# Patient Record
Sex: MALE | Race: WHITE | NOT HISPANIC OR LATINO | Employment: OTHER | URBAN - METROPOLITAN AREA
[De-identification: names, ages, dates, MRNs, and addresses within clinical notes are randomized per-mention and may not be internally consistent; named-entity substitution may affect disease eponyms.]

---

## 2020-02-18 LAB — CRC RECOMMENDATION EXT: NORMAL

## 2020-02-26 ENCOUNTER — HISTORICAL (OUTPATIENT)
Dept: ADMINISTRATIVE | Facility: HOSPITAL | Age: 70
End: 2020-02-26

## 2020-02-26 LAB
ABS NEUT (OLG): 2.35 X10(3)/MCL (ref 2.1–9.2)
ALBUMIN SERPL-MCNC: 4.3 GM/DL (ref 3.4–5)
ALBUMIN/GLOB SERPL: 1.4 RATIO (ref 1.1–2)
ALP SERPL-CCNC: 108 UNIT/L (ref 50–136)
ALT SERPL-CCNC: 41 UNIT/L (ref 12–78)
AST SERPL-CCNC: 18 UNIT/L (ref 15–37)
BILIRUB SERPL-MCNC: 1.1 MG/DL (ref 0.2–1)
BILIRUBIN DIRECT+TOT PNL SERPL-MCNC: 0.3 MG/DL (ref 0–0.5)
BILIRUBIN DIRECT+TOT PNL SERPL-MCNC: 0.8 MG/DL (ref 0–0.8)
BUN SERPL-MCNC: 21 MG/DL (ref 7–18)
CALCIUM SERPL-MCNC: 9.6 MG/DL (ref 8.5–10.1)
CHLORIDE SERPL-SCNC: 103 MMOL/L (ref 98–107)
CHOLEST SERPL-MCNC: 126 MG/DL (ref 0–200)
CHOLEST/HDLC SERPL: 3.9 {RATIO} (ref 0–5)
CO2 SERPL-SCNC: 33 MMOL/L (ref 21–32)
CREAT SERPL-MCNC: 0.9 MG/DL (ref 0.7–1.3)
EOSINOPHIL NFR BLD MANUAL: 1 % (ref 0–8)
ERYTHROCYTE [DISTWIDTH] IN BLOOD BY AUTOMATED COUNT: 14.1 % (ref 11.5–17)
GLOBULIN SER-MCNC: 3.1 GM/DL (ref 2.4–3.5)
GLUCOSE SERPL-MCNC: 108 MG/DL (ref 74–106)
HCT VFR BLD AUTO: 44.2 % (ref 42–52)
HDLC SERPL-MCNC: 32 MG/DL (ref 35–60)
HGB BLD-MCNC: 14.2 GM/DL (ref 14–18)
LDLC SERPL CALC-MCNC: 72 MG/DL (ref 0–129)
LYMPHOCYTES NFR BLD MANUAL: 24 % (ref 13–40)
MCH RBC QN AUTO: 31.3 PG (ref 27–31)
MCHC RBC AUTO-ENTMCNC: 32.1 GM/DL (ref 33–36)
MCV RBC AUTO: 97.6 FL (ref 80–94)
METAMYELOCYTES NFR BLD MANUAL: 1 %
MONOCYTES NFR BLD MANUAL: 11 % (ref 2–11)
NEUTROPHILS NFR BLD MANUAL: 63 % (ref 47–80)
PLATELET # BLD AUTO: 189 X10(3)/MCL (ref 130–400)
PLATELET # BLD EST: NORMAL 10*3/UL
PMV BLD AUTO: 9.6 FL (ref 7.4–10.4)
POTASSIUM SERPL-SCNC: 4.5 MMOL/L (ref 3.5–5.1)
PROT SERPL-MCNC: 7.4 GM/DL (ref 6.4–8.2)
PSA SERPL-MCNC: 0.86 NG/ML (ref 0–4)
RBC # BLD AUTO: 4.53 X10(6)/MCL (ref 4.7–6.1)
RBC MORPH BLD: NORMAL
SODIUM SERPL-SCNC: 141 MMOL/L (ref 136–145)
TRIGL SERPL-MCNC: 112 MG/DL (ref 30–150)
TSH SERPL-ACNC: 1.09 MIU/L (ref 0.36–3.74)
VLDLC SERPL CALC-MCNC: 22 MG/DL
WBC # SPEC AUTO: 4.2 X10(3)/MCL (ref 4.5–11.5)

## 2021-02-25 ENCOUNTER — HISTORICAL (OUTPATIENT)
Dept: ADMINISTRATIVE | Facility: HOSPITAL | Age: 71
End: 2021-02-25

## 2021-02-25 LAB
ABS NEUT (OLG): 2.65 X10(3)/MCL (ref 2.1–9.2)
ALBUMIN SERPL-MCNC: 4.1 GM/DL (ref 3.4–4.8)
ALBUMIN/GLOB SERPL: 1.6 RATIO (ref 1.1–2)
ALP SERPL-CCNC: 99 UNIT/L (ref 40–150)
ALT SERPL-CCNC: 23 UNIT/L (ref 0–55)
ANISOCYTOSIS BLD QL SMEAR: 1
AST SERPL-CCNC: 22 UNIT/L (ref 5–34)
BASOPHILS NFR BLD MANUAL: 1 % (ref 0–2)
BILIRUB SERPL-MCNC: 1.2 MG/DL
BILIRUBIN DIRECT+TOT PNL SERPL-MCNC: 0.4 MG/DL (ref 0–0.5)
BILIRUBIN DIRECT+TOT PNL SERPL-MCNC: 0.8 MG/DL (ref 0–0.8)
BUN SERPL-MCNC: 29.8 MG/DL (ref 8.4–25.7)
CALCIUM SERPL-MCNC: 8.8 MG/DL (ref 8.8–10)
CHLORIDE SERPL-SCNC: 106 MMOL/L (ref 98–107)
CHOLEST SERPL-MCNC: 120 MG/DL
CHOLEST/HDLC SERPL: 4 {RATIO} (ref 0–5)
CO2 SERPL-SCNC: 29 MMOL/L (ref 23–31)
CREAT SERPL-MCNC: 0.87 MG/DL (ref 0.73–1.18)
ERYTHROCYTE [DISTWIDTH] IN BLOOD BY AUTOMATED COUNT: 15.3 % (ref 11.5–17)
GLOBULIN SER-MCNC: 2.5 GM/DL (ref 2.4–3.5)
GLUCOSE SERPL-MCNC: 96 MG/DL (ref 82–115)
HCT VFR BLD AUTO: 38.3 % (ref 42–52)
HDLC SERPL-MCNC: 31 MG/DL (ref 35–60)
HGB BLD-MCNC: 12 GM/DL (ref 14–18)
LDLC SERPL CALC-MCNC: 78 MG/DL (ref 50–140)
LYMPHOCYTES NFR BLD MANUAL: 22 % (ref 13–40)
MCH RBC QN AUTO: 30.3 PG (ref 27–31)
MCHC RBC AUTO-ENTMCNC: 31.3 GM/DL (ref 33–36)
MCV RBC AUTO: 96.7 FL (ref 80–94)
MICROCYTES BLD QL SMEAR: 1
MONOCYTES NFR BLD MANUAL: 11 % (ref 2–11)
MYELOCYTES NFR BLD MANUAL: 1 %
NEUTROPHILS NFR BLD MANUAL: 65 % (ref 47–80)
PLATELET # BLD AUTO: 181 X10(3)/MCL (ref 130–400)
PLATELET # BLD EST: NORMAL 10*3/UL
PMV BLD AUTO: 9.5 FL (ref 7.4–10.4)
POTASSIUM SERPL-SCNC: 4.3 MMOL/L (ref 3.5–5.1)
PROT SERPL-MCNC: 6.6 GM/DL (ref 5.8–7.6)
PSA SERPL-MCNC: 0.95 NG/ML
RBC # BLD AUTO: 3.96 X10(6)/MCL (ref 4.7–6.1)
RBC MORPH BLD: NORMAL
SODIUM SERPL-SCNC: 142 MMOL/L (ref 136–145)
TRIGL SERPL-MCNC: 56 MG/DL (ref 34–140)
TSH SERPL-ACNC: 0.63 UIU/ML (ref 0.35–4.94)
VLDLC SERPL CALC-MCNC: 11 MG/DL
WBC # SPEC AUTO: 4.6 X10(3)/MCL (ref 4.5–11.5)

## 2022-03-03 ENCOUNTER — EXTERNAL HOSPITAL ADMISSION (OUTPATIENT)
Dept: ADMINISTRATIVE | Facility: CLINIC | Age: 72
End: 2022-03-03

## 2022-03-03 ENCOUNTER — PATIENT OUTREACH (OUTPATIENT)
Dept: ADMINISTRATIVE | Facility: CLINIC | Age: 72
End: 2022-03-03

## 2022-03-03 NOTE — PROGRESS NOTES
C3 nurse attempted to contact Hussein T Carlos Salmeron for a TCC post hospital discharge follow up call. No answer. Left voicemail with callback information. The patient does not have a scheduled HOSFU appointment, and the pt does not have an [facility] PCP.    C3 nurse attempted to contact Hussein ALLEN Carlos Salmeron for a TCC post hospital discharge follow up call. No answer. The patient does not have a scheduled HOSFU appointment, and the pt does not have an [facility] PCP.    C3 nurse spoke with Hussein Gonzalez JrKell's wife for a TCC post hospital discharge follow up call. The patient has a scheduled HOSFU appointment with Dr. Coppola on 3/16/2022.

## 2022-03-04 RX ORDER — LISINOPRIL AND HYDROCHLOROTHIAZIDE 20; 25 MG/1; MG/1
1 TABLET ORAL DAILY
COMMUNITY
End: 2022-06-27 | Stop reason: SDUPTHER

## 2022-03-04 RX ORDER — ATORVASTATIN CALCIUM 80 MG/1
80 TABLET, FILM COATED ORAL DAILY
COMMUNITY
End: 2023-08-16

## 2022-03-04 RX ORDER — ASPIRIN 325 MG
325 TABLET ORAL DAILY
Status: ON HOLD | COMMUNITY
End: 2023-01-21

## 2022-03-04 RX ORDER — CHOLECALCIFEROL (VITAMIN D3) 25 MCG
1000 TABLET ORAL DAILY
COMMUNITY

## 2022-03-04 RX ORDER — VITAMIN E (DL,TOCOPHERYL ACET) 22.5 MG/ML
DROPS ORAL
COMMUNITY
End: 2023-08-24

## 2022-03-04 RX ORDER — AMOXICILLIN 500 MG
1 CAPSULE ORAL EVERY OTHER DAY
Status: ON HOLD | COMMUNITY
End: 2023-06-27 | Stop reason: CLARIF

## 2022-03-04 RX ORDER — TAMSULOSIN HYDROCHLORIDE 0.4 MG/1
CAPSULE ORAL DAILY
COMMUNITY
End: 2022-09-06

## 2022-03-04 RX ORDER — CITALOPRAM 40 MG/1
40 TABLET, FILM COATED ORAL DAILY
COMMUNITY
End: 2022-10-03

## 2022-03-04 RX ORDER — CEFDINIR 300 MG/1
300 CAPSULE ORAL 2 TIMES DAILY
COMMUNITY
End: 2022-10-04

## 2022-03-04 RX ORDER — PYRIDOXINE HCL (VITAMIN B6) 100 MG
100 TABLET ORAL EVERY OTHER DAY
COMMUNITY

## 2022-04-10 ENCOUNTER — HISTORICAL (OUTPATIENT)
Dept: ADMINISTRATIVE | Facility: HOSPITAL | Age: 72
End: 2022-04-10

## 2022-04-28 VITALS
BODY MASS INDEX: 27.23 KG/M2 | HEIGHT: 73 IN | SYSTOLIC BLOOD PRESSURE: 120 MMHG | DIASTOLIC BLOOD PRESSURE: 78 MMHG | WEIGHT: 205.5 LBS | OXYGEN SATURATION: 97 %

## 2022-06-03 ENCOUNTER — DOCUMENTATION ONLY (OUTPATIENT)
Dept: ADMINISTRATIVE | Facility: HOSPITAL | Age: 72
End: 2022-06-03

## 2022-06-27 ENCOUNTER — TELEPHONE (OUTPATIENT)
Dept: INTERNAL MEDICINE | Facility: CLINIC | Age: 72
End: 2022-06-27

## 2022-06-27 DIAGNOSIS — I10 HYPERTENSION, UNSPECIFIED TYPE: Primary | ICD-10-CM

## 2022-06-27 RX ORDER — LISINOPRIL AND HYDROCHLOROTHIAZIDE 20; 25 MG/1; MG/1
1 TABLET ORAL DAILY
Qty: 90 TABLET | Refills: 2 | Status: SHIPPED | OUTPATIENT
Start: 2022-06-27 | End: 2022-10-04

## 2022-06-27 NOTE — TELEPHONE ENCOUNTER
----- Message from Tiana Monae sent at 6/27/2022  3:43 PM CDT -----  Regarding: REFILL  MEDICATION WAS CALLED IN 2 AT DIFFERENT STRENGHTS    LISINOPRIL  HCTZ 20/12.5 MG AND 20/25MG      NEED TO KNOW WHICH ONE IS HE SUPPOSED TO BE TAKING    [facility]  [birth year]  [phone]----INDIO (CARETAKER/GIRLFRIEND)

## 2022-09-26 RX ORDER — LISINOPRIL AND HYDROCHLOROTHIAZIDE 12.5; 2 MG/1; MG/1
TABLET ORAL
Qty: 90 TABLET | Refills: 0 | Status: SHIPPED | OUTPATIENT
Start: 2022-09-26 | End: 2023-01-09

## 2022-10-04 ENCOUNTER — OFFICE VISIT (OUTPATIENT)
Dept: INTERNAL MEDICINE | Facility: CLINIC | Age: 72
End: 2022-10-04
Payer: MEDICARE

## 2022-10-04 VITALS
SYSTOLIC BLOOD PRESSURE: 104 MMHG | HEIGHT: 73 IN | HEART RATE: 75 BPM | WEIGHT: 203.63 LBS | DIASTOLIC BLOOD PRESSURE: 62 MMHG | TEMPERATURE: 98 F | OXYGEN SATURATION: 98 % | BODY MASS INDEX: 26.99 KG/M2

## 2022-10-04 DIAGNOSIS — F41.9 ANXIETY AND DEPRESSION: ICD-10-CM

## 2022-10-04 DIAGNOSIS — C43.9 MALIGNANT MELANOMA OF SKIN, UNSPECIFIED: ICD-10-CM

## 2022-10-04 DIAGNOSIS — F32.A ANXIETY AND DEPRESSION: ICD-10-CM

## 2022-10-04 DIAGNOSIS — N40.0 BENIGN PROSTATIC HYPERPLASIA, UNSPECIFIED WHETHER LOWER URINARY TRACT SYMPTOMS PRESENT: ICD-10-CM

## 2022-10-04 DIAGNOSIS — I10 PRIMARY HYPERTENSION: ICD-10-CM

## 2022-10-04 PROCEDURE — 99214 OFFICE O/P EST MOD 30 MIN: CPT | Mod: ,,, | Performed by: INTERNAL MEDICINE

## 2022-10-04 PROCEDURE — 99214 PR OFFICE/OUTPT VISIT, EST, LEVL IV, 30-39 MIN: ICD-10-PCS | Mod: ,,, | Performed by: INTERNAL MEDICINE

## 2022-10-04 RX ORDER — LANOLIN ALCOHOL/MO/W.PET/CERES
1 CREAM (GRAM) TOPICAL
Status: ON HOLD | COMMUNITY
End: 2023-06-27 | Stop reason: CLARIF

## 2022-10-04 NOTE — ASSESSMENT & PLAN NOTE
-has needed resection twice, encouraged to keep her regular follow-up with dermatology for mole check

## 2022-10-04 NOTE — ASSESSMENT & PLAN NOTE
Continue lisinopril hydrochlorothiazide 20-12.5 mg p.o. daily  Low Sodium Diet (DASH Diet - Less than 2 grams of sodium per day).  Monitor blood pressure daily and log. Report consistent numbers greater than 140/90.  Maintain healthy weight with goal BMI <30. Exercise 30 minutes per day, 5 days per week.  Smoking cessation encouraged to aid in BP reduction.

## 2022-10-04 NOTE — PROGRESS NOTES
Subjective:      Patient ID: Hussein Gonzalez Jr. is a 72 y.o. male.    Chief Complaint: No chief complaint on file.    Mr. Savage is a 71-year-old male here today for wellness visit. 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    Reports feeling generally well since last visit without any acute issues.     Wellness: 4/5/2020  CRS: February 2020; every 10yrs  Pneumococcal: Declined    Cardiology: Dr. Kelley  Urology: Dr. Salas  Infectious disease: Dr. Aguilar    The patient's Health Maintenance was reviewed and the following appears to be due at this time:   Health Maintenance Due   Topic Date Due    Hepatitis C Screening  Never done    COVID-19 Vaccine (1) Never done    TETANUS VACCINE  Never done    Shingles Vaccine (1 of 2) Never done    Pneumococcal Vaccines (Age 65+) (1 - PCV) Never done    Influenza Vaccine (1) Never done        Past Medical History:  Past Medical History:   Diagnosis Date    Anxiety and depression     BPH (benign prostatic hyperplasia)     CVA (cerebrovascular accident)     HTN (hypertension)     Malignant melanoma of skin, unspecified     NSTEMI (non-ST elevated myocardial infarction)     Sepsis, Gram negative      Past Surgical History:   Procedure Laterality Date    COLONOSCOPY      EXCISION OF MELANOMA      SINUS SURGERY      TONSILLECTOMY       Review of patient's allergies indicates:   Allergen Reactions    Acetaminophen     Chlorpheniramine-dextromethorp     Doxylamine-dm-acetaminophen      Other reaction(s): Feeling jittery     Social History     Socioeconomic History    Marital status:      Family History   Problem Relation Age of Onset    Cancer Mother     Cancer Father        Review of Systems    A comprehensive review of systems was performed and is negative except for that stated above  Objective:   There were no vitals taken for this visit.    Physical Exam  Constitutional:        Appearance: Normal appearance.   HENT:      Head: Normocephalic and atraumatic.      Nose: Nose normal.      Mouth/Throat:      Mouth: Mucous membranes are moist.      Pharynx: Oropharynx is clear.   Eyes:      Extraocular Movements: Extraocular movements intact.      Pupils: Pupils are equal, round, and reactive to light.   Cardiovascular:      Rate and Rhythm: Normal rate and regular rhythm.      Pulses: Normal pulses.   Pulmonary:      Effort: Pulmonary effort is normal.      Breath sounds: Normal breath sounds.   Abdominal:      General: Bowel sounds are normal.      Palpations: Abdomen is soft.   Musculoskeletal:         General: Normal range of motion.      Cervical back: Normal range of motion and neck supple.   Skin:     General: Skin is warm.   Neurological:      General: No focal deficit present.      Mental Status: He is alert and oriented to person, place, and time. Mental status is at baseline.   Psychiatric:         Mood and Affect: Mood normal.     Assessment/ Plan:   {There are no diagnoses linked to this encounter. (Refresh or delete this SmartLink)}

## 2022-10-04 NOTE — PROGRESS NOTES
Subjective:      Patient ID: Hussein Gonzalez Jr. is a 72 y.o. male.    Chief Complaint: Follow-up (6 month)  Mr. Ramirez is a 72-year-old gentleman who is here today for his 6 month follow-up.   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   No acute needs or complaints as such today.  Doing well overall     Wellness: 4/5/2021  CRS: February 2020; every 10yrs  Pneumococcal: Declined    Cardiology: Dr. Hussein  Urology: Dr. Salas  Dermatology: Dr. Mason    The patient's Health Maintenance was reviewed and the following appears to be due at this time:   There are no preventive care reminders to display for this patient.     Past Medical History:  Past Medical History:   Diagnosis Date    Anxiety and depression     BPH (benign prostatic hyperplasia)     CVA (cerebrovascular accident)     HTN (hypertension)     Malignant melanoma of skin, unspecified     NSTEMI (non-ST elevated myocardial infarction)     Sepsis, Gram negative      Past Surgical History:   Procedure Laterality Date    COLONOSCOPY      EXCISION OF MELANOMA      SINUS SURGERY      TONSILLECTOMY       Review of patient's allergies indicates:   Allergen Reactions    Acetaminophen     Chlorpheniramine-dextromethorp     Doxylamine-dm-acetaminophen      Other reaction(s): Feeling jittery     Social History     Socioeconomic History    Marital status: Significant Other   Tobacco Use    Smoking status: Never    Smokeless tobacco: Never   Substance and Sexual Activity    Alcohol use: Yes     Alcohol/week: 7.0 standard drinks     Types: 7 Cans of beer per week    Drug use: Never    Sexual activity: Yes     Family History   Problem Relation Age of Onset    Cancer Mother     Cancer Father        Review of Systems    A comprehensive review of systems was performed and is negative except for that stated above  Objective:   BP  "104/62 (BP Location: Left arm, Patient Position: Sitting, BP Method: Large (Manual))   Pulse 75   Temp 98.1 °F (36.7 °C) (Temporal)   Ht 6' 1" (1.854 m)   Wt 92.4 kg (203 lb 9.6 oz)   SpO2 98%   BMI 26.86 kg/m²     Physical Exam  Constitutional:       Appearance: Normal appearance.   HENT:      Head: Normocephalic and atraumatic.      Nose: Nose normal.      Mouth/Throat:      Mouth: Mucous membranes are moist.      Pharynx: Oropharynx is clear.   Eyes:      Extraocular Movements: Extraocular movements intact.      Pupils: Pupils are equal, round, and reactive to light.   Cardiovascular:      Rate and Rhythm: Normal rate and regular rhythm.      Pulses: Normal pulses.   Pulmonary:      Effort: Pulmonary effort is normal.      Breath sounds: Normal breath sounds.   Abdominal:      General: Bowel sounds are normal.      Palpations: Abdomen is soft.   Musculoskeletal:         General: Normal range of motion.      Cervical back: Normal range of motion and neck supple.   Skin:     General: Skin is warm.   Neurological:      General: No focal deficit present.      Mental Status: He is alert and oriented to person, place, and time. Mental status is at baseline.   Psychiatric:         Mood and Affect: Mood normal.     Assessment/ Plan:   1. Primary hypertension  Assessment & Plan:    Continue lisinopril hydrochlorothiazide 20-12.5 mg p.o. daily  Low Sodium Diet (DASH Diet - Less than 2 grams of sodium per day).  Monitor blood pressure daily and log. Report consistent numbers greater than 140/90.  Maintain healthy weight with goal BMI <30. Exercise 30 minutes per day, 5 days per week.  Smoking cessation encouraged to aid in BP reduction.            2. Benign prostatic hyperplasia, unspecified whether lower urinary tract symptoms present  Assessment & Plan:  -symptoms well controlled on tamsulosin, continue   -followed by Urology      3. Anxiety and depression  Assessment & Plan:  On Celexa,  Continue   Practice deep " breathing or abdominal breathing exercises when anxiety occurs.  Exercise daily. Get sunlight daily.  Avoid caffeine, alcohol and stimulants.  Practice positive phrases and repeat throughout the day, along with yoga and relaxation techniques.  Set healthy boundaries, avoid people and conversations that increase stress.  Educated patient on the risks of serotonin based medications such as serotonin modulators and SSRIs/SNRIs including common side effects of nausea, GI upset, headache dizziness as well as the rare risk for worsening symptoms of depression including development of suicidal thoughts or ideations, and serotonin syndrome.         4. Malignant melanoma of skin, unspecified  Assessment & Plan:  -has needed resection twice, encouraged to keep her regular follow-up with dermatology for mole check

## 2022-10-04 NOTE — ASSESSMENT & PLAN NOTE
On Celexa,  Continue   Practice deep breathing or abdominal breathing exercises when anxiety occurs.  Exercise daily. Get sunlight daily.  Avoid caffeine, alcohol and stimulants.  Practice positive phrases and repeat throughout the day, along with yoga and relaxation techniques.  Set healthy boundaries, avoid people and conversations that increase stress.  Educated patient on the risks of serotonin based medications such as serotonin modulators and SSRIs/SNRIs including common side effects of nausea, GI upset, headache dizziness as well as the rare risk for worsening symptoms of depression including development of suicidal thoughts or ideations, and serotonin syndrome.

## 2022-12-14 ENCOUNTER — TELEPHONE (OUTPATIENT)
Dept: INTERNAL MEDICINE | Facility: CLINIC | Age: 72
End: 2022-12-14

## 2022-12-14 ENCOUNTER — OFFICE VISIT (OUTPATIENT)
Dept: INTERNAL MEDICINE | Facility: CLINIC | Age: 72
End: 2022-12-14
Payer: MEDICARE

## 2022-12-14 ENCOUNTER — HOSPITAL ENCOUNTER (OUTPATIENT)
Dept: RADIOLOGY | Facility: HOSPITAL | Age: 72
Discharge: HOME | End: 2022-12-14
Payer: MEDICARE

## 2022-12-14 VITALS
DIASTOLIC BLOOD PRESSURE: 82 MMHG | HEART RATE: 65 BPM | BODY MASS INDEX: 26.64 KG/M2 | OXYGEN SATURATION: 97 % | SYSTOLIC BLOOD PRESSURE: 137 MMHG | HEIGHT: 73 IN | WEIGHT: 201 LBS | TEMPERATURE: 99 F

## 2022-12-14 DIAGNOSIS — R06.02 SHORTNESS OF BREATH: ICD-10-CM

## 2022-12-14 DIAGNOSIS — T14.8XXA MUSCLE STRAIN: Primary | ICD-10-CM

## 2022-12-14 DIAGNOSIS — T14.8XXA SKIN ABRASION: ICD-10-CM

## 2022-12-14 PROCEDURE — 99214 PR OFFICE/OUTPT VISIT, EST, LEVL IV, 30-39 MIN: ICD-10-PCS | Mod: ,,,

## 2022-12-14 PROCEDURE — 71046 X-RAY EXAM CHEST 2 VIEWS: CPT | Mod: TC

## 2022-12-14 PROCEDURE — 99214 OFFICE O/P EST MOD 30 MIN: CPT | Mod: ,,,

## 2022-12-14 RX ORDER — CYCLOBENZAPRINE HCL 5 MG
5 TABLET ORAL NIGHTLY
Qty: 4 TABLET | Refills: 0 | Status: SHIPPED | OUTPATIENT
Start: 2022-12-14 | End: 2022-12-18

## 2022-12-14 RX ORDER — MUPIROCIN 20 MG/G
OINTMENT TOPICAL 3 TIMES DAILY
Qty: 15 G | Refills: 0 | Status: SHIPPED | OUTPATIENT
Start: 2022-12-14 | End: 2022-12-21

## 2022-12-14 RX ORDER — MELOXICAM 15 MG/1
15 TABLET ORAL DAILY PRN
Qty: 14 TABLET | Refills: 0 | Status: SHIPPED | OUTPATIENT
Start: 2022-12-14 | End: 2022-12-28

## 2022-12-14 NOTE — PROGRESS NOTES
Patient ID: Hussein Gonzalez Jr. is a 72 y.o. male.    Chief Complaint: Follow-up    72-year-old gentleman who is here today for  a requested visit.   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  Since last visit patient reports left-sided flank discomfort times 1-2 weeks.  Denies recent over exertions for associated traumas.  Symptoms triggered with range of motion of back.  No bony tenderness noted on exam.  Will initiate on trial on Mobic as well as Flexeril.  Also with some complaints of left near ulcer s/p picking for which we will initiate him on mupirocin topical.  Does have some complaints of occasional shortness of breath /fatigue after previous URI approximately 3-4 weeks ago.    Denies cough, fever, chest pain, orthopnea.    Vital signs stable in no acute distress during office visit. Of note,   Self-reported negative stress test with Cardiology this past August. Will obtain chest x-ray.      Wellness: 4/5/2021  CRS: February 2020; every 10yrs    Cardiology: Dr. Hussein  Urology: Dr. Salas  Dermatology: Dr. Mason      MEDICAL HISTORY:    Past Medical History:   Diagnosis Date    Anxiety and depression     BPH (benign prostatic hyperplasia)     CVA (cerebrovascular accident)     HTN (hypertension)     Malignant melanoma of skin, unspecified     NSTEMI (non-ST elevated myocardial infarction)     Sepsis, Gram negative       Past Surgical History:   Procedure Laterality Date    COLONOSCOPY      EXCISION OF MELANOMA      SINUS SURGERY      TONSILLECTOMY        Social History     Tobacco Use    Smoking status: Never    Smokeless tobacco: Never   Substance Use Topics    Alcohol use: Yes     Alcohol/week: 7.0 standard drinks     Types: 7 Cans of beer per week    Drug use: Never          Health Maintenance Due   Topic Date Due    Hepatitis C Screening  Never done     "      Patient Care Team:  Nayely Andres MD as PCP - General (Internal Medicine)  Nayely Andres MD      Review of Systems   Constitutional:  Negative for fatigue and fever.   HENT:  Negative for congestion, rhinorrhea, sore throat and trouble swallowing.    Eyes:  Negative for redness and visual disturbance.   Respiratory:  Positive for shortness of breath (Occasional). Negative for cough and chest tightness.    Cardiovascular:  Negative for chest pain and palpitations.   Gastrointestinal:  Negative for abdominal pain, constipation, diarrhea, nausea and vomiting.   Genitourinary:  Negative for dysuria, flank pain, frequency and urgency.   Musculoskeletal:  Positive for back pain and myalgias. Negative for arthralgias and gait problem.   Skin:  Negative for rash and wound.   Neurological:  Negative for facial asymmetry, speech difficulty, weakness and headaches.   All other systems reviewed and are negative.    Objective:   /82 (BP Location: Right arm, Patient Position: Sitting, BP Method: Large (Manual))   Pulse 65   Temp 98.6 °F (37 °C)   Ht 6' 1" (1.854 m)   Wt 91.2 kg (201 lb)   SpO2 97%   BMI 26.52 kg/m²      Physical Exam  Constitutional:       General: He is not in acute distress.     Appearance: Normal appearance.   HENT:      Right Ear: Tympanic membrane, ear canal and external ear normal.      Left Ear: Tympanic membrane, ear canal and external ear normal.      Nose: Nose normal.      Mouth/Throat:      Mouth: Mucous membranes are moist.      Pharynx: Oropharynx is clear.   Eyes:      Extraocular Movements: Extraocular movements intact.      Conjunctiva/sclera: Conjunctivae normal.      Pupils: Pupils are equal, round, and reactive to light.   Cardiovascular:      Rate and Rhythm: Normal rate and regular rhythm.      Pulses: Normal pulses.      Heart sounds: Normal heart sounds. No murmur heard.    No gallop.   Pulmonary:      Effort: Pulmonary effort is normal.      Breath sounds: " Normal breath sounds. No wheezing.   Abdominal:      General: Bowel sounds are normal. There is no distension.      Palpations: Abdomen is soft. There is no mass.      Tenderness: There is no abdominal tenderness. There is no right CVA tenderness (none), left CVA tenderness (none) or guarding.   Musculoskeletal:      Cervical back: Normal. No signs of trauma.      Thoracic back: Normal. No signs of trauma.      Lumbar back: No signs of trauma. Decreased range of motion (due to discomfort).        Back:    Skin:     General: Skin is warm and dry.   Neurological:      Mental Status: He is alert. Mental status is at baseline.      Sensory: No sensory deficit.      Motor: No weakness.         Assessment:       ICD-10-CM ICD-9-CM   1. Muscle strain  T14.8XXA 848.9   2. Shortness of breath  R06.02 786.05   3. Skin abrasion  T14.8XXA 919.0        Plan:     Problem List Items Addressed This Visit          Pulmonary    Shortness of breath    Relevant Orders    X-Ray Chest PA And Lateral (Completed)       Orthopedic    Muscle strain - Primary    Relevant Medications    meloxicam (MOBIC) 15 MG tablet    cyclobenzaprine (FLEXERIL) 5 MG tablet    Skin abrasion    Relevant Medications    mupirocin (BACTROBAN) 2 % ointment          Follow up for Previously scheduled and PRN if need.   -plan specifics discussed above    Orders Placed This Encounter    X-Ray Chest PA And Lateral    meloxicam (MOBIC) 15 MG tablet    cyclobenzaprine (FLEXERIL) 5 MG tablet    mupirocin (BACTROBAN) 2 % ointment        Medication List with Changes/Refills   New Medications    CYCLOBENZAPRINE (FLEXERIL) 5 MG TABLET    Take 1 tablet (5 mg total) by mouth nightly. for 4 days    MELOXICAM (MOBIC) 15 MG TABLET    Take 1 tablet (15 mg total) by mouth daily as needed for Pain.    MUPIROCIN (BACTROBAN) 2 % OINTMENT    Apply topically 3 (three) times daily. for 7 days   Current Medications    ASPIRIN 325 MG TABLET    Take 325 mg by mouth once daily.     ATORVASTATIN (LIPITOR) 80 MG TABLET    Take 80 mg by mouth once daily.    CITALOPRAM (CELEXA) 40 MG TABLET    Take 1 tablet by mouth once daily    FERROUS SULFATE (FEOSOL) TAB TABLET    Take 1 tablet by mouth daily with breakfast.    LACTOBACILLUS RHAMNOSUS GG (CULTURELLE) 10 BILLION CELL CAPSULE    Take 1 capsule by mouth once daily.    LISINOPRIL-HYDROCHLOROTHIAZIDE (PRINZIDE,ZESTORETIC) 20-12.5 MG PER TABLET    Take 1 tablet by mouth once daily    OMEGA-3 FATTY ACIDS/FISH OIL (FISH OIL-OMEGA-3 FATTY ACIDS) 300-1,000 MG CAPSULE    Take 1 capsule by mouth every other day.    PYRIDOXINE, VITAMIN B6, (B-6) 100 MG TAB    Take 100 mg by mouth every other day.    TAMSULOSIN (FLOMAX) 0.4 MG CAP    TAKE 1 CAPSULE BY MOUTH ONCE DAILY IN THE EVENING    VITAMIN D (VITAMIN D3) 1000 UNITS TAB    Take 1,000 Units by mouth once daily.    VITAMIN E, DL, ACETATE, 22.5 MG (50 UNIT)/ML DROP    Take by mouth.

## 2022-12-14 NOTE — TELEPHONE ENCOUNTER
----- Message from JULITO Snyder sent at 12/14/2022  1:01 PM CST -----  Please notify patient his chest x-ray was normal.  No pneumonia or lung issue noted

## 2023-01-02 DIAGNOSIS — F41.9 ANXIETY AND DEPRESSION: Primary | ICD-10-CM

## 2023-01-02 DIAGNOSIS — F32.A ANXIETY AND DEPRESSION: Primary | ICD-10-CM

## 2023-01-03 RX ORDER — CITALOPRAM 40 MG/1
TABLET, FILM COATED ORAL
Qty: 90 TABLET | Refills: 0 | Status: SHIPPED | OUTPATIENT
Start: 2023-01-03 | End: 2023-03-27 | Stop reason: SDUPTHER

## 2023-01-20 ENCOUNTER — TELEPHONE (OUTPATIENT)
Dept: INTERNAL MEDICINE | Facility: CLINIC | Age: 73
End: 2023-01-20
Payer: MEDICARE

## 2023-01-20 ENCOUNTER — HOSPITAL ENCOUNTER (INPATIENT)
Facility: HOSPITAL | Age: 73
LOS: 5 days | Discharge: HOME | DRG: 808 | End: 2023-01-25
Attending: EMERGENCY MEDICINE | Admitting: INTERNAL MEDICINE
Payer: MEDICARE

## 2023-01-20 DIAGNOSIS — R55 SYNCOPE: ICD-10-CM

## 2023-01-20 DIAGNOSIS — R55 SYNCOPE, UNSPECIFIED SYNCOPE TYPE: ICD-10-CM

## 2023-01-20 DIAGNOSIS — K92.2 ACUTE GI BLEEDING: ICD-10-CM

## 2023-01-20 DIAGNOSIS — D61.818 PANCYTOPENIA: Primary | ICD-10-CM

## 2023-01-20 DIAGNOSIS — R53.1 GENERALIZED WEAKNESS: ICD-10-CM

## 2023-01-20 DIAGNOSIS — K92.2 GASTROINTESTINAL HEMORRHAGE, UNSPECIFIED GASTROINTESTINAL HEMORRHAGE TYPE: ICD-10-CM

## 2023-01-20 LAB
ABORH RETYPE: NORMAL
ABS NEUT (OLG): 1.14 X10(3)/MCL (ref 2.1–9.2)
ALBUMIN SERPL-MCNC: 3.6 G/DL (ref 3.4–4.8)
ALBUMIN/GLOB SERPL: 1.1 RATIO (ref 1.1–2)
ALP SERPL-CCNC: 103 UNIT/L (ref 40–150)
ALT SERPL-CCNC: 14 UNIT/L (ref 0–55)
ANISOCYTOSIS BLD QL SMEAR: ABNORMAL
AST SERPL-CCNC: 16 UNIT/L (ref 5–34)
BILIRUBIN DIRECT+TOT PNL SERPL-MCNC: 0.6 MG/DL
BUN SERPL-MCNC: 24.2 MG/DL (ref 8.4–25.7)
CALCIUM SERPL-MCNC: 9.2 MG/DL (ref 8.8–10)
CHLORIDE SERPL-SCNC: 104 MMOL/L (ref 98–107)
CO2 SERPL-SCNC: 27 MMOL/L (ref 23–31)
CREAT SERPL-MCNC: 1.09 MG/DL (ref 0.73–1.18)
DIRECT COOMBS (DAT): NORMAL
ELLIPTOCYTOSIS (OHS): ABNORMAL
ERYTHROCYTE [DISTWIDTH] IN BLOOD BY AUTOMATED COUNT: 19.9 % (ref 11.5–17)
FOLATE SERPL-MCNC: 8.7 NG/ML (ref 7–31.4)
GFR SERPLBLD CREATININE-BSD FMLA CKD-EPI: >60 MLS/MIN/1.73/M2
GLOBULIN SER-MCNC: 3.4 GM/DL (ref 2.4–3.5)
GLUCOSE SERPL-MCNC: 110 MG/DL (ref 82–115)
GROUP & RH: NORMAL
HCT VFR BLD AUTO: 27.1 % (ref 42–52)
HGB BLD-MCNC: 7.7 GM/DL (ref 14–18)
HYPOCHROMIA BLD QL SMEAR: ABNORMAL
IMM GRANULOCYTES # BLD AUTO: 0.02 X10(3)/MCL (ref 0–0.04)
IMM GRANULOCYTES NFR BLD AUTO: 0.8 %
INDIRECT COOMBS GEL: NORMAL
INSTRUMENT WBC (OLG): 2.6 X10(3)/MCL
IRON SATN MFR SERPL: 23 % (ref 20–50)
IRON SERPL-MCNC: 61 UG/DL (ref 65–175)
LDH SERPL-CCNC: 185 U/L (ref 125–220)
LYMPHOCYTES NFR BLD MANUAL: 1.22 X10(3)/MCL
LYMPHOCYTES NFR BLD MANUAL: 47 %
MCH RBC QN AUTO: 22.3 PG
MCHC RBC AUTO-ENTMCNC: 28.4 MG/DL (ref 33–36)
MCV RBC AUTO: 78.6 FL (ref 80–94)
MICROCYTES BLD QL SMEAR: ABNORMAL
MONOCYTES NFR BLD MANUAL: 0.23 X10(3)/MCL (ref 0.1–1.3)
MONOCYTES NFR BLD MANUAL: 9 %
NEUTROPHILS NFR BLD MANUAL: 44 %
NRBC BLD AUTO-RTO: 0 %
PLATELET # BLD AUTO: 47 X10(3)/MCL (ref 130–400)
PLATELET # BLD EST: ABNORMAL 10*3/UL
PMV BLD AUTO: ABNORMAL FL
POIKILOCYTOSIS BLD QL SMEAR: ABNORMAL
POTASSIUM SERPL-SCNC: 3.9 MMOL/L (ref 3.5–5.1)
PROT SERPL-MCNC: 7 GM/DL (ref 5.8–7.6)
RBC # BLD AUTO: 3.45 X10(6)/MCL (ref 4.7–6.1)
RET# (OHS): 0.16 (ref 0.03–0.1)
RETICULOCYTE COUNT AUTOMATED (OLG): 4.66 % (ref 1.1–2.1)
SARS-COV-2 RDRP RESP QL NAA+PROBE: NEGATIVE
SODIUM SERPL-SCNC: 136 MMOL/L (ref 136–145)
STOMATOCYTES (OLG): ABNORMAL
TEAR DROP CELL (OLG): ABNORMAL
TIBC SERPL-MCNC: 203 UG/DL (ref 69–240)
TIBC SERPL-MCNC: 264 UG/DL (ref 250–450)
TRANSFERRIN SERPL-MCNC: 235 MG/DL (ref 163–344)
TROPONIN I SERPL-MCNC: <0.01 NG/ML (ref 0–0.04)
VIT B12 SERPL-MCNC: >2000 PG/ML (ref 213–816)
WBC # SPEC AUTO: 2.6 X10(3)/MCL (ref 4.5–11.5)

## 2023-01-20 PROCEDURE — 85025 COMPLETE CBC W/AUTO DIFF WBC: CPT | Performed by: NURSE PRACTITIONER

## 2023-01-20 PROCEDURE — 85027 COMPLETE CBC AUTOMATED: CPT | Performed by: NURSE PRACTITIONER

## 2023-01-20 PROCEDURE — 11000001 HC ACUTE MED/SURG PRIVATE ROOM

## 2023-01-20 PROCEDURE — 36430 TRANSFUSION BLD/BLD COMPNT: CPT

## 2023-01-20 PROCEDURE — 82746 ASSAY OF FOLIC ACID SERUM: CPT | Performed by: EMERGENCY MEDICINE

## 2023-01-20 PROCEDURE — 84484 ASSAY OF TROPONIN QUANT: CPT | Performed by: NURSE PRACTITIONER

## 2023-01-20 PROCEDURE — 80053 COMPREHEN METABOLIC PANEL: CPT | Performed by: NURSE PRACTITIONER

## 2023-01-20 PROCEDURE — 85045 AUTOMATED RETICULOCYTE COUNT: CPT | Performed by: EMERGENCY MEDICINE

## 2023-01-20 PROCEDURE — 86900 BLOOD TYPING SEROLOGIC ABO: CPT | Performed by: EMERGENCY MEDICINE

## 2023-01-20 PROCEDURE — 21400001 HC TELEMETRY ROOM

## 2023-01-20 PROCEDURE — 99291 CRITICAL CARE FIRST HOUR: CPT

## 2023-01-20 PROCEDURE — 87635 SARS-COV-2 COVID-19 AMP PRB: CPT | Performed by: INTERNAL MEDICINE

## 2023-01-20 PROCEDURE — 63600175 PHARM REV CODE 636 W HCPCS: Performed by: EMERGENCY MEDICINE

## 2023-01-20 PROCEDURE — 86923 COMPATIBILITY TEST ELECTRIC: CPT | Mod: 91 | Performed by: EMERGENCY MEDICINE

## 2023-01-20 PROCEDURE — C9113 INJ PANTOPRAZOLE SODIUM, VIA: HCPCS | Performed by: EMERGENCY MEDICINE

## 2023-01-20 PROCEDURE — 93005 ELECTROCARDIOGRAM TRACING: CPT

## 2023-01-20 PROCEDURE — 83615 LACTATE (LD) (LDH) ENZYME: CPT | Performed by: EMERGENCY MEDICINE

## 2023-01-20 PROCEDURE — P9016 RBC LEUKOCYTES REDUCED: HCPCS | Performed by: EMERGENCY MEDICINE

## 2023-01-20 PROCEDURE — 82607 VITAMIN B-12: CPT | Performed by: EMERGENCY MEDICINE

## 2023-01-20 PROCEDURE — 83550 IRON BINDING TEST: CPT | Performed by: EMERGENCY MEDICINE

## 2023-01-20 PROCEDURE — 86880 COOMBS TEST DIRECT: CPT | Performed by: EMERGENCY MEDICINE

## 2023-01-20 PROCEDURE — 25000003 PHARM REV CODE 250: Performed by: EMERGENCY MEDICINE

## 2023-01-20 RX ORDER — TALC
6 POWDER (GRAM) TOPICAL NIGHTLY PRN
Status: DISCONTINUED | OUTPATIENT
Start: 2023-01-20 | End: 2023-01-25 | Stop reason: HOSPADM

## 2023-01-20 RX ORDER — ONDANSETRON 2 MG/ML
4 INJECTION INTRAMUSCULAR; INTRAVENOUS EVERY 8 HOURS PRN
Status: DISCONTINUED | OUTPATIENT
Start: 2023-01-20 | End: 2023-01-25 | Stop reason: HOSPADM

## 2023-01-20 RX ORDER — SODIUM CHLORIDE 0.9 % (FLUSH) 0.9 %
10 SYRINGE (ML) INJECTION
Status: DISCONTINUED | OUTPATIENT
Start: 2023-01-20 | End: 2023-01-25 | Stop reason: HOSPADM

## 2023-01-20 RX ORDER — PANTOPRAZOLE SODIUM 40 MG/10ML
80 INJECTION, POWDER, LYOPHILIZED, FOR SOLUTION INTRAVENOUS
Status: COMPLETED | OUTPATIENT
Start: 2023-01-20 | End: 2023-01-20

## 2023-01-20 RX ORDER — HYDROCODONE BITARTRATE AND ACETAMINOPHEN 500; 5 MG/1; MG/1
TABLET ORAL
Status: DISCONTINUED | OUTPATIENT
Start: 2023-01-20 | End: 2023-01-25 | Stop reason: HOSPADM

## 2023-01-20 RX ORDER — POLYETHYLENE GLYCOL 3350 17 G/17G
17 POWDER, FOR SOLUTION ORAL DAILY
Status: DISCONTINUED | OUTPATIENT
Start: 2023-01-21 | End: 2023-01-25 | Stop reason: HOSPADM

## 2023-01-20 RX ADMIN — PANTOPRAZOLE SODIUM 80 MG: 40 INJECTION, POWDER, FOR SOLUTION INTRAVENOUS at 06:01

## 2023-01-20 RX ADMIN — PANTOPRAZOLE SODIUM 8 MG/HR: 40 INJECTION, POWDER, FOR SOLUTION INTRAVENOUS at 06:01

## 2023-01-20 NOTE — FIRST PROVIDER EVALUATION
Medical screening examination initiated.  I have conducted a focused provider triage encounter, findings are as follows:    Brief history of present illness:  Patient states that he became dizzy PTA and then had an episode of syncope. Denies any injury.     There were no vitals filed for this visit.    Pertinent physical exam:  Awake, alert, ambulatory    Brief workup plan:  labs, EKG    Preliminary workup initiated; this workup will be continued and followed by the physician or advanced practice provider that is assigned to the patient when roomed.

## 2023-01-20 NOTE — ED PROVIDER NOTES
"Encounter Date: 1/20/2023    SCRIBE #1 NOTE: I, Melva Arnold, am scribing for, and in the presence of,  Ana Luisa Flores MD. I have scribed the following portions of the note - Other sections scribed: HPI,ROS,PE.     History     Chief Complaint   Patient presents with    Loss of Consciousness     C/o syncopal episode PTA. Patient states he got dizzy after turned away from the door and caused him to "black out" and fall. Pt denies cardiac history and other symptoms. Cardiologist Dr. Hussein.      This is a 72-year-old male with past medical history of CVA, HTN, BPH, and Melanoma presenting to ED following syncopal episode PTA. Pt states he was siting in a chair when he thought he heard someone at the door, states no one was there and when he was walking back to his chair felt sudden onset of dizziness prior to passing out and falling to the floor. Pt denies prior episodes, states he's been having dizziness, fatigue, weakness, SOB, back pain , and leg pain onset over the past 3x months. States he as been seen and had stress test and X-rays done without definitive diagnosis. Pt reports dizziness worsens when standing or walking, but reports relief when sitting down. Currently, pt notes new onset of right sided abdominal pain while sitting in room. Pt follows with Nayely Andres MD .    Cardiology: Dr. Hussein  Urology: Dr. Salas  Dermatology: Dr. Mason    The history is provided by the patient. No  was used.   Loss of Consciousness  This is a new problem. The current episode started 1 to 2 hours ago. Associated symptoms include abdominal pain and shortness of breath. Pertinent negatives include no chest pain and no headaches. Exacerbated by: Movement. Relieved by: Staying still.   Review of patient's allergies indicates:   Allergen Reactions    Acetaminophen     Chlorpheniramine-dextromethorp     Doxylamine-dm-acetaminophen      Other reaction(s): Feeling jittery     Past Medical " History:   Diagnosis Date    Anxiety and depression     BPH (benign prostatic hyperplasia)     CVA (cerebrovascular accident)     HTN (hypertension)     Malignant melanoma of skin, unspecified     NSTEMI (non-ST elevated myocardial infarction)     Sepsis, Gram negative      Past Surgical History:   Procedure Laterality Date    COLONOSCOPY      EXCISION OF MELANOMA      SINUS SURGERY      TONSILLECTOMY       Family History   Problem Relation Age of Onset    Cancer Mother     Cancer Father      Social History     Tobacco Use    Smoking status: Never    Smokeless tobacco: Never   Substance Use Topics    Alcohol use: Yes     Alcohol/week: 7.0 standard drinks     Types: 7 Cans of beer per week    Drug use: Never     Review of Systems   Constitutional:  Positive for fatigue. Negative for chills, diaphoresis and fever.   HENT:  Negative for congestion, ear pain, sinus pain and sore throat.    Eyes:  Negative for pain, discharge and visual disturbance.   Respiratory:  Positive for shortness of breath. Negative for cough, wheezing and stridor.    Cardiovascular:  Positive for syncope. Negative for chest pain and palpitations.   Gastrointestinal:  Positive for abdominal pain. Negative for constipation, diarrhea, nausea, rectal pain and vomiting.   Genitourinary:  Negative for dysuria and hematuria.   Musculoskeletal:  Positive for back pain. Negative for myalgias.   Skin:  Negative for rash.   Neurological:  Positive for dizziness, syncope and weakness. Negative for numbness and headaches.   Hematological: Negative.    Psychiatric/Behavioral: Negative.     All other systems reviewed and are negative.    Physical Exam     Initial Vitals [01/20/23 1310]   BP Pulse Resp Temp SpO2   121/73 75 15 98.4 °F (36.9 °C) 98 %      MAP       --         Physical Exam    Nursing note and vitals reviewed.  Constitutional: He appears well-developed and well-nourished. He is not diaphoretic. No distress.   Patient generally weak.    HENT:    Head: Normocephalic and atraumatic.   Right Ear: External ear normal.   Left Ear: External ear normal.   Nose: Nose normal.   Mouth/Throat: Oropharynx is clear and moist.   Tms clear on otoscopic exam   Eyes: EOM are normal. Pupils are equal, round, and reactive to light.   conjunctival pallor   Neck: Trachea normal. Neck supple.   Normal range of motion.  Cardiovascular:  Normal rate, regular rhythm, normal heart sounds and intact distal pulses.     Exam reveals no gallop and no friction rub.       No murmur heard.  Pulmonary/Chest: Breath sounds normal. No respiratory distress. He has no wheezes. He has no rhonchi. He has no rales. He exhibits no tenderness.   Abdominal: Abdomen is soft. Bowel sounds are normal. He exhibits no distension and no mass. There is no abdominal tenderness. There is no rebound.   Genitourinary: Rectum:      Guaiac result positive.   Guaiac positive stool.    Genitourinary Comments: Rectal Exam: Hard stool in rectal vault. Stool brown, hemoccult +.      Musculoskeletal:         General: No tenderness or edema. Normal range of motion.      Cervical back: Normal range of motion and neck supple.      Lumbar back: Normal. No tenderness. Normal range of motion.     Neurological: He is alert and oriented to person, place, and time. He has normal strength. No cranial nerve deficit or sensory deficit.   Skin: Skin is warm and dry. Capillary refill takes less than 2 seconds. No rash and no abscess noted. No erythema. There is pallor.   Psychiatric: He has a normal mood and affect. His behavior is normal. Judgment and thought content normal.       ED Course   Critical Care    Date/Time: 1/20/2023 7:22 PM  Performed by: Ana Luisa Flores MD  Authorized by: Ana Luisa Flores MD   Direct patient critical care time: 15 minutes  Additional history critical care time: 5 minutes  Ordering / reviewing critical care time: 12 minutes  Documentation critical care time: 10 minutes  Consulting other  physicians critical care time: 5 minutes  Total critical care time (exclusive of procedural time) : 47 minutes  Critical care was necessary to treat or prevent imminent or life-threatening deterioration of the following conditions: anemia.  Critical care was time spent personally by me on the following activities: development of treatment plan with patient or surrogate, discussions with consultants, evaluation of patient's response to treatment, obtaining history from patient or surrogate, examination of patient, ordering and performing treatments and interventions, ordering and review of laboratory studies, pulse oximetry, re-evaluation of patient's condition and review of old charts.      Labs Reviewed   CBC WITH DIFFERENTIAL - Abnormal; Notable for the following components:       Result Value    WBC 2.6 (*)     RBC 3.45 (*)     Hgb 7.7 (*)     Hct 27.1 (*)     MCV 78.6 (*)     MCHC 28.4 (*)     RDW 19.9 (*)     Platelet 47 (*)     All other components within normal limits   MANUAL DIFFERENTIAL - Abnormal; Notable for the following components:    Abs Neut 1.144 (*)     Anisocyte 1+ (*)     Poik 1+ (*)     Microcyte 1+ (*)     Hypochrom 1+ (*)     Tear drop cell 1+ (*)     Elliptocytosis 1+ (*)     Stomatocytes 1+ (*)     Platelet Est Decreased (*)     All other components within normal limits   RETICULOCYTES - Abnormal; Notable for the following components:    Retic Cnt Auto 4.66 (*)     RET# 0.1617 (*)     All other components within normal limits   VITAMIN B12 - Abnormal; Notable for the following components:    Vitamin B12 Level >2,000 (*)     All other components within normal limits   IRON AND TIBC - Abnormal; Notable for the following components:    Iron Level 61 (*)     All other components within normal limits   TROPONIN I - Normal   LACTATE DEHYDROGENASE - Normal   COMPREHENSIVE METABOLIC PANEL   CBC W/ AUTO DIFFERENTIAL    Narrative:     The following orders were created for panel order CBC Auto  Differential.  Procedure                               Abnormality         Status                     ---------                               -----------         ------                     CBC with Differential[[id number]]        Abnormal            Final result               Manual Differential[[id number]]          Abnormal            Final result                 Please view results for these tests on the individual orders.   URINALYSIS, REFLEX TO URINE CULTURE   FOLATE   TYPE & SCREEN   DIRECT ANTIGLOBULIN TEST   ABORH RETYPE   PREPARE RBC SOFT          Imaging Results    None     Medical Decision Making  Problems Addressed:  Gastrointestinal hemorrhage, unspecified gastrointestinal hemorrhage type: acute illness or injury that poses a threat to life or bodily functions  Generalized weakness: acute illness or injury that poses a threat to life or bodily functions  Pancytopenia: acute illness or injury that poses a threat to life or bodily functions  Syncope: acute illness or injury that poses a threat to life or bodily functions  Syncope, unspecified syncope type: acute illness or injury that poses a threat to life or bodily functions    Amount and/or Complexity of Data Reviewed  Independent Historian: lupe  External Data Reviewed: labs and notes.  Labs: ordered. Decision-making details documented in ED Course.  ECG/medicine tests: ordered and independent interpretation performed. Decision-making details documented in ED Course.    Risk  Decision regarding hospitalization.           Medications   pantoprazole (PROTONIX) 40 mg in sodium chloride 0.9 % 100 mL IVPB (MB+) (8 mg/hr Intravenous New Bag 1/20/23 1838)   0.9%  NaCl infusion (for blood administration) (has no administration in time range)   pantoprazole injection 80 mg (80 mg Intravenous Given 1/20/23 1800)     Medical Decision Making:   History:   Old Medical Records: I decided to obtain old medical records.  Old Records Summarized: records from clinic  visits.       <> Summary of Records: Clinic visits for recent complaints  Initial Assessment:   Syncopal episode, fatigue  Differential Diagnosis:   Anemia, dysrhythmia, acs, pancytopenia, hyper or hyponatremia, hyper or hypokalemia, elevated lfts, gi bleed, iron deficiency  Independently Interpreted Test(s):   I have ordered and independently interpreted EKG Reading(s) - see prior notes  Clinical Tests:   Lab Tests: Ordered and Reviewed  The following lab test(s) were unremarkable: CBC, CMP and Urinalysis       <> Summary of Lab: Notable for acute leukopenia, anemia and thrombocytopenia  Medical Tests: Ordered and Reviewed  ED Management:  Ekg without significant abnormality, s/s most consistent with acute anemia, noted to have + occult stool on exam. Symptoms ongoing for months, no gross melena or hematochezia. No hemodynamic instability but did have syncope today. Typed and crossed for 2 uprbc to transfuse, protonix bolus and infusion, gi consult and admitted to PCP.  There is the possibility of malignancy as a source of symptoms given pancytopenia; however, he does have evidence of a gi bleed and acutely that will be treated.  Additional anemia labs have been ordered.   Other:   I have discussed this case with another health care provider.       <> Summary of the Discussion: Gi consult, transfuse 2 u        Scribe Attestation:   Scribe #1: I performed the above scribed service and the documentation accurately describes the services I performed. I attest to the accuracy of the note.  Comments: Attending:   Physician Attestation Statement for Scribe #1: I, Ana Luisa Flores MD, personally performed the services described in this documentation. All medical record entries made by the scribe were at my direction and in my presence.  I have reviewed the chart and agree that the record reflects my personal performance and is accurate and complete.        Attending Attestation:           Physician Attestation for  Scribe:  Physician Attestation Statement for Scribe #1: I, Ana Luisa Flores MD, reviewed documentation, as scribed by Melva Arnold in my presence, and it is both accurate and complete.           ED Course as of 01/20/23 1929 Fri Jan 20, 2023   1751 Paged Nayely Andres MD  [DP]   0455 Call and Consult Nayely Andres MD  [DP]   8428 Discussed with Dr. Ventura, on call for Dr. Andres, transfuse 2 u, gi consult and admit [BS]   1823 Ekg performeda t 1312 rate 72 nsr, incomplete rbbb, nonspecific septal abnormality [BS]      ED Course User Index  [BS] Ana Luisa Flores MD  [DP] Sasha Arnold                   Clinical Impression:   Final diagnoses:  [R55] Syncope  [D61.818] Pancytopenia (Primary)  [K92.2] Gastrointestinal hemorrhage, unspecified gastrointestinal hemorrhage type  [R53.1] Generalized weakness  [R55] Syncope, unspecified syncope type        ED Disposition Condition    Admit Stable                Ana Luisa Flores MD  01/20/23 1929

## 2023-01-20 NOTE — TELEPHONE ENCOUNTER
Pt Wife Gissel left a message with there answering service after hours. Pt fell, and is still dizzy. I called and spoke with Gissel Pt getting to the hospital while on the phone.

## 2023-01-20 NOTE — Clinical Note
Diagnosis: Pancytopenia [635100]   Admitting Provider:: NEETA VAZQUEZ [[id number]]   Future Attending Provider: NEETA VAZQUEZ [[id number]]   Reason for IP Medical Treatment  (Clinical interventions that can only be accomplished in the IP setting? ) :: pancytopenia, gi bleed   Estimated Length of Stay:: 2 midnights   I certify that Inpatient services for greater than or equal to 2 midnights are medically necessary:: Yes   Plans for Post-Acute care--if anticipated (pick the single best option):: A. No post acute care anticipated at this time

## 2023-01-21 LAB
ABO + RH BLD: NORMAL
ABO + RH BLD: NORMAL
ALBUMIN SERPL-MCNC: 3.7 G/DL (ref 3.4–4.8)
ALBUMIN/GLOB SERPL: 1.3 RATIO (ref 1.1–2)
ALP SERPL-CCNC: 109 UNIT/L (ref 40–150)
ALT SERPL-CCNC: 15 UNIT/L (ref 0–55)
APPEARANCE UR: CLEAR
AST SERPL-CCNC: 18 UNIT/L (ref 5–34)
BACTERIA #/AREA URNS AUTO: NORMAL /HPF
BASOPHILS # BLD AUTO: 0.01 X10(3)/MCL (ref 0–0.2)
BASOPHILS NFR BLD AUTO: 0.4 %
BILIRUB UR QL STRIP.AUTO: NEGATIVE MG/DL
BILIRUBIN DIRECT+TOT PNL SERPL-MCNC: 1.1 MG/DL
BLD PROD TYP BPU: NORMAL
BLD PROD TYP BPU: NORMAL
BLOOD UNIT EXPIRATION DATE: NORMAL
BLOOD UNIT EXPIRATION DATE: NORMAL
BLOOD UNIT TYPE CODE: 5100
BLOOD UNIT TYPE CODE: 5100
BUN SERPL-MCNC: 23.1 MG/DL (ref 8.4–25.7)
CALCIUM SERPL-MCNC: 8.6 MG/DL (ref 8.8–10)
CHLORIDE SERPL-SCNC: 105 MMOL/L (ref 98–107)
CO2 SERPL-SCNC: 27 MMOL/L (ref 23–31)
COLOR UR AUTO: YELLOW
CREAT SERPL-MCNC: 1.03 MG/DL (ref 0.73–1.18)
CROSSMATCH INTERPRETATION: NORMAL
CROSSMATCH INTERPRETATION: NORMAL
DISPENSE STATUS: NORMAL
DISPENSE STATUS: NORMAL
EOSINOPHIL # BLD AUTO: 0 X10(3)/MCL (ref 0–0.9)
EOSINOPHIL NFR BLD AUTO: 0 %
ERYTHROCYTE [DISTWIDTH] IN BLOOD BY AUTOMATED COUNT: 18.9 % (ref 11.5–17)
GFR SERPLBLD CREATININE-BSD FMLA CKD-EPI: >60 MLS/MIN/1.73/M2
GLOBULIN SER-MCNC: 2.9 GM/DL (ref 2.4–3.5)
GLUCOSE SERPL-MCNC: 79 MG/DL (ref 82–115)
GLUCOSE UR QL STRIP.AUTO: NEGATIVE MG/DL
HCT VFR BLD AUTO: 31.5 % (ref 42–52)
HGB BLD-MCNC: 9.6 GM/DL (ref 14–18)
IMM GRANULOCYTES # BLD AUTO: 0.02 X10(3)/MCL (ref 0–0.04)
IMM GRANULOCYTES NFR BLD AUTO: 0.7 %
KETONES UR QL STRIP.AUTO: NEGATIVE MG/DL
LEUKOCYTE ESTERASE UR QL STRIP.AUTO: NEGATIVE UNIT/L
LYMPHOCYTES # BLD AUTO: 1.29 X10(3)/MCL (ref 0.6–4.6)
LYMPHOCYTES NFR BLD AUTO: 46.7 %
MCH RBC QN AUTO: 24.4 PG
MCHC RBC AUTO-ENTMCNC: 30.5 MG/DL (ref 33–36)
MCV RBC AUTO: 79.9 FL (ref 80–94)
MONOCYTES # BLD AUTO: 0.46 X10(3)/MCL (ref 0.1–1.3)
MONOCYTES NFR BLD AUTO: 16.7 %
NEUTROPHILS # BLD AUTO: 0.98 X10(3)/MCL (ref 2.1–9.2)
NEUTROPHILS NFR BLD AUTO: 35.5 %
NITRITE UR QL STRIP.AUTO: NEGATIVE
NRBC BLD AUTO-RTO: 0 %
PH UR STRIP.AUTO: 6.5 [PH]
PLATELET # BLD AUTO: 43 X10(3)/MCL (ref 130–400)
PMV BLD AUTO: ABNORMAL FL
POTASSIUM SERPL-SCNC: 4 MMOL/L (ref 3.5–5.1)
PROT SERPL-MCNC: 6.6 GM/DL (ref 5.8–7.6)
PROT UR QL STRIP.AUTO: NEGATIVE MG/DL
RBC # BLD AUTO: 3.94 X10(6)/MCL (ref 4.7–6.1)
RBC #/AREA URNS AUTO: <5 /HPF
RBC UR QL AUTO: NEGATIVE UNIT/L
SODIUM SERPL-SCNC: 142 MMOL/L (ref 136–145)
SP GR UR STRIP.AUTO: 1.02 (ref 1–1.03)
SQUAMOUS #/AREA URNS AUTO: <5 /HPF
UNIT NUMBER: NORMAL
UNIT NUMBER: NORMAL
UROBILINOGEN UR STRIP-ACNC: 1 MG/DL
WBC # SPEC AUTO: 2.8 X10(3)/MCL (ref 4.5–11.5)
WBC #/AREA URNS AUTO: <5 /HPF

## 2023-01-21 PROCEDURE — 36415 COLL VENOUS BLD VENIPUNCTURE: CPT | Performed by: INTERNAL MEDICINE

## 2023-01-21 PROCEDURE — 99223 1ST HOSP IP/OBS HIGH 75: CPT | Mod: ,,, | Performed by: INTERNAL MEDICINE

## 2023-01-21 PROCEDURE — 99223 PR INITIAL HOSPITAL CARE,LEVL III: ICD-10-PCS | Mod: ,,, | Performed by: INTERNAL MEDICINE

## 2023-01-21 PROCEDURE — 25000003 PHARM REV CODE 250: Performed by: EMERGENCY MEDICINE

## 2023-01-21 PROCEDURE — C9113 INJ PANTOPRAZOLE SODIUM, VIA: HCPCS | Performed by: EMERGENCY MEDICINE

## 2023-01-21 PROCEDURE — 63600175 PHARM REV CODE 636 W HCPCS: Performed by: EMERGENCY MEDICINE

## 2023-01-21 PROCEDURE — 82525 ASSAY OF COPPER: CPT | Performed by: INTERNAL MEDICINE

## 2023-01-21 PROCEDURE — P9016 RBC LEUKOCYTES REDUCED: HCPCS | Performed by: EMERGENCY MEDICINE

## 2023-01-21 PROCEDURE — 25000003 PHARM REV CODE 250: Performed by: INTERNAL MEDICINE

## 2023-01-21 PROCEDURE — 85025 COMPLETE CBC W/AUTO DIFF WBC: CPT | Performed by: EMERGENCY MEDICINE

## 2023-01-21 PROCEDURE — 94761 N-INVAS EAR/PLS OXIMETRY MLT: CPT

## 2023-01-21 PROCEDURE — 80053 COMPREHEN METABOLIC PANEL: CPT | Performed by: EMERGENCY MEDICINE

## 2023-01-21 PROCEDURE — 36415 COLL VENOUS BLD VENIPUNCTURE: CPT | Performed by: EMERGENCY MEDICINE

## 2023-01-21 PROCEDURE — 81001 URINALYSIS AUTO W/SCOPE: CPT | Performed by: NURSE PRACTITIONER

## 2023-01-21 PROCEDURE — 21400001 HC TELEMETRY ROOM

## 2023-01-21 RX ORDER — HYDROCHLOROTHIAZIDE 12.5 MG/1
12.5 TABLET ORAL DAILY
Status: DISCONTINUED | OUTPATIENT
Start: 2023-01-21 | End: 2023-01-25 | Stop reason: HOSPADM

## 2023-01-21 RX ORDER — ATORVASTATIN CALCIUM 40 MG/1
80 TABLET, FILM COATED ORAL DAILY
Status: DISCONTINUED | OUTPATIENT
Start: 2023-01-21 | End: 2023-01-25 | Stop reason: HOSPADM

## 2023-01-21 RX ORDER — LISINOPRIL 20 MG/1
20 TABLET ORAL DAILY
Status: DISCONTINUED | OUTPATIENT
Start: 2023-01-21 | End: 2023-01-25 | Stop reason: HOSPADM

## 2023-01-21 RX ORDER — LISINOPRIL AND HYDROCHLOROTHIAZIDE 12.5; 2 MG/1; MG/1
1 TABLET ORAL DAILY
Status: DISCONTINUED | OUTPATIENT
Start: 2023-01-21 | End: 2023-01-21

## 2023-01-21 RX ORDER — TAMSULOSIN HYDROCHLORIDE 0.4 MG/1
1 CAPSULE ORAL NIGHTLY
Status: DISCONTINUED | OUTPATIENT
Start: 2023-01-21 | End: 2023-01-25 | Stop reason: HOSPADM

## 2023-01-21 RX ORDER — CITALOPRAM 20 MG/1
40 TABLET, FILM COATED ORAL DAILY
Status: DISCONTINUED | OUTPATIENT
Start: 2023-01-21 | End: 2023-01-25 | Stop reason: HOSPADM

## 2023-01-21 RX ADMIN — POLYETHYLENE GLYCOL 3350 17 G: 17 POWDER, FOR SOLUTION ORAL at 11:01

## 2023-01-21 RX ADMIN — LISINOPRIL 20 MG: 20 TABLET ORAL at 11:01

## 2023-01-21 RX ADMIN — ATORVASTATIN CALCIUM 80 MG: 40 TABLET, FILM COATED ORAL at 11:01

## 2023-01-21 RX ADMIN — PANTOPRAZOLE SODIUM 8 MG/HR: 40 INJECTION, POWDER, FOR SOLUTION INTRAVENOUS at 09:01

## 2023-01-21 RX ADMIN — PANTOPRAZOLE SODIUM 8 MG/HR: 40 INJECTION, POWDER, FOR SOLUTION INTRAVENOUS at 06:01

## 2023-01-21 RX ADMIN — TAMSULOSIN HYDROCHLORIDE 0.4 MG: 0.4 CAPSULE ORAL at 09:01

## 2023-01-21 RX ADMIN — CITALOPRAM HYDROBROMIDE 40 MG: 20 TABLET ORAL at 11:01

## 2023-01-21 RX ADMIN — HYDROCHLOROTHIAZIDE 12.5 MG: 12.5 TABLET ORAL at 11:01

## 2023-01-21 NOTE — HPI
72-year-old male patient of Dr. Andres with past medical history of CVA, HTN, BPH, and Melanoma presented to ED following syncopal episode at home. Pt states he was siting in a chair when he thought he heard someone at the door, states no one was there and when he was walking back to his chair felt sudden onset of dizziness prior to passing out and falling to the floor. Pt denies prior episodes, states he's been having dizziness, fatigue, weakness, SOB, back pain , and leg pain onset over the past 3 months. Pt reports dizziness worsens when standing or walking, but reports relief when sitting down. He was found to be anemic and was transfused 2 units of blood 1/20. Stool positive for blood in ER, but patient denies melena or hematochezia. Reports that he had 2 colonoscopies in the past, but doesn't remember the last one or who did procedure.

## 2023-01-21 NOTE — H&P
"[facility] Medicine  History & Physical    Patient Name: Hussein Gonzalez Jr.  MRN: [id number]  Patient Class: IP- Inpatient  Admission Date: 1/20/2023  Attending Physician: Nayely Andres MD   Primary Care Provider: Nayely Andres MD         Patient information was obtained from patient and ER records.     Subjective:     Principal Problem:Acute GI bleeding    Chief Complaint:   Chief Complaint   Patient presents with    Loss of Consciousness     C/o syncopal episode PTA. Patient states he got dizzy after turned away from the door and caused him to "black out" and fall. Pt denies cardiac history and other symptoms. Cardiologist Dr. Hussein.         HPI: 72-year-old male patient of Dr. Andres with past medical history of CVA, HTN, BPH, and Melanoma presented to ED following syncopal episode at home. Pt states he was siting in a chair when he thought he heard someone at the door, states no one was there and when he was walking back to his chair felt sudden onset of dizziness prior to passing out and falling to the floor. Pt denies prior episodes, states he's been having dizziness, fatigue, weakness, SOB, back pain , and leg pain onset over the past 3 months. Pt reports dizziness worsens when standing or walking, but reports relief when sitting down. He was found to be anemic and was transfused 2 units of blood 1/20. Stool positive for blood in ER, but patient denies melena or hematochezia. Reports that he had 2 colonoscopies in the past, but doesn't remember the last one or who did procedure.      Past Medical History:   Diagnosis Date    Anxiety and depression     BPH (benign prostatic hyperplasia)     CVA (cerebrovascular accident)     HTN (hypertension)     Malignant melanoma of skin, unspecified     NSTEMI (non-ST elevated myocardial infarction)     Sepsis, Gram negative        Past Surgical History:   Procedure Laterality Date    COLONOSCOPY   "    EXCISION OF MELANOMA      SINUS SURGERY      TONSILLECTOMY         Review of patient's allergies indicates:   Allergen Reactions    Acetaminophen     Chlorpheniramine-dextromethorp     Doxylamine-dm-acetaminophen      Other reaction(s): Feeling jittery       No current facility-administered medications on file prior to encounter.     Current Outpatient Medications on File Prior to Encounter   Medication Sig    aspirin 325 MG tablet Take 325 mg by mouth once daily.    atorvastatin (LIPITOR) 80 MG tablet Take 80 mg by mouth once daily.    citalopram (CELEXA) 40 MG tablet Take 1 tablet by mouth once daily    ferrous sulfate (FEOSOL) Tab tablet Take 1 tablet by mouth daily with breakfast.    Lactobacillus rhamnosus GG (CULTURELLE) 10 billion cell capsule Take 3 capsules by mouth once daily. Pt states he takes 3 in the morning and 2 and night    lisinopriL-hydrochlorothiazide (PRINZIDE,ZESTORETIC) 20-12.5 mg per tablet Take 1 tablet by mouth once daily    omega-3 fatty acids/fish oil (FISH OIL-OMEGA-3 FATTY ACIDS) 300-1,000 mg capsule Take 1 capsule by mouth every other day.    pyridoxine, vitamin B6, (B-6) 100 MG Tab Take 100 mg by mouth every other day.    tamsulosin (FLOMAX) 0.4 mg Cap TAKE 1 CAPSULE BY MOUTH ONCE DAILY IN THE EVENING    vitamin D (VITAMIN D3) 1000 units Tab Take 1,000 Units by mouth once daily.    vitamin E, dl, acetate, 22.5 mg (50 unit)/mL Drop Take by mouth.     Family History       Problem Relation (Age of Onset)    Cancer Mother, Father          Tobacco Use    Smoking status: Never    Smokeless tobacco: Never   Substance and Sexual Activity    Alcohol use: Yes     Alcohol/week: 7.0 standard drinks     Types: 7 Cans of beer per week    Drug use: Never    Sexual activity: Yes     Review of Systems   Constitutional:  Negative for chills.   HENT:  Negative for nosebleeds.    Eyes:  Negative for redness.   Respiratory:  Negative for shortness of breath.    Cardiovascular:   Negative for chest pain.   Gastrointestinal:  Negative for abdominal pain.   Genitourinary:  Negative for dysuria.   Musculoskeletal:  Negative for back pain.   Neurological:  Positive for syncope. Negative for headaches.   Psychiatric/Behavioral:  Negative for behavioral problems.    Objective:     Vital Signs (Most Recent):  Temp: 98 °F (36.7 °C) (01/21/23 0720)  Pulse: 62 (01/21/23 0720)  Resp: 20 (01/21/23 0720)  BP: (!) 147/79 (01/21/23 0720)  SpO2: 95 % (01/21/23 0720)   Vital Signs (24h Range):  Temp:  [97.8 °F (36.6 °C)-98.7 °F (37.1 °C)] 98 °F (36.7 °C)  Pulse:  [60-85] 62  Resp:  [15-20] 20  SpO2:  [95 %-100 %] 95 %  BP: ()/(54-86) 147/79     Weight: 89.8 kg (198 lb)  Body mass index is 26.12 kg/m².    Physical Exam  Vitals reviewed.   Eyes:      Extraocular Movements: Extraocular movements intact.   Cardiovascular:      Rate and Rhythm: Normal rate and regular rhythm.   Pulmonary:      Effort: Pulmonary effort is normal.      Breath sounds: Normal breath sounds.   Abdominal:      General: Bowel sounds are normal.      Palpations: Abdomen is soft.   Neurological:      General: No focal deficit present.      Mental Status: He is alert and oriented to person, place, and time.   Psychiatric:         Mood and Affect: Mood normal.           Significant Labs: All pertinent labs within the past 24 hours have been reviewed.    Significant Imaging: I have reviewed all pertinent imaging results/findings within the past 24 hours.    Assessment/Plan:     * Acute GI bleeding  - FOBT positive in ER, no melena or hematochezia  - GI consulted    Pancytopenia  - Platelets 43,000  - Retic elevated  - Transfused 2 units 1/20  - Consult Hematology    Malignant melanoma of skin, unspecified  - Treated twice at [facility]  - Radiation to L side of neck in the past    HTN (hypertension)  - Resume home dose of medication    VTE Risk Mitigation (From admission, onward)         Ordered     IP VTE HIGH RISK PATIENT  Once          01/20/23 1940     Place sequential compression device  Until discontinued         01/20/23 1940                   Rocky Jose II, MD  Department of [facility] Medicine   [facility]

## 2023-01-21 NOTE — CONSULTS
Reason for Consult: anemia     HPI: 71 y/o gentleman who is being worked up for pancytopenia. Consult placed to GI for evaluation of anemia. Patient unsure if he has had an EGD but reports a colonoscopy within the past 3 years. He denies any GI symptoms.       Review of Systems   Constitutional: Negative.    HENT: Negative.     Eyes: Negative.    Respiratory: Negative.     Cardiovascular: Negative.    Gastrointestinal: Negative.       Past Medical History:   Diagnosis Date    Anxiety and depression     BPH (benign prostatic hyperplasia)     CVA (cerebrovascular accident)     HTN (hypertension)     Malignant melanoma of skin, unspecified     NSTEMI (non-ST elevated myocardial infarction)     Sepsis, Gram negative        Past Surgical History:   Procedure Laterality Date    COLONOSCOPY      EXCISION OF MELANOMA      SINUS SURGERY      TONSILLECTOMY         Family History   Problem Relation Age of Onset    Cancer Mother     Cancer Father        Review of patient's allergies indicates:   Allergen Reactions    Acetaminophen     Chlorpheniramine-dextromethorp     Doxylamine-dm-acetaminophen      Other reaction(s): Feeling jittery       Social History     Socioeconomic History    Marital status: Significant Other   Tobacco Use    Smoking status: Never    Smokeless tobacco: Never   Substance and Sexual Activity    Alcohol use: Yes     Alcohol/week: 7.0 standard drinks     Types: 7 Cans of beer per week    Drug use: Never    Sexual activity: Yes          atorvastatin  80 mg Oral Daily    citalopram  40 mg Oral Daily    lisinopriL  20 mg Oral Daily    And    hydroCHLOROthiazide  12.5 mg Oral Daily    polyethylene glycol  17 g Oral Daily    tamsulosin  1 capsule Oral QHS       Physical Exam  Constitutional:       Appearance: Normal appearance.   HENT:      Head: Normocephalic and atraumatic.      Nose: Nose normal.   Eyes:      Extraocular Movements: Extraocular movements intact.   Cardiovascular:      Rate and Rhythm: Normal  rate and regular rhythm.      Pulses: Normal pulses.      Heart sounds: Normal heart sounds.   Pulmonary:      Effort: Pulmonary effort is normal.      Breath sounds: Normal breath sounds.   Abdominal:      General: Abdomen is flat. Bowel sounds are normal.      Palpations: Abdomen is soft.   Neurological:      Mental Status: He is alert.        Recent Labs   Lab 01/21/23  0800   WBC 2.8*   RBC 3.94*   HGB 9.6*   HCT 31.5*   PLT 43*   MCV 79.9*   MCH 24.4   MCHC 30.5*     Recent Labs   Lab 01/21/23  0800   GLUCOSE 79*   CALCIUM 8.6*      K 4.0   CO2 27   BUN 23.1   CREATININE 1.03   ALKPHOS 109   ALT 15   AST 18   BILITOT 1.1     No results for input(s): PT, INR, APTT in the last 24 hours.    Assessment:   1. Anemia    Recs:   1. EGD and colonoscopy Monday. Regular diet today and clears tomorrow. NPO after MN Sunday. Hold any anticoagulants.

## 2023-01-21 NOTE — PLAN OF CARE
Problem: Adult Inpatient Plan of Care  Goal: Plan of Care Review  1/21/2023 0516 by Nandini Watson RN  Outcome: Ongoing, Progressing  Flowsheets (Taken 1/20/2023 2000)  Plan of Care Reviewed With: patient  1/21/2023 0211 by Nandini Watson RN  Outcome: Ongoing, Progressing  Flowsheets (Taken 1/20/2023 2306)  Plan of Care Reviewed With: patient  Goal: Patient-Specific Goal (Individualized)  1/21/2023 0516 by Nandini Watson RN  Outcome: Ongoing, Progressing  1/21/2023 0211 by Nandini Watson RN  Outcome: Ongoing, Progressing  Flowsheets (Taken 1/20/2023 2306)  Anxieties, Fears or Concerns: not sure of what is causing me to get weaken  Goal: Absence of Hospital-Acquired Illness or Injury  Outcome: Ongoing, Progressing  Intervention: Identify and Manage Fall Risk  1/21/2023 0516 by MARK Maloneyheets (Taken 1/20/2023 2000)  Safety Promotion/Fall Prevention:   room near unit station   nonskid shoes/socks when out of bed  1/21/2023 0211 by MARK Maloneyheets (Taken 1/20/2023 2306)  Safety Promotion/Fall Prevention:   assistive device/personal item within reach   lighting adjusted   medications reviewed   nonskid shoes/socks when out of bed  Intervention: Prevent Skin Injury  1/21/2023 0516 by MARK Maloney (Taken 1/20/2023 2000)  Body Position: position changed independently  Skin Protection: adhesive use limited  1/21/2023 0211 by MARK Maloney (Taken 1/20/2023 2306)  Skin Protection:   adhesive use limited   tubing/devices free from skin contact  Intervention: Prevent and Manage VTE (Venous Thromboembolism) Risk  1/21/2023 0516 by MARK Maloney (Taken 1/20/2023 2000)  Activity Management: Ambulated -L4  VTE Prevention/Management: remove, assess skin, and reapply sequential compression device  Range of Motion: active ROM (range of motion) encouraged  1/21/2023 0211 by MARK Maloneyheets (Taken 1/20/2023  2306)  VTE Prevention/Management: remove, assess skin, and reapply sequential compression device  Range of Motion: active ROM (range of motion) encouraged  Intervention: Prevent Infection  1/21/2023 0516 by Nandini Watson RN  Flowsheets (Taken 1/20/2023 2000)  Infection Prevention: rest/sleep promoted  1/21/2023 0211 by Nandini Watson RN  Flowsheets (Taken 1/20/2023 2306)  Infection Prevention: rest/sleep promoted  Goal: Optimal Comfort and Wellbeing  1/21/2023 0516 by Nandini Watson RN  Outcome: Ongoing, Progressing  1/21/2023 0211 by Nandini Watson RN  Outcome: Ongoing, Progressing  Intervention: Monitor Pain and Promote Comfort  1/21/2023 0516 by Nandini Watson RN  Flowsheets (Taken 1/20/2023 2000)  Pain Management Interventions:   care clustered   warm blanket provided  1/21/2023 0211 by Nandini Watson RN  Flowsheets (Taken 1/20/2023 2306)  Pain Management Interventions:   care clustered   pillow support provided  Intervention: Provide Person-Centered Care  1/21/2023 0516 by Nandini Watson RN  Flowsheets (Taken 1/20/2023 2000)  Trust Relationship/Rapport:   care explained   questions encouraged   questions answered  1/21/2023 0211 by Nandini Watson RN  Flowsheets (Taken 1/20/2023 2306)  Trust Relationship/Rapport:   care explained   questions answered   emotional support provided  Goal: Readiness for Transition of Care  1/21/2023 0516 by Nandini Watson RN  Outcome: Ongoing, Progressing  1/21/2023 0211 by Nandini Watson RN  Outcome: Ongoing, Progressing  Intervention: Mutually Develop Transition Plan  Flowsheets (Taken 1/20/2023 2306)  Equipment Currently Used at Home: none

## 2023-01-21 NOTE — CONSULTS
"Subjective:       Patient ID: Hussein Gonzalez Jr. is a 72 y.o. male.    Chief Complaint: Loss of Consciousness (C/o syncopal episode PTA. Patient states he got dizzy after turned away from the door and caused him to "black out" and fall. Pt denies cardiac history and other symptoms. Cardiologist Dr. Hussein. )      Diagnosis:  Pancytopenia    Current Treatment:   Pending workup    Treatment History:  N/A    HPI:  72-year-old male with a history of CVA, hypertension, BPH, and melanoma who presented to the emergency department following a syncopal episode.  In February of 2022, the patient did have a slightly low white blood cell count, hemoglobin, and platelet count.  Per the patient, 3 months prior to admission, he was having dizziness, fatigue, weakness, shortness of breath, back pain, and leg pain.  The dizziness was worse with standing and walking.  On 01/20/2023, the patient had a syncopal episode.  He was brought to the emergency department and was found to be pancytopenic.  He also had stool that was positive for occult blood in the emergency department.  He received blood transfusion.  His CBC on 01/20/2023 revealed a white blood cell count of 2.6, hemoglobin of 7.7, and platelet count of 47,000. Reticulocytes were appropriately elevated, B12 and folate were within normal limits, iron level was slightly decreased.  Repeat blood work on 01/21/2023 revealed a white blood cell count of 2.8, post transfusion hemoglobin of 9.6, and a [place] 80362.  Hematology was consulted for pancytopenia.    Interval History:   I initially saw the patient on 01/21/2023.  He stated that overall, he felt well.  Outside of his symptoms and have been going on for the last 3 months, he had no issues.  He also stated that prior to the symptoms starting, he was able to do everything he wanted to without any limitation.    Past Medical History:   Diagnosis Date    Anxiety and depression     BPH (benign prostatic " hyperplasia)     CVA (cerebrovascular accident)     HTN (hypertension)     Malignant melanoma of skin, unspecified     NSTEMI (non-ST elevated myocardial infarction)     Sepsis, Gram negative       Past Surgical History:   Procedure Laterality Date    COLONOSCOPY      EXCISION OF MELANOMA      SINUS SURGERY      TONSILLECTOMY       Social History     Socioeconomic History    Marital status: Significant Other   Tobacco Use    Smoking status: Never    Smokeless tobacco: Never   Substance and Sexual Activity    Alcohol use: Yes     Alcohol/week: 7.0 standard drinks     Types: 7 Cans of beer per week    Drug use: Never    Sexual activity: Yes      Family History   Problem Relation Age of Onset    Cancer Mother     Cancer Father       Review of patient's allergies indicates:   Allergen Reactions    Acetaminophen     Chlorpheniramine-dextromethorp     Doxylamine-dm-acetaminophen      Other reaction(s): Feeling jittery      Review of Systems   Constitutional:  Positive for fatigue. Negative for chills, diaphoresis, fever and unexpected weight change.   HENT:  Negative for nasal congestion, mouth sores, sinus pressure/congestion and sore throat.    Eyes:  Negative for pain and visual disturbance.   Respiratory:  Positive for shortness of breath. Negative for cough and chest tightness.    Cardiovascular:  Negative for chest pain, palpitations and leg swelling.   Gastrointestinal:  Negative for abdominal distention, abdominal pain, blood in stool, constipation and diarrhea.   Genitourinary:  Negative for dysuria, frequency and hematuria.   Musculoskeletal:  Negative for arthralgias and back pain.   Integumentary:  Negative for rash.   Neurological:  Positive for dizziness and syncope. Negative for weakness, numbness and headaches.   Hematological:  Negative for adenopathy.   Psychiatric/Behavioral:  Negative for confusion.        Objective:      Physical Exam  Vitals reviewed.   Constitutional:       General: He is awake.       Appearance: Normal appearance.   HENT:      Head: Normocephalic and atraumatic.      Right Ear: Hearing normal.      Left Ear: Hearing normal.      Nose: Nose normal.   Eyes:      General: Lids are normal. Vision grossly intact.      Extraocular Movements: Extraocular movements intact.      Conjunctiva/sclera: Conjunctivae normal.   Cardiovascular:      Rate and Rhythm: Normal rate and regular rhythm.      Pulses: Normal pulses.      Heart sounds: Normal heart sounds.   Pulmonary:      Effort: Pulmonary effort is normal.      Breath sounds: Normal breath sounds. No wheezing, rhonchi or rales.   Abdominal:      General: Bowel sounds are normal.      Palpations: Abdomen is soft.      Tenderness: There is no abdominal tenderness.   Musculoskeletal:      Cervical back: Full passive range of motion without pain.      Right lower leg: No edema.      Left lower leg: No edema.   Lymphadenopathy:      Cervical: No cervical adenopathy.      Upper Body:      Right upper body: No supraclavicular or axillary adenopathy.      Left upper body: No supraclavicular or axillary adenopathy.   Skin:     General: Skin is warm.   Neurological:      General: No focal deficit present.      Mental Status: He is alert and oriented to person, place, and time.   Psychiatric:         Attention and Perception: Attention normal.         Mood and Affect: Mood and affect normal.         Behavior: Behavior is cooperative.       LABS AND IMAGING REVIEWED IN EPIC          Assessment:   Pancytopenia        Plan:       So far, patient's blood work unremarkable for cause of pancytopenia.    For a blood smear reviewed by me on 01/21/2023 showed anemia, thrombocytopenia, leukopenia with bilobed (pseudo Pelger-Huet) neutrophils present.    I will check a copper level as the patient is on zinc and this can cause copper deficiency.    Due to the fact that he has had cytopenias in the past and this is progressive, if his copper level is within normal limits, I  will go ahead and likely perform a bone marrow biopsy sometime in the upcoming week.  This would be to rule out MDS.    I agree with GI workup for possible GI bleed.    All questions were answered to the patient to the best of my ability and he understands the plan moving forward.      Edurad Luna II, MD

## 2023-01-21 NOTE — PLAN OF CARE
Problem: Adult Inpatient Plan of Care  Goal: Plan of Care Review  Outcome: Ongoing, Progressing  Flowsheets (Taken 1/20/2023 2306)  Plan of Care Reviewed With: patient  Goal: Patient-Specific Goal (Individualized)  Outcome: Ongoing, Progressing  Flowsheets (Taken 1/20/2023 2306)  Anxieties, Fears or Concerns: not sure of what is causing me to get weaken  Goal: Absence of Hospital-Acquired Illness or Injury  Outcome: Ongoing, Progressing  Intervention: Identify and Manage Fall Risk  Flowsheets (Taken 1/20/2023 2306)  Safety Promotion/Fall Prevention:   assistive device/personal item within reach   lighting adjusted   medications reviewed   nonskid shoes/socks when out of bed  Intervention: Prevent Skin Injury  Flowsheets (Taken 1/20/2023 2306)  Skin Protection:   adhesive use limited   tubing/devices free from skin contact  Intervention: Prevent and Manage VTE (Venous Thromboembolism) Risk  Flowsheets (Taken 1/20/2023 2306)  VTE Prevention/Management: remove, assess skin, and reapply sequential compression device  Range of Motion: active ROM (range of motion) encouraged  Intervention: Prevent Infection  Flowsheets (Taken 1/20/2023 2306)  Infection Prevention: rest/sleep promoted  Goal: Optimal Comfort and Wellbeing  Outcome: Ongoing, Progressing  Intervention: Monitor Pain and Promote Comfort  Flowsheets (Taken 1/20/2023 2306)  Pain Management Interventions:   care clustered   pillow support provided  Intervention: Provide Person-Centered Care  Flowsheets (Taken 1/20/2023 2306)  Trust Relationship/Rapport:   care explained   questions answered   emotional support provided  Goal: Readiness for Transition of Care  Outcome: Ongoing, Progressing  Intervention: Mutually Develop Transition Plan  Flowsheets (Taken 1/20/2023 2306)  Equipment Currently Used at Home: none

## 2023-01-21 NOTE — NURSING
Nurses Note -- 4 Eyes      1/21/2023   12:03 AM      Skin assessed during: Admit      [x] No Pressure Injuries Present    []Prevention Measures Documented      [] Yes- Altered Skin Integrity Present or Discovered   [] LDA Added if Not in Epic (Describe Wound)   [] New Altered Skin Integrity was Present on Admit and Documented in LDA   [] Wound Image Taken    Wound Care Consulted? No    Attending Nurse:  Nandini Watson RN     Second RN/Staff Member:  Barbra Alvarez

## 2023-01-21 NOTE — SUBJECTIVE & OBJECTIVE
Past Medical History:   Diagnosis Date    Anxiety and depression     BPH (benign prostatic hyperplasia)     CVA (cerebrovascular accident)     HTN (hypertension)     Malignant melanoma of skin, unspecified     NSTEMI (non-ST elevated myocardial infarction)     Sepsis, Gram negative        Past Surgical History:   Procedure Laterality Date    COLONOSCOPY      EXCISION OF MELANOMA      SINUS SURGERY      TONSILLECTOMY         Review of patient's allergies indicates:   Allergen Reactions    Acetaminophen     Chlorpheniramine-dextromethorp     Doxylamine-dm-acetaminophen      Other reaction(s): Feeling jittery       No current facility-administered medications on file prior to encounter.     Current Outpatient Medications on File Prior to Encounter   Medication Sig    aspirin 325 MG tablet Take 325 mg by mouth once daily.    atorvastatin (LIPITOR) 80 MG tablet Take 80 mg by mouth once daily.    citalopram (CELEXA) 40 MG tablet Take 1 tablet by mouth once daily    ferrous sulfate (FEOSOL) Tab tablet Take 1 tablet by mouth daily with breakfast.    Lactobacillus rhamnosus GG (CULTURELLE) 10 billion cell capsule Take 3 capsules by mouth once daily. Pt states he takes 3 in the morning and 2 and night    lisinopriL-hydrochlorothiazide (PRINZIDE,ZESTORETIC) 20-12.5 mg per tablet Take 1 tablet by mouth once daily    omega-3 fatty acids/fish oil (FISH OIL-OMEGA-3 FATTY ACIDS) 300-1,000 mg capsule Take 1 capsule by mouth every other day.    pyridoxine, vitamin B6, (B-6) 100 MG Tab Take 100 mg by mouth every other day.    tamsulosin (FLOMAX) 0.4 mg Cap TAKE 1 CAPSULE BY MOUTH ONCE DAILY IN THE EVENING    vitamin D (VITAMIN D3) 1000 units Tab Take 1,000 Units by mouth once daily.    vitamin E, dl, acetate, 22.5 mg (50 unit)/mL Drop Take by mouth.     Family History       Problem Relation (Age of Onset)    Cancer Mother, Father          Tobacco Use    Smoking status: Never    Smokeless tobacco: Never   Substance and Sexual Activity     Alcohol use: Yes     Alcohol/week: 7.0 standard drinks     Types: 7 Cans of beer per week    Drug use: Never    Sexual activity: Yes     Review of Systems   Constitutional:  Negative for chills.   HENT:  Negative for nosebleeds.    Eyes:  Negative for redness.   Respiratory:  Negative for shortness of breath.    Cardiovascular:  Negative for chest pain.   Gastrointestinal:  Negative for abdominal pain.   Genitourinary:  Negative for dysuria.   Musculoskeletal:  Negative for back pain.   Neurological:  Positive for syncope. Negative for headaches.   Psychiatric/Behavioral:  Negative for behavioral problems.    Objective:     Vital Signs (Most Recent):  Temp: 98 °F (36.7 °C) (01/21/23 0720)  Pulse: 62 (01/21/23 0720)  Resp: 20 (01/21/23 0720)  BP: (!) 147/79 (01/21/23 0720)  SpO2: 95 % (01/21/23 0720)   Vital Signs (24h Range):  Temp:  [97.8 °F (36.6 °C)-98.7 °F (37.1 °C)] 98 °F (36.7 °C)  Pulse:  [60-85] 62  Resp:  [15-20] 20  SpO2:  [95 %-100 %] 95 %  BP: ()/(54-86) 147/79     Weight: 89.8 kg (198 lb)  Body mass index is 26.12 kg/m².    Physical Exam  Vitals reviewed.   Eyes:      Extraocular Movements: Extraocular movements intact.   Cardiovascular:      Rate and Rhythm: Normal rate and regular rhythm.   Pulmonary:      Effort: Pulmonary effort is normal.      Breath sounds: Normal breath sounds.   Abdominal:      General: Bowel sounds are normal.      Palpations: Abdomen is soft.   Neurological:      General: No focal deficit present.      Mental Status: He is alert and oriented to person, place, and time.   Psychiatric:         Mood and Affect: Mood normal.           Significant Labs: All pertinent labs within the past 24 hours have been reviewed.    Significant Imaging: I have reviewed all pertinent imaging results/findings within the past 24 hours.

## 2023-01-22 LAB
ABS NEUT (OLG): 0.58 X10(3)/MCL (ref 2.1–9.2)
ALBUMIN SERPL-MCNC: 3.5 G/DL (ref 3.4–4.8)
ALBUMIN/GLOB SERPL: 1.2 RATIO (ref 1.1–2)
ALP SERPL-CCNC: 106 UNIT/L (ref 40–150)
ALT SERPL-CCNC: 14 UNIT/L (ref 0–55)
ANISOCYTOSIS BLD QL SMEAR: ABNORMAL
AST SERPL-CCNC: 18 UNIT/L (ref 5–34)
BILIRUBIN DIRECT+TOT PNL SERPL-MCNC: 0.9 MG/DL
BUN SERPL-MCNC: 21.8 MG/DL (ref 8.4–25.7)
CALCIUM SERPL-MCNC: 8.6 MG/DL (ref 8.8–10)
CHLORIDE SERPL-SCNC: 103 MMOL/L (ref 98–107)
CO2 SERPL-SCNC: 26 MMOL/L (ref 23–31)
CREAT SERPL-MCNC: 0.98 MG/DL (ref 0.73–1.18)
ERYTHROCYTE [DISTWIDTH] IN BLOOD BY AUTOMATED COUNT: 19.1 % (ref 11.5–17)
GFR SERPLBLD CREATININE-BSD FMLA CKD-EPI: >60 MLS/MIN/1.73/M2
GLOBULIN SER-MCNC: 2.9 GM/DL (ref 2.4–3.5)
GLUCOSE SERPL-MCNC: 99 MG/DL (ref 82–115)
HCT VFR BLD AUTO: 32.1 % (ref 42–52)
HGB BLD-MCNC: 9.6 GM/DL (ref 14–18)
HYPOCHROMIA BLD QL SMEAR: ABNORMAL
IMM GRANULOCYTES # BLD AUTO: 0.01 X10(3)/MCL (ref 0–0.04)
IMM GRANULOCYTES NFR BLD AUTO: 0.5 %
INSTRUMENT WBC (OLG): 2 X10(3)/MCL
LYMPHOCYTES NFR BLD MANUAL: 1.22 X10(3)/MCL
LYMPHOCYTES NFR BLD MANUAL: 61 %
MCH RBC QN AUTO: 23.8 PG
MCHC RBC AUTO-ENTMCNC: 29.9 MG/DL (ref 33–36)
MCV RBC AUTO: 79.7 FL (ref 80–94)
MICROCYTES BLD QL SMEAR: ABNORMAL
MONOCYTES NFR BLD MANUAL: 0.2 X10(3)/MCL (ref 0.1–1.3)
MONOCYTES NFR BLD MANUAL: 10 %
NEUTROPHILS NFR BLD MANUAL: 29 %
NRBC BLD AUTO-RTO: 0 %
OVALOCYTES (OLG): ABNORMAL
PLATELET # BLD AUTO: 44 X10(3)/MCL (ref 130–400)
PLATELET # BLD EST: ABNORMAL 10*3/UL
PMV BLD AUTO: ABNORMAL FL
POIKILOCYTOSIS BLD QL SMEAR: ABNORMAL
POLYCHROMASIA BLD QL SMEAR: ABNORMAL
POTASSIUM SERPL-SCNC: 4.2 MMOL/L (ref 3.5–5.1)
PROT SERPL-MCNC: 6.4 GM/DL (ref 5.8–7.6)
RBC # BLD AUTO: 4.03 X10(6)/MCL (ref 4.7–6.1)
RBC MORPH BLD: ABNORMAL
SODIUM SERPL-SCNC: 137 MMOL/L (ref 136–145)
TEAR DROP CELL (OLG): ABNORMAL
WBC # SPEC AUTO: 2 X10(3)/MCL (ref 4.5–11.5)

## 2023-01-22 PROCEDURE — 99232 PR SUBSEQUENT HOSPITAL CARE,LEVL II: ICD-10-PCS | Mod: ,,, | Performed by: INTERNAL MEDICINE

## 2023-01-22 PROCEDURE — 36415 COLL VENOUS BLD VENIPUNCTURE: CPT | Performed by: INTERNAL MEDICINE

## 2023-01-22 PROCEDURE — 25000003 PHARM REV CODE 250: Performed by: INTERNAL MEDICINE

## 2023-01-22 PROCEDURE — 21400001 HC TELEMETRY ROOM

## 2023-01-22 PROCEDURE — 85025 COMPLETE CBC W/AUTO DIFF WBC: CPT | Performed by: INTERNAL MEDICINE

## 2023-01-22 PROCEDURE — 99232 SBSQ HOSP IP/OBS MODERATE 35: CPT | Mod: ,,, | Performed by: INTERNAL MEDICINE

## 2023-01-22 PROCEDURE — 80053 COMPREHEN METABOLIC PANEL: CPT | Performed by: INTERNAL MEDICINE

## 2023-01-22 PROCEDURE — 85027 COMPLETE CBC AUTOMATED: CPT | Performed by: INTERNAL MEDICINE

## 2023-01-22 RX ORDER — SODIUM, POTASSIUM,MAG SULFATES 17.5-3.13G
1 SOLUTION, RECONSTITUTED, ORAL ORAL 2 TIMES DAILY
Status: COMPLETED | OUTPATIENT
Start: 2023-01-22 | End: 2023-01-22

## 2023-01-22 RX ADMIN — ATORVASTATIN CALCIUM 80 MG: 40 TABLET, FILM COATED ORAL at 08:01

## 2023-01-22 RX ADMIN — TAMSULOSIN HYDROCHLORIDE 0.4 MG: 0.4 CAPSULE ORAL at 09:01

## 2023-01-22 RX ADMIN — CITALOPRAM HYDROBROMIDE 40 MG: 20 TABLET ORAL at 08:01

## 2023-01-22 RX ADMIN — LISINOPRIL 20 MG: 20 TABLET ORAL at 08:01

## 2023-01-22 RX ADMIN — HYDROCHLOROTHIAZIDE 12.5 MG: 12.5 TABLET ORAL at 08:01

## 2023-01-22 RX ADMIN — SODIUM SULFATE, POTASSIUM SULFATE, MAGNESIUM SULFATE 177 ML: 17.5; 3.13; 1.6 SOLUTION, CONCENTRATE ORAL at 07:01

## 2023-01-22 RX ADMIN — SODIUM SULFATE, POTASSIUM SULFATE, MAGNESIUM SULFATE 177 ML: 17.5; 3.13; 1.6 SOLUTION, CONCENTRATE ORAL at 02:01

## 2023-01-22 NOTE — ASSESSMENT & PLAN NOTE
- Platelets 43,000  - Retic elevated  - Transfused 2 units 1/20  - Consulted Hematology, bone marrow biopsy this week

## 2023-01-22 NOTE — PLAN OF CARE
Problem: Adult Inpatient Plan of Care  Goal: Plan of Care Review  Outcome: Ongoing, Progressing  Flowsheets (Taken 1/21/2023 2000)  Plan of Care Reviewed With: patient  Goal: Patient-Specific Goal (Individualized)  Outcome: Ongoing, Progressing  Goal: Absence of Hospital-Acquired Illness or Injury  Outcome: Ongoing, Progressing  Intervention: Identify and Manage Fall Risk  Flowsheets (Taken 1/21/2023 2000)  Safety Promotion/Fall Prevention:   assistive device/personal item within reach   nonskid shoes/socks when out of bed   medications reviewed  Intervention: Prevent Skin Injury  Flowsheets (Taken 1/21/2023 2000)  Body Position: position changed independently  Skin Protection: transparent dressing maintained  Intervention: Prevent and Manage VTE (Venous Thromboembolism) Risk  Flowsheets (Taken 1/21/2023 2000)  Activity Management: Ambulated -L4  VTE Prevention/Management: dorsiflexion/plantar flexion performed  Range of Motion: active ROM (range of motion) encouraged  Intervention: Prevent Infection  Flowsheets (Taken 1/21/2023 2000)  Infection Prevention:   hand hygiene promoted   rest/sleep promoted  Goal: Optimal Comfort and Wellbeing  Outcome: Ongoing, Progressing  Intervention: Monitor Pain and Promote Comfort  Flowsheets (Taken 1/21/2023 2000)  Pain Management Interventions:   care clustered   quiet environment facilitated  Intervention: Provide Person-Centered Care  Flowsheets (Taken 1/21/2023 2000)  Trust Relationship/Rapport:   care explained   questions encouraged   questions answered  Goal: Readiness for Transition of Care  Outcome: Ongoing, Progressing     Problem: Fatigue  Goal: Improved Activity Tolerance  Outcome: Ongoing, Progressing  Intervention: Promote Improved Energy  Flowsheets (Taken 1/21/2023 2000)  Sleep/Rest Enhancement: room darkened  Activity Management: Ambulated -L4

## 2023-01-22 NOTE — SUBJECTIVE & OBJECTIVE
Interval History: Stable overnight    Review of Systems   Constitutional:  Negative for chills.   HENT:  Negative for nosebleeds.    Eyes:  Negative for redness.   Respiratory:  Negative for shortness of breath.    Cardiovascular:  Negative for chest pain.   Gastrointestinal:  Negative for abdominal pain.   Genitourinary:  Negative for dysuria.   Musculoskeletal:  Negative for back pain.   Neurological:  Negative for headaches.   Psychiatric/Behavioral:  Negative for behavioral problems.    Objective:     Vital Signs (Most Recent):  Temp: 97.9 °F (36.6 °C) (01/22/23 1056)  Pulse: 71 (01/22/23 1056)  Resp: 20 (01/22/23 1056)  BP: 116/73 (01/22/23 1056)  SpO2: 95 % (01/22/23 1056) Vital Signs (24h Range):  Temp:  [97.9 °F (36.6 °C)-98.4 °F (36.9 °C)] 97.9 °F (36.6 °C)  Pulse:  [55-71] 71  Resp:  [18-20] 20  SpO2:  [94 %-98 %] 95 %  BP: (116-143)/(64-73) 116/73     Weight: 89.8 kg (198 lb)  Body mass index is 26.12 kg/m².    Intake/Output Summary (Last 24 hours) at 1/22/2023 1217  Last data filed at 1/21/2023 1411  Gross per 24 hour   Intake 240 ml   Output 200 ml   Net 40 ml      Physical Exam  Vitals reviewed.   Eyes:      Extraocular Movements: Extraocular movements intact.      Pupils: Pupils are equal, round, and reactive to light.   Cardiovascular:      Rate and Rhythm: Normal rate and regular rhythm.   Pulmonary:      Effort: Pulmonary effort is normal.      Breath sounds: Normal breath sounds.   Abdominal:      General: Bowel sounds are normal.      Palpations: Abdomen is soft.   Neurological:      General: No focal deficit present.      Mental Status: He is alert.   Psychiatric:         Mood and Affect: Mood normal.       Significant Labs: All pertinent labs within the past 24 hours have been reviewed.    Significant Imaging: I have reviewed all pertinent imaging results/findings within the past 24 hours.

## 2023-01-22 NOTE — PROGRESS NOTES
EGD and colonoscopy tomorrow. Clear liquid diet today and NPO after MN. Prep ordered. Hold any anticoagulants.

## 2023-01-22 NOTE — PROGRESS NOTES
"Subjective:       Patient ID: Hussein Gonzalez Jr. is a 72 y.o. male.    Chief Complaint: Loss of Consciousness (C/o syncopal episode PTA. Patient states he got dizzy after turned away from the door and caused him to "black out" and fall. Pt denies cardiac history and other symptoms. Cardiologist Dr. Hussein. )      Diagnosis:  Pancytopenia    Current Treatment:   Pending workup    Treatment History:  N/A    HPI:  72-year-old male with a history of CVA, hypertension, BPH, and melanoma who presented to the emergency department following a syncopal episode.  In February of 2022, the patient did have a slightly low white blood cell count, hemoglobin, and platelet count.  Per the patient, 3 months prior to admission, he was having dizziness, fatigue, weakness, shortness of breath, back pain, and leg pain.  The dizziness was worse with standing and walking.  On 01/20/2023, the patient had a syncopal episode.  He was brought to the emergency department and was found to be pancytopenic.  He also had stool that was positive for occult blood in the emergency department.  He received blood transfusion.  His CBC on 01/20/2023 revealed a white blood cell count of 2.6, hemoglobin of 7.7, and platelet count of 47,000. Reticulocytes were appropriately elevated, B12 and folate were within normal limits, iron level was slightly decreased.  Repeat blood work on 01/21/2023 revealed a white blood cell count of 2.8, post transfusion hemoglobin of 9.6, and a [place] count [place] 54979.  Hematology was consulted for pancytopenia.    Interval History:   Patient seen and examined on rounds.  Overall he is doing well.  He states that he feels better and has not had any further dizziness.    Past Medical History:   Diagnosis Date    Anxiety and depression     BPH (benign prostatic hyperplasia)     CVA (cerebrovascular accident)     HTN (hypertension)     Malignant melanoma of skin, unspecified     NSTEMI (non-ST elevated myocardial infarction) "     Sepsis, Gram negative       Past Surgical History:   Procedure Laterality Date    COLONOSCOPY      EXCISION OF MELANOMA      SINUS SURGERY      TONSILLECTOMY       Social History     Socioeconomic History    Marital status: Significant Other   Tobacco Use    Smoking status: Never    Smokeless tobacco: Never   Substance and Sexual Activity    Alcohol use: Yes     Alcohol/week: 7.0 standard drinks     Types: 7 Cans of beer per week    Drug use: Never    Sexual activity: Yes      Family History   Problem Relation Age of Onset    Cancer Mother     Cancer Father       Review of patient's allergies indicates:   Allergen Reactions    Acetaminophen     Chlorpheniramine-dextromethorp     Doxylamine-dm-acetaminophen      Other reaction(s): Feeling jittery      Review of Systems   Constitutional:  Positive for fatigue. Negative for chills, diaphoresis, fever and unexpected weight change.   HENT:  Negative for nasal congestion, mouth sores, sinus pressure/congestion and sore throat.    Eyes:  Negative for pain and visual disturbance.   Respiratory:  Negative for cough, chest tightness and shortness of breath.    Cardiovascular:  Negative for chest pain, palpitations and leg swelling.   Gastrointestinal:  Negative for abdominal distention, abdominal pain, blood in stool, constipation and diarrhea.   Genitourinary:  Negative for dysuria, frequency and hematuria.   Musculoskeletal:  Negative for arthralgias and back pain.   Integumentary:  Negative for rash.   Neurological:  Positive for syncope (On admission). Negative for dizziness, weakness, numbness and headaches.   Hematological:  Negative for adenopathy.   Psychiatric/Behavioral:  Negative for confusion.        Objective:      Physical Exam  Vitals reviewed.   Constitutional:       General: He is awake.      Appearance: Normal appearance.   HENT:      Head: Normocephalic and atraumatic.      Right Ear: Hearing normal.      Left Ear: Hearing normal.      Nose: Nose  normal.   Eyes:      General: Lids are normal. Vision grossly intact.      Extraocular Movements: Extraocular movements intact.      Conjunctiva/sclera: Conjunctivae normal.   Cardiovascular:      Rate and Rhythm: Normal rate and regular rhythm.      Pulses: Normal pulses.      Heart sounds: Normal heart sounds.   Pulmonary:      Effort: Pulmonary effort is normal.      Breath sounds: Normal breath sounds. No wheezing, rhonchi or rales.   Abdominal:      General: Bowel sounds are normal.      Palpations: Abdomen is soft.      Tenderness: There is no abdominal tenderness.   Musculoskeletal:      Cervical back: Full passive range of motion without pain.      Right lower leg: No edema.      Left lower leg: No edema.   Lymphadenopathy:      Cervical: No cervical adenopathy.      Upper Body:      Right upper body: No supraclavicular or axillary adenopathy.      Left upper body: No supraclavicular or axillary adenopathy.   Skin:     General: Skin is warm.   Neurological:      General: No focal deficit present.      Mental Status: He is alert and oriented to person, place, and time.   Psychiatric:         Attention and Perception: Attention normal.         Mood and Affect: Mood and affect normal.         Behavior: Behavior is cooperative.       LABS AND IMAGING REVIEWED IN EPIC          Assessment:   Pancytopenia        Plan:       So far, patient's blood work unremarkable for cause of pancytopenia.    I evaluated a blood smear on 01/21/2023 showed anemia, thrombocytopenia, leukopenia with bilobed (pseudo Pelger-Huet) neutrophils present.    I will check a copper level as the patient is on zinc and this can cause copper deficiency.    Due to the fact that he has had cytopenias in the past and this is progressive, if his copper level is within normal limits, I will go ahead and likely perform a bone marrow biopsy sometime in the upcoming week.  This would be to rule out MDS.    I agree with GI workup for possible GI  bleed.    All questions were answered to the patient to the best of my ability and he understands the plan moving forward.      Eduard Luna II, MD

## 2023-01-22 NOTE — PROGRESS NOTES
[facility]  Oncology [facility] Medicine  Progress Note    Patient Name: Hussein Gonzalez Jr.  MRN: [id number]  Patient Class: IP- Inpatient   Admission Date: 1/20/2023  Length of Stay: 2 days  Attending Physician: Nayely Andres MD  Primary Care Provider: Nayely Andres MD        Subjective:     Principal Problem:Acute GI bleeding        HPI:  72-year-old male patient of Dr. Andres with past medical history of CVA, HTN, BPH, and Melanoma presented to ED following syncopal episode at home. Pt states he was siting in a chair when he thought he heard someone at the door, states no one was there and when he was walking back to his chair felt sudden onset of dizziness prior to passing out and falling to the floor. Pt denies prior episodes, states he's been having dizziness, fatigue, weakness, SOB, back pain , and leg pain onset over the past 3 months. Pt reports dizziness worsens when standing or walking, but reports relief when sitting down. He was found to be anemic and was transfused 2 units of blood 1/20. Stool positive for blood in ER, but patient denies melena or hematochezia. Reports that he had 2 colonoscopies in the past, but doesn't remember the last one or who did procedure.      Overview/Hospital Course:  No notes on file    Interval History: Stable overnight    Review of Systems   Constitutional:  Negative for chills.   HENT:  Negative for nosebleeds.    Eyes:  Negative for redness.   Respiratory:  Negative for shortness of breath.    Cardiovascular:  Negative for chest pain.   Gastrointestinal:  Negative for abdominal pain.   Genitourinary:  Negative for dysuria.   Musculoskeletal:  Negative for back pain.   Neurological:  Negative for headaches.   Psychiatric/Behavioral:  Negative for behavioral problems.    Objective:     Vital Signs (Most Recent):  Temp: 97.9 °F (36.6 °C) (01/22/23 1056)  Pulse: 71 (01/22/23 1056)  Resp: 20 (01/22/23 1056)  BP: 116/73 (01/22/23  1056)  SpO2: 95 % (01/22/23 1056) Vital Signs (24h Range):  Temp:  [97.9 °F (36.6 °C)-98.4 °F (36.9 °C)] 97.9 °F (36.6 °C)  Pulse:  [55-71] 71  Resp:  [18-20] 20  SpO2:  [94 %-98 %] 95 %  BP: (116-143)/(64-73) 116/73     Weight: 89.8 kg (198 lb)  Body mass index is 26.12 kg/m².    Intake/Output Summary (Last 24 hours) at 1/22/2023 1217  Last data filed at 1/21/2023 1411  Gross per 24 hour   Intake 240 ml   Output 200 ml   Net 40 ml      Physical Exam  Vitals reviewed.   Eyes:      Extraocular Movements: Extraocular movements intact.      Pupils: Pupils are equal, round, and reactive to light.   Cardiovascular:      Rate and Rhythm: Normal rate and regular rhythm.   Pulmonary:      Effort: Pulmonary effort is normal.      Breath sounds: Normal breath sounds.   Abdominal:      General: Bowel sounds are normal.      Palpations: Abdomen is soft.   Neurological:      General: No focal deficit present.      Mental Status: He is alert.   Psychiatric:         Mood and Affect: Mood normal.       Significant Labs: All pertinent labs within the past 24 hours have been reviewed.    Significant Imaging: I have reviewed all pertinent imaging results/findings within the past 24 hours.      Assessment/Plan:      * Acute GI bleeding  - FOBT positive in ER, no melena or hematochezia  - GI consulted; EGD and C-scope tomorrow    Pancytopenia  - Platelets 43,000  - Retic elevated  - Transfused 2 units 1/20  - Consulted Hematology, bone marrow biopsy this week    Malignant melanoma of skin, unspecified  - Treated twice at [facility]  - Radiation to L side of neck in the past    HTN (hypertension)  - Resumed home dose of medication      VTE Risk Mitigation (From admission, onward)         Ordered     IP VTE HIGH RISK PATIENT  Once         01/20/23 1940     Place sequential compression device  Until discontinued         01/20/23 1940                Discharge Planning   DINA:      Code Status: Full Code   Is the patient medically ready for  discharge?:     Reason for patient still in hospital (select all that apply): Treatment                     Rocky Jose II, MD  Department of [facility] Medicine   [facility] - Oncology [facility]

## 2023-01-23 LAB
BASOPHILS # BLD AUTO: 0 X10(3)/MCL (ref 0–0.2)
BASOPHILS NFR BLD AUTO: 0 %
EOSINOPHIL # BLD AUTO: 0 X10(3)/MCL (ref 0–0.9)
EOSINOPHIL NFR BLD AUTO: 0 %
ERYTHROCYTE [DISTWIDTH] IN BLOOD BY AUTOMATED COUNT: 19.3 % (ref 11.5–17)
HCT VFR BLD AUTO: 32.1 % (ref 42–52)
HGB BLD-MCNC: 9.5 GM/DL (ref 14–18)
IMM GRANULOCYTES # BLD AUTO: 0.02 X10(3)/MCL (ref 0–0.04)
IMM GRANULOCYTES NFR BLD AUTO: 0.9 %
LYMPHOCYTES # BLD AUTO: 1.1 X10(3)/MCL (ref 0.6–4.6)
LYMPHOCYTES NFR BLD AUTO: 50 %
MCH RBC QN AUTO: 23.6 PG
MCHC RBC AUTO-ENTMCNC: 29.6 MG/DL (ref 33–36)
MCV RBC AUTO: 79.9 FL (ref 80–94)
MONOCYTES # BLD AUTO: 0.47 X10(3)/MCL (ref 0.1–1.3)
MONOCYTES NFR BLD AUTO: 21.4 %
NEUTROPHILS # BLD AUTO: 0.61 X10(3)/MCL (ref 2.1–9.2)
NEUTROPHILS NFR BLD AUTO: 27.7 %
NRBC BLD AUTO-RTO: 0 %
PLATELET # BLD AUTO: 45 X10(3)/MCL (ref 130–400)
PMV BLD AUTO: ABNORMAL FL
RBC # BLD AUTO: 4.02 X10(6)/MCL (ref 4.7–6.1)
WBC # SPEC AUTO: 2.2 X10(3)/MCL (ref 4.5–11.5)

## 2023-01-23 PROCEDURE — 25000003 PHARM REV CODE 250: Performed by: EMERGENCY MEDICINE

## 2023-01-23 PROCEDURE — 85025 COMPLETE CBC W/AUTO DIFF WBC: CPT | Performed by: INTERNAL MEDICINE

## 2023-01-23 PROCEDURE — 82525 ASSAY OF COPPER: CPT | Performed by: INTERNAL MEDICINE

## 2023-01-23 PROCEDURE — 99233 SBSQ HOSP IP/OBS HIGH 50: CPT | Mod: ,,, | Performed by: INTERNAL MEDICINE

## 2023-01-23 PROCEDURE — 36415 COLL VENOUS BLD VENIPUNCTURE: CPT | Performed by: INTERNAL MEDICINE

## 2023-01-23 PROCEDURE — 99232 SBSQ HOSP IP/OBS MODERATE 35: CPT | Mod: ,,, | Performed by: INTERNAL MEDICINE

## 2023-01-23 PROCEDURE — 25000003 PHARM REV CODE 250

## 2023-01-23 PROCEDURE — 99233 PR SUBSEQUENT HOSPITAL CARE,LEVL III: ICD-10-PCS | Mod: ,,, | Performed by: INTERNAL MEDICINE

## 2023-01-23 PROCEDURE — 21400001 HC TELEMETRY ROOM

## 2023-01-23 PROCEDURE — 25000003 PHARM REV CODE 250: Performed by: INTERNAL MEDICINE

## 2023-01-23 PROCEDURE — 99232 PR SUBSEQUENT HOSPITAL CARE,LEVL II: ICD-10-PCS | Mod: ,,, | Performed by: INTERNAL MEDICINE

## 2023-01-23 RX ORDER — SODIUM, POTASSIUM,MAG SULFATES 17.5-3.13G
1 SOLUTION, RECONSTITUTED, ORAL ORAL ONCE
Status: COMPLETED | OUTPATIENT
Start: 2023-01-23 | End: 2023-01-23

## 2023-01-23 RX ADMIN — TAMSULOSIN HYDROCHLORIDE 0.4 MG: 0.4 CAPSULE ORAL at 08:01

## 2023-01-23 RX ADMIN — SODIUM SULFATE, POTASSIUM SULFATE, MAGNESIUM SULFATE 354 ML: 17.5; 3.13; 1.6 SOLUTION, CONCENTRATE ORAL at 05:01

## 2023-01-23 RX ADMIN — ATORVASTATIN CALCIUM 80 MG: 40 TABLET, FILM COATED ORAL at 09:01

## 2023-01-23 RX ADMIN — CITALOPRAM HYDROBROMIDE 40 MG: 20 TABLET ORAL at 09:01

## 2023-01-23 RX ADMIN — POLYETHYLENE GLYCOL 3350 17 G: 17 POWDER, FOR SOLUTION ORAL at 09:01

## 2023-01-23 RX ADMIN — LISINOPRIL 20 MG: 20 TABLET ORAL at 09:01

## 2023-01-23 RX ADMIN — HYDROCHLOROTHIAZIDE 12.5 MG: 12.5 TABLET ORAL at 09:01

## 2023-01-23 NOTE — PROGRESS NOTES
[facility]  Hospital Medicine Progress Note        Chief Complaint: Inpatient Follow-up for Pancytopenia , questionable GI bleed    HPI:   72-year-old male patient with  past medical history of CVA, HTN, BPH, and Melanoma presented to ED following syncopal episode at home.  He was found to be anemic and was transfused 2 units of blood 1/20. Stool positive for blood in ER, but patient denies melena or hematochezia. Reports that he had 2 colonoscopies in the past, but doesn't remember the last one or who did procedure.  Was admitted to the hospital and GI and Hematology were consulted.    Interval Hx:   The patient was seen and examined.  Unable to go through a colonoscopy/EGD today because of poor prep however scheduled for tomorrow morning.  Patient understands to remain on a clear liquid diet.  Also will be scheduled for a bone marrow biopsy to further evaluated cause for pancytopenia.  At the time of my visit patient's son and wife were at bedside.  Other than having some fatigue, no other acute needs or complaints.    Objective/physical exam:  General: In no acute distress, afebrile  Chest: Clear to auscultation bilaterally  Heart: RRR, +S1, S2, no appreciable murmur  Abdomen: Soft, nontender, BS +  MSK: Warm, no lower extremity edema, no clubbing or cyanosis  Neurologic: Alert and oriented x4, nonfocal    VITAL SIGNS: 24 HRS MIN & MAX LAST   Temp  Min: 97.8 °F (36.6 °C)  Max: 98.2 °F (36.8 °C) 98 °F (36.7 °C)   BP  Min: 110/61  Max: 138/80 110/61   Pulse  Min: 57  Max: 78  67   Resp  Min: 16  Max: 18 18   SpO2  Min: 96 %  Max: 97 % 97 %       Recent Labs   Lab 01/21/23  0800 01/22/23  0510 01/23/23  0358   WBC 2.8* 2.0* 2.2*   RBC 3.94* 4.03* 4.02*   HGB 9.6* 9.6* 9.5*   HCT 31.5* 32.1* 32.1*   MCV 79.9* 79.7* 79.9*   MCH 24.4 23.8 23.6   MCHC 30.5* 29.9* 29.6*   RDW 18.9* 19.1* 19.3*   PLT 43* 44* 45*       Recent Labs   Lab 01/20/23  1336 01/21/23  0800 01/22/23  0510    142  137   K 3.9 4.0 4.2   CO2 27 27 26   BUN 24.2 23.1 21.8   CREATININE 1.09 1.03 0.98   CALCIUM 9.2 8.6* 8.6*   ALBUMIN 3.6 3.7 3.5   ALKPHOS 103 109 106   ALT 14 15 14   AST 16 18 18   BILITOT 0.6 1.1 0.9          Microbiology Results (last 7 days)       ** No results found for the last 168 hours. **             See below for Radiology    Scheduled Med:   atorvastatin  80 mg Oral Daily    citalopram  40 mg Oral Daily    lisinopriL  20 mg Oral Daily    And    hydroCHLOROthiazide  12.5 mg Oral Daily    polyethylene glycol  17 g Oral Daily    sodium,potassium,mag sulfates  1 kit Oral Once    tamsulosin  1 capsule Oral QHS        Continuous Infusions:   pantoprazole (PROTONIX) IV infusion 8 mg/hr (01/21/23 2122)        PRN Meds:  sodium chloride, melatonin, ondansetron, sodium chloride 0.9%       Assessment/Plan:  * Acute GI bleeding  - FOBT positive in ER, no melena or hematochezia  - GI consulted; EGD and C-scope possibly tomorrow, unable to perform today because of incomplete prep      Pancytopenia  - Platelets 45,000  - Retic elevated  - Transfused 2 units 1/20  - Hematology on board, bone marrow biopsy this week     Malignant melanoma of skin, unspecified  - Treated twice at [facility]  - Radiation to L side of neck in the past     HTN (hypertension)  - Resumed home dose of medication    VTE prophylaxis:  Sequential compression device, no anticoagulation secondary to pancytopenia    Patient condition:  Fair     Anticipated discharge and Disposition:  Home when cleared from consultants      All diagnosis and differential diagnosis have been reviewed; assessment and plan has been documented; I have personally reviewed the labs and test results that are presently available; I have reviewed the patients medication list; I have reviewed the consulting providers response and recommendations. I have reviewed or attempted to review medical records based upon their availability    All of the patient's questions have been   addressed and answered. Patient's is agreeable to the above stated plan. I will continue to monitor closely and make adjustments to medical management as needed.  _____________________________________________________________________    Nutrition Status:    Radiology:  X-Ray Chest PA And Lateral  Narrative: EXAMINATION:  XR CHEST PA AND LATERAL    CLINICAL HISTORY:  Shortness of breath    TECHNIQUE:  Two-view    COMPARISON:  None available.    FINDINGS:  Cardiopericardial silhouette is within normal limits. Lungs are without dense focal or segmental consolidation, congestion, pleural effusion or pneumothorax.  Impression: No acute cardiopulmonary process identified.    Electronically signed by: Xavier Crandall  Date:    12/14/2022  Time:    11:47      Nayely Andres MD   01/23/2023

## 2023-01-23 NOTE — PLAN OF CARE
Problem: Adult Inpatient Plan of Care  Goal: Plan of Care Review  Outcome: Ongoing, Progressing  Flowsheets (Taken 1/22/2023 2000)  Plan of Care Reviewed With: patient  Goal: Patient-Specific Goal (Individualized)  Outcome: Ongoing, Progressing  Goal: Optimal Comfort and Wellbeing  Outcome: Ongoing, Progressing  Intervention: Monitor Pain and Promote Comfort  Flowsheets (Taken 1/22/2023 2000)  Pain Management Interventions: care clustered  Intervention: Provide Person-Centered Care  Flowsheets (Taken 1/22/2023 2000)  Trust Relationship/Rapport:   care explained   questions encouraged   questions answered  Goal: Readiness for Transition of Care  Outcome: Ongoing, Progressing     Problem: Fall Injury Risk  Goal: Absence of Fall and Fall-Related Injury  Outcome: Ongoing, Progressing     Problem: Pain Acute  Goal: Acceptable Pain Control and Functional Ability  1/22/2023 2302 by Nandini Watson RN  Outcome: Ongoing, Progressing  1/22/2023 2249 by Nandini Watson RN  Outcome: Ongoing, Progressing  Intervention: Develop Pain Management Plan  Flowsheets (Taken 1/22/2023 2000)  Pain Management Interventions: care clustered  Intervention: Prevent or Manage Pain  Flowsheets (Taken 1/22/2023 2000)  Sleep/Rest Enhancement: room darkened  Sensory Stimulation Regulation:   quiet environment promoted   lighting decreased  Intervention: Optimize Psychosocial Wellbeing  Flowsheets (Taken 1/22/2023 2000)  Supportive Measures: relaxation techniques promoted     Problem: Fatigue  Goal: Improved Activity Tolerance  1/22/2023 2302 by Nandini Watson RN  Outcome: Ongoing, Progressing  1/22/2023 2249 by Nandini Watson RN  Outcome: Ongoing, Progressing  Intervention: Promote Improved Energy  1/22/2023 2302 by Nandini Watson RN  Flowsheets (Taken 1/22/2023 2000)  Activity Management:   Ambulated to bathroom - L4   Patient unable to perform activities  1/22/2023 2249 by Nandini Watson RN  Flowsheets (Taken 1/22/2023  2000)  Sleep/Rest Enhancement: room darkened  Activity Management:   Ambulated to bathroom - L4   Patient unable to perform activities

## 2023-01-23 NOTE — PROGRESS NOTES
"Subjective:       Patient ID: Hussein Gonzalez Jr. is a 72 y.o. male.    Chief Complaint: Loss of Consciousness (C/o syncopal episode PTA. Patient states he got dizzy after turned away from the door and caused him to "black out" and fall. Pt denies cardiac history and other symptoms. Cardiologist Dr. Hussein. )      Diagnosis:  Pancytopenia    Current Treatment:   Pending workup    Treatment History:  N/A    HPI:  72-year-old male with a history of CVA, hypertension, BPH, and melanoma who presented to the emergency department following a syncopal episode.  In February of 2022, the patient did have a slightly low white blood cell count, hemoglobin, and platelet count.  Per the patient, 3 months prior to admission, he was having dizziness, fatigue, weakness, shortness of breath, back pain, and leg pain.  The dizziness was worse with standing and walking.  On 01/20/2023, the patient had a syncopal episode.  He was brought to the emergency department and was found to be pancytopenic.  He also had stool that was positive for occult blood in the emergency department.  He received blood transfusion.  His CBC on 01/20/2023 revealed a white blood cell count of 2.6, hemoglobin of 7.7, and platelet count of 47,000. Reticulocytes were appropriately elevated, B12 and folate were within normal limits, iron level was slightly decreased.  Repeat blood work on 01/21/2023 revealed a white blood cell count of 2.8, post transfusion hemoglobin of 9.6, and a platelet count of 43940.  Hematology was consulted for pancytopenia.    Interval History:   Patient seen and examined on rounds.  Overall he is doing well. No acute events occurred overnight.    Past Medical History:   Diagnosis Date    Anxiety and depression     BPH (benign prostatic hyperplasia)     CVA (cerebrovascular accident)     HTN (hypertension)     Malignant melanoma of skin, unspecified     NSTEMI (non-ST elevated myocardial infarction)     Sepsis, Gram negative     "   Past Surgical History:   Procedure Laterality Date    COLONOSCOPY      EXCISION OF MELANOMA      SINUS SURGERY      TONSILLECTOMY       Social History     Socioeconomic History    Marital status: Significant Other   Tobacco Use    Smoking status: Never    Smokeless tobacco: Never   Substance and Sexual Activity    Alcohol use: Yes     Alcohol/week: 7.0 standard drinks     Types: 7 Cans of beer per week    Drug use: Never    Sexual activity: Yes      Family History   Problem Relation Age of Onset    Cancer Mother     Cancer Father       Review of patient's allergies indicates:   Allergen Reactions    Acetaminophen     Chlorpheniramine-dextromethorp     Doxylamine-dm-acetaminophen      Other reaction(s): Feeling jittery      Review of Systems   Constitutional:  Positive for fatigue. Negative for chills, diaphoresis, fever and unexpected weight change.   HENT:  Negative for nasal congestion, mouth sores, sinus pressure/congestion and sore throat.    Eyes:  Negative for pain and visual disturbance.   Respiratory:  Negative for cough, chest tightness and shortness of breath.    Cardiovascular:  Negative for chest pain, palpitations and leg swelling.   Gastrointestinal:  Negative for abdominal distention, abdominal pain, blood in stool, constipation and diarrhea.   Genitourinary:  Negative for dysuria, frequency and hematuria.   Musculoskeletal:  Negative for arthralgias and back pain.   Integumentary:  Negative for rash.   Neurological:  Positive for syncope (On admission). Negative for dizziness, weakness, numbness and headaches.   Hematological:  Negative for adenopathy.   Psychiatric/Behavioral:  Negative for confusion.        Objective:      Physical Exam  Vitals reviewed.   Constitutional:       General: He is awake.      Appearance: Normal appearance.   HENT:      Head: Normocephalic and atraumatic.      Right Ear: Hearing normal.      Left Ear: Hearing normal.      Nose: Nose normal.   Eyes:      General: Lids  are normal. Vision grossly intact.      Extraocular Movements: Extraocular movements intact.      Conjunctiva/sclera: Conjunctivae normal.   Cardiovascular:      Rate and Rhythm: Normal rate and regular rhythm.      Pulses: Normal pulses.      Heart sounds: Normal heart sounds.   Pulmonary:      Effort: Pulmonary effort is normal.      Breath sounds: Normal breath sounds. No wheezing, rhonchi or rales.   Abdominal:      General: Bowel sounds are normal.      Palpations: Abdomen is soft.      Tenderness: There is no abdominal tenderness.   Musculoskeletal:      Cervical back: Full passive range of motion without pain.      Right lower leg: No edema.      Left lower leg: No edema.   Lymphadenopathy:      Cervical: No cervical adenopathy.      Upper Body:      Right upper body: No supraclavicular or axillary adenopathy.      Left upper body: No supraclavicular or axillary adenopathy.   Skin:     General: Skin is warm.   Neurological:      General: No focal deficit present.      Mental Status: He is alert and oriented to person, place, and time.   Psychiatric:         Attention and Perception: Attention normal.         Mood and Affect: Mood and affect normal.         Behavior: Behavior is cooperative.       LABS AND IMAGING REVIEWED IN EPIC          Assessment:   Pancytopenia        Plan:       So far, patient's blood work unremarkable for cause of pancytopenia.    I evaluated a blood smear on 01/21/2023 showed anemia, thrombocytopenia, leukopenia with bilobed (pseudo Pelger-Huet) neutrophils present.    I will check a copper level as the patient is on zinc and this can cause copper deficiency.    Due to the fact that he has had cytopenias in the past and this is progressive, I will go ahead and order for bone marrow biopsy to be done tomorrow.    I agree with GI workup for possible GI bleed.    All questions were answered to the patient to the best of my ability and he understands the plan moving forward.      Eduard  ROBERTO Luna, MD

## 2023-01-23 NOTE — PLAN OF CARE
Problem: Adult Inpatient Plan of Care  Goal: Plan of Care Review  Outcome: Ongoing, Progressing  Goal: Patient-Specific Goal (Individualized)  Outcome: Ongoing, Progressing  Goal: Absence of Hospital-Acquired Illness or Injury  Outcome: Ongoing, Progressing  Intervention: Identify and Manage Fall Risk  Flowsheets (Taken 1/23/2023 0737)  Safety Promotion/Fall Prevention:   assistive device/personal item within reach   side rails raised x 2   nonskid shoes/socks when out of bed  Intervention: Prevent Skin Injury  Flowsheets (Taken 1/23/2023 0737)  Body Position:   position changed independently   supine  Intervention: Prevent and Manage VTE (Venous Thromboembolism) Risk  Flowsheets (Taken 1/23/2023 0737)  Activity Management: Ambulated to bathroom - L4  VTE Prevention/Management:   ambulation promoted   fluids promoted  Goal: Optimal Comfort and Wellbeing  Outcome: Ongoing, Progressing  Goal: Readiness for Transition of Care  Outcome: Ongoing, Progressing     Problem: Fall Injury Risk  Goal: Absence of Fall and Fall-Related Injury  Outcome: Ongoing, Progressing     Problem: Pain Acute  Goal: Acceptable Pain Control and Functional Ability  Outcome: Ongoing, Progressing     Problem: Fatigue  Goal: Improved Activity Tolerance  Outcome: Ongoing, Progressing

## 2023-01-23 NOTE — PLAN OF CARE
01/23/23 1036   Discharge Assessment   Assessment Type Discharge Planning Assessment   Confirmed/corrected address, phone number and insurance Yes   Confirmed Demographics Correct on Facesheet   Source of Information patient   When was your last doctors appointment?   (Patient reports a month ago)   Communicated DINA with patient/caregiver Yes   Reason For Admission Syncope   People in Home significant other   Do you expect to return to your current living situation? Yes   Do you have help at home or someone to help you manage your care at home? Yes   Who are your caregiver(s) and their phone number(s)? Significant Other: Gissel Lobato: [phone]   Prior to hospitilization cognitive status: Unable to Assess   Current cognitive status: Alert/Oriented   Home Accessibility stairs to enter home   Number of Stairs, Main Entrance four   Home Layout Able to live on 1st floor   Equipment Currently Used at Home CPAP;walker, rolling   Readmission within 30 days? No   Patient currently being followed by outpatient case management? No   Do you currently have service(s) that help you manage your care at home? No   Do you take prescription medications? Yes   Do you have prescription coverage? Yes   Coverage Medicare Part A & B   Do you have any problems affording any of your prescribed medications? No   Is the patient taking medications as prescribed? yes   Who is going to help you get home at discharge? Patient's girlfriend, Gissel Lobato.   How do you get to doctors appointments? car, drives self   Are you on dialysis? No   Do you take coumadin? No   Discharge Plan A Home with family   Discharge Plan B Home   DME Needed Upon Discharge  none   Discharge Plan discussed with: Patient   Discharge Barriers Identified None   OTHER   Name(s) of People in Home Patient resides with his girlfriend, Gissel Lobato       NO barriers to discharge at this time.

## 2023-01-24 ENCOUNTER — ANESTHESIA (OUTPATIENT)
Dept: ENDOSCOPY | Facility: HOSPITAL | Age: 73
DRG: 808 | End: 2023-01-24
Payer: MEDICARE

## 2023-01-24 ENCOUNTER — ANESTHESIA EVENT (OUTPATIENT)
Dept: ENDOSCOPY | Facility: HOSPITAL | Age: 73
DRG: 808 | End: 2023-01-24
Payer: MEDICARE

## 2023-01-24 PROCEDURE — 63600175 PHARM REV CODE 636 W HCPCS: Performed by: EMERGENCY MEDICINE

## 2023-01-24 PROCEDURE — 25000003 PHARM REV CODE 250: Performed by: NURSE ANESTHETIST, CERTIFIED REGISTERED

## 2023-01-24 PROCEDURE — 88312 SPECIAL STAINS GROUP 1: CPT

## 2023-01-24 PROCEDURE — 88364 INSITU HYBRIDIZATION (FISH): CPT

## 2023-01-24 PROCEDURE — 94761 N-INVAS EAR/PLS OXIMETRY MLT: CPT

## 2023-01-24 PROCEDURE — 37000009 HC ANESTHESIA EA ADD 15 MINS: Performed by: STUDENT IN AN ORGANIZED HEALTH CARE EDUCATION/TRAINING PROGRAM

## 2023-01-24 PROCEDURE — 99233 SBSQ HOSP IP/OBS HIGH 50: CPT | Mod: ,,, | Performed by: INTERNAL MEDICINE

## 2023-01-24 PROCEDURE — C9113 INJ PANTOPRAZOLE SODIUM, VIA: HCPCS | Performed by: EMERGENCY MEDICINE

## 2023-01-24 PROCEDURE — 99233 PR SUBSEQUENT HOSPITAL CARE,LEVL III: ICD-10-PCS | Mod: ,,, | Performed by: INTERNAL MEDICINE

## 2023-01-24 PROCEDURE — 25000003 PHARM REV CODE 250: Performed by: EMERGENCY MEDICINE

## 2023-01-24 PROCEDURE — 63600175 PHARM REV CODE 636 W HCPCS: Performed by: NURSE ANESTHETIST, CERTIFIED REGISTERED

## 2023-01-24 PROCEDURE — 88313 SPECIAL STAINS GROUP 2: CPT

## 2023-01-24 PROCEDURE — 25000003 PHARM REV CODE 250: Performed by: INTERNAL MEDICINE

## 2023-01-24 PROCEDURE — 37000008 HC ANESTHESIA 1ST 15 MINUTES: Performed by: STUDENT IN AN ORGANIZED HEALTH CARE EDUCATION/TRAINING PROGRAM

## 2023-01-24 PROCEDURE — 43239 EGD BIOPSY SINGLE/MULTIPLE: CPT | Performed by: STUDENT IN AN ORGANIZED HEALTH CARE EDUCATION/TRAINING PROGRAM

## 2023-01-24 PROCEDURE — 88365 INSITU HYBRIDIZATION (FISH): CPT

## 2023-01-24 PROCEDURE — 45378 DIAGNOSTIC COLONOSCOPY: CPT | Mod: 74 | Performed by: STUDENT IN AN ORGANIZED HEALTH CARE EDUCATION/TRAINING PROGRAM

## 2023-01-24 PROCEDURE — 27201423 OPTIME MED/SURG SUP & DEVICES STERILE SUPPLY: Performed by: STUDENT IN AN ORGANIZED HEALTH CARE EDUCATION/TRAINING PROGRAM

## 2023-01-24 PROCEDURE — 21400001 HC TELEMETRY ROOM

## 2023-01-24 RX ORDER — SODIUM CHLORIDE, SODIUM GLUCONATE, SODIUM ACETATE, POTASSIUM CHLORIDE AND MAGNESIUM CHLORIDE 30; 37; 368; 526; 502 MG/100ML; MG/100ML; MG/100ML; MG/100ML; MG/100ML
INJECTION, SOLUTION INTRAVENOUS CONTINUOUS
Status: CANCELLED | OUTPATIENT
Start: 2023-01-24 | End: 2023-02-23

## 2023-01-24 RX ORDER — IPRATROPIUM BROMIDE AND ALBUTEROL SULFATE 2.5; .5 MG/3ML; MG/3ML
3 SOLUTION RESPIRATORY (INHALATION)
Status: DISCONTINUED | OUTPATIENT
Start: 2023-01-24 | End: 2023-01-25 | Stop reason: HOSPADM

## 2023-01-24 RX ORDER — PROPOFOL 10 MG/ML
VIAL (ML) INTRAVENOUS
Status: DISCONTINUED | OUTPATIENT
Start: 2023-01-24 | End: 2023-01-24

## 2023-01-24 RX ORDER — PROCHLORPERAZINE EDISYLATE 5 MG/ML
5 INJECTION INTRAMUSCULAR; INTRAVENOUS EVERY 30 MIN PRN
Status: DISCONTINUED | OUTPATIENT
Start: 2023-01-24 | End: 2023-01-25 | Stop reason: HOSPADM

## 2023-01-24 RX ORDER — LIDOCAINE HYDROCHLORIDE 10 MG/ML
1 INJECTION, SOLUTION EPIDURAL; INFILTRATION; INTRACAUDAL; PERINEURAL ONCE
Status: CANCELLED | OUTPATIENT
Start: 2023-01-24 | End: 2023-01-24

## 2023-01-24 RX ORDER — LIDOCAINE HYDROCHLORIDE 20 MG/ML
INJECTION, SOLUTION EPIDURAL; INFILTRATION; INTRACAUDAL; PERINEURAL
Status: COMPLETED
Start: 2023-01-24 | End: 2023-01-24

## 2023-01-24 RX ORDER — ONDANSETRON 2 MG/ML
4 INJECTION INTRAMUSCULAR; INTRAVENOUS DAILY PRN
Status: DISCONTINUED | OUTPATIENT
Start: 2023-01-24 | End: 2023-01-25 | Stop reason: HOSPADM

## 2023-01-24 RX ORDER — PHENYLEPHRINE HYDROCHLORIDE 10 MG/ML
INJECTION INTRAVENOUS
Status: DISCONTINUED | OUTPATIENT
Start: 2023-01-24 | End: 2023-01-24

## 2023-01-24 RX ORDER — LIDOCAINE HYDROCHLORIDE 20 MG/ML
INJECTION, SOLUTION EPIDURAL; INFILTRATION; INTRACAUDAL; PERINEURAL
Status: DISCONTINUED | OUTPATIENT
Start: 2023-01-24 | End: 2023-01-24

## 2023-01-24 RX ORDER — PROPOFOL 10 MG/ML
VIAL (ML) INTRAVENOUS
Status: COMPLETED
Start: 2023-01-24 | End: 2023-01-24

## 2023-01-24 RX ADMIN — LISINOPRIL 20 MG: 20 TABLET ORAL at 11:01

## 2023-01-24 RX ADMIN — PROPOFOL 70 MG: 10 INJECTION, EMULSION INTRAVENOUS at 09:01

## 2023-01-24 RX ADMIN — PANTOPRAZOLE SODIUM 8 MG/HR: 40 INJECTION, POWDER, FOR SOLUTION INTRAVENOUS at 11:01

## 2023-01-24 RX ADMIN — PROPOFOL 20 MG: 10 INJECTION, EMULSION INTRAVENOUS at 09:01

## 2023-01-24 RX ADMIN — PROPOFOL 30 MG: 10 INJECTION, EMULSION INTRAVENOUS at 09:01

## 2023-01-24 RX ADMIN — HYDROCHLOROTHIAZIDE 12.5 MG: 12.5 TABLET ORAL at 11:01

## 2023-01-24 RX ADMIN — PHENYLEPHRINE HYDROCHLORIDE 100 MCG: 10 INJECTION INTRAVENOUS at 10:01

## 2023-01-24 RX ADMIN — LIDOCAINE HYDROCHLORIDE 4 ML: 20 INJECTION, SOLUTION INTRAVENOUS at 09:01

## 2023-01-24 RX ADMIN — PROPOFOL 30 MG: 10 INJECTION, EMULSION INTRAVENOUS at 10:01

## 2023-01-24 RX ADMIN — ATORVASTATIN CALCIUM 80 MG: 40 TABLET, FILM COATED ORAL at 11:01

## 2023-01-24 RX ADMIN — TAMSULOSIN HYDROCHLORIDE 0.4 MG: 0.4 CAPSULE ORAL at 09:01

## 2023-01-24 RX ADMIN — PANTOPRAZOLE SODIUM 8 MG/HR: 40 INJECTION, POWDER, FOR SOLUTION INTRAVENOUS at 04:01

## 2023-01-24 RX ADMIN — PROPOFOL 20 MG: 10 INJECTION, EMULSION INTRAVENOUS at 10:01

## 2023-01-24 RX ADMIN — PHENYLEPHRINE HYDROCHLORIDE 100 MCG: 10 INJECTION INTRAVENOUS at 09:01

## 2023-01-24 RX ADMIN — PROPOFOL 10 MG: 10 INJECTION, EMULSION INTRAVENOUS at 10:01

## 2023-01-24 RX ADMIN — PANTOPRAZOLE SODIUM 8 MG/HR: 40 INJECTION, POWDER, FOR SOLUTION INTRAVENOUS at 10:01

## 2023-01-24 RX ADMIN — SODIUM CHLORIDE, SODIUM GLUCONATE, SODIUM ACETATE, POTASSIUM CHLORIDE AND MAGNESIUM CHLORIDE: 526; 502; 368; 37; 30 INJECTION, SOLUTION INTRAVENOUS at 09:01

## 2023-01-24 NOTE — PLAN OF CARE
Problem: Adult Inpatient Plan of Care  Goal: Plan of Care Review  Outcome: Ongoing, Progressing  Flowsheets (Taken 1/23/2023 1370)  Plan of Care Reviewed With: patient  Goal: Patient-Specific Goal (Individualized)  Outcome: Ongoing, Progressing  Goal: Absence of Hospital-Acquired Illness or Injury  Outcome: Ongoing, Progressing  Goal: Optimal Comfort and Wellbeing  Outcome: Ongoing, Progressing  Goal: Readiness for Transition of Care  Outcome: Ongoing, Progressing     Problem: Fall Injury Risk  Goal: Absence of Fall and Fall-Related Injury  Outcome: Ongoing, Progressing     Problem: Pain Acute  Goal: Acceptable Pain Control and Functional Ability  Outcome: Ongoing, Progressing     Problem: Fatigue  Goal: Improved Activity Tolerance  Outcome: Ongoing, Progressing

## 2023-01-24 NOTE — PROGRESS NOTES
[facility]  Hospital Medicine Progress Note        Chief Complaint: Inpatient Follow-up for Pancytopenia     HPI:   72-year-old male patient with  past medical history of CVA, HTN, BPH, and Melanoma presented to ED following syncopal episode at home.  He was found to be anemic and was transfused 2 units of blood 1/20. Stool positive for blood in ER, but patient denies melena or hematochezia. Reports that he had 2 colonoscopies in the past, but doesn't remember the last one or who did procedure.  Was admitted to the hospital and GI and Hematology were consulted.    Interval Hx:    The patient is status post endoscopy procedures today, results were pending at the time.  Wife was at bedside    Objective/physical exam:  General: In no acute distress, afebrile  Chest: Clear to auscultation bilaterally  Heart: RRR, +S1, S2, no appreciable murmur  Abdomen: Soft, nontender, BS +    VITAL SIGNS: 24 HRS MIN & MAX LAST   Temp  Min: 96.8 °F (36 °C)  Max: 98.6 °F (37 °C) 98 °F (36.7 °C)   BP  Min: 97/52  Max: 149/82 130/75   Pulse  Min: 56  Max: 75  67   Resp  Min: 11  Max: 24 18   SpO2  Min: 94 %  Max: 98 % 96 %       Recent Labs   Lab 01/21/23  0800 01/22/23  0510 01/23/23  0358   WBC 2.8* 2.0* 2.2*   RBC 3.94* 4.03* 4.02*   HGB 9.6* 9.6* 9.5*   HCT 31.5* 32.1* 32.1*   MCV 79.9* 79.7* 79.9*   MCH 24.4 23.8 23.6   MCHC 30.5* 29.9* 29.6*   RDW 18.9* 19.1* 19.3*   PLT 43* 44* 45*       Recent Labs   Lab 01/20/23  1336 01/21/23  0800 01/22/23  0510    142 137   K 3.9 4.0 4.2   CO2 27 27 26   BUN 24.2 23.1 21.8   CREATININE 1.09 1.03 0.98   CALCIUM 9.2 8.6* 8.6*   ALBUMIN 3.6 3.7 3.5   ALKPHOS 103 109 106   ALT 14 15 14   AST 16 18 18   BILITOT 0.6 1.1 0.9          Microbiology Results (last 7 days)       ** No results found for the last 168 hours. **             See below for Radiology    Scheduled Med:   atorvastatin  80 mg Oral Daily    citalopram  40 mg Oral Daily    lisinopriL  20 mg Oral  Daily    And    hydroCHLOROthiazide  12.5 mg Oral Daily    polyethylene glycol  17 g Oral Daily    tamsulosin  1 capsule Oral QHS        Continuous Infusions:   pantoprazole (PROTONIX) IV infusion 8 mg/hr (01/24/23 1134)        PRN Meds:  sodium chloride, albuterol-ipratropium, melatonin, ondansetron, ondansetron, prochlorperazine, sodium chloride 0.9%       Assessment/Plan:  * Acute GI bleeding  - FOBT positive in ER, no melena or hematochezia  - GI consulted;  patient now status post EGD and C-scope,  follow-up on results      Pancytopenia  - Platelets 45,000On 01/23/2023, repeat in a.m.  - Retic elevated  - Transfused 2 units 1/20  - Hematology on board, bone marrow biopsy  scheduled for tomorrow     Malignant melanoma of skin, unspecified  - Treated twice at [facility]  - Radiation to L side of neck in the past     HTN (hypertension)  - Resumed home dose of medication       VTE prophylaxis:  SCDs    Patient condition:  Fair    Anticipated discharge and Disposition:     home once cleared from consultants    All diagnosis and differential diagnosis have been reviewed; assessment and plan has been documented; I have personally reviewed the labs and test results that are presently available; I have reviewed the patients medication list; I have reviewed the consulting providers response and recommendations. I have reviewed or attempted to review medical records based upon their availability    All of the patient's questions have been  addressed and answered. Patient's is agreeable to the above stated plan. I will continue to monitor closely and make adjustments to medical management as needed.  _____________________________________________________________________    Nutrition Status:    Radiology:  X-Ray Chest PA And Lateral  Narrative: EXAMINATION:  XR CHEST PA AND LATERAL    CLINICAL HISTORY:  Shortness of breath    TECHNIQUE:  Two-view    COMPARISON:  None available.    FINDINGS:  Cardiopericardial silhouette is  within normal limits. Lungs are without dense focal or segmental consolidation, congestion, pleural effusion or pneumothorax.  Impression: No acute cardiopulmonary process identified.    Electronically signed by: Xavier Crandall  Date:    12/14/2022  Time:    11:47      Nayely Andres MD   01/24/2023

## 2023-01-24 NOTE — PLAN OF CARE
Problem: Adult Inpatient Plan of Care  Goal: Plan of Care Review  Outcome: Ongoing, Progressing  Flowsheets (Taken 1/24/2023 0819)  Plan of Care Reviewed With: patient  Goal: Patient-Specific Goal (Individualized)  Outcome: Ongoing, Progressing  Flowsheets (Taken 1/24/2023 0819)  Anxieties, Fears or Concerns: Reason for low WBC count  Individualized Care Needs: I'd like to be kept up to date on my blood counts  Goal: Absence of Hospital-Acquired Illness or Injury  Outcome: Ongoing, Progressing  Intervention: Identify and Manage Fall Risk  Flowsheets (Taken 1/24/2023 0819)  Safety Promotion/Fall Prevention:   assistive device/personal item within reach   medications reviewed   nonskid shoes/socks when out of bed   side rails raised x 2  Intervention: Prevent Skin Injury  Flowsheets (Taken 1/24/2023 0819)  Body Position: position changed independently  Skin Protection:   adhesive use limited   tubing/devices free from skin contact  Intervention: Prevent and Manage VTE (Venous Thromboembolism) Risk  Flowsheets (Taken 1/24/2023 0819)  Activity Management:   Ambulated to bathroom - L4   Ambulated in room - L4  VTE Prevention/Management:   ambulation promoted   fluids promoted  Range of Motion:   active ROM (range of motion) encouraged   ROM (range of motion) performed  Intervention: Prevent Infection  Flowsheets (Taken 1/24/2023 0819)  Infection Prevention:   rest/sleep promoted   single patient room provided  Goal: Optimal Comfort and Wellbeing  Outcome: Ongoing, Progressing  Intervention: Monitor Pain and Promote Comfort  Flowsheets (Taken 1/24/2023 0819)  Pain Management Interventions:   care clustered   medication offered   pain management plan reviewed with patient/caregiver   pillow support provided   position adjusted  Intervention: Provide Person-Centered Care  Flowsheets (Taken 1/24/2023 0819)  Trust Relationship/Rapport: care explained  Goal: Readiness for Transition of Care  Outcome: Ongoing, Progressing      Problem: Fall Injury Risk  Goal: Absence of Fall and Fall-Related Injury  Outcome: Ongoing, Progressing  Intervention: Identify and Manage Contributors  Flowsheets (Taken 1/24/2023 0819)  Self-Care Promotion: independence encouraged  Medication Review/Management: medications reviewed  Intervention: Promote Injury-Free Environment  Flowsheets (Taken 1/24/2023 0819)  Safety Promotion/Fall Prevention:   assistive device/personal item within reach   medications reviewed   nonskid shoes/socks when out of bed   side rails raised x 2     Problem: Pain Acute  Goal: Acceptable Pain Control and Functional Ability  Outcome: Ongoing, Progressing  Intervention: Develop Pain Management Plan  Flowsheets (Taken 1/24/2023 0819)  Pain Management Interventions:   care clustered   medication offered   pain management plan reviewed with patient/caregiver   pillow support provided   position adjusted  Intervention: Prevent or Manage Pain  Flowsheets (Taken 1/24/2023 0819)  Medication Review/Management: medications reviewed  Intervention: Optimize Psychosocial Wellbeing  Flowsheets (Taken 1/24/2023 0819)  Supportive Measures: relaxation techniques promoted     Problem: Fatigue  Goal: Improved Activity Tolerance  Outcome: Ongoing, Progressing  Intervention: Promote Improved Energy  Flowsheets (Taken 1/24/2023 0819)  Activity Management:   Ambulated to bathroom - L4   Ambulated in room - L4

## 2023-01-24 NOTE — ANESTHESIA POSTPROCEDURE EVALUATION
Anesthesia Post Evaluation    Patient: Hussein Gonzalez Jr.    Procedure(s) Performed: Procedure(s) (LRB):  COLONOSCOPY (DOUBLE) (N/A)  EGD, WITH CLOSED BIOPSY (N/A)    Final Anesthesia Type: general      Patient location during evaluation: PACU  Patient participation: Yes- Able to Participate  Level of consciousness: awake and alert and oriented  Post-procedure vital signs: reviewed and stable  Pain management: adequate  Airway patency: patent    PONV status at discharge: No PONV  Anesthetic complications: no      Cardiovascular status: hemodynamically stable  Respiratory status: unassisted  Hydration status: euvolemic  Follow-up not needed.          Vitals Value Taken Time   /62 01/24/23 1055   Temp 36.9 01/24/23 1055   Pulse 59 01/24/23 1055   Resp 14 01/24/23 1055   SpO2 97 % 01/24/23 1055         No case tracking events are documented in the log.      Pain/Young Score: Young Score: 10 (1/24/2023 10:55 AM)

## 2023-01-24 NOTE — PROVATION PATIENT INSTRUCTIONS
Discharge Summary/Instructions after an Endoscopic Procedure  Patient Name: Hussein Gonzalez  Patient MRN: [id number]  Patient YOB: 1950 Tuesday, January 24, 2023  Jimy Baker MD  Dear patient,  As a result of recent federal legislation (The Federal Cures Act), you may   receive lab or pathology results from your procedure in your [facility]   account before your physician is able to contact you. Your physician or   their representative will relay the results to you with their   recommendations at their soonest availability.  Thank you,  RESTRICTIONS:  During your procedure today, you received medications for sedation.  These   medications may affect your judgment, balance and coordination.  Therefore,   for 24 hours, you have the following restrictions:   - DO NOT drive a car, operate machinery, make legal/financial decisions,   sign important papers or drink alcohol.    ACTIVITY:  Today: no heavy lifting, straining or running due to procedural   sedation/anesthesia.  The following day: return to full activity including work.  DIET:  Eat and drink normally unless instructed otherwise.     TREATMENT FOR COMMON SIDE EFFECTS:  - Mild abdominal pain, nausea, belching, bloating or excessive gas:  rest,   eat lightly and use a heating pad.  - Sore Throat: treat with throat lozenges and/or gargle with warm salt   water.  - Because air was used during the procedure, expelling large amounts of air   from your rectum or belching is normal.  - If a bowel prep was taken, you may not have a bowel movement for 1-3 days.    This is normal.  SYMPTOMS TO WATCH FOR AND REPORT TO YOUR PHYSICIAN:  1. Abdominal pain or bloating, other than gas cramps.  2. Chest pain.  3. Back pain.  4. Signs of infection such as: chills or fever occurring within 24 hours   after the procedure.  5. Rectal bleeding, which would show as bright red, maroon, or black stools.   (A tablespoon of blood from the rectum is not serious, especially if    hemorrhoids are present.)  6. Vomiting.  7. Weakness or dizziness.  GO DIRECTLY TO THE NEAREST EMERGENCY ROOM IF YOU HAVE ANY OF THE FOLLOWING:      Difficulty breathing              Chills and/or fever over 101 F   Persistent vomiting and/or vomiting blood   Severe abdominal pain   Severe chest pain   Black, tarry stools   Bleeding- more than one tablespoon   Any other symptom or condition that you feel may need urgent attention  Your doctor recommends these additional instructions:  If any biopsies were taken, your doctors clinic will contact you in 1 to 2   weeks with any results.  - See the other procedure note for documentation of additional   recommendations.  For questions, problems or results please call your physician - Jimy Baker MD at Work:  [phone].  [facility], EMERGENCY ROOM PHONE NUMBER: [phone]  IF A COMPLICATION OR EMERGENCY SITUATION ARISES AND YOU ARE UNABLE TO REACH   YOUR PHYSICIAN - GO DIRECTLY TO THE EMERGENCY ROOM.  MD Jimy Addison MD  1/24/2023 10:51:57 AM  This report has been verified and signed electronically.  Dear patient,  As a result of recent federal legislation (The Federal Cures Act), you may   receive lab or pathology results from your procedure in your [facility]   account before your physician is able to contact you. Your physician or   their representative will relay the results to you with their   recommendations at their soonest availability.  Thank you,  PROVATION

## 2023-01-24 NOTE — PROVATION PATIENT INSTRUCTIONS
Discharge Summary/Instructions after an Endoscopic Procedure  Patient Name: Hussein Gonzalez  Patient MRN: [id number]  Patient YOB: 1950 Tuesday, January 24, 2023  Jimy Baker MD  Dear patient,  As a result of recent federal legislation (The Federal Cures Act), you may   receive lab or pathology results from your procedure in your [facility]   account before your physician is able to contact you. Your physician or   their representative will relay the results to you with their   recommendations at their soonest availability.  Thank you,  RESTRICTIONS:  During your procedure today, you received medications for sedation.  These   medications may affect your judgment, balance and coordination.  Therefore,   for 24 hours, you have the following restrictions:   - DO NOT drive a car, operate machinery, make legal/financial decisions,   sign important papers or drink alcohol.    ACTIVITY:  Today: no heavy lifting, straining or running due to procedural   sedation/anesthesia.  The following day: return to full activity including work.  DIET:  Eat and drink normally unless instructed otherwise.     TREATMENT FOR COMMON SIDE EFFECTS:  - Mild abdominal pain, nausea, belching, bloating or excessive gas:  rest,   eat lightly and use a heating pad.  - Sore Throat: treat with throat lozenges and/or gargle with warm salt   water.  - Because air was used during the procedure, expelling large amounts of air   from your rectum or belching is normal.  - If a bowel prep was taken, you may not have a bowel movement for 1-3 days.    This is normal.  SYMPTOMS TO WATCH FOR AND REPORT TO YOUR PHYSICIAN:  1. Abdominal pain or bloating, other than gas cramps.  2. Chest pain.  3. Back pain.  4. Signs of infection such as: chills or fever occurring within 24 hours   after the procedure.  5. Rectal bleeding, which would show as bright red, maroon, or black stools.   (A tablespoon of blood from the rectum is not serious, especially if    hemorrhoids are present.)  6. Vomiting.  7. Weakness or dizziness.  GO DIRECTLY TO THE NEAREST EMERGENCY ROOM IF YOU HAVE ANY OF THE FOLLOWING:      Difficulty breathing              Chills and/or fever over 101 F   Persistent vomiting and/or vomiting blood   Severe abdominal pain   Severe chest pain   Black, tarry stools   Bleeding- more than one tablespoon   Any other symptom or condition that you feel may need urgent attention  Your doctor recommends these additional instructions:  If any biopsies were taken, your doctors clinic will contact you in 1 to 2   weeks with any results.  - Return patient to [facility] for ongoing care.   - Resume previous diet.   - Continue present medications.   - No aspirin, ibuprofen, naproxen, or other non-steroidal anti-inflammatory   drugs for 5 days.   - Use Protonix (pantoprazole) 40 mg PO BID for 6 weeks.   - Repeat colonoscopy in 6 months because the examination was incomplete.  For questions, problems or results please call your physician - Jimy Baker MD at Work:  [phone].  [facility], EMERGENCY ROOM PHONE NUMBER: [phone]  IF A COMPLICATION OR EMERGENCY SITUATION ARISES AND YOU ARE UNABLE TO REACH   YOUR PHYSICIAN - GO DIRECTLY TO THE EMERGENCY ROOM.  MD Jimy Addison MD  1/24/2023 11:01:38 AM  This report has been verified and signed electronically.  Dear patient,  As a result of recent federal legislation (The Federal Cures Act), you may   receive lab or pathology results from your procedure in your [facility]   account before your physician is able to contact you. Your physician or   their representative will relay the results to you with their   recommendations at their soonest availability.  Thank you,  PROVATION

## 2023-01-24 NOTE — ANESTHESIA PREPROCEDURE EVALUATION
01/24/2023  Hussein Gonzalez Jr. is a 72 y.o., male.    Hussein Gonzalez Jr.    Pre-op Diagnosis: * No pre-op diagnosis entered *    Procedure(s):  COLONOSCOPY (DOUBLE)  EGD (ESOPHAGOGASTRODUODENOSCOPY)     Assessment/Plan:      * Acute GI bleeding  - FOBT positive in ER, no melena or hematochezia  - GI consulted     Pancytopenia  - Platelets 43,000  - Retic elevated  - Transfused 2 units 1/20  - Consult Hematology     Malignant melanoma of skin, unspecified  - Treated twice at [facility]  - Radiation to L side of neck in the past     HTN (hypertension)  - Resume home dose of medication  HPI: 72-year-old male patient of Dr. Andres with past medical history of CVA, HTN, BPH, and Melanoma presented to ED following syncopal episode at home. Pt states he was siting in a chair when he thought he heard someone at the door, states no one was there and when he was walking back to his chair felt sudden onset of dizziness prior to passing out and falling to the floor. Pt denies prior episodes, states he's been having dizziness, fatigue, weakness, SOB, back pain , and leg pain onset over the past 3 months. Pt reports dizziness worsens when standing or walking, but reports relief when sitting down. He was found to be anemic and was transfused 2 units of blood 1/20. Stool positive for blood in ER, but patient denies melena or hematochezia.  Review of patient's allergies indicates:   Allergen Reactions    Acetaminophen     Chlorpheniramine-dextromethorp     Doxylamine-dm-acetaminophen      Other reaction(s): Feeling jittery       Current Outpatient Medications   Medication Instructions    atorvastatin (LIPITOR) 80 mg, Oral, Daily    citalopram (CELEXA) 40 MG tablet Take 1 tablet by mouth once daily    ferrous sulfate (FEOSOL) Tab tablet 1 tablet, Oral, With breakfast    Lactobacillus rhamnosus GG (CULTURELLE)  10 billion cell capsule 3 capsules, Oral, Daily, Pt states he takes 3 in the morning and 2 and night    lisinopriL-hydrochlorothiazide (PRINZIDE,ZESTORETIC) 20-12.5 mg per tablet Take 1 tablet by mouth once daily    omega-3 fatty acids/fish oil (FISH OIL-OMEGA-3 FATTY ACIDS) 300-1,000 mg capsule 1 capsule, Oral, Every other day    pyridoxine (vitamin B6) (B-6) 100 mg, Oral, Every other day    tamsulosin (FLOMAX) 0.4 mg Cap TAKE 1 CAPSULE BY MOUTH ONCE DAILY IN THE EVENING    vitamin D (VITAMIN D3) 1,000 Units, Oral, Daily    vitamin E, dl, acetate, 22.5 mg (50 unit)/mL Drop Oral       Past Medical History:   Diagnosis Date    Anxiety and depression     BPH (benign prostatic hyperplasia)     CVA (cerebrovascular accident)     HTN (hypertension)     Malignant melanoma of skin, unspecified     NSTEMI (non-ST elevated myocardial infarction)     Sepsis, Gram negative    PMH includes BERNY on CPAP, Rad TX of Melanoma of Neck, Sepsis 2 months ago, denies NSTEMI    Past Surgical History:   Procedure Laterality Date    COLONOSCOPY      EXCISION OF MELANOMA      SINUS SURGERY      TONSILLECTOMY     PSH includes Modified L Radical Neck (for Melanoma), Sinus resection of LN (melanoma)  Lab Results   Component Value Date    WBC 2.2 (L) 01/23/2023    HGB 9.5 (L) 01/23/2023    HCT 32.1 (L) 01/23/2023    MCV 79.9 (L) 01/23/2023    PLT 45 (L) 01/23/2023          BMP  Lab Results   Component Value Date     01/22/2023    K 4.2 01/22/2023    CO2 26 01/22/2023    BUN 21.8 01/22/2023    CREATININE 0.98 01/22/2023    CALCIUM 8.6 (L) 01/22/2023    EGFRNONAA >60 02/25/2021          Pre-op Assessment    I have reviewed the Patient Summary Reports.    I have reviewed the NPO Status.   I have reviewed the Medications.     Review of Systems  Anesthesia Hx:  No problems with previous Anesthesia  Denies Family Hx of Anesthesia complications.   Denies Personal Hx of Anesthesia complications.   Social:  Non-Smoker     Hematology/Oncology:        Current/Recent Cancer. Oncology Comments: MELANOMA   Cardiovascular:   Exercise tolerance: good Hypertension Past MI   Denies Angina.  Denies Orthopnea.  Denies PND.  Denies ALEMAN.  Functional Capacity good / => 4 METS    Pulmonary:   Shortness of breath Sleep Apnea, CPAP    Renal/:   BPH    Musculoskeletal:  Musculoskeletal Normal    Neurological:   Denies TIA. CVA (4 yrs ago), no residual symptoms    Psych:   anxiety depression          Physical Exam  General: Well nourished, Alert and Oriented    Airway:  Mallampati: III   Mouth Opening: Normal  TM Distance: Normal  Tongue: Normal  Neck ROM: Normal ROM    Dental:  Intact    Chest/Lungs:  Clear to auscultation    Heart:  Rate: Normal  Rhythm: Regular Rhythm  No pretibial edema  No carotid bruits      Anesthesia Plan  Type of Anesthesia, risks & benefits discussed:    Anesthesia Type: Gen Natural Airway  Intra-op Monitoring Plan: Standard ASA Monitors  Post Op Pain Control Plan: IV/PO Opioids PRN  Induction:  IV  Informed Consent: Informed consent signed with the Patient and all parties understand the risks and agree with anesthesia plan.  All questions answered. Patient consented to blood products? No  ASA Score: 3  Day of Surgery Review of History & Physical: H&P Update referred to the surgeon/provider.  Anesthesia Plan Notes: GA TIVA    Ready For Surgery From Anesthesia Perspective.     .

## 2023-01-24 NOTE — TRANSFER OF CARE
"Anesthesia Transfer of Care Note    Patient: Hussein Gonzalez Jr.    Procedure(s) Performed: Procedure(s) (LRB):  COLONOSCOPY (DOUBLE) (N/A)  EGD, WITH CLOSED BIOPSY (N/A)    Patient location: GI    Anesthesia Type: MAC    Transport from OR: Transported from OR on room air with adequate spontaneous ventilation    Post pain: adequate analgesia    Post assessment: no apparent anesthetic complications    Post vital signs: stable    Level of consciousness: responds to stimulation    Nausea/Vomiting: no nausea/vomiting    Complications: none    Transfer of care protocol was followed      Last vitals:   Visit Vitals  /70 (BP Location: Right arm, Patient Position: Lying)   Pulse 66   Temp 36.5 °C (97.7 °F) (Skin)   Resp 18   Ht 6' 1" (1.854 m)   Wt 89.8 kg (198 lb)   SpO2 98%   BMI 26.12 kg/m²     "

## 2023-01-25 ENCOUNTER — TELEPHONE (OUTPATIENT)
Dept: INTERNAL MEDICINE | Facility: CLINIC | Age: 73
End: 2023-01-25
Payer: MEDICARE

## 2023-01-25 VITALS
RESPIRATION RATE: 17 BRPM | TEMPERATURE: 98 F | WEIGHT: 198 LBS | HEART RATE: 66 BPM | BODY MASS INDEX: 26.24 KG/M2 | HEIGHT: 73 IN | DIASTOLIC BLOOD PRESSURE: 64 MMHG | SYSTOLIC BLOOD PRESSURE: 109 MMHG | OXYGEN SATURATION: 96 %

## 2023-01-25 DIAGNOSIS — D61.818 PANCYTOPENIA: Primary | ICD-10-CM

## 2023-01-25 DIAGNOSIS — K92.2 ACUTE GI BLEEDING: ICD-10-CM

## 2023-01-25 LAB
ABS NEUT (OLG): 0.54 X10(3)/MCL (ref 2.1–9.2)
ANISOCYTOSIS BLD QL SMEAR: ABNORMAL
COPPER SERPL-MCNC: 132 MCG/DL (ref 73–129)
ERYTHROCYTE [DISTWIDTH] IN BLOOD BY AUTOMATED COUNT: 18.9 % (ref 11.5–17)
HCT VFR BLD AUTO: 30.2 % (ref 42–52)
HGB BLD-MCNC: 9 GM/DL (ref 14–18)
IMM GRANULOCYTES # BLD AUTO: 0.01 X10(3)/MCL (ref 0–0.04)
IMM GRANULOCYTES NFR BLD AUTO: 0.5 %
INSTRUMENT WBC (OLG): 1.8 X10(3)/MCL
LYMPHOCYTES NFR BLD MANUAL: 1.21 X10(3)/MCL
LYMPHOCYTES NFR BLD MANUAL: 67 %
MACROCYTES BLD QL SMEAR: ABNORMAL
MCH RBC QN AUTO: 23.8 PG
MCHC RBC AUTO-ENTMCNC: 29.8 MG/DL (ref 33–36)
MCV RBC AUTO: 79.9 FL (ref 80–94)
MICROCYTES BLD QL SMEAR: ABNORMAL
MONOCYTES NFR BLD MANUAL: 0.07 X10(3)/MCL (ref 0.1–1.3)
MONOCYTES NFR BLD MANUAL: 4 %
NEUTROPHILS NFR BLD MANUAL: 30 %
NRBC BLD AUTO-RTO: 0 %
OVALOCYTES (OLG): ABNORMAL
PLATELET # BLD AUTO: 40 X10(3)/MCL (ref 130–400)
PLATELET # BLD EST: ABNORMAL 10*3/UL
PMV BLD AUTO: ABNORMAL FL
POIKILOCYTOSIS BLD QL SMEAR: ABNORMAL
RBC # BLD AUTO: 3.78 X10(6)/MCL (ref 4.7–6.1)
RBC MORPH BLD: ABNORMAL
TEAR DROP CELL (OLG): ABNORMAL
WBC # SPEC AUTO: 1.8 X10(3)/MCL (ref 4.5–11.5)

## 2023-01-25 PROCEDURE — 88275 CYTOGENETICS 100-300: CPT

## 2023-01-25 PROCEDURE — 63600175 PHARM REV CODE 636 W HCPCS: Performed by: EMERGENCY MEDICINE

## 2023-01-25 PROCEDURE — 88185 FLOWCYTOMETRY/TC ADD-ON: CPT

## 2023-01-25 PROCEDURE — 25000003 PHARM REV CODE 250: Performed by: EMERGENCY MEDICINE

## 2023-01-25 PROCEDURE — 85027 COMPLETE CBC AUTOMATED: CPT

## 2023-01-25 PROCEDURE — 88342 IMHCHEM/IMCYTCHM 1ST ANTB: CPT

## 2023-01-25 PROCEDURE — 36415 COLL VENOUS BLD VENIPUNCTURE: CPT

## 2023-01-25 PROCEDURE — 88271 CYTOGENETICS DNA PROBE: CPT

## 2023-01-25 PROCEDURE — 88291 CYTO/MOLECULAR REPORT: CPT

## 2023-01-25 PROCEDURE — 88189 FLOWCYTOMETRY/READ 16 & >: CPT

## 2023-01-25 PROCEDURE — 85025 COMPLETE CBC W/AUTO DIFF WBC: CPT

## 2023-01-25 PROCEDURE — C9113 INJ PANTOPRAZOLE SODIUM, VIA: HCPCS | Performed by: EMERGENCY MEDICINE

## 2023-01-25 PROCEDURE — 81450 HL NEO GSAP 5-50DNA/DNA&RNA: CPT

## 2023-01-25 PROCEDURE — 88341 IMHCHEM/IMCYTCHM EA ADD ANTB: CPT

## 2023-01-25 PROCEDURE — 99232 PR SUBSEQUENT HOSPITAL CARE,LEVL II: ICD-10-PCS | Mod: ,,, | Performed by: INTERNAL MEDICINE

## 2023-01-25 PROCEDURE — 25000003 PHARM REV CODE 250: Performed by: INTERNAL MEDICINE

## 2023-01-25 PROCEDURE — 99232 SBSQ HOSP IP/OBS MODERATE 35: CPT | Mod: ,,, | Performed by: INTERNAL MEDICINE

## 2023-01-25 PROCEDURE — 63600175 PHARM REV CODE 636 W HCPCS

## 2023-01-25 PROCEDURE — 88313 SPECIAL STAINS GROUP 2: CPT

## 2023-01-25 PROCEDURE — 88264 CHROMOSOME ANALYSIS 20-25: CPT

## 2023-01-25 PROCEDURE — 88237 TISSUE CULTURE BONE MARROW: CPT

## 2023-01-25 PROCEDURE — 88184 FLOWCYTOMETRY/ TC 1 MARKER: CPT

## 2023-01-25 PROCEDURE — 38222 DX BONE MARROW BX & ASPIR: CPT | Performed by: PATHOLOGY

## 2023-01-25 RX ORDER — SODIUM CHLORIDE 9 MG/ML
INJECTION, SOLUTION INTRAVENOUS CONTINUOUS
Status: DISCONTINUED | OUTPATIENT
Start: 2023-01-25 | End: 2023-01-25 | Stop reason: HOSPADM

## 2023-01-25 RX ORDER — MIDAZOLAM HYDROCHLORIDE 5 MG/ML
INJECTION INTRAMUSCULAR; INTRAVENOUS
Status: COMPLETED
Start: 2023-01-25 | End: 2023-01-25

## 2023-01-25 RX ORDER — MIDAZOLAM HYDROCHLORIDE 1 MG/ML
1 INJECTION INTRAMUSCULAR; INTRAVENOUS
Status: DISCONTINUED | OUTPATIENT
Start: 2023-01-25 | End: 2023-01-25 | Stop reason: HOSPADM

## 2023-01-25 RX ORDER — PANTOPRAZOLE SODIUM 40 MG/1
40 TABLET, DELAYED RELEASE ORAL 2 TIMES DAILY
Qty: 84 TABLET | Refills: 0 | Status: SHIPPED | OUTPATIENT
Start: 2023-01-25 | End: 2023-04-20

## 2023-01-25 RX ORDER — FLUMAZENIL 0.1 MG/ML
0.2 INJECTION INTRAVENOUS
Status: DISCONTINUED | OUTPATIENT
Start: 2023-01-25 | End: 2023-01-25 | Stop reason: HOSPADM

## 2023-01-25 RX ADMIN — ATORVASTATIN CALCIUM 80 MG: 40 TABLET, FILM COATED ORAL at 09:01

## 2023-01-25 RX ADMIN — POLYETHYLENE GLYCOL 3350 17 G: 17 POWDER, FOR SOLUTION ORAL at 09:01

## 2023-01-25 RX ADMIN — CITALOPRAM HYDROBROMIDE 40 MG: 20 TABLET ORAL at 09:01

## 2023-01-25 RX ADMIN — MIDAZOLAM HYDROCHLORIDE 3 MG: 5 INJECTION, SOLUTION INTRAMUSCULAR; INTRAVENOUS at 10:01

## 2023-01-25 RX ADMIN — PANTOPRAZOLE SODIUM 8 MG/HR: 40 INJECTION, POWDER, FOR SOLUTION INTRAVENOUS at 03:01

## 2023-01-25 RX ADMIN — HYDROCHLOROTHIAZIDE 12.5 MG: 12.5 TABLET ORAL at 09:01

## 2023-01-25 RX ADMIN — LISINOPRIL 20 MG: 20 TABLET ORAL at 09:01

## 2023-01-25 RX ADMIN — PANTOPRAZOLE SODIUM 8 MG/HR: 40 INJECTION, POWDER, FOR SOLUTION INTRAVENOUS at 09:01

## 2023-01-25 NOTE — PLAN OF CARE
Problem: Adult Inpatient Plan of Care  Goal: Plan of Care Review  Outcome: Ongoing, Progressing  Flowsheets (Taken 1/25/2023 0143)  Plan of Care Reviewed With: patient     Problem: Adult Inpatient Plan of Care  Goal: Optimal Comfort and Wellbeing  Outcome: Ongoing, Progressing  Intervention: Monitor Pain and Promote Comfort  Flowsheets (Taken 1/25/2023 0143)  Pain Management Interventions:   care clustered   medication offered   pillow support provided   relaxation techniques promoted  Intervention: Provide Person-Centered Care  Flowsheets (Taken 1/25/2023 0143)  Trust Relationship/Rapport:   care explained   choices provided   emotional support provided   empathic listening provided   questions answered   questions encouraged   reassurance provided   thoughts/feelings acknowledged

## 2023-01-25 NOTE — DISCHARGE SUMMARY
[facility] - Oncology Acute  Discharge Note  Short Stay    Procedure(s) (LRB):  Biopsy-bone marrow (N/A)      OUTCOME: Patient tolerated treatment/procedure well without complication and is now ready for discharge.    DISPOSITION: Home or Self Care    FINAL DIAGNOSIS:  Acute GI bleeding    FOLLOWUP: In clinic    DISCHARGE INSTRUCTIONS:  No discharge procedures on file.      Clinical Reference Documents Added to Patient Instructions         Document    MODERATE AND DEEP SEDATION IN ADULTS (ENGLISH)    UPPER GI ENDOSCOPY DISCHARGE INSTRUCTIONS (ENGLISH)            TIME SPENT ON DISCHARGE: 15 minutes

## 2023-01-25 NOTE — TELEPHONE ENCOUNTER
Bakari Wilson RN called from [facility] stated that the lab called at 10:00AM pt WBC 1.8     Call back# [phone]

## 2023-01-25 NOTE — PROCEDURES
"Hussein Gonzalez Jr. is a 72 y.o. male patient.    Temp: 98.1 °F (36.7 °C) (01/25/23 0716)  Pulse: 66 (01/25/23 1109)  Resp: 17 (01/25/23 1109)  BP: 109/64 (01/25/23 1109)  SpO2: 96 % (01/25/23 1109)  Weight: 89.8 kg (198 lb) (01/20/23 2306)  Height: 6' 1" (185.4 cm) (01/20/23 2306)  Mallampati Scale: Class II  ASA Classification: Class 2    Bone marrow    Date/Time: 1/25/2023 12:57 PM  Performed by: Rd Carlos MD  Authorized by: Rd Carlos MD     Consent Done?: Yes (Written)   Immediately prior to procedure a time out was called to verify the correct patient, procedure, equipment, support staff and site/side marked as required.   Patient was prepped and draped in the usual sterile fashion.      Assistants?: Yes    List of assistants: Debra DAUGHERTY was present for the entire procedure.    Position: left lateral  Anesthesia: local infiltration  Local anesthetic: lidocaine 1% without epinephrine  Aspiration?: Yes   Biopsy?: Yes    Specimen source: right posterior iliac crest  Number of Specimens: 2  Estimated blood loss (cc):4  Patient tolerated the procedure well with no immediate complications.  Post-operative instructions were provided for the patient.    1/25/2023    "

## 2023-01-25 NOTE — PROGRESS NOTES
Bone marrow biopsy completed by Dr. Carlos.  Band-aid applied to right posterior hip over biopsy site. No bleeding noted to bandaid.    Instructions per physician:    Patient may remove bandaid in 24 hours and shower. Do not submerge biopsy site under water for at least 1 week. Call MD or report to ER with any signs/symptoms of infection or bleeding from site.    May resume regular diet.    Resume normal activities tomorrow.    Follow up with oncologist for results.

## 2023-01-25 NOTE — DISCHARGE SUMMARY
Clinic Care Coordination Contact  [facility]/Voicemail       Clinical Data: Care Coordinator Outreach  Outreach attempted x 1.  Left message on patient's voicemail with call back information and requested return call.  Plan: Care Coordinator will try to reach patient again in 1-2 business days.         [facility] - Oncology Acute  Discharge Note  Short Stay    Procedure(s) (LRB):  Biopsy-bone marrow (N/A)      OUTCOME: Patient tolerated treatment/procedure well without complication and is now ready for discharge.    DISPOSITION: Home or Self Care    FINAL DIAGNOSIS:  Acute GI bleeding    FOLLOWUP: In clinic    DISCHARGE INSTRUCTIONS:  No discharge procedures on file.      Clinical Reference Documents Added to Patient Instructions         Document    MODERATE AND DEEP SEDATION IN ADULTS (ENGLISH)    UPPER GI ENDOSCOPY DISCHARGE INSTRUCTIONS (ENGLISH)            TIME SPENT ON DISCHARGE: 15 minutes

## 2023-01-25 NOTE — PROGRESS NOTES
"Subjective:       Patient ID: Hussein Gonzalez Jr. is a 72 y.o. male.    Chief Complaint: Loss of Consciousness (C/o syncopal episode PTA. Patient states he got dizzy after turned away from the door and caused him to "black out" and fall. Pt denies cardiac history and other symptoms. Cardiologist Dr. Hussein. )      Diagnosis:  Pancytopenia    Current Treatment:   Pending workup    Treatment History:  N/A    HPI:  72-year-old male with a history of CVA, hypertension, BPH, and melanoma who presented to the emergency department following a syncopal episode.  In February of 2022, the patient did have a slightly low white blood cell count, hemoglobin, and platelet count.  Per the patient, 3 months prior to admission, he was having dizziness, fatigue, weakness, shortness of breath, back pain, and leg pain.  The dizziness was worse with standing and walking.  On 01/20/2023, the patient had a syncopal episode.  He was brought to the emergency department and was found to be pancytopenic.  He also had stool that was positive for occult blood in the emergency department.  He received blood transfusion.  His CBC on 01/20/2023 revealed a white blood cell count of 2.6, hemoglobin of 7.7, and platelet count of 47,000. Reticulocytes were appropriately elevated, B12 and folate were within normal limits, iron level was slightly decreased.  Repeat blood work on 01/21/2023 revealed a white blood cell count of 2.8, post transfusion hemoglobin of 9.6, and a [place] of 90128.  Hematology was consulted for pancytopenia.  EGD and colonoscopy done on 01/24/2023, EGD revealed nonbleeding ulcers, colonoscopy unremarkable, however had to be aborted due to tortuous colon.    Interval History:   Patient seen and examined on rounds.  Overall he is doing well.  His blood counts are stable.  He tolerated his colonoscopy and EGD yesterday well, we will have bone marrow biopsy today.    Past Medical History:   Diagnosis Date    Anxiety and " depression     BPH (benign prostatic hyperplasia)     CVA (cerebrovascular accident)     HTN (hypertension)     Malignant melanoma of skin, unspecified     NSTEMI (non-ST elevated myocardial infarction)     Sepsis, Gram negative     Sleep apnea       Past Surgical History:   Procedure Laterality Date    COLONOSCOPY      COLONOSCOPY N/A 1/24/2023    Procedure: COLONOSCOPY (DOUBLE);  Surgeon: Jimy Baker MD;  Location: [facility] ENDOSCOPY;  Service: Gastroenterology;  Laterality: N/A;    EGD, WITH CLOSED BIOPSY N/A 1/24/2023    Procedure: EGD, WITH CLOSED BIOPSY;  Surgeon: Jimy Baker MD;  Location: [facility] ENDOSCOPY;  Service: Gastroenterology;  Laterality: N/A;    EXCISION OF MELANOMA      SINUS SURGERY      TONSILLECTOMY       Social History     Socioeconomic History    Marital status: Significant Other   Tobacco Use    Smoking status: Never    Smokeless tobacco: Never   Substance and Sexual Activity    Alcohol use: Yes     Alcohol/week: 7.0 standard drinks     Types: 7 Cans of beer per week    Drug use: Never    Sexual activity: Yes      Family History   Problem Relation Age of Onset    Cancer Mother     Cancer Father       Review of patient's allergies indicates:   Allergen Reactions    Acetaminophen     Chlorpheniramine-dextromethorp     Doxylamine-dm-acetaminophen      Other reaction(s): Feeling jittery      Review of Systems   Constitutional:  Positive for fatigue. Negative for chills, diaphoresis, fever and unexpected weight change.   HENT:  Negative for nasal congestion, mouth sores, sinus pressure/congestion and sore throat.    Eyes:  Negative for pain and visual disturbance.   Respiratory:  Negative for cough, chest tightness and shortness of breath.    Cardiovascular:  Negative for chest pain, palpitations and leg swelling.   Gastrointestinal:  Negative for abdominal distention, abdominal pain, blood in stool, constipation and diarrhea.   Genitourinary:  Negative for dysuria, frequency and  hematuria.   Musculoskeletal:  Negative for arthralgias and back pain.   Integumentary:  Negative for rash.   Neurological:  Positive for syncope (On admission). Negative for dizziness, weakness, numbness and headaches.   Hematological:  Negative for adenopathy.   Psychiatric/Behavioral:  Negative for confusion.        Objective:      Physical Exam  Vitals reviewed.   Constitutional:       General: He is awake.      Appearance: Normal appearance.   HENT:      Head: Normocephalic and atraumatic.      Right Ear: Hearing normal.      Left Ear: Hearing normal.      Nose: Nose normal.   Eyes:      General: Lids are normal. Vision grossly intact.      Extraocular Movements: Extraocular movements intact.      Conjunctiva/sclera: Conjunctivae normal.   Cardiovascular:      Rate and Rhythm: Normal rate and regular rhythm.      Pulses: Normal pulses.      Heart sounds: Normal heart sounds.   Pulmonary:      Effort: Pulmonary effort is normal.      Breath sounds: Normal breath sounds. No wheezing, rhonchi or rales.   Abdominal:      General: Bowel sounds are normal.      Palpations: Abdomen is soft.      Tenderness: There is no abdominal tenderness.   Musculoskeletal:      Cervical back: Full passive range of motion without pain.      Right lower leg: No edema.      Left lower leg: No edema.   Lymphadenopathy:      Cervical: No cervical adenopathy.      Upper Body:      Right upper body: No supraclavicular or axillary adenopathy.      Left upper body: No supraclavicular or axillary adenopathy.   Skin:     General: Skin is warm.   Neurological:      General: No focal deficit present.      Mental Status: He is alert and oriented to person, place, and time.   Psychiatric:         Attention and Perception: Attention normal.         Mood and Affect: Mood and affect normal.         Behavior: Behavior is cooperative.       LABS AND IMAGING REVIEWED IN EPIC          Assessment:   Pancytopenia        Plan:       So far, patient's blood  work unremarkable for cause of pancytopenia.    I evaluated a blood smear on 01/21/2023 showed anemia, thrombocytopenia, leukopenia with bilobed (pseudo Pelger-Huet) neutrophils present.    Copper level still pending.    EGD and colonoscopy on 01/24/2023, EGD revealed nonbleeding ulcers, colonoscopy unremarkable other than a tortuous colon.    Bone marrow biopsy today.    Okay to discharge once bone marrow biopsy is done.  We will follow up with me in clinic in 4-5 weeks.      Eduard Luna II, MD

## 2023-01-25 NOTE — DISCHARGE SUMMARY
[facility] Medicine Discharge Summary    Admit Date: 1/20/2023  Discharge Date and Time: 1/25/20233:41 PM  Admitting Physician:  Team  Discharging Physician: Nayely Andres MD.  Primary Care Physician: Nayely Andres MD  Consults: Gastroenterology and Hematology/Oncology    Discharge Diagnoses:  Pancytopenia, non bleeding gastric ulcers    Hospital Course:   72-year-old male patient with  past medical history of CVA, HTN, BPH, and Melanoma presented to ED following syncopal episode at home.  He was found to be anemic and was transfused 2 units of blood 1/20. Stool positive for blood in ER, but patient denies melena or hematochezia. Reports that he had 2 colonoscopies in the past, but doesn't remember the last one or who did procedure.  Was admitted to the hospital and GI and Hematology were consulted.  Patient underwent a bone marrow biopsy today.  He was cleared from the Hematology standpoint for discharge.  Dr. Marquez was notified of patient's WBC count trending down and at 1.8 however at this time cleared from his standpoint for discharge and he will have a close follow-up to discuss the bone marrow biopsy results.    Patient also is status post EGD revealing nonbleeding gastric ulcers with clean ulcer base, forest class 3 with erythematous mucosa in the antrum, nonbleeding duodenal ulcer with a clean clear base again forest class 3 and duodenal erosions without bleeding.    Colonoscopy was consistent with tortuous colon, hemorrhoids on perianal exam and diverticulosis with a poor prep.  Plan is to repeat an EGD in 6 weeks to ensure healing of ulcers as well as a colonoscopy since the 1st exam was incomplete.    Recommendations are to continue a PPI b.i.d. for 6 weeks, prescription for Protonix was sent to the pharmacy.    Rest of his medical comorbidities have remained stable       Pt was seen and examined on the day of discharge  Vitals:  VITAL SIGNS:  24 HRS MIN & MAX LAST   Temp  Min: 98 °F (36.7 °C)  Max: 99.6 °F (37.6 °C) 98.1 °F (36.7 °C)   BP  Min: 95/54  Max: 137/77 109/64   Pulse  Min: 62  Max: 104  66   Resp  Min: 13  Max: 20 17   SpO2  Min: 94 %  Max: 99 % 96 %       Physical Exam:  General:  Alert and oriented, no acute distress   Cardiovascular:  S1-S2, no murmurs rubs, gallops   Abdomen:  Soft, nontender, nondistended   Neuro:  Nonfocal  Respiratory: Clear to auscultation bilaterally, no wheeze, rhonchi or crackles    Procedures Performed: No admission procedures for hospital encounter.     Significant Diagnostic Studies: See Full reports for all details    Recent Labs   Lab 01/22/23  0510 01/23/23  0358 01/25/23  0942   WBC 2.0* 2.2* 1.8*   RBC 4.03* 4.02* 3.78*   HGB 9.6* 9.5* 9.0*   HCT 32.1* 32.1* 30.2*   MCV 79.7* 79.9* 79.9*   MCH 23.8 23.6 23.8   MCHC 29.9* 29.6* 29.8*   RDW 19.1* 19.3* 18.9*   PLT 44* 45* 40*       Recent Labs   Lab 01/20/23  1336 01/21/23  0800 01/22/23  0510    142 137   K 3.9 4.0 4.2   CO2 27 27 26   BUN 24.2 23.1 21.8   CREATININE 1.09 1.03 0.98   CALCIUM 9.2 8.6* 8.6*   ALBUMIN 3.6 3.7 3.5   ALKPHOS 103 109 106   ALT 14 15 14   AST 16 18 18   BILITOT 0.6 1.1 0.9        Microbiology Results (last 7 days)       ** No results found for the last 168 hours. **             X-Ray Chest PA And Lateral  Narrative: EXAMINATION:  XR CHEST PA AND LATERAL    CLINICAL HISTORY:  Shortness of breath    TECHNIQUE:  Two-view    COMPARISON:  None available.    FINDINGS:  Cardiopericardial silhouette is within normal limits. Lungs are without dense focal or segmental consolidation, congestion, pleural effusion or pneumothorax.  Impression: No acute cardiopulmonary process identified.    Electronically signed by: Xavier Crandall  Date:    12/14/2022  Time:    11:47         Medication List        START taking these medications      pantoprazole 40 MG tablet  Commonly known as: PROTONIX  Take 1 tablet (40 mg total) by mouth 2 (two) times  daily.            CONTINUE taking these medications      atorvastatin 80 MG tablet  Commonly known as: LIPITOR     citalopram 40 MG tablet  Commonly known as: CeleXA  Take 1 tablet by mouth once daily     ferrous sulfate Tab tablet  Commonly known as: FEOSOL     fish oil-omega-3 fatty acids 300-1,000 mg capsule     Lactobacillus rhamnosus GG 10 billion cell capsule  Commonly known as: CULTURELLE     lisinopriL-hydrochlorothiazide 20-12.5 mg per tablet  Commonly known as: PRINZIDE,ZESTORETIC  Take 1 tablet by mouth once daily     pyridoxine (vitamin B6) 100 MG Tab  Commonly known as: B-6     tamsulosin 0.4 mg Cap  Commonly known as: FLOMAX  TAKE 1 CAPSULE BY MOUTH ONCE DAILY IN THE EVENING     vitamin D 1000 units Tab  Commonly known as: VITAMIN D3     vitamin E (dl, acetate) 22.5 mg (50 unit)/mL Drop               Where to Get Your Medications        These medications were sent to [id number] - [place], [place] - [address]  [address] [place] [address]      Phone: [phone]   pantoprazole 40 MG tablet          Explained in detail to the patient about the discharge plan, medications, and follow-up visits. Pt understands and agrees with the treatment plan  Discharge Disposition:     Discharged Condition: stable  Diet-   Dietary Orders (From admission, onward)       Start     Ordered    01/24/23 1059  Diet Adult Regular  Diet effective now         01/24/23 1058                   Medications Per DC med rec  Activities as tolerated   Follow-up Information       Eduard Gore II, MD Follow up on 3/3/2023.    Specialties: Oncology, Hematology and Oncology  Why: FOLLOW UP WITH DR GORE MARCH 3 @ 10:40AM  Contact information:  [address] [address]  [phone]               Nayely Andres MD Follow up on 1/31/2023.    Specialty: Internal Medicine  Why: FOLLOW UP WITH NP AT DR VARGAS'S OFFICE JAN 31 @ 10:40AM  Contact  information:  [address] [place] [address]  [phone]               Josh Ross MD Follow up on 1/25/2023.    Specialty: Gastroenterology  Why: DR ROSS'S OFFICE WILL CALL WITH FOLLOW UP APPOINTMENT  Contact information:  [address] [place] [address]  [phone]                           For further questions contact Dr Andres's  office    Discharge time 33 minutes    For worsening symptoms, chest pain, shortness of breath, increased abdominal pain, high grade fever, stroke or stroke like symptoms, immediately go to the nearest Emergency Room or call 911 as soon as possible.      Nayely Bruno M.D, on 1/25/2023. at 3:41 PM.

## 2023-01-25 NOTE — PROGRESS NOTES
"Gastroenterology Progress Note    Subjective/Interval History:  EGD 01/24/23 revealed nonbleeding gastric ulcer with clean ulcer base [Aston Class III], erythematous mucosa in antrum, nonbleeding duodenal ulcer with clean ulcer base [Aston Class III], duodenal erosions without bleeding  Colonoscopy 01/24/23 revealed tortuous colon, hemorrhoids on perianal exam, diverticulosis    H&H 9.0/30.2 - stable    Per nurse, patient to be discharged this afternoon after bone marrow biopsy.     Per patient, feeling much better. Denies n/v, abd pain. Has not had a BM, but states he took Miralax and he thinks he will have one soon.    ROS:  Review of Systems   Constitutional:  Negative for malaise/fatigue.   Respiratory:  Negative for cough and shortness of breath.    Cardiovascular:  Negative for chest pain.   Gastrointestinal:  Negative for abdominal pain, diarrhea, nausea and vomiting.   Neurological:  Negative for weakness.     Vital Signs:  /76   Pulse 69   Temp 98.1 °F (36.7 °C) (Oral)   Resp 18   Ht 6' 1" (1.854 m)   Wt 89.8 kg (198 lb)   SpO2 (!) 94%   BMI 26.12 kg/m²   Body mass index is 26.12 kg/m².    Physical Exam:  Physical Exam  Constitutional:       General: He is not in acute distress.     Appearance: He is obese. He is not ill-appearing.   HENT:      Head: Normocephalic and atraumatic.      Right Ear: External ear normal.      Left Ear: External ear normal.      Nose: Nose normal.      Mouth/Throat:      Pharynx: Oropharynx is clear.   Eyes:      Conjunctiva/sclera: Conjunctivae normal.   Pulmonary:      Effort: Pulmonary effort is normal. No respiratory distress.   Abdominal:      General: Bowel sounds are normal. There is distension.      Tenderness: There is no abdominal tenderness. There is no guarding.      Comments: Abd firm   Musculoskeletal:         General: Normal range of motion.      Cervical back: Normal range of motion and neck supple.   Skin:     General: Skin is warm and dry.      " Coloration: Skin is pale.   Neurological:      Mental Status: He is alert and oriented to person, place, and time. Mental status is at baseline.      Motor: No weakness.   Psychiatric:         Mood and Affect: Mood normal.         Behavior: Behavior normal.         Thought Content: Thought content normal.       Labs:  No results found for this or any previous visit (from the past 24 hour(s)).      Assessment/Plan:  This is a 72 year old male known to Dr. Ross with PMH of CVA, HTN, BPH, melanoma. He was admitted to [facility] with anemia with syncopal episode. GI consulted for anemia.     EGD 01/24/23 revealed nonbleeding gastric ulcer with clean ulcer base [Aston Class III], erythematous mucosa in antrum, nonbleeding duodenal ulcer with clean ulcer base [Aston Class III], duodenal erosions without bleeding  Colonoscopy 01/24/23 revealed tortuous colon, hemorrhoids on perianal exam, diverticulosis    Iron deficiency anemia 2/2 GI blood loss - stable  Gastric, duodenal ulcers as seen on EGD  Tortuous colon with significant looping - colonoscopy aborted before completion  - monitor and transfuse as needed to keep Hgb >7  - monitor stool for color and blood  - PPI BID x6 weeks  - no NSAIDs until 01/30/23  - repeat EGD in 6 weeks to ensure healing of ulcers - our office will set up  - repeat colonoscopy due to incomplete examination - our office will set up  Pancytopenia  - CBC pending for today  - followed by oncology  Melanoma s/p XRT to left side of neck  - followed by oncology    No further GI recommendations at this time. GI will sign off. Please call with any questions.     Amisha Nguyen PA-C  Gastroenterology  [facility]

## 2023-01-25 NOTE — DISCHARGE INSTRUCTIONS
Discussed these topics with the patient and a family member at bedside. Patient knows to follow up with providers and to not remove the hip bandage until tomorrow 1- at noon. Verbalized understanding.

## 2023-01-25 NOTE — PLAN OF CARE
Problem: Adult Inpatient Plan of Care  Goal: Plan of Care Review  1/25/2023 1409 by Hill Siegel RN  Outcome: Met  1/25/2023 1409 by Hill Siegel RN  Outcome: Ongoing, Progressing  Goal: Patient-Specific Goal (Individualized)  1/25/2023 1409 by Hill Siegel RN  Outcome: Met  1/25/2023 1409 by Hill Siegel RN  Outcome: Ongoing, Progressing  Goal: Absence of Hospital-Acquired Illness or Injury  1/25/2023 1409 by Hill Siegel RN  Outcome: Met  1/25/2023 1409 by Hill Siegel RN  Outcome: Ongoing, Progressing  Goal: Optimal Comfort and Wellbeing  1/25/2023 1409 by Hill Siegel RN  Outcome: Met  1/25/2023 1409 by Hill Siegel RN  Outcome: Ongoing, Progressing  Goal: Readiness for Transition of Care  1/25/2023 1409 by Hill Siegel RN  Outcome: Met  1/25/2023 1409 by Hill Siegel RN  Outcome: Ongoing, Progressing     Problem: Fall Injury Risk  Goal: Absence of Fall and Fall-Related Injury  1/25/2023 1409 by Hill Siegel RN  Outcome: Met  1/25/2023 1409 by Hill Siegel RN  Outcome: Ongoing, Progressing     Problem: Pain Acute  Goal: Acceptable Pain Control and Functional Ability  1/25/2023 1409 by Hill Siegel RN  Outcome: Met  1/25/2023 1409 by Hill Siegel RN  Outcome: Ongoing, Progressing     Problem: Fatigue  Goal: Improved Activity Tolerance  1/25/2023 1409 by Hill Siegel RN  Outcome: Met  1/25/2023 1409 by Hill Siegel RN  Outcome: Ongoing, Progressing

## 2023-01-26 ENCOUNTER — PATIENT OUTREACH (OUTPATIENT)
Dept: ADMINISTRATIVE | Facility: CLINIC | Age: 73
End: 2023-01-26
Payer: MEDICARE

## 2023-01-26 LAB — PSYCHE PATHOLOGY RESULT: NORMAL

## 2023-01-26 NOTE — PROGRESS NOTES
C3 nurse spoke with Hussein Gonzalez Jr.  for a TCC post hospital discharge follow up call. The patient has a scheduled HOSFU appointment with Nayely Andres MD  on 01/31/2023 @ 1040 am. Appointment with Dr. Luna 03/03/2023 @ 1040 am.  Awaiting Dr. Josh Ross's office to call with hospital follow up appointment date.       Patient stated taking; Probiotic capsule (1) daily

## 2023-01-30 ENCOUNTER — LAB VISIT (OUTPATIENT)
Dept: LAB | Facility: HOSPITAL | Age: 73
End: 2023-01-30
Attending: INTERNAL MEDICINE
Payer: MEDICARE

## 2023-01-30 DIAGNOSIS — K92.2 ACUTE GI BLEEDING: ICD-10-CM

## 2023-01-30 DIAGNOSIS — D61.818 PANCYTOPENIA: ICD-10-CM

## 2023-01-30 LAB
ALBUMIN SERPL-MCNC: 3.6 G/DL (ref 3.4–4.8)
ALBUMIN/GLOB SERPL: 1.2 RATIO (ref 1.1–2)
ALP SERPL-CCNC: 96 UNIT/L (ref 40–150)
ALT SERPL-CCNC: 17 UNIT/L (ref 0–55)
AST SERPL-CCNC: 18 UNIT/L (ref 5–34)
BASOPHILS # BLD AUTO: 0.09 X10(3)/MCL (ref 0–0.2)
BASOPHILS NFR BLD AUTO: 2.9 %
BILIRUBIN DIRECT+TOT PNL SERPL-MCNC: 0.7 MG/DL
BUN SERPL-MCNC: 22.5 MG/DL (ref 8.4–25.7)
CALCIUM SERPL-MCNC: 9.5 MG/DL (ref 8.8–10)
CHLORIDE SERPL-SCNC: 103 MMOL/L (ref 98–107)
CO2 SERPL-SCNC: 31 MMOL/L (ref 23–31)
CREAT SERPL-MCNC: 1.24 MG/DL (ref 0.73–1.18)
EOSINOPHIL # BLD AUTO: 0 X10(3)/MCL (ref 0–0.9)
EOSINOPHIL NFR BLD AUTO: 0 %
ERYTHROCYTE [DISTWIDTH] IN BLOOD BY AUTOMATED COUNT: 18.9 % (ref 11.5–17)
FIBRINOGEN PPP-MCNC: 449 MG/DL (ref 210–463)
GFR SERPLBLD CREATININE-BSD FMLA CKD-EPI: >60 MLS/MIN/1.73/M2
GLOBULIN SER-MCNC: 2.9 GM/DL (ref 2.4–3.5)
GLUCOSE SERPL-MCNC: 108 MG/DL (ref 82–115)
HCT VFR BLD AUTO: 26.7 % (ref 42–52)
HGB BLD-MCNC: 7.7 GM/DL (ref 14–18)
IMM GRANULOCYTES # BLD AUTO: 0.01 X10(3)/MCL (ref 0–0.04)
IMM GRANULOCYTES NFR BLD AUTO: 0.3 %
LYMPHOCYTES # BLD AUTO: 1.53 X10(3)/MCL (ref 0.6–4.6)
LYMPHOCYTES NFR BLD AUTO: 50 %
MCH RBC QN AUTO: 23.8 PG
MCHC RBC AUTO-ENTMCNC: 28.8 MG/DL (ref 33–36)
MCV RBC AUTO: 82.7 FL (ref 80–94)
MONOCYTES # BLD AUTO: 0.52 X10(3)/MCL (ref 0.1–1.3)
MONOCYTES NFR BLD AUTO: 17 %
NEUTROPHILS # BLD AUTO: 0.91 X10(3)/MCL (ref 2.1–9.2)
NEUTROPHILS NFR BLD AUTO: 29.8 %
PLATELET # BLD AUTO: 54 X10(3)/MCL (ref 130–400)
PMV BLD AUTO: ABNORMAL FL
POTASSIUM SERPL-SCNC: 4.1 MMOL/L (ref 3.5–5.1)
PROT SERPL-MCNC: 6.5 GM/DL (ref 5.8–7.6)
RBC # BLD AUTO: 3.23 X10(6)/MCL (ref 4.7–6.1)
SODIUM SERPL-SCNC: 140 MMOL/L (ref 136–145)
WBC # SPEC AUTO: 3.1 X10(3)/MCL (ref 4.5–11.5)

## 2023-01-30 PROCEDURE — 36415 COLL VENOUS BLD VENIPUNCTURE: CPT

## 2023-01-30 PROCEDURE — 85384 FIBRINOGEN ACTIVITY: CPT

## 2023-01-30 PROCEDURE — 80053 COMPREHEN METABOLIC PANEL: CPT

## 2023-01-30 PROCEDURE — 85025 COMPLETE CBC W/AUTO DIFF WBC: CPT

## 2023-02-02 ENCOUNTER — LAB VISIT (OUTPATIENT)
Dept: LAB | Facility: HOSPITAL | Age: 73
End: 2023-02-02
Attending: INTERNAL MEDICINE
Payer: MEDICARE

## 2023-02-02 DIAGNOSIS — K92.2 ACUTE GI BLEEDING: ICD-10-CM

## 2023-02-02 DIAGNOSIS — D61.818 PANCYTOPENIA: ICD-10-CM

## 2023-02-02 LAB
ALBUMIN SERPL-MCNC: 3.4 G/DL (ref 3.4–4.8)
ALBUMIN/GLOB SERPL: 1.2 RATIO (ref 1.1–2)
ALP SERPL-CCNC: 104 UNIT/L (ref 40–150)
ALT SERPL-CCNC: 18 UNIT/L (ref 0–55)
AST SERPL-CCNC: 17 UNIT/L (ref 5–34)
BASOPHILS # BLD AUTO: 0.09 X10(3)/MCL (ref 0–0.2)
BASOPHILS NFR BLD AUTO: 3.5 %
BILIRUBIN DIRECT+TOT PNL SERPL-MCNC: 0.7 MG/DL
BUN SERPL-MCNC: 20.1 MG/DL (ref 8.4–25.7)
CALCIUM SERPL-MCNC: 8.8 MG/DL (ref 8.8–10)
CHLORIDE SERPL-SCNC: 106 MMOL/L (ref 98–107)
CO2 SERPL-SCNC: 28 MMOL/L (ref 23–31)
CREAT SERPL-MCNC: 1.23 MG/DL (ref 0.73–1.18)
EOSINOPHIL # BLD AUTO: 0 X10(3)/MCL (ref 0–0.9)
EOSINOPHIL NFR BLD AUTO: 0 %
ERYTHROCYTE [DISTWIDTH] IN BLOOD BY AUTOMATED COUNT: 19.3 % (ref 11.5–17)
FIBRINOGEN PPP-MCNC: 446 MG/DL (ref 210–463)
GFR SERPLBLD CREATININE-BSD FMLA CKD-EPI: >60 MLS/MIN/1.73/M2
GLOBULIN SER-MCNC: 2.9 GM/DL (ref 2.4–3.5)
GLUCOSE SERPL-MCNC: 112 MG/DL (ref 82–115)
HCT VFR BLD AUTO: 25.4 % (ref 42–52)
HGB BLD-MCNC: 7.2 GM/DL (ref 14–18)
IMM GRANULOCYTES # BLD AUTO: 0.04 X10(3)/MCL (ref 0–0.04)
IMM GRANULOCYTES NFR BLD AUTO: 1.6 %
LYMPHOCYTES # BLD AUTO: 1.27 X10(3)/MCL (ref 0.6–4.6)
LYMPHOCYTES NFR BLD AUTO: 50 %
MCH RBC QN AUTO: 23.9 PG
MCHC RBC AUTO-ENTMCNC: 28.3 MG/DL (ref 33–36)
MCV RBC AUTO: 84.4 FL (ref 80–94)
MONOCYTES # BLD AUTO: 0.47 X10(3)/MCL (ref 0.1–1.3)
MONOCYTES NFR BLD AUTO: 18.5 %
NEUTROPHILS # BLD AUTO: 0.67 X10(3)/MCL (ref 2.1–9.2)
NEUTROPHILS NFR BLD AUTO: 26.4 %
PLATELET # BLD AUTO: 67 X10(3)/MCL (ref 130–400)
PMV BLD AUTO: ABNORMAL FL
POTASSIUM SERPL-SCNC: 4.1 MMOL/L (ref 3.5–5.1)
PROT SERPL-MCNC: 6.3 GM/DL (ref 5.8–7.6)
RBC # BLD AUTO: 3.01 X10(6)/MCL (ref 4.7–6.1)
SODIUM SERPL-SCNC: 142 MMOL/L (ref 136–145)
WBC # SPEC AUTO: 2.5 X10(3)/MCL (ref 4.5–11.5)

## 2023-02-02 PROCEDURE — 80053 COMPREHEN METABOLIC PANEL: CPT

## 2023-02-02 PROCEDURE — 85384 FIBRINOGEN ACTIVITY: CPT

## 2023-02-02 PROCEDURE — 36415 COLL VENOUS BLD VENIPUNCTURE: CPT

## 2023-02-02 PROCEDURE — 85025 COMPLETE CBC W/AUTO DIFF WBC: CPT

## 2023-02-06 ENCOUNTER — LAB VISIT (OUTPATIENT)
Dept: LAB | Facility: HOSPITAL | Age: 73
End: 2023-02-06
Attending: INTERNAL MEDICINE
Payer: MEDICARE

## 2023-02-06 DIAGNOSIS — K92.2 ACUTE GI BLEEDING: ICD-10-CM

## 2023-02-06 DIAGNOSIS — D61.818 PANCYTOPENIA: ICD-10-CM

## 2023-02-06 LAB
ALBUMIN SERPL-MCNC: 3.5 G/DL (ref 3.4–4.8)
ALBUMIN/GLOB SERPL: 1.2 RATIO (ref 1.1–2)
ALP SERPL-CCNC: 95 UNIT/L (ref 40–150)
ALT SERPL-CCNC: 16 UNIT/L (ref 0–55)
AST SERPL-CCNC: 17 UNIT/L (ref 5–34)
BASOPHILS # BLD AUTO: 0.06 X10(3)/MCL (ref 0–0.2)
BASOPHILS NFR BLD AUTO: 2.2 %
BILIRUBIN DIRECT+TOT PNL SERPL-MCNC: 0.7 MG/DL
BUN SERPL-MCNC: 22.1 MG/DL (ref 8.4–25.7)
CALCIUM SERPL-MCNC: 9.2 MG/DL (ref 8.8–10)
CHLORIDE SERPL-SCNC: 103 MMOL/L (ref 98–107)
CO2 SERPL-SCNC: 29 MMOL/L (ref 23–31)
CREAT SERPL-MCNC: 1.17 MG/DL (ref 0.73–1.18)
EOSINOPHIL # BLD AUTO: 0 X10(3)/MCL (ref 0–0.9)
EOSINOPHIL NFR BLD AUTO: 0 %
ERYTHROCYTE [DISTWIDTH] IN BLOOD BY AUTOMATED COUNT: 19.6 % (ref 11.5–17)
FIBRINOGEN PPP-MCNC: 470 MG/DL (ref 210–463)
GFR SERPLBLD CREATININE-BSD FMLA CKD-EPI: >60 MLS/MIN/1.73/M2
GLOBULIN SER-MCNC: 3 GM/DL (ref 2.4–3.5)
GLUCOSE SERPL-MCNC: 115 MG/DL (ref 82–115)
HCT VFR BLD AUTO: 26.7 % (ref 42–52)
HGB BLD-MCNC: 7.5 GM/DL (ref 14–18)
IMM GRANULOCYTES # BLD AUTO: 0.03 X10(3)/MCL (ref 0–0.04)
IMM GRANULOCYTES NFR BLD AUTO: 1.1 %
LYMPHOCYTES # BLD AUTO: 1.29 X10(3)/MCL (ref 0.6–4.6)
LYMPHOCYTES NFR BLD AUTO: 48 %
MCH RBC QN AUTO: 23.4 PG
MCHC RBC AUTO-ENTMCNC: 28.1 MG/DL (ref 33–36)
MCV RBC AUTO: 83.2 FL (ref 80–94)
MONOCYTES # BLD AUTO: 0.49 X10(3)/MCL (ref 0.1–1.3)
MONOCYTES NFR BLD AUTO: 18.2 %
NEUTROPHILS # BLD AUTO: 0.82 X10(3)/MCL (ref 2.1–9.2)
NEUTROPHILS NFR BLD AUTO: 30.5 %
PLATELET # BLD AUTO: 48 X10(3)/MCL (ref 130–400)
PMV BLD AUTO: ABNORMAL FL
POTASSIUM SERPL-SCNC: 4.4 MMOL/L (ref 3.5–5.1)
PROT SERPL-MCNC: 6.5 GM/DL (ref 5.8–7.6)
RBC # BLD AUTO: 3.21 X10(6)/MCL (ref 4.7–6.1)
SODIUM SERPL-SCNC: 139 MMOL/L (ref 136–145)
WBC # SPEC AUTO: 2.7 X10(3)/MCL (ref 4.5–11.5)

## 2023-02-06 PROCEDURE — 85384 FIBRINOGEN ACTIVITY: CPT

## 2023-02-06 PROCEDURE — 85025 COMPLETE CBC W/AUTO DIFF WBC: CPT

## 2023-02-06 PROCEDURE — 36415 COLL VENOUS BLD VENIPUNCTURE: CPT

## 2023-02-06 PROCEDURE — 80053 COMPREHEN METABOLIC PANEL: CPT

## 2023-02-09 ENCOUNTER — OFFICE VISIT (OUTPATIENT)
Dept: HEMATOLOGY/ONCOLOGY | Facility: CLINIC | Age: 73
End: 2023-02-09
Payer: MEDICARE

## 2023-02-09 ENCOUNTER — TELEPHONE (OUTPATIENT)
Dept: HEMATOLOGY/ONCOLOGY | Facility: CLINIC | Age: 73
End: 2023-02-09
Payer: MEDICARE

## 2023-02-09 VITALS
OXYGEN SATURATION: 96 % | SYSTOLIC BLOOD PRESSURE: 109 MMHG | WEIGHT: 198.44 LBS | RESPIRATION RATE: 18 BRPM | TEMPERATURE: 99 F | HEART RATE: 75 BPM | HEIGHT: 73 IN | BODY MASS INDEX: 26.3 KG/M2 | DIASTOLIC BLOOD PRESSURE: 57 MMHG

## 2023-02-09 DIAGNOSIS — C92.00 ACUTE MYELOID LEUKEMIA NOT HAVING ACHIEVED REMISSION: Primary | ICD-10-CM

## 2023-02-09 PROCEDURE — 99215 PR OFFICE/OUTPT VISIT, EST, LEVL V, 40-54 MIN: ICD-10-PCS | Mod: S$PBB,,, | Performed by: INTERNAL MEDICINE

## 2023-02-09 PROCEDURE — 99215 OFFICE O/P EST HI 40 MIN: CPT | Mod: PBBFAC | Performed by: INTERNAL MEDICINE

## 2023-02-09 PROCEDURE — 99999 PR PBB SHADOW E&M-EST. PATIENT-LVL V: CPT | Mod: PBBFAC,,, | Performed by: INTERNAL MEDICINE

## 2023-02-09 PROCEDURE — 99999 PR PBB SHADOW E&M-EST. PATIENT-LVL V: ICD-10-PCS | Mod: PBBFAC,,, | Performed by: INTERNAL MEDICINE

## 2023-02-09 PROCEDURE — 99215 OFFICE O/P EST HI 40 MIN: CPT | Mod: S$PBB,,, | Performed by: INTERNAL MEDICINE

## 2023-02-09 RX ORDER — AZACITIDINE 100 MG/1
75 INJECTION, POWDER, LYOPHILIZED, FOR SOLUTION INTRAVENOUS; SUBCUTANEOUS
Status: CANCELLED | OUTPATIENT
Start: 2023-03-03

## 2023-02-09 RX ORDER — ONDANSETRON 4 MG/1
16 TABLET, FILM COATED ORAL
Status: CANCELLED | OUTPATIENT
Start: 2023-03-02

## 2023-02-09 RX ORDER — ONDANSETRON 4 MG/1
16 TABLET, FILM COATED ORAL
Status: CANCELLED | OUTPATIENT
Start: 2023-02-28

## 2023-02-09 RX ORDER — AZACITIDINE 100 MG/1
75 INJECTION, POWDER, LYOPHILIZED, FOR SOLUTION INTRAVENOUS; SUBCUTANEOUS
Status: CANCELLED | OUTPATIENT
Start: 2023-03-01

## 2023-02-09 RX ORDER — ONDANSETRON 4 MG/1
16 TABLET, FILM COATED ORAL
Status: CANCELLED | OUTPATIENT
Start: 2023-03-06

## 2023-02-09 RX ORDER — ONDANSETRON 4 MG/1
16 TABLET, FILM COATED ORAL
Status: CANCELLED | OUTPATIENT
Start: 2023-03-01

## 2023-02-09 RX ORDER — AZACITIDINE 100 MG/1
75 INJECTION, POWDER, LYOPHILIZED, FOR SOLUTION INTRAVENOUS; SUBCUTANEOUS
Status: CANCELLED | OUTPATIENT
Start: 2023-03-06

## 2023-02-09 RX ORDER — AZACITIDINE 100 MG/1
75 INJECTION, POWDER, LYOPHILIZED, FOR SOLUTION INTRAVENOUS; SUBCUTANEOUS
Status: CANCELLED | OUTPATIENT
Start: 2023-02-27

## 2023-02-09 RX ORDER — ONDANSETRON 4 MG/1
16 TABLET, FILM COATED ORAL
Status: CANCELLED | OUTPATIENT
Start: 2023-03-03

## 2023-02-09 RX ORDER — AZACITIDINE 100 MG/1
75 INJECTION, POWDER, LYOPHILIZED, FOR SOLUTION INTRAVENOUS; SUBCUTANEOUS
Status: CANCELLED | OUTPATIENT
Start: 2023-03-02

## 2023-02-09 RX ORDER — ONDANSETRON 4 MG/1
16 TABLET, FILM COATED ORAL
Status: CANCELLED | OUTPATIENT
Start: 2023-02-27

## 2023-02-09 RX ORDER — AZACITIDINE 100 MG/1
75 INJECTION, POWDER, LYOPHILIZED, FOR SOLUTION INTRAVENOUS; SUBCUTANEOUS
Status: CANCELLED | OUTPATIENT
Start: 2023-03-07

## 2023-02-09 RX ORDER — ONDANSETRON 4 MG/1
16 TABLET, FILM COATED ORAL
Status: CANCELLED | OUTPATIENT
Start: 2023-03-07

## 2023-02-09 RX ORDER — AZACITIDINE 100 MG/1
75 INJECTION, POWDER, LYOPHILIZED, FOR SOLUTION INTRAVENOUS; SUBCUTANEOUS
Status: CANCELLED | OUTPATIENT
Start: 2023-02-28

## 2023-02-09 NOTE — TELEPHONE ENCOUNTER
----- Message from Edvin Tran sent at 2/9/2023 12:30 PM CST -----  Regarding: RE: URGENT SCT Beenfits check  Sebas Neely,    I have verified medical and transplant benefit for patient at [facility] only.    Marc  ----- Message -----  From: Florinda Garcia RN  Sent: 2/9/2023  12:09 PM CST  To: Brent Metzger, Edvin Tran  Subject: URGENT SCT Beenfits check                        Hello,    Please complete benefit check for patient. Medical benefit check and transplant benefit check for [facility] only.    Allo; AML    Thank you,  Florinda

## 2023-02-09 NOTE — PROGRESS NOTES
Subjective:       Patient ID: Hussein Gonzalez Jr. is a 72 y.o. male.    Chief Complaint: Follow-up (4-5 week hospital f/u. Patient stated that he has been feeling dizzy and hurting in the left leg. )      Diagnosis:  AML with 27% blasts, background dysplasia, normal cytogenetics and normal FISH    Current Treatment:   Azacitidine and venetoclax to start on 02/27/2023.  Referral for possible stem cell transplant evaluation.    Treatment History:  N/A    HPI:  72-year-old male with a history of CVA, hypertension, BPH, and melanoma who presented to the emergency department following a syncopal episode.  In February of 2022, the patient did have a slightly low white blood cell count, hemoglobin, and platelet count.  Per the patient, 3 months prior to admission, he was having dizziness, fatigue, weakness, shortness of breath, back pain, and leg pain.  The dizziness was worse with standing and walking.  On 01/20/2023, the patient had a syncopal episode.  He was brought to the emergency department and was found to be pancytopenic.  He also had stool that was positive for occult blood in the emergency department.  He received blood transfusion.  His CBC on 01/20/2023 revealed a white blood cell count of 2.6, hemoglobin of 7.7, and platelet count of 47,000. Reticulocytes were appropriately elevated, B12 and folate were within normal limits, iron level was slightly decreased.  Repeat blood work on 01/21/2023 revealed a white blood cell count of 2.8, post transfusion hemoglobin of 9.6, and a [place] of 24793.  Hematology was consulted for pancytopenia.  EGD and colonoscopy done on 01/24/2023, EGD revealed nonbleeding ulcers, colonoscopy unremarkable, however had to be aborted due to tortuous colon.  Bone Marrow Biopsy done on 01/25/2023.  Preliminary reveals AML with 27% blasts in a background of dysplasia.  Cytogenetics are within normal limits, FISH is within normal limits.  NGS IS PENDING.    Interval History:    Patient presents for hospital follow up to review bone marrow biopsy results.  Overall he is doing well.  Patient here today with wife, complains of pain in left leg.  Otherwise, no major issues.  He denies any bleeding or bruising.  He denies any fevers.    Past Medical History:   Diagnosis Date    Anxiety and depression     BPH (benign prostatic hyperplasia)     CVA (cerebrovascular accident)     HTN (hypertension)     Malignant melanoma of skin, unspecified     NSTEMI (non-ST elevated myocardial infarction)     Sepsis, Gram negative     Sleep apnea       Past Surgical History:   Procedure Laterality Date    BONE MARROW BIOPSY N/A 1/25/2023    Procedure: Biopsy-bone marrow;  Surgeon: Rd Carlos MD;  Location: [facility];  Service: General;  Laterality: N/A;    COLONOSCOPY      COLONOSCOPY N/A 1/24/2023    Procedure: COLONOSCOPY (DOUBLE);  Surgeon: Jimy Baker MD;  Location: [facility] ENDOSCOPY;  Service: Gastroenterology;  Laterality: N/A;    EGD, WITH CLOSED BIOPSY N/A 1/24/2023    Procedure: EGD, WITH CLOSED BIOPSY;  Surgeon: Jimy Baker MD;  Location: [facility] ENDOSCOPY;  Service: Gastroenterology;  Laterality: N/A;    EXCISION OF MELANOMA      SINUS SURGERY      TONSILLECTOMY       Social History     Socioeconomic History    Marital status: Significant Other   Tobacco Use    Smoking status: Never    Smokeless tobacco: Never   Substance and Sexual Activity    Alcohol use: Yes     Alcohol/week: 7.0 standard drinks     Types: 7 Cans of beer per week    Drug use: Never    Sexual activity: Yes      Family History   Problem Relation Age of Onset    Cancer Mother     Cancer Father       Review of patient's allergies indicates:   Allergen Reactions    Acetaminophen       Review of Systems   Constitutional:  Positive for fatigue. Negative for chills, diaphoresis, fever and unexpected weight change.   HENT:  Negative for nasal congestion, mouth sores, sinus pressure/congestion and sore throat.    Eyes:   Negative for pain and visual disturbance.   Respiratory:  Negative for cough, chest tightness and shortness of breath.    Cardiovascular:  Negative for chest pain, palpitations and leg swelling.   Gastrointestinal:  Negative for abdominal distention, abdominal pain, blood in stool, constipation and diarrhea.   Genitourinary:  Negative for dysuria, frequency and hematuria.   Musculoskeletal:  Negative for arthralgias and back pain.   Integumentary:  Negative for rash.   Neurological:  Negative for dizziness, syncope, weakness, numbness and headaches.   Hematological:  Negative for adenopathy.   Psychiatric/Behavioral:  Negative for confusion.        Objective:      Physical Exam  Vitals reviewed.   Constitutional:       General: He is awake.      Appearance: Normal appearance.   HENT:      Head: Normocephalic and atraumatic.      Right Ear: Hearing normal.      Left Ear: Hearing normal.      Nose: Nose normal.   Eyes:      General: Lids are normal. Vision grossly intact.      Extraocular Movements: Extraocular movements intact.      Conjunctiva/sclera: Conjunctivae normal.   Cardiovascular:      Rate and Rhythm: Normal rate and regular rhythm.      Pulses: Normal pulses.      Heart sounds: Normal heart sounds.   Pulmonary:      Effort: Pulmonary effort is normal.      Breath sounds: Normal breath sounds. No wheezing, rhonchi or rales.   Abdominal:      General: Bowel sounds are normal.      Palpations: Abdomen is soft.      Tenderness: There is no abdominal tenderness.   Musculoskeletal:      Cervical back: Full passive range of motion without pain.      Right lower leg: No edema.      Left lower leg: No edema.   Lymphadenopathy:      Cervical: No cervical adenopathy.      Upper Body:      Right upper body: No supraclavicular or axillary adenopathy.      Left upper body: No supraclavicular or axillary adenopathy.   Skin:     General: Skin is warm.   Neurological:      General: No focal deficit present.      Mental  Status: He is alert and oriented to person, place, and time.   Psychiatric:         Attention and Perception: Attention normal.         Mood and Affect: Mood and affect normal.         Behavior: Behavior is cooperative.       LABS AND IMAGING REVIEWED IN EPIC          Assessment:   AML with 27% blasts, background dysplasia, normal cytogenetics and normal FISH        Plan:       Bone marrow biopsy on 01/23/2023 preliminary report suggests AML with 27% blasts, background dysplasia, normal FISH, normal cytogenetics, NGS pending.  The only reason the report is not signed out yet is because the NGS is still pending.    Due to the patient's age, we will go ahead and get him started on treatment with azacitidine and venetoclax, plan to start on 02/27/2023.    Will send referral to Dr. Interiano in [place] for stem cell transplant evaluation    Prophylactic prescriptions sent to pharmacy.  We will use 500 mg of levofloxacin daily, 200 mg of acyclovir b.i.d., 200 mg of fluconazole daily.    Plan to start treatment on 2/27/2023. Will also set up for patient education.    Return to clinic prior to cycle 2    Consented the patient to the treatment plan and the patient was educated on the planned duration of the treatment and schedule of the treatment administration.      Eduard Luna II, MD I, Terra Chisholm LPN, acted solely as a scribe for and in the presence of, Dr. Eduard Luna who performed the service.

## 2023-02-09 NOTE — TELEPHONE ENCOUNTER
----- Message from Terra Chisholm LPN sent at 2/9/2023 12:03 PM CST -----  Regarding: referral  Referral placed today for stem cell evaluation with Dr. Interiano.

## 2023-02-10 DIAGNOSIS — C92.00 ACUTE MYELOID LEUKEMIA NOT HAVING ACHIEVED REMISSION: ICD-10-CM

## 2023-02-12 NOTE — PROGRESS NOTES
[facility]  [facility]  Hematology/Medical Oncology Clinic       PATIENT: Hussein Gonzalez Jr.  MRN: [id number]  DATE: 2/13/2023    Reason for referral: AML- SCT    HPI: Mr. Gonzalez is a 72 y.o. 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   He has history of HTN, CVA, melanoma s/p resection (2000), with recurrence in 2019 s/p resection + XRT. He had other skin cancers over his ears and nose but were not melanoma.   He presented with a syncopal episode on 1/23/23 to [facility]. He was found to be pancytopenic. Prior to that presentation, he was having dizziness, fatigue, weakness, SOB for about 3 months.   He also underwent EGD revealing nonbleeding gastric ulcers with clean ulcer base, forest class 3 with erythematous mucosa in the antrum, nonbleeding duodenal ulcer with a clean clear base again forest class 3 and duodenal erosions without bleeding.    Colonoscopy was consistent with tortuous colon, hemorrhoids on perianal exam and diverticulosis with a poor prep.  Plan is to repeat an EGD in 6 weeks to ensure healing of ulcers as well as a colonoscopy since the 1st exam was incomplete.    For his pancytopenia, Work-up was negative for B12, folate or copper deficiency. Path from bone marrow biopsy is still pending. But reportedly showwing 27% blasts in a background of dysplasia. CG were normal, with normal FISH.  NGS is also pending.     He saw Dr. Luna and is planned to start Aza+Venetoclax.       Interval History:   He presents today for SCT evaluation. He is with his wife, and son. He was doing well until the last few months where he was more fatigued, and would get SOB leading into recent events of hospitalization.   He remains active, walks every day (up to 2 miles), and hunts.   His complaint today is L leg pain which he noticed recently. No trauma     Past Medical History:   Past Medical History:   Diagnosis Date    Anxiety and depression     BPH  (benign prostatic hyperplasia)     CVA (cerebrovascular accident)     HTN (hypertension)     Malignant melanoma of skin, unspecified     NSTEMI (non-ST elevated myocardial infarction)     Sepsis, Gram negative     Sleep apnea        Past Surgical HIstory:   Past Surgical History:   Procedure Laterality Date    BONE MARROW BIOPSY N/A 1/25/2023    Procedure: Biopsy-bone marrow;  Surgeon: Rd Carlos MD;  Location: [facility];  Service: General;  Laterality: N/A;    COLONOSCOPY      COLONOSCOPY N/A 1/24/2023    Procedure: COLONOSCOPY (DOUBLE);  Surgeon: Jimy Baker MD;  Location: [facility] ENDOSCOPY;  Service: Gastroenterology;  Laterality: N/A;    EGD, WITH CLOSED BIOPSY N/A 1/24/2023    Procedure: EGD, WITH CLOSED BIOPSY;  Surgeon: Jimy Baker MD;  Location: [facility] ENDOSCOPY;  Service: Gastroenterology;  Laterality: N/A;    EXCISION OF MELANOMA      SINUS SURGERY      TONSILLECTOMY         Family History:   Family History   Problem Relation Age of Onset    Cancer Mother     Cancer Father        Social History:  reports that he has never smoked. He has never used smokeless tobacco. He reports current alcohol use of about 7.0 standard drinks per week. He reports that he does not use drugs.    Allergies:  Review of patient's allergies indicates:   Allergen Reactions    Acetaminophen        Medications:  Current Outpatient Medications   Medication Sig Dispense Refill    atorvastatin (LIPITOR) 80 MG tablet Take 80 mg by mouth once daily.      citalopram (CELEXA) 40 MG tablet Take 1 tablet by mouth once daily 90 tablet 0    ferrous sulfate (FEOSOL) Tab tablet Take 1 tablet by mouth daily with breakfast.      Lactobacillus rhamnosus GG (CULTURELLE) 10 billion cell capsule Take 3 capsules by mouth once daily. Pt states he takes 3 in the morning and 2 and night      lisinopriL-hydrochlorothiazide (PRINZIDE,ZESTORETIC) 20-12.5 mg per tablet Take 1 tablet by mouth once daily 90 tablet 2    omega-3 fatty acids/fish oil  "(FISH OIL-OMEGA-3 FATTY ACIDS) 300-1,000 mg capsule Take 1 capsule by mouth every other day.      pyridoxine, vitamin B6, (B-6) 100 MG Tab Take 100 mg by mouth every other day.      tamsulosin (FLOMAX) 0.4 mg Cap TAKE 1 CAPSULE BY MOUTH ONCE DAILY IN THE EVENING 90 capsule 0    venetoclax (VENCLEXTA) 100 mg Tab Take 200 mg by mouth once daily. 42 tablet 6    vitamin D (VITAMIN D3) 1000 units Tab Take 1,000 Units by mouth once daily.      vitamin E, dl, acetate, 22.5 mg (50 unit)/mL Drop Take by mouth.      pantoprazole (PROTONIX) 40 MG tablet Take 1 tablet (40 mg total) by mouth 2 (two) times daily. (Patient not taking: Reported on 2/13/2023) 84 tablet 0     No current facility-administered medications for this visit.       Review of Systems   Constitutional:  Positive for fatigue. Negative for activity change, appetite change, chills, fever and unexpected weight change.   Gastrointestinal:  Negative for abdominal distention, constipation, diarrhea, nausea and vomiting.   Genitourinary: Negative.    Musculoskeletal:  Negative for arthralgias.        L Leg pain   Neurological:  Negative for dizziness and light-headedness.     ECOG Performance Status: 1   Objective:      Vitals:   Vitals:    02/13/23 1452   BP: (!) 103/56   Pulse: 91   Resp: 16   Temp: 98.8 °F (37.1 °C)   SpO2: 98%   Weight: 89.2 kg (196 lb 10.4 oz)   Height: 6' 1" (1.854 m)     Physical Exam  Constitutional:       General: He is not in acute distress.     Appearance: Normal appearance.   HENT:      Head: Normocephalic and atraumatic.   Eyes:      Pupils: Pupils are equal, round, and reactive to light.   Cardiovascular:      Rate and Rhythm: Normal rate and regular rhythm.      Heart sounds: No murmur heard.  Pulmonary:      Effort: No respiratory distress.      Breath sounds: Normal breath sounds. No wheezing.   Abdominal:      General: Abdomen is flat. Bowel sounds are normal. There is no distension.      Palpations: Abdomen is soft. There is no " mass.      Tenderness: There is no abdominal tenderness.   Musculoskeletal:         General: No swelling or deformity.   Skin:     Coloration: Skin is not jaundiced.   Neurological:      General: No focal deficit present.      Mental Status: He is alert and oriented to person, place, and time. Mental status is at baseline.       Laboratory Data:  Lab Visit on 02/09/2023   Component Date Value Ref Range Status    Sodium Level 02/09/2023 138  136 - 145 mmol/L Final    Potassium Level 02/09/2023 4.4  3.5 - 5.1 mmol/L Final    Chloride 02/09/2023 104  98 - 107 mmol/L Final    Carbon Dioxide 02/09/2023 28  23 - 31 mmol/L Final    Glucose Level 02/09/2023 108  82 - 115 mg/dL Final    Blood Urea Nitrogen 02/09/2023 28.4 (H)  8.4 - 25.7 mg/dL Final    Creatinine 02/09/2023 1.15  0.73 - 1.18 mg/dL Final    Calcium Level Total 02/09/2023 9.2  8.8 - 10.0 mg/dL Final    Protein Total 02/09/2023 6.7  5.8 - 7.6 gm/dL Final    Albumin Level 02/09/2023 3.6  3.4 - 4.8 g/dL Final    Globulin 02/09/2023 3.1  2.4 - 3.5 gm/dL Final    Albumin/Globulin Ratio 02/09/2023 1.2  1.1 - 2.0 ratio Final    Bilirubin Total 02/09/2023 0.8  <=1.5 mg/dL Final    Alkaline Phosphatase 02/09/2023 96  40 - 150 unit/L Final    Alanine Aminotransferase 02/09/2023 15  0 - 55 unit/L Final    Aspartate Aminotransferase 02/09/2023 16  5 - 34 unit/L Final    eGFR 02/09/2023 >60  mls/min/1.73/m2 Final    Fibrinogen 02/09/2023 458.0  210.0 - 463.0 mg/dL Final    WBC 02/09/2023 2.2 (L)  4.5 - 11.5 x10(3)/mcL Final    RBC 02/09/2023 3.24 (L)  4.70 - 6.10 x10(6)/mcL Final    Hgb 02/09/2023 7.6 (L)  14.0 - 18.0 gm/dL Final    Hct 02/09/2023 27.2 (L)  42.0 - 52.0 % Final    MCV 02/09/2023 84.0  80.0 - 94.0 fL Final    MCH 02/09/2023 23.5  pg Final    MCHC 02/09/2023 27.9 (L)  33.0 - 36.0 mg/dL Final    RDW 02/09/2023 19.7 (H)  11.5 - 17.0 % Final    Platelet 02/09/2023 79 (L)  130 - 400 x10(3)/mcL Final    MPV 02/09/2023    Final    Unable to calculate MPV    Neut %  02/09/2023 25.6  % Final    Lymph % 02/09/2023 54.4  % Final    Mono % 02/09/2023 17.2  % Final    Eos % 02/09/2023 0.0  % Final    Basophil % 02/09/2023 1.9  % Final    Lymph # 02/09/2023 1.17  0.6 - 4.6 x10(3)/mcL Final    Neut # 02/09/2023 0.55 (L)  2.1 - 9.2 x10(3)/mcL Final    Mono # 02/09/2023 0.37  0.1 - 1.3 x10(3)/mcL Final    Eos # 02/09/2023 0.00  0 - 0.9 x10(3)/mcL Final    Baso # 02/09/2023 0.04  0 - 0.2 x10(3)/mcL Final    IG# 02/09/2023 0.02  0 - 0.04 x10(3)/mcL Final    IG% 02/09/2023 0.9  % Final         Imaging: N/A    Assessment and Plan        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 History of melanoma      72 y.o. M presenting with presumable ND-AML. He presented with a syncopal episode and was found to be pancytopenic with severe anemia.   He underwent a bone marrow biopsy at [place] but the results are not available yet. Preliminary report showing 27% blasts in a background of dysplasia. Normal CG and FISH. NGS remains pending. He established care with  Dr. Luna and is planned to start Aza+venetoclax.     Cannot complete risk stratification at this point, as the results of his bone marrow biopsy and NGS are still pending.   His transplant co-morbidity index is calculated at 6 points, which put him at a higher risk. Estimated NRM at year 1 is 26%, and 2 years NRM is 41%.     We discussed today his diagnosis, and role of allogeneic stem cell transplant in leukemia. We shared that his history of stage IIIb recurrent melanoma might disqualify him from transplant, as immunosuppression can cause progression of this cancer. In addition to his age, other co-morbidities (CVA, peptic ulcer), all put him at a higher risk.     - His case will be discussed at Transplant meeting and [facility] update Dr. Luna with the decision.       2. [facility]   [facility]. His melanoma was first diagnosed in 2008 on his face ( s/p WLE and neg SNLBx). 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    Their questsions were answered to their satisfaction.     The above was discussed and staffed with Dr. Valdez.       Arturo Agudelo MD  [id number]  Hematology/Oncology fellow

## 2023-02-13 ENCOUNTER — OFFICE VISIT (OUTPATIENT)
Dept: HEMATOLOGY/ONCOLOGY | Facility: CLINIC | Age: 73
End: 2023-02-13
Payer: MEDICARE

## 2023-02-13 VITALS
SYSTOLIC BLOOD PRESSURE: 103 MMHG | OXYGEN SATURATION: 98 % | WEIGHT: 196.63 LBS | HEIGHT: 73 IN | HEART RATE: 91 BPM | TEMPERATURE: 99 F | RESPIRATION RATE: 16 BRPM | DIASTOLIC BLOOD PRESSURE: 56 MMHG | BODY MASS INDEX: 26.06 KG/M2

## 2023-02-13 DIAGNOSIS — C92.00 ACUTE MYELOID LEUKEMIA NOT HAVING ACHIEVED REMISSION: Primary | ICD-10-CM

## 2023-02-13 DIAGNOSIS — Z85.820 HISTORY OF MELANOMA: ICD-10-CM

## 2023-02-13 DIAGNOSIS — Z51.81 ENCOUNTER FOR MONITORING CARDIOTOXIC DRUG THERAPY: ICD-10-CM

## 2023-02-13 DIAGNOSIS — Z79.899 ENCOUNTER FOR MONITORING CARDIOTOXIC DRUG THERAPY: ICD-10-CM

## 2023-02-13 PROCEDURE — 99205 PR OFFICE/OUTPT VISIT, NEW, LEVL V, 60-74 MIN: ICD-10-PCS | Mod: S$PBB,GC,, | Performed by: STUDENT IN AN ORGANIZED HEALTH CARE EDUCATION/TRAINING PROGRAM

## 2023-02-13 PROCEDURE — 99999 PR PBB SHADOW E&M-EST. PATIENT-LVL IV: ICD-10-PCS | Mod: PBBFAC,GC,, | Performed by: STUDENT IN AN ORGANIZED HEALTH CARE EDUCATION/TRAINING PROGRAM

## 2023-02-13 PROCEDURE — 99999 PR PBB SHADOW E&M-EST. PATIENT-LVL IV: CPT | Mod: PBBFAC,GC,, | Performed by: STUDENT IN AN ORGANIZED HEALTH CARE EDUCATION/TRAINING PROGRAM

## 2023-02-13 PROCEDURE — 99205 OFFICE O/P NEW HI 60 MIN: CPT | Mod: S$PBB,GC,, | Performed by: STUDENT IN AN ORGANIZED HEALTH CARE EDUCATION/TRAINING PROGRAM

## 2023-02-13 PROCEDURE — 99214 OFFICE O/P EST MOD 30 MIN: CPT | Mod: PBBFAC | Performed by: STUDENT IN AN ORGANIZED HEALTH CARE EDUCATION/TRAINING PROGRAM

## 2023-02-14 ENCOUNTER — LAB VISIT (OUTPATIENT)
Dept: LAB | Facility: HOSPITAL | Age: 73
End: 2023-02-14
Attending: INTERNAL MEDICINE
Payer: MEDICARE

## 2023-02-14 DIAGNOSIS — C92.00 ACUTE MYELOID LEUKEMIA NOT HAVING ACHIEVED REMISSION: ICD-10-CM

## 2023-02-14 DIAGNOSIS — Z51.81 ENCOUNTER FOR MONITORING CARDIOTOXIC DRUG THERAPY: ICD-10-CM

## 2023-02-14 DIAGNOSIS — Z79.899 ENCOUNTER FOR MONITORING CARDIOTOXIC DRUG THERAPY: ICD-10-CM

## 2023-02-14 LAB — PSYCHE PATHOLOGY RESULT: NORMAL

## 2023-02-14 PROCEDURE — 93010 EKG 12-LEAD: ICD-10-PCS | Mod: ,,, | Performed by: INTERNAL MEDICINE

## 2023-02-14 PROCEDURE — 93010 ELECTROCARDIOGRAM REPORT: CPT | Mod: ,,, | Performed by: INTERNAL MEDICINE

## 2023-02-14 PROCEDURE — 93005 ELECTROCARDIOGRAM TRACING: CPT

## 2023-02-16 DIAGNOSIS — C92.00 ACUTE MYELOID LEUKEMIA NOT HAVING ACHIEVED REMISSION: ICD-10-CM

## 2023-02-17 ENCOUNTER — SPECIALTY PHARMACY (OUTPATIENT)
Dept: PHARMACY | Facility: CLINIC | Age: 73
End: 2023-02-17
Payer: MEDICARE

## 2023-02-17 NOTE — TELEPHONE ENCOUNTER
PA for Venclexta approved from 01/18/2023 through 02/17/2024 (PA Case ID: [id number]). Patient has [employer] FEP insurance, insurance states rx can be filled with [id number] (phone # [phone])    Will route rx's there to be filled and close out [facility] encounter

## 2023-02-17 NOTE — TELEPHONE ENCOUNTER
Received rx's for Venclexta for AML, dose appropriate since taking with fluconazole. PA submitted as urgent through [facility] website (Key: [id number])

## 2023-02-20 ENCOUNTER — LAB VISIT (OUTPATIENT)
Dept: LAB | Facility: HOSPITAL | Age: 73
End: 2023-02-20
Attending: NURSE PRACTITIONER
Payer: MEDICARE

## 2023-02-20 DIAGNOSIS — C92.00 ACUTE MYELOID LEUKEMIA NOT HAVING ACHIEVED REMISSION: ICD-10-CM

## 2023-02-20 DIAGNOSIS — C92.00 ACUTE MYELOID LEUKEMIA NOT HAVING ACHIEVED REMISSION: Primary | ICD-10-CM

## 2023-02-20 LAB
ALBUMIN SERPL-MCNC: 3.7 G/DL (ref 3.4–4.8)
ALBUMIN/GLOB SERPL: 1.2 RATIO (ref 1.1–2)
ALP SERPL-CCNC: 102 UNIT/L (ref 40–150)
ALT SERPL-CCNC: 13 UNIT/L (ref 0–55)
AST SERPL-CCNC: 17 UNIT/L (ref 5–34)
BASOPHILS # BLD AUTO: 0.09 X10(3)/MCL (ref 0–0.2)
BASOPHILS NFR BLD AUTO: 3.8 %
BILIRUBIN DIRECT+TOT PNL SERPL-MCNC: 0.7 MG/DL
BUN SERPL-MCNC: 23.1 MG/DL (ref 8.4–25.7)
CALCIUM SERPL-MCNC: 9.2 MG/DL (ref 8.8–10)
CHLORIDE SERPL-SCNC: 102 MMOL/L (ref 98–107)
CO2 SERPL-SCNC: 30 MMOL/L (ref 23–31)
CREAT SERPL-MCNC: 1.38 MG/DL (ref 0.73–1.18)
EOSINOPHIL # BLD AUTO: 0 X10(3)/MCL (ref 0–0.9)
EOSINOPHIL NFR BLD AUTO: 0 %
ERYTHROCYTE [DISTWIDTH] IN BLOOD BY AUTOMATED COUNT: 19.2 % (ref 11.5–17)
GFR SERPLBLD CREATININE-BSD FMLA CKD-EPI: 54 MLS/MIN/1.73/M2
GLOBULIN SER-MCNC: 3.1 GM/DL (ref 2.4–3.5)
GLUCOSE SERPL-MCNC: 91 MG/DL (ref 82–115)
HCT VFR BLD AUTO: 28.2 % (ref 42–52)
HGB BLD-MCNC: 7.8 G/DL (ref 14–18)
IMM GRANULOCYTES # BLD AUTO: 0.01 X10(3)/MCL (ref 0–0.04)
IMM GRANULOCYTES NFR BLD AUTO: 0.4 %
LYMPHOCYTES # BLD AUTO: 1.3 X10(3)/MCL (ref 0.6–4.6)
LYMPHOCYTES NFR BLD AUTO: 54.6 %
MCH RBC QN AUTO: 23.1 PG
MCHC RBC AUTO-ENTMCNC: 27.7 G/DL (ref 33–36)
MCV RBC AUTO: 83.7 FL (ref 80–94)
MONOCYTES # BLD AUTO: 0.39 X10(3)/MCL (ref 0.1–1.3)
MONOCYTES NFR BLD AUTO: 16.4 %
NEUTROPHILS # BLD AUTO: 0.59 X10(3)/MCL (ref 2.1–9.2)
NEUTROPHILS NFR BLD AUTO: 24.8 %
PLATELET # BLD AUTO: 86 X10(3)/MCL (ref 130–400)
PMV BLD AUTO: ABNORMAL FL
POTASSIUM SERPL-SCNC: 4.2 MMOL/L (ref 3.5–5.1)
PROT SERPL-MCNC: 6.8 GM/DL (ref 5.8–7.6)
RBC # BLD AUTO: 3.37 X10(6)/MCL (ref 4.7–6.1)
SODIUM SERPL-SCNC: 141 MMOL/L (ref 136–145)
WBC # SPEC AUTO: 2.4 X10(3)/MCL (ref 4.5–11.5)

## 2023-02-20 PROCEDURE — 36415 COLL VENOUS BLD VENIPUNCTURE: CPT

## 2023-02-20 PROCEDURE — 80053 COMPREHEN METABOLIC PANEL: CPT

## 2023-02-20 PROCEDURE — 85025 COMPLETE CBC W/AUTO DIFF WBC: CPT

## 2023-02-20 RX ORDER — ACYCLOVIR 400 MG/1
400 TABLET ORAL 2 TIMES DAILY
Qty: 60 TABLET | Refills: 1 | Status: SHIPPED | OUTPATIENT
Start: 2023-02-20 | End: 2023-04-20

## 2023-02-20 RX ORDER — LEVOFLOXACIN 500 MG/1
500 TABLET, FILM COATED ORAL DAILY
Qty: 30 TABLET | Refills: 1 | Status: SHIPPED | OUTPATIENT
Start: 2023-02-20 | End: 2023-04-20

## 2023-02-20 RX ORDER — FLUCONAZOLE 200 MG/1
200 TABLET ORAL DAILY
Qty: 30 TABLET | Refills: 1 | Status: SHIPPED | OUTPATIENT
Start: 2023-02-20 | End: 2023-04-20

## 2023-02-23 ENCOUNTER — CLINICAL SUPPORT (OUTPATIENT)
Dept: HEMATOLOGY/ONCOLOGY | Facility: CLINIC | Age: 73
End: 2023-02-23
Payer: MEDICARE

## 2023-02-23 ENCOUNTER — OFFICE VISIT (OUTPATIENT)
Dept: HEMATOLOGY/ONCOLOGY | Facility: CLINIC | Age: 73
End: 2023-02-23
Payer: MEDICARE

## 2023-02-23 ENCOUNTER — LAB VISIT (OUTPATIENT)
Dept: LAB | Facility: HOSPITAL | Age: 73
End: 2023-02-23
Payer: MEDICARE

## 2023-02-23 VITALS
SYSTOLIC BLOOD PRESSURE: 114 MMHG | WEIGHT: 193.81 LBS | HEIGHT: 73 IN | TEMPERATURE: 98 F | DIASTOLIC BLOOD PRESSURE: 59 MMHG | OXYGEN SATURATION: 97 % | HEART RATE: 85 BPM | BODY MASS INDEX: 25.69 KG/M2 | RESPIRATION RATE: 16 BRPM

## 2023-02-23 DIAGNOSIS — C92.00 ACUTE MYELOID LEUKEMIA NOT HAVING ACHIEVED REMISSION: ICD-10-CM

## 2023-02-23 PROCEDURE — 99214 OFFICE O/P EST MOD 30 MIN: CPT | Mod: PBBFAC | Performed by: NURSE PRACTITIONER

## 2023-02-23 PROCEDURE — 99215 PR OFFICE/OUTPT VISIT, EST, LEVL V, 40-54 MIN: ICD-10-PCS | Mod: S$PBB,,, | Performed by: NURSE PRACTITIONER

## 2023-02-23 PROCEDURE — 88342 CHG IMMUNOCYTOCHEMISTRY: ICD-10-PCS | Mod: 26,XU,, | Performed by: PATHOLOGY

## 2023-02-23 PROCEDURE — 88325 CONSLTJ COMPRE RVW REC REPRT: CPT | Performed by: PATHOLOGY

## 2023-02-23 PROCEDURE — 88313 SPECIAL STAINS GROUP 2: CPT | Mod: 26,XU,, | Performed by: PATHOLOGY

## 2023-02-23 PROCEDURE — 88313 PR  SPECIAL STAINS,GROUP II: ICD-10-PCS | Mod: 26,XU,, | Performed by: PATHOLOGY

## 2023-02-23 PROCEDURE — 99999 PR PBB SHADOW E&M-EST. PATIENT-LVL IV: CPT | Mod: PBBFAC,,, | Performed by: NURSE PRACTITIONER

## 2023-02-23 PROCEDURE — 99215 OFFICE O/P EST HI 40 MIN: CPT | Mod: S$PBB,,, | Performed by: NURSE PRACTITIONER

## 2023-02-23 PROCEDURE — 88342 IMHCHEM/IMCYTCHM 1ST ANTB: CPT | Performed by: PATHOLOGY

## 2023-02-23 PROCEDURE — 88342 IMHCHEM/IMCYTCHM 1ST ANTB: CPT | Mod: 26,XU,, | Performed by: PATHOLOGY

## 2023-02-23 PROCEDURE — 88313 SPECIAL STAINS GROUP 2: CPT | Mod: 59 | Performed by: PATHOLOGY

## 2023-02-23 PROCEDURE — 88325 CONSLTJ COMPRE RVW REC REPRT: CPT | Mod: ,,, | Performed by: PATHOLOGY

## 2023-02-23 PROCEDURE — 88321 CONSLTJ&REPRT SLD PREP ELSWR: CPT | Performed by: PATHOLOGY

## 2023-02-23 PROCEDURE — 88325 PR  COMPREHENSIVE REVIEW OF DATA: ICD-10-PCS | Mod: ,,, | Performed by: PATHOLOGY

## 2023-02-23 PROCEDURE — 99999 PR PBB SHADOW E&M-EST. PATIENT-LVL IV: ICD-10-PCS | Mod: PBBFAC,,, | Performed by: NURSE PRACTITIONER

## 2023-02-23 RX ORDER — PROCHLORPERAZINE MALEATE 10 MG
10 TABLET ORAL EVERY 6 HOURS PRN
Qty: 30 TABLET | Refills: 3 | Status: SHIPPED | OUTPATIENT
Start: 2023-02-23 | End: 2024-02-23

## 2023-02-23 RX ORDER — ONDANSETRON HYDROCHLORIDE 8 MG/1
8 TABLET, FILM COATED ORAL EVERY 8 HOURS PRN
Qty: 30 TABLET | Refills: 2 | Status: ON HOLD | OUTPATIENT
Start: 2023-02-23 | End: 2023-06-27

## 2023-02-23 NOTE — PROGRESS NOTES
Oncology Nutrition Evaluation      Hussein Gonzalez Jr.   1950    Oncology Provider:   Eduard Luna MD    Reason for Visit:  New Treatment Education    Oncology/Hematology Diagnosis:   AML    Treatment Plan:  Azacitidine and venetoclax to start on 02/27/2023    Nutrition Recommendations:  1. Regular diet as tolerated    Nutrition Assessment    2/23/23: This is a 72 y.o.male with a medical diagnosis of AML. He reports good appetite and po intake most days. He does c/o some bloating since starting his prophylaxis but otherwise has no complaints. Reports stable wt.    Nutrition Factors Affecting Intake  none identified    PMHx: HTN, CVA    Allergies: Acetaminophen    Current Medications:    Current Outpatient Medications:     acyclovir (ZOVIRAX) 400 MG tablet, Take 1 tablet (400 mg total) by mouth 2 (two) times daily., Disp: 60 tablet, Rfl: 1    atorvastatin (LIPITOR) 80 MG tablet, Take 80 mg by mouth once daily., Disp: , Rfl:     citalopram (CELEXA) 40 MG tablet, Take 1 tablet by mouth once daily, Disp: 90 tablet, Rfl: 0    ferrous sulfate (FEOSOL) Tab tablet, Take 1 tablet by mouth daily with breakfast., Disp: , Rfl:     fluconazole (DIFLUCAN) 200 MG Tab, Take 1 tablet (200 mg total) by mouth once daily., Disp: 30 tablet, Rfl: 1    Lactobacillus rhamnosus GG (CULTURELLE) 10 billion cell capsule, Take 3 capsules by mouth once daily. Pt states he takes 3 in the morning and 2 and night, Disp: , Rfl:     levoFLOXacin (LEVAQUIN) 500 MG tablet, Take 1 tablet (500 mg total) by mouth once daily., Disp: 30 tablet, Rfl: 1    lisinopriL-hydrochlorothiazide (PRINZIDE,ZESTORETIC) 20-12.5 mg per tablet, Take 1 tablet by mouth once daily, Disp: 90 tablet, Rfl: 2    omega-3 fatty acids/fish oil (FISH OIL-OMEGA-3 FATTY ACIDS) 300-1,000 mg capsule, Take 1 capsule by mouth every other day., Disp: , Rfl:     ondansetron (ZOFRAN) 8 MG tablet, Take 1 tablet (8 mg total) by mouth every 8 (eight) hours as needed for Nausea. 1st  "choice for nausea, Disp: 30 tablet, Rfl: 2    pantoprazole (PROTONIX) 40 MG tablet, Take 1 tablet (40 mg total) by mouth 2 (two) times daily. (Patient not taking: Reported on 2/13/2023), Disp: 84 tablet, Rfl: 0    prochlorperazine (COMPAZINE) 10 MG tablet, Take 1 tablet (10 mg total) by mouth every 6 (six) hours as needed (for nausea). 2nd choice, can cause drowsiness., Disp: 30 tablet, Rfl: 3    pyridoxine, vitamin B6, (B-6) 100 MG Tab, Take 100 mg by mouth every other day., Disp: , Rfl:     tamsulosin (FLOMAX) 0.4 mg Cap, TAKE 1 CAPSULE BY MOUTH ONCE DAILY IN THE EVENING, Disp: 90 capsule, Rfl: 0    venetoclax (VENCLEXTA) 100 mg Tab, Take 100 mg by mouth on day 2 of cycle 1, then take 200 mg by mouth on days 3-21 of a 28 day cycle., Disp: 39 tablet, Rfl: 0    venetoclax (VENCLEXTA) 100 mg Tab, Take 200 mg on days 1-21 of a 28 day cycle starting with cycle 2 and beyond., Disp: 42 tablet, Rfl: 5    venetoclax 50 mg Tab, Take 50 mg on day 1 of cycle 1., Disp: 1 tablet, Rfl: 0    vitamin D (VITAMIN D3) 1000 units Tab, Take 1,000 Units by mouth once daily., Disp: , Rfl:     vitamin E, dl, acetate, 22.5 mg (50 unit)/mL Drop, Take by mouth., Disp: , Rfl:     Labs: 2/20/23 creatinine 1.38 (H)    Anthropometrics    Height:   Ht Readings from Last 1 Encounters:   02/23/23 6' 1" (1.854 m)      Weight:   Wt Readings from Last 3 Encounters:   02/23/23 87.9 kg (193 lb 12.8 oz)   02/13/23 89.2 kg (196 lb 10.4 oz)   02/09/23 90 kg (198 lb 6.6 oz)        Usual Body Weight: 89.2 kg (196 lb)  % Weight Change: -1.5% in 2 weeks    BMI: 25.4 (overweight)    Ideal Weight: 83.6 kg (184 lb)  % Ideal Weight: 105%      Nutrition Diagnosis    PES: Food and nutrition related knowledge deficit related to AML as evidenced by lack of prior exposure to onc nutrition. (resolved)    Nutrition Risk  low    Nutrition Intervention    Interventions(treatment strategy):  Education Provided: antimicrobial/neutropenic  Teaching Method: explanation and " printed materials  Comprehension: verbalizes understanding  Barriers to Learning: none evident  Expected Compliance: good  Comments: All questions were answered and dietitian's contact information was provided.        Nutrition Monitoring and Evaluation    Ongoing monitoring not warranted at this time. Please consult RD prn.        Keesha Galloway MS, RD, , LDN

## 2023-02-24 NOTE — PROGRESS NOTES
THERAPY EDUCATION- VENETOCLAX + AZACITIDINE    Subjective:       Patient ID: Hussein Gonzalez Jr. is a 72 y.o. male.    Chief Complaint: Therapy Education    Diagnosis:  AML with 27% blasts, background dysplasia, normal cytogenetics and normal FISH    Current Treatment:   Azacitidine and venetoclax to start on 02/27/2023.  Referral for possible stem cell transplant evaluation.    Treatment History:  N/A    HPI:  72-year-old male with a history of CVA, hypertension, BPH, and melanoma who presented to the emergency department following a syncopal episode.  In February of 2022, the patient did have a slightly low white blood cell count, hemoglobin, and platelet count.  Per the patient, 3 months prior to admission, he was having dizziness, fatigue, weakness, shortness of breath, back pain, and leg pain.  The dizziness was worse with standing and walking.  On 01/20/2023, the patient had a syncopal episode.  He was brought to the emergency department and was found to be pancytopenic.  He also had stool that was positive for occult blood in the emergency department.  He received blood transfusion.  His CBC on 01/20/2023 revealed a white blood cell count of 2.6, hemoglobin of 7.7, and platelet count of 47,000. Reticulocytes were appropriately elevated, B12 and folate were within normal limits, iron level was slightly decreased.  Repeat blood work on 01/21/2023 revealed a white blood cell count of 2.8, post transfusion hemoglobin of 9.6, and a platelet count of 33536.  Hematology was consulted for pancytopenia.  EGD and colonoscopy done on 01/24/2023, EGD revealed nonbleeding ulcers, colonoscopy unremarkable, however had to be aborted due to tortuous colon.  Bone Marrow Biopsy done on 01/25/2023.  Preliminary reveals AML with 27% blasts in a background of dysplasia.  Cytogenetics are within normal limits, FISH is within normal limits.  NGS IS PENDING.    Interval History:     2/23/23: Mr. Gonzalez presents today for therapy  education. He reports feeling well. He started taking Ventetoclax yesterday morning. He denies any complaints at this time.       2/9/23: Patient presents for hospital follow up to review bone marrow biopsy results.  Overall he is doing well.  Patient here today with wife, complains of pain in left leg.  Otherwise, no major issues.  He denies any bleeding or bruising.  He denies any fevers.    Past Medical History:   Diagnosis Date    Anxiety and depression     BPH (benign prostatic hyperplasia)     CVA (cerebrovascular accident)     HTN (hypertension)     Malignant melanoma of skin, unspecified     NSTEMI (non-ST elevated myocardial infarction)     Sepsis, Gram negative     Sleep apnea       Past Surgical History:   Procedure Laterality Date    BONE MARROW BIOPSY N/A 1/25/2023    Procedure: Biopsy-bone marrow;  Surgeon: Rd Carlos MD;  Location: [facility];  Service: General;  Laterality: N/A;    COLONOSCOPY      COLONOSCOPY N/A 1/24/2023    Procedure: COLONOSCOPY (DOUBLE);  Surgeon: Jimy Baker MD;  Location: [facility] ENDOSCOPY;  Service: Gastroenterology;  Laterality: N/A;    EGD, WITH CLOSED BIOPSY N/A 1/24/2023    Procedure: EGD, WITH CLOSED BIOPSY;  Surgeon: Jimy Baker MD;  Location: [facility] ENDOSCOPY;  Service: Gastroenterology;  Laterality: N/A;    EXCISION OF MELANOMA      SINUS SURGERY      TONSILLECTOMY       Social History     Socioeconomic History    Marital status: Significant Other   Tobacco Use    Smoking status: Never    Smokeless tobacco: Never   Substance and Sexual Activity    Alcohol use: Yes     Alcohol/week: 7.0 standard drinks     Types: 7 Cans of beer per week    Drug use: Never    Sexual activity: Yes      Family History   Problem Relation Age of Onset    Cancer Mother     Cancer Father       Review of patient's allergies indicates:   Allergen Reactions    Acetaminophen       Review of Systems   Constitutional:  Positive for fatigue. Negative for chills, diaphoresis, fever and  unexpected weight change.   HENT:  Negative for nasal congestion, mouth sores, sinus pressure/congestion and sore throat.    Eyes:  Negative for pain and visual disturbance.   Respiratory:  Negative for cough, chest tightness and shortness of breath.    Cardiovascular:  Negative for chest pain, palpitations and leg swelling.   Gastrointestinal:  Negative for abdominal distention, abdominal pain, blood in stool, constipation and diarrhea.   Genitourinary:  Negative for dysuria, frequency and hematuria.   Musculoskeletal:  Negative for arthralgias and back pain.   Integumentary:  Negative for rash.   Neurological:  Negative for dizziness, syncope, weakness, numbness and headaches.   Hematological:  Negative for adenopathy.   Psychiatric/Behavioral:  Negative for confusion.        Objective:      Physical Exam  Vitals reviewed.   Constitutional:       General: He is awake.      Appearance: Normal appearance.   HENT:      Head: Normocephalic and atraumatic.      Right Ear: Hearing normal.      Left Ear: Hearing normal.      Nose: Nose normal.   Eyes:      General: Lids are normal. Vision grossly intact.      Extraocular Movements: Extraocular movements intact.      Conjunctiva/sclera: Conjunctivae normal.   Cardiovascular:      Rate and Rhythm: Normal rate and regular rhythm.      Pulses: Normal pulses.      Heart sounds: Normal heart sounds.   Pulmonary:      Effort: Pulmonary effort is normal.      Breath sounds: Normal breath sounds. No wheezing, rhonchi or rales.   Abdominal:      General: Bowel sounds are normal.      Palpations: Abdomen is soft.      Tenderness: There is no abdominal tenderness.   Musculoskeletal:      Cervical back: Full passive range of motion without pain.      Right lower leg: No edema.      Left lower leg: No edema.   Lymphadenopathy:      Cervical: No cervical adenopathy.      Upper Body:      Right upper body: No supraclavicular or axillary adenopathy.      Left upper body: No  supraclavicular or axillary adenopathy.   Skin:     General: Skin is warm.   Neurological:      General: No focal deficit present.      Mental Status: He is alert and oriented to person, place, and time.   Psychiatric:         Attention and Perception: Attention normal.         Mood and Affect: Mood and affect normal.         Behavior: Behavior is cooperative.       LABS AND IMAGING REVIEWED IN EPIC          Assessment:   AML with 27% blasts, background dysplasia, normal cytogenetics and normal FISH        Plan:       Bone marrow biopsy on 01/23/2023 preliminary report suggests AML with 27% blasts, background dysplasia, normal FISH, normal cytogenetics, NGS pending.  The only reason the report is not signed out yet is because the NGS is still pending.    Due to the patient's age, we will go ahead and get him started on treatment with azacitidine and venetoclax, Azacitadine scheduled to start on 02/27/2023. The patient started Venetoclax as of 2/22/23.     Will send referral to Dr. Interiano in [place] for stem cell transplant evaluation    Prophylactic prescriptions sent to pharmacy.   He has started on  500 mg of levofloxacin daily, 200 mg of acyclovir b.i.d., 200 mg of fluconazole daily.    Return to clinic prior to cycle 2    Zofran and Compazine sent PRN nausea.      DISCUSSION:    1.  A total of 60 minutes were spent in counseling today, in which 100% were face-to-face.  At today's chemotherapy teaching session, we discussed the patient's cancer diagnosis as well as planned therapy regimen, protocol, side effects and toxicities.  A handout of each therapeutic agent in the regimen was provided and reviewed in detail.    2.  The following side effects were discussed but not limited to:                a.  Discussed the risk of infection while on chemotherapy related to pancytopenia, specifically a decrease in their white blood cell count.  Instructed to contact our office for temperature >100.5 F, chills, sudden  onset cough or shortness of breath, symptoms of a urinary tract infection.                b.  Discussed the risk of anemia. Instructed to contact our office for dizziness, heart palpitations, or extreme or sudden changes in weakness.                c.  Discussed the risk of thrombocytopenia, which increases the risk of bruising or bleeding.  Instructed the patient to contact our office for spontaneous signs of bleeding, including nose bleeds, bleeding from the gums or mouth, blood in sputum, urine or stool and unusual or excessive bruising or rash.                d.  Discussed GI side effects including weight changes, changes in appetite, altered sense of taste, stomatitis, nausea, vomiting, diarrhea, constipation, and heartburn.                e.  Discussed  side effects including painful urination, changes in the amount of urination, possible urine color changes.  Discussed fertility issues and to prevent  pregnancy if of child bearing age.                f.  Discussed neurological side effects including the risk of peripheral neuropathy, either temporary or permanent.                g.  Discussed the potential for skin, hair, and nail changes.       3.  Instructed to contact our office for discussion of medication changes, the addition of vitamin and/or herbal supplementation as they may interact with some chemotherapy agents.    4.  Discussed dietary modifications and the need to maintain adequate caloric intake and proper oral hydration.  Recommended 64 ounces of fluid per day.    5.  Discussed anti-emetic protocol and bowel regimen protocol.    6.  Office contact information given including after hours number.  Discussed there is an oncologist on call 24/7, 365 days including weekends.  Provided primary nurse's information .    7.  In summary, the patient is in agreement with the plan of care.  Questions appeared to be answered to their satisfaction.  Chemotherapy consent was reviewed and signed.  To be copy  scanned into the chart.

## 2023-02-27 ENCOUNTER — LAB VISIT (OUTPATIENT)
Dept: LAB | Facility: HOSPITAL | Age: 73
End: 2023-02-27
Attending: INTERNAL MEDICINE
Payer: MEDICARE

## 2023-02-27 ENCOUNTER — INFUSION (OUTPATIENT)
Dept: INFUSION THERAPY | Facility: HOSPITAL | Age: 73
End: 2023-02-27
Attending: INTERNAL MEDICINE
Payer: MEDICARE

## 2023-02-27 VITALS
HEART RATE: 85 BPM | RESPIRATION RATE: 18 BRPM | OXYGEN SATURATION: 99 % | WEIGHT: 193.81 LBS | SYSTOLIC BLOOD PRESSURE: 114 MMHG | DIASTOLIC BLOOD PRESSURE: 72 MMHG | TEMPERATURE: 98 F | BODY MASS INDEX: 25.57 KG/M2

## 2023-02-27 DIAGNOSIS — C92.00 ACUTE MYELOID LEUKEMIA NOT HAVING ACHIEVED REMISSION: Primary | ICD-10-CM

## 2023-02-27 DIAGNOSIS — C92.00 ACUTE MYELOID LEUKEMIA NOT HAVING ACHIEVED REMISSION: ICD-10-CM

## 2023-02-27 LAB
ALBUMIN SERPL-MCNC: 3.8 G/DL (ref 3.4–4.8)
ALBUMIN/GLOB SERPL: 1.2 RATIO (ref 1.1–2)
ALP SERPL-CCNC: 92 UNIT/L (ref 40–150)
ALT SERPL-CCNC: 13 UNIT/L (ref 0–55)
AST SERPL-CCNC: 16 UNIT/L (ref 5–34)
BASOPHILS # BLD AUTO: 0.02 X10(3)/MCL (ref 0–0.2)
BASOPHILS NFR BLD AUTO: 1.8 %
BILIRUBIN DIRECT+TOT PNL SERPL-MCNC: 0.9 MG/DL
BUN SERPL-MCNC: 28.3 MG/DL (ref 8.4–25.7)
CALCIUM SERPL-MCNC: 9.1 MG/DL (ref 8.8–10)
CHLORIDE SERPL-SCNC: 101 MMOL/L (ref 98–107)
CO2 SERPL-SCNC: 26 MMOL/L (ref 23–31)
CREAT SERPL-MCNC: 1.48 MG/DL (ref 0.73–1.18)
EOSINOPHIL # BLD AUTO: 0 X10(3)/MCL (ref 0–0.9)
EOSINOPHIL NFR BLD AUTO: 0 %
ERYTHROCYTE [DISTWIDTH] IN BLOOD BY AUTOMATED COUNT: 18.7 % (ref 11.5–17)
GFR SERPLBLD CREATININE-BSD FMLA CKD-EPI: 50 MLS/MIN/1.73/M2
GLOBULIN SER-MCNC: 3.1 GM/DL (ref 2.4–3.5)
GLUCOSE SERPL-MCNC: 104 MG/DL (ref 82–115)
HCT VFR BLD AUTO: 28.4 % (ref 42–52)
HGB BLD-MCNC: 8.1 G/DL (ref 14–18)
IMM GRANULOCYTES # BLD AUTO: 0 X10(3)/MCL (ref 0–0.04)
IMM GRANULOCYTES NFR BLD AUTO: 0 %
LYMPHOCYTES # BLD AUTO: 0.7 X10(3)/MCL (ref 0.6–4.6)
LYMPHOCYTES NFR BLD AUTO: 62.5 %
MCH RBC QN AUTO: 22.9 PG
MCHC RBC AUTO-ENTMCNC: 28.5 G/DL (ref 33–36)
MCV RBC AUTO: 80.5 FL (ref 80–94)
MONOCYTES # BLD AUTO: 0.17 X10(3)/MCL (ref 0.1–1.3)
MONOCYTES NFR BLD AUTO: 15.2 %
NEUTROPHILS # BLD AUTO: 0.23 X10(3)/MCL (ref 2.1–9.2)
NEUTROPHILS NFR BLD AUTO: 20.5 %
PLATELET # BLD AUTO: 68 X10(3)/MCL (ref 130–400)
PMV BLD AUTO: ABNORMAL FL
POTASSIUM SERPL-SCNC: 4 MMOL/L (ref 3.5–5.1)
PROT SERPL-MCNC: 6.9 GM/DL (ref 5.8–7.6)
RBC # BLD AUTO: 3.53 X10(6)/MCL (ref 4.7–6.1)
SODIUM SERPL-SCNC: 135 MMOL/L (ref 136–145)
WBC # SPEC AUTO: 1.1 X10(3)/MCL (ref 4.5–11.5)

## 2023-02-27 PROCEDURE — 36415 COLL VENOUS BLD VENIPUNCTURE: CPT

## 2023-02-27 PROCEDURE — 80053 COMPREHEN METABOLIC PANEL: CPT

## 2023-02-27 PROCEDURE — 25000003 PHARM REV CODE 250: Performed by: INTERNAL MEDICINE

## 2023-02-27 PROCEDURE — 63600175 PHARM REV CODE 636 W HCPCS: Mod: JG | Performed by: INTERNAL MEDICINE

## 2023-02-27 PROCEDURE — 85025 COMPLETE CBC W/AUTO DIFF WBC: CPT

## 2023-02-27 PROCEDURE — 96401 CHEMO ANTI-NEOPL SQ/IM: CPT

## 2023-02-27 RX ORDER — ONDANSETRON 4 MG/1
16 TABLET, FILM COATED ORAL
Status: DISCONTINUED | OUTPATIENT
Start: 2023-02-27 | End: 2023-02-27 | Stop reason: SDUPTHER

## 2023-02-27 RX ORDER — AZACITIDINE 100 MG/1
75 INJECTION, POWDER, LYOPHILIZED, FOR SOLUTION INTRAVENOUS; SUBCUTANEOUS
Status: COMPLETED | OUTPATIENT
Start: 2023-02-27 | End: 2023-02-27

## 2023-02-27 RX ORDER — ONDANSETRON 4 MG/1
16 TABLET, ORALLY DISINTEGRATING ORAL
Status: CANCELLED
Start: 2023-02-28

## 2023-02-27 RX ORDER — ONDANSETRON 4 MG/1
16 TABLET, ORALLY DISINTEGRATING ORAL
Status: CANCELLED
Start: 2023-03-06

## 2023-02-27 RX ORDER — ONDANSETRON 4 MG/1
16 TABLET, ORALLY DISINTEGRATING ORAL
Status: CANCELLED
Start: 2023-03-02

## 2023-02-27 RX ORDER — ONDANSETRON 4 MG/1
16 TABLET, ORALLY DISINTEGRATING ORAL
Status: CANCELLED
Start: 2023-03-03

## 2023-02-27 RX ORDER — ONDANSETRON 4 MG/1
16 TABLET, ORALLY DISINTEGRATING ORAL
Status: COMPLETED | OUTPATIENT
Start: 2023-02-27 | End: 2023-02-27

## 2023-02-27 RX ORDER — ONDANSETRON 4 MG/1
16 TABLET, ORALLY DISINTEGRATING ORAL
Status: CANCELLED
Start: 2023-03-01

## 2023-02-27 RX ORDER — ONDANSETRON 4 MG/1
16 TABLET, ORALLY DISINTEGRATING ORAL
Status: CANCELLED
Start: 2023-03-07

## 2023-02-27 RX ADMIN — ONDANSETRON 16 MG: 4 TABLET, ORALLY DISINTEGRATING ORAL at 11:02

## 2023-02-27 RX ADMIN — AZACITIDINE 160 MG: 100 INJECTION, POWDER, LYOPHILIZED, FOR SOLUTION INTRAVENOUS; SUBCUTANEOUS at 11:02

## 2023-02-28 ENCOUNTER — INFUSION (OUTPATIENT)
Dept: INFUSION THERAPY | Facility: HOSPITAL | Age: 73
End: 2023-02-28
Attending: INTERNAL MEDICINE
Payer: MEDICARE

## 2023-02-28 VITALS
TEMPERATURE: 98 F | RESPIRATION RATE: 18 BRPM | HEART RATE: 88 BPM | DIASTOLIC BLOOD PRESSURE: 63 MMHG | SYSTOLIC BLOOD PRESSURE: 98 MMHG

## 2023-02-28 DIAGNOSIS — C92.00 ACUTE MYELOID LEUKEMIA NOT HAVING ACHIEVED REMISSION: Primary | ICD-10-CM

## 2023-02-28 PROCEDURE — 25000003 PHARM REV CODE 250: Performed by: INTERNAL MEDICINE

## 2023-02-28 PROCEDURE — 96401 CHEMO ANTI-NEOPL SQ/IM: CPT

## 2023-02-28 PROCEDURE — 63600175 PHARM REV CODE 636 W HCPCS: Mod: JG | Performed by: INTERNAL MEDICINE

## 2023-02-28 RX ORDER — ONDANSETRON 4 MG/1
16 TABLET, ORALLY DISINTEGRATING ORAL
Status: COMPLETED | OUTPATIENT
Start: 2023-02-28 | End: 2023-02-28

## 2023-02-28 RX ORDER — AZACITIDINE 100 MG/1
75 INJECTION, POWDER, LYOPHILIZED, FOR SOLUTION INTRAVENOUS; SUBCUTANEOUS
Status: COMPLETED | OUTPATIENT
Start: 2023-02-28 | End: 2023-02-28

## 2023-02-28 RX ADMIN — ONDANSETRON 16 MG: 4 TABLET, ORALLY DISINTEGRATING ORAL at 11:02

## 2023-02-28 RX ADMIN — AZACITIDINE 160 MG: 100 INJECTION, POWDER, LYOPHILIZED, FOR SOLUTION INTRAVENOUS; SUBCUTANEOUS at 11:02

## 2023-03-01 ENCOUNTER — INFUSION (OUTPATIENT)
Dept: INFUSION THERAPY | Facility: HOSPITAL | Age: 73
End: 2023-03-01
Attending: INTERNAL MEDICINE
Payer: MEDICARE

## 2023-03-01 VITALS
TEMPERATURE: 98 F | HEART RATE: 81 BPM | OXYGEN SATURATION: 99 % | SYSTOLIC BLOOD PRESSURE: 119 MMHG | DIASTOLIC BLOOD PRESSURE: 67 MMHG | RESPIRATION RATE: 18 BRPM

## 2023-03-01 DIAGNOSIS — C92.00 ACUTE MYELOID LEUKEMIA NOT HAVING ACHIEVED REMISSION: Primary | ICD-10-CM

## 2023-03-01 PROCEDURE — 63600175 PHARM REV CODE 636 W HCPCS: Mod: JZ,JG | Performed by: INTERNAL MEDICINE

## 2023-03-01 PROCEDURE — 96401 CHEMO ANTI-NEOPL SQ/IM: CPT

## 2023-03-01 PROCEDURE — 25000003 PHARM REV CODE 250: Performed by: INTERNAL MEDICINE

## 2023-03-01 RX ORDER — ONDANSETRON 4 MG/1
16 TABLET, ORALLY DISINTEGRATING ORAL
Status: COMPLETED | OUTPATIENT
Start: 2023-03-01 | End: 2023-03-01

## 2023-03-01 RX ORDER — AZACITIDINE 100 MG/1
75 INJECTION, POWDER, LYOPHILIZED, FOR SOLUTION INTRAVENOUS; SUBCUTANEOUS
Status: COMPLETED | OUTPATIENT
Start: 2023-03-01 | End: 2023-03-01

## 2023-03-01 RX ADMIN — AZACITIDINE 160 MG: 100 INJECTION, POWDER, LYOPHILIZED, FOR SOLUTION INTRAVENOUS; SUBCUTANEOUS at 11:03

## 2023-03-01 RX ADMIN — ONDANSETRON 16 MG: 4 TABLET, ORALLY DISINTEGRATING ORAL at 10:03

## 2023-03-02 ENCOUNTER — INFUSION (OUTPATIENT)
Dept: INFUSION THERAPY | Facility: HOSPITAL | Age: 73
End: 2023-03-02
Attending: INTERNAL MEDICINE
Payer: MEDICARE

## 2023-03-02 VITALS
DIASTOLIC BLOOD PRESSURE: 58 MMHG | SYSTOLIC BLOOD PRESSURE: 112 MMHG | HEART RATE: 91 BPM | RESPIRATION RATE: 18 BRPM | TEMPERATURE: 98 F

## 2023-03-02 DIAGNOSIS — C92.00 ACUTE MYELOID LEUKEMIA NOT HAVING ACHIEVED REMISSION: Primary | ICD-10-CM

## 2023-03-02 PROCEDURE — 25000003 PHARM REV CODE 250: Performed by: INTERNAL MEDICINE

## 2023-03-02 PROCEDURE — 63600175 PHARM REV CODE 636 W HCPCS: Mod: JZ,JG | Performed by: INTERNAL MEDICINE

## 2023-03-02 PROCEDURE — 96401 CHEMO ANTI-NEOPL SQ/IM: CPT

## 2023-03-02 RX ORDER — ONDANSETRON 4 MG/1
16 TABLET, ORALLY DISINTEGRATING ORAL
Status: COMPLETED | OUTPATIENT
Start: 2023-03-02 | End: 2023-03-02

## 2023-03-02 RX ORDER — AZACITIDINE 100 MG/1
75 INJECTION, POWDER, LYOPHILIZED, FOR SOLUTION INTRAVENOUS; SUBCUTANEOUS
Status: COMPLETED | OUTPATIENT
Start: 2023-03-02 | End: 2023-03-02

## 2023-03-02 RX ADMIN — ONDANSETRON 16 MG: 4 TABLET, ORALLY DISINTEGRATING ORAL at 10:03

## 2023-03-02 RX ADMIN — AZACITIDINE 160 MG: 100 INJECTION, POWDER, LYOPHILIZED, FOR SOLUTION INTRAVENOUS; SUBCUTANEOUS at 10:03

## 2023-03-03 ENCOUNTER — INFUSION (OUTPATIENT)
Dept: INFUSION THERAPY | Facility: HOSPITAL | Age: 73
End: 2023-03-03
Attending: INTERNAL MEDICINE
Payer: MEDICARE

## 2023-03-03 ENCOUNTER — TELEPHONE (OUTPATIENT)
Dept: HEMATOLOGY/ONCOLOGY | Facility: CLINIC | Age: 73
End: 2023-03-03

## 2023-03-03 ENCOUNTER — LAB VISIT (OUTPATIENT)
Dept: LAB | Facility: HOSPITAL | Age: 73
End: 2023-03-03
Payer: MEDICARE

## 2023-03-03 VITALS
SYSTOLIC BLOOD PRESSURE: 125 MMHG | OXYGEN SATURATION: 98 % | DIASTOLIC BLOOD PRESSURE: 68 MMHG | TEMPERATURE: 98 F | HEART RATE: 66 BPM | RESPIRATION RATE: 16 BRPM

## 2023-03-03 DIAGNOSIS — C92.00 ACUTE MYELOID LEUKEMIA NOT HAVING ACHIEVED REMISSION: ICD-10-CM

## 2023-03-03 DIAGNOSIS — C92.00 ACUTE MYELOID LEUKEMIA NOT HAVING ACHIEVED REMISSION: Primary | ICD-10-CM

## 2023-03-03 LAB
ABO + RH BLD: NORMAL
ABO + RH BLD: NORMAL
ALBUMIN SERPL-MCNC: 3.8 G/DL (ref 3.4–4.8)
ALBUMIN/GLOB SERPL: 1.3 RATIO (ref 1.1–2)
ALP SERPL-CCNC: 91 UNIT/L (ref 40–150)
ALT SERPL-CCNC: 11 UNIT/L (ref 0–55)
AST SERPL-CCNC: 16 UNIT/L (ref 5–34)
BASOPHILS # BLD AUTO: 0.02 X10(3)/MCL (ref 0–0.2)
BASOPHILS NFR BLD AUTO: 1.9 %
BILIRUBIN DIRECT+TOT PNL SERPL-MCNC: 1.1 MG/DL
BLD PROD TYP BPU: NORMAL
BLD PROD TYP BPU: NORMAL
BLOOD UNIT EXPIRATION DATE: NORMAL
BLOOD UNIT EXPIRATION DATE: NORMAL
BLOOD UNIT TYPE CODE: 5100
BLOOD UNIT TYPE CODE: 7300
BUN SERPL-MCNC: 25.8 MG/DL (ref 8.4–25.7)
CALCIUM SERPL-MCNC: 9.1 MG/DL (ref 8.8–10)
CHLORIDE SERPL-SCNC: 99 MMOL/L (ref 98–107)
CO2 SERPL-SCNC: 28 MMOL/L (ref 23–31)
CREAT SERPL-MCNC: 1.69 MG/DL (ref 0.73–1.18)
CROSSMATCH INTERPRETATION: NORMAL
CROSSMATCH INTERPRETATION: NORMAL
DISPENSE STATUS: NORMAL
DISPENSE STATUS: NORMAL
EOSINOPHIL # BLD AUTO: 0 X10(3)/MCL (ref 0–0.9)
EOSINOPHIL NFR BLD AUTO: 0 %
ERYTHROCYTE [DISTWIDTH] IN BLOOD BY AUTOMATED COUNT: 18.3 % (ref 11.5–17)
GFR SERPLBLD CREATININE-BSD FMLA CKD-EPI: 42 MLS/MIN/1.73/M2
GLOBULIN SER-MCNC: 3 GM/DL (ref 2.4–3.5)
GLUCOSE SERPL-MCNC: 104 MG/DL (ref 82–115)
HCT VFR BLD AUTO: 27.2 % (ref 42–52)
HGB BLD-MCNC: 7.9 G/DL (ref 14–18)
IMM GRANULOCYTES # BLD AUTO: 0 X10(3)/MCL (ref 0–0.04)
IMM GRANULOCYTES NFR BLD AUTO: 0 %
LYMPHOCYTES # BLD AUTO: 0.44 X10(3)/MCL (ref 0.6–4.6)
LYMPHOCYTES NFR BLD AUTO: 42.3 %
MCH RBC QN AUTO: 22.9 PG
MCHC RBC AUTO-ENTMCNC: 29 G/DL (ref 33–36)
MCV RBC AUTO: 78.8 FL (ref 80–94)
MONOCYTES # BLD AUTO: 0.14 X10(3)/MCL (ref 0.1–1.3)
MONOCYTES NFR BLD AUTO: 13.5 %
NEUTROPHILS # BLD AUTO: 0.44 X10(3)/MCL (ref 2.1–9.2)
NEUTROPHILS NFR BLD AUTO: 42.3 %
PLATELET # BLD AUTO: 18 X10(3)/MCL (ref 130–400)
PMV BLD AUTO: ABNORMAL FL
POTASSIUM SERPL-SCNC: 3.9 MMOL/L (ref 3.5–5.1)
PROT SERPL-MCNC: 6.8 GM/DL (ref 5.8–7.6)
RBC # BLD AUTO: 3.45 X10(6)/MCL (ref 4.7–6.1)
SODIUM SERPL-SCNC: 135 MMOL/L (ref 136–145)
UNIT NUMBER: NORMAL
UNIT NUMBER: NORMAL
WBC # SPEC AUTO: 1 X10(3)/MCL (ref 4.5–11.5)

## 2023-03-03 PROCEDURE — 85025 COMPLETE CBC W/AUTO DIFF WBC: CPT

## 2023-03-03 PROCEDURE — 96401 CHEMO ANTI-NEOPL SQ/IM: CPT

## 2023-03-03 PROCEDURE — 96375 TX/PRO/DX INJ NEW DRUG ADDON: CPT

## 2023-03-03 PROCEDURE — 25000003 PHARM REV CODE 250: Performed by: INTERNAL MEDICINE

## 2023-03-03 PROCEDURE — P9037 PLATE PHERES LEUKOREDU IRRAD: HCPCS | Performed by: INTERNAL MEDICINE

## 2023-03-03 PROCEDURE — 36430 TRANSFUSION BLD/BLD COMPNT: CPT

## 2023-03-03 PROCEDURE — 96374 THER/PROPH/DIAG INJ IV PUSH: CPT

## 2023-03-03 PROCEDURE — 80053 COMPREHEN METABOLIC PANEL: CPT

## 2023-03-03 PROCEDURE — 36415 COLL VENOUS BLD VENIPUNCTURE: CPT

## 2023-03-03 PROCEDURE — 63600175 PHARM REV CODE 636 W HCPCS: Mod: JZ,JG | Performed by: INTERNAL MEDICINE

## 2023-03-03 RX ORDER — ONDANSETRON 4 MG/1
16 TABLET, ORALLY DISINTEGRATING ORAL
Status: COMPLETED | OUTPATIENT
Start: 2023-03-03 | End: 2023-03-03

## 2023-03-03 RX ORDER — AZACITIDINE 100 MG/1
75 INJECTION, POWDER, LYOPHILIZED, FOR SOLUTION INTRAVENOUS; SUBCUTANEOUS
Status: COMPLETED | OUTPATIENT
Start: 2023-03-03 | End: 2023-03-03

## 2023-03-03 RX ORDER — HYDROCODONE BITARTRATE AND ACETAMINOPHEN 500; 5 MG/1; MG/1
TABLET ORAL ONCE
Status: CANCELLED | OUTPATIENT
Start: 2023-03-03 | End: 2023-03-03

## 2023-03-03 RX ORDER — HYDROCODONE BITARTRATE AND ACETAMINOPHEN 500; 5 MG/1; MG/1
TABLET ORAL ONCE
Status: COMPLETED | OUTPATIENT
Start: 2023-03-03 | End: 2023-03-03

## 2023-03-03 RX ORDER — DIPHENHYDRAMINE HYDROCHLORIDE 50 MG/ML
25 INJECTION INTRAMUSCULAR; INTRAVENOUS ONCE
Status: COMPLETED | OUTPATIENT
Start: 2023-03-03 | End: 2023-03-03

## 2023-03-03 RX ORDER — DIPHENHYDRAMINE HYDROCHLORIDE 50 MG/ML
25 INJECTION INTRAMUSCULAR; INTRAVENOUS ONCE
Status: CANCELLED | OUTPATIENT
Start: 2023-03-03

## 2023-03-03 RX ADMIN — DIPHENHYDRAMINE HYDROCHLORIDE 25 MG: 50 INJECTION, SOLUTION INTRAMUSCULAR; INTRAVENOUS at 11:03

## 2023-03-03 RX ADMIN — SODIUM CHLORIDE: 9 INJECTION, SOLUTION INTRAVENOUS at 11:03

## 2023-03-03 RX ADMIN — AZACITIDINE 160 MG: 100 INJECTION, POWDER, LYOPHILIZED, FOR SOLUTION INTRAVENOUS; SUBCUTANEOUS at 10:03

## 2023-03-03 RX ADMIN — ONDANSETRON 16 MG: 4 TABLET, ORALLY DISINTEGRATING ORAL at 10:03

## 2023-03-03 NOTE — PLAN OF CARE
C1D5 Vidaza given. Tolerated well. ANC 0.4 and PLT 18 today. MD notified. 1unit Platelets ordered and given. Tolerated well. Next appt confirmed with pt. Dc'd home in stable condition.

## 2023-03-04 LAB
FINAL PATHOLOGIC DIAGNOSIS: NORMAL
GROSS: NORMAL
Lab: NORMAL
MICROSCOPIC EXAM: NORMAL

## 2023-03-06 ENCOUNTER — LAB VISIT (OUTPATIENT)
Dept: LAB | Facility: HOSPITAL | Age: 73
End: 2023-03-06
Attending: INTERNAL MEDICINE
Payer: MEDICARE

## 2023-03-06 ENCOUNTER — INFUSION (OUTPATIENT)
Dept: INFUSION THERAPY | Facility: HOSPITAL | Age: 73
End: 2023-03-06
Attending: INTERNAL MEDICINE
Payer: MEDICARE

## 2023-03-06 VITALS
HEART RATE: 82 BPM | SYSTOLIC BLOOD PRESSURE: 104 MMHG | DIASTOLIC BLOOD PRESSURE: 67 MMHG | TEMPERATURE: 99 F | RESPIRATION RATE: 18 BRPM | OXYGEN SATURATION: 98 %

## 2023-03-06 DIAGNOSIS — C92.00 ACUTE MYELOID LEUKEMIA NOT HAVING ACHIEVED REMISSION: Primary | ICD-10-CM

## 2023-03-06 DIAGNOSIS — C92.00 ACUTE MYELOID LEUKEMIA NOT HAVING ACHIEVED REMISSION: ICD-10-CM

## 2023-03-06 LAB
ALBUMIN SERPL-MCNC: 3.6 G/DL (ref 3.4–4.8)
ALBUMIN/GLOB SERPL: 1.2 RATIO (ref 1.1–2)
ALP SERPL-CCNC: 85 UNIT/L (ref 40–150)
ALT SERPL-CCNC: 11 UNIT/L (ref 0–55)
AST SERPL-CCNC: 18 UNIT/L (ref 5–34)
BASOPHILS # BLD AUTO: 0.01 X10(3)/MCL (ref 0–0.2)
BASOPHILS NFR BLD AUTO: 1 %
BILIRUBIN DIRECT+TOT PNL SERPL-MCNC: 1 MG/DL
BUN SERPL-MCNC: 16.3 MG/DL (ref 8.4–25.7)
CALCIUM SERPL-MCNC: 9.4 MG/DL (ref 8.8–10)
CHLORIDE SERPL-SCNC: 101 MMOL/L (ref 98–107)
CO2 SERPL-SCNC: 31 MMOL/L (ref 23–31)
CREAT SERPL-MCNC: 1.23 MG/DL (ref 0.73–1.18)
EOSINOPHIL # BLD AUTO: 0 X10(3)/MCL (ref 0–0.9)
EOSINOPHIL NFR BLD AUTO: 0 %
ERYTHROCYTE [DISTWIDTH] IN BLOOD BY AUTOMATED COUNT: 18 % (ref 11.5–17)
GFR SERPLBLD CREATININE-BSD FMLA CKD-EPI: >60 MLS/MIN/1.73/M2
GLOBULIN SER-MCNC: 2.9 GM/DL (ref 2.4–3.5)
GLUCOSE SERPL-MCNC: 97 MG/DL (ref 82–115)
HCT VFR BLD AUTO: 28.2 % (ref 42–52)
HGB BLD-MCNC: 8.4 G/DL (ref 14–18)
IMM GRANULOCYTES # BLD AUTO: 0 X10(3)/MCL (ref 0–0.04)
IMM GRANULOCYTES NFR BLD AUTO: 0 %
LYMPHOCYTES # BLD AUTO: 0.55 X10(3)/MCL (ref 0.6–4.6)
LYMPHOCYTES NFR BLD AUTO: 55 %
MCH RBC QN AUTO: 24 PG
MCHC RBC AUTO-ENTMCNC: 29.8 G/DL (ref 33–36)
MCV RBC AUTO: 80.6 FL (ref 80–94)
MONOCYTES # BLD AUTO: 0.13 X10(3)/MCL (ref 0.1–1.3)
MONOCYTES NFR BLD AUTO: 13 %
NEUTROPHILS # BLD AUTO: 0.31 X10(3)/MCL (ref 2.1–9.2)
NEUTROPHILS NFR BLD AUTO: 31 %
PLATELET # BLD AUTO: 51 X10(3)/MCL (ref 130–400)
PMV BLD AUTO: ABNORMAL FL
POTASSIUM SERPL-SCNC: 3.7 MMOL/L (ref 3.5–5.1)
PROT SERPL-MCNC: 6.5 GM/DL (ref 5.8–7.6)
RBC # BLD AUTO: 3.5 X10(6)/MCL (ref 4.7–6.1)
SODIUM SERPL-SCNC: 139 MMOL/L (ref 136–145)
WBC # SPEC AUTO: 1 X10(3)/MCL (ref 4.5–11.5)

## 2023-03-06 PROCEDURE — 25000003 PHARM REV CODE 250: Performed by: INTERNAL MEDICINE

## 2023-03-06 PROCEDURE — 96401 CHEMO ANTI-NEOPL SQ/IM: CPT

## 2023-03-06 PROCEDURE — 80053 COMPREHEN METABOLIC PANEL: CPT

## 2023-03-06 PROCEDURE — 63600175 PHARM REV CODE 636 W HCPCS: Mod: JZ,JG | Performed by: INTERNAL MEDICINE

## 2023-03-06 PROCEDURE — 36415 COLL VENOUS BLD VENIPUNCTURE: CPT

## 2023-03-06 PROCEDURE — 85025 COMPLETE CBC W/AUTO DIFF WBC: CPT

## 2023-03-06 RX ORDER — AZACITIDINE 100 MG/1
75 INJECTION, POWDER, LYOPHILIZED, FOR SOLUTION INTRAVENOUS; SUBCUTANEOUS
Status: COMPLETED | OUTPATIENT
Start: 2023-03-06 | End: 2023-03-06

## 2023-03-06 RX ORDER — ONDANSETRON 4 MG/1
16 TABLET, ORALLY DISINTEGRATING ORAL
Status: COMPLETED | OUTPATIENT
Start: 2023-03-06 | End: 2023-03-06

## 2023-03-06 RX ADMIN — ONDANSETRON 16 MG: 4 TABLET, ORALLY DISINTEGRATING ORAL at 11:03

## 2023-03-06 RX ADMIN — AZACITIDINE 160 MG: 100 INJECTION, POWDER, LYOPHILIZED, FOR SOLUTION INTRAVENOUS; SUBCUTANEOUS at 11:03

## 2023-03-06 NOTE — PLAN OF CARE
C1D8 Vidaza given. Tolerated well. Labs checked today and no blood products needed. Pt stated he did pass out Friday afternoon and went to the ER and received 2units PRBC and was released. Stated he did not have another episode of passing out since then and is feeling better. Care team notified. Next appts confirmed with pt. Dc'd home in stable condition.

## 2023-03-07 ENCOUNTER — INFUSION (OUTPATIENT)
Dept: INFUSION THERAPY | Facility: HOSPITAL | Age: 73
End: 2023-03-07
Attending: INTERNAL MEDICINE
Payer: MEDICARE

## 2023-03-07 VITALS
TEMPERATURE: 98 F | HEART RATE: 79 BPM | SYSTOLIC BLOOD PRESSURE: 131 MMHG | DIASTOLIC BLOOD PRESSURE: 71 MMHG | RESPIRATION RATE: 18 BRPM | OXYGEN SATURATION: 98 %

## 2023-03-07 DIAGNOSIS — C92.00 ACUTE MYELOID LEUKEMIA NOT HAVING ACHIEVED REMISSION: Primary | ICD-10-CM

## 2023-03-07 PROCEDURE — 63600175 PHARM REV CODE 636 W HCPCS: Mod: JZ,JG | Performed by: INTERNAL MEDICINE

## 2023-03-07 PROCEDURE — 25000003 PHARM REV CODE 250: Performed by: INTERNAL MEDICINE

## 2023-03-07 PROCEDURE — 96401 CHEMO ANTI-NEOPL SQ/IM: CPT

## 2023-03-07 RX ORDER — AZACITIDINE 100 MG/1
75 INJECTION, POWDER, LYOPHILIZED, FOR SOLUTION INTRAVENOUS; SUBCUTANEOUS
Status: COMPLETED | OUTPATIENT
Start: 2023-03-07 | End: 2023-03-07

## 2023-03-07 RX ORDER — ONDANSETRON 4 MG/1
16 TABLET, ORALLY DISINTEGRATING ORAL
Status: COMPLETED | OUTPATIENT
Start: 2023-03-07 | End: 2023-03-07

## 2023-03-07 RX ADMIN — AZACITIDINE 160 MG: 100 INJECTION, POWDER, LYOPHILIZED, FOR SOLUTION INTRAVENOUS; SUBCUTANEOUS at 11:03

## 2023-03-07 RX ADMIN — ONDANSETRON 16 MG: 4 TABLET, ORALLY DISINTEGRATING ORAL at 11:03

## 2023-03-07 NOTE — PLAN OF CARE
C1D9 Vidaza given to left lower abdomen. Tolerated well. Right side of lower abdomen red with a quarter size area of irritation with skin peeling. Pt denies any pain and instructed to call if site becomes worse. Educated on s/s of infection. Next appt confirmed with pt. Dc'd home in stable condition.

## 2023-03-10 ENCOUNTER — LAB VISIT (OUTPATIENT)
Dept: LAB | Facility: HOSPITAL | Age: 73
End: 2023-03-10
Attending: INTERNAL MEDICINE
Payer: MEDICARE

## 2023-03-10 DIAGNOSIS — C92.00 ACUTE MYELOID LEUKEMIA NOT HAVING ACHIEVED REMISSION: ICD-10-CM

## 2023-03-10 LAB
ALBUMIN SERPL-MCNC: 3.7 G/DL (ref 3.4–4.8)
ALBUMIN/GLOB SERPL: 1.2 RATIO (ref 1.1–2)
ALP SERPL-CCNC: 95 UNIT/L (ref 40–150)
ALT SERPL-CCNC: 13 UNIT/L (ref 0–55)
AST SERPL-CCNC: 15 UNIT/L (ref 5–34)
BASOPHILS # BLD AUTO: 0.02 X10(3)/MCL (ref 0–0.2)
BASOPHILS NFR BLD AUTO: 2.1 %
BILIRUBIN DIRECT+TOT PNL SERPL-MCNC: 0.7 MG/DL
BUN SERPL-MCNC: 16.5 MG/DL (ref 8.4–25.7)
CALCIUM SERPL-MCNC: 9.5 MG/DL (ref 8.8–10)
CHLORIDE SERPL-SCNC: 101 MMOL/L (ref 98–107)
CO2 SERPL-SCNC: 31 MMOL/L (ref 23–31)
CREAT SERPL-MCNC: 1.24 MG/DL (ref 0.73–1.18)
EOSINOPHIL # BLD AUTO: 0 X10(3)/MCL (ref 0–0.9)
EOSINOPHIL NFR BLD AUTO: 0 %
ERYTHROCYTE [DISTWIDTH] IN BLOOD BY AUTOMATED COUNT: 18.1 % (ref 11.5–17)
GFR SERPLBLD CREATININE-BSD FMLA CKD-EPI: >60 MLS/MIN/1.73/M2
GLOBULIN SER-MCNC: 3 GM/DL (ref 2.4–3.5)
GLUCOSE SERPL-MCNC: 98 MG/DL (ref 82–115)
HCT VFR BLD AUTO: 28.1 % (ref 42–52)
HGB BLD-MCNC: 8.2 G/DL (ref 14–18)
IMM GRANULOCYTES # BLD AUTO: 0 X10(3)/MCL (ref 0–0.04)
IMM GRANULOCYTES NFR BLD AUTO: 0 %
LYMPHOCYTES # BLD AUTO: 0.64 X10(3)/MCL (ref 0.6–4.6)
LYMPHOCYTES NFR BLD AUTO: 67.4 %
MCH RBC QN AUTO: 23.8 PG
MCHC RBC AUTO-ENTMCNC: 29.2 G/DL (ref 33–36)
MCV RBC AUTO: 81.7 FL (ref 80–94)
MONOCYTES # BLD AUTO: 0.09 X10(3)/MCL (ref 0.1–1.3)
MONOCYTES NFR BLD AUTO: 9.5 %
NEUTROPHILS # BLD AUTO: 0.2 X10(3)/MCL (ref 2.1–9.2)
NEUTROPHILS NFR BLD AUTO: 21 %
PLATELET # BLD AUTO: 65 X10(3)/MCL (ref 130–400)
PMV BLD AUTO: ABNORMAL FL
POTASSIUM SERPL-SCNC: 4 MMOL/L (ref 3.5–5.1)
PROT SERPL-MCNC: 6.7 GM/DL (ref 5.8–7.6)
RBC # BLD AUTO: 3.44 X10(6)/MCL (ref 4.7–6.1)
SODIUM SERPL-SCNC: 140 MMOL/L (ref 136–145)
WBC # SPEC AUTO: 1 X10(3)/MCL (ref 4.5–11.5)

## 2023-03-10 PROCEDURE — 36415 COLL VENOUS BLD VENIPUNCTURE: CPT

## 2023-03-10 PROCEDURE — 80053 COMPREHEN METABOLIC PANEL: CPT

## 2023-03-10 PROCEDURE — 85025 COMPLETE CBC W/AUTO DIFF WBC: CPT

## 2023-03-13 ENCOUNTER — LAB VISIT (OUTPATIENT)
Dept: LAB | Facility: HOSPITAL | Age: 73
End: 2023-03-13
Attending: INTERNAL MEDICINE
Payer: MEDICARE

## 2023-03-13 DIAGNOSIS — C92.00 ACUTE MYELOID LEUKEMIA NOT HAVING ACHIEVED REMISSION: ICD-10-CM

## 2023-03-13 DIAGNOSIS — C92.00 ACUTE MYELOID LEUKEMIA NOT HAVING ACHIEVED REMISSION: Primary | ICD-10-CM

## 2023-03-13 LAB
ALBUMIN SERPL-MCNC: 3.6 G/DL (ref 3.4–4.8)
ALBUMIN/GLOB SERPL: 1.3 RATIO (ref 1.1–2)
ALP SERPL-CCNC: 90 UNIT/L (ref 40–150)
ALT SERPL-CCNC: 12 UNIT/L (ref 0–55)
AST SERPL-CCNC: 16 UNIT/L (ref 5–34)
BASOPHILS # BLD AUTO: 0.01 X10(3)/MCL (ref 0–0.2)
BASOPHILS NFR BLD AUTO: 0.7 %
BILIRUBIN DIRECT+TOT PNL SERPL-MCNC: 1 MG/DL
BUN SERPL-MCNC: 18.9 MG/DL (ref 8.4–25.7)
CALCIUM SERPL-MCNC: 9.2 MG/DL (ref 8.8–10)
CHLORIDE SERPL-SCNC: 101 MMOL/L (ref 98–107)
CO2 SERPL-SCNC: 29 MMOL/L (ref 23–31)
CREAT SERPL-MCNC: 1.27 MG/DL (ref 0.73–1.18)
EOSINOPHIL # BLD AUTO: 0 X10(3)/MCL (ref 0–0.9)
EOSINOPHIL NFR BLD AUTO: 0 %
ERYTHROCYTE [DISTWIDTH] IN BLOOD BY AUTOMATED COUNT: 18.7 % (ref 11.5–17)
GFR SERPLBLD CREATININE-BSD FMLA CKD-EPI: 60 MLS/MIN/1.73/M2
GLOBULIN SER-MCNC: 2.8 GM/DL (ref 2.4–3.5)
GLUCOSE SERPL-MCNC: 120 MG/DL (ref 82–115)
HCT VFR BLD AUTO: 26 % (ref 42–52)
HGB BLD-MCNC: 7.7 G/DL (ref 14–18)
IMM GRANULOCYTES # BLD AUTO: 0.01 X10(3)/MCL (ref 0–0.04)
IMM GRANULOCYTES NFR BLD AUTO: 0.7 %
LYMPHOCYTES # BLD AUTO: 0.48 X10(3)/MCL (ref 0.6–4.6)
LYMPHOCYTES NFR BLD AUTO: 32.7 %
MCH RBC QN AUTO: 24.2 PG
MCHC RBC AUTO-ENTMCNC: 29.6 G/DL (ref 33–36)
MCV RBC AUTO: 81.8 FL (ref 80–94)
MONOCYTES # BLD AUTO: 0.2 X10(3)/MCL (ref 0.1–1.3)
MONOCYTES NFR BLD AUTO: 13.6 %
NEUTROPHILS # BLD AUTO: 0.77 X10(3)/MCL (ref 2.1–9.2)
NEUTROPHILS NFR BLD AUTO: 52.3 %
PLATELET # BLD AUTO: 37 X10(3)/MCL (ref 130–400)
PMV BLD AUTO: ABNORMAL FL
POTASSIUM SERPL-SCNC: 4.2 MMOL/L (ref 3.5–5.1)
PROT SERPL-MCNC: 6.4 GM/DL (ref 5.8–7.6)
RBC # BLD AUTO: 3.18 X10(6)/MCL (ref 4.7–6.1)
SODIUM SERPL-SCNC: 139 MMOL/L (ref 136–145)
WBC # SPEC AUTO: 1.5 X10(3)/MCL (ref 4.5–11.5)

## 2023-03-13 PROCEDURE — 36415 COLL VENOUS BLD VENIPUNCTURE: CPT

## 2023-03-13 PROCEDURE — 85025 COMPLETE CBC W/AUTO DIFF WBC: CPT

## 2023-03-13 PROCEDURE — 80053 COMPREHEN METABOLIC PANEL: CPT

## 2023-03-17 ENCOUNTER — LAB VISIT (OUTPATIENT)
Dept: LAB | Facility: HOSPITAL | Age: 73
End: 2023-03-17
Attending: INTERNAL MEDICINE
Payer: MEDICARE

## 2023-03-17 DIAGNOSIS — C92.00 ACUTE MYELOID LEUKEMIA NOT HAVING ACHIEVED REMISSION: ICD-10-CM

## 2023-03-17 LAB
ALBUMIN SERPL-MCNC: 3.7 G/DL (ref 3.4–4.8)
ALBUMIN/GLOB SERPL: 1.3 RATIO (ref 1.1–2)
ALP SERPL-CCNC: 95 UNIT/L (ref 40–150)
ALT SERPL-CCNC: 11 UNIT/L (ref 0–55)
AST SERPL-CCNC: 17 UNIT/L (ref 5–34)
BASOPHILS # BLD AUTO: 0.01 X10(3)/MCL (ref 0–0.2)
BASOPHILS NFR BLD AUTO: 1.3 %
BILIRUBIN DIRECT+TOT PNL SERPL-MCNC: 1.1 MG/DL
BUN SERPL-MCNC: 17.9 MG/DL (ref 8.4–25.7)
CALCIUM SERPL-MCNC: 9.1 MG/DL (ref 8.8–10)
CHLORIDE SERPL-SCNC: 105 MMOL/L (ref 98–107)
CO2 SERPL-SCNC: 30 MMOL/L (ref 23–31)
CREAT SERPL-MCNC: 1.17 MG/DL (ref 0.73–1.18)
EOSINOPHIL # BLD AUTO: 0 X10(3)/MCL (ref 0–0.9)
EOSINOPHIL NFR BLD AUTO: 0 %
ERYTHROCYTE [DISTWIDTH] IN BLOOD BY AUTOMATED COUNT: 18.3 % (ref 11.5–17)
GFR SERPLBLD CREATININE-BSD FMLA CKD-EPI: >60 MLS/MIN/1.73/M2
GLOBULIN SER-MCNC: 2.8 GM/DL (ref 2.4–3.5)
GLUCOSE SERPL-MCNC: 98 MG/DL (ref 82–115)
HCT VFR BLD AUTO: 26.5 % (ref 42–52)
HGB BLD-MCNC: 7.7 G/DL (ref 14–18)
IMM GRANULOCYTES # BLD AUTO: 0.01 X10(3)/MCL (ref 0–0.04)
IMM GRANULOCYTES NFR BLD AUTO: 1.3 %
LYMPHOCYTES # BLD AUTO: 0.54 X10(3)/MCL (ref 0.6–4.6)
LYMPHOCYTES NFR BLD AUTO: 67.5 %
MCH RBC QN AUTO: 24 PG
MCHC RBC AUTO-ENTMCNC: 29.1 G/DL (ref 33–36)
MCV RBC AUTO: 82.6 FL (ref 80–94)
MONOCYTES # BLD AUTO: 0.08 X10(3)/MCL (ref 0.1–1.3)
MONOCYTES NFR BLD AUTO: 10 %
NEUTROPHILS # BLD AUTO: 0.16 X10(3)/MCL (ref 2.1–9.2)
NEUTROPHILS NFR BLD AUTO: 19.9 %
PLATELET # BLD AUTO: 48 X10(3)/MCL (ref 130–400)
PMV BLD AUTO: ABNORMAL FL
POTASSIUM SERPL-SCNC: 4.1 MMOL/L (ref 3.5–5.1)
PROT SERPL-MCNC: 6.5 GM/DL (ref 5.8–7.6)
RBC # BLD AUTO: 3.21 X10(6)/MCL (ref 4.7–6.1)
SODIUM SERPL-SCNC: 140 MMOL/L (ref 136–145)
WBC # SPEC AUTO: 0.8 X10(3)/MCL (ref 4.5–11.5)

## 2023-03-17 PROCEDURE — 85025 COMPLETE CBC W/AUTO DIFF WBC: CPT

## 2023-03-17 PROCEDURE — 36415 COLL VENOUS BLD VENIPUNCTURE: CPT

## 2023-03-17 PROCEDURE — 80053 COMPREHEN METABOLIC PANEL: CPT

## 2023-03-20 ENCOUNTER — LAB VISIT (OUTPATIENT)
Dept: LAB | Facility: HOSPITAL | Age: 73
End: 2023-03-20
Attending: INTERNAL MEDICINE
Payer: MEDICARE

## 2023-03-20 DIAGNOSIS — C92.00 ACUTE MYELOID LEUKEMIA NOT HAVING ACHIEVED REMISSION: ICD-10-CM

## 2023-03-20 LAB
ALBUMIN SERPL-MCNC: 3.5 G/DL (ref 3.4–4.8)
ALBUMIN/GLOB SERPL: 1.3 RATIO (ref 1.1–2)
ALP SERPL-CCNC: 90 UNIT/L (ref 40–150)
ALT SERPL-CCNC: 10 UNIT/L (ref 0–55)
AST SERPL-CCNC: 16 UNIT/L (ref 5–34)
BASOPHILS # BLD AUTO: 0.01 X10(3)/MCL (ref 0–0.2)
BASOPHILS NFR BLD AUTO: 1.5 %
BILIRUBIN DIRECT+TOT PNL SERPL-MCNC: 0.7 MG/DL
BUN SERPL-MCNC: 21.7 MG/DL (ref 8.4–25.7)
CALCIUM SERPL-MCNC: 8.6 MG/DL (ref 8.8–10)
CHLORIDE SERPL-SCNC: 104 MMOL/L (ref 98–107)
CO2 SERPL-SCNC: 30 MMOL/L (ref 23–31)
CREAT SERPL-MCNC: 1.14 MG/DL (ref 0.73–1.18)
EOSINOPHIL # BLD AUTO: 0 X10(3)/MCL (ref 0–0.9)
EOSINOPHIL NFR BLD AUTO: 0 %
ERYTHROCYTE [DISTWIDTH] IN BLOOD BY AUTOMATED COUNT: 18.2 % (ref 11.5–17)
GFR SERPLBLD CREATININE-BSD FMLA CKD-EPI: >60 MLS/MIN/1.73/M2
GLOBULIN SER-MCNC: 2.8 GM/DL (ref 2.4–3.5)
GLUCOSE SERPL-MCNC: 108 MG/DL (ref 82–115)
HCT VFR BLD AUTO: 24.9 % (ref 42–52)
HGB BLD-MCNC: 7.1 G/DL (ref 14–18)
IMM GRANULOCYTES # BLD AUTO: 0.03 X10(3)/MCL (ref 0–0.04)
IMM GRANULOCYTES NFR BLD AUTO: 4.6 %
LYMPHOCYTES # BLD AUTO: 0.49 X10(3)/MCL (ref 0.6–4.6)
LYMPHOCYTES NFR BLD AUTO: 75.4 %
MCH RBC QN AUTO: 23.6 PG
MCHC RBC AUTO-ENTMCNC: 28.5 G/DL (ref 33–36)
MCV RBC AUTO: 82.7 FL (ref 80–94)
MONOCYTES # BLD AUTO: 0.06 X10(3)/MCL (ref 0.1–1.3)
MONOCYTES NFR BLD AUTO: 9.2 %
NEUTROPHILS # BLD AUTO: 0.06 X10(3)/MCL (ref 2.1–9.2)
NEUTROPHILS NFR BLD AUTO: 9.3 %
PLATELET # BLD AUTO: 48 X10(3)/MCL (ref 130–400)
PMV BLD AUTO: ABNORMAL FL
POTASSIUM SERPL-SCNC: 4 MMOL/L (ref 3.5–5.1)
PROT SERPL-MCNC: 6.3 GM/DL (ref 5.8–7.6)
RBC # BLD AUTO: 3.01 X10(6)/MCL (ref 4.7–6.1)
SODIUM SERPL-SCNC: 142 MMOL/L (ref 136–145)
WBC # SPEC AUTO: 0.7 X10(3)/MCL (ref 4.5–11.5)

## 2023-03-20 PROCEDURE — 36415 COLL VENOUS BLD VENIPUNCTURE: CPT

## 2023-03-20 PROCEDURE — 80053 COMPREHEN METABOLIC PANEL: CPT

## 2023-03-20 PROCEDURE — 85025 COMPLETE CBC W/AUTO DIFF WBC: CPT

## 2023-03-23 ENCOUNTER — OFFICE VISIT (OUTPATIENT)
Dept: HEMATOLOGY/ONCOLOGY | Facility: CLINIC | Age: 73
End: 2023-03-23
Payer: MEDICARE

## 2023-03-23 ENCOUNTER — INFUSION (OUTPATIENT)
Dept: INFUSION THERAPY | Facility: HOSPITAL | Age: 73
End: 2023-03-23
Attending: INTERNAL MEDICINE
Payer: MEDICARE

## 2023-03-23 VITALS
RESPIRATION RATE: 18 BRPM | SYSTOLIC BLOOD PRESSURE: 119 MMHG | WEIGHT: 183.19 LBS | HEIGHT: 73 IN | OXYGEN SATURATION: 98 % | DIASTOLIC BLOOD PRESSURE: 70 MMHG | BODY MASS INDEX: 24.28 KG/M2 | HEART RATE: 77 BPM | TEMPERATURE: 98 F

## 2023-03-23 VITALS
HEART RATE: 57 BPM | DIASTOLIC BLOOD PRESSURE: 73 MMHG | SYSTOLIC BLOOD PRESSURE: 143 MMHG | RESPIRATION RATE: 16 BRPM | OXYGEN SATURATION: 99 % | TEMPERATURE: 98 F

## 2023-03-23 DIAGNOSIS — R23.3 PETECHIAE: ICD-10-CM

## 2023-03-23 DIAGNOSIS — C92.00 ACUTE MYELOID LEUKEMIA NOT HAVING ACHIEVED REMISSION: Primary | ICD-10-CM

## 2023-03-23 DIAGNOSIS — C92.00 ACUTE MYELOID LEUKEMIA NOT HAVING ACHIEVED REMISSION: ICD-10-CM

## 2023-03-23 DIAGNOSIS — D61.818 PANCYTOPENIA: ICD-10-CM

## 2023-03-23 DIAGNOSIS — D69.6 THROMBOCYTOPENIA: ICD-10-CM

## 2023-03-23 LAB
ABO + RH BLD: NORMAL
BLD PROD TYP BPU: NORMAL
BLOOD UNIT EXPIRATION DATE: NORMAL
BLOOD UNIT TYPE CODE: 9500
CROSSMATCH INTERPRETATION: NORMAL
DISPENSE STATUS: NORMAL
UNIT NUMBER: NORMAL

## 2023-03-23 PROCEDURE — 36430 TRANSFUSION BLD/BLD COMPNT: CPT

## 2023-03-23 PROCEDURE — P9037 PLATE PHERES LEUKOREDU IRRAD: HCPCS | Performed by: NURSE PRACTITIONER

## 2023-03-23 PROCEDURE — 99999 PR PBB SHADOW E&M-EST. PATIENT-LVL V: ICD-10-PCS | Mod: PBBFAC,,, | Performed by: NURSE PRACTITIONER

## 2023-03-23 PROCEDURE — 99999 PR PBB SHADOW E&M-EST. PATIENT-LVL V: CPT | Mod: PBBFAC,,, | Performed by: NURSE PRACTITIONER

## 2023-03-23 PROCEDURE — 99215 OFFICE O/P EST HI 40 MIN: CPT | Mod: S$PBB,,, | Performed by: NURSE PRACTITIONER

## 2023-03-23 PROCEDURE — 99215 OFFICE O/P EST HI 40 MIN: CPT | Mod: PBBFAC | Performed by: NURSE PRACTITIONER

## 2023-03-23 PROCEDURE — 25000003 PHARM REV CODE 250: Performed by: NURSE PRACTITIONER

## 2023-03-23 PROCEDURE — 99215 PR OFFICE/OUTPT VISIT, EST, LEVL V, 40-54 MIN: ICD-10-PCS | Mod: S$PBB,,, | Performed by: NURSE PRACTITIONER

## 2023-03-23 RX ORDER — DIPHENHYDRAMINE HCL 25 MG
25 CAPSULE ORAL ONCE
Status: COMPLETED | OUTPATIENT
Start: 2023-03-23 | End: 2023-03-23

## 2023-03-23 RX ORDER — HYDROCODONE BITARTRATE AND ACETAMINOPHEN 500; 5 MG/1; MG/1
TABLET ORAL ONCE
Status: COMPLETED | OUTPATIENT
Start: 2023-03-23 | End: 2023-03-23

## 2023-03-23 RX ORDER — DIPHENHYDRAMINE HCL 25 MG
25 CAPSULE ORAL ONCE
Status: CANCELLED | OUTPATIENT
Start: 2023-03-23 | End: 2023-03-23

## 2023-03-23 RX ORDER — HYDROCODONE BITARTRATE AND ACETAMINOPHEN 500; 5 MG/1; MG/1
TABLET ORAL ONCE
Status: CANCELLED | OUTPATIENT
Start: 2023-03-23 | End: 2023-03-23

## 2023-03-23 RX ADMIN — DIPHENHYDRAMINE HYDROCHLORIDE 25 MG: 25 CAPSULE ORAL at 10:03

## 2023-03-23 RX ADMIN — SODIUM CHLORIDE: 9 INJECTION, SOLUTION INTRAVENOUS at 09:03

## 2023-03-23 NOTE — PLAN OF CARE
1 unit Platelets given. Tolerated well. Next appts confirmed with pt. Dc'd home in stable condition.

## 2023-03-23 NOTE — PROGRESS NOTES
Subjective:       Patient ID: Hussein Gonzalez Jr. is a 72 y.o. male.    Chief Complaint: Follow-up (Pt stated he has no concerns today.)      Diagnosis:  AML with 27% blasts, background dysplasia, normal cytogenetics and normal FISH    Current Treatment:   Azacitidine and venetoclax to start on 02/27/2023.  Referral for possible stem cell transplant evaluation.    Treatment History:  N/A    HPI:  72-year-old male with a history of CVA, hypertension, BPH, and melanoma who presented to the emergency department following a syncopal episode.  In February of 2022, the patient did have a slightly low white blood cell count, hemoglobin, and platelet count.  Per the patient, 3 months prior to admission, he was having dizziness, fatigue, weakness, shortness of breath, back pain, and leg pain.  The dizziness was worse with standing and walking.  On 01/20/2023, the patient had a syncopal episode.  He was brought to the emergency department and was found to be pancytopenic.  He also had stool that was positive for occult blood in the emergency department.  He received blood transfusion.  His CBC on 01/20/2023 revealed a white blood cell count of 2.6, hemoglobin of 7.7, and platelet count of 47,000. Reticulocytes were appropriately elevated, B12 and folate were within normal limits, iron level was slightly decreased.  Repeat blood work on 01/21/2023 revealed a white blood cell count of 2.8, post transfusion hemoglobin of 9.6, and a platelet count of 07232.  Hematology was consulted for pancytopenia.  EGD and colonoscopy done on 01/24/2023, EGD revealed nonbleeding ulcers, colonoscopy unremarkable, however had to be aborted due to tortuous colon.  Bone Marrow Biopsy done on 01/25/2023.  Preliminary reveals AML with 27% blasts in a background of dysplasia.  Cytogenetics are within normal limits, FISH is within normal limits.  NGS IS PENDING.    Interval History:   Patient presents for hospital follow up.  Overall he is doing  well.  He does report fatigue and leg weakness. Otherwise, no major issues.  He denies any bleeding or bruising.  He denies any fevers.    Past Medical History:   Diagnosis Date    Anxiety and depression     BPH (benign prostatic hyperplasia)     CVA (cerebrovascular accident)     HTN (hypertension)     Malignant melanoma of skin, unspecified     NSTEMI (non-ST elevated myocardial infarction)     Sepsis, Gram negative     Sleep apnea       Past Surgical History:   Procedure Laterality Date    BONE MARROW BIOPSY N/A 1/25/2023    Procedure: Biopsy-bone marrow;  Surgeon: Rd Carlos MD;  Location: [facility] OR;  Service: General;  Laterality: N/A;    COLONOSCOPY      COLONOSCOPY N/A 1/24/2023    Procedure: COLONOSCOPY (DOUBLE);  Surgeon: Jimy Baker MD;  Location: [facility] ENDOSCOPY;  Service: Gastroenterology;  Laterality: N/A;    EGD, WITH CLOSED BIOPSY N/A 1/24/2023    Procedure: EGD, WITH CLOSED BIOPSY;  Surgeon: Jimy Baker MD;  Location: [facility] ENDOSCOPY;  Service: Gastroenterology;  Laterality: N/A;    EXCISION OF MELANOMA      SINUS SURGERY      TONSILLECTOMY       Social History     Socioeconomic History    Marital status: Significant Other   Tobacco Use    Smoking status: Never    Smokeless tobacco: Never   Substance and Sexual Activity    Alcohol use: Yes     Alcohol/week: 7.0 standard drinks     Types: 7 Cans of beer per week    Drug use: Never    Sexual activity: Yes      Family History   Problem Relation Age of Onset    Cancer Mother     Cancer Father       Review of patient's allergies indicates:   Allergen Reactions    Acetaminophen       Review of Systems   Constitutional:  Positive for fatigue. Negative for chills, diaphoresis, fever and unexpected weight change.   HENT:  Negative for nasal congestion, mouth sores, sinus pressure/congestion and sore throat.    Eyes:  Negative for pain and visual disturbance.   Respiratory:  Negative for cough, chest tightness and shortness of breath.     Cardiovascular:  Negative for chest pain, palpitations and leg swelling.   Gastrointestinal:  Negative for abdominal distention, abdominal pain, blood in stool, constipation and diarrhea.   Genitourinary:  Negative for dysuria, frequency and hematuria.   Musculoskeletal:  Negative for arthralgias and back pain.   Integumentary:  Negative for rash.   Neurological:  Negative for dizziness, syncope, weakness, numbness and headaches.   Hematological:  Negative for adenopathy.   Psychiatric/Behavioral:  Negative for confusion.        Objective:      Physical Exam  Vitals reviewed.   Constitutional:       General: He is awake.      Appearance: Normal appearance.   HENT:      Head: Normocephalic and atraumatic.      Right Ear: Hearing normal.      Left Ear: Hearing normal.      Nose: Nose normal.   Eyes:      General: Lids are normal. Vision grossly intact.      Extraocular Movements: Extraocular movements intact.      Conjunctiva/sclera: Conjunctivae normal.   Cardiovascular:      Rate and Rhythm: Normal rate and regular rhythm.      Pulses: Normal pulses.      Heart sounds: Normal heart sounds.   Pulmonary:      Effort: Pulmonary effort is normal.      Breath sounds: Normal breath sounds. No wheezing, rhonchi or rales.   Abdominal:      General: Bowel sounds are normal.      Palpations: Abdomen is soft.      Tenderness: There is no abdominal tenderness.   Musculoskeletal:      Cervical back: Full passive range of motion without pain.      Right lower leg: No edema.      Left lower leg: No edema.   Lymphadenopathy:      Cervical: No cervical adenopathy.      Upper Body:      Right upper body: No supraclavicular or axillary adenopathy.      Left upper body: No supraclavicular or axillary adenopathy.   Skin:     General: Skin is warm.      Comments: Lower leg petechiae     Neurological:      General: No focal deficit present.      Mental Status: He is alert and oriented to person, place, and time.   Psychiatric:          Attention and Perception: Attention normal.         Mood and Affect: Mood and affect normal.         Behavior: Behavior is cooperative.       LABS AND IMAGING REVIEWED IN EPIC          Assessment:   AML with 27% blasts, background dysplasia, normal cytogenetics and normal FISH        Plan:       Bone marrow biopsy on 01/23/2023 preliminary report suggests AML with 27% blasts, background dysplasia, normal FISH, normal cytogenetics, NGS pending.      Due to the patient's age, we will go ahead and get him started on treatment with azacitidine and venetoclax, Started on 02/27/2023. Proceed with cycle 2 next week.    Give 1 unit of platelets today    Evaluated by Dr. Valdez in [place] for stem cell transplant evaluation    Continue prophylaxis with 500 mg of levofloxacin daily, 200 mg of acyclovir b.i.d., 200 mg of fluconazole daily.      Return to clinic prior to cycle 3      MATTY Hays

## 2023-03-24 DIAGNOSIS — C92.00 ACUTE MYELOID LEUKEMIA NOT HAVING ACHIEVED REMISSION: Primary | ICD-10-CM

## 2023-03-27 ENCOUNTER — LAB VISIT (OUTPATIENT)
Dept: LAB | Facility: HOSPITAL | Age: 73
End: 2023-03-27
Attending: INTERNAL MEDICINE
Payer: MEDICARE

## 2023-03-27 ENCOUNTER — INFUSION (OUTPATIENT)
Dept: INFUSION THERAPY | Facility: HOSPITAL | Age: 73
End: 2023-03-27
Attending: INTERNAL MEDICINE
Payer: MEDICARE

## 2023-03-27 VITALS
TEMPERATURE: 98 F | RESPIRATION RATE: 16 BRPM | SYSTOLIC BLOOD PRESSURE: 118 MMHG | DIASTOLIC BLOOD PRESSURE: 74 MMHG | HEART RATE: 72 BPM

## 2023-03-27 DIAGNOSIS — C92.00 ACUTE MYELOID LEUKEMIA NOT HAVING ACHIEVED REMISSION: Primary | ICD-10-CM

## 2023-03-27 DIAGNOSIS — D69.6 THROMBOCYTOPENIA: ICD-10-CM

## 2023-03-27 DIAGNOSIS — C92.00 ACUTE MYELOID LEUKEMIA NOT HAVING ACHIEVED REMISSION: ICD-10-CM

## 2023-03-27 DIAGNOSIS — F41.9 ANXIETY AND DEPRESSION: ICD-10-CM

## 2023-03-27 DIAGNOSIS — F32.A ANXIETY AND DEPRESSION: ICD-10-CM

## 2023-03-27 LAB
BASOPHILS # BLD AUTO: 0 X10(3)/MCL (ref 0–0.2)
BASOPHILS NFR BLD AUTO: 0 %
EOSINOPHIL # BLD AUTO: 0 X10(3)/MCL (ref 0–0.9)
EOSINOPHIL NFR BLD AUTO: 0 %
ERYTHROCYTE [DISTWIDTH] IN BLOOD BY AUTOMATED COUNT: 18.3 % (ref 11.5–17)
GROUP & RH: NORMAL
HCT VFR BLD AUTO: 25.4 % (ref 42–52)
HGB BLD-MCNC: 7.3 G/DL (ref 14–18)
IMM GRANULOCYTES # BLD AUTO: 0 X10(3)/MCL (ref 0–0.04)
IMM GRANULOCYTES NFR BLD AUTO: 0 %
INDIRECT COOMBS GEL: NORMAL
LYMPHOCYTES # BLD AUTO: 0.48 X10(3)/MCL (ref 0.6–4.6)
LYMPHOCYTES NFR BLD AUTO: 96 %
MCH RBC QN AUTO: 23.7 PG (ref 27–31)
MCHC RBC AUTO-ENTMCNC: 28.7 G/DL (ref 33–36)
MCV RBC AUTO: 82.5 FL (ref 80–94)
MONOCYTES # BLD AUTO: 0.02 X10(3)/MCL (ref 0.1–1.3)
MONOCYTES NFR BLD AUTO: 4 %
NEUTROPHILS # BLD AUTO: 0 X10(3)/MCL (ref 2.1–9.2)
NEUTROPHILS NFR BLD AUTO: 0 %
PLATELET # BLD AUTO: 16 X10(3)/MCL (ref 130–400)
PMV BLD AUTO: ABNORMAL FL
RBC # BLD AUTO: 3.08 X10(6)/MCL (ref 4.7–6.1)
SPECIMEN OUTDATE: NORMAL
WBC # SPEC AUTO: 0.5 X10(3)/MCL (ref 4.5–11.5)

## 2023-03-27 PROCEDURE — 96401 CHEMO ANTI-NEOPL SQ/IM: CPT

## 2023-03-27 PROCEDURE — 86900 BLOOD TYPING SEROLOGIC ABO: CPT | Performed by: NURSE PRACTITIONER

## 2023-03-27 PROCEDURE — 36415 COLL VENOUS BLD VENIPUNCTURE: CPT

## 2023-03-27 PROCEDURE — 85025 COMPLETE CBC W/AUTO DIFF WBC: CPT

## 2023-03-27 PROCEDURE — 63600175 PHARM REV CODE 636 W HCPCS: Mod: JG | Performed by: INTERNAL MEDICINE

## 2023-03-27 PROCEDURE — 25000003 PHARM REV CODE 250: Performed by: INTERNAL MEDICINE

## 2023-03-27 RX ORDER — AZACITIDINE 100 MG/1
75 INJECTION, POWDER, LYOPHILIZED, FOR SOLUTION INTRAVENOUS; SUBCUTANEOUS
Status: CANCELLED | OUTPATIENT
Start: 2023-04-04

## 2023-03-27 RX ORDER — CITALOPRAM 40 MG/1
40 TABLET, FILM COATED ORAL DAILY
Qty: 90 TABLET | Refills: 1 | Status: SHIPPED | OUTPATIENT
Start: 2023-03-27 | End: 2023-10-23

## 2023-03-27 RX ORDER — ONDANSETRON 4 MG/1
16 TABLET, ORALLY DISINTEGRATING ORAL
Status: CANCELLED
Start: 2023-03-27

## 2023-03-27 RX ORDER — AZACITIDINE 100 MG/1
75 INJECTION, POWDER, LYOPHILIZED, FOR SOLUTION INTRAVENOUS; SUBCUTANEOUS
Status: CANCELLED | OUTPATIENT
Start: 2023-03-27

## 2023-03-27 RX ORDER — AZACITIDINE 100 MG/1
75 INJECTION, POWDER, LYOPHILIZED, FOR SOLUTION INTRAVENOUS; SUBCUTANEOUS
Status: CANCELLED | OUTPATIENT
Start: 2023-03-30

## 2023-03-27 RX ORDER — AZACITIDINE 100 MG/1
75 INJECTION, POWDER, LYOPHILIZED, FOR SOLUTION INTRAVENOUS; SUBCUTANEOUS
Status: COMPLETED | OUTPATIENT
Start: 2023-03-27 | End: 2023-03-27

## 2023-03-27 RX ORDER — ONDANSETRON 4 MG/1
16 TABLET, ORALLY DISINTEGRATING ORAL
Status: CANCELLED
Start: 2023-04-04

## 2023-03-27 RX ORDER — AZACITIDINE 100 MG/1
75 INJECTION, POWDER, LYOPHILIZED, FOR SOLUTION INTRAVENOUS; SUBCUTANEOUS
Status: CANCELLED | OUTPATIENT
Start: 2023-03-29

## 2023-03-27 RX ORDER — ONDANSETRON 4 MG/1
16 TABLET, ORALLY DISINTEGRATING ORAL
Status: CANCELLED
Start: 2023-03-28

## 2023-03-27 RX ORDER — AZACITIDINE 100 MG/1
75 INJECTION, POWDER, LYOPHILIZED, FOR SOLUTION INTRAVENOUS; SUBCUTANEOUS
Status: CANCELLED | OUTPATIENT
Start: 2023-03-28

## 2023-03-27 RX ORDER — ONDANSETRON 4 MG/1
16 TABLET, ORALLY DISINTEGRATING ORAL
Status: CANCELLED
Start: 2023-03-29

## 2023-03-27 RX ORDER — AZACITIDINE 100 MG/1
75 INJECTION, POWDER, LYOPHILIZED, FOR SOLUTION INTRAVENOUS; SUBCUTANEOUS
Status: CANCELLED | OUTPATIENT
Start: 2023-03-31

## 2023-03-27 RX ORDER — ONDANSETRON 4 MG/1
16 TABLET, ORALLY DISINTEGRATING ORAL
Status: CANCELLED
Start: 2023-03-30

## 2023-03-27 RX ORDER — ONDANSETRON 4 MG/1
16 TABLET, ORALLY DISINTEGRATING ORAL
Status: COMPLETED | OUTPATIENT
Start: 2023-03-27 | End: 2023-03-27

## 2023-03-27 RX ORDER — ONDANSETRON 4 MG/1
16 TABLET, ORALLY DISINTEGRATING ORAL
Status: CANCELLED
Start: 2023-03-31

## 2023-03-27 RX ORDER — AZACITIDINE 100 MG/1
75 INJECTION, POWDER, LYOPHILIZED, FOR SOLUTION INTRAVENOUS; SUBCUTANEOUS
Status: CANCELLED | OUTPATIENT
Start: 2023-04-03

## 2023-03-27 RX ORDER — ONDANSETRON 4 MG/1
16 TABLET, ORALLY DISINTEGRATING ORAL
Status: CANCELLED
Start: 2023-04-03

## 2023-03-27 RX ADMIN — AZACITIDINE 155 MG: 100 INJECTION, POWDER, LYOPHILIZED, FOR SOLUTION INTRAVENOUS; SUBCUTANEOUS at 11:03

## 2023-03-27 RX ADMIN — ONDANSETRON 16 MG: 4 TABLET, ORALLY DISINTEGRATING ORAL at 11:03

## 2023-03-28 ENCOUNTER — INFUSION (OUTPATIENT)
Dept: INFUSION THERAPY | Facility: HOSPITAL | Age: 73
End: 2023-03-28
Attending: INTERNAL MEDICINE
Payer: MEDICARE

## 2023-03-28 ENCOUNTER — TELEPHONE (OUTPATIENT)
Dept: HEMATOLOGY/ONCOLOGY | Facility: CLINIC | Age: 73
End: 2023-03-28
Payer: MEDICARE

## 2023-03-28 VITALS
SYSTOLIC BLOOD PRESSURE: 116 MMHG | HEART RATE: 64 BPM | DIASTOLIC BLOOD PRESSURE: 63 MMHG | TEMPERATURE: 98 F | RESPIRATION RATE: 18 BRPM

## 2023-03-28 DIAGNOSIS — C92.00 ACUTE MYELOID LEUKEMIA NOT HAVING ACHIEVED REMISSION: Primary | ICD-10-CM

## 2023-03-28 PROCEDURE — 25000003 PHARM REV CODE 250: Performed by: INTERNAL MEDICINE

## 2023-03-28 PROCEDURE — 63600175 PHARM REV CODE 636 W HCPCS: Mod: JG | Performed by: INTERNAL MEDICINE

## 2023-03-28 PROCEDURE — 96401 CHEMO ANTI-NEOPL SQ/IM: CPT

## 2023-03-28 RX ORDER — ONDANSETRON 4 MG/1
16 TABLET, ORALLY DISINTEGRATING ORAL
Status: COMPLETED | OUTPATIENT
Start: 2023-03-28 | End: 2023-03-28

## 2023-03-28 RX ORDER — AZACITIDINE 100 MG/1
75 INJECTION, POWDER, LYOPHILIZED, FOR SOLUTION INTRAVENOUS; SUBCUTANEOUS
Status: COMPLETED | OUTPATIENT
Start: 2023-03-28 | End: 2023-03-28

## 2023-03-28 RX ADMIN — ONDANSETRON 16 MG: 4 TABLET, ORALLY DISINTEGRATING ORAL at 11:03

## 2023-03-28 RX ADMIN — AZACITIDINE 155 MG: 100 INJECTION, POWDER, LYOPHILIZED, FOR SOLUTION INTRAVENOUS; SUBCUTANEOUS at 11:03

## 2023-03-28 NOTE — TELEPHONE ENCOUNTER
Incoming call from patient stating he started his Venclexta late and wanted to know if he still needed to take 7 days off. Per Dr. Luna, he does not need to take 7 days off for this cycle and he can continue taking medication. He will start his off week on 4/17. Patient verbalized understanding

## 2023-03-29 ENCOUNTER — INFUSION (OUTPATIENT)
Dept: INFUSION THERAPY | Facility: HOSPITAL | Age: 73
End: 2023-03-29
Attending: INTERNAL MEDICINE
Payer: MEDICARE

## 2023-03-29 VITALS
HEART RATE: 72 BPM | WEIGHT: 183 LBS | HEIGHT: 73 IN | BODY MASS INDEX: 24.25 KG/M2 | DIASTOLIC BLOOD PRESSURE: 62 MMHG | SYSTOLIC BLOOD PRESSURE: 102 MMHG | OXYGEN SATURATION: 100 %

## 2023-03-29 DIAGNOSIS — R53.83 FATIGUE, UNSPECIFIED TYPE: ICD-10-CM

## 2023-03-29 DIAGNOSIS — I10 PRIMARY HYPERTENSION: ICD-10-CM

## 2023-03-29 DIAGNOSIS — C92.00 ACUTE MYELOID LEUKEMIA NOT HAVING ACHIEVED REMISSION: Primary | ICD-10-CM

## 2023-03-29 DIAGNOSIS — Z00.00 WELLNESS EXAMINATION: ICD-10-CM

## 2023-03-29 DIAGNOSIS — E55.9 VITAMIN D DEFICIENCY: Primary | ICD-10-CM

## 2023-03-29 PROCEDURE — 63600175 PHARM REV CODE 636 W HCPCS: Mod: JG | Performed by: INTERNAL MEDICINE

## 2023-03-29 PROCEDURE — 25000003 PHARM REV CODE 250: Performed by: INTERNAL MEDICINE

## 2023-03-29 PROCEDURE — 96401 CHEMO ANTI-NEOPL SQ/IM: CPT

## 2023-03-29 RX ORDER — AZACITIDINE 100 MG/1
75 INJECTION, POWDER, LYOPHILIZED, FOR SOLUTION INTRAVENOUS; SUBCUTANEOUS
Status: COMPLETED | OUTPATIENT
Start: 2023-03-29 | End: 2023-03-29

## 2023-03-29 RX ORDER — ONDANSETRON 4 MG/1
16 TABLET, ORALLY DISINTEGRATING ORAL
Status: COMPLETED | OUTPATIENT
Start: 2023-03-29 | End: 2023-03-29

## 2023-03-29 RX ADMIN — AZACITIDINE 155 MG: 100 INJECTION, POWDER, LYOPHILIZED, FOR SOLUTION INTRAVENOUS; SUBCUTANEOUS at 10:03

## 2023-03-29 RX ADMIN — ONDANSETRON 16 MG: 4 TABLET, ORALLY DISINTEGRATING ORAL at 09:03

## 2023-03-30 ENCOUNTER — INFUSION (OUTPATIENT)
Dept: INFUSION THERAPY | Facility: HOSPITAL | Age: 73
End: 2023-03-30
Attending: INTERNAL MEDICINE
Payer: MEDICARE

## 2023-03-30 ENCOUNTER — LAB VISIT (OUTPATIENT)
Dept: LAB | Facility: HOSPITAL | Age: 73
End: 2023-03-30
Attending: INTERNAL MEDICINE
Payer: MEDICARE

## 2023-03-30 VITALS
OXYGEN SATURATION: 99 % | TEMPERATURE: 98 F | DIASTOLIC BLOOD PRESSURE: 67 MMHG | SYSTOLIC BLOOD PRESSURE: 109 MMHG | RESPIRATION RATE: 18 BRPM | HEART RATE: 75 BPM

## 2023-03-30 DIAGNOSIS — C92.00 ACUTE MYELOID LEUKEMIA NOT HAVING ACHIEVED REMISSION: Primary | ICD-10-CM

## 2023-03-30 DIAGNOSIS — C92.00 ACUTE MYELOID LEUKEMIA NOT HAVING ACHIEVED REMISSION: ICD-10-CM

## 2023-03-30 LAB
ALBUMIN SERPL-MCNC: 3.6 G/DL (ref 3.4–4.8)
ALBUMIN/GLOB SERPL: 1.2 RATIO (ref 1.1–2)
ALP SERPL-CCNC: 100 UNIT/L (ref 40–150)
ALT SERPL-CCNC: 10 UNIT/L (ref 0–55)
AST SERPL-CCNC: 12 UNIT/L (ref 5–34)
BASOPHILS # BLD AUTO: 0 X10(3)/MCL (ref 0–0.2)
BASOPHILS NFR BLD AUTO: 0 %
BILIRUBIN DIRECT+TOT PNL SERPL-MCNC: 1 MG/DL
BUN SERPL-MCNC: 19.2 MG/DL (ref 8.4–25.7)
CALCIUM SERPL-MCNC: 8.7 MG/DL (ref 8.8–10)
CHLORIDE SERPL-SCNC: 104 MMOL/L (ref 98–107)
CO2 SERPL-SCNC: 31 MMOL/L (ref 23–31)
CREAT SERPL-MCNC: 1.3 MG/DL (ref 0.73–1.18)
EOSINOPHIL # BLD AUTO: 0 X10(3)/MCL (ref 0–0.9)
EOSINOPHIL NFR BLD AUTO: 0 %
ERYTHROCYTE [DISTWIDTH] IN BLOOD BY AUTOMATED COUNT: 18.9 % (ref 11.5–17)
GFR SERPLBLD CREATININE-BSD FMLA CKD-EPI: 58 MLS/MIN/1.73/M2
GLOBULIN SER-MCNC: 2.9 GM/DL (ref 2.4–3.5)
GLUCOSE SERPL-MCNC: 126 MG/DL (ref 82–115)
HCT VFR BLD AUTO: 26.5 % (ref 42–52)
HGB BLD-MCNC: 7.5 G/DL (ref 14–18)
IMM GRANULOCYTES # BLD AUTO: 0 X10(3)/MCL (ref 0–0.04)
IMM GRANULOCYTES NFR BLD AUTO: 0 %
LYMPHOCYTES # BLD AUTO: 0.4 X10(3)/MCL (ref 0.6–4.6)
LYMPHOCYTES NFR BLD AUTO: 88.9 %
MCH RBC QN AUTO: 23.6 PG (ref 27–31)
MCHC RBC AUTO-ENTMCNC: 28.3 G/DL (ref 33–36)
MCV RBC AUTO: 83.3 FL (ref 80–94)
MONOCYTES # BLD AUTO: 0.03 X10(3)/MCL (ref 0.1–1.3)
MONOCYTES NFR BLD AUTO: 6.7 %
NEUTROPHILS # BLD AUTO: 0.02 X10(3)/MCL (ref 2.1–9.2)
NEUTROPHILS NFR BLD AUTO: 4.4 %
PLATELET # BLD AUTO: 16 X10(3)/MCL (ref 130–400)
PMV BLD AUTO: ABNORMAL FL
POTASSIUM SERPL-SCNC: 3.8 MMOL/L (ref 3.5–5.1)
PROT SERPL-MCNC: 6.5 GM/DL (ref 5.8–7.6)
RBC # BLD AUTO: 3.18 X10(6)/MCL (ref 4.7–6.1)
SODIUM SERPL-SCNC: 138 MMOL/L (ref 136–145)
WBC # SPEC AUTO: 0.5 X10(3)/MCL (ref 4.5–11.5)

## 2023-03-30 PROCEDURE — 85025 COMPLETE CBC W/AUTO DIFF WBC: CPT

## 2023-03-30 PROCEDURE — 80053 COMPREHEN METABOLIC PANEL: CPT

## 2023-03-30 PROCEDURE — 36415 COLL VENOUS BLD VENIPUNCTURE: CPT

## 2023-03-30 PROCEDURE — 96401 CHEMO ANTI-NEOPL SQ/IM: CPT

## 2023-03-30 PROCEDURE — 25000003 PHARM REV CODE 250: Performed by: INTERNAL MEDICINE

## 2023-03-30 PROCEDURE — 63600175 PHARM REV CODE 636 W HCPCS: Mod: JG | Performed by: INTERNAL MEDICINE

## 2023-03-30 RX ORDER — ONDANSETRON 4 MG/1
16 TABLET, ORALLY DISINTEGRATING ORAL
Status: COMPLETED | OUTPATIENT
Start: 2023-03-30 | End: 2023-03-30

## 2023-03-30 RX ORDER — AZACITIDINE 100 MG/1
75 INJECTION, POWDER, LYOPHILIZED, FOR SOLUTION INTRAVENOUS; SUBCUTANEOUS
Status: COMPLETED | OUTPATIENT
Start: 2023-03-30 | End: 2023-03-30

## 2023-03-30 RX ADMIN — AZACITIDINE 155 MG: 100 INJECTION, POWDER, LYOPHILIZED, FOR SOLUTION INTRAVENOUS; SUBCUTANEOUS at 01:03

## 2023-03-30 RX ADMIN — ONDANSETRON 16 MG: 4 TABLET, ORALLY DISINTEGRATING ORAL at 01:03

## 2023-03-30 NOTE — NURSING
Pt states he has no bleeding and feels good. Cbc will be repeated tomorrow per Jenna Bobby NP to recheck plts. Plts 16k today, same as Monday. No petechiae noted on left leg and a few on right leg.

## 2023-03-31 ENCOUNTER — INFUSION (OUTPATIENT)
Dept: INFUSION THERAPY | Facility: HOSPITAL | Age: 73
End: 2023-03-31
Attending: INTERNAL MEDICINE
Payer: MEDICARE

## 2023-03-31 ENCOUNTER — LAB VISIT (OUTPATIENT)
Dept: LAB | Facility: HOSPITAL | Age: 73
End: 2023-03-31
Attending: INTERNAL MEDICINE
Payer: MEDICARE

## 2023-03-31 VITALS
RESPIRATION RATE: 18 BRPM | SYSTOLIC BLOOD PRESSURE: 135 MMHG | TEMPERATURE: 98 F | HEART RATE: 61 BPM | DIASTOLIC BLOOD PRESSURE: 68 MMHG

## 2023-03-31 DIAGNOSIS — D61.818 PANCYTOPENIA: Primary | ICD-10-CM

## 2023-03-31 DIAGNOSIS — C92.00 ACUTE MYELOID LEUKEMIA NOT HAVING ACHIEVED REMISSION: Primary | ICD-10-CM

## 2023-03-31 DIAGNOSIS — D61.818 PANCYTOPENIA: ICD-10-CM

## 2023-03-31 DIAGNOSIS — C92.00 ACUTE MYELOID LEUKEMIA NOT HAVING ACHIEVED REMISSION: ICD-10-CM

## 2023-03-31 LAB
ABO + RH BLD: NORMAL
BASOPHILS # BLD AUTO: 0 X10(3)/MCL (ref 0–0.2)
BASOPHILS NFR BLD AUTO: 0 %
BLD PROD TYP BPU: NORMAL
BLOOD UNIT EXPIRATION DATE: NORMAL
BLOOD UNIT TYPE CODE: 6200
CROSSMATCH INTERPRETATION: NORMAL
DISPENSE STATUS: NORMAL
EOSINOPHIL # BLD AUTO: 0 X10(3)/MCL (ref 0–0.9)
EOSINOPHIL NFR BLD AUTO: 0 %
ERYTHROCYTE [DISTWIDTH] IN BLOOD BY AUTOMATED COUNT: 18.5 % (ref 11.5–17)
HCT VFR BLD AUTO: 25.2 % (ref 42–52)
HGB BLD-MCNC: 7.3 G/DL (ref 14–18)
IMM GRANULOCYTES # BLD AUTO: 0 X10(3)/MCL (ref 0–0.04)
IMM GRANULOCYTES NFR BLD AUTO: 0 %
LYMPHOCYTES # BLD AUTO: 0.43 X10(3)/MCL (ref 0.6–4.6)
LYMPHOCYTES NFR BLD AUTO: 86 %
MCH RBC QN AUTO: 24.2 PG (ref 27–31)
MCHC RBC AUTO-ENTMCNC: 29 G/DL (ref 33–36)
MCV RBC AUTO: 83.4 FL (ref 80–94)
MONOCYTES # BLD AUTO: 0.06 X10(3)/MCL (ref 0.1–1.3)
MONOCYTES NFR BLD AUTO: 12 %
NEUTROPHILS # BLD AUTO: 0.01 X10(3)/MCL (ref 2.1–9.2)
NEUTROPHILS NFR BLD AUTO: 2 %
PLATELET # BLD AUTO: 11 X10(3)/MCL (ref 130–400)
PMV BLD AUTO: ABNORMAL FL
RBC # BLD AUTO: 3.02 X10(6)/MCL (ref 4.7–6.1)
UNIT NUMBER: NORMAL
WBC # SPEC AUTO: 0.5 X10(3)/MCL (ref 4.5–11.5)

## 2023-03-31 PROCEDURE — 85025 COMPLETE CBC W/AUTO DIFF WBC: CPT

## 2023-03-31 PROCEDURE — 36430 TRANSFUSION BLD/BLD COMPNT: CPT

## 2023-03-31 PROCEDURE — P9037 PLATE PHERES LEUKOREDU IRRAD: HCPCS | Performed by: NURSE PRACTITIONER

## 2023-03-31 PROCEDURE — 96401 CHEMO ANTI-NEOPL SQ/IM: CPT

## 2023-03-31 PROCEDURE — 36415 COLL VENOUS BLD VENIPUNCTURE: CPT

## 2023-03-31 PROCEDURE — 25000003 PHARM REV CODE 250: Performed by: INTERNAL MEDICINE

## 2023-03-31 PROCEDURE — 63600175 PHARM REV CODE 636 W HCPCS: Mod: JG | Performed by: INTERNAL MEDICINE

## 2023-03-31 RX ORDER — DIPHENHYDRAMINE HCL 25 MG
25 CAPSULE ORAL ONCE
Status: DISCONTINUED | OUTPATIENT
Start: 2023-03-31 | End: 2023-03-31 | Stop reason: HOSPADM

## 2023-03-31 RX ORDER — HYDROCODONE BITARTRATE AND ACETAMINOPHEN 500; 5 MG/1; MG/1
TABLET ORAL ONCE
Status: DISCONTINUED | OUTPATIENT
Start: 2023-03-31 | End: 2023-03-31 | Stop reason: HOSPADM

## 2023-03-31 RX ORDER — AZACITIDINE 100 MG/1
75 INJECTION, POWDER, LYOPHILIZED, FOR SOLUTION INTRAVENOUS; SUBCUTANEOUS
Status: COMPLETED | OUTPATIENT
Start: 2023-03-31 | End: 2023-03-31

## 2023-03-31 RX ORDER — DIPHENHYDRAMINE HCL 25 MG
25 CAPSULE ORAL ONCE
Status: CANCELLED | OUTPATIENT
Start: 2023-03-31 | End: 2023-03-31

## 2023-03-31 RX ORDER — HYDROCODONE BITARTRATE AND ACETAMINOPHEN 500; 5 MG/1; MG/1
TABLET ORAL ONCE
Status: CANCELLED | OUTPATIENT
Start: 2023-03-31 | End: 2023-03-31

## 2023-03-31 RX ORDER — ONDANSETRON 4 MG/1
16 TABLET, ORALLY DISINTEGRATING ORAL
Status: COMPLETED | OUTPATIENT
Start: 2023-03-31 | End: 2023-03-31

## 2023-03-31 RX ADMIN — AZACITIDINE 155 MG: 100 INJECTION, POWDER, LYOPHILIZED, FOR SOLUTION INTRAVENOUS; SUBCUTANEOUS at 12:03

## 2023-03-31 RX ADMIN — ONDANSETRON 16 MG: 4 TABLET, ORALLY DISINTEGRATING ORAL at 12:03

## 2023-03-31 NOTE — NURSING
Labs reviewed with Jenna Bobby NP.  1 unit sd platelets given as ordered.  C2d5 Vidaza sq given as orered.  Rtc Monday lab/vidaza.

## 2023-04-03 ENCOUNTER — INFUSION (OUTPATIENT)
Dept: INFUSION THERAPY | Facility: HOSPITAL | Age: 73
End: 2023-04-03
Attending: INTERNAL MEDICINE
Payer: MEDICARE

## 2023-04-03 ENCOUNTER — LAB VISIT (OUTPATIENT)
Dept: LAB | Facility: HOSPITAL | Age: 73
End: 2023-04-03
Attending: INTERNAL MEDICINE
Payer: MEDICARE

## 2023-04-03 VITALS
SYSTOLIC BLOOD PRESSURE: 122 MMHG | RESPIRATION RATE: 18 BRPM | TEMPERATURE: 98 F | HEART RATE: 71 BPM | DIASTOLIC BLOOD PRESSURE: 68 MMHG

## 2023-04-03 DIAGNOSIS — C92.00 ACUTE MYELOID LEUKEMIA NOT HAVING ACHIEVED REMISSION: Primary | ICD-10-CM

## 2023-04-03 DIAGNOSIS — C92.00 ACUTE MYELOID LEUKEMIA NOT HAVING ACHIEVED REMISSION: ICD-10-CM

## 2023-04-03 LAB
ALBUMIN SERPL-MCNC: 3.6 G/DL (ref 3.4–4.8)
ALBUMIN/GLOB SERPL: 1.3 RATIO (ref 1.1–2)
ALP SERPL-CCNC: 97 UNIT/L (ref 40–150)
ALT SERPL-CCNC: 8 UNIT/L (ref 0–55)
AST SERPL-CCNC: 13 UNIT/L (ref 5–34)
BASOPHILS # BLD AUTO: 0 X10(3)/MCL (ref 0–0.2)
BASOPHILS NFR BLD AUTO: 0 %
BILIRUBIN DIRECT+TOT PNL SERPL-MCNC: 1.2 MG/DL
BUN SERPL-MCNC: 20.1 MG/DL (ref 8.4–25.7)
CALCIUM SERPL-MCNC: 9 MG/DL (ref 8.8–10)
CHLORIDE SERPL-SCNC: 103 MMOL/L (ref 98–107)
CO2 SERPL-SCNC: 29 MMOL/L (ref 23–31)
CREAT SERPL-MCNC: 1.08 MG/DL (ref 0.73–1.18)
EOSINOPHIL # BLD AUTO: 0 X10(3)/MCL (ref 0–0.9)
EOSINOPHIL NFR BLD AUTO: 0 %
ERYTHROCYTE [DISTWIDTH] IN BLOOD BY AUTOMATED COUNT: 18.4 % (ref 11.5–17)
GFR SERPLBLD CREATININE-BSD FMLA CKD-EPI: >60 MLS/MIN/1.73/M2
GLOBULIN SER-MCNC: 2.8 GM/DL (ref 2.4–3.5)
GLUCOSE SERPL-MCNC: 103 MG/DL (ref 82–115)
GROUP & RH: NORMAL
HCT VFR BLD AUTO: 23.6 % (ref 42–52)
HGB BLD-MCNC: 6.7 G/DL (ref 14–18)
IMM GRANULOCYTES # BLD AUTO: 0 X10(3)/MCL (ref 0–0.04)
IMM GRANULOCYTES NFR BLD AUTO: 0 %
INDIRECT COOMBS GEL: NORMAL
LYMPHOCYTES # BLD AUTO: 0.46 X10(3)/MCL (ref 0.6–4.6)
LYMPHOCYTES NFR BLD AUTO: 85.2 %
MCH RBC QN AUTO: 23.6 PG (ref 27–31)
MCHC RBC AUTO-ENTMCNC: 28.4 G/DL (ref 33–36)
MCV RBC AUTO: 83.1 FL (ref 80–94)
MONOCYTES # BLD AUTO: 0.07 X10(3)/MCL (ref 0.1–1.3)
MONOCYTES NFR BLD AUTO: 13 %
NEUTROPHILS # BLD AUTO: 0.01 X10(3)/MCL (ref 2.1–9.2)
NEUTROPHILS NFR BLD AUTO: 1.8 %
PLATELET # BLD AUTO: 14 X10(3)/MCL (ref 130–400)
PMV BLD AUTO: ABNORMAL FL
POTASSIUM SERPL-SCNC: 3.9 MMOL/L (ref 3.5–5.1)
PROT SERPL-MCNC: 6.4 GM/DL (ref 5.8–7.6)
RBC # BLD AUTO: 2.84 X10(6)/MCL (ref 4.7–6.1)
SODIUM SERPL-SCNC: 141 MMOL/L (ref 136–145)
SPECIMEN OUTDATE: NORMAL
WBC # SPEC AUTO: 0.5 X10(3)/MCL (ref 4.5–11.5)

## 2023-04-03 PROCEDURE — 25000003 PHARM REV CODE 250: Performed by: INTERNAL MEDICINE

## 2023-04-03 PROCEDURE — 80053 COMPREHEN METABOLIC PANEL: CPT

## 2023-04-03 PROCEDURE — 86900 BLOOD TYPING SEROLOGIC ABO: CPT | Performed by: NURSE PRACTITIONER

## 2023-04-03 PROCEDURE — 85025 COMPLETE CBC W/AUTO DIFF WBC: CPT

## 2023-04-03 PROCEDURE — 36415 COLL VENOUS BLD VENIPUNCTURE: CPT

## 2023-04-03 PROCEDURE — 63600175 PHARM REV CODE 636 W HCPCS: Performed by: INTERNAL MEDICINE

## 2023-04-03 PROCEDURE — 86923 COMPATIBILITY TEST ELECTRIC: CPT | Performed by: INTERNAL MEDICINE

## 2023-04-03 PROCEDURE — 96401 CHEMO ANTI-NEOPL SQ/IM: CPT

## 2023-04-03 RX ORDER — HYDROCODONE BITARTRATE AND ACETAMINOPHEN 500; 5 MG/1; MG/1
TABLET ORAL ONCE
Status: CANCELLED | OUTPATIENT
Start: 2023-04-03 | End: 2023-04-03

## 2023-04-03 RX ORDER — DIPHENHYDRAMINE HYDROCHLORIDE 50 MG/ML
25 INJECTION INTRAMUSCULAR; INTRAVENOUS ONCE
Status: CANCELLED | OUTPATIENT
Start: 2023-04-03

## 2023-04-03 RX ORDER — AZACITIDINE 100 MG/1
75 INJECTION, POWDER, LYOPHILIZED, FOR SOLUTION INTRAVENOUS; SUBCUTANEOUS
Status: COMPLETED | OUTPATIENT
Start: 2023-04-03 | End: 2023-04-03

## 2023-04-03 RX ORDER — ONDANSETRON 4 MG/1
16 TABLET, ORALLY DISINTEGRATING ORAL
Status: COMPLETED | OUTPATIENT
Start: 2023-04-03 | End: 2023-04-03

## 2023-04-03 RX ADMIN — AZACITIDINE 155 MG: 100 INJECTION, POWDER, LYOPHILIZED, FOR SOLUTION INTRAVENOUS; SUBCUTANEOUS at 01:04

## 2023-04-03 RX ADMIN — ONDANSETRON 16 MG: 4 TABLET, ORALLY DISINTEGRATING ORAL at 01:04

## 2023-04-03 NOTE — NURSING
Reviewed lab with Dr Luna.  1unit prbc and 1 unit sd plt ordered for tomorrow.  Vidaza c2d8 given as ordered.  Dimitrios well.  Rtc tomorrow am for c2d9 vidaza and transfusion.

## 2023-04-04 ENCOUNTER — TELEPHONE (OUTPATIENT)
Dept: INTERNAL MEDICINE | Facility: CLINIC | Age: 73
End: 2023-04-04
Payer: MEDICARE

## 2023-04-04 ENCOUNTER — INFUSION (OUTPATIENT)
Dept: INFUSION THERAPY | Facility: HOSPITAL | Age: 73
End: 2023-04-04
Attending: INTERNAL MEDICINE
Payer: MEDICARE

## 2023-04-04 VITALS
DIASTOLIC BLOOD PRESSURE: 64 MMHG | SYSTOLIC BLOOD PRESSURE: 119 MMHG | TEMPERATURE: 98 F | OXYGEN SATURATION: 99 % | HEART RATE: 72 BPM

## 2023-04-04 DIAGNOSIS — C92.00 ACUTE MYELOID LEUKEMIA NOT HAVING ACHIEVED REMISSION: Primary | ICD-10-CM

## 2023-04-04 LAB
ABO + RH BLD: NORMAL
ABO + RH BLD: NORMAL
BLD PROD TYP BPU: NORMAL
BLD PROD TYP BPU: NORMAL
BLOOD UNIT EXPIRATION DATE: NORMAL
BLOOD UNIT EXPIRATION DATE: NORMAL
BLOOD UNIT TYPE CODE: 5100
BLOOD UNIT TYPE CODE: 5100
CROSSMATCH INTERPRETATION: NORMAL
CROSSMATCH INTERPRETATION: NORMAL
DISPENSE STATUS: NORMAL
DISPENSE STATUS: NORMAL
UNIT NUMBER: NORMAL
UNIT NUMBER: NORMAL

## 2023-04-04 PROCEDURE — 36430 TRANSFUSION BLD/BLD COMPNT: CPT

## 2023-04-04 PROCEDURE — 25000003 PHARM REV CODE 250: Performed by: INTERNAL MEDICINE

## 2023-04-04 PROCEDURE — 96402 CHEMO HORMON ANTINEOPL SQ/IM: CPT

## 2023-04-04 PROCEDURE — P9037 PLATE PHERES LEUKOREDU IRRAD: HCPCS | Performed by: INTERNAL MEDICINE

## 2023-04-04 PROCEDURE — P9040 RBC LEUKOREDUCED IRRADIATED: HCPCS | Performed by: INTERNAL MEDICINE

## 2023-04-04 PROCEDURE — 63600175 PHARM REV CODE 636 W HCPCS: Performed by: INTERNAL MEDICINE

## 2023-04-04 RX ORDER — HYDROCODONE BITARTRATE AND ACETAMINOPHEN 500; 5 MG/1; MG/1
TABLET ORAL ONCE
Status: DISCONTINUED | OUTPATIENT
Start: 2023-04-04 | End: 2023-04-04 | Stop reason: HOSPADM

## 2023-04-04 RX ORDER — ONDANSETRON 4 MG/1
16 TABLET, ORALLY DISINTEGRATING ORAL
Status: COMPLETED | OUTPATIENT
Start: 2023-04-04 | End: 2023-04-04

## 2023-04-04 RX ORDER — AZACITIDINE 100 MG/1
75 INJECTION, POWDER, LYOPHILIZED, FOR SOLUTION INTRAVENOUS; SUBCUTANEOUS
Status: COMPLETED | OUTPATIENT
Start: 2023-04-04 | End: 2023-04-04

## 2023-04-04 RX ORDER — HYDROCODONE BITARTRATE AND ACETAMINOPHEN 500; 5 MG/1; MG/1
TABLET ORAL ONCE
Status: COMPLETED | OUTPATIENT
Start: 2023-04-04 | End: 2023-04-04

## 2023-04-04 RX ORDER — DIPHENHYDRAMINE HYDROCHLORIDE 50 MG/ML
25 INJECTION INTRAMUSCULAR; INTRAVENOUS ONCE
Status: COMPLETED | OUTPATIENT
Start: 2023-04-04 | End: 2023-04-04

## 2023-04-04 RX ADMIN — DIPHENHYDRAMINE HYDROCHLORIDE 25 MG: 50 INJECTION, SOLUTION INTRAMUSCULAR; INTRAVENOUS at 10:04

## 2023-04-04 RX ADMIN — AZACITIDINE 155 MG: 100 INJECTION, POWDER, LYOPHILIZED, FOR SOLUTION INTRAVENOUS; SUBCUTANEOUS at 10:04

## 2023-04-04 RX ADMIN — SODIUM CHLORIDE: 9 INJECTION, SOLUTION INTRAVENOUS at 10:04

## 2023-04-04 RX ADMIN — ONDANSETRON 16 MG: 4 TABLET, ORALLY DISINTEGRATING ORAL at 09:04

## 2023-04-06 ENCOUNTER — LAB VISIT (OUTPATIENT)
Dept: LAB | Facility: HOSPITAL | Age: 73
End: 2023-04-06
Attending: INTERNAL MEDICINE
Payer: MEDICARE

## 2023-04-06 ENCOUNTER — INFUSION (OUTPATIENT)
Dept: INFUSION THERAPY | Facility: HOSPITAL | Age: 73
End: 2023-04-06
Attending: INTERNAL MEDICINE
Payer: MEDICARE

## 2023-04-06 VITALS
SYSTOLIC BLOOD PRESSURE: 135 MMHG | RESPIRATION RATE: 18 BRPM | OXYGEN SATURATION: 99 % | TEMPERATURE: 98 F | DIASTOLIC BLOOD PRESSURE: 69 MMHG | HEART RATE: 77 BPM

## 2023-04-06 DIAGNOSIS — C92.00 ACUTE MYELOID LEUKEMIA NOT HAVING ACHIEVED REMISSION: ICD-10-CM

## 2023-04-06 DIAGNOSIS — K13.79 MOUTH PAIN: ICD-10-CM

## 2023-04-06 DIAGNOSIS — C92.00 ACUTE MYELOID LEUKEMIA NOT HAVING ACHIEVED REMISSION: Primary | ICD-10-CM

## 2023-04-06 DIAGNOSIS — D61.818 PANCYTOPENIA: ICD-10-CM

## 2023-04-06 LAB
ALBUMIN SERPL-MCNC: 3.5 G/DL (ref 3.4–4.8)
ALBUMIN/GLOB SERPL: 1.3 RATIO (ref 1.1–2)
ALP SERPL-CCNC: 92 UNIT/L (ref 40–150)
ALT SERPL-CCNC: 7 UNIT/L (ref 0–55)
AST SERPL-CCNC: 12 UNIT/L (ref 5–34)
BASOPHILS # BLD AUTO: 0 X10(3)/MCL (ref 0–0.2)
BASOPHILS NFR BLD AUTO: 0 %
BILIRUBIN DIRECT+TOT PNL SERPL-MCNC: 0.9 MG/DL
BUN SERPL-MCNC: 18.7 MG/DL (ref 8.4–25.7)
CALCIUM SERPL-MCNC: 8.9 MG/DL (ref 8.8–10)
CHLORIDE SERPL-SCNC: 105 MMOL/L (ref 98–107)
CO2 SERPL-SCNC: 27 MMOL/L (ref 23–31)
CREAT SERPL-MCNC: 1.19 MG/DL (ref 0.73–1.18)
EOSINOPHIL # BLD AUTO: 0 X10(3)/MCL (ref 0–0.9)
EOSINOPHIL NFR BLD AUTO: 0 %
ERYTHROCYTE [DISTWIDTH] IN BLOOD BY AUTOMATED COUNT: 17.9 % (ref 11.5–17)
GFR SERPLBLD CREATININE-BSD FMLA CKD-EPI: >60 MLS/MIN/1.73/M2
GLOBULIN SER-MCNC: 2.8 GM/DL (ref 2.4–3.5)
GLUCOSE SERPL-MCNC: 109 MG/DL (ref 82–115)
GROUP & RH: NORMAL
HCT VFR BLD AUTO: 26.4 % (ref 42–52)
HGB BLD-MCNC: 7.7 G/DL (ref 14–18)
IMM GRANULOCYTES # BLD AUTO: 0 X10(3)/MCL (ref 0–0.04)
IMM GRANULOCYTES NFR BLD AUTO: 0 %
INDIRECT COOMBS GEL: NORMAL
LYMPHOCYTES # BLD AUTO: 0.43 X10(3)/MCL (ref 0.6–4.6)
LYMPHOCYTES NFR BLD AUTO: 84.3 %
MCH RBC QN AUTO: 24.8 PG (ref 27–31)
MCHC RBC AUTO-ENTMCNC: 29.2 G/DL (ref 33–36)
MCV RBC AUTO: 84.9 FL (ref 80–94)
MONOCYTES # BLD AUTO: 0.05 X10(3)/MCL (ref 0.1–1.3)
MONOCYTES NFR BLD AUTO: 9.8 %
NEUTROPHILS # BLD AUTO: 0.03 X10(3)/MCL (ref 2.1–9.2)
NEUTROPHILS NFR BLD AUTO: 5.9 %
PLATELET # BLD AUTO: 29 X10(3)/MCL (ref 130–400)
PMV BLD AUTO: ABNORMAL FL
POTASSIUM SERPL-SCNC: 4.3 MMOL/L (ref 3.5–5.1)
PROT SERPL-MCNC: 6.3 GM/DL (ref 5.8–7.6)
RBC # BLD AUTO: 3.11 X10(6)/MCL (ref 4.7–6.1)
SODIUM SERPL-SCNC: 141 MMOL/L (ref 136–145)
SPECIMEN OUTDATE: NORMAL
WBC # SPEC AUTO: 0.5 X10(3)/MCL (ref 4.5–11.5)

## 2023-04-06 PROCEDURE — 80053 COMPREHEN METABOLIC PANEL: CPT

## 2023-04-06 PROCEDURE — 85025 COMPLETE CBC W/AUTO DIFF WBC: CPT

## 2023-04-06 PROCEDURE — 86900 BLOOD TYPING SEROLOGIC ABO: CPT | Performed by: INTERNAL MEDICINE

## 2023-04-06 PROCEDURE — 36415 COLL VENOUS BLD VENIPUNCTURE: CPT

## 2023-04-06 NOTE — PLAN OF CARE
Labs reviewed by NP, No blood products needed today. 2 mouth sores noted today, NP notified and Magic mouthwash called to pt's pharmacy. Next appts given to pt for blood check on 4/10. Dc'd home in stable condition.

## 2023-04-10 ENCOUNTER — LAB VISIT (OUTPATIENT)
Dept: LAB | Facility: HOSPITAL | Age: 73
End: 2023-04-10
Attending: INTERNAL MEDICINE
Payer: MEDICARE

## 2023-04-10 ENCOUNTER — INFUSION (OUTPATIENT)
Dept: INFUSION THERAPY | Facility: HOSPITAL | Age: 73
End: 2023-04-10
Attending: INTERNAL MEDICINE
Payer: MEDICARE

## 2023-04-10 VITALS
TEMPERATURE: 98 F | RESPIRATION RATE: 20 BRPM | HEIGHT: 73 IN | BODY MASS INDEX: 24.28 KG/M2 | HEART RATE: 62 BPM | DIASTOLIC BLOOD PRESSURE: 65 MMHG | SYSTOLIC BLOOD PRESSURE: 107 MMHG | WEIGHT: 183.19 LBS

## 2023-04-10 DIAGNOSIS — D61.818 PANCYTOPENIA: ICD-10-CM

## 2023-04-10 DIAGNOSIS — D61.818 PANCYTOPENIA: Primary | ICD-10-CM

## 2023-04-10 LAB
ABO + RH BLD: NORMAL
ALBUMIN SERPL-MCNC: 3.3 G/DL (ref 3.4–4.8)
ALBUMIN/GLOB SERPL: 1.2 RATIO (ref 1.1–2)
ALP SERPL-CCNC: 93 UNIT/L (ref 40–150)
ALT SERPL-CCNC: 8 UNIT/L (ref 0–55)
AST SERPL-CCNC: 12 UNIT/L (ref 5–34)
BASOPHILS # BLD AUTO: 0 X10(3)/MCL (ref 0–0.2)
BASOPHILS NFR BLD AUTO: 0 %
BILIRUBIN DIRECT+TOT PNL SERPL-MCNC: 0.9 MG/DL
BLD PROD TYP BPU: NORMAL
BLOOD UNIT EXPIRATION DATE: NORMAL
BLOOD UNIT TYPE CODE: 5100
BUN SERPL-MCNC: 21.5 MG/DL (ref 8.4–25.7)
CALCIUM SERPL-MCNC: 8.6 MG/DL (ref 8.8–10)
CHLORIDE SERPL-SCNC: 105 MMOL/L (ref 98–107)
CO2 SERPL-SCNC: 29 MMOL/L (ref 23–31)
CREAT SERPL-MCNC: 1.08 MG/DL (ref 0.73–1.18)
CROSSMATCH INTERPRETATION: NORMAL
DISPENSE STATUS: NORMAL
EOSINOPHIL # BLD AUTO: 0 X10(3)/MCL (ref 0–0.9)
EOSINOPHIL NFR BLD AUTO: 0 %
ERYTHROCYTE [DISTWIDTH] IN BLOOD BY AUTOMATED COUNT: 18.3 % (ref 11.5–17)
GFR SERPLBLD CREATININE-BSD FMLA CKD-EPI: >60 MLS/MIN/1.73/M2
GLOBULIN SER-MCNC: 2.8 GM/DL (ref 2.4–3.5)
GLUCOSE SERPL-MCNC: 148 MG/DL (ref 82–115)
HCT VFR BLD AUTO: 23.8 % (ref 42–52)
HGB BLD-MCNC: 7 G/DL (ref 14–18)
IMM GRANULOCYTES # BLD AUTO: 0 X10(3)/MCL (ref 0–0.04)
IMM GRANULOCYTES NFR BLD AUTO: 0 %
LYMPHOCYTES # BLD AUTO: 0.36 X10(3)/MCL (ref 0.6–4.6)
LYMPHOCYTES NFR BLD AUTO: 81.8 %
MCH RBC QN AUTO: 24.6 PG (ref 27–31)
MCHC RBC AUTO-ENTMCNC: 29.4 G/DL (ref 33–36)
MCV RBC AUTO: 83.8 FL (ref 80–94)
MONOCYTES # BLD AUTO: 0.05 X10(3)/MCL (ref 0.1–1.3)
MONOCYTES NFR BLD AUTO: 11.4 %
NEUTROPHILS # BLD AUTO: 0.03 X10(3)/MCL (ref 2.1–9.2)
NEUTROPHILS NFR BLD AUTO: 6.8 %
PLATELET # BLD AUTO: 13 X10(3)/MCL (ref 130–400)
PMV BLD AUTO: ABNORMAL FL
POTASSIUM SERPL-SCNC: 3.3 MMOL/L (ref 3.5–5.1)
PROT SERPL-MCNC: 6.1 GM/DL (ref 5.8–7.6)
RBC # BLD AUTO: 2.84 X10(6)/MCL (ref 4.7–6.1)
SODIUM SERPL-SCNC: 141 MMOL/L (ref 136–145)
UNIT NUMBER: NORMAL
WBC # SPEC AUTO: 0.4 X10(3)/MCL (ref 4.5–11.5)

## 2023-04-10 PROCEDURE — 25000003 PHARM REV CODE 250: Performed by: NURSE PRACTITIONER

## 2023-04-10 PROCEDURE — 80053 COMPREHEN METABOLIC PANEL: CPT

## 2023-04-10 PROCEDURE — 36430 TRANSFUSION BLD/BLD COMPNT: CPT

## 2023-04-10 PROCEDURE — 85025 COMPLETE CBC W/AUTO DIFF WBC: CPT

## 2023-04-10 PROCEDURE — P9037 PLATE PHERES LEUKOREDU IRRAD: HCPCS | Performed by: NURSE PRACTITIONER

## 2023-04-10 PROCEDURE — 36415 COLL VENOUS BLD VENIPUNCTURE: CPT

## 2023-04-10 RX ORDER — HYDROCODONE BITARTRATE AND ACETAMINOPHEN 500; 5 MG/1; MG/1
TABLET ORAL ONCE
Status: DISCONTINUED | OUTPATIENT
Start: 2023-04-10 | End: 2023-04-10 | Stop reason: HOSPADM

## 2023-04-10 RX ORDER — HYDROCODONE BITARTRATE AND ACETAMINOPHEN 500; 5 MG/1; MG/1
TABLET ORAL ONCE
Status: CANCELLED | OUTPATIENT
Start: 2023-04-10 | End: 2023-04-10

## 2023-04-10 RX ORDER — DIPHENHYDRAMINE HCL 25 MG
25 CAPSULE ORAL ONCE
Status: COMPLETED | OUTPATIENT
Start: 2023-04-10 | End: 2023-04-10

## 2023-04-10 RX ORDER — DIPHENHYDRAMINE HCL 25 MG
25 CAPSULE ORAL ONCE
Status: CANCELLED | OUTPATIENT
Start: 2023-04-10 | End: 2023-04-10

## 2023-04-10 RX ADMIN — DIPHENHYDRAMINE HYDROCHLORIDE 25 MG: 25 CAPSULE ORAL at 09:04

## 2023-04-12 ENCOUNTER — TELEPHONE (OUTPATIENT)
Dept: INTERNAL MEDICINE | Facility: CLINIC | Age: 73
End: 2023-04-12
Payer: MEDICARE

## 2023-04-12 NOTE — TELEPHONE ENCOUNTER
Looked in chart and he had previous CBC done 4/10/23  CMP 4/6/23. Has Vitamin D, TSH, and Lipid Panel ordered for future in the computer. He is having labs done tomorrow with another visit. Will get them all drawn. Reminded to fast after midnight.

## 2023-04-12 NOTE — TELEPHONE ENCOUNTER
----- Message from Karolyn Lu sent at 4/12/2023  2:49 PM CDT -----  Regarding: wellness labs  Please make sure all wellness labs are ordered in epic. Patient is going draw labs tomorrow.

## 2023-04-12 NOTE — TELEPHONE ENCOUNTER
----- Message from Luisa Clifford sent at 4/12/2023  2:25 PM CDT -----  Type:  Patient Call    Who Called:Pt   Would the patient rather a call back or a response via [facility]? Call back  Best Call Back Number:[phone]  Additional Information: Pt wants to re schedule missed appt from yesterday

## 2023-04-13 ENCOUNTER — LAB VISIT (OUTPATIENT)
Dept: LAB | Facility: HOSPITAL | Age: 73
End: 2023-04-13
Attending: INTERNAL MEDICINE
Payer: MEDICARE

## 2023-04-13 ENCOUNTER — INFUSION (OUTPATIENT)
Dept: INFUSION THERAPY | Facility: HOSPITAL | Age: 73
End: 2023-04-13
Attending: INTERNAL MEDICINE
Payer: MEDICARE

## 2023-04-13 VITALS
TEMPERATURE: 98 F | HEART RATE: 72 BPM | SYSTOLIC BLOOD PRESSURE: 118 MMHG | RESPIRATION RATE: 18 BRPM | OXYGEN SATURATION: 100 % | DIASTOLIC BLOOD PRESSURE: 70 MMHG

## 2023-04-13 DIAGNOSIS — D61.818 PANCYTOPENIA: ICD-10-CM

## 2023-04-13 LAB
ALBUMIN SERPL-MCNC: 3.5 G/DL (ref 3.4–4.8)
ALBUMIN/GLOB SERPL: 1.2 RATIO (ref 1.1–2)
ALP SERPL-CCNC: 104 UNIT/L (ref 40–150)
ALT SERPL-CCNC: 9 UNIT/L (ref 0–55)
AST SERPL-CCNC: 11 UNIT/L (ref 5–34)
BASOPHILS # BLD AUTO: 0 X10(3)/MCL (ref 0–0.2)
BASOPHILS NFR BLD AUTO: 0 %
BILIRUBIN DIRECT+TOT PNL SERPL-MCNC: 0.8 MG/DL
BUN SERPL-MCNC: 13.7 MG/DL (ref 8.4–25.7)
CALCIUM SERPL-MCNC: 8.6 MG/DL (ref 8.8–10)
CHLORIDE SERPL-SCNC: 106 MMOL/L (ref 98–107)
CO2 SERPL-SCNC: 30 MMOL/L (ref 23–31)
CREAT SERPL-MCNC: 0.91 MG/DL (ref 0.73–1.18)
EOSINOPHIL # BLD AUTO: 0 X10(3)/MCL (ref 0–0.9)
EOSINOPHIL NFR BLD AUTO: 0 %
ERYTHROCYTE [DISTWIDTH] IN BLOOD BY AUTOMATED COUNT: 18.8 % (ref 11.5–17)
GFR SERPLBLD CREATININE-BSD FMLA CKD-EPI: >60 MLS/MIN/1.73/M2
GLOBULIN SER-MCNC: 3 GM/DL (ref 2.4–3.5)
GLUCOSE SERPL-MCNC: 133 MG/DL (ref 82–115)
GROUP & RH: NORMAL
HCT VFR BLD AUTO: 24.4 % (ref 42–52)
HGB BLD-MCNC: 7.3 G/DL (ref 14–18)
IMM GRANULOCYTES # BLD AUTO: 0 X10(3)/MCL (ref 0–0.04)
IMM GRANULOCYTES NFR BLD AUTO: 0 %
INDIRECT COOMBS GEL: NORMAL
LYMPHOCYTES # BLD AUTO: 0.34 X10(3)/MCL (ref 0.6–4.6)
LYMPHOCYTES NFR BLD AUTO: 91.9 %
MCH RBC QN AUTO: 25.1 PG (ref 27–31)
MCHC RBC AUTO-ENTMCNC: 29.9 G/DL (ref 33–36)
MCV RBC AUTO: 83.8 FL (ref 80–94)
MONOCYTES # BLD AUTO: 0.01 X10(3)/MCL (ref 0.1–1.3)
MONOCYTES NFR BLD AUTO: 2.7 %
NEUTROPHILS # BLD AUTO: 0.02 X10(3)/MCL (ref 2.1–9.2)
NEUTROPHILS NFR BLD AUTO: 5.4 %
PLATELET # BLD AUTO: 28 X10(3)/MCL (ref 130–400)
PMV BLD AUTO: ABNORMAL FL
POTASSIUM SERPL-SCNC: 3.5 MMOL/L (ref 3.5–5.1)
PROT SERPL-MCNC: 6.5 GM/DL (ref 5.8–7.6)
RBC # BLD AUTO: 2.91 X10(6)/MCL (ref 4.7–6.1)
SODIUM SERPL-SCNC: 142 MMOL/L (ref 136–145)
SPECIMEN OUTDATE: NORMAL
WBC # SPEC AUTO: 0.4 X10(3)/MCL (ref 4.5–11.5)

## 2023-04-13 PROCEDURE — 86900 BLOOD TYPING SEROLOGIC ABO: CPT | Performed by: NURSE PRACTITIONER

## 2023-04-13 PROCEDURE — 85025 COMPLETE CBC W/AUTO DIFF WBC: CPT

## 2023-04-13 PROCEDURE — 80053 COMPREHEN METABOLIC PANEL: CPT

## 2023-04-13 PROCEDURE — 36415 COLL VENOUS BLD VENIPUNCTURE: CPT

## 2023-04-13 NOTE — PLAN OF CARE
Labs reviewed by MD, no blood products needed today. Pt will return Monday 4/17/23 for blood check. Appt handout given to pt. Dc'd home in stable condition.

## 2023-04-17 ENCOUNTER — LAB VISIT (OUTPATIENT)
Dept: LAB | Facility: HOSPITAL | Age: 73
End: 2023-04-17
Attending: INTERNAL MEDICINE
Payer: MEDICARE

## 2023-04-17 ENCOUNTER — INFUSION (OUTPATIENT)
Dept: INFUSION THERAPY | Facility: HOSPITAL | Age: 73
End: 2023-04-17
Attending: INTERNAL MEDICINE
Payer: MEDICARE

## 2023-04-17 VITALS
RESPIRATION RATE: 16 BRPM | SYSTOLIC BLOOD PRESSURE: 143 MMHG | DIASTOLIC BLOOD PRESSURE: 76 MMHG | TEMPERATURE: 98 F | OXYGEN SATURATION: 100 % | HEART RATE: 59 BPM

## 2023-04-17 DIAGNOSIS — C92.00 ACUTE MYELOID LEUKEMIA NOT HAVING ACHIEVED REMISSION: ICD-10-CM

## 2023-04-17 DIAGNOSIS — D61.818 PANCYTOPENIA: ICD-10-CM

## 2023-04-17 DIAGNOSIS — C92.00 ACUTE MYELOID LEUKEMIA NOT HAVING ACHIEVED REMISSION: Primary | ICD-10-CM

## 2023-04-17 LAB
ABO + RH BLD: NORMAL
BASOPHILS # BLD AUTO: 0 X10(3)/MCL (ref 0–0.2)
BASOPHILS NFR BLD AUTO: 0 %
BLD PROD TYP BPU: NORMAL
BLOOD UNIT EXPIRATION DATE: NORMAL
BLOOD UNIT TYPE CODE: 5100
CROSSMATCH INTERPRETATION: NORMAL
DISPENSE STATUS: NORMAL
EOSINOPHIL # BLD AUTO: 0 X10(3)/MCL (ref 0–0.9)
EOSINOPHIL NFR BLD AUTO: 0 %
ERYTHROCYTE [DISTWIDTH] IN BLOOD BY AUTOMATED COUNT: 21.1 % (ref 11.5–17)
GROUP & RH: NORMAL
HCT VFR BLD AUTO: 22.9 % (ref 42–52)
HGB BLD-MCNC: 6.7 G/DL (ref 14–18)
IMM GRANULOCYTES # BLD AUTO: 0 X10(3)/MCL (ref 0–0.04)
IMM GRANULOCYTES NFR BLD AUTO: 0 %
INDIRECT COOMBS GEL: NORMAL
LYMPHOCYTES # BLD AUTO: 0.35 X10(3)/MCL (ref 0.6–4.6)
LYMPHOCYTES NFR BLD AUTO: 97.2 %
MCH RBC QN AUTO: 25.5 PG (ref 27–31)
MCHC RBC AUTO-ENTMCNC: 29.3 G/DL (ref 33–36)
MCV RBC AUTO: 87.1 FL (ref 80–94)
MONOCYTES # BLD AUTO: 0 X10(3)/MCL (ref 0.1–1.3)
MONOCYTES NFR BLD AUTO: 0 %
NEUTROPHILS # BLD AUTO: 0.01 X10(3)/MCL (ref 2.1–9.2)
NEUTROPHILS NFR BLD AUTO: 2.8 %
PLATELET # BLD AUTO: 41 X10(3)/MCL (ref 130–400)
PMV BLD AUTO: ABNORMAL FL
RBC # BLD AUTO: 2.63 X10(6)/MCL (ref 4.7–6.1)
SPECIMEN OUTDATE: NORMAL
UNIT NUMBER: NORMAL
WBC # SPEC AUTO: 0.4 X10(3)/MCL (ref 4.5–11.5)

## 2023-04-17 PROCEDURE — 96374 THER/PROPH/DIAG INJ IV PUSH: CPT

## 2023-04-17 PROCEDURE — 85025 COMPLETE CBC W/AUTO DIFF WBC: CPT

## 2023-04-17 PROCEDURE — 36415 COLL VENOUS BLD VENIPUNCTURE: CPT

## 2023-04-17 PROCEDURE — 25000003 PHARM REV CODE 250: Performed by: INTERNAL MEDICINE

## 2023-04-17 PROCEDURE — 63600175 PHARM REV CODE 636 W HCPCS: Performed by: INTERNAL MEDICINE

## 2023-04-17 PROCEDURE — 86923 COMPATIBILITY TEST ELECTRIC: CPT | Performed by: INTERNAL MEDICINE

## 2023-04-17 PROCEDURE — 86900 BLOOD TYPING SEROLOGIC ABO: CPT | Performed by: INTERNAL MEDICINE

## 2023-04-17 PROCEDURE — 36430 TRANSFUSION BLD/BLD COMPNT: CPT

## 2023-04-17 PROCEDURE — P9040 RBC LEUKOREDUCED IRRADIATED: HCPCS | Performed by: INTERNAL MEDICINE

## 2023-04-17 RX ORDER — DIPHENHYDRAMINE HYDROCHLORIDE 50 MG/ML
25 INJECTION INTRAMUSCULAR; INTRAVENOUS ONCE
Status: COMPLETED | OUTPATIENT
Start: 2023-04-17 | End: 2023-04-17

## 2023-04-17 RX ORDER — HYDROCODONE BITARTRATE AND ACETAMINOPHEN 500; 5 MG/1; MG/1
TABLET ORAL ONCE
Status: COMPLETED | OUTPATIENT
Start: 2023-04-17 | End: 2023-04-17

## 2023-04-17 RX ORDER — DIPHENHYDRAMINE HYDROCHLORIDE 50 MG/ML
25 INJECTION INTRAMUSCULAR; INTRAVENOUS ONCE
Status: CANCELLED | OUTPATIENT
Start: 2023-04-17

## 2023-04-17 RX ORDER — HYDROCODONE BITARTRATE AND ACETAMINOPHEN 500; 5 MG/1; MG/1
TABLET ORAL ONCE
Status: CANCELLED | OUTPATIENT
Start: 2023-04-17 | End: 2023-04-17

## 2023-04-17 RX ADMIN — SODIUM CHLORIDE: 9 INJECTION, SOLUTION INTRAVENOUS at 08:04

## 2023-04-17 RX ADMIN — DIPHENHYDRAMINE HYDROCHLORIDE 25 MG: 50 INJECTION, SOLUTION INTRAMUSCULAR; INTRAVENOUS at 09:04

## 2023-04-20 ENCOUNTER — INFUSION (OUTPATIENT)
Dept: INFUSION THERAPY | Facility: HOSPITAL | Age: 73
End: 2023-04-20
Attending: INTERNAL MEDICINE
Payer: MEDICARE

## 2023-04-20 ENCOUNTER — OFFICE VISIT (OUTPATIENT)
Dept: INTERNAL MEDICINE | Facility: CLINIC | Age: 73
End: 2023-04-20
Payer: MEDICARE

## 2023-04-20 VITALS
TEMPERATURE: 98 F | RESPIRATION RATE: 18 BRPM | WEIGHT: 180.63 LBS | BODY MASS INDEX: 23.83 KG/M2 | OXYGEN SATURATION: 97 % | DIASTOLIC BLOOD PRESSURE: 86 MMHG | SYSTOLIC BLOOD PRESSURE: 128 MMHG | HEART RATE: 79 BPM

## 2023-04-20 DIAGNOSIS — Z86.73 HISTORY OF CVA (CEREBROVASCULAR ACCIDENT) WITHOUT RESIDUAL DEFICITS: Chronic | ICD-10-CM

## 2023-04-20 DIAGNOSIS — N40.0 BENIGN PROSTATIC HYPERPLASIA, UNSPECIFIED WHETHER LOWER URINARY TRACT SYMPTOMS PRESENT: ICD-10-CM

## 2023-04-20 DIAGNOSIS — G62.9 NEUROPATHY: ICD-10-CM

## 2023-04-20 DIAGNOSIS — F41.9 ANXIETY AND DEPRESSION: Chronic | ICD-10-CM

## 2023-04-20 DIAGNOSIS — F32.A ANXIETY AND DEPRESSION: Chronic | ICD-10-CM

## 2023-04-20 DIAGNOSIS — I10 PRIMARY HYPERTENSION: Chronic | ICD-10-CM

## 2023-04-20 DIAGNOSIS — D61.818 PANCYTOPENIA: ICD-10-CM

## 2023-04-20 DIAGNOSIS — Z00.00 MEDICARE ANNUAL WELLNESS VISIT, SUBSEQUENT: Primary | ICD-10-CM

## 2023-04-20 DIAGNOSIS — C92.00 ACUTE MYELOID LEUKEMIA NOT HAVING ACHIEVED REMISSION: ICD-10-CM

## 2023-04-20 PROBLEM — T14.8XXA MUSCLE STRAIN: Status: RESOLVED | Noted: 2022-12-14 | Resolved: 2023-04-20

## 2023-04-20 PROBLEM — T14.8XXA SKIN ABRASION: Status: RESOLVED | Noted: 2022-12-14 | Resolved: 2023-04-20

## 2023-04-20 PROBLEM — K92.2 ACUTE GI BLEEDING: Status: RESOLVED | Noted: 2023-01-20 | Resolved: 2023-04-20

## 2023-04-20 PROBLEM — R06.02 SHORTNESS OF BREATH: Status: RESOLVED | Noted: 2022-12-14 | Resolved: 2023-04-20

## 2023-04-20 PROCEDURE — G0439 PPPS, SUBSEQ VISIT: HCPCS | Mod: ,,, | Performed by: INTERNAL MEDICINE

## 2023-04-20 PROCEDURE — G0439 PR MEDICARE ANNUAL WELLNESS SUBSEQUENT VISIT: ICD-10-PCS | Mod: ,,, | Performed by: INTERNAL MEDICINE

## 2023-04-20 RX ORDER — GABAPENTIN 100 MG/1
100 CAPSULE ORAL NIGHTLY
Qty: 30 CAPSULE | Refills: 0 | Status: SHIPPED | OUTPATIENT
Start: 2023-04-20 | End: 2023-05-18

## 2023-04-20 RX ORDER — MULTIVITAMIN
1 TABLET ORAL DAILY
Status: ON HOLD | COMMUNITY
End: 2023-06-27 | Stop reason: CLARIF

## 2023-04-20 RX ORDER — VITAMIN E 268 MG
180 CAPSULE ORAL DAILY
COMMUNITY
Start: 2021-08-27

## 2023-04-20 NOTE — PROGRESS NOTES
Patient Care Team:  Nayely Andres MD as PCP - General (Internal Medicine)  Nayely Andres MD      Patient ID: Hussein Gonzalez Jr. is a 72 y.o. male.    Chief Complaint: Medicare AWV (Pt concerns with possible side effects from medications )      HPI:     72-year-old male patient with  past medical history of CVA, HTN, BPH, and Melanoma.    Also with pancytopenia established with Hematology/Oncology.  Bone Marrow Biopsy done on 01/25/2023.  Preliminary reveals AML with 27% blasts in a background of dysplasia.  Cytogenetics are within normal limits, FISH is within normal limits.  NGS pending.  Currently being treated with with azacitidine and venetoclax. Prophylacticly being treated with 500 mg of levofloxacin daily, 200 mg of acyclovir b.i.d., 200 mg of fluconazole daily.  Currently being evaluated for stem cell transplant in [place].   Since last visit patient reports he has been fairly well without acute injury.  Denies fevers, shortness a breath, chills, or diaphoresis.  Most recent labs reviewed and noted to have persistent pancytopenia.  Most recent WBC 0.4.  Does have upcoming appointment with Hematology tomorrow.  Also with some complaints today regarding some burning sensation along the bilateral arms and back, onset of symptoms coinciding with new infusions.  Discussed potential nerve component, for which we will initiate on gabapentin 100 mg trial nightly.  Age-appropriate screenings reviewed and up-to-date.  Immunizations reviewed up-to-date.  Outpatient otherwise stable without any other acute complaints.    Wellness:  Today 04/20/2023        MEDICAL HISTORY:    Past Medical History:   Diagnosis Date    Anxiety and depression     BPH (benign prostatic hyperplasia)     History of CVA (cerebrovascular accident) without residual deficits     HTN (hypertension)     Malignant melanoma of skin, unspecified     NSTEMI (non-ST elevated myocardial infarction)     Sepsis, Gram negative      Sleep apnea       Past Surgical History:   Procedure Laterality Date    BONE MARROW BIOPSY N/A 1/25/2023    Procedure: Biopsy-bone marrow;  Surgeon: Rd Carlos MD;  Location: [facility];  Service: General;  Laterality: N/A;    COLONOSCOPY      COLONOSCOPY N/A 1/24/2023    Procedure: COLONOSCOPY (DOUBLE);  Surgeon: Jimy Baker MD;  Location: [facility] ENDOSCOPY;  Service: Gastroenterology;  Laterality: N/A;    EGD, WITH CLOSED BIOPSY N/A 1/24/2023    Procedure: EGD, WITH CLOSED BIOPSY;  Surgeon: Jimy Baker MD;  Location: [facility] ENDOSCOPY;  Service: Gastroenterology;  Laterality: N/A;    EXCISION OF MELANOMA      SINUS SURGERY      TONSILLECTOMY        Social History     Tobacco Use    Smoking status: Never    Smokeless tobacco: Never   Substance Use Topics    Alcohol use: Not Currently     Alcohol/week: 7.0 standard drinks     Types: 7 Cans of beer per week    Drug use: Never          Health Maintenance Due   Topic Date Due    Hepatitis C Screening  Never done        SUBJECTIVE:     Review of Systems   Constitutional:  Negative for fatigue and fever.   HENT:  Negative for congestion, rhinorrhea, sore throat and trouble swallowing.    Eyes:  Negative for redness and visual disturbance.   Respiratory:  Negative for cough, chest tightness and shortness of breath.    Cardiovascular:  Negative for chest pain and palpitations.   Gastrointestinal:  Negative for abdominal pain, constipation, diarrhea, nausea and vomiting.   Genitourinary:  Negative for dysuria, flank pain, frequency and urgency.   Musculoskeletal:  Negative for arthralgias, gait problem and myalgias.   Skin:  Negative for rash and wound.   Neurological:  Negative for facial asymmetry, speech difficulty, weakness and headaches.        Burning tingling sensation bilateral mid back and upper arms   All other systems reviewed and are negative.      Patient Reported Health Risk Assessment  What [age] 70-79  Are you male or female?:  Male  During the past four weeks, how much have you been bothered by emotional problems such as feeling anxious, depressed, irritable, sad, or downhearted and blue?: Not at all  During the past five weeks, has your physical and/or emotional health limited your social activities with family, friends, neighbors, or groups?: Not at all  During the past four weeks, how much bodily pain have you generally had?: Mild pain  During the past four weeks, was someone available to help if you needed and wanted help?: Yes, as much as I wanted  During the past four weeks, what was the hardest physical activity you could do for at least two minutes?: Moderate  Can you get to places out of walking distance without help?  (For example, can you travel alone on buses or taxis, or drive your own car?): Yes  Can you go shopping for groceries or clothes without someone's help?: Yes  Can you prepare your own meals?: Yes  Can you do your own housework without help?: Yes  Because of any health problems, do you need the help of another person with your personal care needs such as eating, bathing, dressing, or getting around the house?: No  Can you handle your own money without help?: Yes  During the past four weeks, how would you rate your health in general?: Very good  How have things been going for you during the past four weeks?: Pretty well  Are you having difficulties driving your car?: No  Do you always fasten your seat belt when you are in a car?: Yes, usually  How often in the past four weeks have you been bothered by falling or dizzy when standing up?: Never  How often in the past four weeks have you been bothered by sexual problems?: Never  How often in the past four weeks have you been bothered by trouble eating well?: Never  How often in the past four weeks have you been bothered by teeth or denture problems?: Never  How often in the past four weeks have you been bothered with problems using the telephone?: Never  How often in  the past four weeks have you been bothered by tiredness or fatigue?: Never  Have you fallen two or more times in the past year?: Yes  Are you afraid of falling?: No  Are you a smoker?: No  During the past four weeks, how many drinks of wine, beer, or other alcoholic beverages did you have?: No alcohol at all  Do you exercise for about 20 minutes three or more days a week?: Yes, some of the time  Have you been given any information to help you with hazards in your house that might hurt you?: Yes  Have you been given any information to help you with keeping track of your medications?: Yes  How often do you have trouble taking medicines the way you've been told to take them?: I always take them as prescribed  How confident are you that you can control and manage most of your health problems?: Very confident  What is your race? (Check all that apply.):     OBJECTIVE:     /86 (BP Location: Right arm, Patient Position: Sitting, BP Method: Small (Automatic))   Pulse 79   Temp 98.2 °F (36.8 °C) (Temporal)   Resp 18   Wt 81.9 kg (180 lb 9.6 oz)   SpO2 97%   BMI 23.83 kg/m²      Physical Exam  Constitutional:       General: He is not in acute distress.     Appearance: Normal appearance.   HENT:      Right Ear: Tympanic membrane, ear canal and external ear normal.      Left Ear: Tympanic membrane, ear canal and external ear normal.      Nose: Nose normal.      Mouth/Throat:      Mouth: Mucous membranes are moist.      Pharynx: Oropharynx is clear.   Eyes:      Extraocular Movements: Extraocular movements intact.      Conjunctiva/sclera: Conjunctivae normal.      Pupils: Pupils are equal, round, and reactive to light.   Cardiovascular:      Rate and Rhythm: Normal rate and regular rhythm.      Pulses: Normal pulses.      Heart sounds: Normal heart sounds. No murmur heard.    No gallop.   Pulmonary:      Effort: Pulmonary effort is normal.      Breath sounds: Normal breath sounds. No wheezing.   Abdominal:       General: Bowel sounds are normal. There is no distension.      Palpations: Abdomen is soft. There is no mass.      Tenderness: There is no abdominal tenderness. There is no guarding.   Musculoskeletal:         General: Normal range of motion.   Skin:     General: Skin is warm and dry.   Neurological:      Mental Status: He is alert. Mental status is at baseline.      Sensory: No sensory deficit.      Motor: No weakness.           No flowsheet data found.  Fall Risk Assessment - Outpatient 4/20/2023 4/17/2023 4/13/2023 4/10/2023 4/6/2023 4/4/2023 4/3/2023   Mobility Status Ambulatory Ambulatory Ambulatory Ambulatory Ambulatory Ambulatory Ambulatory   Number of falls 2+ 1 1 0 1 0 1   Identified as fall risk 1 0 0 0 0 0 0                 Depression Screening  Over the past two weeks, has the patient felt down, depressed, or hopeless?: No  Over the past two weeks, has the patient felt little interest or pleasure in doing things?: No  Functional Ability/Safety Screening  Was the patient's timed Up & Go test unsteady or longer than 30 seconds?: No  Does the patient need help with phone, transportation, shopping, preparing meals, housework, laundry, meds, or managing money?: No  Does the patient's home have rugs in the hallway, lack grab bars in the bathroom, lack handrails on the stairs or have poor lighting?: No  Have you noticed any hearing difficulties?: No  Cognitive Function (Assessed through direct observation with due consideration of information obtained by way of patient reports and/or concerns raised by family, friends, caretakers, or others)    Does the patient repeat questions/statements in the same day?: No  Does the patient have trouble remembering the date, year, and time?: No  Does the patient have difficulty managing finances?: No  Does the patient have a decreased sense of direction?: No      ASSEMENT/ PLAN:       ICD-10-CM ICD-9-CM   1. Medicare annual wellness visit, subsequent  Z00.00 V70.0   2.  Neuropathy  G62.9 355.9   3. 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 Pancytopenia  D61.818 284.19   5. Anxiety and depression  F41.9 300.00    F32.A 311   6. Benign prostatic hyperplasia, unspecified whether lower urinary tract symptoms present  N40.0 600.00   7. Primary hypertension  I10 401.9   8. History of CVA (cerebrovascular accident) without residual deficits  Z86.73 V12.54         Plan:     Problem List Items Addressed This Visit          Neuro    History of CVA (cerebrovascular accident) without residual deficits (Chronic)     -Stable  -currently on statin, Ace              Relevant Orders    Lipid Panel    TSH       Psychiatric    Anxiety and depression (Chronic)     -stable   -currently on Celexa 40 mg, continue              Cardiac/Vascular    HTN (hypertension) (Chronic)     -/86   -currently on lisinopril-HCTZ 20-12.5 mg, continue  -low-sodium diet           Relevant Orders    Lipid Panel    TSH       Renal/    BPH (benign prostatic hyperplasia) (Chronic)     -stable   -currently on tamsulosin, continue              Oncology    Pancytopenia (Chronic)     -established with Hematology  -bone marrow biopsy preliminary reveals AML  -currently on Thyrogen, pyridoxine, continue           Relevant Medications    multivitamin (THERAGRAN) per tablet    Acute myeloid leukemia not having achieved remission (Chronic)     -established with Oncology   -being evaluated for stem cell transplant [place]   -currently on Venclexta and as Azacitidine , continue  -also on acyclovir, fluconazole, and Levaquin daily prophylactically              Other    Medicare annual wellness visit, subsequent - Primary     -patient feeling generally well today  -wellness labs reviewed pancytopenia/neutropenia, add TSH and lipid profile to complete labs  -age-appropriate screenings up-to-date  -immunizations up-to-date  -encourage routine aerobic exercise 2 to 3 times a week  -increase fluid  hydration           Relevant Orders    Lipid Panel    TSH     Other Visit Diagnoses       Neuropathy        Relevant Medications    gabapentin (NEURONTIN) 100 MG capsule            Advance Care Planning   I attest to discussing Advance Care Planning with patient and/or family member.  Education was provided including the importance of the Health Care Power of , Advance Directives, and/or [place] documentation.       Opioid Screening: Patient medication list reviewed, patient is not taking prescription opioids. Patient is not using additional opioids than prescribed. Patient is at low risk of substance abuse based on this opioid use history.         Follow up in about 4 months (around 8/20/2023) for General Checkup (dr Andres).   -plan specifics discussed above    Orders Placed This Encounter    Lipid Panel    TSH    gabapentin (NEURONTIN) 100 MG capsule        Medication List with Changes/Refills   New Medications    GABAPENTIN (NEURONTIN) 100 MG CAPSULE    Take 1 capsule (100 mg total) by mouth every evening.   Current Medications    (MAGIC MOUTHWASH) 1:1:1 DIPHENHYDRAMINE(BENADRYL) 12.5MG/5ML LIQ, ALUMINUM & MAGNESIUM HYDROXIDE-SIMETHICONE (MAALOX), LIDOCAINE VISCOUS 2%    Swish and spit 15 mLs every 4 (four) hours as needed (mouth pain).    ACYCLOVIR (ZOVIRAX) 400 MG TABLET    Take 1 tablet (400 mg total) by mouth 2 (two) times daily.    ATORVASTATIN (LIPITOR) 80 MG TABLET    Take 80 mg by mouth once daily.    CITALOPRAM (CELEXA) 40 MG TABLET    Take 1 tablet (40 mg total) by mouth once daily.    FERROUS SULFATE (FEOSOL) TAB TABLET    Take 1 tablet by mouth daily with breakfast.    FLUCONAZOLE (DIFLUCAN) 200 MG TAB    Take 1 tablet (200 mg total) by mouth once daily.    LACTOBACILLUS RHAMNOSUS GG (CULTURELLE) 10 BILLION CELL CAPSULE    Take 3 capsules by mouth once daily. Pt states he takes 3 in the morning and 2 and night    LEVOFLOXACIN (LEVAQUIN) 500 MG TABLET    Take 1 tablet (500 mg total) by  mouth once daily.    LISINOPRIL-HYDROCHLOROTHIAZIDE (PRINZIDE,ZESTORETIC) 20-12.5 MG PER TABLET    Take 1 tablet by mouth once daily    MULTIVITAMIN (THERAGRAN) PER TABLET    Take 1 tablet by mouth once daily.    OMEGA-3 FATTY ACIDS/FISH OIL (FISH OIL-OMEGA-3 FATTY ACIDS) 300-1,000 MG CAPSULE    Take 1 capsule by mouth every other day.    ONDANSETRON (ZOFRAN) 8 MG TABLET    Take 1 tablet (8 mg total) by mouth every 8 (eight) hours as needed for Nausea. 1st choice for nausea    PROCHLORPERAZINE (COMPAZINE) 10 MG TABLET    Take 1 tablet (10 mg total) by mouth every 6 (six) hours as needed (for nausea). 2nd choice, can cause drowsiness.    PYRIDOXINE, VITAMIN B6, (B-6) 100 MG TAB    Take 100 mg by mouth every other day.    TAMSULOSIN (FLOMAX) 0.4 MG CAP    TAKE 1 CAPSULE BY MOUTH ONCE DAILY IN THE EVENING    VENETOCLAX (VENCLEXTA) 100 MG TAB    Take 100 mg by mouth on day 2 of cycle 1, then take 200 mg by mouth on days 3-21 of a 28 day cycle.    VENETOCLAX (VENCLEXTA) 100 MG TAB    Take 200 mg on days 1-21 of a 28 day cycle starting with cycle 2 and beyond.    VENETOCLAX 50 MG TAB    Take 50 mg on day 1 of cycle 1.    VITAMIN D (VITAMIN D3) 1000 UNITS TAB    Take 1,000 Units by mouth once daily.    VITAMIN E 400 UNIT CAPSULE    Take 180 mg by mouth Daily.    VITAMIN E, DL, ACETATE, 22.5 MG (50 UNIT)/ML DROP    Take by mouth.   Discontinued Medications    PANTOPRAZOLE (PROTONIX) 40 MG TABLET    Take 1 tablet (40 mg total) by mouth 2 (two) times daily.

## 2023-04-20 NOTE — ASSESSMENT & PLAN NOTE
-established with Hematology  -bone marrow biopsy preliminary reveals AML  -currently on Thyrogen, pyridoxine, continue

## 2023-04-20 NOTE — PLAN OF CARE
Labs reviewed with patient and MD staff; no transfusion needed today. Patient aware of TD tomorrow 4/21.

## 2023-04-20 NOTE — PROGRESS NOTES
Subjective:       Patient ID: Hussein Gonzalez Jr. is a 72 y.o. male.    Chief Complaint: Follow-up (Pt has no concerns today.)      Diagnosis:  AML with 27% blasts, background dysplasia, normal cytogenetics and normal FISH    Current Treatment:   Azacitidine and venetoclax to start on 02/27/2023.  Referral for possible stem cell transplant evaluation.    Treatment History:  N/A    HPI:  72-year-old male with a history of CVA, hypertension, BPH, and melanoma who presented to the emergency department following a syncopal episode.  In February of 2022, the patient did have a slightly low white blood cell count, hemoglobin, and platelet count.  Per the patient, 3 months prior to admission, he was having dizziness, fatigue, weakness, shortness of breath, back pain, and leg pain.  The dizziness was worse with standing and walking.  On 01/20/2023, the patient had a syncopal episode.  He was brought to the emergency department and was found to be pancytopenic.  He also had stool that was positive for occult blood in the emergency department.  He received blood transfusion.  His CBC on 01/20/2023 revealed a white blood cell count of 2.6, hemoglobin of 7.7, and platelet count of 47,000. Reticulocytes were appropriately elevated, B12 and folate were within normal limits, iron level was slightly decreased.  Repeat blood work on 01/21/2023 revealed a white blood cell count of 2.8, post transfusion hemoglobin of 9.6, and a platelet count of 85788.  Hematology was consulted for pancytopenia.  EGD and colonoscopy done on 01/24/2023, EGD revealed nonbleeding ulcers, colonoscopy unremarkable, however had to be aborted due to tortuous colon.  Bone Marrow Biopsy done on 01/25/2023.  Preliminary reveals AML with 27% blasts in a background of dysplasia.  Cytogenetics are within normal limits, FISH is within normal limits.  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    Interval History:   Patient  presents for hospital follow up.  Overall he is doing well.  He states that he has started feeling better last Wednesday, and this is the best he is felt since diagnosis.    Past Medical History:   Diagnosis Date    Anxiety and depression     BPH (benign prostatic hyperplasia)     History of CVA (cerebrovascular accident) without residual deficits     HTN (hypertension)     Malignant melanoma of skin, unspecified     NSTEMI (non-ST elevated myocardial infarction)     Sepsis, Gram negative     Sleep apnea       Past Surgical History:   Procedure Laterality Date    BONE MARROW BIOPSY N/A 1/25/2023    Procedure: Biopsy-bone marrow;  Surgeon: Rd Carlos MD;  Location: [facility] OR;  Service: General;  Laterality: N/A;    COLONOSCOPY      COLONOSCOPY N/A 1/24/2023    Procedure: COLONOSCOPY (DOUBLE);  Surgeon: Jimy Baker MD;  Location: [facility] ENDOSCOPY;  Service: Gastroenterology;  Laterality: N/A;    EGD, WITH CLOSED BIOPSY N/A 1/24/2023    Procedure: EGD, WITH CLOSED BIOPSY;  Surgeon: Jimy Baker MD;  Location: [facility] ENDOSCOPY;  Service: Gastroenterology;  Laterality: N/A;    EXCISION OF MELANOMA      SINUS SURGERY      TONSILLECTOMY       Social History     Socioeconomic History    Marital status: Significant Other   Tobacco Use    Smoking status: Never    Smokeless tobacco: Never   Substance and Sexual Activity    Alcohol use: Not Currently     Alcohol/week: 7.0 standard drinks     Types: 7 Cans of beer per week    Drug use: Never    Sexual activity: Yes     Social Determinants of Health     Financial Resource Strain: Low Risk     Difficulty of Paying Living Expenses: Not hard at all   Food Insecurity: No Food Insecurity    Worried About Running Out of Food in the Last Year: Never true    Ran Out of Food in the Last Year: Never true   Transportation Needs: No Transportation Needs    Lack of Transportation (Medical): No    Lack of Transportation (Non-Medical): No   Physical Activity: Insufficiently Active     Days of Exercise per Week: 3 days    Minutes of Exercise per Session: 30 min   Stress: No Stress Concern Present    Feeling of Stress : Not at all   Social Connections: Unknown    Frequency of Communication with Friends and Family: More than three times a week    Frequency of Social Gatherings with Friends and Family: More than three times a week    Attends [employer] Services: Patient refused    Active Member of Clubs or Organizations: Patient refused    Attends Club or Organization Meetings: Patient refused    Marital Status:    Housing Stability: Low Risk     Unable to Pay for Housing in the Last Year: No    Number of Places Lived in the Last Year: 1    Unstable Housing in the Last Year: No      Family History   Problem Relation Age of Onset    Cancer Mother     Cancer Father       Review of patient's allergies indicates:   Allergen Reactions    Acetaminophen       Review of Systems   Constitutional:  Negative for chills, diaphoresis, fatigue, fever and unexpected weight change.   HENT:  Negative for nasal congestion, mouth sores, sinus pressure/congestion and sore throat.    Eyes:  Negative for pain and visual disturbance.   Respiratory:  Negative for cough, chest tightness and shortness of breath.    Cardiovascular:  Negative for chest pain, palpitations and leg swelling.   Gastrointestinal:  Negative for abdominal distention, abdominal pain, blood in stool, constipation and diarrhea.   Genitourinary:  Negative for dysuria, frequency and hematuria.   Musculoskeletal:  Negative for arthralgias and back pain.   Integumentary:  Negative for rash.   Neurological:  Negative for dizziness, syncope, weakness, numbness and headaches.   Hematological:  Negative for adenopathy.   Psychiatric/Behavioral:  Negative for confusion.        Objective:      Physical Exam  Vitals reviewed.   Constitutional:       General: He is awake.      Appearance: Normal appearance.   HENT:      Head: Normocephalic and atraumatic.       Right Ear: Hearing normal.      Left Ear: Hearing normal.      Nose: Nose normal.   Eyes:      General: Lids are normal. Vision grossly intact.      Extraocular Movements: Extraocular movements intact.      Conjunctiva/sclera: Conjunctivae normal.   Cardiovascular:      Rate and Rhythm: Normal rate and regular rhythm.      Pulses: Normal pulses.      Heart sounds: Normal heart sounds.   Pulmonary:      Effort: Pulmonary effort is normal.      Breath sounds: Normal breath sounds. No wheezing, rhonchi or rales.   Abdominal:      General: Bowel sounds are normal.      Palpations: Abdomen is soft.      Tenderness: There is no abdominal tenderness.   Musculoskeletal:      Cervical back: Full passive range of motion without pain.      Right lower leg: No edema.      Left lower leg: No edema.   Lymphadenopathy:      Cervical: No cervical adenopathy.      Upper Body:      Right upper body: No supraclavicular or axillary adenopathy.      Left upper body: No supraclavicular or axillary adenopathy.   Skin:     General: Skin is warm.      Comments: Lower leg petechiae     Neurological:      General: No focal deficit present.      Mental Status: He is alert and oriented to person, place, and time.   Psychiatric:         Attention and Perception: Attention normal.         Mood and Affect: Mood and affect normal.         Behavior: Behavior is cooperative.       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    Treating with azacitidine and venetoclax, Started on 02/27/2023. Proceed with cycle 3 next week.    Evaluated by Dr. Valdez in  [place] for stem cell transplant evaluation    Continue prophylaxis with 500 mg of levofloxacin daily, 200 mg of acyclovir b.i.d., 200 mg of fluconazole daily.    Twice weekly labs (Monday/Thursday)    Return to clinic prior to cycle 4      Eduard Luna II, MD

## 2023-04-20 NOTE — ASSESSMENT & PLAN NOTE
-patient feeling generally well today  -wellness labs reviewed pancytopenia/neutropenia, add TSH and lipid profile to complete labs  -age-appropriate screenings up-to-date  -immunizations up-to-date  -encourage routine aerobic exercise 2 to 3 times a week  -increase fluid hydration

## 2023-04-20 NOTE — ASSESSMENT & PLAN NOTE
-established with Oncology   -being evaluated for stem cell transplant [place]   -currently on Venclexta and as Azacitidine , continue  -also on acyclovir, fluconazole, and Levaquin daily prophylactically

## 2023-04-20 NOTE — PROGRESS NOTES
Patient ID: Hussein Gonzalez Jr. is a 72 y.o. male.    Chief Complaint: Medicare AWV (Pt concerns with possible side effects from medications )    HPI    MEDICAL HISTORY:    Past Medical History:   Diagnosis Date    Anxiety and depression     BPH (benign prostatic hyperplasia)     History of CVA (cerebrovascular accident) without residual deficits     HTN (hypertension)     Malignant melanoma of skin, unspecified     NSTEMI (non-ST elevated myocardial infarction)     Sepsis, Gram negative     Sleep apnea       Past Surgical History:   Procedure Laterality Date    BONE MARROW BIOPSY N/A 1/25/2023    Procedure: Biopsy-bone marrow;  Surgeon: Rd Carlos MD;  Location: [facility] OR;  Service: General;  Laterality: N/A;    COLONOSCOPY      COLONOSCOPY N/A 1/24/2023    Procedure: COLONOSCOPY (DOUBLE);  Surgeon: Jimy Baker MD;  Location: [facility] ENDOSCOPY;  Service: Gastroenterology;  Laterality: N/A;    EGD, WITH CLOSED BIOPSY N/A 1/24/2023    Procedure: EGD, WITH CLOSED BIOPSY;  Surgeon: Jimy Baker MD;  Location: [facility] ENDOSCOPY;  Service: Gastroenterology;  Laterality: N/A;    EXCISION OF MELANOMA      SINUS SURGERY      TONSILLECTOMY        Social History     Tobacco Use    Smoking status: Never    Smokeless tobacco: Never   Substance Use Topics    Alcohol use: Not Currently     Alcohol/week: 7.0 standard drinks     Types: 7 Cans of beer per week    Drug use: Never          Health Maintenance Due   Topic Date Due    Hepatitis C Screening  Never done          Patient Care Team:  Nayely Andres MD as PCP - General (Internal Medicine)  Nayely Andres MD      Review of Systems   Constitutional:  Negative for fatigue and fever.   HENT:  Negative for congestion, rhinorrhea, sore throat and trouble swallowing.    Eyes:  Negative for redness and visual disturbance.   Respiratory:  Negative for cough, chest tightness and shortness of breath.    Cardiovascular:  Negative for chest pain and  palpitations.   Gastrointestinal:  Negative for abdominal pain, constipation, diarrhea, nausea and vomiting.   Genitourinary:  Negative for dysuria, flank pain, frequency and urgency.   Musculoskeletal:  Negative for arthralgias, gait problem and myalgias.   Skin:  Negative for rash and wound.   Neurological:  Negative for facial asymmetry, speech difficulty, weakness and headaches.   All other systems reviewed and are negative.    Objective:   /86 (BP Location: Right arm, Patient Position: Sitting, BP Method: Small (Automatic))   Pulse 79   Temp 98.2 °F (36.8 °C) (Temporal)   Resp 18   Wt 81.9 kg (180 lb 9.6 oz)   SpO2 97%   BMI 23.83 kg/m²      Physical Exam  Constitutional:       General: He is not in acute distress.     Appearance: Normal appearance.   HENT:      Right Ear: Tympanic membrane, ear canal and external ear normal.      Left Ear: Tympanic membrane, ear canal and external ear normal.      Nose: Nose normal.      Mouth/Throat:      Mouth: Mucous membranes are moist.      Pharynx: Oropharynx is clear.   Eyes:      Extraocular Movements: Extraocular movements intact.      Conjunctiva/sclera: Conjunctivae normal.      Pupils: Pupils are equal, round, and reactive to light.   Cardiovascular:      Rate and Rhythm: Normal rate and regular rhythm.      Pulses: Normal pulses.      Heart sounds: Normal heart sounds. No murmur heard.    No gallop.   Pulmonary:      Effort: Pulmonary effort is normal.      Breath sounds: Normal breath sounds. No wheezing.   Abdominal:      General: Bowel sounds are normal. There is no distension.      Palpations: Abdomen is soft. There is no mass.      Tenderness: There is no abdominal tenderness. There is no guarding.   Musculoskeletal:         General: Normal range of motion.   Skin:     General: Skin is warm and dry.   Neurological:      Mental Status: He is alert. Mental status is at baseline.      Sensory: No sensory deficit.      Motor: No weakness.          Assessment:       ICD-10-CM ICD-9-CM   1. Medicare annual wellness visit, subsequent  Z00.00 V70.0   2. Neuropathy  G62.9 355.9   3. 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 Pancytopenia  D61.818 284.19   5. Anxiety and depression  F41.9 300.00    F32.A 311   6. Benign prostatic hyperplasia, unspecified whether lower urinary tract symptoms present  N40.0 600.00   7. Primary hypertension  I10 401.9   8. History of CVA (cerebrovascular accident) without residual deficits  Z86.73 V12.54        Plan:     Problem List Items Addressed This Visit          Neuro    History of CVA (cerebrovascular accident) without residual deficits (Chronic)     -Stable  -currently on statin, Ace              Relevant Orders    Lipid Panel    TSH       Psychiatric    Anxiety and depression (Chronic)     -stable   -currently on Celexa 40 mg, continue              Cardiac/Vascular    HTN (hypertension) (Chronic)     -/86   -currently on lisinopril-HCTZ 20-12.5 mg, continue  -low-sodium diet           Relevant Orders    Lipid Panel    TSH       Renal/    BPH (benign prostatic hyperplasia) (Chronic)     -stable   -currently on tamsulosin, continue              Oncology    Pancytopenia (Chronic)     -established with Hematology  -bone marrow biopsy preliminary reveals AML  -currently on Thyrogen, pyridoxine, continue           Relevant Medications    multivitamin (THERAGRAN) per tablet    Acute myeloid leukemia not having achieved remission (Chronic)     -established with Oncology   -being evaluated for stem cell transplant [place]   -currently on Venclexta and as Azacitidine , continue  -also on acyclovir, fluconazole, and Levaquin daily prophylactically              Other    Medicare annual wellness visit, subsequent - Primary     -patient feeling generally well today  -wellness labs reviewed pancytopenia/neutropenia, add TSH and lipid profile to complete labs  -age-appropriate screenings  up-to-date  -immunizations up-to-date  -encourage routine aerobic exercise 2 to 3 times a week  -increase fluid hydration           Relevant Orders    Lipid Panel    TSH     Other Visit Diagnoses       Neuropathy        Relevant Medications    gabapentin (NEURONTIN) 100 MG capsule               Follow up in about 4 months (around 8/20/2023) for General Checkup (dr Andres).   -plan specifics discussed above    Orders Placed This Encounter    Lipid Panel    TSH    gabapentin (NEURONTIN) 100 MG capsule        Medication List with Changes/Refills   New Medications    GABAPENTIN (NEURONTIN) 100 MG CAPSULE    Take 1 capsule (100 mg total) by mouth every evening.   Current Medications    (MAGIC MOUTHWASH) 1:1:1 DIPHENHYDRAMINE(BENADRYL) 12.5MG/5ML LIQ, ALUMINUM & MAGNESIUM HYDROXIDE-SIMETHICONE (MAALOX), LIDOCAINE VISCOUS 2%    Swish and spit 15 mLs every 4 (four) hours as needed (mouth pain).    ACYCLOVIR (ZOVIRAX) 400 MG TABLET    Take 1 tablet (400 mg total) by mouth 2 (two) times daily.    ATORVASTATIN (LIPITOR) 80 MG TABLET    Take 80 mg by mouth once daily.    CITALOPRAM (CELEXA) 40 MG TABLET    Take 1 tablet (40 mg total) by mouth once daily.    FERROUS SULFATE (FEOSOL) TAB TABLET    Take 1 tablet by mouth daily with breakfast.    FLUCONAZOLE (DIFLUCAN) 200 MG TAB    Take 1 tablet (200 mg total) by mouth once daily.    LACTOBACILLUS RHAMNOSUS GG (CULTURELLE) 10 BILLION CELL CAPSULE    Take 3 capsules by mouth once daily. Pt states he takes 3 in the morning and 2 and night    LEVOFLOXACIN (LEVAQUIN) 500 MG TABLET    Take 1 tablet (500 mg total) by mouth once daily.    LISINOPRIL-HYDROCHLOROTHIAZIDE (PRINZIDE,ZESTORETIC) 20-12.5 MG PER TABLET    Take 1 tablet by mouth once daily    MULTIVITAMIN (THERAGRAN) PER TABLET    Take 1 tablet by mouth once daily.    OMEGA-3 FATTY ACIDS/FISH OIL (FISH OIL-OMEGA-3 FATTY ACIDS) 300-1,000 MG CAPSULE    Take 1 capsule by mouth every other day.    ONDANSETRON (ZOFRAN) 8 MG  TABLET    Take 1 tablet (8 mg total) by mouth every 8 (eight) hours as needed for Nausea. 1st choice for nausea    PROCHLORPERAZINE (COMPAZINE) 10 MG TABLET    Take 1 tablet (10 mg total) by mouth every 6 (six) hours as needed (for nausea). 2nd choice, can cause drowsiness.    PYRIDOXINE, VITAMIN B6, (B-6) 100 MG TAB    Take 100 mg by mouth every other day.    TAMSULOSIN (FLOMAX) 0.4 MG CAP    TAKE 1 CAPSULE BY MOUTH ONCE DAILY IN THE EVENING    VENETOCLAX (VENCLEXTA) 100 MG TAB    Take 100 mg by mouth on day 2 of cycle 1, then take 200 mg by mouth on days 3-21 of a 28 day cycle.    VENETOCLAX (VENCLEXTA) 100 MG TAB    Take 200 mg on days 1-21 of a 28 day cycle starting with cycle 2 and beyond.    VENETOCLAX 50 MG TAB    Take 50 mg on day 1 of cycle 1.    VITAMIN D (VITAMIN D3) 1000 UNITS TAB    Take 1,000 Units by mouth once daily.    VITAMIN E 400 UNIT CAPSULE    Take 180 mg by mouth Daily.    VITAMIN E, DL, ACETATE, 22.5 MG (50 UNIT)/ML DROP    Take by mouth.   Discontinued Medications    PANTOPRAZOLE (PROTONIX) 40 MG TABLET    Take 1 tablet (40 mg total) by mouth 2 (two) times daily.

## 2023-04-21 ENCOUNTER — OFFICE VISIT (OUTPATIENT)
Dept: HEMATOLOGY/ONCOLOGY | Facility: CLINIC | Age: 73
End: 2023-04-21
Payer: MEDICARE

## 2023-04-21 VITALS
TEMPERATURE: 98 F | HEART RATE: 73 BPM | HEIGHT: 73 IN | SYSTOLIC BLOOD PRESSURE: 128 MMHG | RESPIRATION RATE: 18 BRPM | WEIGHT: 181 LBS | DIASTOLIC BLOOD PRESSURE: 81 MMHG | OXYGEN SATURATION: 100 % | BODY MASS INDEX: 23.99 KG/M2

## 2023-04-21 DIAGNOSIS — C92.00 ACUTE MYELOID LEUKEMIA NOT HAVING ACHIEVED REMISSION: Primary | Chronic | ICD-10-CM

## 2023-04-21 PROCEDURE — 99214 PR OFFICE/OUTPT VISIT, EST, LEVL IV, 30-39 MIN: ICD-10-PCS | Mod: S$PBB,,, | Performed by: INTERNAL MEDICINE

## 2023-04-21 PROCEDURE — 99999 PR PBB SHADOW E&M-EST. PATIENT-LVL V: ICD-10-PCS | Mod: PBBFAC,,, | Performed by: INTERNAL MEDICINE

## 2023-04-21 PROCEDURE — 99999 PR PBB SHADOW E&M-EST. PATIENT-LVL V: CPT | Mod: PBBFAC,,, | Performed by: INTERNAL MEDICINE

## 2023-04-21 PROCEDURE — 99215 OFFICE O/P EST HI 40 MIN: CPT | Mod: PBBFAC | Performed by: INTERNAL MEDICINE

## 2023-04-21 PROCEDURE — 99214 OFFICE O/P EST MOD 30 MIN: CPT | Mod: S$PBB,,, | Performed by: INTERNAL MEDICINE

## 2023-04-21 RX ORDER — ONDANSETRON 4 MG/1
16 TABLET, ORALLY DISINTEGRATING ORAL
Status: CANCELLED
Start: 2023-04-25

## 2023-04-21 RX ORDER — AZACITIDINE 100 MG/1
75 INJECTION, POWDER, LYOPHILIZED, FOR SOLUTION INTRAVENOUS; SUBCUTANEOUS
Status: CANCELLED | OUTPATIENT
Start: 2023-04-27

## 2023-04-21 RX ORDER — ONDANSETRON 4 MG/1
16 TABLET, ORALLY DISINTEGRATING ORAL
Status: CANCELLED
Start: 2023-04-27

## 2023-04-21 RX ORDER — AZACITIDINE 100 MG/1
75 INJECTION, POWDER, LYOPHILIZED, FOR SOLUTION INTRAVENOUS; SUBCUTANEOUS
Status: CANCELLED | OUTPATIENT
Start: 2023-04-26

## 2023-04-21 RX ORDER — ONDANSETRON 4 MG/1
16 TABLET, ORALLY DISINTEGRATING ORAL
Status: CANCELLED
Start: 2023-05-02

## 2023-04-21 RX ORDER — AZACITIDINE 100 MG/1
75 INJECTION, POWDER, LYOPHILIZED, FOR SOLUTION INTRAVENOUS; SUBCUTANEOUS
Status: CANCELLED | OUTPATIENT
Start: 2023-04-28

## 2023-04-21 RX ORDER — AZACITIDINE 100 MG/1
75 INJECTION, POWDER, LYOPHILIZED, FOR SOLUTION INTRAVENOUS; SUBCUTANEOUS
Status: CANCELLED | OUTPATIENT
Start: 2023-05-01

## 2023-04-21 RX ORDER — AZACITIDINE 100 MG/1
75 INJECTION, POWDER, LYOPHILIZED, FOR SOLUTION INTRAVENOUS; SUBCUTANEOUS
Status: CANCELLED | OUTPATIENT
Start: 2023-04-25

## 2023-04-21 RX ORDER — AZACITIDINE 100 MG/1
75 INJECTION, POWDER, LYOPHILIZED, FOR SOLUTION INTRAVENOUS; SUBCUTANEOUS
Status: CANCELLED | OUTPATIENT
Start: 2023-04-24

## 2023-04-21 RX ORDER — AZACITIDINE 100 MG/1
75 INJECTION, POWDER, LYOPHILIZED, FOR SOLUTION INTRAVENOUS; SUBCUTANEOUS
Status: CANCELLED | OUTPATIENT
Start: 2023-05-02

## 2023-04-21 RX ORDER — ONDANSETRON 4 MG/1
16 TABLET, ORALLY DISINTEGRATING ORAL
Status: CANCELLED
Start: 2023-04-24

## 2023-04-21 RX ORDER — ONDANSETRON 4 MG/1
16 TABLET, ORALLY DISINTEGRATING ORAL
Status: CANCELLED
Start: 2023-04-28

## 2023-04-21 RX ORDER — ONDANSETRON 4 MG/1
16 TABLET, ORALLY DISINTEGRATING ORAL
Status: CANCELLED
Start: 2023-05-01

## 2023-04-21 RX ORDER — ONDANSETRON 4 MG/1
16 TABLET, ORALLY DISINTEGRATING ORAL
Status: CANCELLED
Start: 2023-04-26

## 2023-04-24 ENCOUNTER — INFUSION (OUTPATIENT)
Dept: INFUSION THERAPY | Facility: HOSPITAL | Age: 73
End: 2023-04-24
Attending: INTERNAL MEDICINE
Payer: MEDICARE

## 2023-04-24 ENCOUNTER — LAB VISIT (OUTPATIENT)
Dept: LAB | Facility: HOSPITAL | Age: 73
End: 2023-04-24
Attending: INTERNAL MEDICINE
Payer: MEDICARE

## 2023-04-24 VITALS
OXYGEN SATURATION: 99 % | RESPIRATION RATE: 16 BRPM | TEMPERATURE: 98 F | DIASTOLIC BLOOD PRESSURE: 85 MMHG | SYSTOLIC BLOOD PRESSURE: 159 MMHG | HEART RATE: 75 BPM

## 2023-04-24 DIAGNOSIS — C92.00 ACUTE MYELOID LEUKEMIA NOT HAVING ACHIEVED REMISSION: ICD-10-CM

## 2023-04-24 DIAGNOSIS — D61.818 PANCYTOPENIA: ICD-10-CM

## 2023-04-24 DIAGNOSIS — C92.00 ACUTE MYELOID LEUKEMIA NOT HAVING ACHIEVED REMISSION: Primary | ICD-10-CM

## 2023-04-24 LAB
ALBUMIN SERPL-MCNC: 3.3 G/DL (ref 3.4–4.8)
ALBUMIN/GLOB SERPL: 1.1 RATIO (ref 1.1–2)
ALP SERPL-CCNC: 107 UNIT/L (ref 40–150)
ALT SERPL-CCNC: 9 UNIT/L (ref 0–55)
AST SERPL-CCNC: 13 UNIT/L (ref 5–34)
BASOPHILS # BLD AUTO: 0 X10(3)/MCL (ref 0–0.2)
BASOPHILS NFR BLD AUTO: 0 %
BILIRUBIN DIRECT+TOT PNL SERPL-MCNC: 0.7 MG/DL
BUN SERPL-MCNC: 17.5 MG/DL (ref 8.4–25.7)
CALCIUM SERPL-MCNC: 8.8 MG/DL (ref 8.8–10)
CHLORIDE SERPL-SCNC: 105 MMOL/L (ref 98–107)
CO2 SERPL-SCNC: 28 MMOL/L (ref 23–31)
CREAT SERPL-MCNC: 1.04 MG/DL (ref 0.73–1.18)
EOSINOPHIL # BLD AUTO: 0 X10(3)/MCL (ref 0–0.9)
EOSINOPHIL NFR BLD AUTO: 0 %
ERYTHROCYTE [DISTWIDTH] IN BLOOD BY AUTOMATED COUNT: 22.5 % (ref 11.5–17)
GFR SERPLBLD CREATININE-BSD FMLA CKD-EPI: >60 MLS/MIN/1.73/M2
GLOBULIN SER-MCNC: 3.1 GM/DL (ref 2.4–3.5)
GLUCOSE SERPL-MCNC: 103 MG/DL (ref 82–115)
HCT VFR BLD AUTO: 25.3 % (ref 42–52)
HGB BLD-MCNC: 7.6 G/DL (ref 14–18)
IMM GRANULOCYTES # BLD AUTO: 0.01 X10(3)/MCL (ref 0–0.04)
IMM GRANULOCYTES NFR BLD AUTO: 2.1 %
LYMPHOCYTES # BLD AUTO: 0.45 X10(3)/MCL (ref 0.6–4.6)
LYMPHOCYTES NFR BLD AUTO: 93.8 %
MCH RBC QN AUTO: 27.1 PG (ref 27–31)
MCHC RBC AUTO-ENTMCNC: 30 G/DL (ref 33–36)
MCV RBC AUTO: 90.4 FL (ref 80–94)
MONOCYTES # BLD AUTO: 0.01 X10(3)/MCL (ref 0.1–1.3)
MONOCYTES NFR BLD AUTO: 2.1 %
NEUTROPHILS # BLD AUTO: 0.01 X10(3)/MCL (ref 2.1–9.2)
NEUTROPHILS NFR BLD AUTO: 2 %
PLATELET # BLD AUTO: 72 X10(3)/MCL (ref 130–400)
PMV BLD AUTO: ABNORMAL FL
POTASSIUM SERPL-SCNC: 4 MMOL/L (ref 3.5–5.1)
PROT SERPL-MCNC: 6.4 GM/DL (ref 5.8–7.6)
RBC # BLD AUTO: 2.8 X10(6)/MCL (ref 4.7–6.1)
SODIUM SERPL-SCNC: 141 MMOL/L (ref 136–145)
WBC # SPEC AUTO: 0.5 X10(3)/MCL (ref 4.5–11.5)

## 2023-04-24 PROCEDURE — 80053 COMPREHEN METABOLIC PANEL: CPT

## 2023-04-24 PROCEDURE — 63600175 PHARM REV CODE 636 W HCPCS: Performed by: INTERNAL MEDICINE

## 2023-04-24 PROCEDURE — 96401 CHEMO ANTI-NEOPL SQ/IM: CPT

## 2023-04-24 PROCEDURE — 36415 COLL VENOUS BLD VENIPUNCTURE: CPT

## 2023-04-24 PROCEDURE — 25000003 PHARM REV CODE 250: Performed by: INTERNAL MEDICINE

## 2023-04-24 PROCEDURE — 85025 COMPLETE CBC W/AUTO DIFF WBC: CPT

## 2023-04-24 RX ORDER — AZACITIDINE 100 MG/1
75 INJECTION, POWDER, LYOPHILIZED, FOR SOLUTION INTRAVENOUS; SUBCUTANEOUS
Status: COMPLETED | OUTPATIENT
Start: 2023-04-24 | End: 2023-04-24

## 2023-04-24 RX ORDER — ONDANSETRON 4 MG/1
16 TABLET, ORALLY DISINTEGRATING ORAL
Status: COMPLETED | OUTPATIENT
Start: 2023-04-24 | End: 2023-04-24

## 2023-04-24 RX ADMIN — AZACITIDINE 155 MG: 100 INJECTION, POWDER, LYOPHILIZED, FOR SOLUTION INTRAVENOUS; SUBCUTANEOUS at 01:04

## 2023-04-24 RX ADMIN — ONDANSETRON 16 MG: 4 TABLET, ORALLY DISINTEGRATING ORAL at 01:04

## 2023-04-25 ENCOUNTER — INFUSION (OUTPATIENT)
Dept: INFUSION THERAPY | Facility: HOSPITAL | Age: 73
End: 2023-04-25
Attending: INTERNAL MEDICINE
Payer: MEDICARE

## 2023-04-25 VITALS
HEIGHT: 73 IN | RESPIRATION RATE: 20 BRPM | WEIGHT: 183.19 LBS | TEMPERATURE: 98 F | BODY MASS INDEX: 24.28 KG/M2 | SYSTOLIC BLOOD PRESSURE: 152 MMHG | HEART RATE: 86 BPM | DIASTOLIC BLOOD PRESSURE: 71 MMHG

## 2023-04-25 DIAGNOSIS — C92.00 ACUTE MYELOID LEUKEMIA NOT HAVING ACHIEVED REMISSION: Primary | ICD-10-CM

## 2023-04-25 PROCEDURE — 25000003 PHARM REV CODE 250: Performed by: INTERNAL MEDICINE

## 2023-04-25 PROCEDURE — 96401 CHEMO ANTI-NEOPL SQ/IM: CPT

## 2023-04-25 PROCEDURE — 63600175 PHARM REV CODE 636 W HCPCS: Performed by: INTERNAL MEDICINE

## 2023-04-25 RX ORDER — AZACITIDINE 100 MG/1
75 INJECTION, POWDER, LYOPHILIZED, FOR SOLUTION INTRAVENOUS; SUBCUTANEOUS
Status: COMPLETED | OUTPATIENT
Start: 2023-04-25 | End: 2023-04-25

## 2023-04-25 RX ORDER — ONDANSETRON 4 MG/1
16 TABLET, ORALLY DISINTEGRATING ORAL
Status: COMPLETED | OUTPATIENT
Start: 2023-04-25 | End: 2023-04-25

## 2023-04-25 RX ADMIN — ONDANSETRON 16 MG: 4 TABLET, ORALLY DISINTEGRATING ORAL at 01:04

## 2023-04-25 RX ADMIN — AZACITIDINE 155 MG: 100 INJECTION, POWDER, LYOPHILIZED, FOR SOLUTION INTRAVENOUS; SUBCUTANEOUS at 01:04

## 2023-04-26 ENCOUNTER — INFUSION (OUTPATIENT)
Dept: INFUSION THERAPY | Facility: HOSPITAL | Age: 73
End: 2023-04-26
Attending: INTERNAL MEDICINE
Payer: MEDICARE

## 2023-04-26 VITALS
RESPIRATION RATE: 18 BRPM | TEMPERATURE: 98 F | HEART RATE: 96 BPM | DIASTOLIC BLOOD PRESSURE: 61 MMHG | SYSTOLIC BLOOD PRESSURE: 116 MMHG

## 2023-04-26 DIAGNOSIS — C92.00 ACUTE MYELOID LEUKEMIA NOT HAVING ACHIEVED REMISSION: Primary | ICD-10-CM

## 2023-04-26 PROCEDURE — 96401 CHEMO ANTI-NEOPL SQ/IM: CPT

## 2023-04-26 PROCEDURE — 63600175 PHARM REV CODE 636 W HCPCS: Performed by: INTERNAL MEDICINE

## 2023-04-26 PROCEDURE — 25000003 PHARM REV CODE 250: Performed by: INTERNAL MEDICINE

## 2023-04-26 RX ORDER — ONDANSETRON 4 MG/1
16 TABLET, ORALLY DISINTEGRATING ORAL
Status: COMPLETED | OUTPATIENT
Start: 2023-04-26 | End: 2023-04-26

## 2023-04-26 RX ORDER — AZACITIDINE 100 MG/1
75 INJECTION, POWDER, LYOPHILIZED, FOR SOLUTION INTRAVENOUS; SUBCUTANEOUS
Status: COMPLETED | OUTPATIENT
Start: 2023-04-26 | End: 2023-04-26

## 2023-04-26 RX ADMIN — ONDANSETRON 16 MG: 4 TABLET, ORALLY DISINTEGRATING ORAL at 01:04

## 2023-04-26 RX ADMIN — AZACITIDINE 155 MG: 100 INJECTION, POWDER, LYOPHILIZED, FOR SOLUTION INTRAVENOUS; SUBCUTANEOUS at 01:04

## 2023-04-27 ENCOUNTER — INFUSION (OUTPATIENT)
Dept: INFUSION THERAPY | Facility: HOSPITAL | Age: 73
End: 2023-04-27
Attending: INTERNAL MEDICINE
Payer: MEDICARE

## 2023-04-27 ENCOUNTER — LAB VISIT (OUTPATIENT)
Dept: LAB | Facility: HOSPITAL | Age: 73
End: 2023-04-27
Attending: INTERNAL MEDICINE
Payer: MEDICARE

## 2023-04-27 VITALS
SYSTOLIC BLOOD PRESSURE: 143 MMHG | OXYGEN SATURATION: 99 % | TEMPERATURE: 99 F | DIASTOLIC BLOOD PRESSURE: 70 MMHG | HEART RATE: 82 BPM | RESPIRATION RATE: 18 BRPM

## 2023-04-27 DIAGNOSIS — C92.00 ACUTE MYELOID LEUKEMIA NOT HAVING ACHIEVED REMISSION: ICD-10-CM

## 2023-04-27 DIAGNOSIS — C92.00 ACUTE MYELOID LEUKEMIA NOT HAVING ACHIEVED REMISSION: Primary | ICD-10-CM

## 2023-04-27 LAB
ALBUMIN SERPL-MCNC: 3.4 G/DL (ref 3.4–4.8)
ALBUMIN/GLOB SERPL: 1.1 RATIO (ref 1.1–2)
ALP SERPL-CCNC: 109 UNIT/L (ref 40–150)
ALT SERPL-CCNC: 11 UNIT/L (ref 0–55)
AST SERPL-CCNC: 13 UNIT/L (ref 5–34)
BASOPHILS # BLD AUTO: 0 X10(3)/MCL (ref 0–0.2)
BASOPHILS NFR BLD AUTO: 0 %
BILIRUBIN DIRECT+TOT PNL SERPL-MCNC: 0.5 MG/DL
BUN SERPL-MCNC: 19.6 MG/DL (ref 8.4–25.7)
CALCIUM SERPL-MCNC: 9.2 MG/DL (ref 8.8–10)
CHLORIDE SERPL-SCNC: 101 MMOL/L (ref 98–107)
CO2 SERPL-SCNC: 32 MMOL/L (ref 23–31)
CREAT SERPL-MCNC: 1.14 MG/DL (ref 0.73–1.18)
EOSINOPHIL # BLD AUTO: 0 X10(3)/MCL (ref 0–0.9)
EOSINOPHIL NFR BLD AUTO: 0 %
ERYTHROCYTE [DISTWIDTH] IN BLOOD BY AUTOMATED COUNT: 23.5 % (ref 11.5–17)
GFR SERPLBLD CREATININE-BSD FMLA CKD-EPI: >60 MLS/MIN/1.73/M2
GLOBULIN SER-MCNC: 3.2 GM/DL (ref 2.4–3.5)
GLUCOSE SERPL-MCNC: 103 MG/DL (ref 82–115)
HCT VFR BLD AUTO: 26.2 % (ref 42–52)
HGB BLD-MCNC: 7.8 G/DL (ref 14–18)
IMM GRANULOCYTES # BLD AUTO: 0.01 X10(3)/MCL (ref 0–0.04)
IMM GRANULOCYTES NFR BLD AUTO: 1.7 %
LYMPHOCYTES # BLD AUTO: 0.48 X10(3)/MCL (ref 0.6–4.6)
LYMPHOCYTES NFR BLD AUTO: 80 %
MCH RBC QN AUTO: 26.9 PG (ref 27–31)
MCHC RBC AUTO-ENTMCNC: 29.8 G/DL (ref 33–36)
MCV RBC AUTO: 90.3 FL (ref 80–94)
MONOCYTES # BLD AUTO: 0.04 X10(3)/MCL (ref 0.1–1.3)
MONOCYTES NFR BLD AUTO: 6.7 %
NEUTROPHILS # BLD AUTO: 0.07 X10(3)/MCL (ref 2.1–9.2)
NEUTROPHILS NFR BLD AUTO: 11.6 %
PLATELET # BLD AUTO: 91 X10(3)/MCL (ref 130–400)
PMV BLD AUTO: ABNORMAL FL
POTASSIUM SERPL-SCNC: 4 MMOL/L (ref 3.5–5.1)
PROT SERPL-MCNC: 6.6 GM/DL (ref 5.8–7.6)
RBC # BLD AUTO: 2.9 X10(6)/MCL (ref 4.7–6.1)
SODIUM SERPL-SCNC: 139 MMOL/L (ref 136–145)
WBC # SPEC AUTO: 0.6 X10(3)/MCL (ref 4.5–11.5)

## 2023-04-27 PROCEDURE — 36415 COLL VENOUS BLD VENIPUNCTURE: CPT

## 2023-04-27 PROCEDURE — 85025 COMPLETE CBC W/AUTO DIFF WBC: CPT

## 2023-04-27 PROCEDURE — 80053 COMPREHEN METABOLIC PANEL: CPT

## 2023-04-27 PROCEDURE — 96401 CHEMO ANTI-NEOPL SQ/IM: CPT

## 2023-04-27 PROCEDURE — 63600175 PHARM REV CODE 636 W HCPCS: Performed by: INTERNAL MEDICINE

## 2023-04-27 PROCEDURE — 25000003 PHARM REV CODE 250: Performed by: INTERNAL MEDICINE

## 2023-04-27 RX ORDER — ONDANSETRON 4 MG/1
16 TABLET, ORALLY DISINTEGRATING ORAL
Status: COMPLETED | OUTPATIENT
Start: 2023-04-27 | End: 2023-04-27

## 2023-04-27 RX ORDER — AZACITIDINE 100 MG/1
75 INJECTION, POWDER, LYOPHILIZED, FOR SOLUTION INTRAVENOUS; SUBCUTANEOUS
Status: COMPLETED | OUTPATIENT
Start: 2023-04-27 | End: 2023-04-27

## 2023-04-27 RX ADMIN — ONDANSETRON 16 MG: 4 TABLET, ORALLY DISINTEGRATING ORAL at 02:04

## 2023-04-27 RX ADMIN — AZACITIDINE 155 MG: 100 INJECTION, POWDER, LYOPHILIZED, FOR SOLUTION INTRAVENOUS; SUBCUTANEOUS at 02:04

## 2023-04-28 ENCOUNTER — INFUSION (OUTPATIENT)
Dept: INFUSION THERAPY | Facility: HOSPITAL | Age: 73
End: 2023-04-28
Attending: INTERNAL MEDICINE
Payer: MEDICARE

## 2023-04-28 VITALS
HEART RATE: 99 BPM | RESPIRATION RATE: 20 BRPM | HEIGHT: 73 IN | DIASTOLIC BLOOD PRESSURE: 66 MMHG | BODY MASS INDEX: 24.28 KG/M2 | TEMPERATURE: 98 F | SYSTOLIC BLOOD PRESSURE: 118 MMHG | WEIGHT: 183.19 LBS

## 2023-04-28 DIAGNOSIS — C92.00 ACUTE MYELOID LEUKEMIA NOT HAVING ACHIEVED REMISSION: Primary | ICD-10-CM

## 2023-04-28 PROCEDURE — 25000003 PHARM REV CODE 250: Performed by: INTERNAL MEDICINE

## 2023-04-28 PROCEDURE — 96401 CHEMO ANTI-NEOPL SQ/IM: CPT

## 2023-04-28 PROCEDURE — 63600175 PHARM REV CODE 636 W HCPCS: Performed by: INTERNAL MEDICINE

## 2023-04-28 RX ORDER — AZACITIDINE 100 MG/1
75 INJECTION, POWDER, LYOPHILIZED, FOR SOLUTION INTRAVENOUS; SUBCUTANEOUS
Status: COMPLETED | OUTPATIENT
Start: 2023-04-28 | End: 2023-04-28

## 2023-04-28 RX ORDER — ONDANSETRON 4 MG/1
16 TABLET, ORALLY DISINTEGRATING ORAL
Status: COMPLETED | OUTPATIENT
Start: 2023-04-28 | End: 2023-04-28

## 2023-04-28 RX ADMIN — AZACITIDINE 155 MG: 100 INJECTION, POWDER, LYOPHILIZED, FOR SOLUTION INTRAVENOUS; SUBCUTANEOUS at 10:04

## 2023-04-28 RX ADMIN — ONDANSETRON 16 MG: 4 TABLET, ORALLY DISINTEGRATING ORAL at 09:04

## 2023-05-01 ENCOUNTER — INFUSION (OUTPATIENT)
Dept: INFUSION THERAPY | Facility: HOSPITAL | Age: 73
End: 2023-05-01
Attending: INTERNAL MEDICINE
Payer: MEDICARE

## 2023-05-01 ENCOUNTER — LAB VISIT (OUTPATIENT)
Dept: LAB | Facility: HOSPITAL | Age: 73
End: 2023-05-01
Attending: INTERNAL MEDICINE
Payer: MEDICARE

## 2023-05-01 VITALS
TEMPERATURE: 98 F | HEART RATE: 80 BPM | BODY MASS INDEX: 24.28 KG/M2 | HEIGHT: 73 IN | SYSTOLIC BLOOD PRESSURE: 119 MMHG | RESPIRATION RATE: 20 BRPM | DIASTOLIC BLOOD PRESSURE: 68 MMHG | WEIGHT: 183.19 LBS

## 2023-05-01 DIAGNOSIS — C92.00 ACUTE MYELOID LEUKEMIA NOT HAVING ACHIEVED REMISSION: Primary | ICD-10-CM

## 2023-05-01 DIAGNOSIS — C92.00 ACUTE MYELOID LEUKEMIA NOT HAVING ACHIEVED REMISSION: ICD-10-CM

## 2023-05-01 LAB
ALBUMIN SERPL-MCNC: 3.4 G/DL (ref 3.4–4.8)
ALBUMIN/GLOB SERPL: 1 RATIO (ref 1.1–2)
ALP SERPL-CCNC: 113 UNIT/L (ref 40–150)
ALT SERPL-CCNC: 10 UNIT/L (ref 0–55)
AST SERPL-CCNC: 13 UNIT/L (ref 5–34)
BASOPHILS # BLD AUTO: 0 X10(3)/MCL (ref 0–0.2)
BASOPHILS NFR BLD AUTO: 0 %
BILIRUBIN DIRECT+TOT PNL SERPL-MCNC: 0.5 MG/DL
BUN SERPL-MCNC: 20.2 MG/DL (ref 8.4–25.7)
CALCIUM SERPL-MCNC: 9.4 MG/DL (ref 8.8–10)
CHLORIDE SERPL-SCNC: 104 MMOL/L (ref 98–107)
CO2 SERPL-SCNC: 30 MMOL/L (ref 23–31)
CREAT SERPL-MCNC: 1.13 MG/DL (ref 0.73–1.18)
EOSINOPHIL # BLD AUTO: 0 X10(3)/MCL (ref 0–0.9)
EOSINOPHIL NFR BLD AUTO: 0 %
ERYTHROCYTE [DISTWIDTH] IN BLOOD BY AUTOMATED COUNT: 23.8 % (ref 11.5–17)
GFR SERPLBLD CREATININE-BSD FMLA CKD-EPI: >60 MLS/MIN/1.73/M2
GLOBULIN SER-MCNC: 3.3 GM/DL (ref 2.4–3.5)
GLUCOSE SERPL-MCNC: 122 MG/DL (ref 82–115)
HCT VFR BLD AUTO: 26 % (ref 42–52)
HGB BLD-MCNC: 7.9 G/DL (ref 14–18)
IMM GRANULOCYTES # BLD AUTO: 0 X10(3)/MCL (ref 0–0.04)
IMM GRANULOCYTES NFR BLD AUTO: 0 %
LYMPHOCYTES # BLD AUTO: 0.59 X10(3)/MCL (ref 0.6–4.6)
LYMPHOCYTES NFR BLD AUTO: 73.8 %
MCH RBC QN AUTO: 27.4 PG (ref 27–31)
MCHC RBC AUTO-ENTMCNC: 30.4 G/DL (ref 33–36)
MCV RBC AUTO: 90.3 FL (ref 80–94)
MONOCYTES # BLD AUTO: 0.04 X10(3)/MCL (ref 0.1–1.3)
MONOCYTES NFR BLD AUTO: 5 %
NEUTROPHILS # BLD AUTO: 0.17 X10(3)/MCL (ref 2.1–9.2)
NEUTROPHILS NFR BLD AUTO: 21.2 %
PLATELET # BLD AUTO: 38 X10(3)/MCL (ref 130–400)
PMV BLD AUTO: ABNORMAL FL
POTASSIUM SERPL-SCNC: 4.2 MMOL/L (ref 3.5–5.1)
PROT SERPL-MCNC: 6.7 GM/DL (ref 5.8–7.6)
RBC # BLD AUTO: 2.88 X10(6)/MCL (ref 4.7–6.1)
SODIUM SERPL-SCNC: 141 MMOL/L (ref 136–145)
WBC # SPEC AUTO: 0.8 X10(3)/MCL (ref 4.5–11.5)

## 2023-05-01 PROCEDURE — 96401 CHEMO ANTI-NEOPL SQ/IM: CPT

## 2023-05-01 PROCEDURE — 80053 COMPREHEN METABOLIC PANEL: CPT

## 2023-05-01 PROCEDURE — 85025 COMPLETE CBC W/AUTO DIFF WBC: CPT

## 2023-05-01 PROCEDURE — 25000003 PHARM REV CODE 250: Performed by: INTERNAL MEDICINE

## 2023-05-01 PROCEDURE — 63600175 PHARM REV CODE 636 W HCPCS: Performed by: INTERNAL MEDICINE

## 2023-05-01 PROCEDURE — 36415 COLL VENOUS BLD VENIPUNCTURE: CPT

## 2023-05-01 RX ORDER — AZACITIDINE 100 MG/1
75 INJECTION, POWDER, LYOPHILIZED, FOR SOLUTION INTRAVENOUS; SUBCUTANEOUS
Status: COMPLETED | OUTPATIENT
Start: 2023-05-01 | End: 2023-05-01

## 2023-05-01 RX ORDER — ONDANSETRON 4 MG/1
16 TABLET, ORALLY DISINTEGRATING ORAL
Status: COMPLETED | OUTPATIENT
Start: 2023-05-01 | End: 2023-05-01

## 2023-05-01 RX ADMIN — AZACITIDINE 155 MG: 100 INJECTION, POWDER, LYOPHILIZED, FOR SOLUTION INTRAVENOUS; SUBCUTANEOUS at 10:05

## 2023-05-01 RX ADMIN — ONDANSETRON 16 MG: 4 TABLET, ORALLY DISINTEGRATING ORAL at 10:05

## 2023-05-02 ENCOUNTER — INFUSION (OUTPATIENT)
Dept: INFUSION THERAPY | Facility: HOSPITAL | Age: 73
End: 2023-05-02
Attending: INTERNAL MEDICINE
Payer: MEDICARE

## 2023-05-02 VITALS
HEIGHT: 73 IN | SYSTOLIC BLOOD PRESSURE: 109 MMHG | RESPIRATION RATE: 20 BRPM | HEART RATE: 101 BPM | DIASTOLIC BLOOD PRESSURE: 60 MMHG | WEIGHT: 183.19 LBS | BODY MASS INDEX: 24.28 KG/M2

## 2023-05-02 DIAGNOSIS — C92.00 ACUTE MYELOID LEUKEMIA NOT HAVING ACHIEVED REMISSION: Primary | ICD-10-CM

## 2023-05-02 PROCEDURE — 63600175 PHARM REV CODE 636 W HCPCS: Performed by: INTERNAL MEDICINE

## 2023-05-02 PROCEDURE — 96401 CHEMO ANTI-NEOPL SQ/IM: CPT

## 2023-05-02 PROCEDURE — 25000003 PHARM REV CODE 250: Performed by: INTERNAL MEDICINE

## 2023-05-02 RX ORDER — ONDANSETRON 4 MG/1
16 TABLET, ORALLY DISINTEGRATING ORAL
Status: COMPLETED | OUTPATIENT
Start: 2023-05-02 | End: 2023-05-02

## 2023-05-02 RX ORDER — AZACITIDINE 100 MG/1
75 INJECTION, POWDER, LYOPHILIZED, FOR SOLUTION INTRAVENOUS; SUBCUTANEOUS
Status: COMPLETED | OUTPATIENT
Start: 2023-05-02 | End: 2023-05-02

## 2023-05-02 RX ADMIN — ONDANSETRON 16 MG: 4 TABLET, ORALLY DISINTEGRATING ORAL at 01:05

## 2023-05-02 RX ADMIN — AZACITIDINE 155 MG: 100 INJECTION, POWDER, LYOPHILIZED, FOR SOLUTION INTRAVENOUS; SUBCUTANEOUS at 01:05

## 2023-05-04 ENCOUNTER — INFUSION (OUTPATIENT)
Dept: INFUSION THERAPY | Facility: HOSPITAL | Age: 73
End: 2023-05-04
Attending: INTERNAL MEDICINE
Payer: MEDICARE

## 2023-05-04 ENCOUNTER — LAB VISIT (OUTPATIENT)
Dept: LAB | Facility: HOSPITAL | Age: 73
End: 2023-05-04
Attending: INTERNAL MEDICINE
Payer: MEDICARE

## 2023-05-04 DIAGNOSIS — C92.00 ACUTE MYELOID LEUKEMIA NOT HAVING ACHIEVED REMISSION: ICD-10-CM

## 2023-05-04 LAB
ABS NEUT CALC (OHS): 0.17 X10(3)/MCL (ref 2.1–9.2)
ALBUMIN SERPL-MCNC: 3.4 G/DL (ref 3.4–4.8)
ALBUMIN/GLOB SERPL: 1 RATIO (ref 1.1–2)
ALP SERPL-CCNC: 104 UNIT/L (ref 40–150)
ALT SERPL-CCNC: 12 UNIT/L (ref 0–55)
ANISOCYTOSIS BLD QL SMEAR: ABNORMAL
AST SERPL-CCNC: 16 UNIT/L (ref 5–34)
BILIRUBIN DIRECT+TOT PNL SERPL-MCNC: 0.6 MG/DL
BUN SERPL-MCNC: 18.9 MG/DL (ref 8.4–25.7)
CALCIUM SERPL-MCNC: 9.1 MG/DL (ref 8.8–10)
CHLORIDE SERPL-SCNC: 104 MMOL/L (ref 98–107)
CO2 SERPL-SCNC: 24 MMOL/L (ref 23–31)
CREAT SERPL-MCNC: 1.1 MG/DL (ref 0.73–1.18)
ERYTHROCYTE [DISTWIDTH] IN BLOOD BY AUTOMATED COUNT: 24 % (ref 11.5–17)
GFR SERPLBLD CREATININE-BSD FMLA CKD-EPI: >60 MLS/MIN/1.73/M2
GLOBULIN SER-MCNC: 3.3 GM/DL (ref 2.4–3.5)
GLUCOSE SERPL-MCNC: 152 MG/DL (ref 82–115)
HCT VFR BLD AUTO: 25.6 % (ref 42–52)
HGB BLD-MCNC: 7.8 G/DL (ref 14–18)
HYPOCHROMIA BLD QL SMEAR: SLIGHT
LYMPH ABN # BLD MANUAL: 4 %
LYMPHOCYTES NFR BLD MANUAL: 0.53 X10(3)/MCL
LYMPHOCYTES NFR BLD MANUAL: 69 % (ref 13–40)
MCH RBC QN AUTO: 27.6 PG (ref 27–31)
MCHC RBC AUTO-ENTMCNC: 30.5 G/DL (ref 33–36)
MCV RBC AUTO: 90.5 FL (ref 80–94)
MONOCYTES NFR BLD MANUAL: 0.04 X10(3)/MCL (ref 0.1–1.3)
MONOCYTES NFR BLD MANUAL: 5 % (ref 2–11)
NEUTROPHILS NFR BLD MANUAL: 22 % (ref 47–80)
OVALOCYTES (OLG): ABNORMAL
PLATELET # BLD AUTO: 23 X10(3)/MCL (ref 130–400)
PLATELET # BLD EST: ABNORMAL 10*3/UL
PMV BLD AUTO: ABNORMAL FL
POIKILOCYTOSIS BLD QL SMEAR: ABNORMAL
POLYCHROMASIA BLD QL SMEAR: SLIGHT
POTASSIUM SERPL-SCNC: 4.1 MMOL/L (ref 3.5–5.1)
PROT SERPL-MCNC: 6.7 GM/DL (ref 5.8–7.6)
RBC # BLD AUTO: 2.83 X10(6)/MCL (ref 4.7–6.1)
RBC MORPH BLD: ABNORMAL
SODIUM SERPL-SCNC: 136 MMOL/L (ref 136–145)
TEAR DROP CELL (OLG): SLIGHT
WBC # SPEC AUTO: 0.77 X10(3)/MCL (ref 4.5–11.5)

## 2023-05-04 PROCEDURE — 80053 COMPREHEN METABOLIC PANEL: CPT

## 2023-05-04 PROCEDURE — 36415 COLL VENOUS BLD VENIPUNCTURE: CPT

## 2023-05-04 PROCEDURE — 85027 COMPLETE CBC AUTOMATED: CPT

## 2023-05-08 ENCOUNTER — LAB VISIT (OUTPATIENT)
Dept: LAB | Facility: HOSPITAL | Age: 73
End: 2023-05-08
Attending: INTERNAL MEDICINE
Payer: MEDICARE

## 2023-05-08 ENCOUNTER — INFUSION (OUTPATIENT)
Dept: INFUSION THERAPY | Facility: HOSPITAL | Age: 73
End: 2023-05-08
Attending: INTERNAL MEDICINE
Payer: MEDICARE

## 2023-05-08 DIAGNOSIS — C92.00 ACUTE MYELOID LEUKEMIA NOT HAVING ACHIEVED REMISSION: ICD-10-CM

## 2023-05-08 LAB
ALBUMIN SERPL-MCNC: 3.4 G/DL (ref 3.4–4.8)
ALBUMIN/GLOB SERPL: 1.1 RATIO (ref 1.1–2)
ALP SERPL-CCNC: 106 UNIT/L (ref 40–150)
ALT SERPL-CCNC: 11 UNIT/L (ref 0–55)
AST SERPL-CCNC: 13 UNIT/L (ref 5–34)
BASOPHILS # BLD AUTO: 0 X10(3)/MCL
BASOPHILS NFR BLD AUTO: 0 %
BILIRUBIN DIRECT+TOT PNL SERPL-MCNC: 0.6 MG/DL
BUN SERPL-MCNC: 14.1 MG/DL (ref 8.4–25.7)
CALCIUM SERPL-MCNC: 9.2 MG/DL (ref 8.8–10)
CHLORIDE SERPL-SCNC: 105 MMOL/L (ref 98–107)
CO2 SERPL-SCNC: 31 MMOL/L (ref 23–31)
CREAT SERPL-MCNC: 1.07 MG/DL (ref 0.73–1.18)
EOSINOPHIL # BLD AUTO: 0 X10(3)/MCL (ref 0–0.9)
EOSINOPHIL NFR BLD AUTO: 0 %
ERYTHROCYTE [DISTWIDTH] IN BLOOD BY AUTOMATED COUNT: 24.1 % (ref 11.5–17)
GFR SERPLBLD CREATININE-BSD FMLA CKD-EPI: >60 MLS/MIN/1.73/M2
GLOBULIN SER-MCNC: 3.1 GM/DL (ref 2.4–3.5)
GLUCOSE SERPL-MCNC: 111 MG/DL (ref 82–115)
HCT VFR BLD AUTO: 24.6 % (ref 42–52)
HGB BLD-MCNC: 7.6 G/DL (ref 14–18)
IMM GRANULOCYTES # BLD AUTO: 0.01 X10(3)/MCL (ref 0–0.04)
IMM GRANULOCYTES NFR BLD AUTO: 1.3 %
LYMPHOCYTES # BLD AUTO: 0.63 X10(3)/MCL (ref 0.6–4.6)
LYMPHOCYTES NFR BLD AUTO: 80.8 %
MCH RBC QN AUTO: 28.1 PG (ref 27–31)
MCHC RBC AUTO-ENTMCNC: 30.9 G/DL (ref 33–36)
MCV RBC AUTO: 91.1 FL (ref 80–94)
MONOCYTES # BLD AUTO: 0.04 X10(3)/MCL (ref 0.1–1.3)
MONOCYTES NFR BLD AUTO: 5.1 %
NEUTROPHILS # BLD AUTO: 0.1 X10(3)/MCL (ref 2.1–9.2)
NEUTROPHILS NFR BLD AUTO: 12.8 %
PLATELET # BLD AUTO: 23 X10(3)/MCL (ref 130–400)
PMV BLD AUTO: ABNORMAL FL
POTASSIUM SERPL-SCNC: 4.1 MMOL/L (ref 3.5–5.1)
PROT SERPL-MCNC: 6.5 GM/DL (ref 5.8–7.6)
RBC # BLD AUTO: 2.7 X10(6)/MCL (ref 4.7–6.1)
SODIUM SERPL-SCNC: 142 MMOL/L (ref 136–145)
WBC # SPEC AUTO: 0.78 X10(3)/MCL (ref 4.5–11.5)

## 2023-05-08 PROCEDURE — 80053 COMPREHEN METABOLIC PANEL: CPT

## 2023-05-08 PROCEDURE — 85025 COMPLETE CBC W/AUTO DIFF WBC: CPT

## 2023-05-08 PROCEDURE — 36415 COLL VENOUS BLD VENIPUNCTURE: CPT

## 2023-05-08 NOTE — NURSING
"2.70  7.6/24.6  23plgage Bobby NP apprised.  No blood products required.   Patients  asymptomatic   states that he "feels great"  "

## 2023-05-11 ENCOUNTER — LAB VISIT (OUTPATIENT)
Dept: LAB | Facility: HOSPITAL | Age: 73
End: 2023-05-11
Attending: INTERNAL MEDICINE
Payer: MEDICARE

## 2023-05-11 DIAGNOSIS — C92.00 ACUTE MYELOID LEUKEMIA NOT HAVING ACHIEVED REMISSION: ICD-10-CM

## 2023-05-11 LAB
ALBUMIN SERPL-MCNC: 3.4 G/DL (ref 3.4–4.8)
ALBUMIN/GLOB SERPL: 1.2 RATIO (ref 1.1–2)
ALP SERPL-CCNC: 117 UNIT/L (ref 40–150)
ALT SERPL-CCNC: 15 UNIT/L (ref 0–55)
AST SERPL-CCNC: 14 UNIT/L (ref 5–34)
BASOPHILS # BLD AUTO: 0 X10(3)/MCL
BASOPHILS NFR BLD AUTO: 0 %
BILIRUBIN DIRECT+TOT PNL SERPL-MCNC: 0.4 MG/DL
BUN SERPL-MCNC: 14.2 MG/DL (ref 8.4–25.7)
CALCIUM SERPL-MCNC: 9.1 MG/DL (ref 8.8–10)
CHLORIDE SERPL-SCNC: 104 MMOL/L (ref 98–107)
CO2 SERPL-SCNC: 31 MMOL/L (ref 23–31)
CREAT SERPL-MCNC: 1.06 MG/DL (ref 0.73–1.18)
EOSINOPHIL # BLD AUTO: 0 X10(3)/MCL (ref 0–0.9)
EOSINOPHIL NFR BLD AUTO: 0 %
ERYTHROCYTE [DISTWIDTH] IN BLOOD BY AUTOMATED COUNT: 24.6 % (ref 11.5–17)
GFR SERPLBLD CREATININE-BSD FMLA CKD-EPI: >60 MLS/MIN/1.73/M2
GLOBULIN SER-MCNC: 2.9 GM/DL (ref 2.4–3.5)
GLUCOSE SERPL-MCNC: 104 MG/DL (ref 82–115)
HCT VFR BLD AUTO: 23.8 % (ref 42–52)
HGB BLD-MCNC: 7.3 G/DL (ref 14–18)
IMM GRANULOCYTES # BLD AUTO: 0 X10(3)/MCL (ref 0–0.04)
IMM GRANULOCYTES NFR BLD AUTO: 0 %
LYMPHOCYTES # BLD AUTO: 0.64 X10(3)/MCL (ref 0.6–4.6)
LYMPHOCYTES NFR BLD AUTO: 91.4 %
MCH RBC QN AUTO: 28.4 PG (ref 27–31)
MCHC RBC AUTO-ENTMCNC: 30.7 G/DL (ref 33–36)
MCV RBC AUTO: 92.6 FL (ref 80–94)
MONOCYTES # BLD AUTO: 0.01 X10(3)/MCL (ref 0.1–1.3)
MONOCYTES NFR BLD AUTO: 1.4 %
NEUTROPHILS # BLD AUTO: 0.05 X10(3)/MCL (ref 2.1–9.2)
NEUTROPHILS NFR BLD AUTO: 7.2 %
PLATELET # BLD AUTO: 34 X10(3)/MCL (ref 130–400)
PMV BLD AUTO: ABNORMAL FL
POTASSIUM SERPL-SCNC: 4 MMOL/L (ref 3.5–5.1)
PROT SERPL-MCNC: 6.3 GM/DL (ref 5.8–7.6)
RBC # BLD AUTO: 2.57 X10(6)/MCL (ref 4.7–6.1)
SODIUM SERPL-SCNC: 141 MMOL/L (ref 136–145)
WBC # SPEC AUTO: 0.7 X10(3)/MCL (ref 4.5–11.5)

## 2023-05-11 PROCEDURE — 85025 COMPLETE CBC W/AUTO DIFF WBC: CPT

## 2023-05-11 PROCEDURE — 36415 COLL VENOUS BLD VENIPUNCTURE: CPT

## 2023-05-11 PROCEDURE — 80053 COMPREHEN METABOLIC PANEL: CPT

## 2023-05-15 ENCOUNTER — LAB VISIT (OUTPATIENT)
Dept: LAB | Facility: HOSPITAL | Age: 73
End: 2023-05-15
Attending: INTERNAL MEDICINE
Payer: MEDICARE

## 2023-05-15 ENCOUNTER — INFUSION (OUTPATIENT)
Dept: INFUSION THERAPY | Facility: HOSPITAL | Age: 73
End: 2023-05-15
Attending: INTERNAL MEDICINE
Payer: MEDICARE

## 2023-05-15 VITALS
WEIGHT: 180 LBS | OXYGEN SATURATION: 98 % | DIASTOLIC BLOOD PRESSURE: 75 MMHG | HEART RATE: 56 BPM | BODY MASS INDEX: 23.86 KG/M2 | SYSTOLIC BLOOD PRESSURE: 139 MMHG | TEMPERATURE: 98 F | HEIGHT: 73 IN | RESPIRATION RATE: 16 BRPM

## 2023-05-15 DIAGNOSIS — C92.00 ACUTE MYELOID LEUKEMIA NOT HAVING ACHIEVED REMISSION: Primary | ICD-10-CM

## 2023-05-15 DIAGNOSIS — C92.00 ACUTE MYELOID LEUKEMIA NOT HAVING ACHIEVED REMISSION: ICD-10-CM

## 2023-05-15 LAB
ABO + RH BLD: NORMAL
ALBUMIN SERPL-MCNC: 3.3 G/DL (ref 3.4–4.8)
ALBUMIN/GLOB SERPL: 1.1 RATIO (ref 1.1–2)
ALP SERPL-CCNC: 102 UNIT/L (ref 40–150)
ALT SERPL-CCNC: 14 UNIT/L (ref 0–55)
AST SERPL-CCNC: 13 UNIT/L (ref 5–34)
BASOPHILS # BLD AUTO: 0 X10(3)/MCL
BASOPHILS NFR BLD AUTO: 0 %
BILIRUBIN DIRECT+TOT PNL SERPL-MCNC: 0.5 MG/DL
BLD PROD TYP BPU: NORMAL
BLOOD UNIT EXPIRATION DATE: NORMAL
BLOOD UNIT TYPE CODE: 5100
BUN SERPL-MCNC: 20 MG/DL (ref 8.4–25.7)
CALCIUM SERPL-MCNC: 8.8 MG/DL (ref 8.8–10)
CHLORIDE SERPL-SCNC: 107 MMOL/L (ref 98–107)
CO2 SERPL-SCNC: 28 MMOL/L (ref 23–31)
CREAT SERPL-MCNC: 0.9 MG/DL (ref 0.73–1.18)
CROSSMATCH INTERPRETATION: NORMAL
DISPENSE STATUS: NORMAL
EOSINOPHIL # BLD AUTO: 0 X10(3)/MCL (ref 0–0.9)
EOSINOPHIL NFR BLD AUTO: 0 %
ERYTHROCYTE [DISTWIDTH] IN BLOOD BY AUTOMATED COUNT: 25.6 % (ref 11.5–17)
GFR SERPLBLD CREATININE-BSD FMLA CKD-EPI: >60 MLS/MIN/1.73/M2
GLOBULIN SER-MCNC: 2.9 GM/DL (ref 2.4–3.5)
GLUCOSE SERPL-MCNC: 84 MG/DL (ref 82–115)
GROUP & RH: NORMAL
HCT VFR BLD AUTO: 22.4 % (ref 42–52)
HGB BLD-MCNC: 6.9 G/DL (ref 14–18)
IMM GRANULOCYTES # BLD AUTO: 0 X10(3)/MCL (ref 0–0.04)
IMM GRANULOCYTES NFR BLD AUTO: 0 %
INDIRECT COOMBS GEL: NORMAL
LYMPHOCYTES # BLD AUTO: 0.61 X10(3)/MCL (ref 0.6–4.6)
LYMPHOCYTES NFR BLD AUTO: 98.4 %
MCH RBC QN AUTO: 28.9 PG (ref 27–31)
MCHC RBC AUTO-ENTMCNC: 30.8 G/DL (ref 33–36)
MCV RBC AUTO: 93.7 FL (ref 80–94)
MONOCYTES # BLD AUTO: 0 X10(3)/MCL (ref 0.1–1.3)
MONOCYTES NFR BLD AUTO: 0 %
NEUTROPHILS # BLD AUTO: 0.01 X10(3)/MCL (ref 2.1–9.2)
NEUTROPHILS NFR BLD AUTO: 1.6 %
PLATELET # BLD AUTO: 19 X10(3)/MCL (ref 130–400)
PMV BLD AUTO: ABNORMAL FL
POTASSIUM SERPL-SCNC: 4 MMOL/L (ref 3.5–5.1)
PROT SERPL-MCNC: 6.2 GM/DL (ref 5.8–7.6)
RBC # BLD AUTO: 2.39 X10(6)/MCL (ref 4.7–6.1)
SODIUM SERPL-SCNC: 141 MMOL/L (ref 136–145)
SPECIMEN OUTDATE: NORMAL
UNIT NUMBER: NORMAL
WBC # SPEC AUTO: 0.62 X10(3)/MCL (ref 4.5–11.5)

## 2023-05-15 PROCEDURE — 86923 COMPATIBILITY TEST ELECTRIC: CPT | Performed by: INTERNAL MEDICINE

## 2023-05-15 PROCEDURE — 86900 BLOOD TYPING SEROLOGIC ABO: CPT | Performed by: INTERNAL MEDICINE

## 2023-05-15 PROCEDURE — 85025 COMPLETE CBC W/AUTO DIFF WBC: CPT

## 2023-05-15 PROCEDURE — 25000003 PHARM REV CODE 250: Performed by: INTERNAL MEDICINE

## 2023-05-15 PROCEDURE — 63600175 PHARM REV CODE 636 W HCPCS: Performed by: INTERNAL MEDICINE

## 2023-05-15 PROCEDURE — 80053 COMPREHEN METABOLIC PANEL: CPT

## 2023-05-15 PROCEDURE — 36430 TRANSFUSION BLD/BLD COMPNT: CPT

## 2023-05-15 PROCEDURE — 96375 TX/PRO/DX INJ NEW DRUG ADDON: CPT

## 2023-05-15 PROCEDURE — P9040 RBC LEUKOREDUCED IRRADIATED: HCPCS | Performed by: INTERNAL MEDICINE

## 2023-05-15 PROCEDURE — 36415 COLL VENOUS BLD VENIPUNCTURE: CPT

## 2023-05-15 PROCEDURE — 96374 THER/PROPH/DIAG INJ IV PUSH: CPT

## 2023-05-15 RX ORDER — DIPHENHYDRAMINE HYDROCHLORIDE 50 MG/ML
25 INJECTION INTRAMUSCULAR; INTRAVENOUS ONCE
Status: CANCELLED | OUTPATIENT
Start: 2023-05-15

## 2023-05-15 RX ORDER — DIPHENHYDRAMINE HYDROCHLORIDE 50 MG/ML
25 INJECTION INTRAMUSCULAR; INTRAVENOUS ONCE
Status: COMPLETED | OUTPATIENT
Start: 2023-05-15 | End: 2023-05-15

## 2023-05-15 RX ORDER — HYDROCODONE BITARTRATE AND ACETAMINOPHEN 500; 5 MG/1; MG/1
TABLET ORAL ONCE
Status: COMPLETED | OUTPATIENT
Start: 2023-05-15 | End: 2023-05-15

## 2023-05-15 RX ORDER — HYDROCODONE BITARTRATE AND ACETAMINOPHEN 500; 5 MG/1; MG/1
TABLET ORAL ONCE
Status: CANCELLED | OUTPATIENT
Start: 2023-05-15 | End: 2023-05-15

## 2023-05-15 RX ADMIN — SODIUM CHLORIDE: 9 INJECTION, SOLUTION INTRAVENOUS at 11:05

## 2023-05-15 RX ADMIN — DIPHENHYDRAMINE HYDROCHLORIDE 25 MG: 50 INJECTION INTRAMUSCULAR; INTRAVENOUS at 11:05

## 2023-05-15 NOTE — PLAN OF CARE
Plan of care reviewed with patient; patient in agreement. Tolerated 1unit PRBCs. Uses portal for appts; reviewed next appts dates/times.

## 2023-05-18 ENCOUNTER — INFUSION (OUTPATIENT)
Dept: INFUSION THERAPY | Facility: HOSPITAL | Age: 73
End: 2023-05-18
Attending: INTERNAL MEDICINE
Payer: MEDICARE

## 2023-05-18 ENCOUNTER — LAB VISIT (OUTPATIENT)
Dept: LAB | Facility: HOSPITAL | Age: 73
End: 2023-05-18
Attending: INTERNAL MEDICINE
Payer: MEDICARE

## 2023-05-18 DIAGNOSIS — G62.9 NEUROPATHY: ICD-10-CM

## 2023-05-18 DIAGNOSIS — C92.00 ACUTE MYELOID LEUKEMIA NOT HAVING ACHIEVED REMISSION: ICD-10-CM

## 2023-05-18 LAB
ALBUMIN SERPL-MCNC: 3.4 G/DL (ref 3.4–4.8)
ALBUMIN/GLOB SERPL: 1.1 RATIO (ref 1.1–2)
ALP SERPL-CCNC: 105 UNIT/L (ref 40–150)
ALT SERPL-CCNC: 14 UNIT/L (ref 0–55)
AST SERPL-CCNC: 13 UNIT/L (ref 5–34)
BASOPHILS # BLD AUTO: 0 X10(3)/MCL
BASOPHILS NFR BLD AUTO: 0 %
BILIRUBIN DIRECT+TOT PNL SERPL-MCNC: 0.8 MG/DL
BUN SERPL-MCNC: 15.6 MG/DL (ref 8.4–25.7)
CALCIUM SERPL-MCNC: 8.9 MG/DL (ref 8.8–10)
CHLORIDE SERPL-SCNC: 104 MMOL/L (ref 98–107)
CO2 SERPL-SCNC: 28 MMOL/L (ref 23–31)
CREAT SERPL-MCNC: 0.84 MG/DL (ref 0.73–1.18)
EOSINOPHIL # BLD AUTO: 0 X10(3)/MCL (ref 0–0.9)
EOSINOPHIL NFR BLD AUTO: 0 %
ERYTHROCYTE [DISTWIDTH] IN BLOOD BY AUTOMATED COUNT: 24.9 % (ref 11.5–17)
GFR SERPLBLD CREATININE-BSD FMLA CKD-EPI: >60 MLS/MIN/1.73/M2
GLOBULIN SER-MCNC: 3.1 GM/DL (ref 2.4–3.5)
GLUCOSE SERPL-MCNC: 90 MG/DL (ref 82–115)
HCT VFR BLD AUTO: 25.4 % (ref 42–52)
HGB BLD-MCNC: 7.9 G/DL (ref 14–18)
IMM GRANULOCYTES # BLD AUTO: 0.01 X10(3)/MCL (ref 0–0.04)
IMM GRANULOCYTES NFR BLD AUTO: 1.6 %
LYMPHOCYTES # BLD AUTO: 0.58 X10(3)/MCL (ref 0.6–4.6)
LYMPHOCYTES NFR BLD AUTO: 92.1 %
MCH RBC QN AUTO: 29.6 PG (ref 27–31)
MCHC RBC AUTO-ENTMCNC: 31.1 G/DL (ref 33–36)
MCV RBC AUTO: 95.1 FL (ref 80–94)
MONOCYTES # BLD AUTO: 0.01 X10(3)/MCL (ref 0.1–1.3)
MONOCYTES NFR BLD AUTO: 1.6 %
NEUTROPHILS # BLD AUTO: 0.03 X10(3)/MCL (ref 2.1–9.2)
NEUTROPHILS NFR BLD AUTO: 4.7 %
PLATELET # BLD AUTO: 18 X10(3)/MCL (ref 130–400)
PMV BLD AUTO: ABNORMAL FL
POTASSIUM SERPL-SCNC: 3.8 MMOL/L (ref 3.5–5.1)
PROT SERPL-MCNC: 6.5 GM/DL (ref 5.8–7.6)
RBC # BLD AUTO: 2.67 X10(6)/MCL (ref 4.7–6.1)
SODIUM SERPL-SCNC: 137 MMOL/L (ref 136–145)
WBC # SPEC AUTO: 0.63 X10(3)/MCL (ref 4.5–11.5)

## 2023-05-18 PROCEDURE — 80053 COMPREHEN METABOLIC PANEL: CPT

## 2023-05-18 PROCEDURE — 36415 COLL VENOUS BLD VENIPUNCTURE: CPT

## 2023-05-18 PROCEDURE — 85025 COMPLETE CBC W/AUTO DIFF WBC: CPT

## 2023-05-18 RX ORDER — LEVOFLOXACIN 500 MG/1
TABLET, FILM COATED ORAL
Qty: 30 TABLET | Refills: 1 | Status: SHIPPED | OUTPATIENT
Start: 2023-05-18 | End: 2023-07-24

## 2023-05-18 RX ORDER — GABAPENTIN 100 MG/1
CAPSULE ORAL
Qty: 30 CAPSULE | Refills: 0 | Status: SHIPPED | OUTPATIENT
Start: 2023-05-18

## 2023-05-18 RX ORDER — FLUCONAZOLE 200 MG/1
TABLET ORAL
Qty: 30 TABLET | Refills: 1 | Status: SHIPPED | OUTPATIENT
Start: 2023-05-18 | End: 2023-07-24

## 2023-05-22 ENCOUNTER — INFUSION (OUTPATIENT)
Dept: INFUSION THERAPY | Facility: HOSPITAL | Age: 73
End: 2023-05-22
Attending: INTERNAL MEDICINE
Payer: MEDICARE

## 2023-05-22 ENCOUNTER — LAB VISIT (OUTPATIENT)
Dept: LAB | Facility: HOSPITAL | Age: 73
End: 2023-05-22
Attending: INTERNAL MEDICINE
Payer: MEDICARE

## 2023-05-22 VITALS
OXYGEN SATURATION: 99 % | HEIGHT: 73 IN | WEIGHT: 180 LBS | HEART RATE: 62 BPM | TEMPERATURE: 98 F | DIASTOLIC BLOOD PRESSURE: 66 MMHG | BODY MASS INDEX: 23.86 KG/M2 | SYSTOLIC BLOOD PRESSURE: 130 MMHG

## 2023-05-22 DIAGNOSIS — C92.00 ACUTE MYELOID LEUKEMIA NOT HAVING ACHIEVED REMISSION: ICD-10-CM

## 2023-05-22 DIAGNOSIS — C92.00 ACUTE MYELOID LEUKEMIA NOT HAVING ACHIEVED REMISSION: Primary | ICD-10-CM

## 2023-05-22 LAB
ABO + RH BLD: NORMAL
BASOPHILS # BLD AUTO: 0 X10(3)/MCL
BASOPHILS NFR BLD AUTO: 0 %
BLD PROD TYP BPU: NORMAL
BLOOD UNIT EXPIRATION DATE: NORMAL
BLOOD UNIT TYPE CODE: 5100
CROSSMATCH INTERPRETATION: NORMAL
DISPENSE STATUS: NORMAL
EOSINOPHIL # BLD AUTO: 0 X10(3)/MCL (ref 0–0.9)
EOSINOPHIL NFR BLD AUTO: 0 %
ERYTHROCYTE [DISTWIDTH] IN BLOOD BY AUTOMATED COUNT: 25.5 % (ref 11.5–17)
GROUP & RH: NORMAL
HCT VFR BLD AUTO: 22.3 % (ref 42–52)
HGB BLD-MCNC: 6.8 G/DL (ref 14–18)
IMM GRANULOCYTES # BLD AUTO: 0 X10(3)/MCL (ref 0–0.04)
IMM GRANULOCYTES NFR BLD AUTO: 0 %
INDIRECT COOMBS GEL: NORMAL
LYMPHOCYTES # BLD AUTO: 0.52 X10(3)/MCL (ref 0.6–4.6)
LYMPHOCYTES NFR BLD AUTO: 86.7 %
MCH RBC QN AUTO: 29.6 PG (ref 27–31)
MCHC RBC AUTO-ENTMCNC: 30.5 G/DL (ref 33–36)
MCV RBC AUTO: 97 FL (ref 80–94)
MONOCYTES # BLD AUTO: 0.02 X10(3)/MCL (ref 0.1–1.3)
MONOCYTES NFR BLD AUTO: 3.3 %
NEUTROPHILS # BLD AUTO: 0.06 X10(3)/MCL (ref 2.1–9.2)
NEUTROPHILS NFR BLD AUTO: 10 %
PLATELET # BLD AUTO: 50 X10(3)/MCL (ref 130–400)
PMV BLD AUTO: ABNORMAL FL
RBC # BLD AUTO: 2.3 X10(6)/MCL (ref 4.7–6.1)
SPECIMEN OUTDATE: NORMAL
UNIT NUMBER: NORMAL
WBC # SPEC AUTO: 0.6 X10(3)/MCL (ref 4.5–11.5)

## 2023-05-22 PROCEDURE — 86923 COMPATIBILITY TEST ELECTRIC: CPT | Performed by: INTERNAL MEDICINE

## 2023-05-22 PROCEDURE — 96401 CHEMO ANTI-NEOPL SQ/IM: CPT

## 2023-05-22 PROCEDURE — 96374 THER/PROPH/DIAG INJ IV PUSH: CPT

## 2023-05-22 PROCEDURE — 25000003 PHARM REV CODE 250: Performed by: NURSE PRACTITIONER

## 2023-05-22 PROCEDURE — 85025 COMPLETE CBC W/AUTO DIFF WBC: CPT

## 2023-05-22 PROCEDURE — 86900 BLOOD TYPING SEROLOGIC ABO: CPT | Performed by: INTERNAL MEDICINE

## 2023-05-22 PROCEDURE — 80053 COMPREHEN METABOLIC PANEL: CPT

## 2023-05-22 PROCEDURE — 36430 TRANSFUSION BLD/BLD COMPNT: CPT

## 2023-05-22 PROCEDURE — 96375 TX/PRO/DX INJ NEW DRUG ADDON: CPT

## 2023-05-22 PROCEDURE — 25000003 PHARM REV CODE 250: Performed by: INTERNAL MEDICINE

## 2023-05-22 PROCEDURE — 63600175 PHARM REV CODE 636 W HCPCS: Performed by: NURSE PRACTITIONER

## 2023-05-22 PROCEDURE — P9040 RBC LEUKOREDUCED IRRADIATED: HCPCS | Performed by: INTERNAL MEDICINE

## 2023-05-22 PROCEDURE — 63600175 PHARM REV CODE 636 W HCPCS: Performed by: INTERNAL MEDICINE

## 2023-05-22 PROCEDURE — 36415 COLL VENOUS BLD VENIPUNCTURE: CPT

## 2023-05-22 RX ORDER — DIPHENHYDRAMINE HYDROCHLORIDE 50 MG/ML
25 INJECTION INTRAMUSCULAR; INTRAVENOUS ONCE
Status: COMPLETED | OUTPATIENT
Start: 2023-05-22 | End: 2023-05-22

## 2023-05-22 RX ORDER — ONDANSETRON 4 MG/1
16 TABLET, ORALLY DISINTEGRATING ORAL
Status: CANCELLED
Start: 2023-05-25

## 2023-05-22 RX ORDER — AZACITIDINE 100 MG/1
75 INJECTION, POWDER, LYOPHILIZED, FOR SOLUTION INTRAVENOUS; SUBCUTANEOUS
Status: CANCELLED | OUTPATIENT
Start: 2023-05-22

## 2023-05-22 RX ORDER — AZACITIDINE 100 MG/1
75 INJECTION, POWDER, LYOPHILIZED, FOR SOLUTION INTRAVENOUS; SUBCUTANEOUS
Status: CANCELLED | OUTPATIENT
Start: 2023-05-29

## 2023-05-22 RX ORDER — ONDANSETRON 4 MG/1
16 TABLET, ORALLY DISINTEGRATING ORAL
Status: CANCELLED
Start: 2023-05-30

## 2023-05-22 RX ORDER — AZACITIDINE 100 MG/1
75 INJECTION, POWDER, LYOPHILIZED, FOR SOLUTION INTRAVENOUS; SUBCUTANEOUS
Status: CANCELLED | OUTPATIENT
Start: 2023-05-26

## 2023-05-22 RX ORDER — ONDANSETRON 4 MG/1
16 TABLET, ORALLY DISINTEGRATING ORAL
Status: CANCELLED
Start: 2023-05-22

## 2023-05-22 RX ORDER — HYDROCODONE BITARTRATE AND ACETAMINOPHEN 500; 5 MG/1; MG/1
TABLET ORAL ONCE
Status: CANCELLED | OUTPATIENT
Start: 2023-05-22 | End: 2023-05-22

## 2023-05-22 RX ORDER — AZACITIDINE 100 MG/1
75 INJECTION, POWDER, LYOPHILIZED, FOR SOLUTION INTRAVENOUS; SUBCUTANEOUS
Status: COMPLETED | OUTPATIENT
Start: 2023-05-22 | End: 2023-05-22

## 2023-05-22 RX ORDER — HYDROCODONE BITARTRATE AND ACETAMINOPHEN 500; 5 MG/1; MG/1
TABLET ORAL ONCE
Status: COMPLETED | OUTPATIENT
Start: 2023-05-22 | End: 2023-05-22

## 2023-05-22 RX ORDER — AZACITIDINE 100 MG/1
75 INJECTION, POWDER, LYOPHILIZED, FOR SOLUTION INTRAVENOUS; SUBCUTANEOUS
Status: CANCELLED | OUTPATIENT
Start: 2023-05-23

## 2023-05-22 RX ORDER — ONDANSETRON 4 MG/1
16 TABLET, ORALLY DISINTEGRATING ORAL
Status: CANCELLED
Start: 2023-05-26

## 2023-05-22 RX ORDER — AZACITIDINE 100 MG/1
75 INJECTION, POWDER, LYOPHILIZED, FOR SOLUTION INTRAVENOUS; SUBCUTANEOUS
Status: CANCELLED | OUTPATIENT
Start: 2023-05-24

## 2023-05-22 RX ORDER — DIPHENHYDRAMINE HYDROCHLORIDE 50 MG/ML
25 INJECTION INTRAMUSCULAR; INTRAVENOUS ONCE
Status: CANCELLED | OUTPATIENT
Start: 2023-05-22

## 2023-05-22 RX ORDER — ONDANSETRON 4 MG/1
16 TABLET, ORALLY DISINTEGRATING ORAL
Status: CANCELLED
Start: 2023-05-29

## 2023-05-22 RX ORDER — ONDANSETRON 4 MG/1
16 TABLET, ORALLY DISINTEGRATING ORAL
Status: CANCELLED
Start: 2023-05-23

## 2023-05-22 RX ORDER — ONDANSETRON 4 MG/1
16 TABLET, ORALLY DISINTEGRATING ORAL
Status: COMPLETED | OUTPATIENT
Start: 2023-05-22 | End: 2023-05-22

## 2023-05-22 RX ORDER — ONDANSETRON 4 MG/1
16 TABLET, ORALLY DISINTEGRATING ORAL
Status: CANCELLED
Start: 2023-05-24

## 2023-05-22 RX ORDER — AZACITIDINE 100 MG/1
75 INJECTION, POWDER, LYOPHILIZED, FOR SOLUTION INTRAVENOUS; SUBCUTANEOUS
Status: CANCELLED | OUTPATIENT
Start: 2023-05-30

## 2023-05-22 RX ORDER — AZACITIDINE 100 MG/1
75 INJECTION, POWDER, LYOPHILIZED, FOR SOLUTION INTRAVENOUS; SUBCUTANEOUS
Status: CANCELLED | OUTPATIENT
Start: 2023-05-25

## 2023-05-22 RX ADMIN — ONDANSETRON 16 MG: 4 TABLET, ORALLY DISINTEGRATING ORAL at 02:05

## 2023-05-22 RX ADMIN — SODIUM CHLORIDE: 9 INJECTION, SOLUTION INTRAVENOUS at 02:05

## 2023-05-22 RX ADMIN — DIPHENHYDRAMINE HYDROCHLORIDE 25 MG: 50 INJECTION, SOLUTION INTRAMUSCULAR; INTRAVENOUS at 02:05

## 2023-05-22 RX ADMIN — AZACITIDINE 155 MG: 100 INJECTION, POWDER, LYOPHILIZED, FOR SOLUTION INTRAVENOUS; SUBCUTANEOUS at 02:05

## 2023-05-23 ENCOUNTER — INFUSION (OUTPATIENT)
Dept: INFUSION THERAPY | Facility: HOSPITAL | Age: 73
End: 2023-05-23
Attending: INTERNAL MEDICINE
Payer: MEDICARE

## 2023-05-23 VITALS
HEART RATE: 75 BPM | DIASTOLIC BLOOD PRESSURE: 68 MMHG | SYSTOLIC BLOOD PRESSURE: 125 MMHG | RESPIRATION RATE: 18 BRPM | OXYGEN SATURATION: 98 % | TEMPERATURE: 99 F

## 2023-05-23 DIAGNOSIS — C92.00 ACUTE MYELOID LEUKEMIA NOT HAVING ACHIEVED REMISSION: Primary | ICD-10-CM

## 2023-05-23 PROCEDURE — 96401 CHEMO ANTI-NEOPL SQ/IM: CPT

## 2023-05-23 PROCEDURE — 63600175 PHARM REV CODE 636 W HCPCS: Performed by: NURSE PRACTITIONER

## 2023-05-23 PROCEDURE — 96402 CHEMO HORMON ANTINEOPL SQ/IM: CPT

## 2023-05-23 PROCEDURE — 25000003 PHARM REV CODE 250: Performed by: NURSE PRACTITIONER

## 2023-05-23 RX ORDER — ONDANSETRON 4 MG/1
16 TABLET, ORALLY DISINTEGRATING ORAL
Status: COMPLETED | OUTPATIENT
Start: 2023-05-23 | End: 2023-05-23

## 2023-05-23 RX ORDER — AZACITIDINE 100 MG/1
75 INJECTION, POWDER, LYOPHILIZED, FOR SOLUTION INTRAVENOUS; SUBCUTANEOUS
Status: COMPLETED | OUTPATIENT
Start: 2023-05-23 | End: 2023-05-23

## 2023-05-23 RX ADMIN — AZACITIDINE 155 MG: 100 INJECTION, POWDER, LYOPHILIZED, FOR SOLUTION INTRAVENOUS; SUBCUTANEOUS at 01:05

## 2023-05-23 RX ADMIN — ONDANSETRON 16 MG: 4 TABLET, ORALLY DISINTEGRATING ORAL at 01:05

## 2023-05-24 ENCOUNTER — INFUSION (OUTPATIENT)
Dept: INFUSION THERAPY | Facility: HOSPITAL | Age: 73
End: 2023-05-24
Attending: INTERNAL MEDICINE
Payer: MEDICARE

## 2023-05-24 VITALS
HEIGHT: 73 IN | SYSTOLIC BLOOD PRESSURE: 96 MMHG | HEART RATE: 91 BPM | DIASTOLIC BLOOD PRESSURE: 56 MMHG | BODY MASS INDEX: 24.28 KG/M2 | RESPIRATION RATE: 20 BRPM | WEIGHT: 183.19 LBS

## 2023-05-24 DIAGNOSIS — C92.00 ACUTE MYELOID LEUKEMIA NOT HAVING ACHIEVED REMISSION: Primary | ICD-10-CM

## 2023-05-24 PROCEDURE — 63600175 PHARM REV CODE 636 W HCPCS: Performed by: NURSE PRACTITIONER

## 2023-05-24 PROCEDURE — 96401 CHEMO ANTI-NEOPL SQ/IM: CPT

## 2023-05-24 PROCEDURE — 25000003 PHARM REV CODE 250: Performed by: NURSE PRACTITIONER

## 2023-05-24 RX ORDER — AZACITIDINE 100 MG/1
75 INJECTION, POWDER, LYOPHILIZED, FOR SOLUTION INTRAVENOUS; SUBCUTANEOUS
Status: COMPLETED | OUTPATIENT
Start: 2023-05-24 | End: 2023-05-24

## 2023-05-24 RX ORDER — ONDANSETRON 4 MG/1
16 TABLET, ORALLY DISINTEGRATING ORAL
Status: COMPLETED | OUTPATIENT
Start: 2023-05-24 | End: 2023-05-24

## 2023-05-24 RX ADMIN — ONDANSETRON 16 MG: 4 TABLET, ORALLY DISINTEGRATING ORAL at 01:05

## 2023-05-24 RX ADMIN — AZACITIDINE 155 MG: 100 INJECTION, POWDER, LYOPHILIZED, FOR SOLUTION INTRAVENOUS; SUBCUTANEOUS at 01:05

## 2023-05-25 ENCOUNTER — APPOINTMENT (OUTPATIENT)
Dept: LAB | Facility: HOSPITAL | Age: 73
End: 2023-05-25
Attending: INTERNAL MEDICINE
Payer: MEDICARE

## 2023-05-25 ENCOUNTER — INFUSION (OUTPATIENT)
Dept: INFUSION THERAPY | Facility: HOSPITAL | Age: 73
End: 2023-05-25
Attending: INTERNAL MEDICINE
Payer: MEDICARE

## 2023-05-25 VITALS
DIASTOLIC BLOOD PRESSURE: 67 MMHG | SYSTOLIC BLOOD PRESSURE: 118 MMHG | OXYGEN SATURATION: 99 % | TEMPERATURE: 97 F | HEART RATE: 84 BPM

## 2023-05-25 DIAGNOSIS — C92.00 ACUTE MYELOID LEUKEMIA NOT HAVING ACHIEVED REMISSION: Primary | ICD-10-CM

## 2023-05-25 LAB
ALBUMIN SERPL-MCNC: 3.1 G/DL (ref 3.4–4.8)
ALBUMIN/GLOB SERPL: 1 RATIO (ref 1.1–2)
ALP SERPL-CCNC: 97 UNIT/L (ref 40–150)
ALT SERPL-CCNC: 15 UNIT/L (ref 0–55)
AST SERPL-CCNC: 14 UNIT/L (ref 5–34)
BASOPHILS # BLD AUTO: 0 X10(3)/MCL
BASOPHILS NFR BLD AUTO: 0 %
BILIRUBIN DIRECT+TOT PNL SERPL-MCNC: 0.5 MG/DL
BUN SERPL-MCNC: 16 MG/DL (ref 8.4–25.7)
CALCIUM SERPL-MCNC: 8.3 MG/DL (ref 8.8–10)
CHLORIDE SERPL-SCNC: 105 MMOL/L (ref 98–107)
CO2 SERPL-SCNC: 26 MMOL/L (ref 23–31)
CREAT SERPL-MCNC: 1.09 MG/DL (ref 0.73–1.18)
EOSINOPHIL # BLD AUTO: 0 X10(3)/MCL (ref 0–0.9)
EOSINOPHIL NFR BLD AUTO: 0 %
ERYTHROCYTE [DISTWIDTH] IN BLOOD BY AUTOMATED COUNT: 24.3 % (ref 11.5–17)
GFR SERPLBLD CREATININE-BSD FMLA CKD-EPI: >60 MLS/MIN/1.73/M2
GLOBULIN SER-MCNC: 3.1 GM/DL (ref 2.4–3.5)
GLUCOSE SERPL-MCNC: 161 MG/DL (ref 82–115)
HCT VFR BLD AUTO: 25.5 % (ref 42–52)
HGB BLD-MCNC: 7.8 G/DL (ref 14–18)
IMM GRANULOCYTES # BLD AUTO: 0 X10(3)/MCL (ref 0–0.04)
IMM GRANULOCYTES NFR BLD AUTO: 0 %
LYMPHOCYTES # BLD AUTO: 0.51 X10(3)/MCL (ref 0.6–4.6)
LYMPHOCYTES NFR BLD AUTO: 70.8 %
MCH RBC QN AUTO: 29.5 PG (ref 27–31)
MCHC RBC AUTO-ENTMCNC: 30.6 G/DL (ref 33–36)
MCV RBC AUTO: 96.6 FL (ref 80–94)
MONOCYTES # BLD AUTO: 0.03 X10(3)/MCL (ref 0.1–1.3)
MONOCYTES NFR BLD AUTO: 4.2 %
NEUTROPHILS # BLD AUTO: 0.18 X10(3)/MCL (ref 2.1–9.2)
NEUTROPHILS NFR BLD AUTO: 25 %
PLATELET # BLD AUTO: 22 X10(3)/MCL (ref 130–400)
PMV BLD AUTO: ABNORMAL FL
POTASSIUM SERPL-SCNC: 4.1 MMOL/L (ref 3.5–5.1)
PROT SERPL-MCNC: 6.2 GM/DL (ref 5.8–7.6)
RBC # BLD AUTO: 2.64 X10(6)/MCL (ref 4.7–6.1)
SODIUM SERPL-SCNC: 138 MMOL/L (ref 136–145)
WBC # SPEC AUTO: 0.72 X10(3)/MCL (ref 4.5–11.5)

## 2023-05-25 PROCEDURE — 63600175 PHARM REV CODE 636 W HCPCS: Performed by: NURSE PRACTITIONER

## 2023-05-25 PROCEDURE — 96401 CHEMO ANTI-NEOPL SQ/IM: CPT

## 2023-05-25 PROCEDURE — 36415 COLL VENOUS BLD VENIPUNCTURE: CPT

## 2023-05-25 PROCEDURE — 25000003 PHARM REV CODE 250: Performed by: NURSE PRACTITIONER

## 2023-05-25 PROCEDURE — 85025 COMPLETE CBC W/AUTO DIFF WBC: CPT

## 2023-05-25 RX ORDER — AZACITIDINE 100 MG/1
75 INJECTION, POWDER, LYOPHILIZED, FOR SOLUTION INTRAVENOUS; SUBCUTANEOUS
Status: COMPLETED | OUTPATIENT
Start: 2023-05-25 | End: 2023-05-25

## 2023-05-25 RX ORDER — ONDANSETRON 4 MG/1
16 TABLET, ORALLY DISINTEGRATING ORAL
Status: COMPLETED | OUTPATIENT
Start: 2023-05-25 | End: 2023-05-25

## 2023-05-25 RX ADMIN — AZACITIDINE 155 MG: 100 INJECTION, POWDER, LYOPHILIZED, FOR SOLUTION INTRAVENOUS; SUBCUTANEOUS at 09:05

## 2023-05-25 RX ADMIN — ONDANSETRON 16 MG: 4 TABLET, ORALLY DISINTEGRATING ORAL at 09:05

## 2023-05-26 ENCOUNTER — INFUSION (OUTPATIENT)
Dept: INFUSION THERAPY | Facility: HOSPITAL | Age: 73
End: 2023-05-26
Attending: INTERNAL MEDICINE
Payer: MEDICARE

## 2023-05-26 VITALS
RESPIRATION RATE: 18 BRPM | OXYGEN SATURATION: 99 % | SYSTOLIC BLOOD PRESSURE: 128 MMHG | TEMPERATURE: 99 F | BODY MASS INDEX: 24.18 KG/M2 | WEIGHT: 183.19 LBS | HEART RATE: 68 BPM | DIASTOLIC BLOOD PRESSURE: 70 MMHG

## 2023-05-26 DIAGNOSIS — C92.00 ACUTE MYELOID LEUKEMIA NOT HAVING ACHIEVED REMISSION: Primary | ICD-10-CM

## 2023-05-26 PROCEDURE — 25000003 PHARM REV CODE 250: Performed by: NURSE PRACTITIONER

## 2023-05-26 PROCEDURE — 63600175 PHARM REV CODE 636 W HCPCS: Performed by: NURSE PRACTITIONER

## 2023-05-26 PROCEDURE — 96401 CHEMO ANTI-NEOPL SQ/IM: CPT

## 2023-05-26 RX ORDER — ONDANSETRON 4 MG/1
16 TABLET, ORALLY DISINTEGRATING ORAL
Status: COMPLETED | OUTPATIENT
Start: 2023-05-26 | End: 2023-05-26

## 2023-05-26 RX ORDER — AZACITIDINE 100 MG/1
75 INJECTION, POWDER, LYOPHILIZED, FOR SOLUTION INTRAVENOUS; SUBCUTANEOUS
Status: COMPLETED | OUTPATIENT
Start: 2023-05-26 | End: 2023-05-26

## 2023-05-26 RX ADMIN — AZACITIDINE 155 MG: 100 INJECTION, POWDER, LYOPHILIZED, FOR SOLUTION INTRAVENOUS; SUBCUTANEOUS at 01:05

## 2023-05-26 RX ADMIN — ONDANSETRON 16 MG: 4 TABLET, ORALLY DISINTEGRATING ORAL at 01:05

## 2023-05-28 DIAGNOSIS — N40.0 ENLARGED PROSTATE: ICD-10-CM

## 2023-05-29 ENCOUNTER — INFUSION (OUTPATIENT)
Dept: INFUSION THERAPY | Facility: HOSPITAL | Age: 73
End: 2023-05-29
Attending: INTERNAL MEDICINE
Payer: MEDICARE

## 2023-05-29 ENCOUNTER — LAB VISIT (OUTPATIENT)
Dept: LAB | Facility: HOSPITAL | Age: 73
End: 2023-05-29
Attending: INTERNAL MEDICINE
Payer: MEDICARE

## 2023-05-29 VITALS
BODY MASS INDEX: 24.28 KG/M2 | RESPIRATION RATE: 20 BRPM | SYSTOLIC BLOOD PRESSURE: 119 MMHG | WEIGHT: 183.19 LBS | DIASTOLIC BLOOD PRESSURE: 74 MMHG | HEART RATE: 69 BPM | TEMPERATURE: 98 F | HEIGHT: 73 IN

## 2023-05-29 DIAGNOSIS — C92.00 ACUTE MYELOID LEUKEMIA NOT HAVING ACHIEVED REMISSION: Primary | ICD-10-CM

## 2023-05-29 DIAGNOSIS — C92.00 ACUTE MYELOID LEUKEMIA NOT HAVING ACHIEVED REMISSION: ICD-10-CM

## 2023-05-29 LAB
ALBUMIN SERPL-MCNC: 3.4 G/DL (ref 3.4–4.8)
ALBUMIN/GLOB SERPL: 1 RATIO (ref 1.1–2)
ALP SERPL-CCNC: 111 UNIT/L (ref 40–150)
ALT SERPL-CCNC: 14 UNIT/L (ref 0–55)
AST SERPL-CCNC: 12 UNIT/L (ref 5–34)
BASOPHILS # BLD AUTO: 0 X10(3)/MCL
BASOPHILS NFR BLD AUTO: 0 %
BILIRUBIN DIRECT+TOT PNL SERPL-MCNC: 0.5 MG/DL
BUN SERPL-MCNC: 17.5 MG/DL (ref 8.4–25.7)
CALCIUM SERPL-MCNC: 9 MG/DL (ref 8.8–10)
CHLORIDE SERPL-SCNC: 105 MMOL/L (ref 98–107)
CO2 SERPL-SCNC: 27 MMOL/L (ref 23–31)
CREAT SERPL-MCNC: 1.07 MG/DL (ref 0.73–1.18)
EOSINOPHIL # BLD AUTO: 0 X10(3)/MCL (ref 0–0.9)
EOSINOPHIL NFR BLD AUTO: 0 %
ERYTHROCYTE [DISTWIDTH] IN BLOOD BY AUTOMATED COUNT: 23.3 % (ref 11.5–17)
GFR SERPLBLD CREATININE-BSD FMLA CKD-EPI: >60 MLS/MIN/1.73/M2
GLOBULIN SER-MCNC: 3.3 GM/DL (ref 2.4–3.5)
GLUCOSE SERPL-MCNC: 100 MG/DL (ref 82–115)
HCT VFR BLD AUTO: 24.6 % (ref 42–52)
HGB BLD-MCNC: 7.8 G/DL (ref 14–18)
IMM GRANULOCYTES # BLD AUTO: 0.01 X10(3)/MCL (ref 0–0.04)
IMM GRANULOCYTES NFR BLD AUTO: 1 %
LYMPHOCYTES # BLD AUTO: 0.74 X10(3)/MCL (ref 0.6–4.6)
LYMPHOCYTES NFR BLD AUTO: 76.3 %
MCH RBC QN AUTO: 30.7 PG (ref 27–31)
MCHC RBC AUTO-ENTMCNC: 31.7 G/DL (ref 33–36)
MCV RBC AUTO: 96.9 FL (ref 80–94)
MONOCYTES # BLD AUTO: 0.04 X10(3)/MCL (ref 0.1–1.3)
MONOCYTES NFR BLD AUTO: 4.1 %
NEUTROPHILS # BLD AUTO: 0.18 X10(3)/MCL (ref 2.1–9.2)
NEUTROPHILS NFR BLD AUTO: 18.6 %
PLATELET # BLD AUTO: 18 X10(3)/MCL (ref 130–400)
PMV BLD AUTO: ABNORMAL FL
POTASSIUM SERPL-SCNC: 4.1 MMOL/L (ref 3.5–5.1)
PROT SERPL-MCNC: 6.7 GM/DL (ref 5.8–7.6)
RBC # BLD AUTO: 2.54 X10(6)/MCL (ref 4.7–6.1)
SODIUM SERPL-SCNC: 140 MMOL/L (ref 136–145)
WBC # SPEC AUTO: 0.97 X10(3)/MCL (ref 4.5–11.5)

## 2023-05-29 PROCEDURE — 36415 COLL VENOUS BLD VENIPUNCTURE: CPT

## 2023-05-29 PROCEDURE — 80053 COMPREHEN METABOLIC PANEL: CPT

## 2023-05-29 PROCEDURE — 96401 CHEMO ANTI-NEOPL SQ/IM: CPT

## 2023-05-29 PROCEDURE — 25000003 PHARM REV CODE 250: Performed by: NURSE PRACTITIONER

## 2023-05-29 PROCEDURE — 85025 COMPLETE CBC W/AUTO DIFF WBC: CPT

## 2023-05-29 PROCEDURE — 63600175 PHARM REV CODE 636 W HCPCS: Performed by: NURSE PRACTITIONER

## 2023-05-29 RX ORDER — ONDANSETRON 4 MG/1
16 TABLET, ORALLY DISINTEGRATING ORAL
Status: COMPLETED | OUTPATIENT
Start: 2023-05-29 | End: 2023-05-29

## 2023-05-29 RX ORDER — AZACITIDINE 100 MG/1
75 INJECTION, POWDER, LYOPHILIZED, FOR SOLUTION INTRAVENOUS; SUBCUTANEOUS
Status: COMPLETED | OUTPATIENT
Start: 2023-05-29 | End: 2023-05-29

## 2023-05-29 RX ADMIN — ONDANSETRON 16 MG: 4 TABLET, ORALLY DISINTEGRATING ORAL at 11:05

## 2023-05-29 RX ADMIN — AZACITIDINE 155 MG: 100 INJECTION, POWDER, LYOPHILIZED, FOR SOLUTION INTRAVENOUS; SUBCUTANEOUS at 11:05

## 2023-05-30 ENCOUNTER — INFUSION (OUTPATIENT)
Dept: INFUSION THERAPY | Facility: HOSPITAL | Age: 73
End: 2023-05-30
Attending: INTERNAL MEDICINE
Payer: MEDICARE

## 2023-05-30 VITALS
TEMPERATURE: 98 F | HEART RATE: 67 BPM | SYSTOLIC BLOOD PRESSURE: 106 MMHG | DIASTOLIC BLOOD PRESSURE: 64 MMHG | RESPIRATION RATE: 18 BRPM | OXYGEN SATURATION: 100 %

## 2023-05-30 DIAGNOSIS — C92.00 ACUTE MYELOID LEUKEMIA NOT HAVING ACHIEVED REMISSION: Primary | ICD-10-CM

## 2023-05-30 PROCEDURE — 63600175 PHARM REV CODE 636 W HCPCS: Performed by: NURSE PRACTITIONER

## 2023-05-30 PROCEDURE — 96401 CHEMO ANTI-NEOPL SQ/IM: CPT

## 2023-05-30 PROCEDURE — 25000003 PHARM REV CODE 250: Performed by: NURSE PRACTITIONER

## 2023-05-30 RX ORDER — ONDANSETRON 4 MG/1
16 TABLET, ORALLY DISINTEGRATING ORAL
Status: COMPLETED | OUTPATIENT
Start: 2023-05-30 | End: 2023-05-30

## 2023-05-30 RX ORDER — TAMSULOSIN HYDROCHLORIDE 0.4 MG/1
CAPSULE ORAL
Qty: 90 CAPSULE | Refills: 0 | Status: ON HOLD | OUTPATIENT
Start: 2023-05-30 | End: 2023-06-27 | Stop reason: CLARIF

## 2023-05-30 RX ORDER — AZACITIDINE 100 MG/1
75 INJECTION, POWDER, LYOPHILIZED, FOR SOLUTION INTRAVENOUS; SUBCUTANEOUS
Status: COMPLETED | OUTPATIENT
Start: 2023-05-30 | End: 2023-05-30

## 2023-05-30 RX ADMIN — ONDANSETRON 16 MG: 4 TABLET, ORALLY DISINTEGRATING ORAL at 08:05

## 2023-05-30 RX ADMIN — AZACITIDINE 155 MG: 100 INJECTION, POWDER, LYOPHILIZED, FOR SOLUTION INTRAVENOUS; SUBCUTANEOUS at 09:05

## 2023-05-30 NOTE — PLAN OF CARE
C4D9 Vidaza given. Tolerated well. Next appt for blood check given to pt for 6/1. Contacted office about his next office visit. MD stated he would like to see him the week of 6/12 but the appt hasn't been made yet. Instructed pt to get appts at next blood check on 6/1. Verbalized understanding. Dc'd home in stable condition.

## 2023-05-31 DIAGNOSIS — C92.00 ACUTE MYELOID LEUKEMIA NOT HAVING ACHIEVED REMISSION: Primary | ICD-10-CM

## 2023-06-01 ENCOUNTER — INFUSION (OUTPATIENT)
Dept: INFUSION THERAPY | Facility: HOSPITAL | Age: 73
End: 2023-06-01
Attending: INTERNAL MEDICINE
Payer: MEDICARE

## 2023-06-01 VITALS
SYSTOLIC BLOOD PRESSURE: 138 MMHG | HEART RATE: 64 BPM | TEMPERATURE: 98 F | RESPIRATION RATE: 18 BRPM | DIASTOLIC BLOOD PRESSURE: 77 MMHG | OXYGEN SATURATION: 99 %

## 2023-06-01 DIAGNOSIS — D61.818 PANCYTOPENIA: Primary | Chronic | ICD-10-CM

## 2023-06-01 DIAGNOSIS — D61.818 PANCYTOPENIA: Chronic | ICD-10-CM

## 2023-06-01 LAB
ABO + RH BLD: NORMAL
BLD PROD TYP BPU: NORMAL
BLOOD UNIT EXPIRATION DATE: NORMAL
BLOOD UNIT TYPE CODE: 5100
CROSSMATCH INTERPRETATION: NORMAL
DISPENSE STATUS: NORMAL
UNIT NUMBER: NORMAL

## 2023-06-01 PROCEDURE — P9037 PLATE PHERES LEUKOREDU IRRAD: HCPCS | Performed by: NURSE PRACTITIONER

## 2023-06-01 PROCEDURE — 36430 TRANSFUSION BLD/BLD COMPNT: CPT

## 2023-06-01 RX ORDER — HYDROCODONE BITARTRATE AND ACETAMINOPHEN 500; 5 MG/1; MG/1
TABLET ORAL ONCE
Status: CANCELLED | OUTPATIENT
Start: 2023-06-01 | End: 2023-06-01

## 2023-06-01 RX ORDER — DIPHENHYDRAMINE HCL 25 MG
25 CAPSULE ORAL ONCE
Status: DISCONTINUED | OUTPATIENT
Start: 2023-06-01 | End: 2023-06-01 | Stop reason: HOSPADM

## 2023-06-01 RX ORDER — DIPHENHYDRAMINE HCL 25 MG
25 CAPSULE ORAL ONCE
Status: CANCELLED | OUTPATIENT
Start: 2023-06-01 | End: 2023-06-01

## 2023-06-01 RX ORDER — HYDROCODONE BITARTRATE AND ACETAMINOPHEN 500; 5 MG/1; MG/1
TABLET ORAL ONCE
Status: DISCONTINUED | OUTPATIENT
Start: 2023-06-01 | End: 2023-06-01 | Stop reason: HOSPADM

## 2023-06-05 ENCOUNTER — INFUSION (OUTPATIENT)
Dept: INFUSION THERAPY | Facility: HOSPITAL | Age: 73
End: 2023-06-05
Attending: INTERNAL MEDICINE
Payer: MEDICARE

## 2023-06-05 VITALS — DIASTOLIC BLOOD PRESSURE: 75 MMHG | TEMPERATURE: 99 F | HEART RATE: 79 BPM | SYSTOLIC BLOOD PRESSURE: 153 MMHG

## 2023-06-05 DIAGNOSIS — C92.00 ACUTE MYELOID LEUKEMIA NOT HAVING ACHIEVED REMISSION: ICD-10-CM

## 2023-06-05 DIAGNOSIS — C92.00 ACUTE MYELOID LEUKEMIA NOT HAVING ACHIEVED REMISSION: Primary | ICD-10-CM

## 2023-06-05 PROCEDURE — 36430 TRANSFUSION BLD/BLD COMPNT: CPT

## 2023-06-05 PROCEDURE — P9037 PLATE PHERES LEUKOREDU IRRAD: HCPCS | Performed by: INTERNAL MEDICINE

## 2023-06-05 PROCEDURE — 63600175 PHARM REV CODE 636 W HCPCS: Performed by: INTERNAL MEDICINE

## 2023-06-05 PROCEDURE — 96374 THER/PROPH/DIAG INJ IV PUSH: CPT

## 2023-06-05 RX ORDER — HYDROCODONE BITARTRATE AND ACETAMINOPHEN 500; 5 MG/1; MG/1
TABLET ORAL ONCE
Status: CANCELLED | OUTPATIENT
Start: 2023-06-05 | End: 2023-06-05

## 2023-06-05 RX ORDER — DIPHENHYDRAMINE HYDROCHLORIDE 50 MG/ML
25 INJECTION INTRAMUSCULAR; INTRAVENOUS ONCE
Status: CANCELLED | OUTPATIENT
Start: 2023-06-05

## 2023-06-05 RX ORDER — HYDROCODONE BITARTRATE AND ACETAMINOPHEN 500; 5 MG/1; MG/1
TABLET ORAL ONCE
Status: DISCONTINUED | OUTPATIENT
Start: 2023-06-05 | End: 2023-06-05 | Stop reason: HOSPADM

## 2023-06-05 RX ORDER — DIPHENHYDRAMINE HYDROCHLORIDE 50 MG/ML
25 INJECTION INTRAMUSCULAR; INTRAVENOUS ONCE
Status: COMPLETED | OUTPATIENT
Start: 2023-06-05 | End: 2023-06-05

## 2023-06-05 RX ADMIN — DIPHENHYDRAMINE HYDROCHLORIDE 25 MG: 50 INJECTION INTRAMUSCULAR; INTRAVENOUS at 10:06

## 2023-06-08 ENCOUNTER — LAB VISIT (OUTPATIENT)
Dept: LAB | Facility: HOSPITAL | Age: 73
End: 2023-06-08
Attending: INTERNAL MEDICINE
Payer: MEDICARE

## 2023-06-08 ENCOUNTER — INFUSION (OUTPATIENT)
Dept: INFUSION THERAPY | Facility: HOSPITAL | Age: 73
End: 2023-06-08
Attending: INTERNAL MEDICINE
Payer: MEDICARE

## 2023-06-08 DIAGNOSIS — C92.00 ACUTE MYELOID LEUKEMIA NOT HAVING ACHIEVED REMISSION: ICD-10-CM

## 2023-06-08 LAB
ALBUMIN SERPL-MCNC: 3.3 G/DL (ref 3.4–4.8)
ALBUMIN/GLOB SERPL: 1.2 RATIO (ref 1.1–2)
ALP SERPL-CCNC: 102 UNIT/L (ref 40–150)
ALT SERPL-CCNC: 13 UNIT/L (ref 0–55)
AST SERPL-CCNC: 13 UNIT/L (ref 5–34)
BASOPHILS # BLD AUTO: 0 X10(3)/MCL
BASOPHILS NFR BLD AUTO: 0 %
BILIRUBIN DIRECT+TOT PNL SERPL-MCNC: 0.5 MG/DL
BUN SERPL-MCNC: 16 MG/DL (ref 8.4–25.7)
CALCIUM SERPL-MCNC: 9 MG/DL (ref 8.8–10)
CHLORIDE SERPL-SCNC: 106 MMOL/L (ref 98–107)
CO2 SERPL-SCNC: 29 MMOL/L (ref 23–31)
CREAT SERPL-MCNC: 0.88 MG/DL (ref 0.73–1.18)
EOSINOPHIL # BLD AUTO: 0 X10(3)/MCL (ref 0–0.9)
EOSINOPHIL NFR BLD AUTO: 0 %
ERYTHROCYTE [DISTWIDTH] IN BLOOD BY AUTOMATED COUNT: 21 % (ref 11.5–17)
GFR SERPLBLD CREATININE-BSD FMLA CKD-EPI: >60 MLS/MIN/1.73/M2
GLOBULIN SER-MCNC: 2.8 GM/DL (ref 2.4–3.5)
GLUCOSE SERPL-MCNC: 140 MG/DL (ref 82–115)
HCT VFR BLD AUTO: 22.5 % (ref 42–52)
HGB BLD-MCNC: 7.2 G/DL (ref 14–18)
IMM GRANULOCYTES # BLD AUTO: 0 X10(3)/MCL (ref 0–0.04)
IMM GRANULOCYTES NFR BLD AUTO: 0 %
LYMPHOCYTES # BLD AUTO: 0.43 X10(3)/MCL (ref 0.6–4.6)
LYMPHOCYTES NFR BLD AUTO: 93.5 %
MCH RBC QN AUTO: 31.4 PG (ref 27–31)
MCHC RBC AUTO-ENTMCNC: 32 G/DL (ref 33–36)
MCV RBC AUTO: 98.3 FL (ref 80–94)
MONOCYTES # BLD AUTO: 0.01 X10(3)/MCL (ref 0.1–1.3)
MONOCYTES NFR BLD AUTO: 2.2 %
NEUTROPHILS # BLD AUTO: 0.02 X10(3)/MCL (ref 2.1–9.2)
NEUTROPHILS NFR BLD AUTO: 4.3 %
PLATELET # BLD AUTO: 14 X10(3)/MCL (ref 130–400)
PMV BLD AUTO: ABNORMAL FL
POTASSIUM SERPL-SCNC: 4 MMOL/L (ref 3.5–5.1)
PROT SERPL-MCNC: 6.1 GM/DL (ref 5.8–7.6)
RBC # BLD AUTO: 2.29 X10(6)/MCL (ref 4.7–6.1)
SODIUM SERPL-SCNC: 141 MMOL/L (ref 136–145)
WBC # SPEC AUTO: 0.46 X10(3)/MCL (ref 4.5–11.5)

## 2023-06-08 PROCEDURE — 80053 COMPREHEN METABOLIC PANEL: CPT

## 2023-06-08 PROCEDURE — 36415 COLL VENOUS BLD VENIPUNCTURE: CPT

## 2023-06-08 PROCEDURE — 85025 COMPLETE CBC W/AUTO DIFF WBC: CPT

## 2023-06-08 NOTE — PLAN OF CARE
Plan of care reviewed with patient; per MD, no transfusions needed for H/H 7.2/22.5 PLTs 14. Appts printed and given to patient.

## 2023-06-09 LAB — RBCS: NORMAL

## 2023-06-11 ENCOUNTER — HOSPITAL ENCOUNTER (EMERGENCY)
Facility: HOSPITAL | Age: 73
Discharge: HOME | End: 2023-06-11
Attending: EMERGENCY MEDICINE
Payer: MEDICARE

## 2023-06-11 VITALS
BODY MASS INDEX: 24.15 KG/M2 | SYSTOLIC BLOOD PRESSURE: 166 MMHG | WEIGHT: 183 LBS | HEART RATE: 61 BPM | OXYGEN SATURATION: 99 % | TEMPERATURE: 98 F | RESPIRATION RATE: 19 BRPM | DIASTOLIC BLOOD PRESSURE: 91 MMHG

## 2023-06-11 DIAGNOSIS — R53.1 GENERALIZED WEAKNESS: ICD-10-CM

## 2023-06-11 DIAGNOSIS — D69.6 THROMBOCYTOPENIA: Primary | ICD-10-CM

## 2023-06-11 DIAGNOSIS — D64.9 ANEMIA, UNSPECIFIED TYPE: ICD-10-CM

## 2023-06-11 DIAGNOSIS — C92.00 ACUTE MYELOID LEUKEMIA NOT HAVING ACHIEVED REMISSION: ICD-10-CM

## 2023-06-11 LAB
ABO + RH BLD: NORMAL
ABO + RH BLD: NORMAL
ABS NEUT (OLG): 0.01 X10(3)/MCL (ref 2.1–9.2)
ALBUMIN SERPL-MCNC: 3.3 G/DL (ref 3.4–4.8)
ALBUMIN/GLOB SERPL: 1 RATIO (ref 1.1–2)
ALP SERPL-CCNC: 102 UNIT/L (ref 40–150)
ALT SERPL-CCNC: 11 UNIT/L (ref 0–55)
ANISOCYTOSIS BLD QL SMEAR: ABNORMAL
AST SERPL-CCNC: 11 UNIT/L (ref 5–34)
BILIRUBIN DIRECT+TOT PNL SERPL-MCNC: 0.6 MG/DL
BLD PROD TYP BPU: NORMAL
BLD PROD TYP BPU: NORMAL
BLOOD UNIT EXPIRATION DATE: NORMAL
BLOOD UNIT EXPIRATION DATE: NORMAL
BLOOD UNIT TYPE CODE: 5100
BLOOD UNIT TYPE CODE: 600
BUN SERPL-MCNC: 13.8 MG/DL (ref 8.4–25.7)
CALCIUM SERPL-MCNC: 8.9 MG/DL (ref 8.8–10)
CHLORIDE SERPL-SCNC: 106 MMOL/L (ref 98–107)
CO2 SERPL-SCNC: 25 MMOL/L (ref 23–31)
CREAT SERPL-MCNC: 1.07 MG/DL (ref 0.73–1.18)
CROSSMATCH INTERPRETATION: NORMAL
CROSSMATCH INTERPRETATION: NORMAL
DISPENSE STATUS: NORMAL
DISPENSE STATUS: NORMAL
ERYTHROCYTE [DISTWIDTH] IN BLOOD BY AUTOMATED COUNT: 21 % (ref 11.5–17)
GFR SERPLBLD CREATININE-BSD FMLA CKD-EPI: >60 MLS/MIN/1.73/M2
GLOBULIN SER-MCNC: 3.3 GM/DL (ref 2.4–3.5)
GLUCOSE SERPL-MCNC: 133 MG/DL (ref 82–115)
GROUP & RH: NORMAL
HCT VFR BLD AUTO: 21.4 % (ref 42–52)
HGB BLD-MCNC: 7.1 G/DL (ref 14–18)
INDIRECT COOMBS GEL: NORMAL
INSTRUMENT WBC (OLG): 0.5 X10(3)/MCL
LYMPHOCYTES NFR BLD MANUAL: 0.49 X10(3)/MCL
LYMPHOCYTES NFR BLD MANUAL: 98 %
MCH RBC QN AUTO: 31.4 PG (ref 27–31)
MCHC RBC AUTO-ENTMCNC: 33.2 G/DL (ref 33–36)
MCV RBC AUTO: 94.7 FL (ref 80–94)
MICROCYTES BLD QL SMEAR: ABNORMAL
NEUTROPHILS NFR BLD MANUAL: 2 %
NRBC BLD AUTO-RTO: 0 %
OVALOCYTES (OLG): ABNORMAL
PLATELET # BLD AUTO: 9 X10(3)/MCL (ref 130–400)
PLATELET # BLD EST: ABNORMAL 10*3/UL
PMV BLD AUTO: ABNORMAL FL
POIKILOCYTOSIS BLD QL SMEAR: ABNORMAL
POTASSIUM SERPL-SCNC: 3.6 MMOL/L (ref 3.5–5.1)
PROT SERPL-MCNC: 6.6 GM/DL (ref 5.8–7.6)
RBC # BLD AUTO: 2.26 X10(6)/MCL (ref 4.7–6.1)
RBC MORPH BLD: ABNORMAL
SCHISTOCYTE (OLG): ABNORMAL
SODIUM SERPL-SCNC: 141 MMOL/L (ref 136–145)
SPECIMEN OUTDATE: NORMAL
TROPONIN I SERPL-MCNC: <0.01 NG/ML (ref 0–0.04)
UNIT NUMBER: NORMAL
UNIT NUMBER: NORMAL
WBC # SPEC AUTO: 0.52 X10(3)/MCL (ref 4.5–11.5)

## 2023-06-11 PROCEDURE — 86900 BLOOD TYPING SEROLOGIC ABO: CPT | Performed by: STUDENT IN AN ORGANIZED HEALTH CARE EDUCATION/TRAINING PROGRAM

## 2023-06-11 PROCEDURE — 93005 ELECTROCARDIOGRAM TRACING: CPT

## 2023-06-11 PROCEDURE — P9037 PLATE PHERES LEUKOREDU IRRAD: HCPCS | Performed by: STUDENT IN AN ORGANIZED HEALTH CARE EDUCATION/TRAINING PROGRAM

## 2023-06-11 PROCEDURE — 84484 ASSAY OF TROPONIN QUANT: CPT | Performed by: NURSE PRACTITIONER

## 2023-06-11 PROCEDURE — 93010 ELECTROCARDIOGRAM REPORT: CPT | Mod: ,,, | Performed by: INTERNAL MEDICINE

## 2023-06-11 PROCEDURE — 93010 EKG 12-LEAD: ICD-10-PCS | Mod: ,,, | Performed by: INTERNAL MEDICINE

## 2023-06-11 PROCEDURE — 80053 COMPREHEN METABOLIC PANEL: CPT | Performed by: NURSE PRACTITIONER

## 2023-06-11 PROCEDURE — P9040 RBC LEUKOREDUCED IRRADIATED: HCPCS | Performed by: STUDENT IN AN ORGANIZED HEALTH CARE EDUCATION/TRAINING PROGRAM

## 2023-06-11 PROCEDURE — 99285 EMERGENCY DEPT VISIT HI MDM: CPT | Mod: 25

## 2023-06-11 PROCEDURE — 85027 COMPLETE CBC AUTOMATED: CPT | Performed by: NURSE PRACTITIONER

## 2023-06-11 PROCEDURE — 36430 TRANSFUSION BLD/BLD COMPNT: CPT

## 2023-06-11 PROCEDURE — 86923 COMPATIBILITY TEST ELECTRIC: CPT | Performed by: STUDENT IN AN ORGANIZED HEALTH CARE EDUCATION/TRAINING PROGRAM

## 2023-06-11 RX ORDER — HYDROCODONE BITARTRATE AND ACETAMINOPHEN 500; 5 MG/1; MG/1
TABLET ORAL
Status: DISCONTINUED | OUTPATIENT
Start: 2023-06-11 | End: 2023-06-11 | Stop reason: HOSPADM

## 2023-06-11 NOTE — FIRST PROVIDER EVALUATION
Medical screening examination initiated.  I have conducted a focused provider triage encounter, findings are as follows:    Brief history of present illness:  73y/o M presents to with generalize weakness. Onset three days. Currently receiving treatment with Dr. Luna.     There were no vitals filed for this visit.    Pertinent physical exam:  AAA x 3    Brief workup plan:  Labs    Preliminary workup initiated; this workup will be continued and followed by the physician or advanced practice provider that is assigned to the patient when roomed.

## 2023-06-11 NOTE — ED PROVIDER NOTES
Encounter Date: 6/11/2023       History     Chief Complaint   Patient presents with    Weakness     Pt presents with weakness and BUE soreness. Onset 3 days ago. Being treated for AML with Dr ngo. State that he has periodic blood transfusions. States that he takes both oral and IV chemo. Last dose of any chemo in greater that 72 hours.      HPI    71 yo male with history of AML, HTN and BPH who presents due to generalized weakness, lightheadedness and shortness of breath x 1 day. Finished 4th cycle of azacitidine and venetoclax approximately 3 days ago. Has required frequent transfusions since initiating chemo. Patient last received 2 units pRBCs and 1 unit platelets 6/5/23. Reports similar symptoms after previous chemotherapy cycles and when he requires transfusions. Tolerating PO intake. Denies recent fever, upper respiratory symptoms, chest pain, diarrhea, abdominal pain or dysuria. On daily prophylaxis  with Levaquin, acyclovir and fluconazole. Oncologist Dr. Luna.     Review of patient's allergies indicates:   Allergen Reactions    Acetaminophen      Past Medical History:   Diagnosis Date    Anxiety and depression     BPH (benign prostatic hyperplasia)     History of CVA (cerebrovascular accident) without residual deficits     HTN (hypertension)     Malignant melanoma of skin, unspecified     NSTEMI (non-ST elevated myocardial infarction)     Sepsis, Gram negative     Sleep apnea      Past Surgical History:   Procedure Laterality Date    BONE MARROW BIOPSY N/A 1/25/2023    Procedure: Biopsy-bone marrow;  Surgeon: Rd Carlos MD;  Location: [facility];  Service: General;  Laterality: N/A;    COLONOSCOPY      COLONOSCOPY N/A 1/24/2023    Procedure: COLONOSCOPY (DOUBLE);  Surgeon: Jimy Baker MD;  Location: [facility] ENDOSCOPY;  Service: Gastroenterology;  Laterality: N/A;    EGD, WITH CLOSED BIOPSY N/A 1/24/2023    Procedure: EGD, WITH CLOSED BIOPSY;  Surgeon: Jimy Baker MD;  Location: [facility]  ENDOSCOPY;  Service: Gastroenterology;  Laterality: N/A;    EXCISION OF MELANOMA      SINUS SURGERY      TONSILLECTOMY       Family History   Problem Relation Age of Onset    Cancer Mother     Cancer Father      Social History     Tobacco Use    Smoking status: Never    Smokeless tobacco: Never   Substance Use Topics    Alcohol use: Not Currently     Alcohol/week: 7.0 standard drinks     Types: 7 Cans of beer per week    Drug use: Never     Review of Systems   Constitutional:  Positive for activity change and fatigue. Negative for appetite change and fever.   HENT:  Negative for congestion, rhinorrhea and sore throat.    Respiratory:  Negative for shortness of breath.    Cardiovascular:  Negative for chest pain.   Gastrointestinal:  Negative for abdominal pain, constipation, diarrhea, nausea and vomiting.   Genitourinary:  Negative for dysuria and hematuria.   Neurological:  Positive for weakness (generalized) and light-headedness.     Physical Exam     Initial Vitals [06/11/23 1146]   BP Pulse Resp Temp SpO2   (!) 95/56 88 18 98.5 °F (36.9 °C) 98 %      MAP       --         Vitals:    06/11/23 1416 06/11/23 1441 06/11/23 1455 06/11/23 1510   BP: 128/79 135/76 137/80 139/81   Pulse: 62 64 (!) 56 62   Resp: 18 18 18 18   Temp: 97.8 °F (36.6 °C) 98.1 °F (36.7 °C) 98.1 °F (36.7 °C) 98 °F (36.7 °C)   TempSrc:  Oral     SpO2: 98% 98% 98% 99%   Weight:            Repeat BP during interview and exam: 133/82    Physical Exam    Nursing note and vitals reviewed.  Constitutional: He appears well-developed and well-nourished. He is not diaphoretic. He is cooperative. No distress.   HENT:   Head: Normocephalic and atraumatic.   Right Ear: Hearing, tympanic membrane and ear canal normal.   Left Ear: Hearing, tympanic membrane, external ear and ear canal normal.   Nose: Nose normal. No mucosal edema or rhinorrhea.   Mouth/Throat: Uvula is midline, oropharynx is clear and moist and mucous membranes are normal.   Right ear s/p  excision of BCC with crusting, no erythema     Eyes: Conjunctivae and EOM are normal. Pupils are equal, round, and reactive to light. Right conjunctiva is not injected. Left conjunctiva is not injected.   Neck: Trachea normal. Neck supple.   Normal range of motion.  Cardiovascular:  Normal rate, regular rhythm and intact distal pulses.     Exam reveals no gallop and no friction rub.       Murmur heard.  Systolic murmur is present with a grade of 2/6.  Pulses:       Radial pulses are 2+ on the right side and 2+ on the left side.        Dorsalis pedis pulses are 2+ on the right side and 2+ on the left side.   Pulmonary/Chest: Effort normal and breath sounds normal. No respiratory distress. He has no decreased breath sounds. He has no wheezes. He has no rhonchi. He has no rales. He exhibits no tenderness.   Abdominal: Abdomen is soft and flat. Bowel sounds are normal. He exhibits no distension and no mass. There is no abdominal tenderness. There is no rebound.   Musculoskeletal:         General: No tenderness or edema. Normal range of motion.      Cervical back: Normal range of motion and neck supple.      Lumbar back: Normal. No tenderness. Normal range of motion.      Right lower leg: No swelling.      Left lower leg: No swelling.      Comments: 5/5 strength in upper and lower extremities      Neurological: He is alert and oriented to person, place, and time. He has normal strength. No cranial nerve deficit or sensory deficit.   Skin: Skin is warm and dry. Capillary refill takes less than 2 seconds. No ecchymosis, no petechiae, no rash and no abscess noted. No erythema. No pallor.   Psychiatric: He has a normal mood and affect. His speech is normal and behavior is normal. Judgment and thought content normal.       ED Course   Critical Care    Date/Time: 6/11/2023 4:55 PM  Performed by: Ana Luisa Flores MD  Authorized by: Ana Luisa Flores MD   Direct patient critical care time: 30 minutes  Total critical care time  (exclusive of procedural time) : 30 minutes  Critical care was necessary to treat or prevent imminent or life-threatening deterioration of the following conditions: symptomatic anemia.  Critical care was time spent personally by me on the following activities: development of treatment plan with patient or surrogate, discussions with consultants, evaluation of patient's response to treatment, examination of patient, obtaining history from patient or surrogate, ordering and performing treatments and interventions, ordering and review of laboratory studies, ordering and review of radiographic studies, pulse oximetry, re-evaluation of patient's condition and review of old charts.      Labs Reviewed   COMPREHENSIVE METABOLIC PANEL - Abnormal; Notable for the following components:       Result Value    Glucose Level 133 (*)     Albumin Level 3.3 (*)     Albumin/Globulin Ratio 1.0 (*)     All other components within normal limits   CBC WITH DIFFERENTIAL - Abnormal; Notable for the following components:    WBC 0.52 (*)     RBC 2.26 (*)     Hgb 7.1 (*)     Hct 21.4 (*)     MCV 94.7 (*)     MCH 31.4 (*)     RDW 21.0 (*)     Platelet 9 (*)     All other components within normal limits   MANUAL DIFFERENTIAL - Abnormal; Notable for the following components:    Abs Lymp 0.49 (*)     Abs Neut 0.01 (*)     RBC Morph Abnormal (*)     Anisocyte 2+ (*)     Poik 2+ (*)     Microcyte 1+ (*)     Schistocyte 1+ (*)     Ovalocytes 1+ (*)     Platelet Est Decreased (*)     All other components within normal limits   TROPONIN I - Normal   CBC W/ AUTO DIFFERENTIAL    Narrative:     The following orders were created for panel order CBC Auto Differential.  Procedure                               Abnormality         Status                     ---------                               -----------         ------                     CBC with Differential[[id number]]        Abnormal            Final result               Manual Differential[[id number]]           Abnormal            Final result                 Please view results for these tests on the individual orders.   URINALYSIS, REFLEX TO URINE CULTURE   TYPE & SCREEN   PREPARE PLATELETS (DOSE) SOFT   PREPARE RBC SOFT     EKG Readings: (Independently Interpreted)   Rhythm: Normal Sinus Rhythm. Heart Rate: 73. Ectopy: No Ectopy.   Nonspecifc st and t wave abnormality     Imaging Results              X-Ray Chest 1 View (Final result)  Result time 06/11/23 12:43:21   Procedure changed from X-Ray Chest PA And Lateral     Final result by Xavier Crandall MD (06/11/23 12:43:21)                   Impression:      NO ACUTE CARDIOPULMONARY PROCESS IDENTIFIED.      Electronically signed by: Xavier Crandall  Date:    06/11/2023  Time:    12:43               Narrative:    EXAMINATION:  XR CHEST 1 VIEW    CLINICAL HISTORY:  generlized weakness;Weakness    TECHNIQUE:  One view    COMPARISON:  December 14, 2022.    FINDINGS:  Cardiopericardial silhouette is upper limits of normal.  Lungs are without dense focal or segmental consolidation, congestive process, pleural effusions or pneumothorax.                                    X-Rays:   Independently Interpreted Readings:   Chest X-Ray: Normal heart size.  No infiltrates.  No acute abnormalities.   Medications   0.9%  NaCl infusion (for blood administration) (has no administration in time range)   0.9%  NaCl infusion (for blood administration) (has no administration in time range)   Medical Decision Making  Given patient's presentation, differential diagnosis includes but is not limited to anemia, pancytopenia, debility, renal insufficiency, acs, chf  To evaluate these  possible etiologies cbc, cmp, trop, bnp, ekg, cxr were ordered and reviewed  Pancytopenia noted, s/s consistent with his anemia  Discussd with oncology and recs provided, they are comforatble with transufion and dc, has f/u in am  Offered obs vs transfusion and dc with f/u in am, they do not want to be admitted,  wish to receive blood and is due for labs in am. 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  Labs: ordered.  Radiology: ordered and independent interpretation performed.  ECG/medicine tests: ordered and independent interpretation performed.    Risk  OTC drugs.  Prescription drug management.  Decision regarding hospitalization.    Critical Care  Total time providing critical care: 30 minutes        Medical Decision Making:   History:   Old Medical Records: I decided to obtain old medical records.  Old Records Summarized: records from clinic visits.  Initial Assessment:   71 yo male with AML who presents to ED due to generalized weakness, fatigue and shortness of breath x 1 day. Completed 4th round of chemo approximately 3 days ago. Has similar symptoms after each chemotherapy cycle. Has required multiple transfusions since initiation of chemo. Denies fever, upper respiratory symptoms, chest pain, shortness of breath and UTI symptoms.   Differential Diagnosis:   Pancytopenia, symptomatic anemia, LEONEL, chemotherapy side effects   Independently Interpreted Test(s):   I have ordered and independently interpreted X-rays - see prior notes.  I have ordered and independently interpreted EKG Reading(s) - see prior notes  Clinical Tests:   Lab Tests: Reviewed and Ordered  Radiological Study: Ordered and Reviewed  Medical Tests: Reviewed and Ordered  Other:   I have discussed this case  with another health care provider.          Attending Attestation:   Physician Attestation Statement for Resident:  As the supervising MD   Physician Attestation Statement: I have personally seen and examined this patient.   I agree with the above history.  -:   As the supervising MD I agree with the above PE.     As the supervising MD I agree with the above treatment, course, plan, and disposition.    I have reviewed and agree with the residents interpretation of the following: lab data, x-rays and EKG.  I have reviewed the following: old records at this facility.              ED Course as of 06/11/23 1659   Sun Jun 11, 2023   8167 Dr. Flores supervisory attestation  I performed substantive portion of MDM. I performed a history, physical exam, reviewed pertinent available previous records and discussed plan of care with resident I had face to face time evaluation of the patient    HPI:  72-year-old male with a history of leukemia frequently requiring transfusions presenting to the ED complaining of generalized weakness, lower blood pressure than baseline and dyspnea on exertion similar to previous anemia episodes.  Last transfusion was about a week ago where he received both blood and platelets..  He denies any other complaints.  He was currently on a fluoroquinolone for his right ear that had a basal cell carcinoma removal  PE:  Normocephalic, atraumatic  Conjunctival pallor, pupils equal and reactive to light  Right ear with some crusting, no surrounding erythema  Regular rate, lungs clear to auscultation bilaterally  Abdomen is soft and nontender  No lower extremity edema  MDM:cbc, cmp, trop  Pancytopenia, discussed with oncology, transfuse, discuss dispo with pt    [BS]   1306 Spoke with Dr. Lee, oncology on call, who recommends transfusion with 1 unit leukopoor reduced and irradiated blood and 1 unit platelets.   [AW]   1330 Patient agreeable to transfusion. Discussed admission for observation vs  discharge after completion of transfusion. Requests discharge from ED after transfusion. Has labs scheduled tomorrow. Would like to obtain repeat labs tomorrow am rather than obtain repeat labs in ED. Plan to discharge patient after transfusion if appropriate.  [AW]      ED Course User Index  [AW] Alisson Garcia MD  [BS] Ana Luisa Flores MD                 Clinical Impression:   Final diagnoses:  [R53.1] Generalized weakness  [D69.6] Thrombocytopenia (Primary)  [D64.9] Anemia, unspecified type  [C92.00] Acute myeloid leukemia not having achieved remission        ED Disposition Condition    Discharge Stable          ED Prescriptions    None       Follow-up Information       Follow up With Specialties Details Why Contact Info    Nayely Andres MD Internal Medicine  As needed, If symptoms worsen [address] [place] [id number]  [phone]      Eduard Luna II, MD Oncology, Hematology and Oncology In 1 day Call to notify clinic of ED visit. Keep scheduled appointment for labs. [address] [facility]  [address]  [facility] [address]  [phone]               Alisson Garcia MD  Resident  06/11/23 1648       Ana Luisa Flores MD  06/11/23 0247

## 2023-06-12 ENCOUNTER — INFUSION (OUTPATIENT)
Dept: INFUSION THERAPY | Facility: HOSPITAL | Age: 73
End: 2023-06-12
Attending: INTERNAL MEDICINE
Payer: MEDICARE

## 2023-06-12 ENCOUNTER — LAB VISIT (OUTPATIENT)
Dept: LAB | Facility: HOSPITAL | Age: 73
End: 2023-06-12
Attending: INTERNAL MEDICINE
Payer: MEDICARE

## 2023-06-12 DIAGNOSIS — C92.00 ACUTE MYELOID LEUKEMIA NOT HAVING ACHIEVED REMISSION: ICD-10-CM

## 2023-06-12 LAB
ALBUMIN SERPL-MCNC: 3.5 G/DL (ref 3.4–4.8)
ALBUMIN/GLOB SERPL: 1.2 RATIO (ref 1.1–2)
ALP SERPL-CCNC: 105 UNIT/L (ref 40–150)
ALT SERPL-CCNC: 12 UNIT/L (ref 0–55)
AST SERPL-CCNC: 12 UNIT/L (ref 5–34)
BASOPHILS # BLD AUTO: 0 X10(3)/MCL
BASOPHILS NFR BLD AUTO: 0 %
BILIRUBIN DIRECT+TOT PNL SERPL-MCNC: 0.7 MG/DL
BUN SERPL-MCNC: 15.1 MG/DL (ref 8.4–25.7)
CALCIUM SERPL-MCNC: 9 MG/DL (ref 8.8–10)
CHLORIDE SERPL-SCNC: 103 MMOL/L (ref 98–107)
CO2 SERPL-SCNC: 30 MMOL/L (ref 23–31)
CREAT SERPL-MCNC: 1.13 MG/DL (ref 0.73–1.18)
EOSINOPHIL # BLD AUTO: 0 X10(3)/MCL (ref 0–0.9)
EOSINOPHIL NFR BLD AUTO: 0 %
ERYTHROCYTE [DISTWIDTH] IN BLOOD BY AUTOMATED COUNT: 20.1 % (ref 11.5–17)
GFR SERPLBLD CREATININE-BSD FMLA CKD-EPI: >60 MLS/MIN/1.73/M2
GLOBULIN SER-MCNC: 2.9 GM/DL (ref 2.4–3.5)
GLUCOSE SERPL-MCNC: 134 MG/DL (ref 82–115)
HCT VFR BLD AUTO: 23.7 % (ref 42–52)
HGB BLD-MCNC: 7.7 G/DL (ref 14–18)
IMM GRANULOCYTES # BLD AUTO: 0 X10(3)/MCL (ref 0–0.04)
IMM GRANULOCYTES NFR BLD AUTO: 0 %
LYMPHOCYTES # BLD AUTO: 0.5 X10(3)/MCL (ref 0.6–4.6)
LYMPHOCYTES NFR BLD AUTO: 94.3 %
MCH RBC QN AUTO: 31.4 PG (ref 27–31)
MCHC RBC AUTO-ENTMCNC: 32.5 G/DL (ref 33–36)
MCV RBC AUTO: 96.7 FL (ref 80–94)
MONOCYTES # BLD AUTO: 0.01 X10(3)/MCL (ref 0.1–1.3)
MONOCYTES NFR BLD AUTO: 1.9 %
NEUTROPHILS # BLD AUTO: 0.02 X10(3)/MCL (ref 2.1–9.2)
NEUTROPHILS NFR BLD AUTO: 3.8 %
PLATELET # BLD AUTO: 49 X10(3)/MCL (ref 130–400)
PMV BLD AUTO: 10.1 FL (ref 7.4–10.4)
POTASSIUM SERPL-SCNC: 3.4 MMOL/L (ref 3.5–5.1)
PROT SERPL-MCNC: 6.4 GM/DL (ref 5.8–7.6)
RBC # BLD AUTO: 2.45 X10(6)/MCL (ref 4.7–6.1)
SODIUM SERPL-SCNC: 139 MMOL/L (ref 136–145)
WBC # SPEC AUTO: 0.53 X10(3)/MCL (ref 4.5–11.5)

## 2023-06-12 PROCEDURE — 36415 COLL VENOUS BLD VENIPUNCTURE: CPT

## 2023-06-12 PROCEDURE — 80053 COMPREHEN METABOLIC PANEL: CPT

## 2023-06-12 PROCEDURE — 85025 COMPLETE CBC W/AUTO DIFF WBC: CPT

## 2023-06-12 NOTE — PROGRESS NOTES
Subjective:       Patient ID: Hussein Gonzalez Jr. is a 72 y.o. male.    Chief Complaint: Other Misc (Pt reports no new concerns today.)      Diagnosis:  AML with 27% blasts, background dysplasia, normal cytogenetics and normal FISH    Current Treatment:   Azacitidine and venetoclax to start on 02/27/2023.  Referral for possible stem cell transplant evaluation.    Treatment History:  N/A    HPI:  72-year-old male with a history of CVA, hypertension, BPH, and melanoma who presented to the emergency department following a syncopal episode.  In February of 2022, the patient did have a slightly low white blood cell count, hemoglobin, and platelet count.  Per the patient, 3 months prior to admission, he was having dizziness, fatigue, weakness, shortness of breath, back pain, and leg pain.  The dizziness was worse with standing and walking.  On 01/20/2023, the patient had a syncopal episode.  He was brought to the emergency department and was found to be pancytopenic.  He also had stool that was positive for occult blood in the emergency department.  He received blood transfusion.  His CBC on 01/20/2023 revealed a white blood cell count of 2.6, hemoglobin of 7.7, and platelet count of 47,000. Reticulocytes were appropriately elevated, B12 and folate were within normal limits, iron level was slightly decreased.  Repeat blood work on 01/21/2023 revealed a white blood cell count of 2.8, post transfusion hemoglobin of 9.6, and a [place] of 09551.  Hematology was consulted for pancytopenia.  EGD and colonoscopy done on 01/24/2023, EGD revealed nonbleeding ulcers, colonoscopy unremarkable, however had to be aborted due to tortuous colon.  Bone Marrow Biopsy done on 01/25/2023.  Preliminary reveals AML with 27% blasts in a background of dysplasia.  Cytogenetics are within normal limits, FISH is within normal limits.  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  Started azacitidine and  venetoclax on 02/27/2023, completed cycle 4 on 05/30/2023.    Interval History:   Patient presents for scheduled follow up appointment.  He has been having some weakness.  He did have a fall last weekend, went to the emergency department, was discharged.  He did require blood transfusion last week and had to have platelets yesterday.  He is overall feeling weak.        Past Medical History:   Diagnosis Date    Anxiety and depression     BPH (benign prostatic hyperplasia)     History of CVA (cerebrovascular accident) without residual deficits     HTN (hypertension)     Malignant melanoma of skin, unspecified     NSTEMI (non-ST elevated myocardial infarction)     Sepsis, Gram negative     Sleep apnea       Past Surgical History:   Procedure Laterality Date    BONE MARROW BIOPSY N/A 1/25/2023    Procedure: Biopsy-bone marrow;  Surgeon: Rd Carlos MD;  Location: [facility] OR;  Service: General;  Laterality: N/A;    COLONOSCOPY      COLONOSCOPY N/A 1/24/2023    Procedure: COLONOSCOPY (DOUBLE);  Surgeon: Jimy Baker MD;  Location: [facility] ENDOSCOPY;  Service: Gastroenterology;  Laterality: N/A;    EGD, WITH CLOSED BIOPSY N/A 1/24/2023    Procedure: EGD, WITH CLOSED BIOPSY;  Surgeon: Jimy Baker MD;  Location: [facility] ENDOSCOPY;  Service: Gastroenterology;  Laterality: N/A;    EXCISION OF MELANOMA      SINUS SURGERY      TONSILLECTOMY       Social History     Socioeconomic History    Marital status: Significant Other   Tobacco Use    Smoking status: Never    Smokeless tobacco: Never   Substance and Sexual Activity    Alcohol use: Not Currently     Alcohol/week: 7.0 standard drinks     Types: 7 Cans of beer per week    Drug use: Never    Sexual activity: Yes     Social Determinants of Health     Financial Resource Strain: Low Risk     Difficulty of Paying Living Expenses: Not hard at all   Food Insecurity: No Food Insecurity    Worried About Running Out of Food in the Last Year: Never true    Ran Out of Food in  the Last Year: Never true   Transportation Needs: No Transportation Needs    Lack of Transportation (Medical): No    Lack of Transportation (Non-Medical): No   Physical Activity: Insufficiently Active    Days of Exercise per Week: 3 days    Minutes of Exercise per Session: 30 min   Stress: No Stress Concern Present    Feeling of Stress : Not at all   Social Connections: Unknown    Frequency of Communication with Friends and Family: More than three times a week    Frequency of Social Gatherings with Friends and Family: More than three times a week    Attends Rastafarian Services: Patient refused    Active Member of Clubs or Organizations: Patient refused    Attends Club or Organization Meetings: Patient refused    Marital Status:    Housing Stability: Low Risk     Unable to Pay for Housing in the Last Year: No    Number of Places Lived in the Last Year: 1    Unstable Housing in the Last Year: No      Family History   Problem Relation Age of Onset    Cancer Mother     Cancer Father       Review of patient's allergies indicates:   Allergen Reactions    Acetaminophen       Review of Systems   Constitutional:  Positive for fatigue. Negative for chills, diaphoresis, fever and unexpected weight change.   HENT:  Negative for nasal congestion, mouth sores, sinus pressure/congestion and sore throat.    Eyes:  Negative for pain and visual disturbance.   Respiratory:  Negative for cough, chest tightness and shortness of breath.    Cardiovascular:  Negative for chest pain, palpitations and leg swelling.   Gastrointestinal:  Negative for abdominal distention, abdominal pain, blood in stool, constipation and diarrhea.   Genitourinary:  Negative for dysuria, frequency and hematuria.   Musculoskeletal:  Negative for arthralgias and back pain.   Integumentary:  Negative for rash.   Neurological:  Positive for weakness. Negative for dizziness, syncope, numbness and headaches.   Hematological:  Negative for adenopathy.    Psychiatric/Behavioral:  Negative for confusion.        Objective:      Physical Exam  Vitals reviewed.   Constitutional:       General: He is awake.      Appearance: Normal appearance.   HENT:      Head: Normocephalic and atraumatic.      Right Ear: Hearing normal.      Left Ear: Hearing normal.      Nose: Nose normal.   Eyes:      General: Lids are normal. Vision grossly intact.      Extraocular Movements: Extraocular movements intact.      Conjunctiva/sclera: Conjunctivae normal.   Cardiovascular:      Rate and Rhythm: Normal rate and regular rhythm.      Pulses: Normal pulses.      Heart sounds: Normal heart sounds.   Pulmonary:      Effort: Pulmonary effort is normal.      Breath sounds: Normal breath sounds. No wheezing, rhonchi or rales.   Abdominal:      General: Bowel sounds are normal.      Palpations: Abdomen is soft.      Tenderness: There is no abdominal tenderness.   Musculoskeletal:      Cervical back: Full passive range of motion without pain.      Right lower leg: No edema.      Left lower leg: No edema.   Lymphadenopathy:      Cervical: No cervical adenopathy.      Upper Body:      Right upper body: No supraclavicular or axillary adenopathy.      Left upper body: No supraclavicular or axillary adenopathy.   Skin:     General: Skin is warm.      Comments: Lower leg petechiae     Neurological:      General: No focal deficit present.      Mental Status: He is alert and oriented to person, place, and time.   Psychiatric:         Attention and Perception: Attention normal.         Mood and Affect: Mood and affect normal.         Behavior: Behavior is cooperative.       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    Treating with azacitidine and venetoclax, Started on 02/27/2023. Proceed with cycle 3 next week.  Received his last dose of cycle 4 on 05/30/2023.    Evaluated by Dr. Valdez in [place] for stem cell transplant evaluation    He is received 4 months of therapy, I will repeat a bone marrow biopsy.  I will see him back in 2-3 weeks to discuss the results.  He will hold venetoclax for now.  We will see if this helps with his energy.    Continue prophylaxis with 500 mg of levofloxacin daily, 200 mg of acyclovir b.i.d., 200 mg of fluconazole daily.    Twice weekly labs (Monday/Thursday) with transfusions as needed.    Return to clinic the week of 4th of July to discuss bone marrow results.      Eduard Luna II, MD

## 2023-06-15 ENCOUNTER — INFUSION (OUTPATIENT)
Dept: INFUSION THERAPY | Facility: HOSPITAL | Age: 73
End: 2023-06-15
Attending: INTERNAL MEDICINE
Payer: MEDICARE

## 2023-06-15 VITALS
HEART RATE: 66 BPM | TEMPERATURE: 98 F | SYSTOLIC BLOOD PRESSURE: 140 MMHG | DIASTOLIC BLOOD PRESSURE: 74 MMHG | RESPIRATION RATE: 18 BRPM

## 2023-06-15 DIAGNOSIS — C92.00 ACUTE MYELOID LEUKEMIA NOT HAVING ACHIEVED REMISSION: ICD-10-CM

## 2023-06-15 DIAGNOSIS — C92.00 ACUTE MYELOID LEUKEMIA NOT HAVING ACHIEVED REMISSION: Primary | ICD-10-CM

## 2023-06-15 PROCEDURE — 86923 COMPATIBILITY TEST ELECTRIC: CPT | Performed by: INTERNAL MEDICINE

## 2023-06-15 PROCEDURE — P9040 RBC LEUKOREDUCED IRRADIATED: HCPCS | Performed by: INTERNAL MEDICINE

## 2023-06-15 PROCEDURE — 36430 TRANSFUSION BLD/BLD COMPNT: CPT

## 2023-06-15 RX ORDER — DIPHENHYDRAMINE HCL 25 MG
25 CAPSULE ORAL ONCE
Status: DISCONTINUED | OUTPATIENT
Start: 2023-06-15 | End: 2023-06-15 | Stop reason: HOSPADM

## 2023-06-15 RX ORDER — ACETAMINOPHEN 325 MG/1
650 TABLET ORAL ONCE
Status: CANCELLED | OUTPATIENT
Start: 2023-06-15 | End: 2023-06-15

## 2023-06-15 RX ORDER — ACETAMINOPHEN 325 MG/1
650 TABLET ORAL ONCE
Status: DISCONTINUED | OUTPATIENT
Start: 2023-06-15 | End: 2023-06-15

## 2023-06-15 RX ORDER — DIPHENHYDRAMINE HCL 25 MG
25 CAPSULE ORAL ONCE
Status: CANCELLED | OUTPATIENT
Start: 2023-06-15 | End: 2023-06-15

## 2023-06-15 RX ORDER — HYDROCODONE BITARTRATE AND ACETAMINOPHEN 500; 5 MG/1; MG/1
TABLET ORAL ONCE
Status: CANCELLED | OUTPATIENT
Start: 2023-06-15 | End: 2023-06-15

## 2023-06-15 RX ORDER — HYDROCODONE BITARTRATE AND ACETAMINOPHEN 500; 5 MG/1; MG/1
TABLET ORAL ONCE
Status: DISCONTINUED | OUTPATIENT
Start: 2023-06-15 | End: 2023-06-15 | Stop reason: HOSPADM

## 2023-06-16 ENCOUNTER — OFFICE VISIT (OUTPATIENT)
Dept: HEMATOLOGY/ONCOLOGY | Facility: CLINIC | Age: 73
End: 2023-06-16
Payer: MEDICARE

## 2023-06-16 VITALS
HEIGHT: 73 IN | RESPIRATION RATE: 14 BRPM | OXYGEN SATURATION: 100 % | TEMPERATURE: 98 F | DIASTOLIC BLOOD PRESSURE: 53 MMHG | WEIGHT: 180.19 LBS | SYSTOLIC BLOOD PRESSURE: 109 MMHG | HEART RATE: 72 BPM | BODY MASS INDEX: 23.88 KG/M2

## 2023-06-16 DIAGNOSIS — C92.00 ACUTE MYELOID LEUKEMIA NOT HAVING ACHIEVED REMISSION: Primary | ICD-10-CM

## 2023-06-16 PROCEDURE — 99214 PR OFFICE/OUTPT VISIT, EST, LEVL IV, 30-39 MIN: ICD-10-PCS | Mod: S$PBB,,, | Performed by: INTERNAL MEDICINE

## 2023-06-16 PROCEDURE — 99999 PR PBB SHADOW E&M-EST. PATIENT-LVL V: CPT | Mod: PBBFAC,,, | Performed by: INTERNAL MEDICINE

## 2023-06-16 PROCEDURE — 99214 OFFICE O/P EST MOD 30 MIN: CPT | Mod: S$PBB,,, | Performed by: INTERNAL MEDICINE

## 2023-06-16 PROCEDURE — 99999 PR PBB SHADOW E&M-EST. PATIENT-LVL V: ICD-10-PCS | Mod: PBBFAC,,, | Performed by: INTERNAL MEDICINE

## 2023-06-16 PROCEDURE — 99215 OFFICE O/P EST HI 40 MIN: CPT | Mod: PBBFAC | Performed by: INTERNAL MEDICINE

## 2023-06-19 ENCOUNTER — LAB VISIT (OUTPATIENT)
Dept: LAB | Facility: HOSPITAL | Age: 73
End: 2023-06-19
Attending: INTERNAL MEDICINE
Payer: MEDICARE

## 2023-06-19 ENCOUNTER — INFUSION (OUTPATIENT)
Dept: INFUSION THERAPY | Facility: HOSPITAL | Age: 73
End: 2023-06-19
Attending: INTERNAL MEDICINE
Payer: MEDICARE

## 2023-06-19 VITALS
TEMPERATURE: 98 F | HEART RATE: 65 BPM | DIASTOLIC BLOOD PRESSURE: 71 MMHG | SYSTOLIC BLOOD PRESSURE: 134 MMHG | RESPIRATION RATE: 16 BRPM

## 2023-06-19 DIAGNOSIS — D69.6 THROMBOCYTOPENIA: Primary | ICD-10-CM

## 2023-06-19 DIAGNOSIS — D69.6 THROMBOCYTOPENIA: ICD-10-CM

## 2023-06-19 DIAGNOSIS — C92.00 ACUTE MYELOID LEUKEMIA NOT HAVING ACHIEVED REMISSION: ICD-10-CM

## 2023-06-19 LAB
ABO + RH BLD: NORMAL
ALBUMIN SERPL-MCNC: 3.1 G/DL (ref 3.4–4.8)
ALBUMIN/GLOB SERPL: 1 RATIO (ref 1.1–2)
ALP SERPL-CCNC: 112 UNIT/L (ref 40–150)
ALT SERPL-CCNC: 10 UNIT/L (ref 0–55)
AST SERPL-CCNC: 9 UNIT/L (ref 5–34)
BASOPHILS # BLD AUTO: 0 X10(3)/MCL
BASOPHILS NFR BLD AUTO: 0 %
BILIRUBIN DIRECT+TOT PNL SERPL-MCNC: 0.4 MG/DL
BLD PROD TYP BPU: NORMAL
BLOOD UNIT EXPIRATION DATE: NORMAL
BLOOD UNIT TYPE CODE: 6200
BUN SERPL-MCNC: 22.9 MG/DL (ref 8.4–25.7)
CALCIUM SERPL-MCNC: 8.4 MG/DL (ref 8.8–10)
CHLORIDE SERPL-SCNC: 106 MMOL/L (ref 98–107)
CO2 SERPL-SCNC: 30 MMOL/L (ref 23–31)
CREAT SERPL-MCNC: 0.84 MG/DL (ref 0.73–1.18)
CROSSMATCH INTERPRETATION: NORMAL
DISPENSE STATUS: NORMAL
EOSINOPHIL # BLD AUTO: 0 X10(3)/MCL (ref 0–0.9)
EOSINOPHIL NFR BLD AUTO: 0 %
ERYTHROCYTE [DISTWIDTH] IN BLOOD BY AUTOMATED COUNT: 18.3 % (ref 11.5–17)
GFR SERPLBLD CREATININE-BSD FMLA CKD-EPI: >60 MLS/MIN/1.73/M2
GLOBULIN SER-MCNC: 3 GM/DL (ref 2.4–3.5)
GLUCOSE SERPL-MCNC: 106 MG/DL (ref 82–115)
HCT VFR BLD AUTO: 34.8 % (ref 42–52)
HGB BLD-MCNC: 11 G/DL (ref 14–18)
IMM GRANULOCYTES # BLD AUTO: 0 X10(3)/MCL (ref 0–0.04)
IMM GRANULOCYTES NFR BLD AUTO: 0 %
LYMPHOCYTES # BLD AUTO: 0.36 X10(3)/MCL (ref 0.6–4.6)
LYMPHOCYTES NFR BLD AUTO: 94.7 %
MCH RBC QN AUTO: 30.6 PG (ref 27–31)
MCHC RBC AUTO-ENTMCNC: 31.6 G/DL (ref 33–36)
MCV RBC AUTO: 96.9 FL (ref 80–94)
MONOCYTES # BLD AUTO: 0.01 X10(3)/MCL (ref 0.1–1.3)
MONOCYTES NFR BLD AUTO: 2.6 %
NEUTROPHILS # BLD AUTO: 0.01 X10(3)/MCL (ref 2.1–9.2)
NEUTROPHILS NFR BLD AUTO: 2.7 %
PLATELET # BLD AUTO: 10 X10(3)/MCL (ref 130–400)
PMV BLD AUTO: ABNORMAL FL
POTASSIUM SERPL-SCNC: 3.6 MMOL/L (ref 3.5–5.1)
PROT SERPL-MCNC: 6.1 GM/DL (ref 5.8–7.6)
RBC # BLD AUTO: 3.59 X10(6)/MCL (ref 4.7–6.1)
SODIUM SERPL-SCNC: 142 MMOL/L (ref 136–145)
UNIT NUMBER: NORMAL
WBC # SPEC AUTO: 0.38 X10(3)/MCL (ref 4.5–11.5)

## 2023-06-19 PROCEDURE — P9037 PLATE PHERES LEUKOREDU IRRAD: HCPCS | Performed by: INTERNAL MEDICINE

## 2023-06-19 PROCEDURE — 36430 TRANSFUSION BLD/BLD COMPNT: CPT

## 2023-06-19 PROCEDURE — 85025 COMPLETE CBC W/AUTO DIFF WBC: CPT

## 2023-06-19 PROCEDURE — 80053 COMPREHEN METABOLIC PANEL: CPT

## 2023-06-19 PROCEDURE — 36415 COLL VENOUS BLD VENIPUNCTURE: CPT

## 2023-06-19 RX ORDER — HYDROCODONE BITARTRATE AND ACETAMINOPHEN 500; 5 MG/1; MG/1
TABLET ORAL ONCE
Status: DISCONTINUED | OUTPATIENT
Start: 2023-06-19 | End: 2023-06-19 | Stop reason: HOSPADM

## 2023-06-19 RX ORDER — HYDROCODONE BITARTRATE AND ACETAMINOPHEN 500; 5 MG/1; MG/1
TABLET ORAL ONCE
Status: CANCELLED | OUTPATIENT
Start: 2023-06-19 | End: 2023-06-19

## 2023-06-19 RX ORDER — DIPHENHYDRAMINE HCL 25 MG
25 CAPSULE ORAL ONCE
Status: CANCELLED | OUTPATIENT
Start: 2023-06-19 | End: 2023-06-19

## 2023-06-19 RX ORDER — ACETAMINOPHEN 325 MG/1
650 TABLET ORAL ONCE
Status: CANCELLED | OUTPATIENT
Start: 2023-06-19 | End: 2023-06-19

## 2023-06-19 RX ORDER — ACETAMINOPHEN 325 MG/1
650 TABLET ORAL ONCE
Status: DISCONTINUED | OUTPATIENT
Start: 2023-06-19 | End: 2023-06-19 | Stop reason: HOSPADM

## 2023-06-19 RX ORDER — DIPHENHYDRAMINE HCL 25 MG
25 CAPSULE ORAL ONCE
Status: DISCONTINUED | OUTPATIENT
Start: 2023-06-19 | End: 2023-06-19 | Stop reason: HOSPADM

## 2023-06-22 ENCOUNTER — INFUSION (OUTPATIENT)
Dept: INFUSION THERAPY | Facility: HOSPITAL | Age: 73
End: 2023-06-22
Attending: INTERNAL MEDICINE
Payer: MEDICARE

## 2023-06-22 VITALS
WEIGHT: 180.31 LBS | OXYGEN SATURATION: 99 % | SYSTOLIC BLOOD PRESSURE: 162 MMHG | TEMPERATURE: 98 F | RESPIRATION RATE: 16 BRPM | BODY MASS INDEX: 23.9 KG/M2 | DIASTOLIC BLOOD PRESSURE: 79 MMHG | HEART RATE: 63 BPM | HEIGHT: 73 IN

## 2023-06-22 DIAGNOSIS — C92.00 ACUTE MYELOID LEUKEMIA NOT HAVING ACHIEVED REMISSION: ICD-10-CM

## 2023-06-22 DIAGNOSIS — C92.00 ACUTE MYELOID LEUKEMIA NOT HAVING ACHIEVED REMISSION: Primary | ICD-10-CM

## 2023-06-22 PROCEDURE — 36430 TRANSFUSION BLD/BLD COMPNT: CPT

## 2023-06-22 PROCEDURE — P9040 RBC LEUKOREDUCED IRRADIATED: HCPCS | Performed by: INTERNAL MEDICINE

## 2023-06-22 PROCEDURE — 25000003 PHARM REV CODE 250: Performed by: INTERNAL MEDICINE

## 2023-06-22 PROCEDURE — 86923 COMPATIBILITY TEST ELECTRIC: CPT | Performed by: INTERNAL MEDICINE

## 2023-06-22 RX ORDER — HYDROCODONE BITARTRATE AND ACETAMINOPHEN 500; 5 MG/1; MG/1
TABLET ORAL ONCE
Status: CANCELLED | OUTPATIENT
Start: 2023-06-22 | End: 2023-06-22

## 2023-06-22 RX ORDER — DIPHENHYDRAMINE HCL 25 MG
25 CAPSULE ORAL ONCE
Status: CANCELLED | OUTPATIENT
Start: 2023-06-22 | End: 2023-06-22

## 2023-06-22 RX ORDER — DIPHENHYDRAMINE HCL 25 MG
25 CAPSULE ORAL ONCE
Status: DISCONTINUED | OUTPATIENT
Start: 2023-06-22 | End: 2023-06-22 | Stop reason: HOSPADM

## 2023-06-22 RX ORDER — HYDROCODONE BITARTRATE AND ACETAMINOPHEN 500; 5 MG/1; MG/1
TABLET ORAL ONCE
Status: COMPLETED | OUTPATIENT
Start: 2023-06-22 | End: 2023-06-22

## 2023-06-22 RX ADMIN — SODIUM CHLORIDE: 9 INJECTION, SOLUTION INTRAVENOUS at 09:06

## 2023-06-22 NOTE — PLAN OF CARE
Plan of care reviewed with patient; patient in agreement. 1 unit PRBCs given for H/H 7.0/22.1; tolerated. Appts reviewed with patient; patient has copy from previous appt.

## 2023-06-26 ENCOUNTER — INFUSION (OUTPATIENT)
Dept: INFUSION THERAPY | Facility: HOSPITAL | Age: 73
End: 2023-06-26
Attending: INTERNAL MEDICINE
Payer: MEDICARE

## 2023-06-26 ENCOUNTER — LAB VISIT (OUTPATIENT)
Dept: LAB | Facility: HOSPITAL | Age: 73
End: 2023-06-26
Attending: INTERNAL MEDICINE
Payer: MEDICARE

## 2023-06-26 DIAGNOSIS — C92.00 ACUTE MYELOID LEUKEMIA NOT HAVING ACHIEVED REMISSION: ICD-10-CM

## 2023-06-26 LAB
ALBUMIN SERPL-MCNC: 3.3 G/DL (ref 3.4–4.8)
ALBUMIN/GLOB SERPL: 1.1 RATIO (ref 1.1–2)
ALP SERPL-CCNC: 130 UNIT/L (ref 40–150)
ALT SERPL-CCNC: 12 UNIT/L (ref 0–55)
AST SERPL-CCNC: 11 UNIT/L (ref 5–34)
BASOPHILS # BLD AUTO: 0 X10(3)/MCL
BASOPHILS NFR BLD AUTO: 0 %
BILIRUBIN DIRECT+TOT PNL SERPL-MCNC: 0.7 MG/DL
BUN SERPL-MCNC: 17.1 MG/DL (ref 8.4–25.7)
CALCIUM SERPL-MCNC: 8.9 MG/DL (ref 8.8–10)
CHLORIDE SERPL-SCNC: 103 MMOL/L (ref 98–107)
CO2 SERPL-SCNC: 31 MMOL/L (ref 23–31)
CREAT SERPL-MCNC: 0.86 MG/DL (ref 0.73–1.18)
EOSINOPHIL # BLD AUTO: 0 X10(3)/MCL (ref 0–0.9)
EOSINOPHIL NFR BLD AUTO: 0 %
ERYTHROCYTE [DISTWIDTH] IN BLOOD BY AUTOMATED COUNT: 16.7 % (ref 11.5–17)
GFR SERPLBLD CREATININE-BSD FMLA CKD-EPI: >60 MLS/MIN/1.73/M2
GLOBULIN SER-MCNC: 3.1 GM/DL (ref 2.4–3.5)
GLUCOSE SERPL-MCNC: 115 MG/DL (ref 82–115)
HCT VFR BLD AUTO: 24.6 % (ref 42–52)
HGB BLD-MCNC: 8 G/DL (ref 14–18)
IMM GRANULOCYTES # BLD AUTO: 0 X10(3)/MCL (ref 0–0.04)
IMM GRANULOCYTES NFR BLD AUTO: 0 %
LYMPHOCYTES # BLD AUTO: 0.46 X10(3)/MCL (ref 0.6–4.6)
LYMPHOCYTES NFR BLD AUTO: 73 %
MCH RBC QN AUTO: 31 PG (ref 27–31)
MCHC RBC AUTO-ENTMCNC: 32.5 G/DL (ref 33–36)
MCV RBC AUTO: 95.3 FL (ref 80–94)
MONOCYTES # BLD AUTO: 0.05 X10(3)/MCL (ref 0.1–1.3)
MONOCYTES NFR BLD AUTO: 7.9 %
NEUTROPHILS # BLD AUTO: 0.12 X10(3)/MCL (ref 2.1–9.2)
NEUTROPHILS NFR BLD AUTO: 19.1 %
PLATELET # BLD AUTO: 16 X10(3)/MCL (ref 130–400)
PMV BLD AUTO: ABNORMAL FL
POTASSIUM SERPL-SCNC: 4.1 MMOL/L (ref 3.5–5.1)
PROT SERPL-MCNC: 6.4 GM/DL (ref 5.8–7.6)
RBC # BLD AUTO: 2.58 X10(6)/MCL (ref 4.7–6.1)
SODIUM SERPL-SCNC: 141 MMOL/L (ref 136–145)
WBC # SPEC AUTO: 0.63 X10(3)/MCL (ref 4.5–11.5)

## 2023-06-26 PROCEDURE — 36415 COLL VENOUS BLD VENIPUNCTURE: CPT

## 2023-06-26 PROCEDURE — 80053 COMPREHEN METABOLIC PANEL: CPT

## 2023-06-26 PROCEDURE — 85025 COMPLETE CBC W/AUTO DIFF WBC: CPT

## 2023-06-26 NOTE — PROGRESS NOTES
Subjective:       Patient ID: Hussein Gonzalez Jr. is a 73 y.o. male.    Chief Complaint: Follow-up (No concerns today.)      Diagnosis:  AML with 27% blasts, background dysplasia, normal cytogenetics and normal FISH    Current Treatment:   Azacitidine and venetoclax started on 02/27/2023.  Referral for possible stem cell transplant evaluation.    Treatment History:  N/A    HPI:  72-year-old male with a history of CVA, hypertension, BPH, and melanoma who presented to the emergency department following a syncopal episode.  In February of 2022, the patient did have a slightly low white blood cell count, hemoglobin, and platelet count.  Per the patient, 3 months prior to admission, he was having dizziness, fatigue, weakness, shortness of breath, back pain, and leg pain.  The dizziness was worse with standing and walking.  On 01/20/2023, the patient had a syncopal episode.  He was brought to the emergency department and was found to be pancytopenic.  He also had stool that was positive for occult blood in the emergency department.  He received blood transfusion.  His CBC on 01/20/2023 revealed a white blood cell count of 2.6, hemoglobin of 7.7, and platelet count of 47,000. Reticulocytes were appropriately elevated, B12 and folate were within normal limits, iron level was slightly decreased.  Repeat blood work on 01/21/2023 revealed a white blood cell count of 2.8, post transfusion hemoglobin of 9.6, and a [place] count of 22486.  Hematology was consulted for pancytopenia.  EGD and colonoscopy done on 01/24/2023, EGD revealed nonbleeding ulcers, colonoscopy unremarkable, however had to be aborted due to tortuous colon.  Bone Marrow Biopsy done on 01/25/2023.  Preliminary reveals AML with 27% blasts in a background of dysplasia.  Cytogenetics are within normal limits, FISH is within normal limits.  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  Started azacitidine and venetoclax on  02/27/2023, completed cycle 4 on 05/30/2023.  Held after this cycle and repeated a bone marrow biopsy, this was done on 06/27/2023, this showed a normocellular marrow (40%) with relative erythroid hyperplasia, sequential granulopoiesis and megakaryopoiesis.  The bone marrow was slightly suboptimal given it was predominantly subcortical bone with focal hematopoietic elements.  There was no morphologic or immunophenotypic evidence of previous acute myeloid leukemia, NGS and cytogenetics pending.    Interval History:   Patient presents for scheduled follow up appointment.  He states that since being off of his treatment, he has felt better.  He is willing to get started back on therapy, we will try to start on 07/10/2023.       Past Medical History:   Diagnosis Date    Anxiety and depression     BPH (benign prostatic hyperplasia)     History of CVA (cerebrovascular accident) without residual deficits     HTN (hypertension)     Malignant melanoma of skin, unspecified     NSTEMI (non-ST elevated myocardial infarction)     Sepsis, Gram negative     Sleep apnea       Past Surgical History:   Procedure Laterality Date    BONE MARROW ASPIRATION N/A 6/27/2023    Procedure: ASPIRATION, BONE MARROW;  Surgeon: Sabnio Núñez MD;  Location: [facility];  Service: General;  Laterality: N/A;  ALL ASSESS FOR MOLECULAR MRD    BONE MARROW BIOPSY N/A 1/25/2023    Procedure: Biopsy-bone marrow;  Surgeon: Rd Carlos MD;  Location: [facility];  Service: General;  Laterality: N/A;    COLONOSCOPY      COLONOSCOPY N/A 1/24/2023    Procedure: COLONOSCOPY (DOUBLE);  Surgeon: Jimy Baker MD;  Location: [facility] ENDOSCOPY;  Service: Gastroenterology;  Laterality: N/A;    EGD, WITH CLOSED BIOPSY N/A 1/24/2023    Procedure: EGD, WITH CLOSED BIOPSY;  Surgeon: Jimy Baker MD;  Location: [facility] ENDOSCOPY;  Service: Gastroenterology;  Laterality: N/A;    EXCISION OF MELANOMA      SINUS SURGERY      TONSILLECTOMY       Social History      Socioeconomic History    Marital status: Significant Other   Tobacco Use    Smoking status: Never    Smokeless tobacco: Never   Substance and Sexual Activity    Alcohol use: Not Currently     Alcohol/week: 7.0 standard drinks     Types: 7 Cans of beer per week    Drug use: Never    Sexual activity: Yes     Social Determinants of Health     Financial Resource Strain: Low Risk     Difficulty of Paying Living Expenses: Not hard at all   Food Insecurity: No Food Insecurity    Worried About Running Out of Food in the Last Year: Never true    Ran Out of Food in the Last Year: Never true   Transportation Needs: No Transportation Needs    Lack of Transportation (Medical): No    Lack of Transportation (Non-Medical): No   Physical Activity: Insufficiently Active    Days of Exercise per Week: 3 days    Minutes of Exercise per Session: 30 min   Stress: No Stress Concern Present    Feeling of Stress : Not at all   Social Connections: Unknown    Frequency of Communication with Friends and Family: More than three times a week    Frequency of Social Gatherings with Friends and Family: More than three times a week    Attends Muslim Services: Patient refused    Active Member of Clubs or Organizations: Patient refused    Attends Club or Organization Meetings: Patient refused    Marital Status:    Housing Stability: Low Risk     Unable to Pay for Housing in the Last Year: No    Number of Places Lived in the Last Year: 1    Unstable Housing in the Last Year: No      Family History   Problem Relation Age of Onset    Cancer Mother     Cancer Father       Review of patient's allergies indicates:   Allergen Reactions    Acetaminophen Other (See Comments)     Restless leg       Review of Systems   Constitutional:  Positive for fatigue. Negative for chills, diaphoresis, fever and unexpected weight change.   HENT:  Negative for nasal congestion, mouth sores, sinus pressure/congestion and sore throat.    Eyes:  Negative for pain  and visual disturbance.   Respiratory:  Negative for cough, chest tightness and shortness of breath.    Cardiovascular:  Negative for chest pain, palpitations and leg swelling.   Gastrointestinal:  Negative for abdominal distention, abdominal pain, blood in stool, constipation and diarrhea.   Genitourinary:  Negative for dysuria, frequency and hematuria.   Musculoskeletal:  Negative for arthralgias and back pain.   Integumentary:  Negative for rash.   Neurological:  Positive for weakness. Negative for dizziness, syncope, numbness and headaches.   Hematological:  Negative for adenopathy.   Psychiatric/Behavioral:  Negative for confusion.        Objective:      Physical Exam  Vitals reviewed.   Constitutional:       General: He is awake.      Appearance: Normal appearance.   HENT:      Head: Normocephalic and atraumatic.      Right Ear: Hearing normal.      Left Ear: Hearing normal.      Nose: Nose normal.   Eyes:      General: Lids are normal. Vision grossly intact.      Extraocular Movements: Extraocular movements intact.      Conjunctiva/sclera: Conjunctivae normal.   Cardiovascular:      Rate and Rhythm: Normal rate and regular rhythm.      Pulses: Normal pulses.      Heart sounds: Normal heart sounds.   Pulmonary:      Effort: Pulmonary effort is normal.      Breath sounds: Normal breath sounds. No wheezing, rhonchi or rales.   Abdominal:      General: Bowel sounds are normal.      Palpations: Abdomen is soft.      Tenderness: There is no abdominal tenderness.   Musculoskeletal:      Cervical back: Full passive range of motion without pain.      Right lower leg: No edema.      Left lower leg: No edema.   Lymphadenopathy:      Cervical: No cervical adenopathy.      Upper Body:      Right upper body: No supraclavicular or axillary adenopathy.      Left upper body: No supraclavicular or axillary adenopathy.   Skin:     General: Skin is warm.      Comments: Lower leg petechiae     Neurological:      General: No focal  deficit present.      Mental Status: He is alert and oriented to person, place, and time.   Psychiatric:         Attention and Perception: Attention normal.         Mood and Affect: Mood and affect normal.         Behavior: Behavior is cooperative.       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    Treating with azacitidine and venetoclax, Started on 02/27/2023.  Bone marrow biopsy after cycle 4 shows no immunophenotypic or morphologic evidence of AML.  NGS and cytogenetics pending.    Evaluated by Dr. Valdez in [place] once NGS and cytogenetics returned.    Continue prophylaxis with 500 mg of levofloxacin daily, 200 mg of acyclovir b.i.d., 200 mg of fluconazole daily.    Twice weekly labs (Monday/Thursday) with transfusions as needed.    Start treatment on 07/10/2023.      Eduard Luna II, MD

## 2023-06-26 NOTE — NURSING
Scheduled labs today show Hgb 8.0 and PLT 16. E. Kanu NP informed; states no transfusion needed today after labs reviewed. Will RTC Thursday for repeat labs and possible transfusion. Pt verbalizes understanding.

## 2023-06-27 ENCOUNTER — HOSPITAL ENCOUNTER (OUTPATIENT)
Facility: HOSPITAL | Age: 73
Discharge: HOME | End: 2023-06-27
Attending: PATHOLOGY | Admitting: PATHOLOGY
Payer: MEDICARE

## 2023-06-27 ENCOUNTER — TELEPHONE (OUTPATIENT)
Dept: HEMATOLOGY/ONCOLOGY | Facility: CLINIC | Age: 73
End: 2023-06-27
Payer: MEDICARE

## 2023-06-27 DIAGNOSIS — C92.00 ACUTE MYELOID LEUKEMIA NOT HAVING ACHIEVED REMISSION: ICD-10-CM

## 2023-06-27 LAB
BASOPHILS # BLD AUTO: 0 X10(3)/MCL
BASOPHILS NFR BLD AUTO: 0 %
EOSINOPHIL # BLD AUTO: 0 X10(3)/MCL (ref 0–0.9)
EOSINOPHIL NFR BLD AUTO: 0 %
ERYTHROCYTE [DISTWIDTH] IN BLOOD BY AUTOMATED COUNT: 16.8 % (ref 11.5–17)
HCT VFR BLD AUTO: 22.6 % (ref 42–52)
HGB BLD-MCNC: 7.7 G/DL (ref 14–18)
IMM GRANULOCYTES # BLD AUTO: 0.01 X10(3)/MCL (ref 0–0.04)
IMM GRANULOCYTES NFR BLD AUTO: 1.6 %
LYMPHOCYTES # BLD AUTO: 0.45 X10(3)/MCL (ref 0.6–4.6)
LYMPHOCYTES NFR BLD AUTO: 71.4 %
MCH RBC QN AUTO: 32 PG (ref 27–31)
MCHC RBC AUTO-ENTMCNC: 34.1 G/DL (ref 33–36)
MCV RBC AUTO: 93.8 FL (ref 80–94)
MONOCYTES # BLD AUTO: 0.06 X10(3)/MCL (ref 0.1–1.3)
MONOCYTES NFR BLD AUTO: 9.5 %
NEUTROPHILS # BLD AUTO: 0.11 X10(3)/MCL (ref 2.1–9.2)
NEUTROPHILS NFR BLD AUTO: 17.5 %
NRBC BLD AUTO-RTO: 0 %
PLATELET # BLD AUTO: 8 X10(3)/MCL (ref 130–400)
PMV BLD AUTO: ABNORMAL FL
RBC # BLD AUTO: 2.41 X10(6)/MCL (ref 4.7–6.1)
WBC # SPEC AUTO: 0.63 X10(3)/MCL (ref 4.5–11.5)

## 2023-06-27 PROCEDURE — 63600175 PHARM REV CODE 636 W HCPCS: Performed by: PATHOLOGY

## 2023-06-27 PROCEDURE — 38222 DX BONE MARROW BX & ASPIR: CPT | Performed by: PATHOLOGY

## 2023-06-27 PROCEDURE — 88264 CHROMOSOME ANALYSIS 20-25: CPT

## 2023-06-27 PROCEDURE — 88237 TISSUE CULTURE BONE MARROW: CPT

## 2023-06-27 PROCEDURE — 88305 TISSUE EXAM BY PATHOLOGIST: CPT | Mod: 59 | Performed by: INTERNAL MEDICINE

## 2023-06-27 PROCEDURE — 25000003 PHARM REV CODE 250: Performed by: PATHOLOGY

## 2023-06-27 PROCEDURE — 88311 DECALCIFY TISSUE: CPT

## 2023-06-27 PROCEDURE — 88342 IMHCHEM/IMCYTCHM 1ST ANTB: CPT

## 2023-06-27 PROCEDURE — 88184 FLOWCYTOMETRY/ TC 1 MARKER: CPT

## 2023-06-27 PROCEDURE — 81450 HL NEO GSAP 5-50DNA/DNA&RNA: CPT

## 2023-06-27 PROCEDURE — 88313 SPECIAL STAINS GROUP 2: CPT

## 2023-06-27 PROCEDURE — 88341 IMHCHEM/IMCYTCHM EA ADD ANTB: CPT

## 2023-06-27 PROCEDURE — 88185 FLOWCYTOMETRY/TC ADD-ON: CPT

## 2023-06-27 PROCEDURE — 85025 COMPLETE CBC W/AUTO DIFF WBC: CPT | Performed by: INTERNAL MEDICINE

## 2023-06-27 RX ORDER — SODIUM CHLORIDE 9 MG/ML
INJECTION, SOLUTION INTRAVENOUS CONTINUOUS
Status: DISCONTINUED | OUTPATIENT
Start: 2023-06-27 | End: 2023-06-27 | Stop reason: HOSPADM

## 2023-06-27 RX ORDER — FLUMAZENIL 0.1 MG/ML
0.2 INJECTION INTRAVENOUS
Status: DISCONTINUED | OUTPATIENT
Start: 2023-06-27 | End: 2023-06-27 | Stop reason: HOSPADM

## 2023-06-27 RX ORDER — MIDAZOLAM HYDROCHLORIDE 1 MG/ML
1 INJECTION INTRAMUSCULAR; INTRAVENOUS
Status: DISCONTINUED | OUTPATIENT
Start: 2023-06-27 | End: 2023-06-27 | Stop reason: HOSPADM

## 2023-06-27 RX ADMIN — MIDAZOLAM HYDROCHLORIDE 3 MG: 1 INJECTION, SOLUTION INTRAMUSCULAR; INTRAVENOUS at 11:06

## 2023-06-27 RX ADMIN — MIDAZOLAM HYDROCHLORIDE 1 MG: 1 INJECTION, SOLUTION INTRAMUSCULAR; INTRAVENOUS at 11:06

## 2023-06-27 RX ADMIN — SODIUM CHLORIDE: 9 INJECTION, SOLUTION INTRAVENOUS at 11:06

## 2023-06-27 NOTE — DISCHARGE SUMMARY
[facility] Periop Services  Discharge Note  Short Stay    Procedure(s) (LRB):  ASPIRATION, BONE MARROW (N/A)      OUTCOME: Patient tolerated treatment/procedure well without complication and is now ready for discharge.    DISPOSITION: Home or Self Care    FINAL DIAGNOSIS:  <principal problem not specified>    FOLLOWUP: In clinic    DISCHARGE INSTRUCTIONS:  No discharge procedures on file.      Clinical Reference Documents Added to Patient Instructions         Document    BONE MARROW ASPIRATION OR BIOPSY (ENGLISH)            TIME SPENT ON DISCHARGE: 15 minutes

## 2023-06-27 NOTE — PROCEDURES
"Hussein Gonzalez Jr. is a 72 y.o. male patient.    Temp: 98.9 °F (37.2 °C) (06/27/23 0840)  Pulse: 65 (06/27/23 1121)  Resp: 14 (06/27/23 1120)  BP: (!) 149/83 (06/27/23 1121)  SpO2: 100 % (06/27/23 1121)  Weight: 79 kg (174 lb 2.6 oz) (06/27/23 0936)  Height: 6' 1" (185.4 cm) (06/27/23 0936)  Mallampati Scale: Class II  ASA Classification: Class 2    Procedures    6/27/2023    "

## 2023-06-27 NOTE — TELEPHONE ENCOUNTER
Korin with outpatient surgery called in the following critical results:     WBC: 0.63  HGB: 7.7  HCT: 22.6  PLT: 8,000

## 2023-06-27 NOTE — DISCHARGE INSTRUCTIONS
-No driving for 24 hours and while taking narcotic pain medication.    -May remove band-aid tomorrow and shower.    - Report to ER with any bleeding, heavy bruising at site, or SEVERE/SUDDEN unrelieved abdominal pain.    - Biopsy results will be sent to your oncologist/referring physician.    - Call with any questions or concerns.    -May take over the counter meds or use ice packs for pain/discomfort

## 2023-06-27 NOTE — PROCEDURES
"Hussein Gonzalez Jr. is a 72 y.o. male patient.    Temp: 98.9 °F (37.2 °C) (06/27/23 0840)  Pulse: (!) 59 (06/27/23 1120)  Resp: 14 (06/27/23 1120)  BP: (!) 156/67 (06/27/23 1119)  SpO2: 99 % (06/27/23 1120)  Weight: 79 kg (174 lb 2.6 oz) (06/27/23 0936)  Height: 6' 1" (185.4 cm) (06/27/23 0936)  Mallampati Scale: Class II  ASA Classification: Class 2    Bone marrow    Date/Time: 6/27/2023 11:31 AM  Performed by: Sabino Núñez MD  Authorized by: Sabino Núñez MD     Consent Done?: Yes (Verbal) and Yes (Written)   Immediately prior to procedure a time out was called to verify the correct patient, procedure, equipment, support staff and site/side marked as required.   Patient was prepped and draped in the usual sterile fashion.      Assistants?: Yes    List of assistants: Roseanne Richardson was present for the entire procedure.    Position: left lateral  Anesthesia: local infiltration and topical anesthetic  Aspiration?: Yes   Biopsy?: Yes    Specimen source: right posterior iliac crest  Number of Specimens: 2  Estimated blood loss (cc):3  Patient tolerated the procedure well with no immediate complications.  Post-operative instructions were provided for the patient.  Patient was discharged and will follow up if any complications occur.     6/27/2023    "

## 2023-06-28 VITALS
HEIGHT: 73 IN | BODY MASS INDEX: 23.09 KG/M2 | RESPIRATION RATE: 18 BRPM | OXYGEN SATURATION: 96 % | TEMPERATURE: 99 F | SYSTOLIC BLOOD PRESSURE: 151 MMHG | WEIGHT: 174.19 LBS | HEART RATE: 58 BPM | DIASTOLIC BLOOD PRESSURE: 79 MMHG

## 2023-06-29 ENCOUNTER — LAB VISIT (OUTPATIENT)
Dept: LAB | Facility: HOSPITAL | Age: 73
End: 2023-06-29
Attending: INTERNAL MEDICINE
Payer: MEDICARE

## 2023-06-29 ENCOUNTER — INFUSION (OUTPATIENT)
Dept: INFUSION THERAPY | Facility: HOSPITAL | Age: 73
End: 2023-06-29
Attending: INTERNAL MEDICINE
Payer: MEDICARE

## 2023-06-29 VITALS
OXYGEN SATURATION: 100 % | HEART RATE: 60 BPM | DIASTOLIC BLOOD PRESSURE: 79 MMHG | RESPIRATION RATE: 16 BRPM | SYSTOLIC BLOOD PRESSURE: 138 MMHG | TEMPERATURE: 98 F

## 2023-06-29 DIAGNOSIS — C92.00 ACUTE MYELOID LEUKEMIA NOT HAVING ACHIEVED REMISSION: ICD-10-CM

## 2023-06-29 DIAGNOSIS — C92.00 ACUTE MYELOID LEUKEMIA NOT HAVING ACHIEVED REMISSION: Primary | ICD-10-CM

## 2023-06-29 LAB
ABO + RH BLD: NORMAL
ABO + RH BLD: NORMAL
ALBUMIN SERPL-MCNC: 3.4 G/DL (ref 3.4–4.8)
ALBUMIN/GLOB SERPL: 1.1 RATIO (ref 1.1–2)
ALP SERPL-CCNC: 143 UNIT/L (ref 40–150)
ALT SERPL-CCNC: 16 UNIT/L (ref 0–55)
AST SERPL-CCNC: 13 UNIT/L (ref 5–34)
BASOPHILS # BLD AUTO: 0 X10(3)/MCL
BASOPHILS NFR BLD AUTO: 0 %
BILIRUBIN DIRECT+TOT PNL SERPL-MCNC: 0.5 MG/DL
BLD PROD TYP BPU: NORMAL
BLD PROD TYP BPU: NORMAL
BLOOD UNIT EXPIRATION DATE: NORMAL
BLOOD UNIT EXPIRATION DATE: NORMAL
BLOOD UNIT TYPE CODE: 7300
BLOOD UNIT TYPE CODE: 8400
BUN SERPL-MCNC: 17.7 MG/DL (ref 8.4–25.7)
CALCIUM SERPL-MCNC: 9 MG/DL (ref 8.8–10)
CHLORIDE SERPL-SCNC: 105 MMOL/L (ref 98–107)
CO2 SERPL-SCNC: 30 MMOL/L (ref 23–31)
CREAT SERPL-MCNC: 0.85 MG/DL (ref 0.73–1.18)
CROSSMATCH INTERPRETATION: NORMAL
CROSSMATCH INTERPRETATION: NORMAL
DISPENSE STATUS: NORMAL
DISPENSE STATUS: NORMAL
EOSINOPHIL # BLD AUTO: 0 X10(3)/MCL (ref 0–0.9)
EOSINOPHIL NFR BLD AUTO: 0 %
ERYTHROCYTE [DISTWIDTH] IN BLOOD BY AUTOMATED COUNT: 16.5 % (ref 11.5–17)
GFR SERPLBLD CREATININE-BSD FMLA CKD-EPI: >60 MLS/MIN/1.73/M2
GLOBULIN SER-MCNC: 3.1 GM/DL (ref 2.4–3.5)
GLUCOSE SERPL-MCNC: 128 MG/DL (ref 82–115)
HCT VFR BLD AUTO: 24.8 % (ref 42–52)
HGB BLD-MCNC: 7.9 G/DL (ref 14–18)
IMM GRANULOCYTES # BLD AUTO: 0.01 X10(3)/MCL (ref 0–0.04)
IMM GRANULOCYTES NFR BLD AUTO: 1.1 %
LYMPHOCYTES # BLD AUTO: 0.67 X10(3)/MCL (ref 0.6–4.6)
LYMPHOCYTES NFR BLD AUTO: 70.5 %
MCH RBC QN AUTO: 31.1 PG (ref 27–31)
MCHC RBC AUTO-ENTMCNC: 31.9 G/DL (ref 33–36)
MCV RBC AUTO: 97.6 FL (ref 80–94)
MONOCYTES # BLD AUTO: 0.1 X10(3)/MCL (ref 0.1–1.3)
MONOCYTES NFR BLD AUTO: 10.5 %
NEUTROPHILS # BLD AUTO: 0.17 X10(3)/MCL (ref 2.1–9.2)
NEUTROPHILS NFR BLD AUTO: 17.9 %
PLATELET # BLD AUTO: 6 X10(3)/MCL (ref 130–400)
PMV BLD AUTO: ABNORMAL FL
POTASSIUM SERPL-SCNC: 3.9 MMOL/L (ref 3.5–5.1)
PROT SERPL-MCNC: 6.5 GM/DL (ref 5.8–7.6)
RBC # BLD AUTO: 2.54 X10(6)/MCL (ref 4.7–6.1)
SODIUM SERPL-SCNC: 140 MMOL/L (ref 136–145)
UNIT NUMBER: NORMAL
UNIT NUMBER: NORMAL
WBC # SPEC AUTO: 0.95 X10(3)/MCL (ref 4.5–11.5)

## 2023-06-29 PROCEDURE — 36415 COLL VENOUS BLD VENIPUNCTURE: CPT

## 2023-06-29 PROCEDURE — 80053 COMPREHEN METABOLIC PANEL: CPT

## 2023-06-29 PROCEDURE — P9037 PLATE PHERES LEUKOREDU IRRAD: HCPCS | Performed by: NURSE PRACTITIONER

## 2023-06-29 PROCEDURE — 85025 COMPLETE CBC W/AUTO DIFF WBC: CPT

## 2023-06-29 PROCEDURE — 36430 TRANSFUSION BLD/BLD COMPNT: CPT

## 2023-06-29 RX ORDER — HYDROCODONE BITARTRATE AND ACETAMINOPHEN 500; 5 MG/1; MG/1
TABLET ORAL ONCE
Status: CANCELLED | OUTPATIENT
Start: 2023-06-29 | End: 2023-06-29

## 2023-06-29 RX ORDER — HYDROCODONE BITARTRATE AND ACETAMINOPHEN 500; 5 MG/1; MG/1
TABLET ORAL ONCE
Status: DISCONTINUED | OUTPATIENT
Start: 2023-06-29 | End: 2023-06-29 | Stop reason: HOSPADM

## 2023-06-29 RX ORDER — DIPHENHYDRAMINE HYDROCHLORIDE 50 MG/ML
25 INJECTION INTRAMUSCULAR; INTRAVENOUS ONCE
Status: CANCELLED | OUTPATIENT
Start: 2023-06-29

## 2023-06-29 RX ORDER — DIPHENHYDRAMINE HYDROCHLORIDE 50 MG/ML
25 INJECTION INTRAMUSCULAR; INTRAVENOUS ONCE
Status: DISCONTINUED | OUTPATIENT
Start: 2023-06-29 | End: 2023-06-29 | Stop reason: HOSPADM

## 2023-07-03 ENCOUNTER — OFFICE VISIT (OUTPATIENT)
Dept: HEMATOLOGY/ONCOLOGY | Facility: CLINIC | Age: 73
End: 2023-07-03
Payer: MEDICARE

## 2023-07-03 VITALS
DIASTOLIC BLOOD PRESSURE: 64 MMHG | BODY MASS INDEX: 23.91 KG/M2 | RESPIRATION RATE: 18 BRPM | WEIGHT: 180.38 LBS | SYSTOLIC BLOOD PRESSURE: 106 MMHG | HEIGHT: 73 IN | OXYGEN SATURATION: 99 % | TEMPERATURE: 98 F | HEART RATE: 85 BPM

## 2023-07-03 DIAGNOSIS — C92.01 ACUTE MYELOID LEUKEMIA IN REMISSION: Primary | ICD-10-CM

## 2023-07-03 PROCEDURE — 99214 OFFICE O/P EST MOD 30 MIN: CPT | Mod: S$PBB,,, | Performed by: INTERNAL MEDICINE

## 2023-07-03 PROCEDURE — 99999 PR PBB SHADOW E&M-EST. PATIENT-LVL V: ICD-10-PCS | Mod: PBBFAC,,, | Performed by: INTERNAL MEDICINE

## 2023-07-03 PROCEDURE — 99214 PR OFFICE/OUTPT VISIT, EST, LEVL IV, 30-39 MIN: ICD-10-PCS | Mod: S$PBB,,, | Performed by: INTERNAL MEDICINE

## 2023-07-03 PROCEDURE — 99215 OFFICE O/P EST HI 40 MIN: CPT | Mod: PBBFAC | Performed by: INTERNAL MEDICINE

## 2023-07-03 PROCEDURE — 99999 PR PBB SHADOW E&M-EST. PATIENT-LVL V: CPT | Mod: PBBFAC,,, | Performed by: INTERNAL MEDICINE

## 2023-07-03 RX ORDER — AZACITIDINE 100 MG/1
75 INJECTION, POWDER, LYOPHILIZED, FOR SOLUTION INTRAVENOUS; SUBCUTANEOUS
Status: CANCELLED | OUTPATIENT
Start: 2023-07-17

## 2023-07-03 RX ORDER — ONDANSETRON 4 MG/1
16 TABLET, ORALLY DISINTEGRATING ORAL
Status: CANCELLED | OUTPATIENT
Start: 2023-07-12

## 2023-07-03 RX ORDER — AZACITIDINE 100 MG/1
75 INJECTION, POWDER, LYOPHILIZED, FOR SOLUTION INTRAVENOUS; SUBCUTANEOUS
Status: CANCELLED | OUTPATIENT
Start: 2023-07-10

## 2023-07-03 RX ORDER — AZACITIDINE 100 MG/1
75 INJECTION, POWDER, LYOPHILIZED, FOR SOLUTION INTRAVENOUS; SUBCUTANEOUS
Status: CANCELLED | OUTPATIENT
Start: 2023-07-14

## 2023-07-03 RX ORDER — ONDANSETRON 4 MG/1
16 TABLET, ORALLY DISINTEGRATING ORAL
Status: CANCELLED | OUTPATIENT
Start: 2023-07-14

## 2023-07-03 RX ORDER — ONDANSETRON 4 MG/1
16 TABLET, ORALLY DISINTEGRATING ORAL
Status: CANCELLED | OUTPATIENT
Start: 2023-07-10

## 2023-07-03 RX ORDER — AZACITIDINE 100 MG/1
75 INJECTION, POWDER, LYOPHILIZED, FOR SOLUTION INTRAVENOUS; SUBCUTANEOUS
Status: CANCELLED | OUTPATIENT
Start: 2023-07-13

## 2023-07-03 RX ORDER — ONDANSETRON 4 MG/1
16 TABLET, ORALLY DISINTEGRATING ORAL
Status: CANCELLED | OUTPATIENT
Start: 2023-07-18

## 2023-07-03 RX ORDER — AZACITIDINE 100 MG/1
75 INJECTION, POWDER, LYOPHILIZED, FOR SOLUTION INTRAVENOUS; SUBCUTANEOUS
Status: CANCELLED | OUTPATIENT
Start: 2023-07-11

## 2023-07-03 RX ORDER — ONDANSETRON 4 MG/1
16 TABLET, ORALLY DISINTEGRATING ORAL
Status: CANCELLED | OUTPATIENT
Start: 2023-07-11

## 2023-07-03 RX ORDER — AZACITIDINE 100 MG/1
75 INJECTION, POWDER, LYOPHILIZED, FOR SOLUTION INTRAVENOUS; SUBCUTANEOUS
Status: CANCELLED | OUTPATIENT
Start: 2023-07-12

## 2023-07-03 RX ORDER — AZACITIDINE 100 MG/1
75 INJECTION, POWDER, LYOPHILIZED, FOR SOLUTION INTRAVENOUS; SUBCUTANEOUS
Status: CANCELLED | OUTPATIENT
Start: 2023-07-18

## 2023-07-03 RX ORDER — ONDANSETRON 4 MG/1
16 TABLET, ORALLY DISINTEGRATING ORAL
Status: CANCELLED | OUTPATIENT
Start: 2023-07-13

## 2023-07-03 RX ORDER — ONDANSETRON 4 MG/1
16 TABLET, ORALLY DISINTEGRATING ORAL
Status: CANCELLED | OUTPATIENT
Start: 2023-07-17

## 2023-07-10 ENCOUNTER — INFUSION (OUTPATIENT)
Dept: INFUSION THERAPY | Facility: HOSPITAL | Age: 73
End: 2023-07-10
Attending: INTERNAL MEDICINE
Payer: MEDICARE

## 2023-07-10 VITALS
OXYGEN SATURATION: 99 % | RESPIRATION RATE: 18 BRPM | TEMPERATURE: 98 F | SYSTOLIC BLOOD PRESSURE: 152 MMHG | DIASTOLIC BLOOD PRESSURE: 79 MMHG | HEART RATE: 64 BPM

## 2023-07-10 DIAGNOSIS — D61.818 PANCYTOPENIA: Chronic | ICD-10-CM

## 2023-07-10 DIAGNOSIS — C92.00 ACUTE MYELOID LEUKEMIA NOT HAVING ACHIEVED REMISSION: Primary | ICD-10-CM

## 2023-07-10 DIAGNOSIS — D61.818 PANCYTOPENIA: Primary | Chronic | ICD-10-CM

## 2023-07-10 LAB
ABO + RH BLD: NORMAL
BLD PROD TYP BPU: NORMAL
BLOOD UNIT EXPIRATION DATE: NORMAL
BLOOD UNIT TYPE CODE: 6200
CROSSMATCH INTERPRETATION: NORMAL
DISPENSE STATUS: NORMAL
UNIT NUMBER: NORMAL

## 2023-07-10 PROCEDURE — 25000003 PHARM REV CODE 250: Performed by: INTERNAL MEDICINE

## 2023-07-10 PROCEDURE — P9037 PLATE PHERES LEUKOREDU IRRAD: HCPCS | Performed by: NURSE PRACTITIONER

## 2023-07-10 PROCEDURE — 63600175 PHARM REV CODE 636 W HCPCS: Performed by: INTERNAL MEDICINE

## 2023-07-10 PROCEDURE — 36430 TRANSFUSION BLD/BLD COMPNT: CPT

## 2023-07-10 PROCEDURE — 96401 CHEMO ANTI-NEOPL SQ/IM: CPT

## 2023-07-10 PROCEDURE — 25000003 PHARM REV CODE 250: Performed by: NURSE PRACTITIONER

## 2023-07-10 RX ORDER — DIPHENHYDRAMINE HCL 25 MG
25 CAPSULE ORAL ONCE
Status: DISCONTINUED | OUTPATIENT
Start: 2023-07-10 | End: 2023-07-10 | Stop reason: HOSPADM

## 2023-07-10 RX ORDER — ONDANSETRON 4 MG/1
16 TABLET, ORALLY DISINTEGRATING ORAL
Status: COMPLETED | OUTPATIENT
Start: 2023-07-10 | End: 2023-07-10

## 2023-07-10 RX ORDER — HYDROCODONE BITARTRATE AND ACETAMINOPHEN 500; 5 MG/1; MG/1
TABLET ORAL ONCE
Status: CANCELLED | OUTPATIENT
Start: 2023-07-10 | End: 2023-07-10

## 2023-07-10 RX ORDER — HYDROCODONE BITARTRATE AND ACETAMINOPHEN 500; 5 MG/1; MG/1
TABLET ORAL ONCE
Status: DISCONTINUED | OUTPATIENT
Start: 2023-07-10 | End: 2023-07-10 | Stop reason: HOSPADM

## 2023-07-10 RX ORDER — DIPHENHYDRAMINE HCL 25 MG
25 CAPSULE ORAL ONCE
Status: CANCELLED | OUTPATIENT
Start: 2023-07-10 | End: 2023-07-10

## 2023-07-10 RX ORDER — AZACITIDINE 100 MG/1
75 INJECTION, POWDER, LYOPHILIZED, FOR SOLUTION INTRAVENOUS; SUBCUTANEOUS
Status: COMPLETED | OUTPATIENT
Start: 2023-07-10 | End: 2023-07-10

## 2023-07-10 RX ADMIN — ONDANSETRON 16 MG: 4 TABLET, ORALLY DISINTEGRATING ORAL at 09:07

## 2023-07-10 RX ADMIN — AZACITIDINE 155 MG: 100 INJECTION, POWDER, LYOPHILIZED, FOR SOLUTION INTRAVENOUS; SUBCUTANEOUS at 11:07

## 2023-07-10 NOTE — PLAN OF CARE
Patient received C5D1, 1 PLT, and 1 PRBCs, tolerated well. No reaction noted. Discharge in stable condition.

## 2023-07-11 ENCOUNTER — INFUSION (OUTPATIENT)
Dept: INFUSION THERAPY | Facility: HOSPITAL | Age: 73
End: 2023-07-11
Attending: INTERNAL MEDICINE
Payer: MEDICARE

## 2023-07-11 VITALS
RESPIRATION RATE: 16 BRPM | DIASTOLIC BLOOD PRESSURE: 65 MMHG | TEMPERATURE: 98 F | OXYGEN SATURATION: 99 % | SYSTOLIC BLOOD PRESSURE: 136 MMHG | HEART RATE: 82 BPM

## 2023-07-11 DIAGNOSIS — C92.00 ACUTE MYELOID LEUKEMIA NOT HAVING ACHIEVED REMISSION: Primary | ICD-10-CM

## 2023-07-11 PROCEDURE — 25000003 PHARM REV CODE 250: Performed by: INTERNAL MEDICINE

## 2023-07-11 PROCEDURE — 63600175 PHARM REV CODE 636 W HCPCS: Performed by: INTERNAL MEDICINE

## 2023-07-11 PROCEDURE — 96401 CHEMO ANTI-NEOPL SQ/IM: CPT

## 2023-07-11 RX ORDER — ONDANSETRON 4 MG/1
16 TABLET, ORALLY DISINTEGRATING ORAL
Status: COMPLETED | OUTPATIENT
Start: 2023-07-11 | End: 2023-07-11

## 2023-07-11 RX ORDER — AZACITIDINE 100 MG/1
75 INJECTION, POWDER, LYOPHILIZED, FOR SOLUTION INTRAVENOUS; SUBCUTANEOUS
Status: COMPLETED | OUTPATIENT
Start: 2023-07-11 | End: 2023-07-11

## 2023-07-11 RX ADMIN — AZACITIDINE 155 MG: 100 INJECTION, POWDER, LYOPHILIZED, FOR SOLUTION INTRAVENOUS; SUBCUTANEOUS at 02:07

## 2023-07-11 RX ADMIN — ONDANSETRON 16 MG: 4 TABLET, ORALLY DISINTEGRATING ORAL at 01:07

## 2023-07-12 ENCOUNTER — INFUSION (OUTPATIENT)
Dept: INFUSION THERAPY | Facility: HOSPITAL | Age: 73
End: 2023-07-12
Attending: INTERNAL MEDICINE
Payer: MEDICARE

## 2023-07-12 VITALS
SYSTOLIC BLOOD PRESSURE: 109 MMHG | DIASTOLIC BLOOD PRESSURE: 55 MMHG | TEMPERATURE: 98 F | BODY MASS INDEX: 24.28 KG/M2 | WEIGHT: 183.19 LBS | HEART RATE: 92 BPM | HEIGHT: 73 IN | RESPIRATION RATE: 18 BRPM

## 2023-07-12 DIAGNOSIS — C92.00 ACUTE MYELOID LEUKEMIA NOT HAVING ACHIEVED REMISSION: Primary | ICD-10-CM

## 2023-07-12 PROCEDURE — 63600175 PHARM REV CODE 636 W HCPCS: Performed by: INTERNAL MEDICINE

## 2023-07-12 PROCEDURE — 25000003 PHARM REV CODE 250: Performed by: INTERNAL MEDICINE

## 2023-07-12 PROCEDURE — 96401 CHEMO ANTI-NEOPL SQ/IM: CPT

## 2023-07-12 RX ORDER — ONDANSETRON 4 MG/1
16 TABLET, ORALLY DISINTEGRATING ORAL
Status: COMPLETED | OUTPATIENT
Start: 2023-07-12 | End: 2023-07-12

## 2023-07-12 RX ORDER — AZACITIDINE 100 MG/1
75 INJECTION, POWDER, LYOPHILIZED, FOR SOLUTION INTRAVENOUS; SUBCUTANEOUS
Status: COMPLETED | OUTPATIENT
Start: 2023-07-12 | End: 2023-07-12

## 2023-07-12 RX ADMIN — AZACITIDINE 155 MG: 100 INJECTION, POWDER, LYOPHILIZED, FOR SOLUTION INTRAVENOUS; SUBCUTANEOUS at 01:07

## 2023-07-12 RX ADMIN — ONDANSETRON 16 MG: 4 TABLET, ORALLY DISINTEGRATING ORAL at 01:07

## 2023-07-13 ENCOUNTER — LAB VISIT (OUTPATIENT)
Dept: LAB | Facility: HOSPITAL | Age: 73
End: 2023-07-13
Attending: INTERNAL MEDICINE
Payer: MEDICARE

## 2023-07-13 ENCOUNTER — INFUSION (OUTPATIENT)
Dept: INFUSION THERAPY | Facility: HOSPITAL | Age: 73
End: 2023-07-13
Attending: INTERNAL MEDICINE
Payer: MEDICARE

## 2023-07-13 VITALS
WEIGHT: 183 LBS | RESPIRATION RATE: 16 BRPM | TEMPERATURE: 98 F | HEIGHT: 73 IN | SYSTOLIC BLOOD PRESSURE: 147 MMHG | OXYGEN SATURATION: 97 % | BODY MASS INDEX: 24.25 KG/M2 | HEART RATE: 71 BPM | DIASTOLIC BLOOD PRESSURE: 80 MMHG

## 2023-07-13 DIAGNOSIS — C92.00 ACUTE MYELOID LEUKEMIA NOT HAVING ACHIEVED REMISSION: ICD-10-CM

## 2023-07-13 DIAGNOSIS — C92.00 ACUTE MYELOID LEUKEMIA NOT HAVING ACHIEVED REMISSION: Primary | ICD-10-CM

## 2023-07-13 LAB
ALBUMIN SERPL-MCNC: 3.7 G/DL (ref 3.4–4.8)
ALBUMIN/GLOB SERPL: 1.3 RATIO (ref 1.1–2)
ALP SERPL-CCNC: 138 UNIT/L (ref 40–150)
ALT SERPL-CCNC: 15 UNIT/L (ref 0–55)
AST SERPL-CCNC: 13 UNIT/L (ref 5–34)
BASOPHILS # BLD AUTO: 0 X10(3)/MCL
BASOPHILS NFR BLD AUTO: 0 %
BILIRUBIN DIRECT+TOT PNL SERPL-MCNC: 0.8 MG/DL
BUN SERPL-MCNC: 21.9 MG/DL (ref 8.4–25.7)
CALCIUM SERPL-MCNC: 9.7 MG/DL (ref 8.8–10)
CHLORIDE SERPL-SCNC: 106 MMOL/L (ref 98–107)
CO2 SERPL-SCNC: 28 MMOL/L (ref 23–31)
CREAT SERPL-MCNC: 1.08 MG/DL (ref 0.73–1.18)
EOSINOPHIL # BLD AUTO: 0 X10(3)/MCL (ref 0–0.9)
EOSINOPHIL NFR BLD AUTO: 0 %
ERYTHROCYTE [DISTWIDTH] IN BLOOD BY AUTOMATED COUNT: 16.7 % (ref 11.5–17)
GFR SERPLBLD CREATININE-BSD FMLA CKD-EPI: >60 MLS/MIN/1.73/M2
GLOBULIN SER-MCNC: 2.9 GM/DL (ref 2.4–3.5)
GLUCOSE SERPL-MCNC: 108 MG/DL (ref 82–115)
HCT VFR BLD AUTO: 23.3 % (ref 42–52)
HGB BLD-MCNC: 7.5 G/DL (ref 14–18)
IMM GRANULOCYTES # BLD AUTO: 0 X10(3)/MCL (ref 0–0.04)
IMM GRANULOCYTES NFR BLD AUTO: 0 %
LYMPHOCYTES # BLD AUTO: 0.7 X10(3)/MCL (ref 0.6–4.6)
LYMPHOCYTES NFR BLD AUTO: 51.1 %
MCH RBC QN AUTO: 31.3 PG (ref 27–31)
MCHC RBC AUTO-ENTMCNC: 32.2 G/DL (ref 33–36)
MCV RBC AUTO: 97.1 FL (ref 80–94)
MONOCYTES # BLD AUTO: 0.25 X10(3)/MCL (ref 0.1–1.3)
MONOCYTES NFR BLD AUTO: 18.2 %
NEUTROPHILS # BLD AUTO: 0.42 X10(3)/MCL (ref 2.1–9.2)
NEUTROPHILS NFR BLD AUTO: 30.7 %
PLATELET # BLD AUTO: 21 X10(3)/MCL (ref 130–400)
PMV BLD AUTO: 10.1 FL (ref 7.4–10.4)
POTASSIUM SERPL-SCNC: 4.1 MMOL/L (ref 3.5–5.1)
PROT SERPL-MCNC: 6.6 GM/DL (ref 5.8–7.6)
RBC # BLD AUTO: 2.4 X10(6)/MCL (ref 4.7–6.1)
SODIUM SERPL-SCNC: 141 MMOL/L (ref 136–145)
WBC # SPEC AUTO: 1.37 X10(3)/MCL (ref 4.5–11.5)

## 2023-07-13 PROCEDURE — 25000003 PHARM REV CODE 250: Performed by: INTERNAL MEDICINE

## 2023-07-13 PROCEDURE — 96401 CHEMO ANTI-NEOPL SQ/IM: CPT

## 2023-07-13 PROCEDURE — 36415 COLL VENOUS BLD VENIPUNCTURE: CPT

## 2023-07-13 PROCEDURE — 63600175 PHARM REV CODE 636 W HCPCS: Performed by: INTERNAL MEDICINE

## 2023-07-13 PROCEDURE — 80053 COMPREHEN METABOLIC PANEL: CPT

## 2023-07-13 PROCEDURE — 85025 COMPLETE CBC W/AUTO DIFF WBC: CPT

## 2023-07-13 RX ORDER — ONDANSETRON 4 MG/1
16 TABLET, ORALLY DISINTEGRATING ORAL
Status: COMPLETED | OUTPATIENT
Start: 2023-07-13 | End: 2023-07-13

## 2023-07-13 RX ORDER — AZACITIDINE 100 MG/1
75 INJECTION, POWDER, LYOPHILIZED, FOR SOLUTION INTRAVENOUS; SUBCUTANEOUS
Status: COMPLETED | OUTPATIENT
Start: 2023-07-13 | End: 2023-07-13

## 2023-07-13 RX ADMIN — AZACITIDINE 155 MG: 100 INJECTION, POWDER, LYOPHILIZED, FOR SOLUTION INTRAVENOUS; SUBCUTANEOUS at 09:07

## 2023-07-13 RX ADMIN — ONDANSETRON 16 MG: 4 TABLET, ORALLY DISINTEGRATING ORAL at 09:07

## 2023-07-14 ENCOUNTER — INFUSION (OUTPATIENT)
Dept: INFUSION THERAPY | Facility: HOSPITAL | Age: 73
End: 2023-07-14
Attending: INTERNAL MEDICINE
Payer: MEDICARE

## 2023-07-14 VITALS
TEMPERATURE: 98 F | HEART RATE: 103 BPM | DIASTOLIC BLOOD PRESSURE: 55 MMHG | RESPIRATION RATE: 18 BRPM | OXYGEN SATURATION: 98 % | SYSTOLIC BLOOD PRESSURE: 104 MMHG

## 2023-07-14 DIAGNOSIS — C92.00 ACUTE MYELOID LEUKEMIA NOT HAVING ACHIEVED REMISSION: Primary | ICD-10-CM

## 2023-07-14 LAB — RBCS: NORMAL

## 2023-07-14 PROCEDURE — 63600175 PHARM REV CODE 636 W HCPCS: Performed by: INTERNAL MEDICINE

## 2023-07-14 PROCEDURE — 25000003 PHARM REV CODE 250: Performed by: INTERNAL MEDICINE

## 2023-07-14 PROCEDURE — 96401 CHEMO ANTI-NEOPL SQ/IM: CPT

## 2023-07-14 RX ORDER — AZACITIDINE 100 MG/1
75 INJECTION, POWDER, LYOPHILIZED, FOR SOLUTION INTRAVENOUS; SUBCUTANEOUS
Status: COMPLETED | OUTPATIENT
Start: 2023-07-14 | End: 2023-07-14

## 2023-07-14 RX ORDER — ONDANSETRON 4 MG/1
16 TABLET, ORALLY DISINTEGRATING ORAL
Status: COMPLETED | OUTPATIENT
Start: 2023-07-14 | End: 2023-07-14

## 2023-07-14 RX ADMIN — ONDANSETRON 16 MG: 4 TABLET, ORALLY DISINTEGRATING ORAL at 11:07

## 2023-07-14 RX ADMIN — AZACITIDINE 155 MG: 100 INJECTION, POWDER, LYOPHILIZED, FOR SOLUTION INTRAVENOUS; SUBCUTANEOUS at 11:07

## 2023-07-17 ENCOUNTER — INFUSION (OUTPATIENT)
Dept: INFUSION THERAPY | Facility: HOSPITAL | Age: 73
End: 2023-07-17
Attending: INTERNAL MEDICINE
Payer: MEDICARE

## 2023-07-17 DIAGNOSIS — C92.00 ACUTE MYELOID LEUKEMIA NOT HAVING ACHIEVED REMISSION: Primary | ICD-10-CM

## 2023-07-17 DIAGNOSIS — C92.01 ACUTE MYELOID LEUKEMIA IN REMISSION: Primary | ICD-10-CM

## 2023-07-17 PROCEDURE — 96402 CHEMO HORMON ANTINEOPL SQ/IM: CPT

## 2023-07-17 PROCEDURE — 25000003 PHARM REV CODE 250: Performed by: INTERNAL MEDICINE

## 2023-07-17 PROCEDURE — 86923 COMPATIBILITY TEST ELECTRIC: CPT | Performed by: INTERNAL MEDICINE

## 2023-07-17 PROCEDURE — 63600175 PHARM REV CODE 636 W HCPCS: Performed by: INTERNAL MEDICINE

## 2023-07-17 RX ORDER — HYDROCODONE BITARTRATE AND ACETAMINOPHEN 500; 5 MG/1; MG/1
TABLET ORAL ONCE
Status: CANCELLED | OUTPATIENT
Start: 2023-07-17 | End: 2023-07-17

## 2023-07-17 RX ORDER — DIPHENHYDRAMINE HYDROCHLORIDE 50 MG/ML
25 INJECTION INTRAMUSCULAR; INTRAVENOUS ONCE
Status: CANCELLED | OUTPATIENT
Start: 2023-07-17

## 2023-07-17 RX ORDER — ONDANSETRON 4 MG/1
16 TABLET, ORALLY DISINTEGRATING ORAL
Status: COMPLETED | OUTPATIENT
Start: 2023-07-17 | End: 2023-07-17

## 2023-07-17 RX ORDER — AZACITIDINE 100 MG/1
75 INJECTION, POWDER, LYOPHILIZED, FOR SOLUTION INTRAVENOUS; SUBCUTANEOUS
Status: COMPLETED | OUTPATIENT
Start: 2023-07-17 | End: 2023-07-17

## 2023-07-17 RX ADMIN — ONDANSETRON 16 MG: 4 TABLET, ORALLY DISINTEGRATING ORAL at 10:07

## 2023-07-17 RX ADMIN — AZACITIDINE 155 MG: 100 INJECTION, POWDER, LYOPHILIZED, FOR SOLUTION INTRAVENOUS; SUBCUTANEOUS at 10:07

## 2023-07-18 ENCOUNTER — INFUSION (OUTPATIENT)
Dept: INFUSION THERAPY | Facility: HOSPITAL | Age: 73
End: 2023-07-18
Attending: INTERNAL MEDICINE
Payer: MEDICARE

## 2023-07-18 VITALS
RESPIRATION RATE: 16 BRPM | WEIGHT: 181 LBS | OXYGEN SATURATION: 99 % | SYSTOLIC BLOOD PRESSURE: 149 MMHG | DIASTOLIC BLOOD PRESSURE: 85 MMHG | HEIGHT: 73 IN | TEMPERATURE: 98 F | BODY MASS INDEX: 23.99 KG/M2 | HEART RATE: 65 BPM

## 2023-07-18 DIAGNOSIS — C92.00 ACUTE MYELOID LEUKEMIA NOT HAVING ACHIEVED REMISSION: ICD-10-CM

## 2023-07-18 DIAGNOSIS — C92.01 ACUTE MYELOID LEUKEMIA IN REMISSION: Primary | ICD-10-CM

## 2023-07-18 LAB
ABO + RH BLD: NORMAL
BLD PROD TYP BPU: NORMAL
BLOOD UNIT EXPIRATION DATE: NORMAL
BLOOD UNIT TYPE CODE: 5100
CROSSMATCH INTERPRETATION: NORMAL
DISPENSE STATUS: NORMAL
PSYCHE PATHOLOGY RESULT: NORMAL
UNIT NUMBER: NORMAL

## 2023-07-18 PROCEDURE — 96401 CHEMO ANTI-NEOPL SQ/IM: CPT

## 2023-07-18 PROCEDURE — 25000003 PHARM REV CODE 250: Performed by: INTERNAL MEDICINE

## 2023-07-18 PROCEDURE — P9040 RBC LEUKOREDUCED IRRADIATED: HCPCS | Performed by: INTERNAL MEDICINE

## 2023-07-18 PROCEDURE — 36430 TRANSFUSION BLD/BLD COMPNT: CPT

## 2023-07-18 PROCEDURE — 63600175 PHARM REV CODE 636 W HCPCS: Performed by: INTERNAL MEDICINE

## 2023-07-18 RX ORDER — ONDANSETRON 4 MG/1
16 TABLET, ORALLY DISINTEGRATING ORAL
Status: COMPLETED | OUTPATIENT
Start: 2023-07-18 | End: 2023-07-18

## 2023-07-18 RX ORDER — DIPHENHYDRAMINE HYDROCHLORIDE 50 MG/ML
25 INJECTION INTRAMUSCULAR; INTRAVENOUS ONCE
Status: DISCONTINUED | OUTPATIENT
Start: 2023-07-18 | End: 2023-07-18 | Stop reason: HOSPADM

## 2023-07-18 RX ORDER — AZACITIDINE 100 MG/1
75 INJECTION, POWDER, LYOPHILIZED, FOR SOLUTION INTRAVENOUS; SUBCUTANEOUS
Status: COMPLETED | OUTPATIENT
Start: 2023-07-18 | End: 2023-07-18

## 2023-07-18 RX ORDER — HYDROCODONE BITARTRATE AND ACETAMINOPHEN 500; 5 MG/1; MG/1
TABLET ORAL ONCE
Status: COMPLETED | OUTPATIENT
Start: 2023-07-18 | End: 2023-07-18

## 2023-07-18 RX ADMIN — AZACITIDINE 155 MG: 100 INJECTION, POWDER, LYOPHILIZED, FOR SOLUTION INTRAVENOUS; SUBCUTANEOUS at 09:07

## 2023-07-18 RX ADMIN — SODIUM CHLORIDE: 9 INJECTION, SOLUTION INTRAVENOUS at 09:07

## 2023-07-18 RX ADMIN — ONDANSETRON 16 MG: 4 TABLET, ORALLY DISINTEGRATING ORAL at 09:07

## 2023-07-18 NOTE — PLAN OF CARE
Plan of care reviewed with patient; patient in agreement. C5D9 Vidaza and 1 unit PRBC tolerated. Appts reviewed with patient; patient has copy of appts from previous visit.

## 2023-07-20 ENCOUNTER — LAB VISIT (OUTPATIENT)
Dept: LAB | Facility: HOSPITAL | Age: 73
End: 2023-07-20
Attending: INTERNAL MEDICINE
Payer: MEDICARE

## 2023-07-20 ENCOUNTER — INFUSION (OUTPATIENT)
Dept: INFUSION THERAPY | Facility: HOSPITAL | Age: 73
End: 2023-07-20
Attending: INTERNAL MEDICINE
Payer: MEDICARE

## 2023-07-20 VITALS
HEIGHT: 73 IN | RESPIRATION RATE: 16 BRPM | DIASTOLIC BLOOD PRESSURE: 76 MMHG | OXYGEN SATURATION: 100 % | SYSTOLIC BLOOD PRESSURE: 146 MMHG | HEART RATE: 63 BPM | BODY MASS INDEX: 23.99 KG/M2 | WEIGHT: 181 LBS | TEMPERATURE: 98 F

## 2023-07-20 DIAGNOSIS — C92.01 ACUTE MYELOID LEUKEMIA IN REMISSION: Primary | ICD-10-CM

## 2023-07-20 DIAGNOSIS — C92.00 ACUTE MYELOID LEUKEMIA NOT HAVING ACHIEVED REMISSION: ICD-10-CM

## 2023-07-20 DIAGNOSIS — C92.01 ACUTE MYELOID LEUKEMIA IN REMISSION: ICD-10-CM

## 2023-07-20 LAB
ABO + RH BLD: NORMAL
ALBUMIN SERPL-MCNC: 3.7 G/DL (ref 3.4–4.8)
ALBUMIN/GLOB SERPL: 1.3 RATIO (ref 1.1–2)
ALP SERPL-CCNC: 162 UNIT/L (ref 40–150)
ALT SERPL-CCNC: 14 UNIT/L (ref 0–55)
AST SERPL-CCNC: 13 UNIT/L (ref 5–34)
BASOPHILS # BLD AUTO: 0 X10(3)/MCL
BASOPHILS NFR BLD AUTO: 0 %
BILIRUBIN DIRECT+TOT PNL SERPL-MCNC: 0.7 MG/DL
BLD PROD TYP BPU: NORMAL
BLOOD UNIT EXPIRATION DATE: NORMAL
BLOOD UNIT TYPE CODE: 6200
BUN SERPL-MCNC: 15 MG/DL (ref 8.4–25.7)
CALCIUM SERPL-MCNC: 9.5 MG/DL (ref 8.8–10)
CHLORIDE SERPL-SCNC: 102 MMOL/L (ref 98–107)
CO2 SERPL-SCNC: 33 MMOL/L (ref 23–31)
CREAT SERPL-MCNC: 1.11 MG/DL (ref 0.73–1.18)
CROSSMATCH INTERPRETATION: NORMAL
DISPENSE STATUS: NORMAL
EOSINOPHIL # BLD AUTO: 0 X10(3)/MCL (ref 0–0.9)
EOSINOPHIL NFR BLD AUTO: 0 %
ERYTHROCYTE [DISTWIDTH] IN BLOOD BY AUTOMATED COUNT: 16.5 % (ref 11.5–17)
GFR SERPLBLD CREATININE-BSD FMLA CKD-EPI: >60 MLS/MIN/1.73/M2
GLOBULIN SER-MCNC: 2.8 GM/DL (ref 2.4–3.5)
GLUCOSE SERPL-MCNC: 141 MG/DL (ref 82–115)
HCT VFR BLD AUTO: 23.8 % (ref 42–52)
HGB BLD-MCNC: 7.9 G/DL (ref 14–18)
IMM GRANULOCYTES # BLD AUTO: 0 X10(3)/MCL (ref 0–0.04)
IMM GRANULOCYTES NFR BLD AUTO: 0 %
LYMPHOCYTES # BLD AUTO: 0.71 X10(3)/MCL (ref 0.6–4.6)
LYMPHOCYTES NFR BLD AUTO: 60.2 %
MCH RBC QN AUTO: 32.2 PG (ref 27–31)
MCHC RBC AUTO-ENTMCNC: 33.2 G/DL (ref 33–36)
MCV RBC AUTO: 97.1 FL (ref 80–94)
MONOCYTES # BLD AUTO: 0.1 X10(3)/MCL (ref 0.1–1.3)
MONOCYTES NFR BLD AUTO: 8.5 %
NEUTROPHILS # BLD AUTO: 0.37 X10(3)/MCL (ref 2.1–9.2)
NEUTROPHILS NFR BLD AUTO: 31.3 %
PLATELET # BLD AUTO: 12 X10(3)/MCL (ref 130–400)
PMV BLD AUTO: ABNORMAL FL
POTASSIUM SERPL-SCNC: 3.4 MMOL/L (ref 3.5–5.1)
PROT SERPL-MCNC: 6.5 GM/DL (ref 5.8–7.6)
RBC # BLD AUTO: 2.45 X10(6)/MCL (ref 4.7–6.1)
SODIUM SERPL-SCNC: 144 MMOL/L (ref 136–145)
UNIT NUMBER: NORMAL
WBC # SPEC AUTO: 1.18 X10(3)/MCL (ref 4.5–11.5)

## 2023-07-20 PROCEDURE — 25000003 PHARM REV CODE 250: Performed by: INTERNAL MEDICINE

## 2023-07-20 PROCEDURE — P9037 PLATE PHERES LEUKOREDU IRRAD: HCPCS | Performed by: INTERNAL MEDICINE

## 2023-07-20 PROCEDURE — 80053 COMPREHEN METABOLIC PANEL: CPT

## 2023-07-20 PROCEDURE — 85025 COMPLETE CBC W/AUTO DIFF WBC: CPT

## 2023-07-20 PROCEDURE — 36415 COLL VENOUS BLD VENIPUNCTURE: CPT

## 2023-07-20 PROCEDURE — 36430 TRANSFUSION BLD/BLD COMPNT: CPT

## 2023-07-20 RX ORDER — HYDROCODONE BITARTRATE AND ACETAMINOPHEN 500; 5 MG/1; MG/1
TABLET ORAL ONCE
Status: CANCELLED | OUTPATIENT
Start: 2023-07-20 | End: 2023-07-20

## 2023-07-20 RX ORDER — HYDROCODONE BITARTRATE AND ACETAMINOPHEN 500; 5 MG/1; MG/1
TABLET ORAL ONCE
Status: COMPLETED | OUTPATIENT
Start: 2023-07-20 | End: 2023-07-20

## 2023-07-20 RX ORDER — DIPHENHYDRAMINE HYDROCHLORIDE 50 MG/ML
25 INJECTION INTRAMUSCULAR; INTRAVENOUS ONCE
Status: CANCELLED | OUTPATIENT
Start: 2023-07-20

## 2023-07-20 RX ORDER — DIPHENHYDRAMINE HYDROCHLORIDE 50 MG/ML
25 INJECTION INTRAMUSCULAR; INTRAVENOUS ONCE
Status: DISCONTINUED | OUTPATIENT
Start: 2023-07-20 | End: 2023-07-20 | Stop reason: HOSPADM

## 2023-07-20 RX ADMIN — SODIUM CHLORIDE: 9 INJECTION, SOLUTION INTRAVENOUS at 09:07

## 2023-07-20 NOTE — PLAN OF CARE
Plan of care reviewed with patient; patient in agreement. 1 unit PLTs tolerated. Appts printed and given to patient.

## 2023-07-24 ENCOUNTER — INFUSION (OUTPATIENT)
Dept: INFUSION THERAPY | Facility: HOSPITAL | Age: 73
End: 2023-07-24
Attending: INTERNAL MEDICINE
Payer: MEDICARE

## 2023-07-24 VITALS
RESPIRATION RATE: 18 BRPM | TEMPERATURE: 98 F | SYSTOLIC BLOOD PRESSURE: 121 MMHG | DIASTOLIC BLOOD PRESSURE: 53 MMHG | HEART RATE: 64 BPM

## 2023-07-24 DIAGNOSIS — C92.01 ACUTE MYELOID LEUKEMIA IN REMISSION: ICD-10-CM

## 2023-07-24 DIAGNOSIS — C92.01 ACUTE MYELOID LEUKEMIA IN REMISSION: Primary | ICD-10-CM

## 2023-07-24 DIAGNOSIS — C92.00 ACUTE MYELOID LEUKEMIA NOT HAVING ACHIEVED REMISSION: ICD-10-CM

## 2023-07-24 PROCEDURE — P9040 RBC LEUKOREDUCED IRRADIATED: HCPCS | Performed by: INTERNAL MEDICINE

## 2023-07-24 PROCEDURE — 86923 COMPATIBILITY TEST ELECTRIC: CPT | Performed by: INTERNAL MEDICINE

## 2023-07-24 PROCEDURE — 36430 TRANSFUSION BLD/BLD COMPNT: CPT

## 2023-07-24 RX ORDER — HYDROCODONE BITARTRATE AND ACETAMINOPHEN 500; 5 MG/1; MG/1
TABLET ORAL ONCE
Status: DISCONTINUED | OUTPATIENT
Start: 2023-07-24 | End: 2023-07-24 | Stop reason: HOSPADM

## 2023-07-24 RX ORDER — HYDROCODONE BITARTRATE AND ACETAMINOPHEN 500; 5 MG/1; MG/1
TABLET ORAL ONCE
Status: CANCELLED | OUTPATIENT
Start: 2023-07-24 | End: 2023-07-24

## 2023-07-24 RX ORDER — DIPHENHYDRAMINE HYDROCHLORIDE 50 MG/ML
25 INJECTION INTRAMUSCULAR; INTRAVENOUS ONCE
Status: DISCONTINUED | OUTPATIENT
Start: 2023-07-24 | End: 2023-07-24 | Stop reason: HOSPADM

## 2023-07-24 RX ORDER — FLUCONAZOLE 200 MG/1
TABLET ORAL
Qty: 30 TABLET | Refills: 2 | Status: SHIPPED | OUTPATIENT
Start: 2023-07-24 | End: 2023-08-24

## 2023-07-24 RX ORDER — LEVOFLOXACIN 500 MG/1
TABLET, FILM COATED ORAL
Qty: 30 TABLET | Refills: 2 | Status: SHIPPED | OUTPATIENT
Start: 2023-07-24 | End: 2023-10-23

## 2023-07-24 RX ORDER — DIPHENHYDRAMINE HYDROCHLORIDE 50 MG/ML
25 INJECTION INTRAMUSCULAR; INTRAVENOUS ONCE
Status: CANCELLED | OUTPATIENT
Start: 2023-07-24

## 2023-07-27 ENCOUNTER — LAB VISIT (OUTPATIENT)
Dept: LAB | Facility: HOSPITAL | Age: 73
End: 2023-07-27
Attending: INTERNAL MEDICINE
Payer: MEDICARE

## 2023-07-27 ENCOUNTER — INFUSION (OUTPATIENT)
Dept: INFUSION THERAPY | Facility: HOSPITAL | Age: 73
End: 2023-07-27
Attending: INTERNAL MEDICINE
Payer: MEDICARE

## 2023-07-27 VITALS
SYSTOLIC BLOOD PRESSURE: 157 MMHG | TEMPERATURE: 98 F | RESPIRATION RATE: 18 BRPM | BODY MASS INDEX: 23.99 KG/M2 | WEIGHT: 181 LBS | HEIGHT: 73 IN | HEART RATE: 61 BPM | DIASTOLIC BLOOD PRESSURE: 78 MMHG

## 2023-07-27 DIAGNOSIS — C92.01 ACUTE MYELOID LEUKEMIA IN REMISSION: Primary | ICD-10-CM

## 2023-07-27 DIAGNOSIS — C92.00 ACUTE MYELOID LEUKEMIA NOT HAVING ACHIEVED REMISSION: ICD-10-CM

## 2023-07-27 DIAGNOSIS — C92.01 ACUTE MYELOID LEUKEMIA IN REMISSION: ICD-10-CM

## 2023-07-27 LAB
ABO + RH BLD: NORMAL
ABO + RH BLD: NORMAL
ALBUMIN SERPL-MCNC: 3.6 G/DL (ref 3.4–4.8)
ALBUMIN/GLOB SERPL: 1.3 RATIO (ref 1.1–2)
ALP SERPL-CCNC: 134 UNIT/L (ref 40–150)
ALT SERPL-CCNC: 14 UNIT/L (ref 0–55)
AST SERPL-CCNC: 14 UNIT/L (ref 5–34)
BASOPHILS # BLD AUTO: 0 X10(3)/MCL
BASOPHILS NFR BLD AUTO: 0 %
BILIRUBIN DIRECT+TOT PNL SERPL-MCNC: 0.9 MG/DL
BLD PROD TYP BPU: NORMAL
BLD PROD TYP BPU: NORMAL
BLOOD UNIT EXPIRATION DATE: NORMAL
BLOOD UNIT EXPIRATION DATE: NORMAL
BLOOD UNIT TYPE CODE: 6200
BLOOD UNIT TYPE CODE: 6200
BUN SERPL-MCNC: 15.7 MG/DL (ref 8.4–25.7)
CALCIUM SERPL-MCNC: 9.2 MG/DL (ref 8.8–10)
CHLORIDE SERPL-SCNC: 105 MMOL/L (ref 98–107)
CO2 SERPL-SCNC: 33 MMOL/L (ref 23–31)
CREAT SERPL-MCNC: 0.85 MG/DL (ref 0.73–1.18)
CROSSMATCH INTERPRETATION: NORMAL
CROSSMATCH INTERPRETATION: NORMAL
DISPENSE STATUS: NORMAL
DISPENSE STATUS: NORMAL
EOSINOPHIL # BLD AUTO: 0 X10(3)/MCL (ref 0–0.9)
EOSINOPHIL NFR BLD AUTO: 0 %
ERYTHROCYTE [DISTWIDTH] IN BLOOD BY AUTOMATED COUNT: 16.9 % (ref 11.5–17)
GFR SERPLBLD CREATININE-BSD FMLA CKD-EPI: >60 MLS/MIN/1.73/M2
GLOBULIN SER-MCNC: 2.7 GM/DL (ref 2.4–3.5)
GLUCOSE SERPL-MCNC: 122 MG/DL (ref 82–115)
HCT VFR BLD AUTO: 22.2 % (ref 42–52)
HGB BLD-MCNC: 7.5 G/DL (ref 14–18)
IMM GRANULOCYTES # BLD AUTO: 0 X10(3)/MCL (ref 0–0.04)
IMM GRANULOCYTES NFR BLD AUTO: 0 %
LYMPHOCYTES # BLD AUTO: 0.6 X10(3)/MCL (ref 0.6–4.6)
LYMPHOCYTES NFR BLD AUTO: 75 %
MCH RBC QN AUTO: 32.1 PG (ref 27–31)
MCHC RBC AUTO-ENTMCNC: 33.8 G/DL (ref 33–36)
MCV RBC AUTO: 94.9 FL (ref 80–94)
MONOCYTES # BLD AUTO: 0.03 X10(3)/MCL (ref 0.1–1.3)
MONOCYTES NFR BLD AUTO: 3.8 %
NEUTROPHILS # BLD AUTO: 0.17 X10(3)/MCL (ref 2.1–9.2)
NEUTROPHILS NFR BLD AUTO: 21.2 %
PLATELET # BLD AUTO: 6 X10(3)/MCL (ref 130–400)
PMV BLD AUTO: 10.1 FL (ref 7.4–10.4)
POTASSIUM SERPL-SCNC: 3.7 MMOL/L (ref 3.5–5.1)
PROT SERPL-MCNC: 6.3 GM/DL (ref 5.8–7.6)
RBC # BLD AUTO: 2.34 X10(6)/MCL (ref 4.7–6.1)
SODIUM SERPL-SCNC: 144 MMOL/L (ref 136–145)
UNIT NUMBER: NORMAL
UNIT NUMBER: NORMAL
WBC # SPEC AUTO: 0.8 X10(3)/MCL (ref 4.5–11.5)

## 2023-07-27 PROCEDURE — 85025 COMPLETE CBC W/AUTO DIFF WBC: CPT

## 2023-07-27 PROCEDURE — 36430 TRANSFUSION BLD/BLD COMPNT: CPT

## 2023-07-27 PROCEDURE — P9037 PLATE PHERES LEUKOREDU IRRAD: HCPCS | Performed by: INTERNAL MEDICINE

## 2023-07-27 PROCEDURE — 36415 COLL VENOUS BLD VENIPUNCTURE: CPT

## 2023-07-27 PROCEDURE — 80053 COMPREHEN METABOLIC PANEL: CPT

## 2023-07-27 RX ORDER — HYDROCODONE BITARTRATE AND ACETAMINOPHEN 500; 5 MG/1; MG/1
TABLET ORAL ONCE
Status: DISCONTINUED | OUTPATIENT
Start: 2023-07-27 | End: 2023-07-27 | Stop reason: HOSPADM

## 2023-07-27 RX ORDER — HYDROCODONE BITARTRATE AND ACETAMINOPHEN 500; 5 MG/1; MG/1
TABLET ORAL ONCE
Status: CANCELLED | OUTPATIENT
Start: 2023-07-27 | End: 2023-07-27

## 2023-07-27 RX ORDER — DIPHENHYDRAMINE HCL 25 MG
25 CAPSULE ORAL ONCE
Status: DISCONTINUED | OUTPATIENT
Start: 2023-07-27 | End: 2023-07-27 | Stop reason: HOSPADM

## 2023-07-27 RX ORDER — DIPHENHYDRAMINE HCL 25 MG
25 CAPSULE ORAL ONCE
Status: CANCELLED | OUTPATIENT
Start: 2023-07-27

## 2023-07-31 ENCOUNTER — INFUSION (OUTPATIENT)
Dept: INFUSION THERAPY | Facility: HOSPITAL | Age: 73
End: 2023-07-31
Attending: INTERNAL MEDICINE
Payer: MEDICARE

## 2023-07-31 ENCOUNTER — LAB VISIT (OUTPATIENT)
Dept: LAB | Facility: HOSPITAL | Age: 73
End: 2023-07-31
Attending: INTERNAL MEDICINE
Payer: MEDICARE

## 2023-07-31 DIAGNOSIS — C92.00 ACUTE MYELOID LEUKEMIA NOT HAVING ACHIEVED REMISSION: ICD-10-CM

## 2023-07-31 LAB
ALBUMIN SERPL-MCNC: 3.7 G/DL (ref 3.4–4.8)
ALBUMIN/GLOB SERPL: 1.4 RATIO (ref 1.1–2)
ALP SERPL-CCNC: 122 UNIT/L (ref 40–150)
ALT SERPL-CCNC: 15 UNIT/L (ref 0–55)
AST SERPL-CCNC: 13 UNIT/L (ref 5–34)
BASOPHILS # BLD AUTO: 0 X10(3)/MCL
BASOPHILS NFR BLD AUTO: 0 %
BILIRUBIN DIRECT+TOT PNL SERPL-MCNC: 0.8 MG/DL
BUN SERPL-MCNC: 18.4 MG/DL (ref 8.4–25.7)
CALCIUM SERPL-MCNC: 9.1 MG/DL (ref 8.8–10)
CHLORIDE SERPL-SCNC: 106 MMOL/L (ref 98–107)
CO2 SERPL-SCNC: 29 MMOL/L (ref 23–31)
CREAT SERPL-MCNC: 1.06 MG/DL (ref 0.73–1.18)
EOSINOPHIL # BLD AUTO: 0 X10(3)/MCL (ref 0–0.9)
EOSINOPHIL NFR BLD AUTO: 0 %
ERYTHROCYTE [DISTWIDTH] IN BLOOD BY AUTOMATED COUNT: 17 % (ref 11.5–17)
GFR SERPLBLD CREATININE-BSD FMLA CKD-EPI: >60 MLS/MIN/1.73/M2
GLOBULIN SER-MCNC: 2.7 GM/DL (ref 2.4–3.5)
GLUCOSE SERPL-MCNC: 144 MG/DL (ref 82–115)
HCT VFR BLD AUTO: 21.5 % (ref 42–52)
HGB BLD-MCNC: 7 G/DL (ref 14–18)
IMM GRANULOCYTES # BLD AUTO: 0.01 X10(3)/MCL (ref 0–0.04)
IMM GRANULOCYTES NFR BLD AUTO: 1.7 %
LYMPHOCYTES # BLD AUTO: 0.53 X10(3)/MCL (ref 0.6–4.6)
LYMPHOCYTES NFR BLD AUTO: 91.4 %
MCH RBC QN AUTO: 31.8 PG (ref 27–31)
MCHC RBC AUTO-ENTMCNC: 32.6 G/DL (ref 33–36)
MCV RBC AUTO: 97.7 FL (ref 80–94)
MONOCYTES # BLD AUTO: 0.01 X10(3)/MCL (ref 0.1–1.3)
MONOCYTES NFR BLD AUTO: 1.7 %
NEUTROPHILS # BLD AUTO: 0.03 X10(3)/MCL (ref 2.1–9.2)
NEUTROPHILS NFR BLD AUTO: 5.2 %
PLATELET # BLD AUTO: 20 X10(3)/MCL (ref 130–400)
PMV BLD AUTO: 9.5 FL (ref 7.4–10.4)
POTASSIUM SERPL-SCNC: 3.9 MMOL/L (ref 3.5–5.1)
PROT SERPL-MCNC: 6.4 GM/DL (ref 5.8–7.6)
RBC # BLD AUTO: 2.2 X10(6)/MCL (ref 4.7–6.1)
SODIUM SERPL-SCNC: 143 MMOL/L (ref 136–145)
WBC # SPEC AUTO: 0.58 X10(3)/MCL (ref 4.5–11.5)

## 2023-07-31 PROCEDURE — 36415 COLL VENOUS BLD VENIPUNCTURE: CPT

## 2023-07-31 PROCEDURE — 85025 COMPLETE CBC W/AUTO DIFF WBC: CPT

## 2023-07-31 PROCEDURE — 80053 COMPREHEN METABOLIC PANEL: CPT

## 2023-07-31 NOTE — PLAN OF CARE
"Plan of care reviewed with patient; patient in agreement. Labs reviewed with NP (Hgb 7.0/Plts 20); patient has no c/o, "I've been feeling good since Thursday". No transfusion needed per NP. Patient knows to return Thursday for labs/possible transfusion.  "

## 2023-08-03 ENCOUNTER — LAB VISIT (OUTPATIENT)
Dept: LAB | Facility: HOSPITAL | Age: 73
End: 2023-08-03
Attending: INTERNAL MEDICINE
Payer: MEDICARE

## 2023-08-03 ENCOUNTER — INFUSION (OUTPATIENT)
Dept: INFUSION THERAPY | Facility: HOSPITAL | Age: 73
End: 2023-08-03
Attending: INTERNAL MEDICINE
Payer: MEDICARE

## 2023-08-03 VITALS
OXYGEN SATURATION: 99 % | DIASTOLIC BLOOD PRESSURE: 79 MMHG | HEART RATE: 66 BPM | TEMPERATURE: 99 F | SYSTOLIC BLOOD PRESSURE: 163 MMHG | RESPIRATION RATE: 18 BRPM

## 2023-08-03 DIAGNOSIS — C92.01 ACUTE MYELOID LEUKEMIA IN REMISSION: ICD-10-CM

## 2023-08-03 DIAGNOSIS — C92.00 ACUTE MYELOID LEUKEMIA NOT HAVING ACHIEVED REMISSION: ICD-10-CM

## 2023-08-03 DIAGNOSIS — C92.01 ACUTE MYELOID LEUKEMIA IN REMISSION: Primary | ICD-10-CM

## 2023-08-03 LAB
ABO + RH BLD: NORMAL
ABO + RH BLD: NORMAL
ALBUMIN SERPL-MCNC: 3.6 G/DL (ref 3.4–4.8)
ALBUMIN/GLOB SERPL: 1.3 RATIO (ref 1.1–2)
ALP SERPL-CCNC: 127 UNIT/L (ref 40–150)
ALT SERPL-CCNC: 13 UNIT/L (ref 0–55)
AST SERPL-CCNC: 12 UNIT/L (ref 5–34)
BASOPHILS # BLD AUTO: 0 X10(3)/MCL
BASOPHILS NFR BLD AUTO: 0 %
BILIRUBIN DIRECT+TOT PNL SERPL-MCNC: 0.8 MG/DL
BLD PROD TYP BPU: NORMAL
BLD PROD TYP BPU: NORMAL
BLOOD UNIT EXPIRATION DATE: NORMAL
BLOOD UNIT EXPIRATION DATE: NORMAL
BLOOD UNIT TYPE CODE: 5100
BLOOD UNIT TYPE CODE: 6200
BUN SERPL-MCNC: 17.3 MG/DL (ref 8.4–25.7)
CALCIUM SERPL-MCNC: 9.2 MG/DL (ref 8.8–10)
CHLORIDE SERPL-SCNC: 106 MMOL/L (ref 98–107)
CO2 SERPL-SCNC: 32 MMOL/L (ref 23–31)
CREAT SERPL-MCNC: 0.98 MG/DL (ref 0.73–1.18)
CROSSMATCH INTERPRETATION: NORMAL
CROSSMATCH INTERPRETATION: NORMAL
DISPENSE STATUS: NORMAL
DISPENSE STATUS: NORMAL
EOSINOPHIL # BLD AUTO: 0 X10(3)/MCL (ref 0–0.9)
EOSINOPHIL NFR BLD AUTO: 0 %
ERYTHROCYTE [DISTWIDTH] IN BLOOD BY AUTOMATED COUNT: 17.4 % (ref 11.5–17)
GFR SERPLBLD CREATININE-BSD FMLA CKD-EPI: >60 MLS/MIN/1.73/M2
GLOBULIN SER-MCNC: 2.7 GM/DL (ref 2.4–3.5)
GLUCOSE SERPL-MCNC: 114 MG/DL (ref 82–115)
GROUP & RH: NORMAL
HCT VFR BLD AUTO: 20.2 % (ref 42–52)
HGB BLD-MCNC: 6.6 G/DL (ref 14–18)
IMM GRANULOCYTES # BLD AUTO: 0 X10(3)/MCL (ref 0–0.04)
IMM GRANULOCYTES NFR BLD AUTO: 0 %
INDIRECT COOMBS GEL: NORMAL
LYMPHOCYTES # BLD AUTO: 0.55 X10(3)/MCL (ref 0.6–4.6)
LYMPHOCYTES NFR BLD AUTO: 94.8 %
MCH RBC QN AUTO: 31.9 PG (ref 27–31)
MCHC RBC AUTO-ENTMCNC: 32.7 G/DL (ref 33–36)
MCV RBC AUTO: 97.6 FL (ref 80–94)
MONOCYTES # BLD AUTO: 0.01 X10(3)/MCL (ref 0.1–1.3)
MONOCYTES NFR BLD AUTO: 1.7 %
NEUTROPHILS # BLD AUTO: 0.02 X10(3)/MCL (ref 2.1–9.2)
NEUTROPHILS NFR BLD AUTO: 3.5 %
PLATELET # BLD AUTO: 16 X10(3)/MCL (ref 130–400)
PMV BLD AUTO: 11.1 FL (ref 7.4–10.4)
POTASSIUM SERPL-SCNC: 4.1 MMOL/L (ref 3.5–5.1)
PROT SERPL-MCNC: 6.3 GM/DL (ref 5.8–7.6)
RBC # BLD AUTO: 2.07 X10(6)/MCL (ref 4.7–6.1)
SODIUM SERPL-SCNC: 142 MMOL/L (ref 136–145)
SPECIMEN OUTDATE: NORMAL
UNIT NUMBER: NORMAL
UNIT NUMBER: NORMAL
WBC # SPEC AUTO: 0.58 X10(3)/MCL (ref 4.5–11.5)

## 2023-08-03 PROCEDURE — 86900 BLOOD TYPING SEROLOGIC ABO: CPT | Performed by: INTERNAL MEDICINE

## 2023-08-03 PROCEDURE — 36415 COLL VENOUS BLD VENIPUNCTURE: CPT

## 2023-08-03 PROCEDURE — 85025 COMPLETE CBC W/AUTO DIFF WBC: CPT

## 2023-08-03 PROCEDURE — P9037 PLATE PHERES LEUKOREDU IRRAD: HCPCS | Performed by: INTERNAL MEDICINE

## 2023-08-03 PROCEDURE — 80053 COMPREHEN METABOLIC PANEL: CPT

## 2023-08-03 PROCEDURE — 36430 TRANSFUSION BLD/BLD COMPNT: CPT

## 2023-08-03 PROCEDURE — P9040 RBC LEUKOREDUCED IRRADIATED: HCPCS | Performed by: INTERNAL MEDICINE

## 2023-08-03 PROCEDURE — 86923 COMPATIBILITY TEST ELECTRIC: CPT | Performed by: INTERNAL MEDICINE

## 2023-08-03 RX ORDER — HYDROCODONE BITARTRATE AND ACETAMINOPHEN 500; 5 MG/1; MG/1
TABLET ORAL ONCE
Status: DISCONTINUED | OUTPATIENT
Start: 2023-08-03 | End: 2023-08-03 | Stop reason: HOSPADM

## 2023-08-03 RX ORDER — DIPHENHYDRAMINE HYDROCHLORIDE 50 MG/ML
25 INJECTION INTRAMUSCULAR; INTRAVENOUS ONCE
Status: DISCONTINUED | OUTPATIENT
Start: 2023-08-03 | End: 2023-08-03 | Stop reason: HOSPADM

## 2023-08-03 RX ORDER — HYDROCODONE BITARTRATE AND ACETAMINOPHEN 500; 5 MG/1; MG/1
TABLET ORAL ONCE
Status: CANCELLED | OUTPATIENT
Start: 2023-08-03 | End: 2023-08-03

## 2023-08-03 RX ORDER — DIPHENHYDRAMINE HYDROCHLORIDE 50 MG/ML
25 INJECTION INTRAMUSCULAR; INTRAVENOUS ONCE
Status: CANCELLED | OUTPATIENT
Start: 2023-08-03

## 2023-08-07 ENCOUNTER — OFFICE VISIT (OUTPATIENT)
Dept: HEMATOLOGY/ONCOLOGY | Facility: CLINIC | Age: 73
End: 2023-08-07
Payer: MEDICARE

## 2023-08-07 VITALS
TEMPERATURE: 99 F | HEART RATE: 72 BPM | WEIGHT: 178.38 LBS | BODY MASS INDEX: 24.16 KG/M2 | SYSTOLIC BLOOD PRESSURE: 104 MMHG | HEIGHT: 72 IN | DIASTOLIC BLOOD PRESSURE: 69 MMHG | RESPIRATION RATE: 20 BRPM | OXYGEN SATURATION: 100 %

## 2023-08-07 DIAGNOSIS — C92.00 ACUTE MYELOID LEUKEMIA NOT HAVING ACHIEVED REMISSION: Primary | Chronic | ICD-10-CM

## 2023-08-07 PROCEDURE — 99999 PR PBB SHADOW E&M-EST. PATIENT-LVL IV: ICD-10-PCS | Mod: PBBFAC,,, | Performed by: INTERNAL MEDICINE

## 2023-08-07 PROCEDURE — 99215 PR OFFICE/OUTPT VISIT, EST, LEVL V, 40-54 MIN: ICD-10-PCS | Mod: S$PBB,,, | Performed by: INTERNAL MEDICINE

## 2023-08-07 PROCEDURE — 99215 OFFICE O/P EST HI 40 MIN: CPT | Mod: S$PBB,,, | Performed by: INTERNAL MEDICINE

## 2023-08-07 PROCEDURE — 99999 PR PBB SHADOW E&M-EST. PATIENT-LVL IV: CPT | Mod: PBBFAC,,, | Performed by: INTERNAL MEDICINE

## 2023-08-07 PROCEDURE — 99214 OFFICE O/P EST MOD 30 MIN: CPT | Mod: PBBFAC | Performed by: INTERNAL MEDICINE

## 2023-08-07 RX ORDER — TRAMADOL HYDROCHLORIDE 50 MG/1
50 TABLET ORAL EVERY 6 HOURS PRN
Qty: 120 TABLET | Refills: 0 | Status: SHIPPED | OUTPATIENT
Start: 2023-08-07 | End: 2023-08-24

## 2023-08-07 NOTE — PROGRESS NOTES
Subjective:       Patient ID: Hussein Gonzalez Jr. is a 73 y.o. male.    Chief Complaint: OTHER (PT states he been having pain in bilateral shoulders and the side of LT leg.)      Diagnosis:  AML with 27% blasts, background dysplasia, normal cytogenetics and normal FISH with NGS showing ASXL1, SRSF2, TET2, and STAG2 mutations.    Current Treatment:   Azacitidine and venetoclax started on 02/27/2023.  Referral for possible stem cell transplant evaluation.    Treatment History:  N/A    HPI:  73-year-old male with a history of CVA, hypertension, BPH, and melanoma who presented to the emergency department following a syncopal episode.  In February of 2022, the patient did have a slightly low white blood cell count, hemoglobin, and platelet count.  Per the patient, 3 months prior to admission, he was having dizziness, fatigue, weakness, shortness of breath, back pain, and leg pain.  The dizziness was worse with standing and walking.  On 01/20/2023, the patient had a syncopal episode.  He was brought to the emergency department and was found to be pancytopenic.  He also had stool that was positive for occult blood in the emergency department.  He received blood transfusion.  His CBC on 01/20/2023 revealed a white blood cell count of 2.6, hemoglobin of 7.7, and platelet count of 47,000. Reticulocytes were appropriately elevated, B12 and folate were within normal limits, iron level was slightly decreased.  Repeat blood work on 01/21/2023 revealed a white blood cell count of 2.8, post transfusion hemoglobin of 9.6, and a platelet count of 56552.  Hematology was consulted for pancytopenia.  EGD and colonoscopy done on 01/24/2023, EGD revealed nonbleeding ulcers, colonoscopy unremarkable, however had to be aborted due to tortuous colon.  Bone Marrow Biopsy done on 01/25/2023.  Preliminary reveals AML with 27% blasts in a background of dysplasia.  Cytogenetics are within normal limits, FISH is within normal limits.  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  Started azacitidine and venetoclax on 02/27/2023, completed cycle 4 on 05/30/2023.  Held after this cycle and repeated a bone marrow biopsy, this was done on 06/27/2023, this showed a normocellular marrow (40%) with relative erythroid hyperplasia, sequential granulopoiesis and megakaryopoiesis.  The bone marrow was slightly suboptimal given it was predominantly subcortical bone with focal hematopoietic elements.  There was no morphologic or immunophenotypic evidence of previous acute myeloid leukemia, cytogenetics were normal, FISH was normal, NGS showed persistent ASXL1, SRSF2, TET2.          Interval History:   Patient presents for scheduled follow up appointment to review bone marrow biopsy results.  He reports bilateral shoulder pain.  He also has some weakness and knows when he gets low on blood because he becomes symptomatic.          Past Medical History:   Diagnosis Date    Anxiety and depression     BPH (benign prostatic hyperplasia)     History of CVA (cerebrovascular accident) without residual deficits     HTN (hypertension)     Malignant melanoma of skin, unspecified     NSTEMI (non-ST elevated myocardial infarction)     Sepsis, Gram negative     Sleep apnea       Past Surgical History:   Procedure Laterality Date    BONE MARROW ASPIRATION N/A 6/27/2023    Procedure: ASPIRATION, BONE MARROW;  Surgeon: Sabino Núñez MD;  Location: [facility];  Service: General;  Laterality: N/A;  ALL ASSESS FOR MOLECULAR MRD    BONE MARROW BIOPSY N/A 1/25/2023    Procedure: Biopsy-bone marrow;  Surgeon: Rd Carlos MD;  Location: [facility] OR;  Service: General;  Laterality: N/A;    COLONOSCOPY      COLONOSCOPY N/A 1/24/2023    Procedure: COLONOSCOPY (DOUBLE);  Surgeon: Jimy Baker MD;  Location: [facility] ENDOSCOPY;  Service: Gastroenterology;  Laterality: N/A;    EGD, WITH CLOSED BIOPSY N/A 1/24/2023    Procedure: EGD, WITH CLOSED BIOPSY;  Surgeon:  Jimy Baker MD;  Location: [facility] ENDOSCOPY;  Service: Gastroenterology;  Laterality: N/A;    EXCISION OF MELANOMA      SINUS SURGERY      TONSILLECTOMY       Social History     Socioeconomic History    Marital status: Significant Other   Tobacco Use    Smoking status: Never    Smokeless tobacco: Never   Substance and Sexual Activity    Alcohol use: Not Currently     Alcohol/week: 7.0 standard drinks of alcohol     Types: 7 Cans of beer per week    Drug use: Never    Sexual activity: Yes     Social Determinants of Health     Financial Resource Strain: Low Risk  (4/20/2023)    Overall Financial Resource Strain (CARDIA)     Difficulty of Paying Living Expenses: Not hard at all   Food Insecurity: No Food Insecurity (4/20/2023)    Hunger Vital Sign     Worried About Running Out of Food in the Last Year: Never true     Ran Out of Food in the Last Year: Never true   Transportation Needs: No Transportation Needs (4/20/2023)    PRAPARE - Transportation     Lack of Transportation (Medical): No     Lack of Transportation (Non-Medical): No   Physical Activity: Insufficiently Active (4/20/2023)    Exercise Vital Sign     Days of Exercise per Week: 3 days     Minutes of Exercise per Session: 30 min   Stress: No Stress Concern Present (4/20/2023)    [employer] - Occupational Stress Questionnaire     Feeling of Stress : Not at all   Social Connections: Unknown (4/20/2023)    Social Connection and Isolation Panel [NHANES]     Frequency of Communication with Friends and Family: More than three times a week     Frequency of Social Gatherings with Friends and Family: More than three times a week     Attends [employer]ist Services: Patient refused     Active Member of Clubs or Organizations: Patient refused     Attends Club or Organization Meetings: Patient refused     Marital Status:    Housing Stability: Low Risk  (4/20/2023)    Housing Stability Vital Sign     Unable to Pay for Housing in the  Last Year: No     Number of Places Lived in the Last Year: 1     Unstable Housing in the Last Year: No      Family History   Problem Relation Age of Onset    Cancer Mother     Cancer Father       Review of patient's allergies indicates:   Allergen Reactions    Acetaminophen Other (See Comments)     Restless leg       Review of Systems   Constitutional:  Positive for fatigue. Negative for chills, diaphoresis, fever and unexpected weight change.   HENT:  Negative for nasal congestion, mouth sores, sinus pressure/congestion and sore throat.    Eyes:  Negative for pain and visual disturbance.   Respiratory:  Negative for cough, chest tightness and shortness of breath.    Cardiovascular:  Negative for chest pain, palpitations and leg swelling.   Gastrointestinal:  Negative for abdominal distention, abdominal pain, blood in stool, constipation and diarrhea.   Genitourinary:  Negative for dysuria, frequency and hematuria.   Musculoskeletal:  Negative for arthralgias and back pain.   Integumentary:  Negative for rash.   Neurological:  Positive for weakness. Negative for dizziness, syncope, numbness and headaches.   Hematological:  Negative for adenopathy.   Psychiatric/Behavioral:  Negative for confusion.          Objective:      Physical Exam  Vitals reviewed.   Constitutional:       General: He is awake.      Appearance: Normal appearance.   HENT:      Head: Normocephalic and atraumatic.      Right Ear: Hearing normal.      Left Ear: Hearing normal.      Nose: Nose normal.   Eyes:      General: Lids are normal. Vision grossly intact.      Extraocular Movements: Extraocular movements intact.      Conjunctiva/sclera: Conjunctivae normal.   Cardiovascular:      Rate and Rhythm: Normal rate and regular rhythm.      Pulses: Normal pulses.      Heart sounds: Normal heart sounds.   Pulmonary:      Effort: Pulmonary effort is normal.      Breath sounds: Normal breath sounds. No wheezing, rhonchi or rales.   Abdominal:       General: Bowel sounds are normal.      Palpations: Abdomen is soft.      Tenderness: There is no abdominal tenderness.   Musculoskeletal:      Cervical back: Full passive range of motion without pain.      Right lower leg: No edema.      Left lower leg: No edema.   Lymphadenopathy:      Cervical: No cervical adenopathy.      Upper Body:      Right upper body: No supraclavicular or axillary adenopathy.      Left upper body: No supraclavicular or axillary adenopathy.   Skin:     General: Skin is warm.      Comments: Lower leg petechiae     Neurological:      General: No focal deficit present.      Mental Status: He is alert and oriented to person, place, and time.   Psychiatric:         Attention and Perception: Attention normal.         Mood and Affect: Mood and affect normal.         Behavior: Behavior is cooperative.         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    Treating with azacitidine and venetoclax, Started on 02/27/2023.  Bone marrow biopsy after cycle 4 shows no immunophenotypic or morphologic evidence of AML.  NGS showed continued ASXL1, SRSF2, TET2 mutations.  Cytogenetics were normal.    Evaluated by Dr. Valdez in [place] once NGS and cytogenetics returned.    Continue prophylaxis with 500 mg of levofloxacin daily, 200 mg of acyclovir b.i.d., 200 mg of fluconazole daily.    Twice weekly labs (Monday/Thursday) with transfusions as needed.    He restarted treatment on 07/10/2023, on 08/14/2023, we will dose reduce by changing venetoclax to 14 out of every 28 days.    Venetoclax 200 mg on days 1-14 of a 28  day cycle    Proceed with Azacididine on 8/14/2023    Return to clinic in 5 weeks    Eduard Luna II, MD I, Terra Chisholm LPN, acted solely as a scribe for and in the presence of, Dr. Eduard Luna who performed the service.

## 2023-08-08 DIAGNOSIS — C92.00 ACUTE MYELOID LEUKEMIA NOT HAVING ACHIEVED REMISSION: Primary | ICD-10-CM

## 2023-08-10 ENCOUNTER — INFUSION (OUTPATIENT)
Dept: INFUSION THERAPY | Facility: HOSPITAL | Age: 73
End: 2023-08-10
Attending: INTERNAL MEDICINE
Payer: MEDICARE

## 2023-08-10 ENCOUNTER — LAB VISIT (OUTPATIENT)
Dept: LAB | Facility: HOSPITAL | Age: 73
End: 2023-08-10
Attending: INTERNAL MEDICINE
Payer: MEDICARE

## 2023-08-10 VITALS
HEART RATE: 62 BPM | SYSTOLIC BLOOD PRESSURE: 143 MMHG | RESPIRATION RATE: 16 BRPM | DIASTOLIC BLOOD PRESSURE: 74 MMHG | TEMPERATURE: 98 F | OXYGEN SATURATION: 98 %

## 2023-08-10 DIAGNOSIS — C92.00 ACUTE MYELOID LEUKEMIA NOT HAVING ACHIEVED REMISSION: ICD-10-CM

## 2023-08-10 DIAGNOSIS — C92.00 ACUTE MYELOID LEUKEMIA NOT HAVING ACHIEVED REMISSION: Primary | ICD-10-CM

## 2023-08-10 LAB
ABO + RH BLD: NORMAL
ABO + RH BLD: NORMAL
ALBUMIN SERPL-MCNC: 3.6 G/DL (ref 3.4–4.8)
ALBUMIN/GLOB SERPL: 1.2 RATIO (ref 1.1–2)
ALP SERPL-CCNC: 121 UNIT/L (ref 40–150)
ALT SERPL-CCNC: 10 UNIT/L (ref 0–55)
AST SERPL-CCNC: 9 UNIT/L (ref 5–34)
BASOPHILS # BLD AUTO: 0 X10(3)/MCL
BASOPHILS NFR BLD AUTO: 0 %
BILIRUB SERPL-MCNC: 0.5 MG/DL
BLD PROD TYP BPU: NORMAL
BLD PROD TYP BPU: NORMAL
BLOOD UNIT EXPIRATION DATE: NORMAL
BLOOD UNIT EXPIRATION DATE: NORMAL
BLOOD UNIT TYPE CODE: 5100
BLOOD UNIT TYPE CODE: 6200
BUN SERPL-MCNC: 23.3 MG/DL (ref 8.4–25.7)
CALCIUM SERPL-MCNC: 9.4 MG/DL (ref 8.8–10)
CHLORIDE SERPL-SCNC: 103 MMOL/L (ref 98–107)
CO2 SERPL-SCNC: 30 MMOL/L (ref 23–31)
CREAT SERPL-MCNC: 1.15 MG/DL (ref 0.73–1.18)
CROSSMATCH INTERPRETATION: NORMAL
CROSSMATCH INTERPRETATION: NORMAL
DISPENSE STATUS: NORMAL
DISPENSE STATUS: NORMAL
EOSINOPHIL # BLD AUTO: 0 X10(3)/MCL (ref 0–0.9)
EOSINOPHIL NFR BLD AUTO: 0 %
ERYTHROCYTE [DISTWIDTH] IN BLOOD BY AUTOMATED COUNT: 17.5 % (ref 11.5–17)
GFR SERPLBLD CREATININE-BSD FMLA CKD-EPI: >60 MLS/MIN/1.73/M2
GLOBULIN SER-MCNC: 2.9 GM/DL (ref 2.4–3.5)
GLUCOSE SERPL-MCNC: 122 MG/DL (ref 82–115)
GROUP & RH: NORMAL
HCT VFR BLD AUTO: 21.4 % (ref 42–52)
HGB BLD-MCNC: 7.1 G/DL (ref 14–18)
IMM GRANULOCYTES # BLD AUTO: 0 X10(3)/MCL (ref 0–0.04)
IMM GRANULOCYTES NFR BLD AUTO: 0 %
INDIRECT COOMBS GEL: NORMAL
LYMPHOCYTES # BLD AUTO: 0.62 X10(3)/MCL (ref 0.6–4.6)
LYMPHOCYTES NFR BLD AUTO: 93.9 %
MCH RBC QN AUTO: 32 PG (ref 27–31)
MCHC RBC AUTO-ENTMCNC: 33.2 G/DL (ref 33–36)
MCV RBC AUTO: 96.4 FL (ref 80–94)
MONOCYTES # BLD AUTO: 0.02 X10(3)/MCL (ref 0.1–1.3)
MONOCYTES NFR BLD AUTO: 3 %
NEUTROPHILS # BLD AUTO: 0.02 X10(3)/MCL (ref 2.1–9.2)
NEUTROPHILS NFR BLD AUTO: 3.1 %
PLATELET # BLD AUTO: 18 X10(3)/MCL (ref 130–400)
PMV BLD AUTO: 11.7 FL (ref 7.4–10.4)
POTASSIUM SERPL-SCNC: 4 MMOL/L (ref 3.5–5.1)
PROT SERPL-MCNC: 6.5 GM/DL (ref 5.8–7.6)
RBC # BLD AUTO: 2.22 X10(6)/MCL (ref 4.7–6.1)
SODIUM SERPL-SCNC: 141 MMOL/L (ref 136–145)
SPECIMEN OUTDATE: NORMAL
UNIT NUMBER: NORMAL
UNIT NUMBER: NORMAL
WBC # SPEC AUTO: 0.66 X10(3)/MCL (ref 4.5–11.5)

## 2023-08-10 PROCEDURE — 85025 COMPLETE CBC W/AUTO DIFF WBC: CPT

## 2023-08-10 PROCEDURE — 80053 COMPREHEN METABOLIC PANEL: CPT

## 2023-08-10 PROCEDURE — P9040 RBC LEUKOREDUCED IRRADIATED: HCPCS | Performed by: INTERNAL MEDICINE

## 2023-08-10 PROCEDURE — P9037 PLATE PHERES LEUKOREDU IRRAD: HCPCS | Performed by: INTERNAL MEDICINE

## 2023-08-10 PROCEDURE — 86923 COMPATIBILITY TEST ELECTRIC: CPT | Performed by: INTERNAL MEDICINE

## 2023-08-10 PROCEDURE — 86900 BLOOD TYPING SEROLOGIC ABO: CPT | Performed by: INTERNAL MEDICINE

## 2023-08-10 PROCEDURE — 36415 COLL VENOUS BLD VENIPUNCTURE: CPT

## 2023-08-10 PROCEDURE — 86901 BLOOD TYPING SEROLOGIC RH(D): CPT | Performed by: INTERNAL MEDICINE

## 2023-08-10 PROCEDURE — 25000003 PHARM REV CODE 250: Performed by: INTERNAL MEDICINE

## 2023-08-10 PROCEDURE — 86850 RBC ANTIBODY SCREEN: CPT | Performed by: INTERNAL MEDICINE

## 2023-08-10 PROCEDURE — 36430 TRANSFUSION BLD/BLD COMPNT: CPT

## 2023-08-10 PROCEDURE — 86900 BLOOD TYPING SEROLOGIC ABO: CPT | Mod: 91 | Performed by: INTERNAL MEDICINE

## 2023-08-10 RX ORDER — HYDROCODONE BITARTRATE AND ACETAMINOPHEN 500; 5 MG/1; MG/1
TABLET ORAL ONCE
Status: CANCELLED | OUTPATIENT
Start: 2023-08-10 | End: 2023-08-10

## 2023-08-10 RX ORDER — DIPHENHYDRAMINE HYDROCHLORIDE 50 MG/ML
25 INJECTION INTRAMUSCULAR; INTRAVENOUS ONCE
Status: DISCONTINUED | OUTPATIENT
Start: 2023-08-10 | End: 2023-08-10 | Stop reason: HOSPADM

## 2023-08-10 RX ORDER — DIPHENHYDRAMINE HYDROCHLORIDE 50 MG/ML
25 INJECTION INTRAMUSCULAR; INTRAVENOUS ONCE
Status: CANCELLED | OUTPATIENT
Start: 2023-08-10

## 2023-08-10 RX ORDER — HYDROCODONE BITARTRATE AND ACETAMINOPHEN 500; 5 MG/1; MG/1
TABLET ORAL ONCE
Status: COMPLETED | OUTPATIENT
Start: 2023-08-10 | End: 2023-08-10

## 2023-08-10 RX ORDER — HYDROCODONE BITARTRATE AND ACETAMINOPHEN 500; 5 MG/1; MG/1
TABLET ORAL ONCE
Status: DISCONTINUED | OUTPATIENT
Start: 2023-08-10 | End: 2023-08-10 | Stop reason: HOSPADM

## 2023-08-10 RX ADMIN — SODIUM CHLORIDE: 9 INJECTION, SOLUTION INTRAVENOUS at 09:08

## 2023-08-10 NOTE — NURSING
Pt here for labs, added on for 1 unit PRBC and 1 platelet. Pt qing transfusions well. No complaints voiced. Will rtc on Monday for labs and treatment

## 2023-08-14 ENCOUNTER — INFUSION (OUTPATIENT)
Dept: INFUSION THERAPY | Facility: HOSPITAL | Age: 73
End: 2023-08-14
Attending: INTERNAL MEDICINE
Payer: MEDICARE

## 2023-08-14 ENCOUNTER — LAB VISIT (OUTPATIENT)
Dept: LAB | Facility: HOSPITAL | Age: 73
End: 2023-08-14
Attending: INTERNAL MEDICINE
Payer: MEDICARE

## 2023-08-14 VITALS
DIASTOLIC BLOOD PRESSURE: 65 MMHG | HEART RATE: 96 BPM | RESPIRATION RATE: 18 BRPM | SYSTOLIC BLOOD PRESSURE: 119 MMHG | OXYGEN SATURATION: 98 % | TEMPERATURE: 98 F

## 2023-08-14 DIAGNOSIS — C92.00 ACUTE MYELOID LEUKEMIA NOT HAVING ACHIEVED REMISSION: ICD-10-CM

## 2023-08-14 DIAGNOSIS — C92.00 ACUTE MYELOID LEUKEMIA NOT HAVING ACHIEVED REMISSION: Primary | ICD-10-CM

## 2023-08-14 LAB
ABS NEUT CALC (OHS): 0.06 X10(3)/MCL (ref 2.1–9.2)
ALBUMIN SERPL-MCNC: 3.7 G/DL (ref 3.4–4.8)
ALBUMIN/GLOB SERPL: 1.3 RATIO (ref 1.1–2)
ALP SERPL-CCNC: 128 UNIT/L (ref 40–150)
ALT SERPL-CCNC: 13 UNIT/L (ref 0–55)
ANISOCYTOSIS BLD QL SMEAR: SLIGHT
AST SERPL-CCNC: 11 UNIT/L (ref 5–34)
BILIRUB SERPL-MCNC: 0.5 MG/DL
BUN SERPL-MCNC: 18.1 MG/DL (ref 8.4–25.7)
CALCIUM SERPL-MCNC: 9.5 MG/DL (ref 8.8–10)
CHLORIDE SERPL-SCNC: 106 MMOL/L (ref 98–107)
CO2 SERPL-SCNC: 29 MMOL/L (ref 23–31)
CREAT SERPL-MCNC: 0.87 MG/DL (ref 0.73–1.18)
ERYTHROCYTE [DISTWIDTH] IN BLOOD BY AUTOMATED COUNT: 16.8 % (ref 11.5–17)
GFR SERPLBLD CREATININE-BSD FMLA CKD-EPI: >60 MLS/MIN/1.73/M2
GLOBULIN SER-MCNC: 2.9 GM/DL (ref 2.4–3.5)
GLUCOSE SERPL-MCNC: 86 MG/DL (ref 82–115)
HCT VFR BLD AUTO: 23.9 % (ref 42–52)
HGB BLD-MCNC: 7.8 G/DL (ref 14–18)
LYMPHOCYTES NFR BLD MANUAL: 0.53 X10(3)/MCL
LYMPHOCYTES NFR BLD MANUAL: 89 % (ref 13–40)
MCH RBC QN AUTO: 31.7 PG (ref 27–31)
MCHC RBC AUTO-ENTMCNC: 32.6 G/DL (ref 33–36)
MCV RBC AUTO: 97.2 FL (ref 80–94)
MONOCYTES NFR BLD MANUAL: 0.01 X10(3)/MCL (ref 0.1–1.3)
MONOCYTES NFR BLD MANUAL: 1 % (ref 2–11)
NEUTROPHILS NFR BLD MANUAL: 10 % (ref 47–80)
PLATELET # BLD AUTO: 23 X10(3)/MCL (ref 130–400)
PLATELET # BLD EST: ABNORMAL 10*3/UL
PMV BLD AUTO: 11.5 FL (ref 7.4–10.4)
POLYCHROMASIA BLD QL SMEAR: SLIGHT
POTASSIUM SERPL-SCNC: 4.4 MMOL/L (ref 3.5–5.1)
PROT SERPL-MCNC: 6.6 GM/DL (ref 5.8–7.6)
RBC # BLD AUTO: 2.46 X10(6)/MCL (ref 4.7–6.1)
RBC MORPH BLD: ABNORMAL
RBCS: NORMAL
SODIUM SERPL-SCNC: 143 MMOL/L (ref 136–145)
WBC # SPEC AUTO: 0.6 X10(3)/MCL (ref 4.5–11.5)

## 2023-08-14 PROCEDURE — 96401 CHEMO ANTI-NEOPL SQ/IM: CPT

## 2023-08-14 PROCEDURE — 25000003 PHARM REV CODE 250: Performed by: NURSE PRACTITIONER

## 2023-08-14 PROCEDURE — 36415 COLL VENOUS BLD VENIPUNCTURE: CPT

## 2023-08-14 PROCEDURE — 63600175 PHARM REV CODE 636 W HCPCS: Performed by: NURSE PRACTITIONER

## 2023-08-14 PROCEDURE — 80053 COMPREHEN METABOLIC PANEL: CPT

## 2023-08-14 PROCEDURE — 85027 COMPLETE CBC AUTOMATED: CPT

## 2023-08-14 RX ORDER — AZACITIDINE 100 MG/1
75 INJECTION, POWDER, LYOPHILIZED, FOR SOLUTION INTRAVENOUS; SUBCUTANEOUS
Status: CANCELLED | OUTPATIENT
Start: 2023-08-16

## 2023-08-14 RX ORDER — ONDANSETRON 4 MG/1
16 TABLET, ORALLY DISINTEGRATING ORAL
Status: CANCELLED | OUTPATIENT
Start: 2023-08-21

## 2023-08-14 RX ORDER — AZACITIDINE 100 MG/1
75 INJECTION, POWDER, LYOPHILIZED, FOR SOLUTION INTRAVENOUS; SUBCUTANEOUS
Status: CANCELLED | OUTPATIENT
Start: 2023-08-21

## 2023-08-14 RX ORDER — AZACITIDINE 100 MG/1
75 INJECTION, POWDER, LYOPHILIZED, FOR SOLUTION INTRAVENOUS; SUBCUTANEOUS
Status: CANCELLED | OUTPATIENT
Start: 2023-08-14

## 2023-08-14 RX ORDER — ONDANSETRON 4 MG/1
16 TABLET, ORALLY DISINTEGRATING ORAL
Status: COMPLETED | OUTPATIENT
Start: 2023-08-14 | End: 2023-08-14

## 2023-08-14 RX ORDER — ONDANSETRON 4 MG/1
16 TABLET, ORALLY DISINTEGRATING ORAL
Status: CANCELLED | OUTPATIENT
Start: 2023-08-16

## 2023-08-14 RX ORDER — ONDANSETRON 4 MG/1
16 TABLET, ORALLY DISINTEGRATING ORAL
Status: CANCELLED | OUTPATIENT
Start: 2023-08-17

## 2023-08-14 RX ORDER — ONDANSETRON 4 MG/1
16 TABLET, ORALLY DISINTEGRATING ORAL
Status: CANCELLED | OUTPATIENT
Start: 2023-08-22

## 2023-08-14 RX ORDER — ONDANSETRON 4 MG/1
16 TABLET, ORALLY DISINTEGRATING ORAL
Status: CANCELLED | OUTPATIENT
Start: 2023-08-18

## 2023-08-14 RX ORDER — ONDANSETRON 4 MG/1
16 TABLET, ORALLY DISINTEGRATING ORAL
Status: CANCELLED | OUTPATIENT
Start: 2023-08-15

## 2023-08-14 RX ORDER — AZACITIDINE 100 MG/1
75 INJECTION, POWDER, LYOPHILIZED, FOR SOLUTION INTRAVENOUS; SUBCUTANEOUS
Status: CANCELLED | OUTPATIENT
Start: 2023-08-18

## 2023-08-14 RX ORDER — AZACITIDINE 100 MG/1
75 INJECTION, POWDER, LYOPHILIZED, FOR SOLUTION INTRAVENOUS; SUBCUTANEOUS
Status: CANCELLED | OUTPATIENT
Start: 2023-08-15

## 2023-08-14 RX ORDER — AZACITIDINE 100 MG/1
75 INJECTION, POWDER, LYOPHILIZED, FOR SOLUTION INTRAVENOUS; SUBCUTANEOUS
Status: COMPLETED | OUTPATIENT
Start: 2023-08-14 | End: 2023-08-14

## 2023-08-14 RX ORDER — ONDANSETRON 4 MG/1
16 TABLET, ORALLY DISINTEGRATING ORAL
Status: CANCELLED | OUTPATIENT
Start: 2023-08-14

## 2023-08-14 RX ORDER — AZACITIDINE 100 MG/1
75 INJECTION, POWDER, LYOPHILIZED, FOR SOLUTION INTRAVENOUS; SUBCUTANEOUS
Status: CANCELLED | OUTPATIENT
Start: 2023-08-17

## 2023-08-14 RX ORDER — AZACITIDINE 100 MG/1
75 INJECTION, POWDER, LYOPHILIZED, FOR SOLUTION INTRAVENOUS; SUBCUTANEOUS
Status: CANCELLED | OUTPATIENT
Start: 2023-08-22

## 2023-08-14 RX ADMIN — ONDANSETRON 16 MG: 4 TABLET, ORALLY DISINTEGRATING ORAL at 10:08

## 2023-08-14 RX ADMIN — AZACITIDINE 155 MG: 100 INJECTION, POWDER, LYOPHILIZED, FOR SOLUTION INTRAVENOUS; SUBCUTANEOUS at 11:08

## 2023-08-15 ENCOUNTER — INFUSION (OUTPATIENT)
Dept: INFUSION THERAPY | Facility: HOSPITAL | Age: 73
End: 2023-08-15
Attending: INTERNAL MEDICINE
Payer: MEDICARE

## 2023-08-15 VITALS
TEMPERATURE: 98 F | HEART RATE: 86 BPM | OXYGEN SATURATION: 99 % | DIASTOLIC BLOOD PRESSURE: 66 MMHG | WEIGHT: 178 LBS | HEIGHT: 72 IN | BODY MASS INDEX: 24.11 KG/M2 | SYSTOLIC BLOOD PRESSURE: 118 MMHG | RESPIRATION RATE: 16 BRPM

## 2023-08-15 DIAGNOSIS — C92.00 ACUTE MYELOID LEUKEMIA NOT HAVING ACHIEVED REMISSION: Primary | ICD-10-CM

## 2023-08-15 PROCEDURE — 25000003 PHARM REV CODE 250: Performed by: NURSE PRACTITIONER

## 2023-08-15 PROCEDURE — 96401 CHEMO ANTI-NEOPL SQ/IM: CPT

## 2023-08-15 PROCEDURE — 63600175 PHARM REV CODE 636 W HCPCS: Performed by: NURSE PRACTITIONER

## 2023-08-15 RX ORDER — AZACITIDINE 100 MG/1
75 INJECTION, POWDER, LYOPHILIZED, FOR SOLUTION INTRAVENOUS; SUBCUTANEOUS
Status: COMPLETED | OUTPATIENT
Start: 2023-08-15 | End: 2023-08-15

## 2023-08-15 RX ORDER — ONDANSETRON 4 MG/1
16 TABLET, ORALLY DISINTEGRATING ORAL
Status: COMPLETED | OUTPATIENT
Start: 2023-08-15 | End: 2023-08-15

## 2023-08-15 RX ADMIN — ONDANSETRON 16 MG: 4 TABLET, ORALLY DISINTEGRATING ORAL at 11:08

## 2023-08-15 RX ADMIN — AZACITIDINE 155 MG: 100 INJECTION, POWDER, LYOPHILIZED, FOR SOLUTION INTRAVENOUS; SUBCUTANEOUS at 11:08

## 2023-08-16 ENCOUNTER — INFUSION (OUTPATIENT)
Dept: INFUSION THERAPY | Facility: HOSPITAL | Age: 73
End: 2023-08-16
Attending: INTERNAL MEDICINE
Payer: MEDICARE

## 2023-08-16 VITALS
SYSTOLIC BLOOD PRESSURE: 146 MMHG | BODY MASS INDEX: 23.91 KG/M2 | HEIGHT: 72 IN | OXYGEN SATURATION: 99 % | WEIGHT: 176.5 LBS | RESPIRATION RATE: 18 BRPM | TEMPERATURE: 98 F | HEART RATE: 72 BPM | DIASTOLIC BLOOD PRESSURE: 77 MMHG

## 2023-08-16 DIAGNOSIS — C92.00 ACUTE MYELOID LEUKEMIA NOT HAVING ACHIEVED REMISSION: Primary | ICD-10-CM

## 2023-08-16 PROCEDURE — 96401 CHEMO ANTI-NEOPL SQ/IM: CPT

## 2023-08-16 PROCEDURE — 25000003 PHARM REV CODE 250: Performed by: NURSE PRACTITIONER

## 2023-08-16 PROCEDURE — 63600175 PHARM REV CODE 636 W HCPCS: Performed by: NURSE PRACTITIONER

## 2023-08-16 RX ORDER — AZACITIDINE 100 MG/1
75 INJECTION, POWDER, LYOPHILIZED, FOR SOLUTION INTRAVENOUS; SUBCUTANEOUS
Status: COMPLETED | OUTPATIENT
Start: 2023-08-16 | End: 2023-08-16

## 2023-08-16 RX ORDER — ONDANSETRON 4 MG/1
16 TABLET, ORALLY DISINTEGRATING ORAL
Status: COMPLETED | OUTPATIENT
Start: 2023-08-16 | End: 2023-08-16

## 2023-08-16 RX ADMIN — AZACITIDINE 155 MG: 100 INJECTION, POWDER, LYOPHILIZED, FOR SOLUTION INTRAVENOUS; SUBCUTANEOUS at 02:08

## 2023-08-16 RX ADMIN — ONDANSETRON 16 MG: 4 TABLET, ORALLY DISINTEGRATING ORAL at 01:08

## 2023-08-17 ENCOUNTER — TELEPHONE (OUTPATIENT)
Dept: INTERNAL MEDICINE | Facility: CLINIC | Age: 73
End: 2023-08-17
Payer: MEDICARE

## 2023-08-17 ENCOUNTER — LAB VISIT (OUTPATIENT)
Dept: LAB | Facility: HOSPITAL | Age: 73
End: 2023-08-17
Attending: INTERNAL MEDICINE
Payer: MEDICARE

## 2023-08-17 ENCOUNTER — INFUSION (OUTPATIENT)
Dept: INFUSION THERAPY | Facility: HOSPITAL | Age: 73
End: 2023-08-17
Attending: INTERNAL MEDICINE
Payer: MEDICARE

## 2023-08-17 VITALS
BODY MASS INDEX: 24.28 KG/M2 | DIASTOLIC BLOOD PRESSURE: 82 MMHG | TEMPERATURE: 98 F | RESPIRATION RATE: 18 BRPM | HEIGHT: 73 IN | SYSTOLIC BLOOD PRESSURE: 164 MMHG | HEART RATE: 64 BPM | WEIGHT: 183.19 LBS

## 2023-08-17 DIAGNOSIS — C92.00 ACUTE MYELOID LEUKEMIA NOT HAVING ACHIEVED REMISSION: Primary | ICD-10-CM

## 2023-08-17 DIAGNOSIS — C92.00 ACUTE MYELOID LEUKEMIA NOT HAVING ACHIEVED REMISSION: ICD-10-CM

## 2023-08-17 LAB
ABO + RH BLD: NORMAL
ABO + RH BLD: NORMAL
ALBUMIN SERPL-MCNC: 3.5 G/DL (ref 3.4–4.8)
ALBUMIN/GLOB SERPL: 1.3 RATIO (ref 1.1–2)
ALP SERPL-CCNC: 122 UNIT/L (ref 40–150)
ALT SERPL-CCNC: 13 UNIT/L (ref 0–55)
AST SERPL-CCNC: 12 UNIT/L (ref 5–34)
BASOPHILS # BLD AUTO: 0 X10(3)/MCL
BASOPHILS NFR BLD AUTO: 0 %
BILIRUB SERPL-MCNC: 0.7 MG/DL
BLD PROD TYP BPU: NORMAL
BLD PROD TYP BPU: NORMAL
BLOOD UNIT EXPIRATION DATE: NORMAL
BLOOD UNIT EXPIRATION DATE: NORMAL
BLOOD UNIT TYPE CODE: 5100
BLOOD UNIT TYPE CODE: 6200
BUN SERPL-MCNC: 15.6 MG/DL (ref 8.4–25.7)
CALCIUM SERPL-MCNC: 9.3 MG/DL (ref 8.8–10)
CHLORIDE SERPL-SCNC: 104 MMOL/L (ref 98–107)
CO2 SERPL-SCNC: 32 MMOL/L (ref 23–31)
CREAT SERPL-MCNC: 1.11 MG/DL (ref 0.73–1.18)
CROSSMATCH INTERPRETATION: NORMAL
CROSSMATCH INTERPRETATION: NORMAL
DISPENSE STATUS: NORMAL
DISPENSE STATUS: NORMAL
EOSINOPHIL # BLD AUTO: 0 X10(3)/MCL (ref 0–0.9)
EOSINOPHIL NFR BLD AUTO: 0 %
ERYTHROCYTE [DISTWIDTH] IN BLOOD BY AUTOMATED COUNT: 17 % (ref 11.5–17)
GFR SERPLBLD CREATININE-BSD FMLA CKD-EPI: >60 MLS/MIN/1.73/M2
GLOBULIN SER-MCNC: 2.8 GM/DL (ref 2.4–3.5)
GLUCOSE SERPL-MCNC: 130 MG/DL (ref 82–115)
GROUP & RH: NORMAL
HCT VFR BLD AUTO: 22.7 % (ref 42–52)
HGB BLD-MCNC: 7.5 G/DL (ref 14–18)
IMM GRANULOCYTES # BLD AUTO: 0 X10(3)/MCL (ref 0–0.04)
IMM GRANULOCYTES NFR BLD AUTO: 0 %
INDIRECT COOMBS GEL: NORMAL
LYMPHOCYTES # BLD AUTO: 0.5 X10(3)/MCL (ref 0.6–4.6)
LYMPHOCYTES NFR BLD AUTO: 82 %
MCH RBC QN AUTO: 31.8 PG (ref 27–31)
MCHC RBC AUTO-ENTMCNC: 33 G/DL (ref 33–36)
MCV RBC AUTO: 96.2 FL (ref 80–94)
MONOCYTES # BLD AUTO: 0.03 X10(3)/MCL (ref 0.1–1.3)
MONOCYTES NFR BLD AUTO: 4.9 %
NEUTROPHILS # BLD AUTO: 0.08 X10(3)/MCL (ref 2.1–9.2)
NEUTROPHILS NFR BLD AUTO: 13.1 %
PLATELET # BLD AUTO: 15 X10(3)/MCL (ref 130–400)
PMV BLD AUTO: 12.8 FL (ref 7.4–10.4)
POTASSIUM SERPL-SCNC: 3.4 MMOL/L (ref 3.5–5.1)
PROT SERPL-MCNC: 6.3 GM/DL (ref 5.8–7.6)
RBC # BLD AUTO: 2.36 X10(6)/MCL (ref 4.7–6.1)
SODIUM SERPL-SCNC: 144 MMOL/L (ref 136–145)
SPECIMEN OUTDATE: NORMAL
UNIT NUMBER: NORMAL
UNIT NUMBER: NORMAL
WBC # SPEC AUTO: 0.61 X10(3)/MCL (ref 4.5–11.5)

## 2023-08-17 PROCEDURE — 96401 CHEMO ANTI-NEOPL SQ/IM: CPT

## 2023-08-17 PROCEDURE — 25000003 PHARM REV CODE 250: Performed by: NURSE PRACTITIONER

## 2023-08-17 PROCEDURE — 36430 TRANSFUSION BLD/BLD COMPNT: CPT

## 2023-08-17 PROCEDURE — P9037 PLATE PHERES LEUKOREDU IRRAD: HCPCS | Performed by: INTERNAL MEDICINE

## 2023-08-17 PROCEDURE — 36415 COLL VENOUS BLD VENIPUNCTURE: CPT

## 2023-08-17 PROCEDURE — 86850 RBC ANTIBODY SCREEN: CPT | Mod: 91 | Performed by: INTERNAL MEDICINE

## 2023-08-17 PROCEDURE — P9040 RBC LEUKOREDUCED IRRADIATED: HCPCS | Performed by: INTERNAL MEDICINE

## 2023-08-17 PROCEDURE — 85025 COMPLETE CBC W/AUTO DIFF WBC: CPT

## 2023-08-17 PROCEDURE — 86901 BLOOD TYPING SEROLOGIC RH(D): CPT | Mod: 91 | Performed by: INTERNAL MEDICINE

## 2023-08-17 PROCEDURE — 63600175 PHARM REV CODE 636 W HCPCS: Performed by: NURSE PRACTITIONER

## 2023-08-17 PROCEDURE — 86900 BLOOD TYPING SEROLOGIC ABO: CPT | Performed by: INTERNAL MEDICINE

## 2023-08-17 PROCEDURE — 86900 BLOOD TYPING SEROLOGIC ABO: CPT | Mod: 91 | Performed by: INTERNAL MEDICINE

## 2023-08-17 PROCEDURE — 80053 COMPREHEN METABOLIC PANEL: CPT

## 2023-08-17 PROCEDURE — 86923 COMPATIBILITY TEST ELECTRIC: CPT | Performed by: INTERNAL MEDICINE

## 2023-08-17 RX ORDER — HYDROCODONE BITARTRATE AND ACETAMINOPHEN 500; 5 MG/1; MG/1
TABLET ORAL ONCE
Status: CANCELLED | OUTPATIENT
Start: 2023-08-17 | End: 2023-08-17

## 2023-08-17 RX ORDER — DIPHENHYDRAMINE HYDROCHLORIDE 50 MG/ML
25 INJECTION INTRAMUSCULAR; INTRAVENOUS ONCE
Status: CANCELLED | OUTPATIENT
Start: 2023-08-17

## 2023-08-17 RX ORDER — ONDANSETRON 4 MG/1
16 TABLET, ORALLY DISINTEGRATING ORAL
Status: COMPLETED | OUTPATIENT
Start: 2023-08-17 | End: 2023-08-17

## 2023-08-17 RX ORDER — AZACITIDINE 100 MG/1
75 INJECTION, POWDER, LYOPHILIZED, FOR SOLUTION INTRAVENOUS; SUBCUTANEOUS
Status: COMPLETED | OUTPATIENT
Start: 2023-08-17 | End: 2023-08-17

## 2023-08-17 RX ADMIN — ONDANSETRON 16 MG: 4 TABLET, ORALLY DISINTEGRATING ORAL at 08:08

## 2023-08-17 RX ADMIN — AZACITIDINE 155 MG: 100 INJECTION, POWDER, LYOPHILIZED, FOR SOLUTION INTRAVENOUS; SUBCUTANEOUS at 08:08

## 2023-08-17 NOTE — TELEPHONE ENCOUNTER
Returning Pt call, LVM reminding Pt of his upcoming appt. Requested a call back, If he needed anything.

## 2023-08-17 NOTE — TELEPHONE ENCOUNTER
----- Message from Russ Anderson sent at 8/17/2023 12:11 PM CDT -----  .Type:  Needs Medical Advice    Who Called: Patient  Symptoms (please be specific):    How long has patient had these symptoms:    Pharmacy name and phone #:    Would the patient rather a call back or a response via [facility]?   Best Call Back Number: [phone]  Additional Information: Patient told me he just missed a call no one answered back line when I tried to call the office.

## 2023-08-17 NOTE — TELEPHONE ENCOUNTER
----- Message from Connie Soto MA sent at 8/15/2023  1:26 PM CDT -----  Regarding: Dr CRISTIANO LARSEN Thursday 8-24-23       1. Are there any outstanding Labs, imaging or referrals in the patient's chart?      4 month follow up    No labs required    Last wellness 4-20-23           2. . Has the patient been seen in an ER, urgent care clinic, or any other health care    provider since their last visit? If yes when and where?

## 2023-08-18 ENCOUNTER — INFUSION (OUTPATIENT)
Dept: INFUSION THERAPY | Facility: HOSPITAL | Age: 73
End: 2023-08-18
Attending: INTERNAL MEDICINE
Payer: MEDICARE

## 2023-08-18 VITALS — HEART RATE: 79 BPM | DIASTOLIC BLOOD PRESSURE: 56 MMHG | SYSTOLIC BLOOD PRESSURE: 97 MMHG

## 2023-08-18 DIAGNOSIS — C92.00 ACUTE MYELOID LEUKEMIA NOT HAVING ACHIEVED REMISSION: Primary | ICD-10-CM

## 2023-08-18 PROCEDURE — 25000003 PHARM REV CODE 250: Performed by: NURSE PRACTITIONER

## 2023-08-18 PROCEDURE — 96402 CHEMO HORMON ANTINEOPL SQ/IM: CPT

## 2023-08-18 PROCEDURE — 63600175 PHARM REV CODE 636 W HCPCS: Performed by: NURSE PRACTITIONER

## 2023-08-18 RX ORDER — ONDANSETRON 4 MG/1
16 TABLET, ORALLY DISINTEGRATING ORAL
Status: COMPLETED | OUTPATIENT
Start: 2023-08-18 | End: 2023-08-18

## 2023-08-18 RX ORDER — AZACITIDINE 100 MG/1
75 INJECTION, POWDER, LYOPHILIZED, FOR SOLUTION INTRAVENOUS; SUBCUTANEOUS
Status: COMPLETED | OUTPATIENT
Start: 2023-08-18 | End: 2023-08-18

## 2023-08-18 RX ADMIN — ONDANSETRON 16 MG: 4 TABLET, ORALLY DISINTEGRATING ORAL at 09:08

## 2023-08-18 RX ADMIN — AZACITIDINE 155 MG: 100 INJECTION, POWDER, LYOPHILIZED, FOR SOLUTION INTRAVENOUS; SUBCUTANEOUS at 09:08

## 2023-08-21 ENCOUNTER — LAB VISIT (OUTPATIENT)
Dept: LAB | Facility: HOSPITAL | Age: 73
End: 2023-08-21
Attending: INTERNAL MEDICINE
Payer: MEDICARE

## 2023-08-21 DIAGNOSIS — C92.00 ACUTE MYELOID LEUKEMIA NOT HAVING ACHIEVED REMISSION: ICD-10-CM

## 2023-08-21 LAB
ALBUMIN SERPL-MCNC: 3.5 G/DL (ref 3.4–4.8)
ALBUMIN/GLOB SERPL: 1.3 RATIO (ref 1.1–2)
ALP SERPL-CCNC: 121 UNIT/L (ref 40–150)
ALT SERPL-CCNC: 10 UNIT/L (ref 0–55)
AST SERPL-CCNC: 10 UNIT/L (ref 5–34)
BASOPHILS # BLD AUTO: 0 X10(3)/MCL
BASOPHILS NFR BLD AUTO: 0 %
BILIRUB SERPL-MCNC: 0.5 MG/DL
BUN SERPL-MCNC: 16.7 MG/DL (ref 8.4–25.7)
CALCIUM SERPL-MCNC: 9.1 MG/DL (ref 8.8–10)
CHLORIDE SERPL-SCNC: 107 MMOL/L (ref 98–107)
CO2 SERPL-SCNC: 29 MMOL/L (ref 23–31)
CREAT SERPL-MCNC: 1.02 MG/DL (ref 0.73–1.18)
EOSINOPHIL # BLD AUTO: 0 X10(3)/MCL (ref 0–0.9)
EOSINOPHIL NFR BLD AUTO: 0 %
ERYTHROCYTE [DISTWIDTH] IN BLOOD BY AUTOMATED COUNT: 15.9 % (ref 11.5–17)
GFR SERPLBLD CREATININE-BSD FMLA CKD-EPI: >60 MLS/MIN/1.73/M2
GLOBULIN SER-MCNC: 2.7 GM/DL (ref 2.4–3.5)
GLUCOSE SERPL-MCNC: 112 MG/DL (ref 82–115)
HCT VFR BLD AUTO: 23.1 % (ref 42–52)
HGB BLD-MCNC: 7.7 G/DL (ref 14–18)
IMM GRANULOCYTES # BLD AUTO: 0.01 X10(3)/MCL (ref 0–0.04)
IMM GRANULOCYTES NFR BLD AUTO: 1.3 %
LYMPHOCYTES # BLD AUTO: 0.62 X10(3)/MCL (ref 0.6–4.6)
LYMPHOCYTES NFR BLD AUTO: 82.7 %
MCH RBC QN AUTO: 31.3 PG (ref 27–31)
MCHC RBC AUTO-ENTMCNC: 33.3 G/DL (ref 33–36)
MCV RBC AUTO: 93.9 FL (ref 80–94)
MONOCYTES # BLD AUTO: 0.03 X10(3)/MCL (ref 0.1–1.3)
MONOCYTES NFR BLD AUTO: 4 %
NEUTROPHILS # BLD AUTO: 0.09 X10(3)/MCL (ref 2.1–9.2)
NEUTROPHILS NFR BLD AUTO: 12 %
PLATELET # BLD AUTO: 19 X10(3)/MCL (ref 130–400)
PMV BLD AUTO: 11.6 FL (ref 7.4–10.4)
POTASSIUM SERPL-SCNC: 3.7 MMOL/L (ref 3.5–5.1)
PROT SERPL-MCNC: 6.2 GM/DL (ref 5.8–7.6)
RBC # BLD AUTO: 2.46 X10(6)/MCL (ref 4.7–6.1)
SODIUM SERPL-SCNC: 143 MMOL/L (ref 136–145)
WBC # SPEC AUTO: 0.75 X10(3)/MCL (ref 4.5–11.5)

## 2023-08-21 PROCEDURE — 85025 COMPLETE CBC W/AUTO DIFF WBC: CPT

## 2023-08-21 PROCEDURE — 36415 COLL VENOUS BLD VENIPUNCTURE: CPT

## 2023-08-21 PROCEDURE — 80053 COMPREHEN METABOLIC PANEL: CPT

## 2023-08-24 ENCOUNTER — OFFICE VISIT (OUTPATIENT)
Dept: INTERNAL MEDICINE | Facility: CLINIC | Age: 73
End: 2023-08-24
Payer: MEDICARE

## 2023-08-24 ENCOUNTER — LAB VISIT (OUTPATIENT)
Dept: LAB | Facility: HOSPITAL | Age: 73
End: 2023-08-24
Attending: INTERNAL MEDICINE
Payer: MEDICARE

## 2023-08-24 ENCOUNTER — INFUSION (OUTPATIENT)
Dept: INFUSION THERAPY | Facility: HOSPITAL | Age: 73
End: 2023-08-24
Attending: INTERNAL MEDICINE
Payer: MEDICARE

## 2023-08-24 VITALS
HEART RATE: 73 BPM | OXYGEN SATURATION: 98 % | DIASTOLIC BLOOD PRESSURE: 70 MMHG | BODY MASS INDEX: 25.19 KG/M2 | WEIGHT: 186 LBS | TEMPERATURE: 99 F | HEIGHT: 72 IN | SYSTOLIC BLOOD PRESSURE: 114 MMHG

## 2023-08-24 VITALS
DIASTOLIC BLOOD PRESSURE: 89 MMHG | SYSTOLIC BLOOD PRESSURE: 175 MMHG | HEART RATE: 57 BPM | OXYGEN SATURATION: 99 % | TEMPERATURE: 98 F

## 2023-08-24 DIAGNOSIS — F32.A ANXIETY AND DEPRESSION: Chronic | ICD-10-CM

## 2023-08-24 DIAGNOSIS — C92.00 ACUTE MYELOID LEUKEMIA NOT HAVING ACHIEVED REMISSION: Primary | ICD-10-CM

## 2023-08-24 DIAGNOSIS — F41.9 ANXIETY AND DEPRESSION: Chronic | ICD-10-CM

## 2023-08-24 DIAGNOSIS — C92.00 ACUTE MYELOID LEUKEMIA NOT HAVING ACHIEVED REMISSION: Chronic | ICD-10-CM

## 2023-08-24 DIAGNOSIS — N40.0 BENIGN PROSTATIC HYPERPLASIA, UNSPECIFIED WHETHER LOWER URINARY TRACT SYMPTOMS PRESENT: Chronic | ICD-10-CM

## 2023-08-24 DIAGNOSIS — C92.00 ACUTE MYELOID LEUKEMIA NOT HAVING ACHIEVED REMISSION: ICD-10-CM

## 2023-08-24 DIAGNOSIS — D61.818 PANCYTOPENIA: Chronic | ICD-10-CM

## 2023-08-24 DIAGNOSIS — Z86.73 HISTORY OF CVA (CEREBROVASCULAR ACCIDENT) WITHOUT RESIDUAL DEFICITS: Primary | Chronic | ICD-10-CM

## 2023-08-24 DIAGNOSIS — I10 PRIMARY HYPERTENSION: Chronic | ICD-10-CM

## 2023-08-24 LAB
ABO + RH BLD: NORMAL
ABO + RH BLD: NORMAL
ALBUMIN SERPL-MCNC: 3.3 G/DL (ref 3.4–4.8)
ALBUMIN/GLOB SERPL: 1.3 RATIO (ref 1.1–2)
ALP SERPL-CCNC: 117 UNIT/L (ref 40–150)
ALT SERPL-CCNC: 11 UNIT/L (ref 0–55)
AST SERPL-CCNC: 11 UNIT/L (ref 5–34)
BASOPHILS # BLD AUTO: 0 X10(3)/MCL
BASOPHILS NFR BLD AUTO: 0 %
BILIRUB SERPL-MCNC: 0.4 MG/DL
BLD PROD TYP BPU: NORMAL
BLD PROD TYP BPU: NORMAL
BLOOD UNIT EXPIRATION DATE: NORMAL
BLOOD UNIT EXPIRATION DATE: NORMAL
BLOOD UNIT TYPE CODE: 5100
BLOOD UNIT TYPE CODE: 6200
BUN SERPL-MCNC: 15.3 MG/DL (ref 8.4–25.7)
CALCIUM SERPL-MCNC: 9.2 MG/DL (ref 8.8–10)
CHLORIDE SERPL-SCNC: 106 MMOL/L (ref 98–107)
CO2 SERPL-SCNC: 32 MMOL/L (ref 23–31)
CREAT SERPL-MCNC: 0.89 MG/DL (ref 0.73–1.18)
CROSSMATCH INTERPRETATION: NORMAL
CROSSMATCH INTERPRETATION: NORMAL
DISPENSE STATUS: NORMAL
DISPENSE STATUS: NORMAL
EOSINOPHIL # BLD AUTO: 0 X10(3)/MCL (ref 0–0.9)
EOSINOPHIL NFR BLD AUTO: 0 %
ERYTHROCYTE [DISTWIDTH] IN BLOOD BY AUTOMATED COUNT: 15.7 % (ref 11.5–17)
GFR SERPLBLD CREATININE-BSD FMLA CKD-EPI: >60 MLS/MIN/1.73/M2
GLOBULIN SER-MCNC: 2.6 GM/DL (ref 2.4–3.5)
GLUCOSE SERPL-MCNC: 110 MG/DL (ref 82–115)
GROUP & RH: NORMAL
HCT VFR BLD AUTO: 21.5 % (ref 42–52)
HGB BLD-MCNC: 7.2 G/DL (ref 14–18)
IMM GRANULOCYTES # BLD AUTO: 0 X10(3)/MCL (ref 0–0.04)
IMM GRANULOCYTES NFR BLD AUTO: 0 %
INDIRECT COOMBS GEL: NORMAL
LYMPHOCYTES # BLD AUTO: 0.64 X10(3)/MCL (ref 0.6–4.6)
LYMPHOCYTES NFR BLD AUTO: 84.2 %
MCH RBC QN AUTO: 31.3 PG (ref 27–31)
MCHC RBC AUTO-ENTMCNC: 33.5 G/DL (ref 33–36)
MCV RBC AUTO: 93.5 FL (ref 80–94)
MONOCYTES # BLD AUTO: 0.04 X10(3)/MCL (ref 0.1–1.3)
MONOCYTES NFR BLD AUTO: 5.3 %
NEUTROPHILS # BLD AUTO: 0.08 X10(3)/MCL (ref 2.1–9.2)
NEUTROPHILS NFR BLD AUTO: 10.5 %
PLATELET # BLD AUTO: 9 X10(3)/MCL (ref 130–400)
PMV BLD AUTO: 9.3 FL (ref 7.4–10.4)
POTASSIUM SERPL-SCNC: 3.7 MMOL/L (ref 3.5–5.1)
PROT SERPL-MCNC: 5.9 GM/DL (ref 5.8–7.6)
RBC # BLD AUTO: 2.3 X10(6)/MCL (ref 4.7–6.1)
SODIUM SERPL-SCNC: 142 MMOL/L (ref 136–145)
SPECIMEN OUTDATE: NORMAL
UNIT NUMBER: NORMAL
UNIT NUMBER: NORMAL
WBC # SPEC AUTO: 0.76 X10(3)/MCL (ref 4.5–11.5)

## 2023-08-24 PROCEDURE — 86900 BLOOD TYPING SEROLOGIC ABO: CPT | Performed by: INTERNAL MEDICINE

## 2023-08-24 PROCEDURE — 86923 COMPATIBILITY TEST ELECTRIC: CPT | Performed by: INTERNAL MEDICINE

## 2023-08-24 PROCEDURE — 80053 COMPREHEN METABOLIC PANEL: CPT

## 2023-08-24 PROCEDURE — P9040 RBC LEUKOREDUCED IRRADIATED: HCPCS | Performed by: INTERNAL MEDICINE

## 2023-08-24 PROCEDURE — 99214 PR OFFICE/OUTPT VISIT, EST, LEVL IV, 30-39 MIN: ICD-10-PCS | Mod: ,,, | Performed by: INTERNAL MEDICINE

## 2023-08-24 PROCEDURE — 36430 TRANSFUSION BLD/BLD COMPNT: CPT

## 2023-08-24 PROCEDURE — 99214 OFFICE O/P EST MOD 30 MIN: CPT | Mod: ,,, | Performed by: INTERNAL MEDICINE

## 2023-08-24 PROCEDURE — 85025 COMPLETE CBC W/AUTO DIFF WBC: CPT

## 2023-08-24 PROCEDURE — 36415 COLL VENOUS BLD VENIPUNCTURE: CPT

## 2023-08-24 PROCEDURE — 25000003 PHARM REV CODE 250: Performed by: INTERNAL MEDICINE

## 2023-08-24 PROCEDURE — P9037 PLATE PHERES LEUKOREDU IRRAD: HCPCS | Performed by: INTERNAL MEDICINE

## 2023-08-24 RX ORDER — DIPHENHYDRAMINE HCL 25 MG
25 CAPSULE ORAL ONCE
Status: DISCONTINUED | OUTPATIENT
Start: 2023-08-24 | End: 2023-08-24 | Stop reason: HOSPADM

## 2023-08-24 RX ORDER — HYDROCODONE BITARTRATE AND ACETAMINOPHEN 500; 5 MG/1; MG/1
TABLET ORAL ONCE
Status: COMPLETED | OUTPATIENT
Start: 2023-08-24 | End: 2023-08-24

## 2023-08-24 RX ORDER — HYDROCODONE BITARTRATE AND ACETAMINOPHEN 500; 5 MG/1; MG/1
TABLET ORAL ONCE
Status: CANCELLED | OUTPATIENT
Start: 2023-08-24 | End: 2023-08-24

## 2023-08-24 RX ORDER — HYDROCODONE BITARTRATE AND ACETAMINOPHEN 500; 5 MG/1; MG/1
TABLET ORAL ONCE
Status: DISCONTINUED | OUTPATIENT
Start: 2023-08-24 | End: 2023-08-24 | Stop reason: HOSPADM

## 2023-08-24 RX ORDER — DIPHENHYDRAMINE HCL 25 MG
25 CAPSULE ORAL ONCE
Status: CANCELLED | OUTPATIENT
Start: 2023-08-24

## 2023-08-24 RX ADMIN — SODIUM CHLORIDE: 9 INJECTION, SOLUTION INTRAVENOUS at 10:08

## 2023-08-24 NOTE — ASSESSMENT & PLAN NOTE
-bone marrow biopsy with AML   -currently being followed by Hematology/Oncology   -status post 1 unit of packed red blood cells and platelets today, continue to monitor labs closely

## 2023-08-24 NOTE — PROGRESS NOTES
Subjective:      Patient ID: Hussein Gonzalez Jr. is a 73 y.o. male.    Chief Complaint: Follow-up (4 month follow up HTN)    Mr Hanley is a 72-year-old male patient with  past medical history of CVA, HTN, BPH, and Melanoma.    Also with pancytopenia established with Hematology/Oncology.  Bone Marrow Biopsy done on 01/25/2023.  Preliminary reveals AML with 27% blasts in a background of dysplasia.  Cytogenetics are within normal limits, FISH is within normal limits.  NGS pending.  Currently being treated with with azacitidine and venetoclax. Prophylacticly being treated with 500 mg of levofloxacin daily, 200 mg of acyclovir b.i.d., 200 mg of fluconazole daily.  Currently being evaluated for stem cell transplant in [place].  This morning patient did receive a unit of packed red blood cells as well as platelets and has been monitored closely with routine lab work per Oncology.    Overall feeling good and has no acute needs or complaints as such today.           Wellness: 04/20/2023          The patient's Health Maintenance was reviewed and the following appears to be due at this time:   Health Maintenance Due   Topic Date Due    Hepatitis C Screening  Never done    Colorectal Cancer Screening  07/24/2023        Past Medical History:  Past Medical History:   Diagnosis Date    Anxiety and depression     BPH (benign prostatic hyperplasia)     History of CVA (cerebrovascular accident) without residual deficits     HTN (hypertension)     Malignant melanoma of skin, unspecified     NSTEMI (non-ST elevated myocardial infarction)     Sepsis, Gram negative     Sleep apnea      Past Surgical History:   Procedure Laterality Date    BONE MARROW ASPIRATION N/A 6/27/2023    Procedure: ASPIRATION, BONE MARROW;  Surgeon: Sabino Núñez MD;  Location: [facility];  Service: General;  Laterality: N/A;  ALL ASSESS FOR MOLECULAR MRD    BONE MARROW BIOPSY N/A 1/25/2023    Procedure: Biopsy-bone marrow;  Surgeon: Rd Carlos MD;   Location: [facility] OR;  Service: General;  Laterality: N/A;    COLONOSCOPY      COLONOSCOPY N/A 1/24/2023    Procedure: COLONOSCOPY (DOUBLE);  Surgeon: Jimy Baker MD;  Location: [facility] ENDOSCOPY;  Service: Gastroenterology;  Laterality: N/A;    EGD, WITH CLOSED BIOPSY N/A 1/24/2023    Procedure: EGD, WITH CLOSED BIOPSY;  Surgeon: Jimy Baker MD;  Location: [facility] ENDOSCOPY;  Service: Gastroenterology;  Laterality: N/A;    EXCISION OF MELANOMA      SINUS SURGERY      TONSILLECTOMY       Review of patient's allergies indicates:   Allergen Reactions    Acetaminophen Other (See Comments)     Restless leg      Social History     Socioeconomic History    Marital status: Significant Other   Tobacco Use    Smoking status: Never    Smokeless tobacco: Never   Substance and Sexual Activity    Alcohol use: Not Currently     Alcohol/week: 7.0 standard drinks of alcohol     Types: 7 Cans of beer per week    Drug use: Never    Sexual activity: Yes     Social Determinants of Health     Financial Resource Strain: Low Risk  (4/20/2023)    Overall Financial Resource Strain (CARDIA)     Difficulty of Paying Living Expenses: Not hard at all   Food Insecurity: No Food Insecurity (4/20/2023)    Hunger Vital Sign     Worried About Running Out of Food in the Last Year: Never true     Ran Out of Food in the Last Year: Never true   Transportation Needs: No Transportation Needs (4/20/2023)    PRAPARE - Transportation     Lack of Transportation (Medical): No     Lack of Transportation (Non-Medical): No   Physical Activity: Insufficiently Active (4/20/2023)    Exercise Vital Sign     Days of Exercise per Week: 3 days     Minutes of Exercise per Session: 30 min   Stress: No Stress Concern Present (4/20/2023)    [employer] - Occupational Stress Questionnaire     Feeling of Stress : Not at all   Social Connections: Unknown (4/20/2023)    Social Connection and Isolation Panel [NHANES]     Frequency of  Communication with Friends and Family: More than three times a week     Frequency of Social Gatherings with Friends and Family: More than three times a week     Attends Bahai Services: Patient refused     Active Member of Clubs or Organizations: Patient refused     Attends Club or Organization Meetings: Patient refused     Marital Status:    Housing Stability: Low Risk  (4/20/2023)    Housing Stability Vital Sign     Unable to Pay for Housing in the Last Year: No     Number of Places Lived in the Last Year: 1     Unstable Housing in the Last Year: No     Family History   Problem Relation Age of Onset    Cancer Mother     Cancer Father        Review of Systems    A comprehensive review of systems was performed and is negative except for that stated above  Objective:   /70 (BP Location: Left arm, Patient Position: Sitting, BP Method: Small (Manual))   Pulse 73   Temp 98.5 °F (36.9 °C) (Temporal)   Ht 6' (1.829 m)   Wt 84.4 kg (186 lb)   SpO2 98%   BMI 25.23 kg/m²     Physical Exam  Constitutional:       Appearance: Normal appearance.   HENT:      Head: Normocephalic and atraumatic.      Nose: Nose normal.      Mouth/Throat:      Mouth: Mucous membranes are moist.      Pharynx: Oropharynx is clear.   Eyes:      Extraocular Movements: Extraocular movements intact.      Pupils: Pupils are equal, round, and reactive to light.   Cardiovascular:      Rate and Rhythm: Normal rate and regular rhythm.      Pulses: Normal pulses.   Pulmonary:      Effort: Pulmonary effort is normal.      Breath sounds: Normal breath sounds.   Abdominal:      General: Bowel sounds are normal.      Palpations: Abdomen is soft.   Musculoskeletal:         General: Normal range of motion.      Cervical back: Normal range of motion and neck supple.   Skin:     General: Skin is warm.   Neurological:      General: No focal deficit present.      Mental Status: He is alert and oriented to person, place, and time. Mental status is  at baseline.   Psychiatric:         Mood and Affect: Mood normal.       Assessment/ Plan:   1. History of CVA (cerebrovascular accident) without residual deficits    2. Anxiety and depression  Assessment & Plan:  -on citalopram, continue      3. Primary hypertension  Assessment & Plan:  -on lisinopril hydrochlorothiazide, blood pressure well controlled, continue         4. Pancytopenia  Assessment & Plan:  -bone marrow biopsy with AML   -currently being followed by Hematology/Oncology   -status post 1 unit of packed red blood cells and platelets today, continue to monitor labs closely      5. Acute myeloid leukemia not having achieved remission    6. Benign prostatic hyperplasia, unspecified whether lower urinary tract symptoms present  Assessment & Plan:  -stable at this time  -off of tamsulosin

## 2023-08-28 ENCOUNTER — LAB VISIT (OUTPATIENT)
Dept: LAB | Facility: HOSPITAL | Age: 73
End: 2023-08-28
Attending: INTERNAL MEDICINE
Payer: MEDICARE

## 2023-08-28 ENCOUNTER — INFUSION (OUTPATIENT)
Dept: INFUSION THERAPY | Facility: HOSPITAL | Age: 73
End: 2023-08-28
Attending: INTERNAL MEDICINE
Payer: MEDICARE

## 2023-08-28 VITALS
TEMPERATURE: 98 F | SYSTOLIC BLOOD PRESSURE: 145 MMHG | RESPIRATION RATE: 18 BRPM | HEIGHT: 73 IN | OXYGEN SATURATION: 98 % | DIASTOLIC BLOOD PRESSURE: 73 MMHG | HEART RATE: 68 BPM | WEIGHT: 185 LBS | BODY MASS INDEX: 24.52 KG/M2

## 2023-08-28 DIAGNOSIS — C92.00 ACUTE MYELOID LEUKEMIA NOT HAVING ACHIEVED REMISSION: ICD-10-CM

## 2023-08-28 DIAGNOSIS — C92.00 ACUTE MYELOID LEUKEMIA NOT HAVING ACHIEVED REMISSION: Primary | ICD-10-CM

## 2023-08-28 LAB
ABO + RH BLD: NORMAL
ABO + RH BLD: NORMAL
ALBUMIN SERPL-MCNC: 3.6 G/DL (ref 3.4–4.8)
ALBUMIN/GLOB SERPL: 1.3 RATIO (ref 1.1–2)
ALP SERPL-CCNC: 110 UNIT/L (ref 40–150)
ALT SERPL-CCNC: 15 UNIT/L (ref 0–55)
AST SERPL-CCNC: 13 UNIT/L (ref 5–34)
BASOPHILS # BLD AUTO: 0 X10(3)/MCL
BASOPHILS NFR BLD AUTO: 0 %
BILIRUB SERPL-MCNC: 0.6 MG/DL
BLD PROD TYP BPU: NORMAL
BLD PROD TYP BPU: NORMAL
BLOOD UNIT EXPIRATION DATE: NORMAL
BLOOD UNIT EXPIRATION DATE: NORMAL
BLOOD UNIT TYPE CODE: 5100
BLOOD UNIT TYPE CODE: 6200
BUN SERPL-MCNC: 17.2 MG/DL (ref 8.4–25.7)
CALCIUM SERPL-MCNC: 9.4 MG/DL (ref 8.8–10)
CHLORIDE SERPL-SCNC: 106 MMOL/L (ref 98–107)
CO2 SERPL-SCNC: 30 MMOL/L (ref 23–31)
CREAT SERPL-MCNC: 0.99 MG/DL (ref 0.73–1.18)
CROSSMATCH INTERPRETATION: NORMAL
CROSSMATCH INTERPRETATION: NORMAL
DISPENSE STATUS: NORMAL
DISPENSE STATUS: NORMAL
EOSINOPHIL # BLD AUTO: 0 X10(3)/MCL (ref 0–0.9)
EOSINOPHIL NFR BLD AUTO: 0 %
ERYTHROCYTE [DISTWIDTH] IN BLOOD BY AUTOMATED COUNT: 15.7 % (ref 11.5–17)
GFR SERPLBLD CREATININE-BSD FMLA CKD-EPI: >60 MLS/MIN/1.73/M2
GLOBULIN SER-MCNC: 2.8 GM/DL (ref 2.4–3.5)
GLUCOSE SERPL-MCNC: 116 MG/DL (ref 82–115)
HCT VFR BLD AUTO: 24.1 % (ref 42–52)
HGB BLD-MCNC: 8.1 G/DL (ref 14–18)
IMM GRANULOCYTES # BLD AUTO: 0 X10(3)/MCL (ref 0–0.04)
IMM GRANULOCYTES NFR BLD AUTO: 0 %
LYMPHOCYTES # BLD AUTO: 0.53 X10(3)/MCL (ref 0.6–4.6)
LYMPHOCYTES NFR BLD AUTO: 82.8 %
MCH RBC QN AUTO: 31 PG (ref 27–31)
MCHC RBC AUTO-ENTMCNC: 33.6 G/DL (ref 33–36)
MCV RBC AUTO: 92.3 FL (ref 80–94)
MONOCYTES # BLD AUTO: 0.05 X10(3)/MCL (ref 0.1–1.3)
MONOCYTES NFR BLD AUTO: 7.8 %
NEUTROPHILS # BLD AUTO: 0.06 X10(3)/MCL (ref 2.1–9.2)
NEUTROPHILS NFR BLD AUTO: 9.4 %
PLATELET # BLD AUTO: 17 X10(3)/MCL (ref 130–400)
PMV BLD AUTO: 11.4 FL (ref 7.4–10.4)
POTASSIUM SERPL-SCNC: 3.9 MMOL/L (ref 3.5–5.1)
PROT SERPL-MCNC: 6.4 GM/DL (ref 5.8–7.6)
RBC # BLD AUTO: 2.61 X10(6)/MCL (ref 4.7–6.1)
RBCS: NORMAL
SODIUM SERPL-SCNC: 143 MMOL/L (ref 136–145)
UNIT NUMBER: NORMAL
UNIT NUMBER: NORMAL
WBC # SPEC AUTO: 0.64 X10(3)/MCL (ref 4.5–11.5)

## 2023-08-28 PROCEDURE — 85025 COMPLETE CBC W/AUTO DIFF WBC: CPT

## 2023-08-28 PROCEDURE — 36430 TRANSFUSION BLD/BLD COMPNT: CPT

## 2023-08-28 PROCEDURE — 36415 COLL VENOUS BLD VENIPUNCTURE: CPT

## 2023-08-28 PROCEDURE — 80053 COMPREHEN METABOLIC PANEL: CPT

## 2023-08-28 PROCEDURE — P9037 PLATE PHERES LEUKOREDU IRRAD: HCPCS | Performed by: NURSE PRACTITIONER

## 2023-08-28 RX ORDER — DIPHENHYDRAMINE HCL 25 MG
25 CAPSULE ORAL ONCE
Status: CANCELLED | OUTPATIENT
Start: 2023-08-28

## 2023-08-28 RX ORDER — DIPHENHYDRAMINE HYDROCHLORIDE 50 MG/ML
25 INJECTION INTRAMUSCULAR; INTRAVENOUS ONCE
Status: DISCONTINUED | OUTPATIENT
Start: 2023-08-28 | End: 2023-08-28 | Stop reason: HOSPADM

## 2023-08-28 RX ORDER — HYDROCODONE BITARTRATE AND ACETAMINOPHEN 500; 5 MG/1; MG/1
TABLET ORAL ONCE
Status: DISCONTINUED | OUTPATIENT
Start: 2023-08-28 | End: 2023-08-28 | Stop reason: HOSPADM

## 2023-08-28 RX ORDER — HYDROCODONE BITARTRATE AND ACETAMINOPHEN 500; 5 MG/1; MG/1
TABLET ORAL ONCE
Status: CANCELLED | OUTPATIENT
Start: 2023-08-28 | End: 2023-08-28

## 2023-08-28 RX ORDER — DIPHENHYDRAMINE HCL 25 MG
25 CAPSULE ORAL ONCE
Status: DISCONTINUED | OUTPATIENT
Start: 2023-08-28 | End: 2023-08-28 | Stop reason: HOSPADM

## 2023-08-31 ENCOUNTER — LAB VISIT (OUTPATIENT)
Dept: LAB | Facility: HOSPITAL | Age: 73
End: 2023-08-31
Attending: INTERNAL MEDICINE
Payer: MEDICARE

## 2023-08-31 ENCOUNTER — INFUSION (OUTPATIENT)
Dept: INFUSION THERAPY | Facility: HOSPITAL | Age: 73
End: 2023-08-31
Attending: INTERNAL MEDICINE
Payer: MEDICARE

## 2023-08-31 DIAGNOSIS — C92.00 ACUTE MYELOID LEUKEMIA NOT HAVING ACHIEVED REMISSION: ICD-10-CM

## 2023-08-31 LAB
ALBUMIN SERPL-MCNC: 3.7 G/DL (ref 3.4–4.8)
ALBUMIN/GLOB SERPL: 1.3 RATIO (ref 1.1–2)
ALP SERPL-CCNC: 111 UNIT/L (ref 40–150)
ALT SERPL-CCNC: 14 UNIT/L (ref 0–55)
AST SERPL-CCNC: 13 UNIT/L (ref 5–34)
BASOPHILS # BLD AUTO: 0 X10(3)/MCL
BASOPHILS NFR BLD AUTO: 0 %
BILIRUB SERPL-MCNC: 0.5 MG/DL
BUN SERPL-MCNC: 20.8 MG/DL (ref 8.4–25.7)
CALCIUM SERPL-MCNC: 9.2 MG/DL (ref 8.8–10)
CHLORIDE SERPL-SCNC: 106 MMOL/L (ref 98–107)
CO2 SERPL-SCNC: 29 MMOL/L (ref 23–31)
CREAT SERPL-MCNC: 1.08 MG/DL (ref 0.73–1.18)
EOSINOPHIL # BLD AUTO: 0 X10(3)/MCL (ref 0–0.9)
EOSINOPHIL NFR BLD AUTO: 0 %
ERYTHROCYTE [DISTWIDTH] IN BLOOD BY AUTOMATED COUNT: 15.5 % (ref 11.5–17)
GFR SERPLBLD CREATININE-BSD FMLA CKD-EPI: >60 MLS/MIN/1.73/M2
GLOBULIN SER-MCNC: 2.8 GM/DL (ref 2.4–3.5)
GLUCOSE SERPL-MCNC: 86 MG/DL (ref 82–115)
HCT VFR BLD AUTO: 23.8 % (ref 42–52)
HGB BLD-MCNC: 7.8 G/DL (ref 14–18)
IMM GRANULOCYTES # BLD AUTO: 0 X10(3)/MCL (ref 0–0.04)
IMM GRANULOCYTES NFR BLD AUTO: 0 %
LYMPHOCYTES # BLD AUTO: 0.48 X10(3)/MCL (ref 0.6–4.6)
LYMPHOCYTES NFR BLD AUTO: 84.2 %
MCH RBC QN AUTO: 30.8 PG (ref 27–31)
MCHC RBC AUTO-ENTMCNC: 32.8 G/DL (ref 33–36)
MCV RBC AUTO: 94.1 FL (ref 80–94)
MONOCYTES # BLD AUTO: 0.03 X10(3)/MCL (ref 0.1–1.3)
MONOCYTES NFR BLD AUTO: 5.3 %
NEUTROPHILS # BLD AUTO: 0.06 X10(3)/MCL (ref 2.1–9.2)
NEUTROPHILS NFR BLD AUTO: 10.5 %
PLATELET # BLD AUTO: 26 X10(3)/MCL (ref 130–400)
PMV BLD AUTO: 12.8 FL (ref 7.4–10.4)
POTASSIUM SERPL-SCNC: 4.1 MMOL/L (ref 3.5–5.1)
PROT SERPL-MCNC: 6.5 GM/DL (ref 5.8–7.6)
RBC # BLD AUTO: 2.53 X10(6)/MCL (ref 4.7–6.1)
SODIUM SERPL-SCNC: 143 MMOL/L (ref 136–145)
WBC # SPEC AUTO: 0.57 X10(3)/MCL (ref 4.5–11.5)

## 2023-08-31 PROCEDURE — 85025 COMPLETE CBC W/AUTO DIFF WBC: CPT

## 2023-08-31 PROCEDURE — 80053 COMPREHEN METABOLIC PANEL: CPT

## 2023-08-31 PROCEDURE — 36415 COLL VENOUS BLD VENIPUNCTURE: CPT

## 2023-08-31 NOTE — NURSING
Care team notified and reviewed labs: labs okay, no transfusion needed if patient is  feeling fine. Patient seen in front lobby-denies S/S and reported feeling fine.

## 2023-09-05 ENCOUNTER — INFUSION (OUTPATIENT)
Dept: INFUSION THERAPY | Facility: HOSPITAL | Age: 73
End: 2023-09-05
Attending: INTERNAL MEDICINE
Payer: MEDICARE

## 2023-09-05 ENCOUNTER — LAB VISIT (OUTPATIENT)
Dept: LAB | Facility: HOSPITAL | Age: 73
End: 2023-09-05
Attending: INTERNAL MEDICINE
Payer: MEDICARE

## 2023-09-05 VITALS
BODY MASS INDEX: 25.06 KG/M2 | RESPIRATION RATE: 16 BRPM | DIASTOLIC BLOOD PRESSURE: 89 MMHG | TEMPERATURE: 98 F | HEIGHT: 72 IN | HEART RATE: 61 BPM | OXYGEN SATURATION: 99 % | WEIGHT: 185 LBS | SYSTOLIC BLOOD PRESSURE: 153 MMHG

## 2023-09-05 DIAGNOSIS — C92.00 ACUTE MYELOID LEUKEMIA NOT HAVING ACHIEVED REMISSION: ICD-10-CM

## 2023-09-05 DIAGNOSIS — C92.00 ACUTE MYELOID LEUKEMIA NOT HAVING ACHIEVED REMISSION: Primary | ICD-10-CM

## 2023-09-05 LAB
ABO + RH BLD: NORMAL
ABO + RH BLD: NORMAL
ALBUMIN SERPL-MCNC: 3.6 G/DL (ref 3.4–4.8)
ALBUMIN/GLOB SERPL: 1.3 RATIO (ref 1.1–2)
ALP SERPL-CCNC: 105 UNIT/L (ref 40–150)
ALT SERPL-CCNC: 11 UNIT/L (ref 0–55)
AST SERPL-CCNC: 12 UNIT/L (ref 5–34)
BASOPHILS # BLD AUTO: 0 X10(3)/MCL
BASOPHILS NFR BLD AUTO: 0 %
BILIRUB SERPL-MCNC: 0.7 MG/DL
BLD PROD TYP BPU: NORMAL
BLD PROD TYP BPU: NORMAL
BLOOD UNIT EXPIRATION DATE: NORMAL
BLOOD UNIT EXPIRATION DATE: NORMAL
BLOOD UNIT TYPE CODE: 5100
BLOOD UNIT TYPE CODE: 6200
BUN SERPL-MCNC: 20.4 MG/DL (ref 8.4–25.7)
CALCIUM SERPL-MCNC: 9 MG/DL (ref 8.8–10)
CHLORIDE SERPL-SCNC: 109 MMOL/L (ref 98–107)
CO2 SERPL-SCNC: 27 MMOL/L (ref 23–31)
CREAT SERPL-MCNC: 1.03 MG/DL (ref 0.73–1.18)
CROSSMATCH INTERPRETATION: NORMAL
CROSSMATCH INTERPRETATION: NORMAL
DISPENSE STATUS: NORMAL
DISPENSE STATUS: NORMAL
EOSINOPHIL # BLD AUTO: 0 X10(3)/MCL (ref 0–0.9)
EOSINOPHIL NFR BLD AUTO: 0 %
ERYTHROCYTE [DISTWIDTH] IN BLOOD BY AUTOMATED COUNT: 15.8 % (ref 11.5–17)
GFR SERPLBLD CREATININE-BSD FMLA CKD-EPI: >60 MLS/MIN/1.73/M2
GLOBULIN SER-MCNC: 2.7 GM/DL (ref 2.4–3.5)
GLUCOSE SERPL-MCNC: 117 MG/DL (ref 82–115)
GROUP & RH: NORMAL
HCT VFR BLD AUTO: 20 % (ref 42–52)
HGB BLD-MCNC: 6.5 G/DL (ref 14–18)
IMM GRANULOCYTES # BLD AUTO: 0.01 X10(3)/MCL (ref 0–0.04)
IMM GRANULOCYTES NFR BLD AUTO: 2.1 %
INDIRECT COOMBS GEL: NORMAL
LYMPHOCYTES # BLD AUTO: 0.41 X10(3)/MCL (ref 0.6–4.6)
LYMPHOCYTES NFR BLD AUTO: 87.2 %
MCH RBC QN AUTO: 30.5 PG (ref 27–31)
MCHC RBC AUTO-ENTMCNC: 32.5 G/DL (ref 33–36)
MCV RBC AUTO: 93.9 FL (ref 80–94)
MONOCYTES # BLD AUTO: 0.03 X10(3)/MCL (ref 0.1–1.3)
MONOCYTES NFR BLD AUTO: 6.4 %
NEUTROPHILS # BLD AUTO: 0.02 X10(3)/MCL (ref 2.1–9.2)
NEUTROPHILS NFR BLD AUTO: 4.3 %
PLATELET # BLD AUTO: 5 X10(3)/MCL (ref 130–400)
PMV BLD AUTO: 7.9 FL (ref 7.4–10.4)
POTASSIUM SERPL-SCNC: 3.4 MMOL/L (ref 3.5–5.1)
PROT SERPL-MCNC: 6.3 GM/DL (ref 5.8–7.6)
RBC # BLD AUTO: 2.13 X10(6)/MCL (ref 4.7–6.1)
SODIUM SERPL-SCNC: 141 MMOL/L (ref 136–145)
SPECIMEN OUTDATE: NORMAL
UNIT NUMBER: NORMAL
UNIT NUMBER: NORMAL
WBC # SPEC AUTO: 0.47 X10(3)/MCL (ref 4.5–11.5)

## 2023-09-05 PROCEDURE — 36415 COLL VENOUS BLD VENIPUNCTURE: CPT

## 2023-09-05 PROCEDURE — P9040 RBC LEUKOREDUCED IRRADIATED: HCPCS | Performed by: INTERNAL MEDICINE

## 2023-09-05 PROCEDURE — 80053 COMPREHEN METABOLIC PANEL: CPT

## 2023-09-05 PROCEDURE — 86850 RBC ANTIBODY SCREEN: CPT | Performed by: INTERNAL MEDICINE

## 2023-09-05 PROCEDURE — 85025 COMPLETE CBC W/AUTO DIFF WBC: CPT

## 2023-09-05 PROCEDURE — 25000003 PHARM REV CODE 250: Performed by: INTERNAL MEDICINE

## 2023-09-05 PROCEDURE — 36430 TRANSFUSION BLD/BLD COMPNT: CPT

## 2023-09-05 PROCEDURE — P9037 PLATE PHERES LEUKOREDU IRRAD: HCPCS | Performed by: INTERNAL MEDICINE

## 2023-09-05 PROCEDURE — 86923 COMPATIBILITY TEST ELECTRIC: CPT | Mod: 91 | Performed by: INTERNAL MEDICINE

## 2023-09-05 PROCEDURE — 86923 COMPATIBILITY TEST ELECTRIC: CPT | Performed by: INTERNAL MEDICINE

## 2023-09-05 RX ORDER — DIPHENHYDRAMINE HYDROCHLORIDE 50 MG/ML
25 INJECTION INTRAMUSCULAR; INTRAVENOUS ONCE
Status: CANCELLED | OUTPATIENT
Start: 2023-09-05

## 2023-09-05 RX ORDER — HYDROCODONE BITARTRATE AND ACETAMINOPHEN 500; 5 MG/1; MG/1
TABLET ORAL ONCE
Status: DISCONTINUED | OUTPATIENT
Start: 2023-09-05 | End: 2023-09-05 | Stop reason: HOSPADM

## 2023-09-05 RX ORDER — HYDROCODONE BITARTRATE AND ACETAMINOPHEN 500; 5 MG/1; MG/1
TABLET ORAL ONCE
Status: CANCELLED | OUTPATIENT
Start: 2023-09-05 | End: 2023-09-05

## 2023-09-05 RX ORDER — DIPHENHYDRAMINE HYDROCHLORIDE 50 MG/ML
25 INJECTION INTRAMUSCULAR; INTRAVENOUS ONCE
Status: DISCONTINUED | OUTPATIENT
Start: 2023-09-05 | End: 2023-09-05 | Stop reason: HOSPADM

## 2023-09-05 RX ORDER — HYDROCODONE BITARTRATE AND ACETAMINOPHEN 500; 5 MG/1; MG/1
TABLET ORAL ONCE
Status: COMPLETED | OUTPATIENT
Start: 2023-09-05 | End: 2023-09-05

## 2023-09-05 RX ADMIN — SODIUM CHLORIDE: 9 INJECTION, SOLUTION INTRAVENOUS at 09:09

## 2023-09-05 NOTE — PLAN OF CARE
1unit PRBC and 1 Platelets given. Tolerated well. Next appt confirmed with pt. Dc'd home in stable condition.

## 2023-09-07 ENCOUNTER — INFUSION (OUTPATIENT)
Dept: INFUSION THERAPY | Facility: HOSPITAL | Age: 73
End: 2023-09-07
Attending: INTERNAL MEDICINE
Payer: MEDICARE

## 2023-09-07 ENCOUNTER — LAB VISIT (OUTPATIENT)
Dept: LAB | Facility: HOSPITAL | Age: 73
End: 2023-09-07
Attending: INTERNAL MEDICINE
Payer: MEDICARE

## 2023-09-07 VITALS
SYSTOLIC BLOOD PRESSURE: 148 MMHG | DIASTOLIC BLOOD PRESSURE: 88 MMHG | BODY MASS INDEX: 25.06 KG/M2 | HEART RATE: 63 BPM | HEIGHT: 72 IN | WEIGHT: 185 LBS | RESPIRATION RATE: 18 BRPM | OXYGEN SATURATION: 100 % | TEMPERATURE: 98 F

## 2023-09-07 DIAGNOSIS — C92.00 ACUTE MYELOID LEUKEMIA NOT HAVING ACHIEVED REMISSION: Primary | ICD-10-CM

## 2023-09-07 DIAGNOSIS — C92.00 ACUTE MYELOID LEUKEMIA NOT HAVING ACHIEVED REMISSION: ICD-10-CM

## 2023-09-07 LAB
ABO + RH BLD: NORMAL
ABO + RH BLD: NORMAL
ALBUMIN SERPL-MCNC: 3.7 G/DL (ref 3.4–4.8)
ALBUMIN/GLOB SERPL: 1.4 RATIO (ref 1.1–2)
ALP SERPL-CCNC: 106 UNIT/L (ref 40–150)
ALT SERPL-CCNC: 10 UNIT/L (ref 0–55)
AST SERPL-CCNC: 12 UNIT/L (ref 5–34)
BASOPHILS # BLD AUTO: 0 X10(3)/MCL
BASOPHILS NFR BLD AUTO: 0 %
BILIRUB SERPL-MCNC: 0.7 MG/DL
BLD PROD TYP BPU: NORMAL
BLD PROD TYP BPU: NORMAL
BLOOD UNIT EXPIRATION DATE: NORMAL
BLOOD UNIT EXPIRATION DATE: NORMAL
BLOOD UNIT TYPE CODE: 5100
BLOOD UNIT TYPE CODE: 8400
BUN SERPL-MCNC: 17.5 MG/DL (ref 8.4–25.7)
CALCIUM SERPL-MCNC: 9.2 MG/DL (ref 8.8–10)
CHLORIDE SERPL-SCNC: 106 MMOL/L (ref 98–107)
CO2 SERPL-SCNC: 29 MMOL/L (ref 23–31)
CREAT SERPL-MCNC: 1.1 MG/DL (ref 0.73–1.18)
CROSSMATCH INTERPRETATION: NORMAL
CROSSMATCH INTERPRETATION: NORMAL
DISPENSE STATUS: NORMAL
DISPENSE STATUS: NORMAL
EOSINOPHIL # BLD AUTO: 0 X10(3)/MCL (ref 0–0.9)
EOSINOPHIL NFR BLD AUTO: 0 %
ERYTHROCYTE [DISTWIDTH] IN BLOOD BY AUTOMATED COUNT: 15.6 % (ref 11.5–17)
GFR SERPLBLD CREATININE-BSD FMLA CKD-EPI: >60 MLS/MIN/1.73/M2
GLOBULIN SER-MCNC: 2.6 GM/DL (ref 2.4–3.5)
GLUCOSE SERPL-MCNC: 118 MG/DL (ref 82–115)
GROUP & RH: NORMAL
HCT VFR BLD AUTO: 22.1 % (ref 42–52)
HGB BLD-MCNC: 7.2 G/DL (ref 14–18)
IMM GRANULOCYTES # BLD AUTO: 0 X10(3)/MCL (ref 0–0.04)
IMM GRANULOCYTES NFR BLD AUTO: 0 %
INDIRECT COOMBS GEL: NORMAL
LYMPHOCYTES # BLD AUTO: 0.52 X10(3)/MCL (ref 0.6–4.6)
LYMPHOCYTES NFR BLD AUTO: 91.2 %
MCH RBC QN AUTO: 30.4 PG (ref 27–31)
MCHC RBC AUTO-ENTMCNC: 32.6 G/DL (ref 33–36)
MCV RBC AUTO: 93.2 FL (ref 80–94)
MONOCYTES # BLD AUTO: 0.02 X10(3)/MCL (ref 0.1–1.3)
MONOCYTES NFR BLD AUTO: 3.5 %
NEUTROPHILS # BLD AUTO: 0.03 X10(3)/MCL (ref 2.1–9.2)
NEUTROPHILS NFR BLD AUTO: 5.3 %
PLATELET # BLD AUTO: 23 X10(3)/MCL (ref 130–400)
PMV BLD AUTO: 10.7 FL (ref 7.4–10.4)
POTASSIUM SERPL-SCNC: 4.2 MMOL/L (ref 3.5–5.1)
PROT SERPL-MCNC: 6.3 GM/DL (ref 5.8–7.6)
RBC # BLD AUTO: 2.37 X10(6)/MCL (ref 4.7–6.1)
SODIUM SERPL-SCNC: 141 MMOL/L (ref 136–145)
SPECIMEN OUTDATE: NORMAL
UNIT NUMBER: NORMAL
UNIT NUMBER: NORMAL
WBC # SPEC AUTO: 0.57 X10(3)/MCL (ref 4.5–11.5)

## 2023-09-07 PROCEDURE — 86900 BLOOD TYPING SEROLOGIC ABO: CPT | Performed by: INTERNAL MEDICINE

## 2023-09-07 PROCEDURE — 85025 COMPLETE CBC W/AUTO DIFF WBC: CPT

## 2023-09-07 PROCEDURE — 36415 COLL VENOUS BLD VENIPUNCTURE: CPT

## 2023-09-07 PROCEDURE — P9040 RBC LEUKOREDUCED IRRADIATED: HCPCS | Performed by: INTERNAL MEDICINE

## 2023-09-07 PROCEDURE — P9037 PLATE PHERES LEUKOREDU IRRAD: HCPCS | Performed by: INTERNAL MEDICINE

## 2023-09-07 PROCEDURE — 36430 TRANSFUSION BLD/BLD COMPNT: CPT

## 2023-09-07 PROCEDURE — 80053 COMPREHEN METABOLIC PANEL: CPT

## 2023-09-07 RX ORDER — HYDROCODONE BITARTRATE AND ACETAMINOPHEN 500; 5 MG/1; MG/1
TABLET ORAL ONCE
Status: DISCONTINUED | OUTPATIENT
Start: 2023-09-07 | End: 2023-09-07 | Stop reason: HOSPADM

## 2023-09-07 RX ORDER — HYDROCODONE BITARTRATE AND ACETAMINOPHEN 500; 5 MG/1; MG/1
TABLET ORAL ONCE
Status: CANCELLED | OUTPATIENT
Start: 2023-09-07 | End: 2023-09-07

## 2023-09-07 RX ORDER — DIPHENHYDRAMINE HCL 25 MG
25 CAPSULE ORAL
Status: DISCONTINUED | OUTPATIENT
Start: 2023-09-07 | End: 2023-09-07 | Stop reason: HOSPADM

## 2023-09-07 RX ORDER — DIPHENHYDRAMINE HCL 25 MG
25 CAPSULE ORAL
Status: CANCELLED | OUTPATIENT
Start: 2023-09-07

## 2023-09-11 ENCOUNTER — OFFICE VISIT (OUTPATIENT)
Dept: HEMATOLOGY/ONCOLOGY | Facility: CLINIC | Age: 73
End: 2023-09-11
Payer: MEDICARE

## 2023-09-11 ENCOUNTER — INFUSION (OUTPATIENT)
Dept: INFUSION THERAPY | Facility: HOSPITAL | Age: 73
End: 2023-09-11
Attending: INTERNAL MEDICINE
Payer: MEDICARE

## 2023-09-11 VITALS
TEMPERATURE: 98 F | WEIGHT: 180.44 LBS | OXYGEN SATURATION: 100 % | DIASTOLIC BLOOD PRESSURE: 77 MMHG | BODY MASS INDEX: 23.92 KG/M2 | HEART RATE: 70 BPM | HEIGHT: 73 IN | RESPIRATION RATE: 18 BRPM | SYSTOLIC BLOOD PRESSURE: 146 MMHG

## 2023-09-11 VITALS
SYSTOLIC BLOOD PRESSURE: 138 MMHG | TEMPERATURE: 99 F | HEART RATE: 67 BPM | RESPIRATION RATE: 18 BRPM | DIASTOLIC BLOOD PRESSURE: 76 MMHG

## 2023-09-11 DIAGNOSIS — D61.818 PANCYTOPENIA: Chronic | ICD-10-CM

## 2023-09-11 DIAGNOSIS — C92.00 ACUTE MYELOID LEUKEMIA NOT HAVING ACHIEVED REMISSION: Primary | Chronic | ICD-10-CM

## 2023-09-11 DIAGNOSIS — D69.6 THROMBOCYTOPENIA: ICD-10-CM

## 2023-09-11 DIAGNOSIS — M62.838 MUSCLE SPASM: ICD-10-CM

## 2023-09-11 DIAGNOSIS — Z79.899 ON ANTINEOPLASTIC CHEMOTHERAPY: ICD-10-CM

## 2023-09-11 DIAGNOSIS — D64.9 TRANSFUSION-DEPENDENT ANEMIA: ICD-10-CM

## 2023-09-11 DIAGNOSIS — C92.00 ACUTE MYELOID LEUKEMIA NOT HAVING ACHIEVED REMISSION: Primary | ICD-10-CM

## 2023-09-11 PROCEDURE — 99999 PR PBB SHADOW E&M-EST. PATIENT-LVL V: ICD-10-PCS | Mod: PBBFAC,,, | Performed by: NURSE PRACTITIONER

## 2023-09-11 PROCEDURE — 99215 OFFICE O/P EST HI 40 MIN: CPT | Mod: PBBFAC,25 | Performed by: NURSE PRACTITIONER

## 2023-09-11 PROCEDURE — 99999 PR PBB SHADOW E&M-EST. PATIENT-LVL V: CPT | Mod: PBBFAC,,, | Performed by: NURSE PRACTITIONER

## 2023-09-11 PROCEDURE — 36430 TRANSFUSION BLD/BLD COMPNT: CPT

## 2023-09-11 PROCEDURE — 25000003 PHARM REV CODE 250: Performed by: NURSE PRACTITIONER

## 2023-09-11 PROCEDURE — P9037 PLATE PHERES LEUKOREDU IRRAD: HCPCS | Performed by: NURSE PRACTITIONER

## 2023-09-11 PROCEDURE — 99215 PR OFFICE/OUTPT VISIT, EST, LEVL V, 40-54 MIN: ICD-10-PCS | Mod: S$PBB,,, | Performed by: NURSE PRACTITIONER

## 2023-09-11 PROCEDURE — 96401 CHEMO ANTI-NEOPL SQ/IM: CPT

## 2023-09-11 PROCEDURE — 63600175 PHARM REV CODE 636 W HCPCS: Performed by: NURSE PRACTITIONER

## 2023-09-11 PROCEDURE — 99215 OFFICE O/P EST HI 40 MIN: CPT | Mod: S$PBB,,, | Performed by: NURSE PRACTITIONER

## 2023-09-11 RX ORDER — HYDROCODONE BITARTRATE AND ACETAMINOPHEN 500; 5 MG/1; MG/1
TABLET ORAL ONCE
Status: CANCELLED | OUTPATIENT
Start: 2023-09-11 | End: 2023-09-11

## 2023-09-11 RX ORDER — ONDANSETRON 4 MG/1
16 TABLET, ORALLY DISINTEGRATING ORAL
Status: CANCELLED | OUTPATIENT
Start: 2023-09-12

## 2023-09-11 RX ORDER — ONDANSETRON 4 MG/1
16 TABLET, ORALLY DISINTEGRATING ORAL
Status: CANCELLED | OUTPATIENT
Start: 2023-09-11

## 2023-09-11 RX ORDER — ONDANSETRON 4 MG/1
16 TABLET, ORALLY DISINTEGRATING ORAL
Status: COMPLETED | OUTPATIENT
Start: 2023-09-11 | End: 2023-09-11

## 2023-09-11 RX ORDER — HYDROCODONE BITARTRATE AND ACETAMINOPHEN 500; 5 MG/1; MG/1
TABLET ORAL ONCE
Status: DISCONTINUED | OUTPATIENT
Start: 2023-09-11 | End: 2023-09-11 | Stop reason: HOSPADM

## 2023-09-11 RX ORDER — ONDANSETRON 4 MG/1
16 TABLET, ORALLY DISINTEGRATING ORAL
Status: CANCELLED | OUTPATIENT
Start: 2023-09-15

## 2023-09-11 RX ORDER — AZACITIDINE 100 MG/1
75 INJECTION, POWDER, LYOPHILIZED, FOR SOLUTION INTRAVENOUS; SUBCUTANEOUS
Status: CANCELLED | OUTPATIENT
Start: 2023-09-15

## 2023-09-11 RX ORDER — AZACITIDINE 100 MG/1
75 INJECTION, POWDER, LYOPHILIZED, FOR SOLUTION INTRAVENOUS; SUBCUTANEOUS
Status: COMPLETED | OUTPATIENT
Start: 2023-09-11 | End: 2023-09-11

## 2023-09-11 RX ORDER — CYCLOBENZAPRINE HCL 10 MG
10 TABLET ORAL 3 TIMES DAILY PRN
Qty: 20 TABLET | Refills: 0 | Status: SHIPPED | OUTPATIENT
Start: 2023-09-11 | End: 2023-09-21

## 2023-09-11 RX ORDER — AZACITIDINE 100 MG/1
75 INJECTION, POWDER, LYOPHILIZED, FOR SOLUTION INTRAVENOUS; SUBCUTANEOUS
Status: CANCELLED | OUTPATIENT
Start: 2023-09-13

## 2023-09-11 RX ORDER — AZACITIDINE 100 MG/1
75 INJECTION, POWDER, LYOPHILIZED, FOR SOLUTION INTRAVENOUS; SUBCUTANEOUS
Status: CANCELLED | OUTPATIENT
Start: 2023-09-14

## 2023-09-11 RX ORDER — ONDANSETRON 4 MG/1
16 TABLET, ORALLY DISINTEGRATING ORAL
Status: CANCELLED | OUTPATIENT
Start: 2023-09-13

## 2023-09-11 RX ORDER — DIPHENHYDRAMINE HCL 25 MG
25 CAPSULE ORAL ONCE
Status: DISCONTINUED | OUTPATIENT
Start: 2023-09-11 | End: 2023-09-11 | Stop reason: HOSPADM

## 2023-09-11 RX ORDER — AZACITIDINE 100 MG/1
75 INJECTION, POWDER, LYOPHILIZED, FOR SOLUTION INTRAVENOUS; SUBCUTANEOUS
Status: CANCELLED | OUTPATIENT
Start: 2023-09-12

## 2023-09-11 RX ORDER — ONDANSETRON 4 MG/1
16 TABLET, ORALLY DISINTEGRATING ORAL
Status: CANCELLED | OUTPATIENT
Start: 2023-09-14

## 2023-09-11 RX ORDER — DIPHENHYDRAMINE HCL 25 MG
25 CAPSULE ORAL ONCE
Status: CANCELLED | OUTPATIENT
Start: 2023-09-11 | End: 2023-09-11

## 2023-09-11 RX ORDER — AZACITIDINE 100 MG/1
75 INJECTION, POWDER, LYOPHILIZED, FOR SOLUTION INTRAVENOUS; SUBCUTANEOUS
Status: CANCELLED | OUTPATIENT
Start: 2023-09-11

## 2023-09-11 RX ADMIN — AZACITIDINE 155 MG: 100 INJECTION, POWDER, LYOPHILIZED, FOR SOLUTION INTRAVENOUS; SUBCUTANEOUS at 09:09

## 2023-09-11 RX ADMIN — ONDANSETRON 16 MG: 4 TABLET, ORALLY DISINTEGRATING ORAL at 09:09

## 2023-09-11 NOTE — PROGRESS NOTES
Subjective:       Patient ID: Hussein Gonzalez Jr. is a 73 y.o. male.    Chief Complaint: Follow-up (Patient reports joint pain at night)      Diagnosis:  AML with 27% blasts, background dysplasia, normal cytogenetics and normal FISH with NGS showing ASXL1, SRSF2, TET2, and STAG2 mutations.    Current Treatment:   Azacitidine and venetoclax started on 02/27/2023.  Referral for possible stem cell transplant evaluation.    Treatment History:  N/A    HPI:  73-year-old male with a history of CVA, hypertension, BPH, and melanoma who presented to the emergency department following a syncopal episode.  In February of 2022, the patient did have a slightly low white blood cell count, hemoglobin, and platelet count.  Per the patient, 3 months prior to admission, he was having dizziness, fatigue, weakness, shortness of breath, back pain, and leg pain.  The dizziness was worse with standing and walking.  On 01/20/2023, the patient had a syncopal episode.  He was brought to the emergency department and was found to be pancytopenic.  He also had stool that was positive for occult blood in the emergency department.  He received blood transfusion.  His CBC on 01/20/2023 revealed a white blood cell count of 2.6, hemoglobin of 7.7, and platelet count of 47,000. Reticulocytes were appropriately elevated, B12 and folate were within normal limits, iron level was slightly decreased.  Repeat blood work on 01/21/2023 revealed a white blood cell count of 2.8, post transfusion hemoglobin of 9.6, and a [place] count of 24784.  Hematology was consulted for pancytopenia.  EGD and colonoscopy done on 01/24/2023, EGD revealed nonbleeding ulcers, colonoscopy unremarkable, however had to be aborted due to tortuous colon.  Bone Marrow Biopsy done on 01/25/2023.  Preliminary reveals AML with 27% blasts in a background of dysplasia.  Cytogenetics are within normal limits, FISH is within normal limits.  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  Started azacitidine and venetoclax on 02/27/2023, completed cycle 4 on 05/30/2023.  Held after this cycle and repeated a bone marrow biopsy, this was done on 06/27/2023, this showed a normocellular marrow (40%) with relative erythroid hyperplasia, sequential granulopoiesis and megakaryopoiesis.  The bone marrow was slightly suboptimal given it was predominantly subcortical bone with focal hematopoietic elements.  There was no morphologic or immunophenotypic evidence of previous acute myeloid leukemia, cytogenetics were normal, FISH was normal, NGS showed persistent ASXL1, SRSF2, TET2.          Interval History:   Patient presents for scheduled follow up appointment for AML.  He remains on Vidaza and venetoclax. He continues with some joint aching and also has been having some muscle spasms in his mid back with certain movements.  He also has some weakness and knows when he gets low on blood because he becomes symptomatic. He denies any bleeding or fevers.      Past Medical History:   Diagnosis Date    Anxiety and depression     BPH (benign prostatic hyperplasia)     History of CVA (cerebrovascular accident) without residual deficits     HTN (hypertension)     Malignant melanoma of skin, unspecified     NSTEMI (non-ST elevated myocardial infarction)     Sepsis, Gram negative     Sleep apnea       Past Surgical History:   Procedure Laterality Date    BONE MARROW ASPIRATION N/A 6/27/2023    Procedure: ASPIRATION, BONE MARROW;  Surgeon: Sabino Núñez MD;  Location: [facility];  Service: General;  Laterality: N/A;  ALL ASSESS FOR MOLECULAR MRD    BONE MARROW BIOPSY N/A 1/25/2023    Procedure: Biopsy-bone marrow;  Surgeon: Rd Carlos MD;  Location: [facility] OR;  Service: General;  Laterality: N/A;    COLONOSCOPY      COLONOSCOPY N/A 1/24/2023    Procedure: COLONOSCOPY (DOUBLE);  Surgeon: Jimy Baker MD;  Location: [facility] ENDOSCOPY;  Service: Gastroenterology;  Laterality: N/A;    EGD,  WITH CLOSED BIOPSY N/A 1/24/2023    Procedure: EGD, WITH CLOSED BIOPSY;  Surgeon: Jimy Baker MD;  Location: [facility] ENDOSCOPY;  Service: Gastroenterology;  Laterality: N/A;    EXCISION OF MELANOMA      SINUS SURGERY      TONSILLECTOMY       Social History     Socioeconomic History    Marital status: Significant Other   Tobacco Use    Smoking status: Never    Smokeless tobacco: Never   Substance and Sexual Activity    Alcohol use: Not Currently     Alcohol/week: 7.0 standard drinks of alcohol     Types: 7 Cans of beer per week    Drug use: Never    Sexual activity: Yes     Social Determinants of Health     Financial Resource Strain: Low Risk  (4/20/2023)    Overall Financial Resource Strain (CARDIA)     Difficulty of Paying Living Expenses: Not hard at all   Food Insecurity: No Food Insecurity (4/20/2023)    Hunger Vital Sign     Worried About Running Out of Food in the Last Year: Never true     Ran Out of Food in the Last Year: Never true   Transportation Needs: No Transportation Needs (4/20/2023)    PRAPARE - Transportation     Lack of Transportation (Medical): No     Lack of Transportation (Non-Medical): No   Physical Activity: Insufficiently Active (4/20/2023)    Exercise Vital Sign     Days of Exercise per Week: 3 days     Minutes of Exercise per Session: 30 min   Stress: No Stress Concern Present (4/20/2023)    [employer] - Occupational Stress Questionnaire     Feeling of Stress : Not at all   Social Connections: Unknown (4/20/2023)    Social Connection and Isolation Panel [NHANES]     Frequency of Communication with Friends and Family: More than three times a week     Frequency of Social Gatherings with Friends and Family: More than three times a week     Attends Confucianism Services: Patient refused     Active Member of Clubs or Organizations: Patient refused     Attends Club or Organization Meetings: Patient refused     Marital Status:    Housing Stability: Low Risk   (4/20/2023)    Housing Stability Vital Sign     Unable to Pay for Housing in the Last Year: No     Number of Places Lived in the Last Year: 1     Unstable Housing in the Last Year: No      Family History   Problem Relation Age of Onset    Cancer Mother     Cancer Father       Review of patient's allergies indicates:   Allergen Reactions    Acetaminophen Other (See Comments)     Restless leg       Review of Systems   Constitutional:  Positive for fatigue. Negative for chills, diaphoresis, fever and unexpected weight change.   HENT:  Negative for nasal congestion, mouth sores, sinus pressure/congestion and sore throat.    Eyes:  Negative for pain and visual disturbance.   Respiratory:  Negative for cough, chest tightness and shortness of breath.    Cardiovascular:  Negative for chest pain, palpitations and leg swelling.   Gastrointestinal:  Negative for abdominal distention, abdominal pain, blood in stool, constipation and diarrhea.   Genitourinary:  Negative for dysuria, frequency and hematuria.   Musculoskeletal:  Positive for arthralgias and myalgias. Negative for back pain.   Integumentary:  Negative for rash.   Neurological:  Positive for weakness. Negative for dizziness, syncope, numbness and headaches.   Hematological:  Negative for adenopathy.   Psychiatric/Behavioral:  Negative for confusion.          Objective:      Physical Exam  Vitals reviewed.   Constitutional:       General: He is awake.      Appearance: Normal appearance.   HENT:      Head: Normocephalic and atraumatic.      Right Ear: Hearing normal.      Left Ear: Hearing normal.      Nose: Nose normal.   Eyes:      General: Lids are normal. Vision grossly intact.      Extraocular Movements: Extraocular movements intact.      Conjunctiva/sclera: Conjunctivae normal.   Cardiovascular:      Rate and Rhythm: Normal rate and regular rhythm.      Pulses: Normal pulses.      Heart sounds: Normal heart sounds.   Pulmonary:      Effort: Pulmonary effort is  normal.      Breath sounds: Normal breath sounds. No wheezing, rhonchi or rales.   Abdominal:      General: Bowel sounds are normal.      Palpations: Abdomen is soft.      Tenderness: There is no abdominal tenderness.   Musculoskeletal:      Cervical back: Full passive range of motion without pain.      Right lower leg: No edema.      Left lower leg: No edema.   Skin:     General: Skin is warm.      Comments:      Neurological:      General: No focal deficit present.      Mental Status: He is alert and oriented to person, place, and time.   Psychiatric:         Attention and Perception: Attention normal.         Mood and Affect: Mood and affect normal.         Behavior: Behavior is cooperative.         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    Treating with azacitidine and venetoclax, Started on 02/27/2023.  Bone marrow biopsy after cycle 4 shows no immunophenotypic or morphologic evidence of AML.  NGS showed continued ASXL1, SRSF2, TET2 mutations.  Cytogenetics were normal.    Evaluated by Dr. Valdez in [place] once NGS and cytogenetics returned.    Continue prophylaxis with 500 mg of levofloxacin daily, 200 mg of acyclovir b.i.d., 200 mg of fluconazole daily.    Twice weekly labs (Monday/Thursday) with transfusions as needed.    He restarted treatment on 07/10/2023, on 08/14/2023, we will dose reduce by changing venetoclax to 14 out of every 28 days.    Venetoclax 200 mg on days 1-14 of a 28 day cycle    Proceed with Azacididine this week, will need 1 unit of platelets today    Flexeril as needed for muscle  spasms    Return to clinic in 4 weeks    MATTY Hays, Terra Chisholm LPN, acted solely as a scribe for and in the presence of, Dr. Eduard Luna who performed the service.

## 2023-09-12 ENCOUNTER — INFUSION (OUTPATIENT)
Dept: INFUSION THERAPY | Facility: HOSPITAL | Age: 73
End: 2023-09-12
Attending: INTERNAL MEDICINE
Payer: MEDICARE

## 2023-09-12 VITALS
DIASTOLIC BLOOD PRESSURE: 58 MMHG | SYSTOLIC BLOOD PRESSURE: 99 MMHG | TEMPERATURE: 99 F | HEART RATE: 67 BPM | OXYGEN SATURATION: 100 % | RESPIRATION RATE: 18 BRPM

## 2023-09-12 DIAGNOSIS — C92.00 ACUTE MYELOID LEUKEMIA NOT HAVING ACHIEVED REMISSION: Primary | ICD-10-CM

## 2023-09-12 PROCEDURE — 25000003 PHARM REV CODE 250: Performed by: NURSE PRACTITIONER

## 2023-09-12 PROCEDURE — 96401 CHEMO ANTI-NEOPL SQ/IM: CPT

## 2023-09-12 PROCEDURE — 63600175 PHARM REV CODE 636 W HCPCS: Performed by: NURSE PRACTITIONER

## 2023-09-12 RX ORDER — AZACITIDINE 100 MG/1
75 INJECTION, POWDER, LYOPHILIZED, FOR SOLUTION INTRAVENOUS; SUBCUTANEOUS
Status: COMPLETED | OUTPATIENT
Start: 2023-09-12 | End: 2023-09-12

## 2023-09-12 RX ORDER — ONDANSETRON 4 MG/1
16 TABLET, ORALLY DISINTEGRATING ORAL
Status: COMPLETED | OUTPATIENT
Start: 2023-09-12 | End: 2023-09-12

## 2023-09-12 RX ADMIN — ONDANSETRON 16 MG: 4 TABLET, ORALLY DISINTEGRATING ORAL at 02:09

## 2023-09-12 RX ADMIN — AZACITIDINE 155 MG: 100 INJECTION, POWDER, LYOPHILIZED, FOR SOLUTION INTRAVENOUS; SUBCUTANEOUS at 02:09

## 2023-09-13 ENCOUNTER — INFUSION (OUTPATIENT)
Dept: INFUSION THERAPY | Facility: HOSPITAL | Age: 73
End: 2023-09-13
Attending: INTERNAL MEDICINE
Payer: MEDICARE

## 2023-09-13 VITALS
TEMPERATURE: 98 F | SYSTOLIC BLOOD PRESSURE: 122 MMHG | HEART RATE: 72 BPM | RESPIRATION RATE: 18 BRPM | DIASTOLIC BLOOD PRESSURE: 74 MMHG

## 2023-09-13 DIAGNOSIS — C92.00 ACUTE MYELOID LEUKEMIA NOT HAVING ACHIEVED REMISSION: Primary | ICD-10-CM

## 2023-09-13 PROCEDURE — 96401 CHEMO ANTI-NEOPL SQ/IM: CPT

## 2023-09-13 PROCEDURE — 63600175 PHARM REV CODE 636 W HCPCS: Performed by: NURSE PRACTITIONER

## 2023-09-13 PROCEDURE — 25000003 PHARM REV CODE 250: Performed by: NURSE PRACTITIONER

## 2023-09-13 RX ORDER — ONDANSETRON 4 MG/1
16 TABLET, ORALLY DISINTEGRATING ORAL
Status: COMPLETED | OUTPATIENT
Start: 2023-09-13 | End: 2023-09-13

## 2023-09-13 RX ORDER — AZACITIDINE 100 MG/1
75 INJECTION, POWDER, LYOPHILIZED, FOR SOLUTION INTRAVENOUS; SUBCUTANEOUS
Status: COMPLETED | OUTPATIENT
Start: 2023-09-13 | End: 2023-09-13

## 2023-09-13 RX ADMIN — AZACITIDINE 155 MG: 100 INJECTION, POWDER, LYOPHILIZED, FOR SOLUTION INTRAVENOUS; SUBCUTANEOUS at 01:09

## 2023-09-13 RX ADMIN — ONDANSETRON 16 MG: 8 TABLET, ORALLY DISINTEGRATING ORAL at 01:09

## 2023-09-13 NOTE — NURSING
Pt arrived for Vidaza.  Pt given oral zofran.  Pt tolerated injection SQ x2 to R and L lower Abd.  Pt discharged to home and will RTC tomorrow

## 2023-09-14 ENCOUNTER — INFUSION (OUTPATIENT)
Dept: INFUSION THERAPY | Facility: HOSPITAL | Age: 73
End: 2023-09-14
Attending: INTERNAL MEDICINE
Payer: MEDICARE

## 2023-09-14 ENCOUNTER — LAB VISIT (OUTPATIENT)
Dept: LAB | Facility: HOSPITAL | Age: 73
End: 2023-09-14
Attending: NURSE PRACTITIONER
Payer: MEDICARE

## 2023-09-14 VITALS
BODY MASS INDEX: 23.86 KG/M2 | WEIGHT: 180 LBS | TEMPERATURE: 98 F | HEART RATE: 62 BPM | SYSTOLIC BLOOD PRESSURE: 129 MMHG | HEIGHT: 73 IN | DIASTOLIC BLOOD PRESSURE: 78 MMHG | RESPIRATION RATE: 16 BRPM | OXYGEN SATURATION: 100 %

## 2023-09-14 DIAGNOSIS — C92.00 ACUTE MYELOID LEUKEMIA NOT HAVING ACHIEVED REMISSION: ICD-10-CM

## 2023-09-14 DIAGNOSIS — C92.00 ACUTE MYELOID LEUKEMIA NOT HAVING ACHIEVED REMISSION: Primary | ICD-10-CM

## 2023-09-14 LAB
ABO + RH BLD: NORMAL
ALBUMIN SERPL-MCNC: 3.8 G/DL (ref 3.4–4.8)
ALBUMIN/GLOB SERPL: 1.5 RATIO (ref 1.1–2)
ALP SERPL-CCNC: 105 UNIT/L (ref 40–150)
ALT SERPL-CCNC: 11 UNIT/L (ref 0–55)
AST SERPL-CCNC: 12 UNIT/L (ref 5–34)
BASOPHILS # BLD AUTO: 0 X10(3)/MCL
BASOPHILS NFR BLD AUTO: 0 %
BILIRUB SERPL-MCNC: 0.9 MG/DL
BLD PROD TYP BPU: NORMAL
BLOOD UNIT EXPIRATION DATE: NORMAL
BLOOD UNIT TYPE CODE: 6200
BUN SERPL-MCNC: 21.3 MG/DL (ref 8.4–25.7)
CALCIUM SERPL-MCNC: 9.1 MG/DL (ref 8.8–10)
CHLORIDE SERPL-SCNC: 105 MMOL/L (ref 98–107)
CO2 SERPL-SCNC: 32 MMOL/L (ref 23–31)
CREAT SERPL-MCNC: 1.19 MG/DL (ref 0.73–1.18)
CROSSMATCH INTERPRETATION: NORMAL
DISPENSE STATUS: NORMAL
EOSINOPHIL # BLD AUTO: 0 X10(3)/MCL (ref 0–0.9)
EOSINOPHIL NFR BLD AUTO: 0 %
ERYTHROCYTE [DISTWIDTH] IN BLOOD BY AUTOMATED COUNT: 14.8 % (ref 11.5–17)
GFR SERPLBLD CREATININE-BSD FMLA CKD-EPI: >60 MLS/MIN/1.73/M2
GLOBULIN SER-MCNC: 2.6 GM/DL (ref 2.4–3.5)
GLUCOSE SERPL-MCNC: 113 MG/DL (ref 82–115)
HCT VFR BLD AUTO: 24.8 % (ref 42–52)
HGB BLD-MCNC: 8.1 G/DL (ref 14–18)
IMM GRANULOCYTES # BLD AUTO: 0 X10(3)/MCL (ref 0–0.04)
IMM GRANULOCYTES NFR BLD AUTO: 0 %
LYMPHOCYTES # BLD AUTO: 0.55 X10(3)/MCL (ref 0.6–4.6)
LYMPHOCYTES NFR BLD AUTO: 87.3 %
MCH RBC QN AUTO: 30.3 PG (ref 27–31)
MCHC RBC AUTO-ENTMCNC: 32.7 G/DL (ref 33–36)
MCV RBC AUTO: 92.9 FL (ref 80–94)
MONOCYTES # BLD AUTO: 0.02 X10(3)/MCL (ref 0.1–1.3)
MONOCYTES NFR BLD AUTO: 3.2 %
NEUTROPHILS # BLD AUTO: 0.06 X10(3)/MCL (ref 2.1–9.2)
NEUTROPHILS NFR BLD AUTO: 9.5 %
PLATELET # BLD AUTO: 8 X10(3)/MCL (ref 130–400)
PMV BLD AUTO: ABNORMAL FL
POTASSIUM SERPL-SCNC: 3.4 MMOL/L (ref 3.5–5.1)
PROT SERPL-MCNC: 6.4 GM/DL (ref 5.8–7.6)
RBC # BLD AUTO: 2.67 X10(6)/MCL (ref 4.7–6.1)
SODIUM SERPL-SCNC: 142 MMOL/L (ref 136–145)
UNIT NUMBER: NORMAL
WBC # SPEC AUTO: 0.63 X10(3)/MCL (ref 4.5–11.5)

## 2023-09-14 PROCEDURE — 36430 TRANSFUSION BLD/BLD COMPNT: CPT

## 2023-09-14 PROCEDURE — P9037 PLATE PHERES LEUKOREDU IRRAD: HCPCS | Performed by: NURSE PRACTITIONER

## 2023-09-14 PROCEDURE — 63600175 PHARM REV CODE 636 W HCPCS: Performed by: NURSE PRACTITIONER

## 2023-09-14 PROCEDURE — 25000003 PHARM REV CODE 250: Performed by: NURSE PRACTITIONER

## 2023-09-14 PROCEDURE — 85025 COMPLETE CBC W/AUTO DIFF WBC: CPT

## 2023-09-14 PROCEDURE — 36415 COLL VENOUS BLD VENIPUNCTURE: CPT

## 2023-09-14 PROCEDURE — 96401 CHEMO ANTI-NEOPL SQ/IM: CPT

## 2023-09-14 PROCEDURE — 80053 COMPREHEN METABOLIC PANEL: CPT

## 2023-09-14 RX ORDER — HYDROCODONE BITARTRATE AND ACETAMINOPHEN 500; 5 MG/1; MG/1
TABLET ORAL ONCE
Status: DISCONTINUED | OUTPATIENT
Start: 2023-09-14 | End: 2023-09-14 | Stop reason: HOSPADM

## 2023-09-14 RX ORDER — HYDROCODONE BITARTRATE AND ACETAMINOPHEN 500; 5 MG/1; MG/1
TABLET ORAL ONCE
Status: CANCELLED | OUTPATIENT
Start: 2023-09-14 | End: 2023-09-14

## 2023-09-14 RX ORDER — DIPHENHYDRAMINE HYDROCHLORIDE 50 MG/ML
25 INJECTION INTRAMUSCULAR; INTRAVENOUS ONCE
Status: DISCONTINUED | OUTPATIENT
Start: 2023-09-14 | End: 2023-09-14 | Stop reason: HOSPADM

## 2023-09-14 RX ORDER — AZACITIDINE 100 MG/1
75 INJECTION, POWDER, LYOPHILIZED, FOR SOLUTION INTRAVENOUS; SUBCUTANEOUS
Status: COMPLETED | OUTPATIENT
Start: 2023-09-14 | End: 2023-09-14

## 2023-09-14 RX ORDER — DIPHENHYDRAMINE HYDROCHLORIDE 50 MG/ML
25 INJECTION INTRAMUSCULAR; INTRAVENOUS ONCE
Status: CANCELLED | OUTPATIENT
Start: 2023-09-14

## 2023-09-14 RX ORDER — ONDANSETRON 4 MG/1
16 TABLET, ORALLY DISINTEGRATING ORAL
Status: COMPLETED | OUTPATIENT
Start: 2023-09-14 | End: 2023-09-14

## 2023-09-14 RX ORDER — HYDROCODONE BITARTRATE AND ACETAMINOPHEN 500; 5 MG/1; MG/1
TABLET ORAL ONCE
Status: COMPLETED | OUTPATIENT
Start: 2023-09-14 | End: 2023-09-14

## 2023-09-14 RX ADMIN — SODIUM CHLORIDE: 9 INJECTION, SOLUTION INTRAVENOUS at 08:09

## 2023-09-14 RX ADMIN — ONDANSETRON 16 MG: 4 TABLET, ORALLY DISINTEGRATING ORAL at 08:09

## 2023-09-14 RX ADMIN — AZACITIDINE 155 MG: 100 INJECTION, POWDER, LYOPHILIZED, FOR SOLUTION INTRAVENOUS; SUBCUTANEOUS at 10:09

## 2023-09-15 ENCOUNTER — INFUSION (OUTPATIENT)
Dept: INFUSION THERAPY | Facility: HOSPITAL | Age: 73
End: 2023-09-15
Attending: INTERNAL MEDICINE
Payer: MEDICARE

## 2023-09-15 VITALS
TEMPERATURE: 98 F | WEIGHT: 183.13 LBS | RESPIRATION RATE: 16 BRPM | DIASTOLIC BLOOD PRESSURE: 79 MMHG | OXYGEN SATURATION: 100 % | SYSTOLIC BLOOD PRESSURE: 131 MMHG | HEIGHT: 72 IN | HEART RATE: 67 BPM | BODY MASS INDEX: 24.81 KG/M2

## 2023-09-15 DIAGNOSIS — C92.00 ACUTE MYELOID LEUKEMIA NOT HAVING ACHIEVED REMISSION: Primary | ICD-10-CM

## 2023-09-15 PROCEDURE — 63600175 PHARM REV CODE 636 W HCPCS: Performed by: NURSE PRACTITIONER

## 2023-09-15 PROCEDURE — 25000003 PHARM REV CODE 250: Performed by: NURSE PRACTITIONER

## 2023-09-15 PROCEDURE — 96401 CHEMO ANTI-NEOPL SQ/IM: CPT

## 2023-09-15 RX ORDER — AZACITIDINE 100 MG/1
75 INJECTION, POWDER, LYOPHILIZED, FOR SOLUTION INTRAVENOUS; SUBCUTANEOUS
Status: COMPLETED | OUTPATIENT
Start: 2023-09-15 | End: 2023-09-15

## 2023-09-15 RX ORDER — ONDANSETRON 4 MG/1
16 TABLET, ORALLY DISINTEGRATING ORAL
Status: COMPLETED | OUTPATIENT
Start: 2023-09-15 | End: 2023-09-15

## 2023-09-15 RX ADMIN — AZACITIDINE 155 MG: 100 INJECTION, POWDER, LYOPHILIZED, FOR SOLUTION INTRAVENOUS; SUBCUTANEOUS at 09:09

## 2023-09-15 RX ADMIN — ONDANSETRON 16 MG: 4 TABLET, ORALLY DISINTEGRATING ORAL at 08:09

## 2023-09-18 ENCOUNTER — LAB VISIT (OUTPATIENT)
Dept: LAB | Facility: HOSPITAL | Age: 73
End: 2023-09-18
Attending: INTERNAL MEDICINE
Payer: MEDICARE

## 2023-09-18 ENCOUNTER — INFUSION (OUTPATIENT)
Dept: INFUSION THERAPY | Facility: HOSPITAL | Age: 73
End: 2023-09-18
Attending: INTERNAL MEDICINE
Payer: MEDICARE

## 2023-09-18 VITALS
TEMPERATURE: 98 F | OXYGEN SATURATION: 100 % | HEART RATE: 68 BPM | DIASTOLIC BLOOD PRESSURE: 76 MMHG | RESPIRATION RATE: 16 BRPM | SYSTOLIC BLOOD PRESSURE: 126 MMHG

## 2023-09-18 DIAGNOSIS — C92.00 ACUTE MYELOID LEUKEMIA NOT HAVING ACHIEVED REMISSION: ICD-10-CM

## 2023-09-18 DIAGNOSIS — C92.00 ACUTE MYELOID LEUKEMIA NOT HAVING ACHIEVED REMISSION: Primary | ICD-10-CM

## 2023-09-18 LAB
ABO + RH BLD: NORMAL
ALBUMIN SERPL-MCNC: 3.8 G/DL (ref 3.4–4.8)
ALBUMIN/GLOB SERPL: 1.3 RATIO (ref 1.1–2)
ALP SERPL-CCNC: 121 UNIT/L (ref 40–150)
ALT SERPL-CCNC: 12 UNIT/L (ref 0–55)
AST SERPL-CCNC: 11 UNIT/L (ref 5–34)
BASOPHILS # BLD AUTO: 0 X10(3)/MCL
BASOPHILS NFR BLD AUTO: 0 %
BILIRUB SERPL-MCNC: 0.8 MG/DL
BLD PROD TYP BPU: NORMAL
BLOOD UNIT EXPIRATION DATE: NORMAL
BLOOD UNIT TYPE CODE: 5100
BLOOD UNIT TYPE CODE: 5100
BLOOD UNIT TYPE CODE: 6200
BUN SERPL-MCNC: 24.5 MG/DL (ref 8.4–25.7)
CALCIUM SERPL-MCNC: 9.3 MG/DL (ref 8.8–10)
CHLORIDE SERPL-SCNC: 101 MMOL/L (ref 98–107)
CO2 SERPL-SCNC: 29 MMOL/L (ref 23–31)
CREAT SERPL-MCNC: 1.12 MG/DL (ref 0.73–1.18)
CROSSMATCH INTERPRETATION: NORMAL
DISPENSE STATUS: NORMAL
EOSINOPHIL # BLD AUTO: 0 X10(3)/MCL (ref 0–0.9)
EOSINOPHIL NFR BLD AUTO: 0 %
ERYTHROCYTE [DISTWIDTH] IN BLOOD BY AUTOMATED COUNT: 14.6 % (ref 11.5–17)
GFR SERPLBLD CREATININE-BSD FMLA CKD-EPI: >60 MLS/MIN/1.73/M2
GLOBULIN SER-MCNC: 3 GM/DL (ref 2.4–3.5)
GLUCOSE SERPL-MCNC: 136 MG/DL (ref 82–115)
HCT VFR BLD AUTO: 23.4 % (ref 42–52)
HGB BLD-MCNC: 7.8 G/DL (ref 14–18)
IMM GRANULOCYTES # BLD AUTO: 0 X10(3)/MCL (ref 0–0.04)
IMM GRANULOCYTES NFR BLD AUTO: 0 %
LYMPHOCYTES # BLD AUTO: 0.7 X10(3)/MCL (ref 0.6–4.6)
LYMPHOCYTES NFR BLD AUTO: 90.9 %
MCH RBC QN AUTO: 30.6 PG (ref 27–31)
MCHC RBC AUTO-ENTMCNC: 33.3 G/DL (ref 33–36)
MCV RBC AUTO: 91.8 FL (ref 80–94)
MONOCYTES # BLD AUTO: 0.02 X10(3)/MCL (ref 0.1–1.3)
MONOCYTES NFR BLD AUTO: 2.6 %
NEUTROPHILS # BLD AUTO: 0.05 X10(3)/MCL (ref 2.1–9.2)
NEUTROPHILS NFR BLD AUTO: 6.5 %
PLATELET # BLD AUTO: 18 X10(3)/MCL (ref 130–400)
PMV BLD AUTO: 10.2 FL (ref 7.4–10.4)
POTASSIUM SERPL-SCNC: 3.6 MMOL/L (ref 3.5–5.1)
PROT SERPL-MCNC: 6.8 GM/DL (ref 5.8–7.6)
RBC # BLD AUTO: 2.55 X10(6)/MCL (ref 4.7–6.1)
SODIUM SERPL-SCNC: 136 MMOL/L (ref 136–145)
UNIT NUMBER: NORMAL
WBC # SPEC AUTO: 0.77 X10(3)/MCL (ref 4.5–11.5)

## 2023-09-18 PROCEDURE — 36430 TRANSFUSION BLD/BLD COMPNT: CPT

## 2023-09-18 PROCEDURE — P9037 PLATE PHERES LEUKOREDU IRRAD: HCPCS | Performed by: INTERNAL MEDICINE

## 2023-09-18 PROCEDURE — 85025 COMPLETE CBC W/AUTO DIFF WBC: CPT

## 2023-09-18 PROCEDURE — 25000003 PHARM REV CODE 250: Performed by: INTERNAL MEDICINE

## 2023-09-18 PROCEDURE — 36415 COLL VENOUS BLD VENIPUNCTURE: CPT

## 2023-09-18 PROCEDURE — 80053 COMPREHEN METABOLIC PANEL: CPT

## 2023-09-18 RX ORDER — DIPHENHYDRAMINE HYDROCHLORIDE 50 MG/ML
25 INJECTION INTRAMUSCULAR; INTRAVENOUS ONCE
Status: DISCONTINUED | OUTPATIENT
Start: 2023-09-18 | End: 2023-09-18 | Stop reason: HOSPADM

## 2023-09-18 RX ORDER — HYDROCODONE BITARTRATE AND ACETAMINOPHEN 500; 5 MG/1; MG/1
TABLET ORAL ONCE
Status: COMPLETED | OUTPATIENT
Start: 2023-09-18 | End: 2023-09-18

## 2023-09-18 RX ORDER — HYDROCODONE BITARTRATE AND ACETAMINOPHEN 500; 5 MG/1; MG/1
TABLET ORAL ONCE
Status: CANCELLED | OUTPATIENT
Start: 2023-09-18 | End: 2023-09-18

## 2023-09-18 RX ORDER — DIPHENHYDRAMINE HYDROCHLORIDE 50 MG/ML
25 INJECTION INTRAMUSCULAR; INTRAVENOUS ONCE
Status: CANCELLED | OUTPATIENT
Start: 2023-09-18

## 2023-09-18 RX ADMIN — SODIUM CHLORIDE: 9 INJECTION, SOLUTION INTRAVENOUS at 09:09

## 2023-09-18 NOTE — NURSING
Pt here for labs. Plt 18, 1 unit platelets given. Pt qing well. Will rtc Thursday for labs and blood if needed.

## 2023-09-20 DIAGNOSIS — C92.00 ACUTE MYELOID LEUKEMIA NOT HAVING ACHIEVED REMISSION: Primary | ICD-10-CM

## 2023-09-21 ENCOUNTER — INFUSION (OUTPATIENT)
Dept: INFUSION THERAPY | Facility: HOSPITAL | Age: 73
End: 2023-09-21
Attending: INTERNAL MEDICINE
Payer: MEDICARE

## 2023-09-21 ENCOUNTER — LAB VISIT (OUTPATIENT)
Dept: LAB | Facility: HOSPITAL | Age: 73
End: 2023-09-21
Attending: INTERNAL MEDICINE
Payer: MEDICARE

## 2023-09-21 VITALS
RESPIRATION RATE: 16 BRPM | HEART RATE: 62 BPM | OXYGEN SATURATION: 100 % | SYSTOLIC BLOOD PRESSURE: 165 MMHG | TEMPERATURE: 98 F | DIASTOLIC BLOOD PRESSURE: 86 MMHG

## 2023-09-21 DIAGNOSIS — C92.00 ACUTE MYELOID LEUKEMIA NOT HAVING ACHIEVED REMISSION: ICD-10-CM

## 2023-09-21 DIAGNOSIS — C92.00 ACUTE MYELOID LEUKEMIA NOT HAVING ACHIEVED REMISSION: Primary | ICD-10-CM

## 2023-09-21 LAB
ABO + RH BLD: NORMAL
ALBUMIN SERPL-MCNC: 3.9 G/DL (ref 3.4–4.8)
ALBUMIN/GLOB SERPL: 1.4 RATIO (ref 1.1–2)
ALP SERPL-CCNC: 113 UNIT/L (ref 40–150)
ALT SERPL-CCNC: 12 UNIT/L (ref 0–55)
AST SERPL-CCNC: 13 UNIT/L (ref 5–34)
BASOPHILS # BLD AUTO: 0 X10(3)/MCL
BASOPHILS NFR BLD AUTO: 0 %
BILIRUB SERPL-MCNC: 0.8 MG/DL
BLD PROD TYP BPU: NORMAL
BLOOD UNIT EXPIRATION DATE: NORMAL
BLOOD UNIT TYPE CODE: 5100
BUN SERPL-MCNC: 24.2 MG/DL (ref 8.4–25.7)
CALCIUM SERPL-MCNC: 9.4 MG/DL (ref 8.8–10)
CHLORIDE SERPL-SCNC: 106 MMOL/L (ref 98–107)
CO2 SERPL-SCNC: 28 MMOL/L (ref 23–31)
CREAT SERPL-MCNC: 1.2 MG/DL (ref 0.73–1.18)
CROSSMATCH INTERPRETATION: NORMAL
DISPENSE STATUS: NORMAL
EOSINOPHIL # BLD AUTO: 0 X10(3)/MCL (ref 0–0.9)
EOSINOPHIL NFR BLD AUTO: 0 %
ERYTHROCYTE [DISTWIDTH] IN BLOOD BY AUTOMATED COUNT: 14.6 % (ref 11.5–17)
GFR SERPLBLD CREATININE-BSD FMLA CKD-EPI: >60 MLS/MIN/1.73/M2
GLOBULIN SER-MCNC: 2.8 GM/DL (ref 2.4–3.5)
GLUCOSE SERPL-MCNC: 146 MG/DL (ref 82–115)
GROUP & RH: NORMAL
HCT VFR BLD AUTO: 21 % (ref 42–52)
HGB BLD-MCNC: 7 G/DL (ref 14–18)
IMM GRANULOCYTES # BLD AUTO: 0 X10(3)/MCL (ref 0–0.04)
IMM GRANULOCYTES NFR BLD AUTO: 0 %
INDIRECT COOMBS GEL: NORMAL
LYMPHOCYTES # BLD AUTO: 0.57 X10(3)/MCL (ref 0.6–4.6)
LYMPHOCYTES NFR BLD AUTO: 90.5 %
MCH RBC QN AUTO: 30.7 PG (ref 27–31)
MCHC RBC AUTO-ENTMCNC: 33.3 G/DL (ref 33–36)
MCV RBC AUTO: 92.1 FL (ref 80–94)
MONOCYTES # BLD AUTO: 0.04 X10(3)/MCL (ref 0.1–1.3)
MONOCYTES NFR BLD AUTO: 6.3 %
NEUTROPHILS # BLD AUTO: 0.02 X10(3)/MCL (ref 2.1–9.2)
NEUTROPHILS NFR BLD AUTO: 3.2 %
PLATELET # BLD AUTO: 21 X10(3)/MCL (ref 130–400)
PMV BLD AUTO: 11.2 FL (ref 7.4–10.4)
POTASSIUM SERPL-SCNC: 4.2 MMOL/L (ref 3.5–5.1)
PROT SERPL-MCNC: 6.7 GM/DL (ref 5.8–7.6)
RBC # BLD AUTO: 2.28 X10(6)/MCL (ref 4.7–6.1)
SODIUM SERPL-SCNC: 142 MMOL/L (ref 136–145)
SPECIMEN OUTDATE: NORMAL
UNIT NUMBER: NORMAL
WBC # SPEC AUTO: 0.63 X10(3)/MCL (ref 4.5–11.5)

## 2023-09-21 PROCEDURE — 86923 COMPATIBILITY TEST ELECTRIC: CPT | Performed by: INTERNAL MEDICINE

## 2023-09-21 PROCEDURE — 36415 COLL VENOUS BLD VENIPUNCTURE: CPT

## 2023-09-21 PROCEDURE — 85025 COMPLETE CBC W/AUTO DIFF WBC: CPT

## 2023-09-21 PROCEDURE — 86900 BLOOD TYPING SEROLOGIC ABO: CPT | Performed by: INTERNAL MEDICINE

## 2023-09-21 PROCEDURE — 36430 TRANSFUSION BLD/BLD COMPNT: CPT

## 2023-09-21 PROCEDURE — 25000003 PHARM REV CODE 250: Performed by: INTERNAL MEDICINE

## 2023-09-21 PROCEDURE — P9040 RBC LEUKOREDUCED IRRADIATED: HCPCS | Performed by: INTERNAL MEDICINE

## 2023-09-21 PROCEDURE — 80053 COMPREHEN METABOLIC PANEL: CPT

## 2023-09-21 RX ORDER — DIPHENHYDRAMINE HCL 25 MG
25 CAPSULE ORAL ONCE
Status: DISCONTINUED | OUTPATIENT
Start: 2023-09-21 | End: 2023-10-27 | Stop reason: HOSPADM

## 2023-09-21 RX ORDER — DIPHENHYDRAMINE HCL 25 MG
25 CAPSULE ORAL ONCE
Status: CANCELLED | OUTPATIENT
Start: 2023-09-21

## 2023-09-21 RX ORDER — HYDROCODONE BITARTRATE AND ACETAMINOPHEN 500; 5 MG/1; MG/1
TABLET ORAL ONCE
Status: CANCELLED | OUTPATIENT
Start: 2023-09-21 | End: 2023-09-21

## 2023-09-21 RX ORDER — HYDROCODONE BITARTRATE AND ACETAMINOPHEN 500; 5 MG/1; MG/1
TABLET ORAL ONCE
Status: COMPLETED | OUTPATIENT
Start: 2023-09-21 | End: 2023-09-21

## 2023-09-21 RX ADMIN — SODIUM CHLORIDE: 9 INJECTION, SOLUTION INTRAVENOUS at 09:09

## 2023-09-25 ENCOUNTER — INFUSION (OUTPATIENT)
Dept: INFUSION THERAPY | Facility: HOSPITAL | Age: 73
End: 2023-09-25
Attending: INTERNAL MEDICINE
Payer: MEDICARE

## 2023-09-25 ENCOUNTER — LAB VISIT (OUTPATIENT)
Dept: LAB | Facility: HOSPITAL | Age: 73
End: 2023-09-25
Attending: INTERNAL MEDICINE
Payer: MEDICARE

## 2023-09-25 VITALS
RESPIRATION RATE: 18 BRPM | HEIGHT: 72 IN | DIASTOLIC BLOOD PRESSURE: 78 MMHG | OXYGEN SATURATION: 99 % | HEART RATE: 59 BPM | TEMPERATURE: 98 F | WEIGHT: 184.88 LBS | BODY MASS INDEX: 25.04 KG/M2 | SYSTOLIC BLOOD PRESSURE: 152 MMHG

## 2023-09-25 DIAGNOSIS — D61.818 PANCYTOPENIA: Chronic | ICD-10-CM

## 2023-09-25 DIAGNOSIS — D61.818 PANCYTOPENIA: Primary | Chronic | ICD-10-CM

## 2023-09-25 DIAGNOSIS — C92.00 ACUTE MYELOID LEUKEMIA NOT HAVING ACHIEVED REMISSION: ICD-10-CM

## 2023-09-25 LAB
ABO + RH BLD: NORMAL
ABS NEUT CALC (OHS): 0.06 X10(3)/MCL (ref 2.1–9.2)
ALBUMIN SERPL-MCNC: 3.7 G/DL (ref 3.4–4.8)
ALBUMIN/GLOB SERPL: 1.4 RATIO (ref 1.1–2)
ALP SERPL-CCNC: 115 UNIT/L (ref 40–150)
ALT SERPL-CCNC: 11 UNIT/L (ref 0–55)
AST SERPL-CCNC: 12 UNIT/L (ref 5–34)
BILIRUB SERPL-MCNC: 0.6 MG/DL
BLD PROD TYP BPU: NORMAL
BLOOD UNIT EXPIRATION DATE: NORMAL
BLOOD UNIT TYPE CODE: 9500
BUN SERPL-MCNC: 17.1 MG/DL (ref 8.4–25.7)
CALCIUM SERPL-MCNC: 9.3 MG/DL (ref 8.8–10)
CHLORIDE SERPL-SCNC: 107 MMOL/L (ref 98–107)
CO2 SERPL-SCNC: 30 MMOL/L (ref 23–31)
CREAT SERPL-MCNC: 0.89 MG/DL (ref 0.73–1.18)
CROSSMATCH INTERPRETATION: NORMAL
DISPENSE STATUS: NORMAL
ERYTHROCYTE [DISTWIDTH] IN BLOOD BY AUTOMATED COUNT: 14.7 % (ref 11.5–17)
GFR SERPLBLD CREATININE-BSD FMLA CKD-EPI: >60 MLS/MIN/1.73/M2
GLOBULIN SER-MCNC: 2.7 GM/DL (ref 2.4–3.5)
GLUCOSE SERPL-MCNC: 113 MG/DL (ref 82–115)
HCT VFR BLD AUTO: 22.1 % (ref 42–52)
HGB BLD-MCNC: 7.3 G/DL (ref 14–18)
LYMPH ABN # BLD MANUAL: 1 %
LYMPHOCYTES NFR BLD MANUAL: 0.53 X10(3)/MCL
LYMPHOCYTES NFR BLD MANUAL: 85 % (ref 13–40)
MCH RBC QN AUTO: 29.9 PG (ref 27–31)
MCHC RBC AUTO-ENTMCNC: 33 G/DL (ref 33–36)
MCV RBC AUTO: 90.6 FL (ref 80–94)
METAMYELOCYTES NFR BLD MANUAL: 1 %
MONOCYTES NFR BLD MANUAL: 0.02 X10(3)/MCL (ref 0.1–1.3)
MONOCYTES NFR BLD MANUAL: 3 % (ref 2–11)
NEUTROPHILS NFR BLD MANUAL: 10 % (ref 47–80)
PLATELET # BLD AUTO: 8 X10(3)/MCL (ref 130–400)
PLATELET # BLD EST: ABNORMAL 10*3/UL
PMV BLD AUTO: 12.1 FL (ref 7.4–10.4)
POTASSIUM SERPL-SCNC: 4 MMOL/L (ref 3.5–5.1)
PROT SERPL-MCNC: 6.4 GM/DL (ref 5.8–7.6)
RBC # BLD AUTO: 2.44 X10(6)/MCL (ref 4.7–6.1)
RBC MORPH BLD: NORMAL
SODIUM SERPL-SCNC: 143 MMOL/L (ref 136–145)
UNIT NUMBER: NORMAL
WBC # SPEC AUTO: 0.62 X10(3)/MCL (ref 4.5–11.5)

## 2023-09-25 PROCEDURE — 85027 COMPLETE CBC AUTOMATED: CPT

## 2023-09-25 PROCEDURE — 36430 TRANSFUSION BLD/BLD COMPNT: CPT

## 2023-09-25 PROCEDURE — P9037 PLATE PHERES LEUKOREDU IRRAD: HCPCS | Performed by: NURSE PRACTITIONER

## 2023-09-25 PROCEDURE — 25000003 PHARM REV CODE 250: Performed by: NURSE PRACTITIONER

## 2023-09-25 PROCEDURE — 80053 COMPREHEN METABOLIC PANEL: CPT

## 2023-09-25 PROCEDURE — 36415 COLL VENOUS BLD VENIPUNCTURE: CPT

## 2023-09-25 RX ORDER — DIPHENHYDRAMINE HCL 25 MG
25 CAPSULE ORAL ONCE
Status: DISCONTINUED | OUTPATIENT
Start: 2023-09-25 | End: 2023-10-27 | Stop reason: HOSPADM

## 2023-09-25 RX ORDER — ACETAMINOPHEN 325 MG/1
650 TABLET ORAL ONCE
Status: DISCONTINUED | OUTPATIENT
Start: 2023-09-25 | End: 2023-10-27 | Stop reason: HOSPADM

## 2023-09-25 RX ORDER — HYDROCODONE BITARTRATE AND ACETAMINOPHEN 500; 5 MG/1; MG/1
TABLET ORAL ONCE
Status: COMPLETED | OUTPATIENT
Start: 2023-09-25 | End: 2023-09-25

## 2023-09-25 RX ORDER — DIPHENHYDRAMINE HCL 25 MG
25 CAPSULE ORAL ONCE
Status: CANCELLED | OUTPATIENT
Start: 2023-09-25 | End: 2023-09-25

## 2023-09-25 RX ORDER — HYDROCODONE BITARTRATE AND ACETAMINOPHEN 500; 5 MG/1; MG/1
TABLET ORAL ONCE
Status: CANCELLED | OUTPATIENT
Start: 2023-09-25 | End: 2023-09-25

## 2023-09-25 RX ORDER — ACETAMINOPHEN 325 MG/1
650 TABLET ORAL ONCE
Status: CANCELLED | OUTPATIENT
Start: 2023-09-25 | End: 2023-09-25

## 2023-09-25 RX ADMIN — SODIUM CHLORIDE: 9 INJECTION, SOLUTION INTRAVENOUS at 10:09

## 2023-09-28 ENCOUNTER — LAB VISIT (OUTPATIENT)
Dept: LAB | Facility: HOSPITAL | Age: 73
End: 2023-09-28
Attending: INTERNAL MEDICINE
Payer: MEDICARE

## 2023-09-28 ENCOUNTER — INFUSION (OUTPATIENT)
Dept: INFUSION THERAPY | Facility: HOSPITAL | Age: 73
End: 2023-09-28
Attending: INTERNAL MEDICINE
Payer: MEDICARE

## 2023-09-28 VITALS
DIASTOLIC BLOOD PRESSURE: 72 MMHG | HEART RATE: 62 BPM | TEMPERATURE: 99 F | SYSTOLIC BLOOD PRESSURE: 174 MMHG | OXYGEN SATURATION: 97 % | RESPIRATION RATE: 18 BRPM

## 2023-09-28 DIAGNOSIS — C92.00 ACUTE MYELOID LEUKEMIA NOT HAVING ACHIEVED REMISSION: ICD-10-CM

## 2023-09-28 DIAGNOSIS — D61.818 PANCYTOPENIA: Chronic | ICD-10-CM

## 2023-09-28 DIAGNOSIS — D61.818 PANCYTOPENIA: Primary | ICD-10-CM

## 2023-09-28 DIAGNOSIS — D61.818 PANCYTOPENIA: ICD-10-CM

## 2023-09-28 LAB
ABO + RH BLD: NORMAL
ABO + RH BLD: NORMAL
ALBUMIN SERPL-MCNC: 3.7 G/DL (ref 3.4–4.8)
ALBUMIN/GLOB SERPL: 1.4 RATIO (ref 1.1–2)
ALP SERPL-CCNC: 105 UNIT/L (ref 40–150)
ALT SERPL-CCNC: 11 UNIT/L (ref 0–55)
AST SERPL-CCNC: 14 UNIT/L (ref 5–34)
BASOPHILS # BLD AUTO: 0 X10(3)/MCL
BASOPHILS NFR BLD AUTO: 0 %
BILIRUB SERPL-MCNC: 0.8 MG/DL
BLD PROD TYP BPU: NORMAL
BLD PROD TYP BPU: NORMAL
BLOOD UNIT EXPIRATION DATE: NORMAL
BLOOD UNIT EXPIRATION DATE: NORMAL
BLOOD UNIT TYPE CODE: 5100
BLOOD UNIT TYPE CODE: 7300
BUN SERPL-MCNC: 16.5 MG/DL (ref 8.4–25.7)
CALCIUM SERPL-MCNC: 9.2 MG/DL (ref 8.8–10)
CHLORIDE SERPL-SCNC: 107 MMOL/L (ref 98–107)
CO2 SERPL-SCNC: 31 MMOL/L (ref 23–31)
CREAT SERPL-MCNC: 0.83 MG/DL (ref 0.73–1.18)
CROSSMATCH INTERPRETATION: NORMAL
CROSSMATCH INTERPRETATION: NORMAL
DISPENSE STATUS: NORMAL
DISPENSE STATUS: NORMAL
EOSINOPHIL # BLD AUTO: 0 X10(3)/MCL (ref 0–0.9)
EOSINOPHIL NFR BLD AUTO: 0 %
ERYTHROCYTE [DISTWIDTH] IN BLOOD BY AUTOMATED COUNT: 14.8 % (ref 11.5–17)
GFR SERPLBLD CREATININE-BSD FMLA CKD-EPI: >60 MLS/MIN/1.73/M2
GLOBULIN SER-MCNC: 2.7 GM/DL (ref 2.4–3.5)
GLUCOSE SERPL-MCNC: 110 MG/DL (ref 82–115)
GROUP & RH: NORMAL
HCT VFR BLD AUTO: 21.6 % (ref 42–52)
HGB BLD-MCNC: 7 G/DL (ref 14–18)
IMM GRANULOCYTES # BLD AUTO: 0 X10(3)/MCL (ref 0–0.04)
IMM GRANULOCYTES NFR BLD AUTO: 0 %
INDIRECT COOMBS GEL: NORMAL
LYMPHOCYTES # BLD AUTO: 0.55 X10(3)/MCL (ref 0.6–4.6)
LYMPHOCYTES NFR BLD AUTO: 82.1 %
MCH RBC QN AUTO: 29.5 PG (ref 27–31)
MCHC RBC AUTO-ENTMCNC: 32.4 G/DL (ref 33–36)
MCV RBC AUTO: 91.1 FL (ref 80–94)
MONOCYTES # BLD AUTO: 0.08 X10(3)/MCL (ref 0.1–1.3)
MONOCYTES NFR BLD AUTO: 11.9 %
NEUTROPHILS # BLD AUTO: 0.04 X10(3)/MCL (ref 2.1–9.2)
NEUTROPHILS NFR BLD AUTO: 6 %
PLATELET # BLD AUTO: 9 X10(3)/MCL (ref 130–400)
PMV BLD AUTO: 11.1 FL (ref 7.4–10.4)
POTASSIUM SERPL-SCNC: 4.2 MMOL/L (ref 3.5–5.1)
PROT SERPL-MCNC: 6.4 GM/DL (ref 5.8–7.6)
RBC # BLD AUTO: 2.37 X10(6)/MCL (ref 4.7–6.1)
SODIUM SERPL-SCNC: 143 MMOL/L (ref 136–145)
SPECIMEN OUTDATE: NORMAL
UNIT NUMBER: NORMAL
UNIT NUMBER: NORMAL
WBC # SPEC AUTO: 0.67 X10(3)/MCL (ref 4.5–11.5)

## 2023-09-28 PROCEDURE — 36430 TRANSFUSION BLD/BLD COMPNT: CPT

## 2023-09-28 PROCEDURE — P9040 RBC LEUKOREDUCED IRRADIATED: HCPCS | Performed by: NURSE PRACTITIONER

## 2023-09-28 PROCEDURE — 36415 COLL VENOUS BLD VENIPUNCTURE: CPT

## 2023-09-28 PROCEDURE — 85025 COMPLETE CBC W/AUTO DIFF WBC: CPT

## 2023-09-28 PROCEDURE — 86900 BLOOD TYPING SEROLOGIC ABO: CPT | Performed by: NURSE PRACTITIONER

## 2023-09-28 PROCEDURE — 80053 COMPREHEN METABOLIC PANEL: CPT

## 2023-09-28 PROCEDURE — P9037 PLATE PHERES LEUKOREDU IRRAD: HCPCS | Performed by: INTERNAL MEDICINE

## 2023-09-28 PROCEDURE — 86923 COMPATIBILITY TEST ELECTRIC: CPT | Performed by: NURSE PRACTITIONER

## 2023-09-28 PROCEDURE — 25000003 PHARM REV CODE 250: Performed by: INTERNAL MEDICINE

## 2023-09-28 RX ORDER — HYDROCODONE BITARTRATE AND ACETAMINOPHEN 500; 5 MG/1; MG/1
TABLET ORAL ONCE
Status: COMPLETED | OUTPATIENT
Start: 2023-09-28 | End: 2023-10-05

## 2023-09-28 RX ORDER — HYDROCODONE BITARTRATE AND ACETAMINOPHEN 500; 5 MG/1; MG/1
TABLET ORAL ONCE
Status: CANCELLED | OUTPATIENT
Start: 2023-09-28 | End: 2023-09-28

## 2023-09-28 RX ORDER — HYDROCODONE BITARTRATE AND ACETAMINOPHEN 500; 5 MG/1; MG/1
TABLET ORAL ONCE
Status: COMPLETED | OUTPATIENT
Start: 2023-09-28 | End: 2023-09-28

## 2023-09-28 RX ADMIN — SODIUM CHLORIDE: 9 INJECTION, SOLUTION INTRAVENOUS at 10:09

## 2023-09-28 NOTE — NURSING
Pt here for labs, hgb was 7.0 and plt were 9, pt given 1 prbc and 1 platelet. Pt qing transfusions well, will rtc for labs and poss blood on Monday

## 2023-10-02 ENCOUNTER — INFUSION (OUTPATIENT)
Dept: INFUSION THERAPY | Facility: HOSPITAL | Age: 73
End: 2023-10-02
Attending: INTERNAL MEDICINE
Payer: MEDICARE

## 2023-10-02 ENCOUNTER — LAB VISIT (OUTPATIENT)
Dept: LAB | Facility: HOSPITAL | Age: 73
End: 2023-10-02
Attending: INTERNAL MEDICINE
Payer: MEDICARE

## 2023-10-02 VITALS
OXYGEN SATURATION: 99 % | BODY MASS INDEX: 24.92 KG/M2 | TEMPERATURE: 98 F | SYSTOLIC BLOOD PRESSURE: 147 MMHG | RESPIRATION RATE: 16 BRPM | HEART RATE: 60 BPM | HEIGHT: 72 IN | DIASTOLIC BLOOD PRESSURE: 76 MMHG | WEIGHT: 184 LBS

## 2023-10-02 DIAGNOSIS — C92.00 ACUTE MYELOID LEUKEMIA NOT HAVING ACHIEVED REMISSION: ICD-10-CM

## 2023-10-02 DIAGNOSIS — C92.00 ACUTE MYELOID LEUKEMIA NOT HAVING ACHIEVED REMISSION: Primary | ICD-10-CM

## 2023-10-02 LAB
ABO + RH BLD: NORMAL
ABS NEUT CALC (OHS): 0.04 X10(3)/MCL (ref 2.1–9.2)
ALBUMIN SERPL-MCNC: 3.6 G/DL (ref 3.4–4.8)
ALBUMIN/GLOB SERPL: 1.2 RATIO (ref 1.1–2)
ALP SERPL-CCNC: 107 UNIT/L (ref 40–150)
ALT SERPL-CCNC: 11 UNIT/L (ref 0–55)
AST SERPL-CCNC: 11 UNIT/L (ref 5–34)
BILIRUB SERPL-MCNC: 0.8 MG/DL
BLD PROD TYP BPU: NORMAL
BLOOD UNIT EXPIRATION DATE: NORMAL
BLOOD UNIT TYPE CODE: 6200
BUN SERPL-MCNC: 18.7 MG/DL (ref 8.4–25.7)
CALCIUM SERPL-MCNC: 9.2 MG/DL (ref 8.8–10)
CHLORIDE SERPL-SCNC: 107 MMOL/L (ref 98–107)
CO2 SERPL-SCNC: 29 MMOL/L (ref 23–31)
CREAT SERPL-MCNC: 0.85 MG/DL (ref 0.73–1.18)
CROSSMATCH INTERPRETATION: NORMAL
DISPENSE STATUS: NORMAL
ERYTHROCYTE [DISTWIDTH] IN BLOOD BY AUTOMATED COUNT: 14.9 % (ref 11.5–17)
GFR SERPLBLD CREATININE-BSD FMLA CKD-EPI: >60 MLS/MIN/1.73/M2
GLOBULIN SER-MCNC: 3 GM/DL (ref 2.4–3.5)
GLUCOSE SERPL-MCNC: 117 MG/DL (ref 82–115)
HCT VFR BLD AUTO: 24.3 % (ref 42–52)
HGB BLD-MCNC: 7.8 G/DL (ref 14–18)
LYMPHOCYTES NFR BLD MANUAL: 0.64 X10(3)/MCL
LYMPHOCYTES NFR BLD MANUAL: 90 % (ref 13–40)
MCH RBC QN AUTO: 29.5 PG (ref 27–31)
MCHC RBC AUTO-ENTMCNC: 32.1 G/DL (ref 33–36)
MCV RBC AUTO: 92 FL (ref 80–94)
MONOCYTES NFR BLD MANUAL: 0.02 X10(3)/MCL (ref 0.1–1.3)
MONOCYTES NFR BLD MANUAL: 3 % (ref 2–11)
NEUTROPHILS NFR BLD MANUAL: 6 % (ref 47–80)
PLATELET # BLD AUTO: 8 X10(3)/MCL (ref 130–400)
PLATELET # BLD EST: ABNORMAL 10*3/UL
PMV BLD AUTO: 10.6 FL (ref 7.4–10.4)
POTASSIUM SERPL-SCNC: 4.2 MMOL/L (ref 3.5–5.1)
PROLYMPHOCYTES # BLD MANUAL: 1 %
PROT SERPL-MCNC: 6.6 GM/DL (ref 5.8–7.6)
RBC # BLD AUTO: 2.64 X10(6)/MCL (ref 4.7–6.1)
RBC MORPH BLD: NORMAL
RBCS: NORMAL
SODIUM SERPL-SCNC: 143 MMOL/L (ref 136–145)
UNIT NUMBER: NORMAL
WBC # SPEC AUTO: 0.71 X10(3)/MCL (ref 4.5–11.5)

## 2023-10-02 PROCEDURE — P9037 PLATE PHERES LEUKOREDU IRRAD: HCPCS | Performed by: INTERNAL MEDICINE

## 2023-10-02 PROCEDURE — 80053 COMPREHEN METABOLIC PANEL: CPT

## 2023-10-02 PROCEDURE — 85027 COMPLETE CBC AUTOMATED: CPT

## 2023-10-02 PROCEDURE — 36430 TRANSFUSION BLD/BLD COMPNT: CPT

## 2023-10-02 PROCEDURE — 36415 COLL VENOUS BLD VENIPUNCTURE: CPT

## 2023-10-02 PROCEDURE — 25000003 PHARM REV CODE 250: Performed by: INTERNAL MEDICINE

## 2023-10-02 RX ORDER — HYDROCODONE BITARTRATE AND ACETAMINOPHEN 500; 5 MG/1; MG/1
TABLET ORAL ONCE
Status: COMPLETED | OUTPATIENT
Start: 2023-10-02 | End: 2023-10-02

## 2023-10-02 RX ORDER — HYDROCODONE BITARTRATE AND ACETAMINOPHEN 500; 5 MG/1; MG/1
TABLET ORAL ONCE
Status: CANCELLED | OUTPATIENT
Start: 2023-10-02 | End: 2023-10-02

## 2023-10-02 RX ADMIN — SODIUM CHLORIDE: 9 INJECTION, SOLUTION INTRAVENOUS at 10:10

## 2023-10-05 ENCOUNTER — INFUSION (OUTPATIENT)
Dept: INFUSION THERAPY | Facility: HOSPITAL | Age: 73
End: 2023-10-05
Attending: INTERNAL MEDICINE
Payer: MEDICARE

## 2023-10-05 ENCOUNTER — LAB VISIT (OUTPATIENT)
Dept: LAB | Facility: HOSPITAL | Age: 73
End: 2023-10-05
Attending: INTERNAL MEDICINE
Payer: MEDICARE

## 2023-10-05 VITALS
SYSTOLIC BLOOD PRESSURE: 145 MMHG | OXYGEN SATURATION: 99 % | RESPIRATION RATE: 18 BRPM | WEIGHT: 184 LBS | HEART RATE: 69 BPM | TEMPERATURE: 99 F | BODY MASS INDEX: 24.92 KG/M2 | DIASTOLIC BLOOD PRESSURE: 76 MMHG | HEIGHT: 72 IN

## 2023-10-05 DIAGNOSIS — C92.00 ACUTE MYELOID LEUKEMIA NOT HAVING ACHIEVED REMISSION: Primary | ICD-10-CM

## 2023-10-05 DIAGNOSIS — C92.00 ACUTE MYELOID LEUKEMIA NOT HAVING ACHIEVED REMISSION: ICD-10-CM

## 2023-10-05 LAB
ABO + RH BLD: NORMAL
ALBUMIN SERPL-MCNC: 3.8 G/DL (ref 3.4–4.8)
ALBUMIN/GLOB SERPL: 1.3 RATIO (ref 1.1–2)
ALP SERPL-CCNC: 117 UNIT/L (ref 40–150)
ALT SERPL-CCNC: 10 UNIT/L (ref 0–55)
AST SERPL-CCNC: 13 UNIT/L (ref 5–34)
BASOPHILS # BLD AUTO: 0 X10(3)/MCL
BASOPHILS NFR BLD AUTO: 0 %
BILIRUB SERPL-MCNC: 1 MG/DL
BLD PROD TYP BPU: NORMAL
BLOOD UNIT EXPIRATION DATE: NORMAL
BLOOD UNIT TYPE CODE: 6200
BUN SERPL-MCNC: 19.5 MG/DL (ref 8.4–25.7)
CALCIUM SERPL-MCNC: 9.1 MG/DL (ref 8.8–10)
CHLORIDE SERPL-SCNC: 105 MMOL/L (ref 98–107)
CO2 SERPL-SCNC: 28 MMOL/L (ref 23–31)
CREAT SERPL-MCNC: 0.89 MG/DL (ref 0.73–1.18)
CROSSMATCH INTERPRETATION: NORMAL
DISPENSE STATUS: NORMAL
EOSINOPHIL # BLD AUTO: 0 X10(3)/MCL (ref 0–0.9)
EOSINOPHIL NFR BLD AUTO: 0 %
ERYTHROCYTE [DISTWIDTH] IN BLOOD BY AUTOMATED COUNT: 15 % (ref 11.5–17)
GFR SERPLBLD CREATININE-BSD FMLA CKD-EPI: >60 MLS/MIN/1.73/M2
GLOBULIN SER-MCNC: 3 GM/DL (ref 2.4–3.5)
GLUCOSE SERPL-MCNC: 150 MG/DL (ref 82–115)
HCT VFR BLD AUTO: 25.1 % (ref 42–52)
HGB BLD-MCNC: 7.9 G/DL (ref 14–18)
IMM GRANULOCYTES # BLD AUTO: 0 X10(3)/MCL (ref 0–0.04)
IMM GRANULOCYTES NFR BLD AUTO: 0 %
LYMPHOCYTES # BLD AUTO: 0.6 X10(3)/MCL (ref 0.6–4.6)
LYMPHOCYTES NFR BLD AUTO: 84.5 %
MCH RBC QN AUTO: 29.4 PG (ref 27–31)
MCHC RBC AUTO-ENTMCNC: 31.5 G/DL (ref 33–36)
MCV RBC AUTO: 93.3 FL (ref 80–94)
MONOCYTES # BLD AUTO: 0.06 X10(3)/MCL (ref 0.1–1.3)
MONOCYTES NFR BLD AUTO: 8.5 %
NEUTROPHILS # BLD AUTO: 0.05 X10(3)/MCL (ref 2.1–9.2)
NEUTROPHILS NFR BLD AUTO: 7 %
PLATELET # BLD AUTO: 10 X10(3)/MCL (ref 130–400)
PMV BLD AUTO: ABNORMAL FL
POTASSIUM SERPL-SCNC: 3.9 MMOL/L (ref 3.5–5.1)
PROT SERPL-MCNC: 6.8 GM/DL (ref 5.8–7.6)
RBC # BLD AUTO: 2.69 X10(6)/MCL (ref 4.7–6.1)
SODIUM SERPL-SCNC: 143 MMOL/L (ref 136–145)
UNIT NUMBER: NORMAL
WBC # SPEC AUTO: 0.71 X10(3)/MCL (ref 4.5–11.5)

## 2023-10-05 PROCEDURE — 36415 COLL VENOUS BLD VENIPUNCTURE: CPT

## 2023-10-05 PROCEDURE — 80053 COMPREHEN METABOLIC PANEL: CPT

## 2023-10-05 PROCEDURE — P9037 PLATE PHERES LEUKOREDU IRRAD: HCPCS | Performed by: INTERNAL MEDICINE

## 2023-10-05 PROCEDURE — 85025 COMPLETE CBC W/AUTO DIFF WBC: CPT

## 2023-10-05 PROCEDURE — 25000003 PHARM REV CODE 250: Performed by: INTERNAL MEDICINE

## 2023-10-05 PROCEDURE — 36430 TRANSFUSION BLD/BLD COMPNT: CPT

## 2023-10-05 RX ORDER — HYDROCODONE BITARTRATE AND ACETAMINOPHEN 500; 5 MG/1; MG/1
TABLET ORAL ONCE
Status: CANCELLED | OUTPATIENT
Start: 2023-10-05 | End: 2023-10-05

## 2023-10-05 RX ORDER — HYDROCODONE BITARTRATE AND ACETAMINOPHEN 500; 5 MG/1; MG/1
TABLET ORAL ONCE
Status: DISCONTINUED | OUTPATIENT
Start: 2023-10-05 | End: 2023-10-27 | Stop reason: HOSPADM

## 2023-10-05 RX ADMIN — SODIUM CHLORIDE: 9 INJECTION, SOLUTION INTRAVENOUS at 10:10

## 2023-10-09 ENCOUNTER — INFUSION (OUTPATIENT)
Dept: INFUSION THERAPY | Facility: HOSPITAL | Age: 73
End: 2023-10-09
Attending: INTERNAL MEDICINE
Payer: MEDICARE

## 2023-10-09 ENCOUNTER — OFFICE VISIT (OUTPATIENT)
Dept: HEMATOLOGY/ONCOLOGY | Facility: CLINIC | Age: 73
End: 2023-10-09
Payer: MEDICARE

## 2023-10-09 ENCOUNTER — TELEPHONE (OUTPATIENT)
Dept: HEMATOLOGY/ONCOLOGY | Facility: CLINIC | Age: 73
End: 2023-10-09

## 2023-10-09 VITALS
SYSTOLIC BLOOD PRESSURE: 135 MMHG | HEIGHT: 72 IN | DIASTOLIC BLOOD PRESSURE: 69 MMHG | BODY MASS INDEX: 24.79 KG/M2 | WEIGHT: 183 LBS | OXYGEN SATURATION: 95 % | TEMPERATURE: 98 F | HEART RATE: 70 BPM

## 2023-10-09 VITALS
OXYGEN SATURATION: 99 % | DIASTOLIC BLOOD PRESSURE: 74 MMHG | SYSTOLIC BLOOD PRESSURE: 131 MMHG | TEMPERATURE: 98 F | RESPIRATION RATE: 16 BRPM | HEART RATE: 64 BPM

## 2023-10-09 DIAGNOSIS — C92.00 ACUTE MYELOID LEUKEMIA NOT HAVING ACHIEVED REMISSION: Primary | ICD-10-CM

## 2023-10-09 DIAGNOSIS — K13.79 MOUTH SORES: ICD-10-CM

## 2023-10-09 DIAGNOSIS — Z79.899 ON ANTINEOPLASTIC CHEMOTHERAPY: ICD-10-CM

## 2023-10-09 DIAGNOSIS — C92.00 ACUTE MYELOID LEUKEMIA NOT HAVING ACHIEVED REMISSION: Primary | Chronic | ICD-10-CM

## 2023-10-09 DIAGNOSIS — D61.818 PANCYTOPENIA: Chronic | ICD-10-CM

## 2023-10-09 LAB
ABO + RH BLD: NORMAL
BLD PROD TYP BPU: NORMAL
BLOOD UNIT EXPIRATION DATE: NORMAL
BLOOD UNIT TYPE CODE: 8400
CROSSMATCH INTERPRETATION: NORMAL
DISPENSE STATUS: NORMAL
UNIT NUMBER: NORMAL

## 2023-10-09 PROCEDURE — P9037 PLATE PHERES LEUKOREDU IRRAD: HCPCS | Performed by: INTERNAL MEDICINE

## 2023-10-09 PROCEDURE — 99214 OFFICE O/P EST MOD 30 MIN: CPT | Mod: PBBFAC,25 | Performed by: NURSE PRACTITIONER

## 2023-10-09 PROCEDURE — 99999 PR PBB SHADOW E&M-EST. PATIENT-LVL IV: CPT | Mod: PBBFAC,,, | Performed by: NURSE PRACTITIONER

## 2023-10-09 PROCEDURE — 99999 PR PBB SHADOW E&M-EST. PATIENT-LVL IV: ICD-10-PCS | Mod: PBBFAC,,, | Performed by: NURSE PRACTITIONER

## 2023-10-09 PROCEDURE — 25000003 PHARM REV CODE 250: Performed by: NURSE PRACTITIONER

## 2023-10-09 PROCEDURE — 99215 PR OFFICE/OUTPT VISIT, EST, LEVL V, 40-54 MIN: ICD-10-PCS | Mod: S$PBB,,, | Performed by: NURSE PRACTITIONER

## 2023-10-09 PROCEDURE — 99215 OFFICE O/P EST HI 40 MIN: CPT | Mod: S$PBB,,, | Performed by: NURSE PRACTITIONER

## 2023-10-09 PROCEDURE — 36430 TRANSFUSION BLD/BLD COMPNT: CPT

## 2023-10-09 PROCEDURE — 63600175 PHARM REV CODE 636 W HCPCS: Performed by: NURSE PRACTITIONER

## 2023-10-09 PROCEDURE — 96401 CHEMO ANTI-NEOPL SQ/IM: CPT

## 2023-10-09 RX ORDER — AZACITIDINE 100 MG/1
75 INJECTION, POWDER, LYOPHILIZED, FOR SOLUTION INTRAVENOUS; SUBCUTANEOUS
Status: COMPLETED | OUTPATIENT
Start: 2023-10-09 | End: 2023-10-09

## 2023-10-09 RX ORDER — HYDROCODONE BITARTRATE AND ACETAMINOPHEN 500; 5 MG/1; MG/1
TABLET ORAL ONCE
Status: CANCELLED | OUTPATIENT
Start: 2023-10-09 | End: 2023-10-09

## 2023-10-09 RX ORDER — ONDANSETRON 4 MG/1
16 TABLET, ORALLY DISINTEGRATING ORAL
Status: COMPLETED | OUTPATIENT
Start: 2023-10-09 | End: 2023-10-09

## 2023-10-09 RX ORDER — HYDROCODONE BITARTRATE AND ACETAMINOPHEN 500; 5 MG/1; MG/1
TABLET ORAL ONCE
Status: DISCONTINUED | OUTPATIENT
Start: 2023-10-09 | End: 2023-10-27 | Stop reason: HOSPADM

## 2023-10-09 RX ORDER — ONDANSETRON 4 MG/1
16 TABLET, ORALLY DISINTEGRATING ORAL
Status: CANCELLED | OUTPATIENT
Start: 2023-10-12

## 2023-10-09 RX ORDER — ONDANSETRON 4 MG/1
16 TABLET, ORALLY DISINTEGRATING ORAL
Status: CANCELLED | OUTPATIENT
Start: 2023-10-13

## 2023-10-09 RX ORDER — AZACITIDINE 100 MG/1
75 INJECTION, POWDER, LYOPHILIZED, FOR SOLUTION INTRAVENOUS; SUBCUTANEOUS
Status: CANCELLED | OUTPATIENT
Start: 2023-10-09

## 2023-10-09 RX ORDER — ONDANSETRON 4 MG/1
16 TABLET, ORALLY DISINTEGRATING ORAL
Status: CANCELLED | OUTPATIENT
Start: 2023-10-09

## 2023-10-09 RX ORDER — AZACITIDINE 100 MG/1
75 INJECTION, POWDER, LYOPHILIZED, FOR SOLUTION INTRAVENOUS; SUBCUTANEOUS
Status: CANCELLED | OUTPATIENT
Start: 2023-10-10

## 2023-10-09 RX ORDER — ONDANSETRON 4 MG/1
16 TABLET, ORALLY DISINTEGRATING ORAL
Status: CANCELLED | OUTPATIENT
Start: 2023-10-10

## 2023-10-09 RX ORDER — AZACITIDINE 100 MG/1
75 INJECTION, POWDER, LYOPHILIZED, FOR SOLUTION INTRAVENOUS; SUBCUTANEOUS
Status: CANCELLED | OUTPATIENT
Start: 2023-10-12

## 2023-10-09 RX ORDER — AZACITIDINE 100 MG/1
75 INJECTION, POWDER, LYOPHILIZED, FOR SOLUTION INTRAVENOUS; SUBCUTANEOUS
Status: CANCELLED | OUTPATIENT
Start: 2023-10-13

## 2023-10-09 RX ORDER — AZACITIDINE 100 MG/1
75 INJECTION, POWDER, LYOPHILIZED, FOR SOLUTION INTRAVENOUS; SUBCUTANEOUS
Status: CANCELLED | OUTPATIENT
Start: 2023-10-11

## 2023-10-09 RX ORDER — ONDANSETRON 4 MG/1
16 TABLET, ORALLY DISINTEGRATING ORAL
Status: CANCELLED | OUTPATIENT
Start: 2023-10-11

## 2023-10-09 RX ADMIN — AZACITIDINE 155 MG: 100 INJECTION, POWDER, LYOPHILIZED, FOR SOLUTION INTRAVENOUS; SUBCUTANEOUS at 11:10

## 2023-10-09 RX ADMIN — ONDANSETRON 16 MG: 4 TABLET, ORALLY DISINTEGRATING ORAL at 11:10

## 2023-10-09 NOTE — PLAN OF CARE
Problem: Adult Inpatient Plan of Care  Goal: Plan of Care Review  Outcome: Met  Flowsheets (Taken 10/9/2023 1409)  Plan of Care Reviewed With: patient  Goal: Absence of Hospital-Acquired Illness or Injury  Outcome: Met  Intervention: Identify and Manage Fall Risk  Flowsheets (Taken 10/9/2023 1409)  Safety Promotion/Fall Prevention:   assistive device/personal item within reach   Fall Risk reviewed with patient/family   in recliner, wheels locked     Pt tolerated injections and transfusions well. Next appt reviewed; pt denied questions or further needs at the time of discharge.

## 2023-10-09 NOTE — PROGRESS NOTES
Subjective:       Patient ID: Hussein Gonzalez Jr. is a 73 y.o. male.    Chief Complaint: Other (Concerns of an ulcer in his mouth, noticed 2 weeks ago.  Pt states  he can sometime hear a ringing in his ears)      Diagnosis:  AML with 27% blasts, background dysplasia, normal cytogenetics and normal FISH with NGS showing ASXL1, SRSF2, TET2, and STAG2 mutations.    Current Treatment:   Azacitidine and venetoclax started on 02/27/2023.  Referral for possible stem cell transplant evaluation.    Treatment History:  N/A    HPI:  73-year-old male with a history of CVA, hypertension, BPH, and melanoma who presented to the emergency department following a syncopal episode.  In February of 2022, the patient did have a slightly low white blood cell count, hemoglobin, and platelet count.  Per the patient, 3 months prior to admission, he was having dizziness, fatigue, weakness, shortness of breath, back pain, and leg pain.  The dizziness was worse with standing and walking.  On 01/20/2023, the patient had a syncopal episode.  He was brought to the emergency department and was found to be pancytopenic.  He also had stool that was positive for occult blood in the emergency department.  He received blood transfusion.  His CBC on 01/20/2023 revealed a white blood cell count of 2.6, hemoglobin of 7.7, and platelet count of 47,000. Reticulocytes were appropriately elevated, B12 and folate were within normal limits, iron level was slightly decreased.  Repeat blood work on 01/21/2023 revealed a white blood cell count of 2.8, post transfusion hemoglobin of 9.6, and a [place] count of [id number].  Hematology was consulted for pancytopenia.  EGD and colonoscopy done on 01/24/2023, EGD revealed nonbleeding ulcers, colonoscopy unremarkable, however had to be aborted due to tortuous colon.  Bone Marrow Biopsy done on 01/25/2023.  Preliminary reveals AML with 27% blasts in a background of dysplasia.  Cytogenetics are within normal limits, FISH is  within normal limits.  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  Started azacitidine and venetoclax on 02/27/2023, completed cycle 4 on 05/30/2023.  Held after this cycle and repeated a bone marrow biopsy, this was done on 06/27/2023, this showed a normocellular marrow (40%) with relative erythroid hyperplasia, sequential granulopoiesis and megakaryopoiesis.  The bone marrow was slightly suboptimal given it was predominantly subcortical bone with focal hematopoietic elements.  There was no morphologic or immunophenotypic evidence of previous acute myeloid leukemia, cytogenetics were normal, FISH was normal, NGS showed persistent ASXL1, SRSF2, TET2.      Interval History:   Patient presents for scheduled follow up appointment for AML.  He remains on Vidaza and venetoclax. He continues with some joint aching that improves with icy hot. He is also having an ulcer in his mouth. Overall he feels well. He denies any bleeding or fevers.      Past Medical History:   Diagnosis Date    Anxiety and depression     BPH (benign prostatic hyperplasia)     History of CVA (cerebrovascular accident) without residual deficits     HTN (hypertension)     Malignant melanoma of skin, unspecified     NSTEMI (non-ST elevated myocardial infarction)     Sepsis, Gram negative     Sleep apnea       Past Surgical History:   Procedure Laterality Date    BONE MARROW ASPIRATION N/A 6/27/2023    Procedure: ASPIRATION, BONE MARROW;  Surgeon: Sabino Núñez MD;  Location: [facility];  Service: General;  Laterality: N/A;  ALL ASSESS FOR MOLECULAR MRD    BONE MARROW BIOPSY N/A 1/25/2023    Procedure: Biopsy-bone marrow;  Surgeon: Rd Carlos MD;  Location: [facility] OR;  Service: General;  Laterality: N/A;    COLONOSCOPY      COLONOSCOPY N/A 1/24/2023    Procedure: COLONOSCOPY (DOUBLE);  Surgeon: Jimy Baker MD;  Location: [facility] ENDOSCOPY;  Service: Gastroenterology;  Laterality: N/A;    EGD, WITH CLOSED BIOPSY N/A  1/24/2023    Procedure: EGD, WITH CLOSED BIOPSY;  Surgeon: Jimy Baker MD;  Location: [facility] ENDOSCOPY;  Service: Gastroenterology;  Laterality: N/A;    EXCISION OF MELANOMA      SINUS SURGERY      TONSILLECTOMY       Social History     Socioeconomic History    Marital status: Significant Other   Tobacco Use    Smoking status: Never    Smokeless tobacco: Never   Substance and Sexual Activity    Alcohol use: Not Currently     Alcohol/week: 7.0 standard drinks of alcohol     Types: 7 Cans of beer per week    Drug use: Never    Sexual activity: Yes     Social Determinants of Health     Financial Resource Strain: Low Risk  (4/20/2023)    Overall Financial Resource Strain (CARDIA)     Difficulty of Paying Living Expenses: Not hard at all   Food Insecurity: No Food Insecurity (4/20/2023)    Hunger Vital Sign     Worried About Running Out of Food in the Last Year: Never true     Ran Out of Food in the Last Year: Never true   Transportation Needs: No Transportation Needs (4/20/2023)    PRAPARE - Transportation     Lack of Transportation (Medical): No     Lack of Transportation (Non-Medical): No   Physical Activity: Insufficiently Active (4/20/2023)    Exercise Vital Sign     Days of Exercise per Week: 3 days     Minutes of Exercise per Session: 30 min   Stress: No Stress Concern Present (4/20/2023)    [place] [employer] - Occupational Stress Questionnaire     Feeling of Stress : Not at all   Social Connections: Unknown (4/20/2023)    Social Connection and Isolation Panel [NHANES]     Frequency of Communication with Friends and Family: More than three times a week     Frequency of Social Gatherings with Friends and Family: More than three times a week     Attends [employer] Services: Patient refused     Active Member of Clubs or Organizations: Patient refused     Attends Club or Organization Meetings: Patient refused     Marital Status:    Housing Stability: Low Risk  (4/20/2023)    Housing  Stability Vital Sign     Unable to Pay for Housing in the Last Year: No     Number of Places Lived in the Last Year: 1     Unstable Housing in the Last Year: No      Family History   Problem Relation Age of Onset    Cancer Mother     Cancer Father       Review of patient's allergies indicates:   Allergen Reactions    Acetaminophen Other (See Comments)     Restless leg     Benadryl [diphenhydramine hcl] Other (See Comments)     Restless Legs      Review of Systems   Constitutional:  Positive for fatigue. Negative for chills, diaphoresis, fever and unexpected weight change.   HENT:  Negative for nasal congestion, mouth sores, sinus pressure/congestion and sore throat.    Eyes:  Negative for pain and visual disturbance.   Respiratory:  Negative for cough, chest tightness and shortness of breath.    Cardiovascular:  Negative for chest pain, palpitations and leg swelling.   Gastrointestinal:  Negative for abdominal distention, abdominal pain, blood in stool, constipation and diarrhea.   Genitourinary:  Negative for dysuria, frequency and hematuria.   Musculoskeletal:  Positive for arthralgias and myalgias. Negative for back pain.   Integumentary:  Negative for rash.   Neurological:  Positive for weakness. Negative for dizziness, syncope, numbness and headaches.   Hematological:  Negative for adenopathy.   Psychiatric/Behavioral:  Negative for confusion.          Objective:      Physical Exam  Vitals reviewed.   Constitutional:       General: He is awake.      Appearance: Normal appearance.   HENT:      Head: Normocephalic and atraumatic.      Right Ear: Hearing normal.      Left Ear: Hearing normal.      Nose: Nose normal.   Eyes:      General: Lids are normal. Vision grossly intact.      Extraocular Movements: Extraocular movements intact.      Conjunctiva/sclera: Conjunctivae normal.   Cardiovascular:      Rate and Rhythm: Normal rate and regular rhythm.      Pulses: Normal pulses.      Heart sounds: Normal heart  sounds.   Pulmonary:      Effort: Pulmonary effort is normal.      Breath sounds: Normal breath sounds. No wheezing, rhonchi or rales.   Abdominal:      General: Bowel sounds are normal.      Palpations: Abdomen is soft.      Tenderness: There is no abdominal tenderness.   Musculoskeletal:      Cervical back: Full passive range of motion without pain.      Right lower leg: No edema.      Left lower leg: No edema.   Skin:     General: Skin is warm.      Comments:      Neurological:      General: No focal deficit present.      Mental Status: He is alert and oriented to person, place, and time.   Psychiatric:         Attention and Perception: Attention normal.         Mood and Affect: Mood and affect normal.         Behavior: Behavior is cooperative.         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    Treating with azacitidine and venetoclax, Started on 02/27/2023.  Bone marrow biopsy after cycle 4 shows no immunophenotypic or morphologic evidence of AML.  NGS showed continued ASXL1, SRSF2, TET2 mutations.  Cytogenetics were normal.    Evaluated by Dr. Valdez in [place] once NGS and cytogenetics returned.    Continue prophylaxis with 500 mg of levofloxacin daily, 200 mg of acyclovir b.i.d., 200 mg of fluconazole daily.    Twice weekly labs (Monday/Thursday) with transfusions as needed.    He restarted treatment on 07/10/2023, on 08/14/2023, we will dose reduce by changing venetoclax to 14 out of every 28 days.    Venetoclax 200 mg on days 1-14 of a 28 day cycle    Proceed with Azacididine this week, will need 1 unit of  platelets and blood today    Flexeril as needed for muscle spasms    Magic mouthwash as needed for mouth sores    Return to clinic in 4 weeks    RIVERA HaysP-C

## 2023-10-10 ENCOUNTER — INFUSION (OUTPATIENT)
Dept: INFUSION THERAPY | Facility: HOSPITAL | Age: 73
End: 2023-10-10
Attending: INTERNAL MEDICINE
Payer: MEDICARE

## 2023-10-10 VITALS
OXYGEN SATURATION: 97 % | HEART RATE: 98 BPM | DIASTOLIC BLOOD PRESSURE: 47 MMHG | RESPIRATION RATE: 16 BRPM | HEIGHT: 73 IN | BODY MASS INDEX: 24.11 KG/M2 | TEMPERATURE: 97 F | SYSTOLIC BLOOD PRESSURE: 101 MMHG | WEIGHT: 181.88 LBS

## 2023-10-10 DIAGNOSIS — C92.00 ACUTE MYELOID LEUKEMIA NOT HAVING ACHIEVED REMISSION: Primary | ICD-10-CM

## 2023-10-10 PROCEDURE — 63600175 PHARM REV CODE 636 W HCPCS: Performed by: NURSE PRACTITIONER

## 2023-10-10 PROCEDURE — 96401 CHEMO ANTI-NEOPL SQ/IM: CPT

## 2023-10-10 PROCEDURE — 25000003 PHARM REV CODE 250: Performed by: NURSE PRACTITIONER

## 2023-10-10 RX ORDER — AZACITIDINE 100 MG/1
75 INJECTION, POWDER, LYOPHILIZED, FOR SOLUTION INTRAVENOUS; SUBCUTANEOUS
Status: COMPLETED | OUTPATIENT
Start: 2023-10-10 | End: 2023-10-10

## 2023-10-10 RX ORDER — ONDANSETRON 4 MG/1
16 TABLET, ORALLY DISINTEGRATING ORAL
Status: COMPLETED | OUTPATIENT
Start: 2023-10-10 | End: 2023-10-10

## 2023-10-10 RX ADMIN — AZACITIDINE 155 MG: 100 INJECTION, POWDER, LYOPHILIZED, FOR SOLUTION INTRAVENOUS; SUBCUTANEOUS at 02:10

## 2023-10-10 RX ADMIN — ONDANSETRON 16 MG: 4 TABLET, ORALLY DISINTEGRATING ORAL at 02:10

## 2023-10-11 ENCOUNTER — INFUSION (OUTPATIENT)
Dept: INFUSION THERAPY | Facility: HOSPITAL | Age: 73
End: 2023-10-11
Attending: INTERNAL MEDICINE
Payer: MEDICARE

## 2023-10-11 VITALS
RESPIRATION RATE: 18 BRPM | TEMPERATURE: 98 F | OXYGEN SATURATION: 95 % | DIASTOLIC BLOOD PRESSURE: 62 MMHG | SYSTOLIC BLOOD PRESSURE: 116 MMHG | HEART RATE: 64 BPM

## 2023-10-11 DIAGNOSIS — C92.00 ACUTE MYELOID LEUKEMIA NOT HAVING ACHIEVED REMISSION: Primary | ICD-10-CM

## 2023-10-11 PROCEDURE — 63600175 PHARM REV CODE 636 W HCPCS: Performed by: NURSE PRACTITIONER

## 2023-10-11 PROCEDURE — 96401 CHEMO ANTI-NEOPL SQ/IM: CPT

## 2023-10-11 PROCEDURE — 25000003 PHARM REV CODE 250: Performed by: NURSE PRACTITIONER

## 2023-10-11 RX ORDER — ONDANSETRON 4 MG/1
16 TABLET, ORALLY DISINTEGRATING ORAL
Status: COMPLETED | OUTPATIENT
Start: 2023-10-11 | End: 2023-10-11

## 2023-10-11 RX ORDER — AZACITIDINE 100 MG/1
75 INJECTION, POWDER, LYOPHILIZED, FOR SOLUTION INTRAVENOUS; SUBCUTANEOUS
Status: COMPLETED | OUTPATIENT
Start: 2023-10-11 | End: 2023-10-11

## 2023-10-11 RX ADMIN — ONDANSETRON 16 MG: 4 TABLET, ORALLY DISINTEGRATING ORAL at 11:10

## 2023-10-11 RX ADMIN — AZACITIDINE 155 MG: 100 INJECTION, POWDER, LYOPHILIZED, FOR SOLUTION INTRAVENOUS; SUBCUTANEOUS at 11:10

## 2023-10-12 ENCOUNTER — INFUSION (OUTPATIENT)
Dept: INFUSION THERAPY | Facility: HOSPITAL | Age: 73
End: 2023-10-12
Attending: INTERNAL MEDICINE
Payer: MEDICARE

## 2023-10-12 ENCOUNTER — LAB VISIT (OUTPATIENT)
Dept: LAB | Facility: HOSPITAL | Age: 73
End: 2023-10-12
Attending: INTERNAL MEDICINE
Payer: MEDICARE

## 2023-10-12 VITALS — SYSTOLIC BLOOD PRESSURE: 119 MMHG | HEART RATE: 72 BPM | DIASTOLIC BLOOD PRESSURE: 72 MMHG

## 2023-10-12 DIAGNOSIS — Z79.899 ON ANTINEOPLASTIC CHEMOTHERAPY: ICD-10-CM

## 2023-10-12 DIAGNOSIS — C92.00 ACUTE MYELOID LEUKEMIA NOT HAVING ACHIEVED REMISSION: Primary | ICD-10-CM

## 2023-10-12 DIAGNOSIS — C92.00 ACUTE MYELOID LEUKEMIA NOT HAVING ACHIEVED REMISSION: ICD-10-CM

## 2023-10-12 DIAGNOSIS — D61.818 PANCYTOPENIA: ICD-10-CM

## 2023-10-12 DIAGNOSIS — K13.79 MOUTH SORES: ICD-10-CM

## 2023-10-12 LAB
ALBUMIN SERPL-MCNC: 3.7 G/DL (ref 3.4–4.8)
ALBUMIN/GLOB SERPL: 1.3 RATIO (ref 1.1–2)
ALP SERPL-CCNC: 119 UNIT/L (ref 40–150)
ALT SERPL-CCNC: 10 UNIT/L (ref 0–55)
AST SERPL-CCNC: 11 UNIT/L (ref 5–34)
BASOPHILS # BLD AUTO: 0 X10(3)/MCL
BASOPHILS NFR BLD AUTO: 0 %
BILIRUB SERPL-MCNC: 0.6 MG/DL
BUN SERPL-MCNC: 23 MG/DL (ref 8.4–25.7)
CALCIUM SERPL-MCNC: 9.3 MG/DL (ref 8.8–10)
CHLORIDE SERPL-SCNC: 107 MMOL/L (ref 98–107)
CO2 SERPL-SCNC: 27 MMOL/L (ref 23–31)
CREAT SERPL-MCNC: 1.07 MG/DL (ref 0.73–1.18)
EOSINOPHIL # BLD AUTO: 0 X10(3)/MCL (ref 0–0.9)
EOSINOPHIL NFR BLD AUTO: 0 %
ERYTHROCYTE [DISTWIDTH] IN BLOOD BY AUTOMATED COUNT: 14.8 % (ref 11.5–17)
GFR SERPLBLD CREATININE-BSD FMLA CKD-EPI: >60 MLS/MIN/1.73/M2
GLOBULIN SER-MCNC: 2.9 GM/DL (ref 2.4–3.5)
GLUCOSE SERPL-MCNC: 109 MG/DL (ref 82–115)
HCT VFR BLD AUTO: 23.8 % (ref 42–52)
HGB BLD-MCNC: 7.6 G/DL (ref 14–18)
IMM GRANULOCYTES # BLD AUTO: 0 X10(3)/MCL (ref 0–0.04)
IMM GRANULOCYTES NFR BLD AUTO: 0 %
LYMPHOCYTES # BLD AUTO: 0.49 X10(3)/MCL (ref 0.6–4.6)
LYMPHOCYTES NFR BLD AUTO: 80.3 %
MCH RBC QN AUTO: 29.2 PG (ref 27–31)
MCHC RBC AUTO-ENTMCNC: 31.9 G/DL (ref 33–36)
MCV RBC AUTO: 91.5 FL (ref 80–94)
MONOCYTES # BLD AUTO: 0.03 X10(3)/MCL (ref 0.1–1.3)
MONOCYTES NFR BLD AUTO: 4.9 %
NEUTROPHILS # BLD AUTO: 0.09 X10(3)/MCL (ref 2.1–9.2)
NEUTROPHILS NFR BLD AUTO: 14.8 %
PLATELET # BLD AUTO: 18 X10(3)/MCL (ref 130–400)
PMV BLD AUTO: 11.5 FL (ref 7.4–10.4)
POTASSIUM SERPL-SCNC: 4 MMOL/L (ref 3.5–5.1)
PROT SERPL-MCNC: 6.6 GM/DL (ref 5.8–7.6)
RBC # BLD AUTO: 2.6 X10(6)/MCL (ref 4.7–6.1)
SODIUM SERPL-SCNC: 140 MMOL/L (ref 136–145)
WBC # SPEC AUTO: 0.61 X10(3)/MCL (ref 4.5–11.5)

## 2023-10-12 PROCEDURE — 25000003 PHARM REV CODE 250: Performed by: NURSE PRACTITIONER

## 2023-10-12 PROCEDURE — 80053 COMPREHEN METABOLIC PANEL: CPT

## 2023-10-12 PROCEDURE — 96401 CHEMO ANTI-NEOPL SQ/IM: CPT

## 2023-10-12 PROCEDURE — 36415 COLL VENOUS BLD VENIPUNCTURE: CPT

## 2023-10-12 PROCEDURE — 85025 COMPLETE CBC W/AUTO DIFF WBC: CPT

## 2023-10-12 PROCEDURE — 63600175 PHARM REV CODE 636 W HCPCS: Performed by: NURSE PRACTITIONER

## 2023-10-12 RX ORDER — AZACITIDINE 100 MG/1
75 INJECTION, POWDER, LYOPHILIZED, FOR SOLUTION INTRAVENOUS; SUBCUTANEOUS
Status: COMPLETED | OUTPATIENT
Start: 2023-10-12 | End: 2023-10-12

## 2023-10-12 RX ORDER — ONDANSETRON 4 MG/1
16 TABLET, ORALLY DISINTEGRATING ORAL
Status: COMPLETED | OUTPATIENT
Start: 2023-10-12 | End: 2023-10-12

## 2023-10-12 RX ADMIN — AZACITIDINE 155 MG: 100 INJECTION, POWDER, LYOPHILIZED, FOR SOLUTION INTRAVENOUS; SUBCUTANEOUS at 09:10

## 2023-10-12 RX ADMIN — ONDANSETRON 16 MG: 4 TABLET, ORALLY DISINTEGRATING ORAL at 09:10

## 2023-10-13 ENCOUNTER — INFUSION (OUTPATIENT)
Dept: INFUSION THERAPY | Facility: HOSPITAL | Age: 73
End: 2023-10-13
Attending: INTERNAL MEDICINE
Payer: MEDICARE

## 2023-10-13 VITALS
SYSTOLIC BLOOD PRESSURE: 128 MMHG | RESPIRATION RATE: 18 BRPM | WEIGHT: 181.88 LBS | HEART RATE: 100 BPM | HEIGHT: 73 IN | TEMPERATURE: 98 F | OXYGEN SATURATION: 100 % | DIASTOLIC BLOOD PRESSURE: 77 MMHG | BODY MASS INDEX: 24.11 KG/M2

## 2023-10-13 DIAGNOSIS — C92.00 ACUTE MYELOID LEUKEMIA NOT HAVING ACHIEVED REMISSION: Primary | ICD-10-CM

## 2023-10-13 LAB — RBCS: NORMAL

## 2023-10-13 PROCEDURE — 96401 CHEMO ANTI-NEOPL SQ/IM: CPT

## 2023-10-13 PROCEDURE — 25000003 PHARM REV CODE 250: Performed by: NURSE PRACTITIONER

## 2023-10-13 PROCEDURE — 63600175 PHARM REV CODE 636 W HCPCS: Performed by: NURSE PRACTITIONER

## 2023-10-13 RX ORDER — ONDANSETRON 4 MG/1
16 TABLET, ORALLY DISINTEGRATING ORAL
Status: COMPLETED | OUTPATIENT
Start: 2023-10-13 | End: 2023-10-13

## 2023-10-13 RX ORDER — AZACITIDINE 100 MG/1
75 INJECTION, POWDER, LYOPHILIZED, FOR SOLUTION INTRAVENOUS; SUBCUTANEOUS
Status: COMPLETED | OUTPATIENT
Start: 2023-10-13 | End: 2023-10-13

## 2023-10-13 RX ADMIN — AZACITIDINE 155 MG: 100 INJECTION, POWDER, LYOPHILIZED, FOR SOLUTION INTRAVENOUS; SUBCUTANEOUS at 10:10

## 2023-10-13 RX ADMIN — ONDANSETRON 16 MG: 4 TABLET, ORALLY DISINTEGRATING ORAL at 10:10

## 2023-10-16 ENCOUNTER — INFUSION (OUTPATIENT)
Dept: INFUSION THERAPY | Facility: HOSPITAL | Age: 73
End: 2023-10-16
Attending: INTERNAL MEDICINE
Payer: MEDICARE

## 2023-10-16 VITALS
SYSTOLIC BLOOD PRESSURE: 134 MMHG | WEIGHT: 180.69 LBS | DIASTOLIC BLOOD PRESSURE: 79 MMHG | HEIGHT: 73 IN | TEMPERATURE: 98 F | BODY MASS INDEX: 23.95 KG/M2 | RESPIRATION RATE: 16 BRPM | HEART RATE: 63 BPM | OXYGEN SATURATION: 99 %

## 2023-10-16 DIAGNOSIS — C92.00 ACUTE MYELOID LEUKEMIA NOT HAVING ACHIEVED REMISSION: ICD-10-CM

## 2023-10-16 DIAGNOSIS — C92.00 ACUTE MYELOID LEUKEMIA NOT HAVING ACHIEVED REMISSION: Primary | ICD-10-CM

## 2023-10-16 PROCEDURE — P9037 PLATE PHERES LEUKOREDU IRRAD: HCPCS | Performed by: INTERNAL MEDICINE

## 2023-10-16 PROCEDURE — 36430 TRANSFUSION BLD/BLD COMPNT: CPT

## 2023-10-16 RX ORDER — HYDROCODONE BITARTRATE AND ACETAMINOPHEN 500; 5 MG/1; MG/1
TABLET ORAL ONCE
Status: CANCELLED | OUTPATIENT
Start: 2023-10-16 | End: 2023-10-16

## 2023-10-16 RX ORDER — HYDROCODONE BITARTRATE AND ACETAMINOPHEN 500; 5 MG/1; MG/1
TABLET ORAL ONCE
Status: DISCONTINUED | OUTPATIENT
Start: 2023-10-16 | End: 2023-10-27 | Stop reason: HOSPADM

## 2023-10-16 NOTE — NURSING
Tolerated transfusion with no complaints.  States understanding to contact provider for any issues. IV catheter discontinued intact. Site without signs and symptoms of complications. Dressing and pressure applied.  No complaints.

## 2023-10-19 ENCOUNTER — INFUSION (OUTPATIENT)
Dept: INFUSION THERAPY | Facility: HOSPITAL | Age: 73
End: 2023-10-19
Attending: INTERNAL MEDICINE
Payer: MEDICARE

## 2023-10-19 ENCOUNTER — LAB VISIT (OUTPATIENT)
Dept: LAB | Facility: HOSPITAL | Age: 73
End: 2023-10-19
Attending: INTERNAL MEDICINE
Payer: MEDICARE

## 2023-10-19 VITALS
RESPIRATION RATE: 16 BRPM | OXYGEN SATURATION: 99 % | DIASTOLIC BLOOD PRESSURE: 74 MMHG | TEMPERATURE: 63 F | SYSTOLIC BLOOD PRESSURE: 134 MMHG | HEART RATE: 63 BPM

## 2023-10-19 DIAGNOSIS — C92.00 ACUTE MYELOID LEUKEMIA NOT HAVING ACHIEVED REMISSION: Primary | ICD-10-CM

## 2023-10-19 DIAGNOSIS — Z79.899 ON ANTINEOPLASTIC CHEMOTHERAPY: ICD-10-CM

## 2023-10-19 DIAGNOSIS — D61.818 PANCYTOPENIA: ICD-10-CM

## 2023-10-19 DIAGNOSIS — C92.00 ACUTE MYELOID LEUKEMIA NOT HAVING ACHIEVED REMISSION: ICD-10-CM

## 2023-10-19 DIAGNOSIS — K13.79 MOUTH SORES: ICD-10-CM

## 2023-10-19 LAB
ABO + RH BLD: NORMAL
ABO + RH BLD: NORMAL
ALBUMIN SERPL-MCNC: 3.7 G/DL (ref 3.4–4.8)
ALBUMIN/GLOB SERPL: 1.4 RATIO (ref 1.1–2)
ALP SERPL-CCNC: 102 UNIT/L (ref 40–150)
ALT SERPL-CCNC: 9 UNIT/L (ref 0–55)
AST SERPL-CCNC: 12 UNIT/L (ref 5–34)
BASOPHILS # BLD AUTO: 0 X10(3)/MCL
BASOPHILS NFR BLD AUTO: 0 %
BILIRUB SERPL-MCNC: 0.8 MG/DL
BLD PROD TYP BPU: NORMAL
BLD PROD TYP BPU: NORMAL
BLOOD UNIT EXPIRATION DATE: NORMAL
BLOOD UNIT EXPIRATION DATE: NORMAL
BLOOD UNIT TYPE CODE: 5100
BLOOD UNIT TYPE CODE: 7300
BUN SERPL-MCNC: 16.9 MG/DL (ref 8.4–25.7)
CALCIUM SERPL-MCNC: 9.1 MG/DL (ref 8.8–10)
CHLORIDE SERPL-SCNC: 107 MMOL/L (ref 98–107)
CO2 SERPL-SCNC: 30 MMOL/L (ref 23–31)
CREAT SERPL-MCNC: 1.04 MG/DL (ref 0.73–1.18)
CROSSMATCH INTERPRETATION: NORMAL
CROSSMATCH INTERPRETATION: NORMAL
DISPENSE STATUS: NORMAL
DISPENSE STATUS: NORMAL
EOSINOPHIL # BLD AUTO: 0 X10(3)/MCL (ref 0–0.9)
EOSINOPHIL NFR BLD AUTO: 0 %
ERYTHROCYTE [DISTWIDTH] IN BLOOD BY AUTOMATED COUNT: 14.4 % (ref 11.5–17)
GFR SERPLBLD CREATININE-BSD FMLA CKD-EPI: >60 MLS/MIN/1.73/M2
GLOBULIN SER-MCNC: 2.6 GM/DL (ref 2.4–3.5)
GLUCOSE SERPL-MCNC: 107 MG/DL (ref 82–115)
GROUP & RH: NORMAL
HCT VFR BLD AUTO: 22.8 % (ref 42–52)
HGB BLD-MCNC: 7.4 G/DL (ref 14–18)
IMM GRANULOCYTES # BLD AUTO: 0 X10(3)/MCL (ref 0–0.04)
IMM GRANULOCYTES NFR BLD AUTO: 0 %
INDIRECT COOMBS GEL: NORMAL
LYMPHOCYTES # BLD AUTO: 0.56 X10(3)/MCL (ref 0.6–4.6)
LYMPHOCYTES NFR BLD AUTO: 90.3 %
MCH RBC QN AUTO: 30.3 PG (ref 27–31)
MCHC RBC AUTO-ENTMCNC: 32.5 G/DL (ref 33–36)
MCV RBC AUTO: 93.4 FL (ref 80–94)
MONOCYTES # BLD AUTO: 0.04 X10(3)/MCL (ref 0.1–1.3)
MONOCYTES NFR BLD AUTO: 6.5 %
NEUTROPHILS # BLD AUTO: 0.02 X10(3)/MCL (ref 2.1–9.2)
NEUTROPHILS NFR BLD AUTO: 3.2 %
PLATELET # BLD AUTO: 14 X10(3)/MCL (ref 130–400)
PMV BLD AUTO: 11.8 FL (ref 7.4–10.4)
POTASSIUM SERPL-SCNC: 4.1 MMOL/L (ref 3.5–5.1)
PROT SERPL-MCNC: 6.3 GM/DL (ref 5.8–7.6)
RBC # BLD AUTO: 2.44 X10(6)/MCL (ref 4.7–6.1)
SODIUM SERPL-SCNC: 143 MMOL/L (ref 136–145)
SPECIMEN OUTDATE: NORMAL
UNIT NUMBER: NORMAL
UNIT NUMBER: NORMAL
WBC # SPEC AUTO: 0.62 X10(3)/MCL (ref 4.5–11.5)

## 2023-10-19 PROCEDURE — P9037 PLATE PHERES LEUKOREDU IRRAD: HCPCS | Performed by: NURSE PRACTITIONER

## 2023-10-19 PROCEDURE — 36415 COLL VENOUS BLD VENIPUNCTURE: CPT

## 2023-10-19 PROCEDURE — 86923 COMPATIBILITY TEST ELECTRIC: CPT | Performed by: NURSE PRACTITIONER

## 2023-10-19 PROCEDURE — 80053 COMPREHEN METABOLIC PANEL: CPT

## 2023-10-19 PROCEDURE — P9040 RBC LEUKOREDUCED IRRADIATED: HCPCS | Performed by: NURSE PRACTITIONER

## 2023-10-19 PROCEDURE — 85025 COMPLETE CBC W/AUTO DIFF WBC: CPT

## 2023-10-19 PROCEDURE — 86850 RBC ANTIBODY SCREEN: CPT | Performed by: NURSE PRACTITIONER

## 2023-10-19 PROCEDURE — 36430 TRANSFUSION BLD/BLD COMPNT: CPT

## 2023-10-19 RX ORDER — HYDROCODONE BITARTRATE AND ACETAMINOPHEN 500; 5 MG/1; MG/1
TABLET ORAL ONCE
Status: DISCONTINUED | OUTPATIENT
Start: 2023-10-19 | End: 2023-10-27 | Stop reason: HOSPADM

## 2023-10-19 RX ORDER — HYDROCODONE BITARTRATE AND ACETAMINOPHEN 500; 5 MG/1; MG/1
TABLET ORAL ONCE
Status: CANCELLED | OUTPATIENT
Start: 2023-10-19 | End: 2023-10-19

## 2023-10-22 DIAGNOSIS — F32.A ANXIETY AND DEPRESSION: ICD-10-CM

## 2023-10-22 DIAGNOSIS — F41.9 ANXIETY AND DEPRESSION: ICD-10-CM

## 2023-10-23 ENCOUNTER — INFUSION (OUTPATIENT)
Dept: INFUSION THERAPY | Facility: HOSPITAL | Age: 73
End: 2023-10-23
Attending: INTERNAL MEDICINE
Payer: MEDICARE

## 2023-10-23 VITALS
RESPIRATION RATE: 16 BRPM | DIASTOLIC BLOOD PRESSURE: 80 MMHG | HEART RATE: 65 BPM | SYSTOLIC BLOOD PRESSURE: 139 MMHG | OXYGEN SATURATION: 100 % | TEMPERATURE: 98 F

## 2023-10-23 DIAGNOSIS — C92.00 ACUTE MYELOID LEUKEMIA NOT HAVING ACHIEVED REMISSION: Chronic | ICD-10-CM

## 2023-10-23 DIAGNOSIS — C92.00 ACUTE MYELOID LEUKEMIA NOT HAVING ACHIEVED REMISSION: Primary | Chronic | ICD-10-CM

## 2023-10-23 PROCEDURE — 36430 TRANSFUSION BLD/BLD COMPNT: CPT

## 2023-10-23 PROCEDURE — P9035 PLATELET PHERES LEUKOREDUCED: HCPCS | Performed by: NURSE PRACTITIONER

## 2023-10-23 RX ORDER — ACETAMINOPHEN 325 MG/1
650 TABLET ORAL ONCE
Status: DISCONTINUED | OUTPATIENT
Start: 2023-10-23 | End: 2023-10-27 | Stop reason: HOSPADM

## 2023-10-23 RX ORDER — ACETAMINOPHEN 325 MG/1
650 TABLET ORAL ONCE
Status: CANCELLED | OUTPATIENT
Start: 2023-10-23 | End: 2023-10-23

## 2023-10-23 RX ORDER — CITALOPRAM 40 MG/1
40 TABLET, FILM COATED ORAL
Qty: 90 TABLET | Refills: 0 | Status: SHIPPED | OUTPATIENT
Start: 2023-10-23 | End: 2024-01-25

## 2023-10-23 RX ORDER — DIPHENHYDRAMINE HCL 25 MG
25 CAPSULE ORAL ONCE
Status: CANCELLED | OUTPATIENT
Start: 2023-10-23 | End: 2023-10-23

## 2023-10-23 RX ORDER — HYDROCODONE BITARTRATE AND ACETAMINOPHEN 500; 5 MG/1; MG/1
TABLET ORAL ONCE
Status: DISCONTINUED | OUTPATIENT
Start: 2023-10-23 | End: 2023-10-27 | Stop reason: HOSPADM

## 2023-10-23 RX ORDER — DIPHENHYDRAMINE HCL 25 MG
25 CAPSULE ORAL ONCE
Status: DISCONTINUED | OUTPATIENT
Start: 2023-10-23 | End: 2023-10-27 | Stop reason: HOSPADM

## 2023-10-23 RX ORDER — HYDROCODONE BITARTRATE AND ACETAMINOPHEN 500; 5 MG/1; MG/1
TABLET ORAL ONCE
Status: CANCELLED | OUTPATIENT
Start: 2023-10-23 | End: 2023-10-23

## 2023-10-26 ENCOUNTER — HOSPITAL ENCOUNTER (INPATIENT)
Facility: HOSPITAL | Age: 73
LOS: 1 days | Discharge: HOME | DRG: 726 | End: 2023-10-27
Attending: STUDENT IN AN ORGANIZED HEALTH CARE EDUCATION/TRAINING PROGRAM | Admitting: INTERNAL MEDICINE
Payer: MEDICARE

## 2023-10-26 DIAGNOSIS — N13.9 OBSTRUCTIVE UROPATHY: ICD-10-CM

## 2023-10-26 DIAGNOSIS — R10.30 LOWER ABDOMINAL PAIN: Primary | ICD-10-CM

## 2023-10-26 DIAGNOSIS — D70.9 NEUTROPENIA, UNSPECIFIED TYPE: ICD-10-CM

## 2023-10-26 DIAGNOSIS — R10.9 ABDOMINAL PAIN, UNSPECIFIED ABDOMINAL LOCATION: ICD-10-CM

## 2023-10-26 DIAGNOSIS — N39.0 URINARY TRACT INFECTION WITHOUT HEMATURIA, SITE UNSPECIFIED: ICD-10-CM

## 2023-10-26 DIAGNOSIS — C92.00 ACUTE MYELOID LEUKEMIA NOT HAVING ACHIEVED REMISSION: Chronic | ICD-10-CM

## 2023-10-26 DIAGNOSIS — D69.6 THROMBOCYTOPENIA: ICD-10-CM

## 2023-10-26 LAB
ABO + RH BLD: NORMAL
ABO + RH BLD: NORMAL
ABS NEUT (OLG): 0.03 X10(3)/MCL (ref 2.1–9.2)
ALBUMIN SERPL-MCNC: 3.6 G/DL (ref 3.4–4.8)
ALBUMIN/GLOB SERPL: 1.2 RATIO (ref 1.1–2)
ALP SERPL-CCNC: 92 UNIT/L (ref 40–150)
ALT SERPL-CCNC: 10 UNIT/L (ref 0–55)
ANISOCYTOSIS BLD QL SMEAR: ABNORMAL
APPEARANCE UR: ABNORMAL
AST SERPL-CCNC: 13 UNIT/L (ref 5–34)
BACTERIA #/AREA URNS AUTO: ABNORMAL /HPF
BILIRUB SERPL-MCNC: 0.8 MG/DL
BILIRUB UR QL STRIP.AUTO: NEGATIVE
BLD PROD TYP BPU: NORMAL
BLD PROD TYP BPU: NORMAL
BLOOD UNIT EXPIRATION DATE: NORMAL
BLOOD UNIT EXPIRATION DATE: NORMAL
BLOOD UNIT TYPE CODE: 5100
BLOOD UNIT TYPE CODE: 6200
BUN SERPL-MCNC: 17.6 MG/DL (ref 8.4–25.7)
CALCIUM SERPL-MCNC: 9.2 MG/DL (ref 8.8–10)
CHLORIDE SERPL-SCNC: 105 MMOL/L (ref 98–107)
CO2 SERPL-SCNC: 27 MMOL/L (ref 23–31)
COLOR UR AUTO: YELLOW
CREAT SERPL-MCNC: 1.04 MG/DL (ref 0.73–1.18)
CROSSMATCH INTERPRETATION: NORMAL
CROSSMATCH INTERPRETATION: NORMAL
DISPENSE STATUS: NORMAL
DISPENSE STATUS: NORMAL
ERYTHROCYTE [DISTWIDTH] IN BLOOD BY AUTOMATED COUNT: 15.7 % (ref 11.5–17)
GFR SERPLBLD CREATININE-BSD FMLA CKD-EPI: >60 MLS/MIN/1.73/M2
GLOBULIN SER-MCNC: 3.1 GM/DL (ref 2.4–3.5)
GLUCOSE SERPL-MCNC: 111 MG/DL (ref 82–115)
GLUCOSE UR QL STRIP.AUTO: NEGATIVE
GROUP & RH: NORMAL
HCT VFR BLD AUTO: 20.5 % (ref 42–52)
HGB BLD-MCNC: 6.8 G/DL (ref 14–18)
INDIRECT COOMBS GEL: NORMAL
INSTRUMENT WBC (OLG): 0.52 X10(3)/MCL
KETONES UR QL STRIP.AUTO: NEGATIVE
LEUKOCYTE ESTERASE UR QL STRIP.AUTO: NEGATIVE
LYMPHOCYTES NFR BLD MANUAL: 0.44 X10(3)/MCL
LYMPHOCYTES NFR BLD MANUAL: 85 %
MCH RBC QN AUTO: 29.2 PG (ref 27–31)
MCHC RBC AUTO-ENTMCNC: 33.2 G/DL (ref 33–36)
MCV RBC AUTO: 88 FL (ref 80–94)
MONOCYTES NFR BLD MANUAL: 0.05 X10(3)/MCL (ref 0.1–1.3)
MONOCYTES NFR BLD MANUAL: 9 %
NEUTROPHILS NFR BLD MANUAL: 6 %
NITRITE UR QL STRIP.AUTO: NEGATIVE
NRBC BLD AUTO-RTO: 5.8 %
NRBC BLD MANUAL-RTO: 3 %
PH UR STRIP.AUTO: 5.5 [PH]
PLATELET # BLD AUTO: 22 X10(3)/MCL (ref 130–400)
PLATELET # BLD EST: ABNORMAL 10*3/UL
PMV BLD AUTO: 11.4 FL (ref 7.4–10.4)
POIKILOCYTOSIS BLD QL SMEAR: ABNORMAL
POTASSIUM SERPL-SCNC: 3.5 MMOL/L (ref 3.5–5.1)
PROT SERPL-MCNC: 6.7 GM/DL (ref 5.8–7.6)
PROT UR QL STRIP.AUTO: ABNORMAL
RBC # BLD AUTO: 2.33 X10(6)/MCL (ref 4.7–6.1)
RBC #/AREA URNS AUTO: ABNORMAL /HPF
RBC MORPH BLD: ABNORMAL
RBC UR QL AUTO: ABNORMAL
SODIUM SERPL-SCNC: 142 MMOL/L (ref 136–145)
SP GR UR STRIP.AUTO: 1.01 (ref 1–1.03)
SPECIMEN OUTDATE: NORMAL
SQUAMOUS #/AREA URNS AUTO: <5 /HPF
TARGETS BLD QL SMEAR: ABNORMAL
UNIT NUMBER: NORMAL
UNIT NUMBER: NORMAL
UROBILINOGEN UR STRIP-ACNC: 1
WBC # SPEC AUTO: 0.52 X10(3)/MCL (ref 4.5–11.5)
WBC #/AREA URNS AUTO: 132 /HPF

## 2023-10-26 PROCEDURE — P9037 PLATE PHERES LEUKOREDU IRRAD: HCPCS | Performed by: INTERNAL MEDICINE

## 2023-10-26 PROCEDURE — 99223 PR INITIAL HOSPITAL CARE,LEVL III: ICD-10-PCS | Mod: AI,,, | Performed by: INTERNAL MEDICINE

## 2023-10-26 PROCEDURE — 80053 COMPREHEN METABOLIC PANEL: CPT | Performed by: STUDENT IN AN ORGANIZED HEALTH CARE EDUCATION/TRAINING PROGRAM

## 2023-10-26 PROCEDURE — 99222 PR INITIAL HOSPITAL CARE,LEVL II: ICD-10-PCS | Mod: ,,, | Performed by: INTERNAL MEDICINE

## 2023-10-26 PROCEDURE — 87086 URINE CULTURE/COLONY COUNT: CPT | Performed by: STUDENT IN AN ORGANIZED HEALTH CARE EDUCATION/TRAINING PROGRAM

## 2023-10-26 PROCEDURE — 86900 BLOOD TYPING SEROLOGIC ABO: CPT | Performed by: STUDENT IN AN ORGANIZED HEALTH CARE EDUCATION/TRAINING PROGRAM

## 2023-10-26 PROCEDURE — 99222 1ST HOSP IP/OBS MODERATE 55: CPT | Mod: ,,, | Performed by: INTERNAL MEDICINE

## 2023-10-26 PROCEDURE — P9040 RBC LEUKOREDUCED IRRADIATED: HCPCS | Performed by: STUDENT IN AN ORGANIZED HEALTH CARE EDUCATION/TRAINING PROGRAM

## 2023-10-26 PROCEDURE — 36430 TRANSFUSION BLD/BLD COMPNT: CPT

## 2023-10-26 PROCEDURE — 25000003 PHARM REV CODE 250: Performed by: INTERNAL MEDICINE

## 2023-10-26 PROCEDURE — 86923 COMPATIBILITY TEST ELECTRIC: CPT | Performed by: STUDENT IN AN ORGANIZED HEALTH CARE EDUCATION/TRAINING PROGRAM

## 2023-10-26 PROCEDURE — 99223 1ST HOSP IP/OBS HIGH 75: CPT | Mod: AI,,, | Performed by: INTERNAL MEDICINE

## 2023-10-26 PROCEDURE — 25000003 PHARM REV CODE 250: Performed by: STUDENT IN AN ORGANIZED HEALTH CARE EDUCATION/TRAINING PROGRAM

## 2023-10-26 PROCEDURE — 96374 THER/PROPH/DIAG INJ IV PUSH: CPT

## 2023-10-26 PROCEDURE — 11000001 HC ACUTE MED/SURG PRIVATE ROOM

## 2023-10-26 PROCEDURE — 81001 URINALYSIS AUTO W/SCOPE: CPT | Performed by: STUDENT IN AN ORGANIZED HEALTH CARE EDUCATION/TRAINING PROGRAM

## 2023-10-26 PROCEDURE — 99285 EMERGENCY DEPT VISIT HI MDM: CPT | Mod: 25

## 2023-10-26 PROCEDURE — 85027 COMPLETE CBC AUTOMATED: CPT | Performed by: STUDENT IN AN ORGANIZED HEALTH CARE EDUCATION/TRAINING PROGRAM

## 2023-10-26 PROCEDURE — 96375 TX/PRO/DX INJ NEW DRUG ADDON: CPT

## 2023-10-26 PROCEDURE — 63600175 PHARM REV CODE 636 W HCPCS: Performed by: STUDENT IN AN ORGANIZED HEALTH CARE EDUCATION/TRAINING PROGRAM

## 2023-10-26 RX ORDER — TALC
6 POWDER (GRAM) TOPICAL NIGHTLY PRN
Status: DISCONTINUED | OUTPATIENT
Start: 2023-10-26 | End: 2023-10-27 | Stop reason: HOSPADM

## 2023-10-26 RX ORDER — ONDANSETRON 2 MG/ML
4 INJECTION INTRAMUSCULAR; INTRAVENOUS EVERY 8 HOURS PRN
Status: DISCONTINUED | OUTPATIENT
Start: 2023-10-26 | End: 2023-10-27 | Stop reason: HOSPADM

## 2023-10-26 RX ORDER — LANOLIN ALCOHOL/MO/W.PET/CERES
100 CREAM (GRAM) TOPICAL EVERY OTHER DAY
Status: DISCONTINUED | OUTPATIENT
Start: 2023-10-27 | End: 2023-10-27 | Stop reason: HOSPADM

## 2023-10-26 RX ORDER — SODIUM CHLORIDE 0.9 % (FLUSH) 0.9 %
10 SYRINGE (ML) INJECTION
Status: DISCONTINUED | OUTPATIENT
Start: 2023-10-26 | End: 2023-10-27 | Stop reason: HOSPADM

## 2023-10-26 RX ORDER — LISINOPRIL AND HYDROCHLOROTHIAZIDE 12.5; 2 MG/1; MG/1
1 TABLET ORAL DAILY
Status: DISCONTINUED | OUTPATIENT
Start: 2023-10-26 | End: 2023-10-26

## 2023-10-26 RX ORDER — MUPIROCIN 20 MG/G
OINTMENT TOPICAL 2 TIMES DAILY
Status: DISCONTINUED | OUTPATIENT
Start: 2023-10-26 | End: 2023-10-27 | Stop reason: HOSPADM

## 2023-10-26 RX ORDER — MORPHINE SULFATE 4 MG/ML
4 INJECTION, SOLUTION INTRAMUSCULAR; INTRAVENOUS
Status: COMPLETED | OUTPATIENT
Start: 2023-10-26 | End: 2023-10-26

## 2023-10-26 RX ORDER — GABAPENTIN 100 MG/1
100 CAPSULE ORAL EVERY EVENING
Status: DISCONTINUED | OUTPATIENT
Start: 2023-10-26 | End: 2023-10-27 | Stop reason: HOSPADM

## 2023-10-26 RX ORDER — HYDROCHLOROTHIAZIDE 12.5 MG/1
12.5 TABLET ORAL DAILY
Status: DISCONTINUED | OUTPATIENT
Start: 2023-10-26 | End: 2023-10-27 | Stop reason: HOSPADM

## 2023-10-26 RX ORDER — ONDANSETRON 2 MG/ML
4 INJECTION INTRAMUSCULAR; INTRAVENOUS
Status: COMPLETED | OUTPATIENT
Start: 2023-10-26 | End: 2023-10-26

## 2023-10-26 RX ORDER — TAMSULOSIN HYDROCHLORIDE 0.4 MG/1
0.4 CAPSULE ORAL DAILY
Status: DISCONTINUED | OUTPATIENT
Start: 2023-10-26 | End: 2023-10-27 | Stop reason: HOSPADM

## 2023-10-26 RX ORDER — HYDROCODONE BITARTRATE AND ACETAMINOPHEN 500; 5 MG/1; MG/1
TABLET ORAL
Status: DISCONTINUED | OUTPATIENT
Start: 2023-10-26 | End: 2023-10-27 | Stop reason: HOSPADM

## 2023-10-26 RX ORDER — LANOLIN ALCOHOL/MO/W.PET/CERES
1 CREAM (GRAM) TOPICAL DAILY
Status: DISCONTINUED | OUTPATIENT
Start: 2023-10-26 | End: 2023-10-27 | Stop reason: HOSPADM

## 2023-10-26 RX ORDER — OXYCODONE HYDROCHLORIDE 5 MG/1
5 TABLET ORAL EVERY 4 HOURS PRN
Status: DISCONTINUED | OUTPATIENT
Start: 2023-10-26 | End: 2023-10-27 | Stop reason: HOSPADM

## 2023-10-26 RX ORDER — MORPHINE SULFATE 4 MG/ML
4 INJECTION, SOLUTION INTRAMUSCULAR; INTRAVENOUS EVERY 4 HOURS PRN
Status: DISCONTINUED | OUTPATIENT
Start: 2023-10-26 | End: 2023-10-27 | Stop reason: HOSPADM

## 2023-10-26 RX ORDER — ACYCLOVIR 200 MG/1
400 CAPSULE ORAL 2 TIMES DAILY
Status: DISCONTINUED | OUTPATIENT
Start: 2023-10-26 | End: 2023-10-27 | Stop reason: HOSPADM

## 2023-10-26 RX ORDER — LISINOPRIL 20 MG/1
20 TABLET ORAL DAILY
Status: DISCONTINUED | OUTPATIENT
Start: 2023-10-26 | End: 2023-10-27 | Stop reason: HOSPADM

## 2023-10-26 RX ORDER — CITALOPRAM 20 MG/1
40 TABLET, FILM COATED ORAL DAILY
Status: DISCONTINUED | OUTPATIENT
Start: 2023-10-26 | End: 2023-10-27 | Stop reason: HOSPADM

## 2023-10-26 RX ORDER — HYOSCYAMINE SULFATE 0.12 MG/1
0.12 TABLET SUBLINGUAL EVERY 4 HOURS PRN
Status: DISCONTINUED | OUTPATIENT
Start: 2023-10-26 | End: 2023-10-27 | Stop reason: HOSPADM

## 2023-10-26 RX ADMIN — GABAPENTIN 100 MG: 100 CAPSULE ORAL at 05:10

## 2023-10-26 RX ADMIN — MORPHINE SULFATE 4 MG: 4 INJECTION, SOLUTION INTRAMUSCULAR; INTRAVENOUS at 05:10

## 2023-10-26 RX ADMIN — SODIUM CHLORIDE, POTASSIUM CHLORIDE, SODIUM LACTATE AND CALCIUM CHLORIDE 1000 ML: 600; 310; 30; 20 INJECTION, SOLUTION INTRAVENOUS at 06:10

## 2023-10-26 RX ADMIN — TAMSULOSIN HYDROCHLORIDE 0.4 MG: 0.4 CAPSULE ORAL at 10:10

## 2023-10-26 RX ADMIN — MUPIROCIN: 20 OINTMENT TOPICAL at 09:10

## 2023-10-26 RX ADMIN — ACYCLOVIR 400 MG: 200 CAPSULE ORAL at 09:10

## 2023-10-26 RX ADMIN — ACYCLOVIR 400 MG: 200 CAPSULE ORAL at 10:10

## 2023-10-26 RX ADMIN — CITALOPRAM HYDROBROMIDE 40 MG: 20 TABLET ORAL at 10:10

## 2023-10-26 RX ADMIN — LISINOPRIL 20 MG: 20 TABLET ORAL at 10:10

## 2023-10-26 RX ADMIN — ONDANSETRON 4 MG: 2 INJECTION INTRAMUSCULAR; INTRAVENOUS at 05:10

## 2023-10-26 RX ADMIN — FERROUS SULFATE TAB 325 MG (65 MG ELEMENTAL FE) 1 EACH: 325 (65 FE) TAB at 10:10

## 2023-10-26 RX ADMIN — CEFTRIAXONE SODIUM 1 G: 1 INJECTION, POWDER, FOR SOLUTION INTRAMUSCULAR; INTRAVENOUS at 07:10

## 2023-10-26 RX ADMIN — HYDROCHLOROTHIAZIDE 12.5 MG: 12.5 TABLET ORAL at 10:10

## 2023-10-26 NOTE — Clinical Note
Diagnosis: Obstructive uropathy [340179]   Admitting Provider:: NEETA VAZQUEZ [[id number]]   Future Attending Provider: NEETA VAZQUEZ [[id number]]   Reason for IP Medical Treatment  (Clinical interventions that can only be accomplished in the IP setting? ) :: Obstructive uropathy, Anemia, Leukemia   I certify that Inpatient services for greater than or equal to 2 midnights are medically necessary:: Yes   Plans for Post-Acute care--if anticipated (pick the single best option):: A. No post acute care anticipated at this time

## 2023-10-26 NOTE — H&P
[facility] Hospital Medicine History & Physical Examination       Patient Name: Hussein Gonzalez Jr.  MRN: [id number]  Patient Class: IP- Inpatient   Admission Date: 10/26/2023   Admitting Physician: SHELLEY Service   Length of Stay: 0  Attending Physician: Nayely Andres MD  Primary Care Provider: Nayely Andres MD  Face-to-Face encounter date: 10/26/2023  Code Status: Full  Chief Complaint: Abdominal Pain (C/O abd pain and bladder pain, onset Monday. States he has to strain to urinate. Oncologist: Alma for leukemia. Reports diarrhea Monday as well. )        Patient information was obtained from patient, patient's family, past medical records and ER records.  ED records were reviewed in detail and documented below    HISTORY OF PRESENT ILLNESS:   Hussein Gonzalez Jr. is a 73 y.o. male who  has a past medical history of Anxiety and depression, BPH (benign prostatic hyperplasia), History of CVA (cerebrovascular accident) without residual deficits, HTN (hypertension), Malignant melanoma of skin, unspecified, NSTEMI (non-ST elevated myocardial infarction), Sepsis, Gram negative, and Sleep apnea.. The patient presented to [facility] on 10/26/2023 with a primary complaint of abdominal pain    Mr. Hughes is a 73-year-old gentleman with CVA, hypertension, BPH, AML and chronic pancytopenia that presented to the hospital with complaints abdominal pain.  Patient was having some issues with constipation since 3 days ago.  He started with lower abdominal pain and also starting having difficulty urinating.  Also complained of distention of the abdomen.    He is currently getting treated for AML and get routine blood transfusion and platelet transfusions on an outpatient basis for his pancytopenia.    In the ED patient was able to void.  Was noted to be on levofloxacin.    His postvoid residual on ultrasound was noted to be 1 L and upon insertion of Judd catheter there was 2800 cc of urine  obtained.  He felt immediate relief after that.    Lab work consistent with pancytopenia with drop in H&H.    1 unit of irradiated packed red blood cells and platelets were ordered .  His significant other is at bedside at the time of my visit.    Abdominal pain is much improved.    Considering his obstructive uropathy symptoms, Urology will be consulted.  Patient may possibly need to be discharged with a Judd catheter and he is aware of the plan.    Urinalysis is noted with leukocytes, has been empirically initiated on Rocephin.      PAST MEDICAL HISTORY:     Past Medical History:   Diagnosis Date    Anxiety and depression     BPH (benign prostatic hyperplasia)     History of CVA (cerebrovascular accident) without residual deficits     HTN (hypertension)     Malignant melanoma of skin, unspecified     NSTEMI (non-ST elevated myocardial infarction)     Sepsis, Gram negative     Sleep apnea        PAST SURGICAL HISTORY:     Past Surgical History:   Procedure Laterality Date    BONE MARROW ASPIRATION N/A 6/27/2023    Procedure: ASPIRATION, BONE MARROW;  Surgeon: Sabino Núñez MD;  Location[facility];  Service: General;  Laterality: N/A;  ALL ASSESS FOR MOLECULAR MRD    BONE MARROW BIOPSY N/A 1/25/2023    Procedure: Biopsy-bone marrow;  Surgeon: Rd Carlos MD;  Location: [facility];  Service: General;  Laterality: N/A;    COLONOSCOPY      COLONOSCOPY N/A 1/24/2023    Procedure: COLONOSCOPY (DOUBLE);  Surgeon: Jimy Baker MD;  Location: [facility] ENDOSCOPY;  Service: Gastroenterology;  Laterality: N/A;    EGD, WITH CLOSED BIOPSY N/A 1/24/2023    Procedure: EGD, WITH CLOSED BIOPSY;  Surgeon: Jimy Baker MD;  Location: [facility] ENDOSCOPY;  Service: Gastroenterology;  Laterality: N/A;    EXCISION OF MELANOMA      SINUS SURGERY      TONSILLECTOMY         ALLERGIES:   Acetaminophen and Benadryl [diphenhydramine hcl]    FAMILY HISTORY:   Reviewed and negative    SOCIAL HISTORY:     Social History     Tobacco Use     Smoking status: Never    Smokeless tobacco: Never   Substance Use Topics    Alcohol use: Not Currently     Alcohol/week: 7.0 standard drinks of alcohol     Types: 7 Cans of beer per week        HOME MEDICATIONS:     Prior to Admission medications    Medication Sig Start Date End Date Taking? Authorizing Provider   (Magic mouthwash) 1:1:1 diphenhydrAMINE(Benadryl) 12.5mg/5ml liq, aluminum & magnesium hydroxide-simethicone (Maalox), LIDOcaine viscous 2% Swish and spit 15 mLs every 4 (four) hours as needed (mouth pain). 4/6/23  Yes Eduard Luna II, MD   (Magic mouthwash) 1:1:1 diphenhydrAMINE(Benadryl) 12.5mg/5ml liq, aluminum & magnesium hydroxide-simethicone (Maalox), LIDOcaine viscous 2% Swish and spit 15 mLs every 4 (four) hours as needed (mouth sores). for mouth sores 10/9/23  Yes Jenna Bobby FNP   acyclovir (ZOVIRAX) 400 MG tablet Take 1 tablet by mouth twice daily 4/20/23  Yes Jenna Bobby FNP   citalopram (CELEXA) 40 MG tablet Take 1 tablet by mouth once daily 10/23/23  Yes Luis Alberto Patel FNP   ferrous sulfate (IRON ORAL) Take 1,000 mg by mouth.   Yes Provider, Historical   gabapentin (NEURONTIN) 100 MG capsule TAKE 1 CAPSULE BY MOUTH IN THE EVENING 5/18/23  Yes Luis Alberto Patel FNP   Lactobacillus rhamnosus GG (CULTURELLE) 10 billion cell capsule Take 3 capsules by mouth once daily. Pt states he takes 3 in the morning and 2 and night   Yes Provider, Historical   levoFLOXacin (LEVAQUIN) 500 MG tablet Take 1 tablet by mouth once daily 10/23/23  Yes Jenna Bobby FNP   lisinopriL-hydrochlorothiazide (PRINZIDE,ZESTORETIC) 20-12.5 mg per tablet Take 1 tablet by mouth once daily  Patient taking differently: 0.5 tab daily 1/9/23  Yes Nayely Andres MD   prochlorperazine (COMPAZINE) 10 MG tablet Take 1 tablet (10 mg total) by mouth every 6 (six) hours as needed (for nausea). 2nd choice, can cause drowsiness. 2/23/23 2/23/24 Yes Charissa Schafer NP   pyridoxine, vitamin B6, (B-6) 100 MG  Tab Take 100 mg by mouth every other day.   Yes Provider, Historical   rosuvastatin (CRESTOR) 40 MG Tab Take 40 mg by mouth once daily. 8/15/23  Yes Provider, Historical   venetoclax (VENCLEXTA) 100 mg Tab Take 200 mg on days 1-14 of a 28 day cycle. 8/8/23  Yes Eduard Luna II, MD   vitamin D (VITAMIN D3) 1000 units Tab Take 1,000 Units by mouth once daily.   Yes Provider, Historical   vitamin E 400 UNIT capsule Take 180 mg by mouth Daily. 8/27/21  Yes Provider, Historical       REVIEW OF SYSTEMS:   Except as documented, all other systems reviewed and negative     PHYSICAL EXAM:     VITAL SIGNS: 24 HRS MIN & MAX LAST   Temp  Min: 97.9 °F (36.6 °C)  Max: 98.5 °F (36.9 °C) 97.9 °F (36.6 °C)   BP  Min: 107/67  Max: 111/54 107/67   Pulse  Min: 63  Max: 74  66   Resp  Min: 15  Max: 18 17   SpO2  Min: 95 %  Max: 98 % 96 %       General appearance:  Alert and oriented, no acute distress at the time of my visit    HENT: Atraumatic head. Moist mucous membranes of oral cavity.  Eyes: Normal extraocular movements.   Neck: Supple.   Lungs: Clear to auscultation bilaterally. No wheezing present.   Heart: Regular rate and rhythm. S1 and S2 present with no murmurs/gallop/rub. No pedal edema. No JVD present.   Abdomen: Soft, no distention, some soreness in the hypogastric area however much improved as per patient with no more tenderness to palpation    Extremities: No cyanosis, clubbing, or edema.  Skin: No Rash.   Neuro:  Nonfocal   Psych/mental status: Appropriate mood and affect. Responds appropriately to questions.     LABS AND IMAGING:     Recent Labs   Lab 10/23/23  0954 10/26/23  0419   WBC 0.65* 0.52*   RBC 2.82* 2.33*   HGB 8.1* 6.8*   HCT 25.8* 20.5*   MCV 91.5 88.0   MCH 28.7 29.2   MCHC 31.4* 33.2   RDW 15.0 15.7   PLT 12* 22*   MPV 8.2 11.4*       Recent Labs   Lab 10/23/23  0954 10/26/23  0419    142   K 3.6 3.5   CO2 31 27   BUN 15.0 17.6   CREATININE 1.11 1.04   CALCIUM 9.2 9.2   ALBUMIN 3.7 3.6    ALKPHOS 97 92   ALT 10 10   AST 14 13   BILITOT 0.8 0.8       Microbiology Results (last 7 days)       Procedure Component Value Units Date/Time    Urine culture [[id number]] Collected: 10/26/23 0615    Order Status: Sent Specimen: Urine Updated: 10/26/23 0657             X-Ray Chest 1 View  Narrative: EXAMINATION:  XR CHEST 1 VIEW    CLINICAL HISTORY:  generlized weakness;Weakness    TECHNIQUE:  One view    COMPARISON:  December 14, 2022.    FINDINGS:  Cardiopericardial silhouette is upper limits of normal.  Lungs are without dense focal or segmental consolidation, congestive process, pleural effusions or pneumothorax.  Impression: NO ACUTE CARDIOPULMONARY PROCESS IDENTIFIED.    Electronically signed by: Xavier Crandall  Date:    06/11/2023  Time:    12:43        ASSESSMENT & PLAN:     Acute Urinary retention  -2800 cc of urine was obtained upon insertion of Judd, continue with indwelling catheter   -initiating patient on Flomax   -urology has been consulted, will follow-up on recommendations   -on all probably the patient will need to be discharged home with a Judd with outpatient follow-up with Urology     2. Questionable UTI   -possibly from stagnant urine in the bladder causing elevated leukocytes   -empirically initiated on Rocephin, monitor cultures     3. AML   -followed by Oncology, medications will be resumed, okay to take home meds     4. Pancytopenia  -drop in H&H from 8.1 and 25.8 on 10/23/2020 3-6.8 and 20.5 today   -type crossmatch and transfuse irradiated 1 unit of packed red blood cells as well as 1 unit of platelets     5. Anxiety/depression   -on Celexa at home, will be resumed      VTE Prophylaxis:  SCDs     Patient condition:  Stable  __________________________________________________________________________  INPATIENT LIST OF MEDICATIONS     Scheduled Meds:   0.9%  NaCl infusion (for blood administration)   Intravenous Once    0.9%  NaCl infusion (for blood administration)   Intravenous Once     0.9%  NaCl infusion (for blood administration)   Intravenous Once    0.9%  NaCl infusion (for blood administration)   Intravenous Once    0.9%  NaCl infusion (for blood administration)   Intravenous Once    0.9%  NaCl infusion (for blood administration)   Intravenous Once    0.9%  NaCl infusion (for blood administration)   Intravenous Once    acetaminophen  650 mg Oral Once    acetaminophen  650 mg Oral Once    acyclovir  400 mg Oral BID    citalopram  40 mg Oral Daily    diphenhydrAMINE  25 mg Oral Once    diphenhydrAMINE  25 mg Oral Once    diphenhydrAMINE  25 mg Oral Once    ferrous sulfate  1 tablet Oral Daily    gabapentin  100 mg Oral Daily PM    lisinopriL  20 mg Oral Daily    And    hydroCHLOROthiazide  12.5 mg Oral Daily    mupirocin   Nasal BID    [START ON 10/27/2023] pyridoxine (vitamin B6)  100 mg Oral Every other day    venetoclax   Oral Daily     Continuous Infusions:  PRN Meds:.(Magic mouthwash) 1:1:1 diphenhydrAMINE(Benadryl) 12.5mg/5ml liq, aluminum & magnesium hydroxide-simethicone (Maalox), LIDOcaine viscous 2%, 0.9%  NaCl infusion (for blood administration), 0.9%  NaCl infusion (for blood administration), melatonin, morphine, ondansetron, oxyCODONE, sodium chloride 0.9%        All diagnosis and differential diagnosis have been reviewed; assessment and plan has been documented; I have personally reviewed the labs and test results that are presently available; I have reviewed the patients medication list; I have reviewed the consulting providers response and recommendations. I have reviewed or attempted to review medical records based upon their availability.    All of the patient and family questions have been addressed and answered. Patient's is agreeable to the above stated plan. I will continue to monitor closely and make adjustments to medical management as needed.      Nayely Andres MD   10/26/2023

## 2023-10-26 NOTE — CONSULTS
Subjective:       Patient ID: Hussein Gonzalez Jr. is a 73 y.o. male.    Chief Complaint: Abdominal Pain (C/O abd pain and bladder pain, onset Monday. States he has to strain to urinate. Oncologist: [place] for leukemia. Reports diarrhea Monday as well. 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    Current Treatment:   Azacitidine and venetoclax started on 02/27/2023.  Referral for possible stem cell transplant evaluation.    Treatment History:  N/A    HPI:  73-year-old male with a history of CVA, hypertension, BPH, and melanoma who presented to the emergency department following a syncopal episode.  In February of 2022, the patient did have a slightly low white blood cell count, hemoglobin, and platelet count.  Per the patient, 3 months prior to admission, he was having dizziness, fatigue, weakness, shortness of breath, back pain, and leg pain.  The dizziness was worse with standing and walking.  On 01/20/2023, the patient had a syncopal episode.  He was brought to the emergency department and was found to be pancytopenic.  He also had stool that was positive for occult blood in the emergency department.  He received blood transfusion.  His CBC on 01/20/2023 revealed a white blood cell count of 2.6, hemoglobin of 7.7, and platelet count of 47,000. Reticulocytes were appropriately elevated, B12 and folate were within normal limits, iron level was slightly decreased.  Repeat blood work on 01/21/2023 revealed a white blood cell count of 2.8, post transfusion hemoglobin of 9.6, and a [place] 32699.  Hematology was consulted for pancytopenia.  EGD and colonoscopy done on 01/24/2023, EGD revealed nonbleeding ulcers, colonoscopy unremarkable, however had to be aborted due to tortuous colon.  Bone Marrow Biopsy done on 01/25/2023.  Pathology revealed AML with 25% blasts in a background of dysplasia.   Cytogenetics are within normal limits, FISH is within normal limits.  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  Started azacitidine and venetoclax on 02/27/2023, completed cycle 4 on 05/30/2023.  Held after this cycle and repeated a bone marrow biopsy, this was done on 06/27/2023, this showed a normocellular marrow (40%) with relative erythroid hyperplasia, sequential granulopoiesis and megakaryopoiesis.  The bone marrow was slightly suboptimal given it was predominantly subcortical bone with focal hematopoietic elements.  There was no morphologic or immunophenotypic evidence of previous acute myeloid leukemia, cytogenetics were normal, FISH was normal, NGS showed persistent ASXL1, SRSF2, TET2.      Interval History:   73-year-old patient well known to me with acute leukemia currently on azacitidine and venetoclax.  He presented to the [facility] Emergency Department on 10/26/2023 with abdominal pain.  He was noted to have a distended bladder, he was able to void, but an ultrasound postvoid showed 2800 mL still in the bladder.  A Judd catheter was placed.  This relieved his obstruction.  He felt much better after that.  Oncology consulted as patient known to me.    Today, 10/26/2023:  I saw the patient in the emergency department.  He was feeling much better.  He was amenable to having blood and platelet transfusions.  He was also amenable to going home with his Judd catheter and being placed on Flomax.    Past Medical History:   Diagnosis Date    Anxiety and depression     BPH (benign prostatic hyperplasia)     History of CVA (cerebrovascular accident) without residual deficits     HTN (hypertension)     Malignant melanoma of skin, unspecified     NSTEMI (non-ST elevated myocardial infarction)     Sepsis, Gram negative     Sleep apnea       Past Surgical History:   Procedure Laterality Date    BONE MARROW ASPIRATION N/A 6/27/2023    Procedure: ASPIRATION, BONE  MARROW;  Surgeon: Sabino Núñez MD;  Location: [facility];  Service: General;  Laterality: N/A;  ALL ASSESS FOR MOLECULAR MRD    BONE MARROW BIOPSY N/A 1/25/2023    Procedure: Biopsy-bone marrow;  Surgeon: Rd Carlos MD;  Location: [facility] OR;  Service: General;  Laterality: N/A;    COLONOSCOPY      COLONOSCOPY N/A 1/24/2023    Procedure: COLONOSCOPY (DOUBLE);  Surgeon: Jimy Baker MD;  Location: [facility] ENDOSCOPY;  Service: Gastroenterology;  Laterality: N/A;    EGD, WITH CLOSED BIOPSY N/A 1/24/2023    Procedure: EGD, WITH CLOSED BIOPSY;  Surgeon: Jimy Baker MD;  Location: [facility] ENDOSCOPY;  Service: Gastroenterology;  Laterality: N/A;    EXCISION OF MELANOMA      SINUS SURGERY      TONSILLECTOMY       Social History     Socioeconomic History    Marital status: Significant Other   Tobacco Use    Smoking status: Never    Smokeless tobacco: Never   Substance and Sexual Activity    Alcohol use: Not Currently     Alcohol/week: 7.0 standard drinks of alcohol     Types: 7 Cans of beer per week    Drug use: Never    Sexual activity: Yes     Social Determinants of Health     Financial Resource Strain: Low Risk  (4/20/2023)    Overall Financial Resource Strain (CARDIA)     Difficulty of Paying Living Expenses: Not hard at all   Food Insecurity: No Food Insecurity (4/20/2023)    Hunger Vital Sign     Worried About Running Out of Food in the Last Year: Never true     Ran Out of Food in the Last Year: Never true   Transportation Needs: No Transportation Needs (4/20/2023)    PRAPARE - Transportation     Lack of Transportation (Medical): No     Lack of Transportation (Non-Medical): No   Physical Activity: Insufficiently Active (4/20/2023)    Exercise Vital Sign     Days of Exercise per Week: 3 days     Minutes of Exercise per Session: 30 min   Stress: No Stress Concern Present (4/20/2023)    [employer] [place] [employer] - Occupational Stress Questionnaire     Feeling of Stress : Not at all   Social  Connections: Unknown (4/20/2023)    Social Connection and Isolation Panel [NHANES]     Frequency of Communication with Friends and Family: More than three times a week     Frequency of Social Gatherings with Friends and Family: More than three times a week     Attends [employer] Services: Patient refused     Active Member of Clubs or Organizations: Patient refused     Attends Club or Organization Meetings: Patient refused     Marital Status:    Housing Stability: Low Risk  (4/20/2023)    Housing Stability Vital Sign     Unable to Pay for Housing in the Last Year: No     Number of Places Lived in the Last Year: 1     Unstable Housing in the Last Year: No      Family History   Problem Relation Age of Onset    Cancer Mother     Cancer Father       Review of patient's allergies indicates:   Allergen Reactions    Acetaminophen Other (See Comments)     Restless leg     Benadryl [diphenhydramine hcl] Other (See Comments)     Restless Legs      Review of Systems   Constitutional:  Positive for fatigue. Negative for chills, diaphoresis, fever and unexpected weight change.   HENT:  Negative for nasal congestion, mouth sores, sinus pressure/congestion and sore throat.    Eyes:  Negative for pain and visual disturbance.   Respiratory:  Negative for cough, chest tightness and shortness of breath.    Cardiovascular:  Negative for chest pain, palpitations and leg swelling.   Gastrointestinal:  Positive for abdominal pain. Negative for abdominal distention, blood in stool, constipation and diarrhea.   Genitourinary:  Positive for difficulty urinating. Negative for dysuria, frequency and hematuria.   Musculoskeletal:  Positive for arthralgias and myalgias. Negative for back pain.   Integumentary:  Negative for rash.   Neurological:  Positive for weakness. Negative for dizziness, syncope, numbness and headaches.   Hematological:  Negative for adenopathy.   Psychiatric/Behavioral:  Negative for confusion.          Objective:       Physical Exam  Vitals reviewed.   Constitutional:       General: He is awake.      Appearance: Normal appearance.   HENT:      Head: Normocephalic and atraumatic.      Right Ear: Hearing normal.      Left Ear: Hearing normal.      Nose: Nose normal.   Eyes:      General: Lids are normal. Vision grossly intact.      Extraocular Movements: Extraocular movements intact.      Conjunctiva/sclera: Conjunctivae normal.   Cardiovascular:      Rate and Rhythm: Normal rate and regular rhythm.      Pulses: Normal pulses.      Heart sounds: Normal heart sounds.   Pulmonary:      Effort: Pulmonary effort is normal.      Breath sounds: Normal breath sounds. No wheezing, rhonchi or rales.   Abdominal:      General: Bowel sounds are normal.      Palpations: Abdomen is soft.      Tenderness: There is no abdominal tenderness.   Musculoskeletal:      Cervical back: Full passive range of motion without pain.      Right lower leg: No edema.      Left lower leg: No edema.   Skin:     General: Skin is warm.      Comments:      Neurological:      General: No focal deficit present.      Mental Status: He is alert and oriented to person, place, and time.   Psychiatric:         Attention and Perception: Attention normal.         Mood and Affect: Mood and affect normal.         Behavior: Behavior is cooperative.         LABS AND IMAGING REVIEWED IN EPIC          Assessment:   AML with 25% blasts, background dysplasia, normal cytogenetics and normal FISH. 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    Treating with azacitidine and venetoclax, Started on 02/27/2023.  Bone marrow biopsy after cycle 4 showed no immunophenotypic or morphologic evidence of AML.  NGS  showed continued ASXL1, SRSF2, TET2 mutations.  Cytogenetics were normal.    Patient presented with obstructive uropathy. Judd catheter placed, patient feels much better.     Continue prophylaxis with 500 mg of levofloxacin daily, 200 mg of acyclovir b.i.d., 200 mg of fluconazole daily.    Continue venetoclax while admitted.     Hopeful discharge tomorrow.     Transfuse blood and platelets today.    Once discharged, will continue twice weekly labs (Monday/Thursday) with transfusions as needed.    **He restarted treatment on 07/10/2023, on 08/14/2023, we will dose reduce by changing venetoclax to 14 out of every 28 days.

## 2023-10-26 NOTE — CONSULTS
Hussein Gonzalez Kell 1950  [id number]  10/26/2023    CONSULTING PHYSICIAN: Dr. Hill Mo    REASON FOR CONSULTATION: obstructive uropathy    HPI:  The patient is a 73-year-old male with a past medical history of anxiety, depression, CVA without residual deficits, hypertension, malignant melanoma, AML and chronic pancytopenia.  The patient reports he began with some constipation Monday followed by difficulties urinating.  He presented to the emergency room early this morning due to abdominal pain and distention.  He urinated some on arrival to the emergency room, postvoid residual greater than 1 L.  Judd was inserted with 1800 mL urine obtained.  He had immediate relief in his abdominal pain and distention following Judd insertion.  He has been hemodynamically stable and afebrile since arrival.  Lab work reveals WBC 0.52, H&H 6.8/20.5, BUN and creatinine 17.6/1.04; UA with turbid yellow urine, negative nitrites, negative leukocytes, 0-2 RBC, 132 WBC, 1+ bacteria.  Urine culture is pending.  He is on Rocephin.    The patient is resting in bed.  He denies any history of prostate issues or urologic surgeries.  He does report 1 previous episode of urinary retention when he sustained his stroke.  He temporarily required Judd catheterization at that time but has not had any issues since.  He has never seen a urologist.  He denies any Judd discomfort at time of rounds.  His abdominal pain remains resolved since Judd placed.  He is receiving blood transfusion.    Past Medical History:   Diagnosis Date    Anxiety and depression     BPH (benign prostatic hyperplasia)     History of CVA (cerebrovascular accident) without residual deficits     HTN (hypertension)     Malignant melanoma of skin, unspecified     NSTEMI (non-ST elevated myocardial infarction)     Sepsis, Gram negative     Sleep apnea      Past Surgical History:   Procedure Laterality Date    BONE MARROW ASPIRATION N/A 6/27/2023    Procedure: ASPIRATION,  Refill request for glipizide  medication.      [facility]       Last visit - 7-6-2021     Pending visit - 10-7-2021    Last refill -4-1-2021      Medication Contract signed -   Missael collins-         Additional Comments BONE MARROW;  Surgeon: Sabino Núñez MD;  Location: [facility] OR;  Service: General;  Laterality: N/A;  ALL ASSESS FOR MOLECULAR MRD    BONE MARROW BIOPSY N/A 1/25/2023    Procedure: Biopsy-bone marrow;  Surgeon: Rd Carlos MD;  Location: [facility] OR;  Service: General;  Laterality: N/A;    COLONOSCOPY      COLONOSCOPY N/A 1/24/2023    Procedure: COLONOSCOPY (DOUBLE);  Surgeon: Jimy Baker MD;  Location: [facility] ENDOSCOPY;  Service: Gastroenterology;  Laterality: N/A;    EGD, WITH CLOSED BIOPSY N/A 1/24/2023    Procedure: EGD, WITH CLOSED BIOPSY;  Surgeon: Jimy Baker MD;  Location: [facility] ENDOSCOPY;  Service: Gastroenterology;  Laterality: N/A;    EXCISION OF MELANOMA      SINUS SURGERY      TONSILLECTOMY       Family History   Problem Relation Age of Onset    Cancer Mother     Cancer Father      Social History     Tobacco Use    Smoking status: Never    Smokeless tobacco: Never   Substance Use Topics    Alcohol use: Not Currently     Alcohol/week: 7.0 standard drinks of alcohol     Types: 7 Cans of beer per week    Drug use: Never     Current Facility-Administered Medications   Medication Dose Route Frequency Provider Last Rate Last Admin    (Magic mouthwash) 1:1:1 diphenhydrAMINE(Benadryl) 12.5mg/5ml liq, aluminum & magnesium hydroxide-simethicone (Maalox), LIDOcaine viscous 2%  15 mL Swish & Spit Q4H PRN Nayely Andres MD        0.9%  NaCl infusion (for blood administration)   Intravenous Once Eduard Luna II, MD        0.9%  NaCl infusion (for blood administration)   Intravenous Once Eduard Luna II, MD        0.9%  NaCl infusion (for blood administration)   Intravenous Once Eduard Luna II, MD        0.9%  NaCl infusion (for blood administration)   Intravenous Once Eduard Luna II, MD        0.9%  NaCl infusion (for blood administration)   Intravenous Once Jenna Bobby FNP        0.9%  NaCl infusion (for blood administration)   Intravenous Once Jenna Bobby FNP         0.9%  NaCl infusion (for blood administration)   Intravenous Once Bobby, Jenna L, FNP        0.9%  NaCl infusion (for blood administration)   Intravenous Q24H PRN Hill Mo MD        0.9%  NaCl infusion (for blood administration)   Intravenous Q24H PRN Nayely Andres MD        acetaminophen tablet 650 mg  650 mg Oral Once Bobby, Jenna L, FNP        acetaminophen tablet 650 mg  650 mg Oral Once Bobby, Jenna L, FNP        acyclovir capsule 400 mg  400 mg Oral BID Nayely Andres MD        citalopram tablet 40 mg  40 mg Oral Daily Nayely Andres MD        diphenhydrAMINE capsule 25 mg  25 mg Oral Once DemetriotEduard II, MD        diphenhydrAMINE capsule 25 mg  25 mg Oral Once Bobby, Jenna L, FNP        diphenhydrAMINE capsule 25 mg  25 mg Oral Once Bobby, Jenna L, FNP        ferrous sulfate tablet 1 each  1 tablet Oral Daily Nayely Andres MD        gabapentin capsule 100 mg  100 mg Oral Daily PM Nayely Andres MD        lisinopriL tablet 20 mg  20 mg Oral Daily Nayely Andres MD        And    hydroCHLOROthiazide tablet 12.5 mg  12.5 mg Oral Daily Nayely Andres MD        melatonin tablet 6 mg  6 mg Oral Nightly PRN Hill Mo MD        morphine injection 4 mg  4 mg Intravenous Q4H PRN Hill Mo MD        mupirocin 2 % ointment   Nasal BID Nayely Andres MD        ondansetron injection 4 mg  4 mg Intravenous Q8H PRN Hlil Mo MD        oxyCODONE immediate release tablet 5 mg  5 mg Oral Q4H PRN Hill Mo MD        [START ON 10/27/2023] pyridoxine (vitamin B6) tablet 100 mg  100 mg Oral Every other day Nayely Andres MD        sodium chloride 0.9% flush 10 mL  10 mL Intravenous PRN Hill Mo MD        venetoclax Tab   Oral Daily Nayely Andres MD         Current Outpatient Medications   Medication Sig Dispense Refill    (Magic mouthwash) 1:1:1 diphenhydrAMINE(Benadryl) 12.5mg/5ml liq, aluminum & magnesium  hydroxide-simethicone (Maalox), LIDOcaine viscous 2% Swish and spit 15 mLs every 4 (four) hours as needed (mouth pain). 450 mL 0    (Magic mouthwash) 1:1:1 diphenhydrAMINE(Benadryl) 12.5mg/5ml liq, aluminum & magnesium hydroxide-simethicone (Maalox), LIDOcaine viscous 2% Swish and spit 15 mLs every 4 (four) hours as needed (mouth sores). for mouth sores 600 mL 1    acyclovir (ZOVIRAX) 400 MG tablet Take 1 tablet by mouth twice daily 60 tablet 0    citalopram (CELEXA) 40 MG tablet Take 1 tablet by mouth once daily 90 tablet 0    ferrous sulfate (IRON ORAL) Take 1,000 mg by mouth.      gabapentin (NEURONTIN) 100 MG capsule TAKE 1 CAPSULE BY MOUTH IN THE EVENING 30 capsule 0    Lactobacillus rhamnosus GG (CULTURELLE) 10 billion cell capsule Take 3 capsules by mouth once daily. Pt states he takes 3 in the morning and 2 and night      levoFLOXacin (LEVAQUIN) 500 MG tablet Take 1 tablet by mouth once daily 30 tablet 2    lisinopriL-hydrochlorothiazide (PRINZIDE,ZESTORETIC) 20-12.5 mg per tablet Take 1 tablet by mouth once daily (Patient taking differently: 0.5 tab daily) 90 tablet 2    prochlorperazine (COMPAZINE) 10 MG tablet Take 1 tablet (10 mg total) by mouth every 6 (six) hours as needed (for nausea). 2nd choice, can cause drowsiness. 30 tablet 3    pyridoxine, vitamin B6, (B-6) 100 MG Tab Take 100 mg by mouth every other day.      rosuvastatin (CRESTOR) 40 MG Tab Take 40 mg by mouth once daily.      venetoclax (VENCLEXTA) 100 mg Tab Take 200 mg on days 1-14 of a 28 day cycle. 28 tablet 5    vitamin D (VITAMIN D3) 1000 units Tab Take 1,000 Units by mouth once daily.      vitamin E 400 UNIT capsule Take 180 mg by mouth Daily.       Review of patient's allergies indicates:   Allergen Reactions    Acetaminophen Other (See Comments)     Restless leg     Benadryl [diphenhydramine hcl] Other (See Comments)     Restless Legs       ROS: 12 point review of systems negative other than the HPI    PHYSICAL EXAM:  Vitals:     10/26/23 0637 10/26/23 0702 10/26/23 0834 10/26/23 0855   BP: (!) 111/54 110/66 107/67 107/67   BP Location:       Patient Position:       Pulse: 63 65 65 66   Resp: 17 15 18 17   Temp:  98.5 °F (36.9 °C) 98.1 °F (36.7 °C) 97.9 °F (36.6 °C)   TempSrc:   Oral Oral   SpO2: 96% 95% 98% 96%   Weight:       Height:           Intake/Output Summary (Last 24 hours) at 10/26/2023 1021  Last data filed at 10/26/2023 0543  Gross per 24 hour   Intake --   Output 1900 ml   Net -1900 ml       GEN: WN/WD NAD  HEENT: normocephalic, atraumatic, PERRLA, EOMI, OP clear, nares patent  CV: RRR  RESP: Even and unlabored  ABD: soft, NTND  : clear yellow urine with small blood clot in tubing, draining well  EXT: no C/C/E  NEURO: no focal deficits, MAEW, AAOx4    LABS:  Recent Results (from the past 24 hour(s))   Comprehensive metabolic panel    Collection Time: 10/26/23  4:19 AM   Result Value Ref Range    Sodium Level 142 136 - 145 mmol/L    Potassium Level 3.5 3.5 - 5.1 mmol/L    Chloride 105 98 - 107 mmol/L    Carbon Dioxide 27 23 - 31 mmol/L    Glucose Level 111 82 - 115 mg/dL    Blood Urea Nitrogen 17.6 8.4 - 25.7 mg/dL    Creatinine 1.04 0.73 - 1.18 mg/dL    Calcium Level Total 9.2 8.8 - 10.0 mg/dL    Protein Total 6.7 5.8 - 7.6 gm/dL    Albumin Level 3.6 3.4 - 4.8 g/dL    Globulin 3.1 2.4 - 3.5 gm/dL    Albumin/Globulin Ratio 1.2 1.1 - 2.0 ratio    Bilirubin Total 0.8 <=1.5 mg/dL    Alkaline Phosphatase 92 40 - 150 unit/L    Alanine Aminotransferase 10 0 - 55 unit/L    Aspartate Aminotransferase 13 5 - 34 unit/L    eGFR >60 mls/min/1.73/m2   CBC with Differential    Collection Time: 10/26/23  4:19 AM   Result Value Ref Range    WBC 0.52 (LL) 4.50 - 11.50 x10(3)/mcL    RBC 2.33 (L) 4.70 - 6.10 x10(6)/mcL    Hgb 6.8 (L) 14.0 - 18.0 g/dL    Hct 20.5 (L) 42.0 - 52.0 %    MCV 88.0 80.0 - 94.0 fL    MCH 29.2 27.0 - 31.0 pg    MCHC 33.2 33.0 - 36.0 g/dL    RDW 15.7 11.5 - 17.0 %    Platelet 22 (LL) 130 - 400 x10(3)/mcL    MPV 11.4 (H)  7.4 - 10.4 fL    NRBC% 5.8 %   Urinalysis, Reflex to Urine Culture    Collection Time: 10/26/23  6:15 AM    Specimen: Urine   Result Value Ref Range    Color, UA Yellow Yellow, Light-Yellow, Dark Yellow, Brittani, Straw    Appearance, UA Turbid (A) Clear    Specific Gravity, UA 1.014 1.005 - 1.030    pH, UA 5.5 5.0 - 8.5    Protein, UA 2+ (A) Negative    Glucose, UA Negative Negative, Normal    Ketones, UA Negative Negative    Blood, UA 1+ (A) Negative    Bilirubin, UA Negative Negative    Urobilinogen, UA 1.0 0.2, 1.0, Normal    Nitrites, UA Negative Negative    Leukocyte Esterase, UA Negative Negative   Urinalysis, Microscopic    Collection Time: 10/26/23  6:15 AM   Result Value Ref Range    RBC, UA 0-2 None Seen, 0-2, 3-5, 0-5 /HPF    WBC,  (H) <=5 /HPF    Squamous Epithelial Cells, UA <5 <=5 /HPF    Bacteria, UA 1+ (A) None Seen, Rare, Occasional /HPF   Type & Screen    Collection Time: 10/26/23  6:33 AM   Result Value Ref Range    Group & Rh O POS     Indirect Sher GEL NEG     Specimen Outdate 10/29/2023 23:59    Prepare RBC 1 Unit    Collection Time: 10/26/23  6:33 AM   Result Value Ref Range    UNIT NUMBER [id number]     UNIT ABO/RH O POS     DISPENSE STATUS Issued     Unit Expiration [id number]     Product Code S2506Y65     Unit Blood Type Code 5100     CROSSMATCH INTERPRETATION Compatible    Prepare Platelets 1 Dose    Collection Time: 10/26/23  6:33 AM   Result Value Ref Range    UNIT NUMBER [id number]     UNIT ABO/RH A POS     DISPENSE STATUS Selected     Unit Expiration [id number]     Product Code Q5004E52     Unit Blood Type Code 6200     CROSSMATCH INTERPRETATION Not required        IMAGING:  Imaging Results    None         ASSESSMENT:  -urinary retention likely s/t constipation s/p jasso with 1.8L urine obtained  -? UTI - cultures pending  -AML, pancytopenia - receiving PRBC/plt  transfusion today    PLAN:  -he has been started on Flomax   -continue antibiotics and tailor according to  final C&S results   -continue indwelling Judd.  Recommend discharge home with Judd in place and he will follow up outpatient in 7-10 days for voiding trial.  This was discussed with the patient and his family at bedside.  The are in agreement with this plan.  -Please call as needed with any issues      Brittani Morris NP

## 2023-10-26 NOTE — ED PROVIDER NOTES
Encounter Date: 10/26/2023    SCRIBE #1 NOTE: I, Keya Ga, am scribing for, and in the presence of,  Hill Mo MD. I have scribed the entire note.       History     Chief Complaint   Patient presents with    Abdominal Pain     C/O abd pain and bladder pain, onset Monday. States he has to strain to urinate. Oncologist: Alma for leukemia. Reports diarrhea Monday as well.      73 year old female with a hx of AML (sees Dr. Luna), and HTN presents to the ED for abdominal pain. Pt reports that he began dealing with constipation on Monday afternoon. Pt also notes he has had difficulty urinating since Monday afternoon. Pt has been experiencing lower abdominal pain due to the difficulty urinating. Pt was able to void some urine tonight, but still feels the need to urinate. Pt states his abdomen is more distended than normal. Pt currently rates his abdominal pain a 7/10.Pt states he is taking Levaquin.    The history is provided by the patient. No  was used.     Review of patient's allergies indicates:   Allergen Reactions    Acetaminophen Other (See Comments)     Restless leg     Benadryl [diphenhydramine hcl] Other (See Comments)     Restless Legs     Past Medical History:   Diagnosis Date    Anxiety and depression     BPH (benign prostatic hyperplasia)     History of CVA (cerebrovascular accident) without residual deficits     HTN (hypertension)     Malignant melanoma of skin, unspecified     NSTEMI (non-ST elevated myocardial infarction)     Sepsis, Gram negative     Sleep apnea      Past Surgical History:   Procedure Laterality Date    BONE MARROW ASPIRATION N/A 6/27/2023    Procedure: ASPIRATION, BONE MARROW;  Surgeon: Sabino Núñez MD;  Location: [facility];  Service: General;  Laterality: N/A;  ALL ASSESS FOR MOLECULAR MRD    BONE MARROW BIOPSY N/A 1/25/2023    Procedure: Biopsy-bone marrow;  Surgeon: Rd Carlos MD;  Location: [facility] OR;  Service: General;  Laterality: N/A;     COLONOSCOPY      COLONOSCOPY N/A 1/24/2023    Procedure: COLONOSCOPY (DOUBLE);  Surgeon: Jimy Baker MD;  Location: [facility] ENDOSCOPY;  Service: Gastroenterology;  Laterality: N/A;    EGD, WITH CLOSED BIOPSY N/A 1/24/2023    Procedure: EGD, WITH CLOSED BIOPSY;  Surgeon: Jimy Baker MD;  Location: [facility] ENDOSCOPY;  Service: Gastroenterology;  Laterality: N/A;    EXCISION OF MELANOMA      SINUS SURGERY      TONSILLECTOMY       Family History   Problem Relation Age of Onset    Cancer Mother     Cancer Father      Social History     Tobacco Use    Smoking status: Never    Smokeless tobacco: Never   Substance Use Topics    Alcohol use: Not Currently     Alcohol/week: 7.0 standard drinks of alcohol     Types: 7 Cans of beer per week    Drug use: Never     Review of Systems   Constitutional:  Negative for fever.   HENT:  Negative for sore throat.    Eyes:  Negative for visual disturbance.   Respiratory:  Negative for shortness of breath.    Cardiovascular:  Negative for chest pain.   Gastrointestinal:  Positive for abdominal distention, abdominal pain and constipation.   Genitourinary:  Positive for difficulty urinating.   Musculoskeletal:  Negative for joint swelling.   Skin:  Negative for rash.   Allergic/Immunologic: Positive for immunocompromised state.   Neurological:  Negative for weakness.   Psychiatric/Behavioral:  Negative for confusion.    All other systems reviewed and are negative.      Physical Exam     Initial Vitals [10/26/23 0352]   BP Pulse Resp Temp SpO2   108/68 74 16 98.5 °F (36.9 °C) 98 %      MAP       --         Physical Exam    Nursing note and vitals reviewed.  Constitutional: He appears well-developed and well-nourished. He is not diaphoretic. No distress.   HENT:   Head: Normocephalic and atraumatic.   Eyes: Conjunctivae and EOM are normal. Pupils are equal, round, and reactive to light.   Neck:   Normal range of motion.  Cardiovascular:  Normal rate, regular rhythm, normal heart  sounds and intact distal pulses.           No murmur heard.  Pulmonary/Chest: Breath sounds normal. No respiratory distress. He has no wheezes. He has no rales.   Abdominal: Abdomen is soft. There is abdominal tenderness in the suprapubic area.   Musculoskeletal:         General: No tenderness or edema. Normal range of motion.      Cervical back: Normal range of motion.     Neurological: He is alert and oriented to person, place, and time. No cranial nerve deficit.   Skin: Skin is warm and dry. Capillary refill takes less than 2 seconds. No rash noted. No erythema.   Psychiatric: He has a normal mood and affect.         ED Course   Critical Care    Date/Time: 10/26/2023 7:00 AM    Performed by: Hill Mo MD  Authorized by: Hill Mo MD  Direct patient critical care time: 12 minutes  Additional history critical care time: 8 minutes  Ordering / reviewing critical care time: 6 minutes  Documentation critical care time: 5 minutes  Consulting other physicians critical care time: 8 minutes  Total critical care time (exclusive of procedural time) : 39 minutes  Critical care time was exclusive of separately billable procedures and treating other patients and teaching time.  Critical care was necessary to treat or prevent imminent or life-threatening deterioration of the following conditions: metabolic crisis.  Critical care was time spent personally by me on the following activities: development of treatment plan with patient or surrogate, discussions with consultants, interpretation of cardiac output measurements, evaluation of patient's response to treatment, examination of patient, obtaining history from patient or surrogate, ordering and performing treatments and interventions, ordering and review of laboratory studies, ordering and review of radiographic studies, pulse oximetry, re-evaluation of patient's condition and review of old charts.        Labs Reviewed   URINALYSIS, REFLEX TO URINE CULTURE -  Abnormal; Notable for the following components:       Result Value    Appearance, UA Turbid (*)     Protein, UA 2+ (*)     Blood, UA 1+ (*)     All other components within normal limits   CBC WITH DIFFERENTIAL - Abnormal; Notable for the following components:    WBC 0.52 (*)     RBC 2.33 (*)     Hgb 6.8 (*)     Hct 20.5 (*)     Platelet 22 (*)     MPV 11.4 (*)     All other components within normal limits   URINALYSIS, MICROSCOPIC - Abnormal; Notable for the following components:    WBC,  (*)     Bacteria, UA 1+ (*)     All other components within normal limits   CULTURE, URINE   COMPREHENSIVE METABOLIC PANEL   CBC W/ AUTO DIFFERENTIAL    Narrative:     The following orders were created for panel order CBC auto differential.  Procedure                               Abnormality         Status                     ---------                               -----------         ------                     CBC with Differential[[id number]]       Abnormal            Final result               Manual Differential[[id number]]                             In process                   Please view results for these tests on the individual orders.   MANUAL DIFFERENTIAL   TYPE & SCREEN   PREPARE RBC SOFT          Imaging Results    None          Medications   lactated ringers bolus 1,000 mL (0 mLs Intravenous Stopped 10/26/23 0741)   0.9%  NaCl infusion (for blood administration) (has no administration in time range)   sodium chloride 0.9% flush 10 mL (has no administration in time range)   melatonin tablet 6 mg (has no administration in time range)   oxyCODONE immediate release tablet 5 mg (has no administration in time range)   morphine injection 4 mg (has no administration in time range)   ondansetron injection 4 mg (has no administration in time range)   mupirocin 2 % ointment (has no administration in time range)   cefTRIAXone (ROCEPHIN) 1 g in dextrose 5 % in water (D5W) 100 mL IVPB (MB+) (has no administration in time  range)   morphine injection 4 mg (4 mg Intravenous Given 10/26/23 0523)   ondansetron injection 4 mg (4 mg Intravenous Given 10/26/23 0523)     Medical Decision Making  Problems Addressed:  Abdominal pain, unspecified abdominal location: acute illness or injury that poses a threat to life or bodily functions  Lower abdominal pain: acute illness or injury that poses a threat to life or bodily functions  Neutropenia, unspecified type: acute illness or injury that poses a threat to life or bodily functions  Obstructive uropathy: acute illness or injury that poses a threat to life or bodily functions  Thrombocytopenia: acute illness or injury that poses a threat to life or bodily functions    Amount and/or Complexity of Data Reviewed  Labs: ordered.    Risk  OTC drugs.  Prescription drug management.  Parenteral controlled substances.  Decision regarding hospitalization.  Diagnosis or treatment significantly limited by social determinants of health.            Scribe Attestation:   Scribe #1: I performed the above scribed service and the documentation accurately describes the services I performed. I attest to the accuracy of the note.    Attending Attestation:           Physician Attestation for Scribe:  Physician Attestation Statement for Scribe #1: I, Hill Mo MD, reviewed documentation, as scribed by Keya Ga in my presence, and it is both accurate and complete.                        Medical Decision Making:   History:   I obtained history from: someone other than patient.       <> Summary of History: Collateral from family.  Old Medical Records: I decided to obtain old medical records.  Old Records Summarized: records from clinic visits, records from previous admission(s) and records from another hospital.       <> Summary of Records: Reviewed outside records, typically neutropenic, thrombocytopenic  Initial Assessment:   Lower abdominal pain  Differential Diagnosis:   Judging by the patient's chief  complaint and pertinent history, the patient has the following possible differential diagnoses, including but not limited to the following.  Some of these are deemed to be lower likelihood and some more likely based on my physical exam and history combined with possible lab work and/or imaging studies.   Please see the pertinent studies, and refer to the HPI.  Some of these diagnoses will take further evaluation to fully rule out, perhaps as an outpatient and the patient was encouraged to follow up when discharged for more comprehensive evaluation.      UTI, obstructive uropathy, acute renal failure,  Clinical Tests:   Lab Tests: Ordered and Reviewed  ED Management:    Patient is a 73-year-old male presents to emergency department for difficulty urination, has only been a few drops every few hours.  Bedside ultrasound after postvoid showed greater than 1 L of urine.  Judd catheter placed with 1.8 L of diuresis.  Urinalysis with evidence of infection.  Started on Rocephin.  Labs obtained.  CBC with no neutropenia, thrombocytopenia.  Patient with a hemoglobin of 6.8.  Will transfuse.  Discussed with Hematology-Oncology.  Discussed with Dr. Andres for admission.  All results discussed with the patient family.  Answered all questions at this time.  Verbalized understanding and agreed to plan.  Hemodynamically stable at this time.      Clinical Impression:   Final diagnoses:  [N13.9] Obstructive uropathy  [R10.9] Abdominal pain, unspecified abdominal location  [R10.30] Lower abdominal pain (Primary)  [D70.9] Neutropenia, unspecified type  [D69.6] Thrombocytopenia  [N39.0] Urinary tract infection without hematuria, site unspecified        ED Disposition Condition    Admit Stable                Hill Mo MD  10/26/23 0628       Hill Mo MD  10/26/23 0658       Hill Mo MD  10/26/23 0700

## 2023-10-27 VITALS
OXYGEN SATURATION: 96 % | SYSTOLIC BLOOD PRESSURE: 148 MMHG | DIASTOLIC BLOOD PRESSURE: 81 MMHG | BODY MASS INDEX: 24.52 KG/M2 | RESPIRATION RATE: 16 BRPM | HEIGHT: 73 IN | WEIGHT: 185 LBS | HEART RATE: 62 BPM | TEMPERATURE: 98 F

## 2023-10-27 LAB
ALBUMIN SERPL-MCNC: 3 G/DL (ref 3.4–4.8)
ALBUMIN/GLOB SERPL: 1.3 RATIO (ref 1.1–2)
ALP SERPL-CCNC: 83 UNIT/L (ref 40–150)
ALT SERPL-CCNC: 11 UNIT/L (ref 0–55)
AST SERPL-CCNC: 14 UNIT/L (ref 5–34)
BASOPHILS # BLD AUTO: 0 X10(3)/MCL
BASOPHILS NFR BLD AUTO: 0 %
BILIRUB SERPL-MCNC: 0.9 MG/DL
BUN SERPL-MCNC: 19.4 MG/DL (ref 8.4–25.7)
CALCIUM SERPL-MCNC: 8.4 MG/DL (ref 8.8–10)
CHLORIDE SERPL-SCNC: 107 MMOL/L (ref 98–107)
CO2 SERPL-SCNC: 30 MMOL/L (ref 23–31)
CREAT SERPL-MCNC: 0.89 MG/DL (ref 0.73–1.18)
EOSINOPHIL # BLD AUTO: 0 X10(3)/MCL (ref 0–0.9)
EOSINOPHIL NFR BLD AUTO: 0 %
ERYTHROCYTE [DISTWIDTH] IN BLOOD BY AUTOMATED COUNT: 16 % (ref 11.5–17)
GFR SERPLBLD CREATININE-BSD FMLA CKD-EPI: >60 MLS/MIN/1.73/M2
GLOBULIN SER-MCNC: 2.3 GM/DL (ref 2.4–3.5)
GLUCOSE SERPL-MCNC: 93 MG/DL (ref 82–115)
HCT VFR BLD AUTO: 21.2 % (ref 42–52)
HGB BLD-MCNC: 7.3 G/DL (ref 14–18)
IMM GRANULOCYTES # BLD AUTO: 0 X10(3)/MCL (ref 0–0.04)
IMM GRANULOCYTES NFR BLD AUTO: 0 %
LYMPHOCYTES # BLD AUTO: 0.38 X10(3)/MCL (ref 0.6–4.6)
LYMPHOCYTES NFR BLD AUTO: 76 %
MCH RBC QN AUTO: 29.7 PG (ref 27–31)
MCHC RBC AUTO-ENTMCNC: 34.4 G/DL (ref 33–36)
MCV RBC AUTO: 86.2 FL (ref 80–94)
MONOCYTES # BLD AUTO: 0.09 X10(3)/MCL (ref 0.1–1.3)
MONOCYTES NFR BLD AUTO: 18 %
NEUTROPHILS # BLD AUTO: 0.03 X10(3)/MCL (ref 2.1–9.2)
NEUTROPHILS NFR BLD AUTO: 6 %
NRBC BLD AUTO-RTO: 4 %
PLATELET # BLD AUTO: 38 X10(3)/MCL (ref 130–400)
PMV BLD AUTO: 10.8 FL (ref 7.4–10.4)
POTASSIUM SERPL-SCNC: 3.9 MMOL/L (ref 3.5–5.1)
PROT SERPL-MCNC: 5.3 GM/DL (ref 5.8–7.6)
RBC # BLD AUTO: 2.46 X10(6)/MCL (ref 4.7–6.1)
SODIUM SERPL-SCNC: 142 MMOL/L (ref 136–145)
WBC # SPEC AUTO: 0.5 X10(3)/MCL (ref 4.5–11.5)

## 2023-10-27 PROCEDURE — 25000003 PHARM REV CODE 250: Performed by: INTERNAL MEDICINE

## 2023-10-27 PROCEDURE — 99239 PR HOSPITAL DISCHARGE DAY,>30 MIN: ICD-10-PCS | Mod: ,,, | Performed by: INTERNAL MEDICINE

## 2023-10-27 PROCEDURE — 80053 COMPREHEN METABOLIC PANEL: CPT | Performed by: STUDENT IN AN ORGANIZED HEALTH CARE EDUCATION/TRAINING PROGRAM

## 2023-10-27 PROCEDURE — 99239 HOSP IP/OBS DSCHRG MGMT >30: CPT | Mod: ,,, | Performed by: INTERNAL MEDICINE

## 2023-10-27 PROCEDURE — 99232 SBSQ HOSP IP/OBS MODERATE 35: CPT | Mod: ,,, | Performed by: INTERNAL MEDICINE

## 2023-10-27 PROCEDURE — 99232 PR SUBSEQUENT HOSPITAL CARE,LEVL II: ICD-10-PCS | Mod: ,,, | Performed by: INTERNAL MEDICINE

## 2023-10-27 PROCEDURE — 85025 COMPLETE CBC W/AUTO DIFF WBC: CPT | Performed by: STUDENT IN AN ORGANIZED HEALTH CARE EDUCATION/TRAINING PROGRAM

## 2023-10-27 RX ORDER — HYOSCYAMINE SULFATE 0.12 MG/1
0.12 TABLET SUBLINGUAL EVERY 4 HOURS PRN
Qty: 30 TABLET | Refills: 0 | Status: SHIPPED | OUTPATIENT
Start: 2023-10-27

## 2023-10-27 RX ORDER — TAMSULOSIN HYDROCHLORIDE 0.4 MG/1
0.4 CAPSULE ORAL DAILY
Qty: 30 CAPSULE | Refills: 11 | Status: SHIPPED | OUTPATIENT
Start: 2023-10-28 | End: 2024-10-27

## 2023-10-27 RX ADMIN — ACYCLOVIR 400 MG: 200 CAPSULE ORAL at 08:10

## 2023-10-27 RX ADMIN — HYDROCHLOROTHIAZIDE 12.5 MG: 12.5 TABLET ORAL at 08:10

## 2023-10-27 RX ADMIN — TAMSULOSIN HYDROCHLORIDE 0.4 MG: 0.4 CAPSULE ORAL at 08:10

## 2023-10-27 RX ADMIN — PYRIDOXINE HCL TAB 50 MG 100 MG: 50 TAB at 08:10

## 2023-10-27 RX ADMIN — CITALOPRAM HYDROBROMIDE 40 MG: 20 TABLET ORAL at 08:10

## 2023-10-27 RX ADMIN — FERROUS SULFATE TAB 325 MG (65 MG ELEMENTAL FE) 1 EACH: 325 (65 FE) TAB at 08:10

## 2023-10-27 RX ADMIN — LISINOPRIL 20 MG: 20 TABLET ORAL at 08:10

## 2023-10-27 RX ADMIN — MUPIROCIN: 20 OINTMENT TOPICAL at 08:10

## 2023-10-27 NOTE — DISCHARGE SUMMARY
[facility] Medicine Discharge Summary    Admit Date: 10/26/2023  Discharge Date and Time: 10/27/826687:47 AM  Admitting Physician:  Team  Discharging Physician: Nayely Andres MD.  Primary Care Physician: Nayely Andres MD  Consults: Hematology/Oncology and Urology    Discharge Diagnoses:  Obstructive uropathy, pancytopenia    Hospital Course:   Mr. Hughes is a 73-year-old gentleman with CVA, hypertension, BPH, AML and chronic pancytopenia that presented to the hospital with complaints abdominal pain.  Patient was having some issues with constipation since 3 days ago.  He started with lower abdominal pain and also starting having difficulty urinating.  Also complained of distention of the abdomen.    He is currently getting treated for AML and get routine blood transfusion and platelet transfusions on an outpatient basis for his pancytopenia.     In the ED patient was able to void.  Was noted to be on levofloxacin.    His postvoid residual on ultrasound was noted to be 1 L and upon insertion of Judd catheter there was 2800 cc of urine obtained.  He felt immediate relief after that.    Lab work consistent with pancytopenia with drop in H&H.    1 unit of irradiated packed red blood cells and platelets were ordered .  Patient was monitored overnight because of the significant output through the bladder after insertion of Judd.  He remained stable hemodynamically.  Tolerated the transfusions well.  Has been cleared from the Hematology standpoint for discharge.  Was initiated on Flomax and will be continued.  He will have a follow-up appointment with Urology outpatient basis and will be discharging home with a Judd with a leg bag.      Pt was seen and examined on the day of discharge  Vitals:  VITAL SIGNS: 24 HRS MIN & MAX LAST   Temp  Min: 97.9 °F (36.6 °C)  Max: 98.1 °F (36.7 °C) 98.1 °F (36.7 °C)   BP  Min: 118/62  Max: 148/81 (!) 148/81   Pulse  Min: 61   Max: 88  62   Resp  Min: 14  Max: 20 16   SpO2  Min: 94 %  Max: 99 % 96 %       Physical Exam:  General:  Alert and oriented, no acute distress , Judd in place  Cardiovascular:  S1-S2, no murmurs, rubs, gallops   Abdomen:  Soft, nontender   Respiratory: Clear to auscultation bilaterally   Neuro:  Nonfocal       Procedures Performed: No admission procedures for hospital encounter.     Significant Diagnostic Studies: See Full reports for all details    Recent Labs   Lab 10/23/23  0954 10/26/23  0419 10/27/23  0500   WBC 0.65* 0.52  0.52* 0.50*   RBC 2.82* 2.33* 2.46*   HGB 8.1* 6.8* 7.3*   HCT 25.8* 20.5* 21.2*   MCV 91.5 88.0 86.2   MCH 28.7 29.2 29.7   MCHC 31.4* 33.2 34.4   RDW 15.0 15.7 16.0   PLT 12* 22* 38*   MPV 8.2 11.4* 10.8*       Recent Labs   Lab 10/23/23  0954 10/26/23  0419 10/27/23  0500    142 142   K 3.6 3.5 3.9   CO2 31 27 30   BUN 15.0 17.6 19.4   CREATININE 1.11 1.04 0.89   CALCIUM 9.2 9.2 8.4*   ALBUMIN 3.7 3.6 3.0*   ALKPHOS 97 92 83   ALT 10 10 11   AST 14 13 14   BILITOT 0.8 0.8 0.9        Microbiology Results (last 7 days)       Procedure Component Value Units Date/Time    Urine culture [[id number]] Collected: 10/26/23 0615    Order Status: Completed Specimen: Urine Updated: 10/27/23 0636     Urine Culture No Growth At 24 Hours             X-Ray Chest 1 View  Narrative: EXAMINATION:  XR CHEST 1 VIEW    CLINICAL HISTORY:  generlized weakness;Weakness    TECHNIQUE:  One view    COMPARISON:  December 14, 2022.    FINDINGS:  Cardiopericardial silhouette is upper limits of normal.  Lungs are without dense focal or segmental consolidation, congestive process, pleural effusions or pneumothorax.  Impression: NO ACUTE CARDIOPULMONARY PROCESS IDENTIFIED.    Electronically signed by: Xavier Crandall  Date:    06/11/2023  Time:    12:43         Medication List        START taking these medications      hyoscyamine 0.125 mg Subl  Place 1 tablet (0.125 mg total) under the tongue every 4 (four) hours as  needed.     tamsulosin 0.4 mg Cap  Commonly known as: FLOMAX  Take 1 capsule (0.4 mg total) by mouth once daily.  Start taking on: October 28, 2023            CHANGE how you take these medications      (MAGIC MOUTHWASH) 1:1:1 BENADRYL 12.5MG/5ML LIQ, ALUMINUM & MAGNESIUM  Swish and spit 15 mLs every 4 (four) hours as needed (mouth sores). for mouth sores  What changed: Another medication with the same name was removed. Continue taking this medication, and follow the directions you see here.     lisinopriL-hydrochlorothiazide 20-12.5 mg per tablet  Commonly known as: PRINZIDE,ZESTORETIC  Take 1 tablet by mouth once daily  What changed:   how much to take  how to take this  when to take this  additional instructions            CONTINUE taking these medications      acyclovir 400 MG tablet  Commonly known as: ZOVIRAX  Take 1 tablet by mouth twice daily     citalopram 40 MG tablet  Commonly known as: CeleXA  Take 1 tablet by mouth once daily     gabapentin 100 MG capsule  Commonly known as: NEURONTIN  TAKE 1 CAPSULE BY MOUTH IN THE EVENING     IRON ORAL     Lactobacillus rhamnosus GG 10 billion cell capsule  Commonly known as: CULTURELLE     prochlorperazine 10 MG tablet  Commonly known as: COMPAZINE  Take 1 tablet (10 mg total) by mouth every 6 (six) hours as needed (for nausea). 2nd choice, can cause drowsiness.     pyridoxine (vitamin B6) 100 MG Tab  Commonly known as: B-6     rosuvastatin 40 MG Tab  Commonly known as: CRESTOR     venetoclax 100 mg Tab  Commonly known as: VENCLEXTA  Take 200 mg on days 1-14 of a 28 day cycle.     vitamin D 1000 units Tab  Commonly known as: VITAMIN D3     vitamin E 400 UNIT capsule            STOP taking these medications      levoFLOXacin 500 MG tablet  Commonly known as: LEVAQUIN               Where to Get Your Medications        These medications were sent to [facility] - [place], [place] - [address]  [address] [place] [address]      Phone:  [phone]   hyoscyamine 0.125 mg Subl  tamsulosin 0.4 mg Cap          Explained in detail to the patient about the discharge plan, medications, and follow-up visits. Pt understands and agrees with the treatment plan  Discharge Disposition: Home or Self Care   Discharged Condition: stable  Diet- Regular   Dietary Orders (From admission, onward)       Start     Ordered    10/26/23 0812  Diet Adult Regular  Diet effective now         10/26/23 0811                   Medications Per DC med rec  Activities as tolerated   Follow-up Information       Arlet Shah MD. Go to.    Specialty: Urology  Why: Office will call with follow up appointment date and time.  Contact information:  [address] [place] [address]  [phone]               Eduard Luna II, MD. Go to.    Specialties: Oncology, Hematology and Oncology  Why: Office will call with follow up appointment date and time.  Contact information:  [address] [place] [address]  [phone]                           For further questions contact Dr Andres's office    Discharge time 33 minutes    For worsening symptoms, chest pain, shortness of breath, increased abdominal pain, high grade fever, stroke or stroke like symptoms, immediately go to the nearest Emergency Room or call 911 as soon as possible.      Nayely Bruno M.D, on 10/27/2023. at 11:47 AM.

## 2023-10-27 NOTE — NURSING
Nurses Note -- 4 Eyes      10/27/2023   1:28 AM      Skin assessed during: Admit      [x] No Altered Skin Integrity Present    []Prevention Measures Documented      [] Yes- Altered Skin Integrity Present or Discovered   [] LDA Added if Not in Epic (Describe Wound)   [] New Altered Skin Integrity was Present on Admit and Documented in LDA   [] Wound Image Taken    Wound Care Consulted? No    Attending Nurse:  Richard PAINTER RN    Second RN/Staff Member:   Emi CHAVEZ RN          07-Sep-2019 10:52

## 2023-10-27 NOTE — PROGRESS NOTES
Subjective:       Patient ID: Hussein Gonzalez Jr. is a 73 y.o. male.    Chief Complaint: Abdominal Pain (C/O abd pain and bladder pain, onset Monday. States he has to strain to urinate. Oncologist: [place] for leukemia. Reports diarrhea Monday as well. 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    Current Treatment:   Azacitidine and venetoclax started on 02/27/2023.  Referral for possible stem cell transplant evaluation.    Treatment History:  N/A    HPI:  73-year-old male with a history of CVA, hypertension, BPH, and melanoma who presented to the emergency department following a syncopal episode.  In February of 2022, the patient did have a slightly low white blood cell count, hemoglobin, and platelet count.  Per the patient, 3 months prior to admission, he was having dizziness, fatigue, weakness, shortness of breath, back pain, and leg pain.  The dizziness was worse with standing and walking.  On 01/20/2023, the patient had a syncopal episode.  He was brought to the emergency department and was found to be pancytopenic.  He also had stool that was positive for occult blood in the emergency department.  He received blood transfusion.  His CBC on 01/20/2023 revealed a white blood cell count of 2.6, hemoglobin of 7.7, and platelet count of 47,000. Reticulocytes were appropriately elevated, B12 and folate were within normal limits, iron level was slightly decreased.  Repeat blood work on 01/21/2023 revealed a white blood cell count of 2.8, post transfusion hemoglobin of 9.6, and a platelet count of 40681.  Hematology was consulted for pancytopenia.  EGD and colonoscopy done on 01/24/2023, EGD revealed nonbleeding ulcers, colonoscopy unremarkable, however had to be aborted due to tortuous colon.  Bone Marrow Biopsy done on 01/25/2023.  Pathology revealed AML with 25% blasts in a background of dysplasia.   Cytogenetics are within normal limits, FISH is within normal limits.  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  Started azacitidine and venetoclax on 02/27/2023, completed cycle 4 on 05/30/2023.  Held after this cycle and repeated a bone marrow biopsy, this was done on 06/27/2023, this showed a normocellular marrow (40%) with relative erythroid hyperplasia, sequential granulopoiesis and megakaryopoiesis.  The bone marrow was slightly suboptimal given it was predominantly subcortical bone with focal hematopoietic elements.  There was no morphologic or immunophenotypic evidence of previous acute myeloid leukemia, cytogenetics were normal, FISH was normal, NGS showed persistent ASXL1, SRSF2, TET2.      Interval History:   73-year-old patient well known to me with acute leukemia currently on azacitidine and venetoclax.  He presented to the [facility] Emergency Department on 10/26/2023 with abdominal pain.  He was noted to have a distended bladder, he was able to void, but an ultrasound postvoid showed 2800 mL still in the bladder.  A Judd catheter was placed.  This relieved his obstruction.  He felt much better after that.  Oncology consulted as patient known to me.    Today, 10/27/2023:  Patient doing much better.  His hemoglobin and platelets are at an appropriate level.  He will be discharged today and continue his Judd catheter.  He already has follow up with us scheduled.  He knows to keep that follow up.    Past Medical History:   Diagnosis Date    Anxiety and depression     BPH (benign prostatic hyperplasia)     History of CVA (cerebrovascular accident) without residual deficits     HTN (hypertension)     Malignant melanoma of skin, unspecified     NSTEMI (non-ST elevated myocardial infarction)     Sepsis, Gram negative     Sleep apnea       Past Surgical History:   Procedure Laterality Date    BONE MARROW ASPIRATION N/A 6/27/2023    Procedure: ASPIRATION, BONE  MARROW;  Surgeon: Sabino Núñez MD;  Location: [facility];  Service: General;  Laterality: N/A;  ALL ASSESS FOR MOLECULAR MRD    BONE MARROW BIOPSY N/A 1/25/2023    Procedure: Biopsy-bone marrow;  Surgeon: Rd Carlos MD;  Location: [facility] OR;  Service: General;  Laterality: N/A;    COLONOSCOPY      COLONOSCOPY N/A 1/24/2023    Procedure: COLONOSCOPY (DOUBLE);  Surgeon: Jimy Baker MD;  Location: [facility] ENDOSCOPY;  Service: Gastroenterology;  Laterality: N/A;    EGD, WITH CLOSED BIOPSY N/A 1/24/2023    Procedure: EGD, WITH CLOSED BIOPSY;  Surgeon: Jimy Baker MD;  Location: [facility] ENDOSCOPY;  Service: Gastroenterology;  Laterality: N/A;    EXCISION OF MELANOMA      SINUS SURGERY      TONSILLECTOMY       Social History     Socioeconomic History    Marital status: Significant Other   Tobacco Use    Smoking status: Never    Smokeless tobacco: Never   Substance and Sexual Activity    Alcohol use: Not Currently     Alcohol/week: 7.0 standard drinks of alcohol     Types: 7 Cans of beer per week    Drug use: Never    Sexual activity: Yes     Social Determinants of Health     Financial Resource Strain: Low Risk  (4/20/2023)    Overall Financial Resource Strain (CARDIA)     Difficulty of Paying Living Expenses: Not hard at all   Food Insecurity: No Food Insecurity (4/20/2023)    Hunger Vital Sign     Worried About Running Out of Food in the Last Year: Never true     Ran Out of Food in the Last Year: Never true   Transportation Needs: No Transportation Needs (4/20/2023)    PRAPARE - Transportation     Lack of Transportation (Medical): No     Lack of Transportation (Non-Medical): No   Physical Activity: Insufficiently Active (4/20/2023)    Exercise Vital Sign     Days of Exercise per Week: 3 days     Minutes of Exercise per Session: 30 min   Stress: No Stress Concern Present (4/20/2023)    [employer] [place] [employer] - Occupational Stress Questionnaire     Feeling of Stress : Not at all   Social  Connections: Unknown (4/20/2023)    Social Connection and Isolation Panel [NHANES]     Frequency of Communication with Friends and Family: More than three times a week     Frequency of Social Gatherings with Friends and Family: More than three times a week     Attends Cheondoism Services: Patient refused     Active Member of Clubs or Organizations: Patient refused     Attends Club or Organization Meetings: Patient refused     Marital Status:    Housing Stability: Low Risk  (4/20/2023)    Housing Stability Vital Sign     Unable to Pay for Housing in the Last Year: No     Number of Places Lived in the Last Year: 1     Unstable Housing in the Last Year: No      Family History   Problem Relation Age of Onset    Cancer Mother     Cancer Father       Review of patient's allergies indicates:   Allergen Reactions    Acetaminophen Other (See Comments)     Restless leg     Benadryl [diphenhydramine hcl] Other (See Comments)     Restless Legs      Review of Systems   Constitutional:  Positive for fatigue. Negative for chills, diaphoresis, fever and unexpected weight change.   HENT:  Negative for nasal congestion, mouth sores, sinus pressure/congestion and sore throat.    Eyes:  Negative for pain and visual disturbance.   Respiratory:  Negative for cough, chest tightness and shortness of breath.    Cardiovascular:  Negative for chest pain, palpitations and leg swelling.   Gastrointestinal:  Positive for abdominal pain. Negative for abdominal distention, blood in stool, constipation and diarrhea.   Genitourinary:  Positive for difficulty urinating. Negative for dysuria, frequency and hematuria.   Musculoskeletal:  Positive for arthralgias and myalgias. Negative for back pain.   Integumentary:  Negative for rash.   Neurological:  Positive for weakness. Negative for dizziness, syncope, numbness and headaches.   Hematological:  Negative for adenopathy.   Psychiatric/Behavioral:  Negative for confusion.          Objective:       Physical Exam  Vitals reviewed.   Constitutional:       General: He is awake.      Appearance: Normal appearance.   HENT:      Head: Normocephalic and atraumatic.      Right Ear: Hearing normal.      Left Ear: Hearing normal.      Nose: Nose normal.   Eyes:      General: Lids are normal. Vision grossly intact.      Extraocular Movements: Extraocular movements intact.      Conjunctiva/sclera: Conjunctivae normal.   Cardiovascular:      Rate and Rhythm: Normal rate and regular rhythm.      Pulses: Normal pulses.      Heart sounds: Normal heart sounds.   Pulmonary:      Effort: Pulmonary effort is normal.      Breath sounds: Normal breath sounds. No wheezing, rhonchi or rales.   Abdominal:      General: Bowel sounds are normal.      Palpations: Abdomen is soft.      Tenderness: There is no abdominal tenderness.   Musculoskeletal:      Cervical back: Full passive range of motion without pain.      Right lower leg: No edema.      Left lower leg: No edema.   Skin:     General: Skin is warm.      Comments:      Neurological:      General: No focal deficit present.      Mental Status: He is alert and oriented to person, place, and time.   Psychiatric:         Attention and Perception: Attention normal.         Mood and Affect: Mood and affect normal.         Behavior: Behavior is cooperative.         LABS AND IMAGING REVIEWED IN EPIC          Assessment:   AML with 25% blasts, background dysplasia, normal cytogenetics and normal FISH. 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    Treating with azacitidine and venetoclax, Started on 02/27/2023.  Bone marrow biopsy after cycle 4 showed no immunophenotypic or morphologic evidence of AML.  NGS  showed continued ASXL1, SRSF2, TET2 mutations.  Cytogenetics were normal.    Patient presented with obstructive uropathy. Judd catheter placed, patient feels much better.     Continue prophylaxis with 500 mg of levofloxacin daily, 200 mg of acyclovir b.i.d., 200 mg of fluconazole daily.    We will be discharged today.    Will continue twice weekly labs (Monday/Thursday) with transfusions as needed.    **He restarted treatment on 07/10/2023, on 08/14/2023, we will dose reduce by changing venetoclax to 14 out of every 28 days.

## 2023-10-28 LAB — BACTERIA UR CULT: NO GROWTH

## 2023-10-30 ENCOUNTER — LAB VISIT (OUTPATIENT)
Dept: LAB | Facility: HOSPITAL | Age: 73
End: 2023-10-30
Attending: INTERNAL MEDICINE
Payer: MEDICARE

## 2023-10-30 ENCOUNTER — INFUSION (OUTPATIENT)
Dept: INFUSION THERAPY | Facility: HOSPITAL | Age: 73
End: 2023-10-30
Attending: INTERNAL MEDICINE
Payer: MEDICARE

## 2023-10-30 VITALS
DIASTOLIC BLOOD PRESSURE: 77 MMHG | HEART RATE: 77 BPM | RESPIRATION RATE: 16 BRPM | WEIGHT: 183.81 LBS | TEMPERATURE: 98 F | SYSTOLIC BLOOD PRESSURE: 134 MMHG | OXYGEN SATURATION: 100 % | BODY MASS INDEX: 24.36 KG/M2 | HEIGHT: 73 IN

## 2023-10-30 DIAGNOSIS — C92.00 ACUTE MYELOID LEUKEMIA NOT HAVING ACHIEVED REMISSION: ICD-10-CM

## 2023-10-30 DIAGNOSIS — D61.818 PANCYTOPENIA: ICD-10-CM

## 2023-10-30 DIAGNOSIS — Z79.899 ON ANTINEOPLASTIC CHEMOTHERAPY: ICD-10-CM

## 2023-10-30 DIAGNOSIS — K13.79 MOUTH SORES: ICD-10-CM

## 2023-10-30 LAB
ALBUMIN SERPL-MCNC: 3.4 G/DL (ref 3.4–4.8)
ALBUMIN/GLOB SERPL: 1.2 RATIO (ref 1.1–2)
ALP SERPL-CCNC: 86 UNIT/L (ref 40–150)
ALT SERPL-CCNC: 14 UNIT/L (ref 0–55)
AST SERPL-CCNC: 14 UNIT/L (ref 5–34)
BASOPHILS # BLD AUTO: 0 X10(3)/MCL
BASOPHILS NFR BLD AUTO: 0 %
BILIRUB SERPL-MCNC: 0.8 MG/DL
BUN SERPL-MCNC: 19 MG/DL (ref 8.4–25.7)
CALCIUM SERPL-MCNC: 8.7 MG/DL (ref 8.8–10)
CHLORIDE SERPL-SCNC: 106 MMOL/L (ref 98–107)
CO2 SERPL-SCNC: 30 MMOL/L (ref 23–31)
CREAT SERPL-MCNC: 0.8 MG/DL (ref 0.73–1.18)
EOSINOPHIL # BLD AUTO: 0 X10(3)/MCL (ref 0–0.9)
EOSINOPHIL NFR BLD AUTO: 0 %
ERYTHROCYTE [DISTWIDTH] IN BLOOD BY AUTOMATED COUNT: 15.6 % (ref 11.5–17)
GFR SERPLBLD CREATININE-BSD FMLA CKD-EPI: >60 MLS/MIN/1.73/M2
GLOBULIN SER-MCNC: 2.8 GM/DL (ref 2.4–3.5)
GLUCOSE SERPL-MCNC: 97 MG/DL (ref 82–115)
GROUP & RH: NORMAL
HCT VFR BLD AUTO: 26.2 % (ref 42–52)
HGB BLD-MCNC: 8.1 G/DL (ref 14–18)
IMM GRANULOCYTES # BLD AUTO: 0.01 X10(3)/MCL (ref 0–0.04)
IMM GRANULOCYTES NFR BLD AUTO: 1.6 %
INDIRECT COOMBS GEL: NORMAL
LYMPHOCYTES # BLD AUTO: 0.52 X10(3)/MCL (ref 0.6–4.6)
LYMPHOCYTES NFR BLD AUTO: 82.5 %
MCH RBC QN AUTO: 28.7 PG (ref 27–31)
MCHC RBC AUTO-ENTMCNC: 30.9 G/DL (ref 33–36)
MCV RBC AUTO: 92.9 FL (ref 80–94)
MONOCYTES # BLD AUTO: 0.06 X10(3)/MCL (ref 0.1–1.3)
MONOCYTES NFR BLD AUTO: 9.5 %
NEUTROPHILS # BLD AUTO: 0.04 X10(3)/MCL (ref 2.1–9.2)
NEUTROPHILS NFR BLD AUTO: 6.4 %
PLATELET # BLD AUTO: 21 X10(3)/MCL (ref 130–400)
PMV BLD AUTO: 10 FL (ref 7.4–10.4)
POTASSIUM SERPL-SCNC: 3.6 MMOL/L (ref 3.5–5.1)
PROT SERPL-MCNC: 6.2 GM/DL (ref 5.8–7.6)
RBC # BLD AUTO: 2.82 X10(6)/MCL (ref 4.7–6.1)
SODIUM SERPL-SCNC: 141 MMOL/L (ref 136–145)
SPECIMEN OUTDATE: NORMAL
WBC # SPEC AUTO: 0.63 X10(3)/MCL (ref 4.5–11.5)

## 2023-10-30 PROCEDURE — 86850 RBC ANTIBODY SCREEN: CPT | Performed by: NURSE PRACTITIONER

## 2023-10-30 PROCEDURE — 36415 COLL VENOUS BLD VENIPUNCTURE: CPT

## 2023-10-30 PROCEDURE — 80053 COMPREHEN METABOLIC PANEL: CPT

## 2023-10-30 PROCEDURE — 85025 COMPLETE CBC W/AUTO DIFF WBC: CPT

## 2023-11-02 ENCOUNTER — LAB VISIT (OUTPATIENT)
Dept: LAB | Facility: HOSPITAL | Age: 73
End: 2023-11-02
Attending: INTERNAL MEDICINE
Payer: MEDICARE

## 2023-11-02 ENCOUNTER — INFUSION (OUTPATIENT)
Dept: INFUSION THERAPY | Facility: HOSPITAL | Age: 73
End: 2023-11-02
Attending: INTERNAL MEDICINE
Payer: MEDICARE

## 2023-11-02 VITALS
SYSTOLIC BLOOD PRESSURE: 136 MMHG | RESPIRATION RATE: 16 BRPM | TEMPERATURE: 98 F | HEART RATE: 64 BPM | OXYGEN SATURATION: 98 % | DIASTOLIC BLOOD PRESSURE: 75 MMHG

## 2023-11-02 DIAGNOSIS — D61.818 PANCYTOPENIA: ICD-10-CM

## 2023-11-02 DIAGNOSIS — K13.79 MOUTH SORES: ICD-10-CM

## 2023-11-02 DIAGNOSIS — C92.00 ACUTE MYELOID LEUKEMIA NOT HAVING ACHIEVED REMISSION: Primary | ICD-10-CM

## 2023-11-02 DIAGNOSIS — C92.00 ACUTE MYELOID LEUKEMIA NOT HAVING ACHIEVED REMISSION: ICD-10-CM

## 2023-11-02 DIAGNOSIS — Z79.899 ON ANTINEOPLASTIC CHEMOTHERAPY: ICD-10-CM

## 2023-11-02 LAB
ABO + RH BLD: NORMAL
ALBUMIN SERPL-MCNC: 3.6 G/DL (ref 3.4–4.8)
ALBUMIN/GLOB SERPL: 1.2 RATIO (ref 1.1–2)
ALP SERPL-CCNC: 91 UNIT/L (ref 40–150)
ALT SERPL-CCNC: 17 UNIT/L (ref 0–55)
AST SERPL-CCNC: 15 UNIT/L (ref 5–34)
BASOPHILS # BLD AUTO: 0 X10(3)/MCL
BASOPHILS NFR BLD AUTO: 0 %
BILIRUB SERPL-MCNC: 1 MG/DL
BLD PROD TYP BPU: NORMAL
BLOOD UNIT EXPIRATION DATE: NORMAL
BLOOD UNIT TYPE CODE: 5100
BUN SERPL-MCNC: 21.2 MG/DL (ref 8.4–25.7)
CALCIUM SERPL-MCNC: 9.3 MG/DL (ref 8.8–10)
CHLORIDE SERPL-SCNC: 105 MMOL/L (ref 98–107)
CO2 SERPL-SCNC: 29 MMOL/L (ref 23–31)
CREAT SERPL-MCNC: 0.96 MG/DL (ref 0.73–1.18)
CROSSMATCH INTERPRETATION: NORMAL
DISPENSE STATUS: NORMAL
EOSINOPHIL # BLD AUTO: 0 X10(3)/MCL (ref 0–0.9)
EOSINOPHIL NFR BLD AUTO: 0 %
ERYTHROCYTE [DISTWIDTH] IN BLOOD BY AUTOMATED COUNT: 16 % (ref 11.5–17)
GFR SERPLBLD CREATININE-BSD FMLA CKD-EPI: >60 MLS/MIN/1.73/M2
GLOBULIN SER-MCNC: 2.9 GM/DL (ref 2.4–3.5)
GLUCOSE SERPL-MCNC: 110 MG/DL (ref 82–115)
HCT VFR BLD AUTO: 27.7 % (ref 42–52)
HGB BLD-MCNC: 8.4 G/DL (ref 14–18)
IMM GRANULOCYTES # BLD AUTO: 0.01 X10(3)/MCL (ref 0–0.04)
IMM GRANULOCYTES NFR BLD AUTO: 1.3 %
LYMPHOCYTES # BLD AUTO: 0.66 X10(3)/MCL (ref 0.6–4.6)
LYMPHOCYTES NFR BLD AUTO: 82.5 %
MCH RBC QN AUTO: 28.4 PG (ref 27–31)
MCHC RBC AUTO-ENTMCNC: 30.3 G/DL (ref 33–36)
MCV RBC AUTO: 93.6 FL (ref 80–94)
MONOCYTES # BLD AUTO: 0.06 X10(3)/MCL (ref 0.1–1.3)
MONOCYTES NFR BLD AUTO: 7.5 %
NEUTROPHILS # BLD AUTO: 0.07 X10(3)/MCL (ref 2.1–9.2)
NEUTROPHILS NFR BLD AUTO: 8.7 %
PLATELET # BLD AUTO: 11 X10(3)/MCL (ref 130–400)
PMV BLD AUTO: 9.5 FL (ref 7.4–10.4)
POTASSIUM SERPL-SCNC: 3.5 MMOL/L (ref 3.5–5.1)
PROT SERPL-MCNC: 6.5 GM/DL (ref 5.8–7.6)
RBC # BLD AUTO: 2.96 X10(6)/MCL (ref 4.7–6.1)
SODIUM SERPL-SCNC: 140 MMOL/L (ref 136–145)
UNIT NUMBER: NORMAL
WBC # SPEC AUTO: 0.8 X10(3)/MCL (ref 4.5–11.5)

## 2023-11-02 PROCEDURE — P9037 PLATE PHERES LEUKOREDU IRRAD: HCPCS | Performed by: INTERNAL MEDICINE

## 2023-11-02 PROCEDURE — 36430 TRANSFUSION BLD/BLD COMPNT: CPT

## 2023-11-02 PROCEDURE — 36415 COLL VENOUS BLD VENIPUNCTURE: CPT

## 2023-11-02 PROCEDURE — 25000003 PHARM REV CODE 250: Performed by: INTERNAL MEDICINE

## 2023-11-02 PROCEDURE — 85025 COMPLETE CBC W/AUTO DIFF WBC: CPT

## 2023-11-02 PROCEDURE — 80053 COMPREHEN METABOLIC PANEL: CPT

## 2023-11-02 RX ORDER — HYDROCODONE BITARTRATE AND ACETAMINOPHEN 500; 5 MG/1; MG/1
TABLET ORAL ONCE
Status: CANCELLED | OUTPATIENT
Start: 2023-11-02 | End: 2023-11-02

## 2023-11-02 RX ORDER — HYDROCODONE BITARTRATE AND ACETAMINOPHEN 500; 5 MG/1; MG/1
TABLET ORAL ONCE
Status: COMPLETED | OUTPATIENT
Start: 2023-11-02 | End: 2023-11-02

## 2023-11-02 RX ADMIN — SODIUM CHLORIDE: 9 INJECTION, SOLUTION INTRAVENOUS at 10:11

## 2023-11-03 NOTE — PROGRESS NOTES
Subjective:       Patient ID: Hussein Gonzalez Jr. is a 73 y.o. male.    Chief Complaint: Follow-up (No concerns today)      Diagnosis:  AML with 27% blasts, background dysplasia, normal cytogenetics and normal FISH with NGS showing ASXL1, SRSF2, TET2, and STAG2 mutations.    Current Treatment:   Azacitidine and venetoclax started on 02/27/2023.  Started 7 out of 28 days, did this through cycle 5.  With cycle 6, moved to 5 out of 28 days.  Referral for possible stem cell transplant evaluation.    Treatment History:  N/A    HPI:  73-year-old male with a history of CVA, hypertension, BPH, and melanoma who presented to the emergency department following a syncopal episode.  In February of 2022, the patient did have a slightly low white blood cell count, hemoglobin, and platelet count.  Per the patient, 3 months prior to admission, he was having dizziness, fatigue, weakness, shortness of breath, back pain, and leg pain.  The dizziness was worse with standing and walking.  On 01/20/2023, the patient had a syncopal episode.  He was brought to the emergency department and was found to be pancytopenic.  He also had stool that was positive for occult blood in the emergency department.  He received blood transfusion.  His CBC on 01/20/2023 revealed a white blood cell count of 2.6, hemoglobin of 7.7, and platelet count of 47,000. Reticulocytes were appropriately elevated, B12 and folate were within normal limits, iron level was slightly decreased.  Repeat blood work on 01/21/2023 revealed a white blood cell count of 2.8, post transfusion hemoglobin of 9.6, and a platelet count of 93884.  Hematology was consulted for pancytopenia.  EGD and colonoscopy done on 01/24/2023, EGD revealed nonbleeding ulcers, colonoscopy unremarkable, however had to be aborted due to tortuous colon.  Bone Marrow Biopsy done on 01/25/2023.  Preliminary reveals AML with 27% blasts in a background of dysplasia.  Cytogenetics are within normal limits,  FISH is within normal limits.  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  Started azacitidine and venetoclax on 02/27/2023, completed cycle 4 on 05/30/2023.  Held after this cycle and repeated a bone marrow biopsy, this was done on 06/27/2023, this showed a normocellular marrow (40%) with relative erythroid hyperplasia, sequential granulopoiesis and megakaryopoiesis.  The bone marrow was slightly suboptimal given it was predominantly subcortical bone with focal hematopoietic elements.  There was no morphologic or immunophenotypic evidence of previous acute myeloid leukemia, cytogenetics were normal, FISH was normal, NGS showed persistent ASXL1, SRSF2, TET2.      Interval History:   Patient presents for scheduled follow up appointment for AML.  He remains on Vidaza and venetoclax.  He states that overall he is doing well.  He feels better and better each day.      Past Medical History:   Diagnosis Date    Anxiety and depression     BPH (benign prostatic hyperplasia)     History of CVA (cerebrovascular accident) without residual deficits     HTN (hypertension)     Malignant melanoma of skin, unspecified     NSTEMI (non-ST elevated myocardial infarction)     Sepsis, Gram negative     Sleep apnea       Past Surgical History:   Procedure Laterality Date    BONE MARROW ASPIRATION N/A 6/27/2023    Procedure: ASPIRATION, BONE MARROW;  Surgeon: Sabino Núñez MD;  Location: [facility];  Service: General;  Laterality: N/A;  ALL ASSESS FOR MOLECULAR MRD    BONE MARROW BIOPSY N/A 1/25/2023    Procedure: Biopsy-bone marrow;  Surgeon: Rd Carlos MD;  Location: [facility] OR;  Service: General;  Laterality: N/A;    COLONOSCOPY      COLONOSCOPY N/A 1/24/2023    Procedure: COLONOSCOPY (DOUBLE);  Surgeon: Jimy Baker MD;  Location: [facility] ENDOSCOPY;  Service: Gastroenterology;  Laterality: N/A;    EGD, WITH CLOSED BIOPSY N/A 1/24/2023    Procedure: EGD, WITH CLOSED BIOPSY;  Surgeon: Jimy Baker  MD;  Location: [facility] ENDOSCOPY;  Service: Gastroenterology;  Laterality: N/A;    EXCISION OF MELANOMA      SINUS SURGERY      TONSILLECTOMY       Social History     Socioeconomic History    Marital status: Significant Other   Tobacco Use    Smoking status: Never    Smokeless tobacco: Never   Substance and Sexual Activity    Alcohol use: Not Currently     Alcohol/week: 7.0 standard drinks of alcohol     Types: 7 Cans of beer per week    Drug use: Never    Sexual activity: Yes     Social Determinants of Health     Financial Resource Strain: Low Risk  (4/20/2023)    Overall Financial Resource Strain (CARDIA)     Difficulty of Paying Living Expenses: Not hard at all   Food Insecurity: No Food Insecurity (4/20/2023)    Hunger Vital Sign     Worried About Running Out of Food in the Last Year: Never true     Ran Out of Food in the Last Year: Never true   Transportation Needs: No Transportation Needs (4/20/2023)    PRAPARE - Transportation     Lack of Transportation (Medical): No     Lack of Transportation (Non-Medical): No   Physical Activity: Insufficiently Active (4/20/2023)    Exercise Vital Sign     Days of Exercise per Week: 3 days     Minutes of Exercise per Session: 30 min   Stress: No Stress Concern Present (4/20/2023)    [employer] [place] [employer] - Occupational Stress Questionnaire     Feeling of Stress : Not at all   Social Connections: Unknown (4/20/2023)    Social Connection and Isolation Panel [NHANES]     Frequency of Communication with Friends and Family: More than three times a week     Frequency of Social Gatherings with Friends and Family: More than three times a week     Attends Buddhist Services: Patient refused     Active Member of Clubs or Organizations: Patient refused     Attends Club or Organization Meetings: Patient refused     Marital Status:    Housing Stability: Low Risk  (4/20/2023)    Housing Stability Vital Sign     Unable to Pay for Housing in the Last Year: No      Number of Places Lived in the Last Year: 1     Unstable Housing in the Last Year: No      Family History   Problem Relation Age of Onset    Cancer Mother     Cancer Father       Review of patient's allergies indicates:   Allergen Reactions    Acetaminophen Other (See Comments)     Restless leg     Benadryl [diphenhydramine hcl] Other (See Comments)     Restless Legs      Review of Systems   Constitutional:  Positive for fatigue. Negative for chills, diaphoresis, fever and unexpected weight change.   HENT:  Negative for nasal congestion, mouth sores, sinus pressure/congestion and sore throat.    Eyes:  Negative for pain and visual disturbance.   Respiratory:  Negative for cough, chest tightness and shortness of breath.    Cardiovascular:  Negative for chest pain, palpitations and leg swelling.   Gastrointestinal:  Negative for abdominal distention, abdominal pain, blood in stool, constipation and diarrhea.   Genitourinary:  Negative for dysuria, frequency and hematuria.   Musculoskeletal:  Positive for arthralgias and myalgias. Negative for back pain.   Integumentary:  Negative for rash.   Neurological:  Positive for weakness. Negative for dizziness, syncope, numbness and headaches.   Hematological:  Negative for adenopathy.   Psychiatric/Behavioral:  Negative for confusion.          Objective:      Physical Exam  Vitals reviewed.   Constitutional:       General: He is awake.      Appearance: Normal appearance.   HENT:      Head: Normocephalic and atraumatic.      Right Ear: Hearing normal.      Left Ear: Hearing normal.      Nose: Nose normal.   Eyes:      General: Lids are normal. Vision grossly intact.      Extraocular Movements: Extraocular movements intact.      Conjunctiva/sclera: Conjunctivae normal.   Cardiovascular:      Rate and Rhythm: Normal rate and regular rhythm.      Pulses: Normal pulses.      Heart sounds: Normal heart sounds.   Pulmonary:      Effort: Pulmonary effort is normal.      Breath  sounds: Normal breath sounds. No wheezing, rhonchi or rales.   Abdominal:      General: Bowel sounds are normal.      Palpations: Abdomen is soft.      Tenderness: There is no abdominal tenderness.   Musculoskeletal:      Cervical back: Full passive range of motion without pain.      Right lower leg: No edema.      Left lower leg: No edema.   Skin:     General: Skin is warm.      Comments:      Neurological:      General: No focal deficit present.      Mental Status: He is alert and oriented to person, place, and time.   Psychiatric:         Attention and Perception: Attention normal.         Mood and Affect: Mood and affect normal.         Behavior: Behavior is cooperative.         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    Treating with azacitidine and venetoclax, Started on 02/27/2023.  Bone marrow biopsy after cycle 4 shows no immunophenotypic or morphologic evidence of AML.  NGS showed continued ASXL1, SRSF2, TET2 mutations.  Cytogenetics were normal.    Evaluated by Dr. Valdez in [place] once NGS and cytogenetics returned.    Continue prophylaxis with 500 mg of levofloxacin daily, 200 mg of acyclovir b.i.d., 200 mg of fluconazole daily.    Twice weekly labs (Monday/Thursday) with transfusions as needed.    He restarted treatment on 07/10/2023, on 08/14/2023, we dose reduced by changing venetoclax to 14 out of every 28 days.    Venetoclax 200 mg on days 1-14 of a 28 day cycle    Proceed with Azacididine this week.  He treated with azacitidine 7 days out of 28 days for the first 5 cycles, with cycle 6 moving  forward, we did 5 days.    Flexeril as needed for muscle spasms    Magic mouthwash as needed for mouth sores    Repeat bone marrow biopsy next week    Return to clinic in 4 weeks    Eduard Luna II, MD

## 2023-11-06 ENCOUNTER — OFFICE VISIT (OUTPATIENT)
Dept: HEMATOLOGY/ONCOLOGY | Facility: CLINIC | Age: 73
End: 2023-11-06
Payer: MEDICARE

## 2023-11-06 ENCOUNTER — INFUSION (OUTPATIENT)
Dept: INFUSION THERAPY | Facility: HOSPITAL | Age: 73
End: 2023-11-06
Attending: INTERNAL MEDICINE
Payer: MEDICARE

## 2023-11-06 VITALS
WEIGHT: 184.5 LBS | SYSTOLIC BLOOD PRESSURE: 154 MMHG | HEIGHT: 73 IN | HEART RATE: 77 BPM | RESPIRATION RATE: 18 BRPM | OXYGEN SATURATION: 100 % | TEMPERATURE: 98 F | BODY MASS INDEX: 24.45 KG/M2 | DIASTOLIC BLOOD PRESSURE: 74 MMHG

## 2023-11-06 DIAGNOSIS — C92.00 ACUTE MYELOID LEUKEMIA NOT HAVING ACHIEVED REMISSION: Primary | ICD-10-CM

## 2023-11-06 DIAGNOSIS — C92.00 ACUTE MYELOID LEUKEMIA NOT HAVING ACHIEVED REMISSION: Primary | Chronic | ICD-10-CM

## 2023-11-06 PROCEDURE — 99999 PR PBB SHADOW E&M-EST. PATIENT-LVL IV: ICD-10-PCS | Mod: PBBFAC,,, | Performed by: INTERNAL MEDICINE

## 2023-11-06 PROCEDURE — 99999 PR PBB SHADOW E&M-EST. PATIENT-LVL IV: CPT | Mod: PBBFAC,,, | Performed by: INTERNAL MEDICINE

## 2023-11-06 PROCEDURE — 99214 PR OFFICE/OUTPT VISIT, EST, LEVL IV, 30-39 MIN: ICD-10-PCS | Mod: S$PBB,,, | Performed by: INTERNAL MEDICINE

## 2023-11-06 PROCEDURE — 99214 OFFICE O/P EST MOD 30 MIN: CPT | Mod: PBBFAC | Performed by: INTERNAL MEDICINE

## 2023-11-06 PROCEDURE — 96401 CHEMO ANTI-NEOPL SQ/IM: CPT

## 2023-11-06 PROCEDURE — 99214 OFFICE O/P EST MOD 30 MIN: CPT | Mod: S$PBB,,, | Performed by: INTERNAL MEDICINE

## 2023-11-06 PROCEDURE — 63600175 PHARM REV CODE 636 W HCPCS: Performed by: INTERNAL MEDICINE

## 2023-11-06 PROCEDURE — 25000003 PHARM REV CODE 250: Performed by: INTERNAL MEDICINE

## 2023-11-06 RX ORDER — ONDANSETRON 4 MG/1
16 TABLET, ORALLY DISINTEGRATING ORAL
Status: CANCELLED | OUTPATIENT
Start: 2023-11-06

## 2023-11-06 RX ORDER — AZACITIDINE 100 MG/1
75 INJECTION, POWDER, LYOPHILIZED, FOR SOLUTION INTRAVENOUS; SUBCUTANEOUS
Status: COMPLETED | OUTPATIENT
Start: 2023-11-06 | End: 2023-11-06

## 2023-11-06 RX ORDER — AZACITIDINE 100 MG/1
75 INJECTION, POWDER, LYOPHILIZED, FOR SOLUTION INTRAVENOUS; SUBCUTANEOUS
Status: CANCELLED | OUTPATIENT
Start: 2023-11-09

## 2023-11-06 RX ORDER — AZACITIDINE 100 MG/1
75 INJECTION, POWDER, LYOPHILIZED, FOR SOLUTION INTRAVENOUS; SUBCUTANEOUS
Status: CANCELLED | OUTPATIENT
Start: 2023-11-10

## 2023-11-06 RX ORDER — ONDANSETRON 4 MG/1
16 TABLET, ORALLY DISINTEGRATING ORAL
Status: CANCELLED | OUTPATIENT
Start: 2023-11-08

## 2023-11-06 RX ORDER — AZACITIDINE 100 MG/1
75 INJECTION, POWDER, LYOPHILIZED, FOR SOLUTION INTRAVENOUS; SUBCUTANEOUS
Status: CANCELLED | OUTPATIENT
Start: 2023-11-06

## 2023-11-06 RX ORDER — ONDANSETRON 4 MG/1
16 TABLET, ORALLY DISINTEGRATING ORAL
Status: CANCELLED | OUTPATIENT
Start: 2023-11-09

## 2023-11-06 RX ORDER — ONDANSETRON 4 MG/1
16 TABLET, ORALLY DISINTEGRATING ORAL
Status: CANCELLED | OUTPATIENT
Start: 2023-11-07

## 2023-11-06 RX ORDER — ONDANSETRON 4 MG/1
16 TABLET, ORALLY DISINTEGRATING ORAL
Status: COMPLETED | OUTPATIENT
Start: 2023-11-06 | End: 2023-11-06

## 2023-11-06 RX ORDER — ONDANSETRON 4 MG/1
16 TABLET, ORALLY DISINTEGRATING ORAL
Status: CANCELLED | OUTPATIENT
Start: 2023-11-10

## 2023-11-06 RX ORDER — AZACITIDINE 100 MG/1
75 INJECTION, POWDER, LYOPHILIZED, FOR SOLUTION INTRAVENOUS; SUBCUTANEOUS
Status: CANCELLED | OUTPATIENT
Start: 2023-11-08

## 2023-11-06 RX ORDER — AZACITIDINE 100 MG/1
75 INJECTION, POWDER, LYOPHILIZED, FOR SOLUTION INTRAVENOUS; SUBCUTANEOUS
Status: CANCELLED | OUTPATIENT
Start: 2023-11-07

## 2023-11-06 RX ADMIN — AZACITIDINE 155 MG: 100 INJECTION, POWDER, LYOPHILIZED, FOR SOLUTION INTRAVENOUS; SUBCUTANEOUS at 10:11

## 2023-11-06 RX ADMIN — ONDANSETRON 16 MG: 4 TABLET, ORALLY DISINTEGRATING ORAL at 09:11

## 2023-11-07 ENCOUNTER — INFUSION (OUTPATIENT)
Dept: INFUSION THERAPY | Facility: HOSPITAL | Age: 73
End: 2023-11-07
Attending: INTERNAL MEDICINE
Payer: MEDICARE

## 2023-11-07 VITALS
DIASTOLIC BLOOD PRESSURE: 73 MMHG | TEMPERATURE: 98 F | HEART RATE: 80 BPM | SYSTOLIC BLOOD PRESSURE: 123 MMHG | OXYGEN SATURATION: 100 %

## 2023-11-07 DIAGNOSIS — C92.00 ACUTE MYELOID LEUKEMIA NOT HAVING ACHIEVED REMISSION: Primary | ICD-10-CM

## 2023-11-07 PROCEDURE — 63600175 PHARM REV CODE 636 W HCPCS: Performed by: INTERNAL MEDICINE

## 2023-11-07 PROCEDURE — 25000003 PHARM REV CODE 250: Performed by: INTERNAL MEDICINE

## 2023-11-07 PROCEDURE — 96401 CHEMO ANTI-NEOPL SQ/IM: CPT

## 2023-11-07 RX ORDER — ONDANSETRON 4 MG/1
16 TABLET, ORALLY DISINTEGRATING ORAL
Status: COMPLETED | OUTPATIENT
Start: 2023-11-07 | End: 2023-11-07

## 2023-11-07 RX ORDER — AZACITIDINE 100 MG/1
75 INJECTION, POWDER, LYOPHILIZED, FOR SOLUTION INTRAVENOUS; SUBCUTANEOUS
Status: COMPLETED | OUTPATIENT
Start: 2023-11-07 | End: 2023-11-07

## 2023-11-07 RX ADMIN — ONDANSETRON 16 MG: 4 TABLET, ORALLY DISINTEGRATING ORAL at 09:11

## 2023-11-07 RX ADMIN — AZACITIDINE 155 MG: 100 INJECTION, POWDER, LYOPHILIZED, FOR SOLUTION INTRAVENOUS; SUBCUTANEOUS at 09:11

## 2023-11-08 ENCOUNTER — INFUSION (OUTPATIENT)
Dept: INFUSION THERAPY | Facility: HOSPITAL | Age: 73
End: 2023-11-08
Attending: INTERNAL MEDICINE
Payer: MEDICARE

## 2023-11-08 VITALS
SYSTOLIC BLOOD PRESSURE: 94 MMHG | TEMPERATURE: 98 F | DIASTOLIC BLOOD PRESSURE: 51 MMHG | HEART RATE: 94 BPM | BODY MASS INDEX: 24.45 KG/M2 | WEIGHT: 184.5 LBS | HEIGHT: 73 IN

## 2023-11-08 DIAGNOSIS — C92.00 ACUTE MYELOID LEUKEMIA NOT HAVING ACHIEVED REMISSION: Primary | ICD-10-CM

## 2023-11-08 PROCEDURE — 25000003 PHARM REV CODE 250: Performed by: INTERNAL MEDICINE

## 2023-11-08 PROCEDURE — 96401 CHEMO ANTI-NEOPL SQ/IM: CPT

## 2023-11-08 PROCEDURE — 63600175 PHARM REV CODE 636 W HCPCS: Performed by: INTERNAL MEDICINE

## 2023-11-08 RX ORDER — ONDANSETRON 4 MG/1
16 TABLET, ORALLY DISINTEGRATING ORAL
Status: COMPLETED | OUTPATIENT
Start: 2023-11-08 | End: 2023-11-08

## 2023-11-08 RX ORDER — AZACITIDINE 100 MG/1
75 INJECTION, POWDER, LYOPHILIZED, FOR SOLUTION INTRAVENOUS; SUBCUTANEOUS
Status: COMPLETED | OUTPATIENT
Start: 2023-11-08 | End: 2023-11-08

## 2023-11-08 RX ADMIN — ONDANSETRON 16 MG: 4 TABLET, ORALLY DISINTEGRATING ORAL at 09:11

## 2023-11-08 RX ADMIN — AZACITIDINE 155 MG: 100 INJECTION, POWDER, LYOPHILIZED, FOR SOLUTION INTRAVENOUS; SUBCUTANEOUS at 09:11

## 2023-11-09 ENCOUNTER — LAB VISIT (OUTPATIENT)
Dept: LAB | Facility: HOSPITAL | Age: 73
End: 2023-11-09
Attending: INTERNAL MEDICINE
Payer: MEDICARE

## 2023-11-09 ENCOUNTER — INFUSION (OUTPATIENT)
Dept: INFUSION THERAPY | Facility: HOSPITAL | Age: 73
End: 2023-11-09
Attending: INTERNAL MEDICINE
Payer: MEDICARE

## 2023-11-09 VITALS — DIASTOLIC BLOOD PRESSURE: 66 MMHG | SYSTOLIC BLOOD PRESSURE: 121 MMHG | HEART RATE: 78 BPM

## 2023-11-09 DIAGNOSIS — C92.00 ACUTE MYELOID LEUKEMIA NOT HAVING ACHIEVED REMISSION: ICD-10-CM

## 2023-11-09 DIAGNOSIS — C92.00 ACUTE MYELOID LEUKEMIA NOT HAVING ACHIEVED REMISSION: Primary | ICD-10-CM

## 2023-11-09 LAB
ABO + RH BLD: NORMAL
ALBUMIN SERPL-MCNC: 3.5 G/DL (ref 3.4–4.8)
ALBUMIN/GLOB SERPL: 1.3 RATIO (ref 1.1–2)
ALP SERPL-CCNC: 99 UNIT/L (ref 40–150)
ALT SERPL-CCNC: 11 UNIT/L (ref 0–55)
AST SERPL-CCNC: 12 UNIT/L (ref 5–34)
BASOPHILS # BLD AUTO: 0.01 X10(3)/MCL
BASOPHILS NFR BLD AUTO: 1.4 %
BILIRUB SERPL-MCNC: 0.9 MG/DL
BLD PROD TYP BPU: NORMAL
BLOOD UNIT EXPIRATION DATE: NORMAL
BLOOD UNIT TYPE CODE: 5100
BUN SERPL-MCNC: 19.8 MG/DL (ref 8.4–25.7)
CALCIUM SERPL-MCNC: 9.2 MG/DL (ref 8.8–10)
CHLORIDE SERPL-SCNC: 105 MMOL/L (ref 98–107)
CO2 SERPL-SCNC: 30 MMOL/L (ref 23–31)
CREAT SERPL-MCNC: 1.13 MG/DL (ref 0.73–1.18)
CROSSMATCH INTERPRETATION: NORMAL
DISPENSE STATUS: NORMAL
EOSINOPHIL # BLD AUTO: 0 X10(3)/MCL (ref 0–0.9)
EOSINOPHIL NFR BLD AUTO: 0 %
ERYTHROCYTE [DISTWIDTH] IN BLOOD BY AUTOMATED COUNT: 16.4 % (ref 11.5–17)
GFR SERPLBLD CREATININE-BSD FMLA CKD-EPI: >60 MLS/MIN/1.73/M2
GLOBULIN SER-MCNC: 2.8 GM/DL (ref 2.4–3.5)
GLUCOSE SERPL-MCNC: 110 MG/DL (ref 82–115)
GROUP & RH: NORMAL
HCT VFR BLD AUTO: 23 % (ref 42–52)
HGB BLD-MCNC: 7.1 G/DL (ref 14–18)
IMM GRANULOCYTES # BLD AUTO: 0.01 X10(3)/MCL (ref 0–0.04)
IMM GRANULOCYTES NFR BLD AUTO: 1.4 %
INDIRECT COOMBS GEL: NORMAL
LYMPHOCYTES # BLD AUTO: 0.56 X10(3)/MCL (ref 0.6–4.6)
LYMPHOCYTES NFR BLD AUTO: 81.2 %
MCH RBC QN AUTO: 28.6 PG (ref 27–31)
MCHC RBC AUTO-ENTMCNC: 30.9 G/DL (ref 33–36)
MCV RBC AUTO: 92.7 FL (ref 80–94)
MONOCYTES # BLD AUTO: 0.02 X10(3)/MCL (ref 0.1–1.3)
MONOCYTES NFR BLD AUTO: 2.9 %
NEUTROPHILS # BLD AUTO: 0.09 X10(3)/MCL (ref 2.1–9.2)
NEUTROPHILS NFR BLD AUTO: 13.1 %
PLATELET # BLD AUTO: 6 X10(3)/MCL (ref 130–400)
PMV BLD AUTO: 8.5 FL (ref 7.4–10.4)
POTASSIUM SERPL-SCNC: 4.1 MMOL/L (ref 3.5–5.1)
PROT SERPL-MCNC: 6.3 GM/DL (ref 5.8–7.6)
RBC # BLD AUTO: 2.48 X10(6)/MCL (ref 4.7–6.1)
SODIUM SERPL-SCNC: 140 MMOL/L (ref 136–145)
SPECIMEN OUTDATE: NORMAL
UNIT NUMBER: NORMAL
WBC # SPEC AUTO: 0.69 X10(3)/MCL (ref 4.5–11.5)

## 2023-11-09 PROCEDURE — 25000003 PHARM REV CODE 250: Performed by: INTERNAL MEDICINE

## 2023-11-09 PROCEDURE — 86901 BLOOD TYPING SEROLOGIC RH(D): CPT | Performed by: INTERNAL MEDICINE

## 2023-11-09 PROCEDURE — 85025 COMPLETE CBC W/AUTO DIFF WBC: CPT

## 2023-11-09 PROCEDURE — 36415 COLL VENOUS BLD VENIPUNCTURE: CPT

## 2023-11-09 PROCEDURE — P9040 RBC LEUKOREDUCED IRRADIATED: HCPCS | Performed by: INTERNAL MEDICINE

## 2023-11-09 PROCEDURE — 80053 COMPREHEN METABOLIC PANEL: CPT

## 2023-11-09 PROCEDURE — P9037 PLATE PHERES LEUKOREDU IRRAD: HCPCS | Performed by: INTERNAL MEDICINE

## 2023-11-09 PROCEDURE — 86923 COMPATIBILITY TEST ELECTRIC: CPT | Performed by: INTERNAL MEDICINE

## 2023-11-09 PROCEDURE — 63600175 PHARM REV CODE 636 W HCPCS: Performed by: INTERNAL MEDICINE

## 2023-11-09 PROCEDURE — 96401 CHEMO ANTI-NEOPL SQ/IM: CPT

## 2023-11-09 PROCEDURE — 36430 TRANSFUSION BLD/BLD COMPNT: CPT

## 2023-11-09 RX ORDER — HYDROCODONE BITARTRATE AND ACETAMINOPHEN 500; 5 MG/1; MG/1
TABLET ORAL ONCE
Status: ACTIVE | OUTPATIENT
Start: 2023-11-09

## 2023-11-09 RX ORDER — AZACITIDINE 100 MG/1
75 INJECTION, POWDER, LYOPHILIZED, FOR SOLUTION INTRAVENOUS; SUBCUTANEOUS
Status: COMPLETED | OUTPATIENT
Start: 2023-11-09 | End: 2023-11-09

## 2023-11-09 RX ORDER — HYDROCODONE BITARTRATE AND ACETAMINOPHEN 500; 5 MG/1; MG/1
TABLET ORAL ONCE
Status: CANCELLED | OUTPATIENT
Start: 2023-11-09 | End: 2023-11-09

## 2023-11-09 RX ORDER — ONDANSETRON 4 MG/1
16 TABLET, ORALLY DISINTEGRATING ORAL
Status: COMPLETED | OUTPATIENT
Start: 2023-11-09 | End: 2023-11-09

## 2023-11-09 RX ADMIN — AZACITIDINE 155 MG: 100 INJECTION, POWDER, LYOPHILIZED, FOR SOLUTION INTRAVENOUS; SUBCUTANEOUS at 10:11

## 2023-11-09 RX ADMIN — ONDANSETRON 16 MG: 4 TABLET, ORALLY DISINTEGRATING ORAL at 10:11

## 2023-11-10 ENCOUNTER — INFUSION (OUTPATIENT)
Dept: INFUSION THERAPY | Facility: HOSPITAL | Age: 73
End: 2023-11-10
Attending: INTERNAL MEDICINE
Payer: MEDICARE

## 2023-11-10 VITALS
TEMPERATURE: 98 F | OXYGEN SATURATION: 100 % | WEIGHT: 184 LBS | SYSTOLIC BLOOD PRESSURE: 99 MMHG | HEART RATE: 88 BPM | RESPIRATION RATE: 18 BRPM | DIASTOLIC BLOOD PRESSURE: 51 MMHG | BODY MASS INDEX: 24.39 KG/M2 | HEIGHT: 73 IN

## 2023-11-10 DIAGNOSIS — C92.00 ACUTE MYELOID LEUKEMIA NOT HAVING ACHIEVED REMISSION: Primary | ICD-10-CM

## 2023-11-10 PROCEDURE — 96401 CHEMO ANTI-NEOPL SQ/IM: CPT

## 2023-11-10 PROCEDURE — 63600175 PHARM REV CODE 636 W HCPCS: Performed by: INTERNAL MEDICINE

## 2023-11-10 PROCEDURE — 25000003 PHARM REV CODE 250: Performed by: INTERNAL MEDICINE

## 2023-11-10 PROCEDURE — 96402 CHEMO HORMON ANTINEOPL SQ/IM: CPT

## 2023-11-10 RX ORDER — ONDANSETRON 4 MG/1
16 TABLET, ORALLY DISINTEGRATING ORAL
Status: COMPLETED | OUTPATIENT
Start: 2023-11-10 | End: 2023-11-10

## 2023-11-10 RX ORDER — AZACITIDINE 100 MG/1
75 INJECTION, POWDER, LYOPHILIZED, FOR SOLUTION INTRAVENOUS; SUBCUTANEOUS
Status: COMPLETED | OUTPATIENT
Start: 2023-11-10 | End: 2023-11-10

## 2023-11-10 RX ADMIN — ONDANSETRON 16 MG: 4 TABLET, ORALLY DISINTEGRATING ORAL at 08:11

## 2023-11-10 RX ADMIN — AZACITIDINE 155 MG: 100 INJECTION, POWDER, LYOPHILIZED, FOR SOLUTION INTRAVENOUS; SUBCUTANEOUS at 08:11

## 2023-11-13 ENCOUNTER — LAB VISIT (OUTPATIENT)
Dept: LAB | Facility: HOSPITAL | Age: 73
End: 2023-11-13
Attending: INTERNAL MEDICINE
Payer: MEDICARE

## 2023-11-13 ENCOUNTER — INFUSION (OUTPATIENT)
Dept: INFUSION THERAPY | Facility: HOSPITAL | Age: 73
End: 2023-11-13
Attending: INTERNAL MEDICINE
Payer: MEDICARE

## 2023-11-13 VITALS
HEART RATE: 69 BPM | SYSTOLIC BLOOD PRESSURE: 124 MMHG | HEIGHT: 73 IN | WEIGHT: 184 LBS | RESPIRATION RATE: 18 BRPM | BODY MASS INDEX: 24.39 KG/M2 | DIASTOLIC BLOOD PRESSURE: 70 MMHG | TEMPERATURE: 99 F | OXYGEN SATURATION: 98 %

## 2023-11-13 DIAGNOSIS — C92.00 ACUTE MYELOID LEUKEMIA NOT HAVING ACHIEVED REMISSION: ICD-10-CM

## 2023-11-13 DIAGNOSIS — C92.00 ACUTE MYELOID LEUKEMIA NOT HAVING ACHIEVED REMISSION: Primary | ICD-10-CM

## 2023-11-13 LAB
ABO + RH BLD: NORMAL
ABS NEUT CALC (OHS): 0.1 X10(3)/MCL (ref 2.1–9.2)
ALBUMIN SERPL-MCNC: 3.4 G/DL (ref 3.4–4.8)
ALBUMIN/GLOB SERPL: 1.3 RATIO (ref 1.1–2)
ALP SERPL-CCNC: 96 UNIT/L (ref 40–150)
ALT SERPL-CCNC: 10 UNIT/L (ref 0–55)
AST SERPL-CCNC: 11 UNIT/L (ref 5–34)
BILIRUB SERPL-MCNC: 0.6 MG/DL
BLD PROD TYP BPU: NORMAL
BLOOD UNIT EXPIRATION DATE: NORMAL
BLOOD UNIT TYPE CODE: 5100
BLOOD UNIT TYPE CODE: 5100
BLOOD UNIT TYPE CODE: 6200
BUN SERPL-MCNC: 20.2 MG/DL (ref 8.4–25.7)
BURR CELLS (OLG): SLIGHT
CALCIUM SERPL-MCNC: 9 MG/DL (ref 8.8–10)
CHLORIDE SERPL-SCNC: 106 MMOL/L (ref 98–107)
CO2 SERPL-SCNC: 29 MMOL/L (ref 23–31)
CREAT SERPL-MCNC: 0.89 MG/DL (ref 0.73–1.18)
CROSSMATCH INTERPRETATION: NORMAL
DISPENSE STATUS: NORMAL
ERYTHROCYTE [DISTWIDTH] IN BLOOD BY AUTOMATED COUNT: 15.7 % (ref 11.5–17)
GFR SERPLBLD CREATININE-BSD FMLA CKD-EPI: >60 MLS/MIN/1.73/M2
GLOBULIN SER-MCNC: 2.7 GM/DL (ref 2.4–3.5)
GLUCOSE SERPL-MCNC: 147 MG/DL (ref 82–115)
HCT VFR BLD AUTO: 22.8 % (ref 42–52)
HGB BLD-MCNC: 7.2 G/DL (ref 14–18)
HYPOCHROMIA BLD QL SMEAR: ABNORMAL
LYMPH ABN # BLD MANUAL: 4 %
LYMPHOCYTES NFR BLD MANUAL: 0.41 X10(3)/MCL
LYMPHOCYTES NFR BLD MANUAL: 73 % (ref 13–40)
MCH RBC QN AUTO: 29.3 PG (ref 27–31)
MCHC RBC AUTO-ENTMCNC: 31.6 G/DL (ref 33–36)
MCV RBC AUTO: 92.7 FL (ref 80–94)
METAMYELOCYTES NFR BLD MANUAL: 1 %
MONOCYTES NFR BLD MANUAL: 0.03 X10(3)/MCL (ref 0.1–1.3)
MONOCYTES NFR BLD MANUAL: 5 % (ref 2–11)
NEUTROPHILS NFR BLD MANUAL: 17 % (ref 47–80)
PLATELET # BLD AUTO: 9 X10(3)/MCL (ref 130–400)
PLATELET # BLD EST: ABNORMAL 10*3/UL
PMV BLD AUTO: 9.2 FL (ref 7.4–10.4)
POIKILOCYTOSIS BLD QL SMEAR: ABNORMAL
POLYCHROMASIA BLD QL SMEAR: SLIGHT
POTASSIUM SERPL-SCNC: 3.7 MMOL/L (ref 3.5–5.1)
PROT SERPL-MCNC: 6.1 GM/DL (ref 5.8–7.6)
RBC # BLD AUTO: 2.46 X10(6)/MCL (ref 4.7–6.1)
RBC MORPH BLD: ABNORMAL
RBCS: NORMAL
RBCS: NORMAL
SCHISTOCYTE (OLG): SLIGHT
SODIUM SERPL-SCNC: 142 MMOL/L (ref 136–145)
TOXIC GRANULES BLD QL SMEAR: ABNORMAL
UNIT NUMBER: NORMAL
WBC # SPEC AUTO: 0.56 X10(3)/MCL (ref 4.5–11.5)

## 2023-11-13 PROCEDURE — 36415 COLL VENOUS BLD VENIPUNCTURE: CPT

## 2023-11-13 PROCEDURE — P9037 PLATE PHERES LEUKOREDU IRRAD: HCPCS | Performed by: INTERNAL MEDICINE

## 2023-11-13 PROCEDURE — 80053 COMPREHEN METABOLIC PANEL: CPT

## 2023-11-13 PROCEDURE — 85027 COMPLETE CBC AUTOMATED: CPT

## 2023-11-13 PROCEDURE — 36430 TRANSFUSION BLD/BLD COMPNT: CPT

## 2023-11-13 PROCEDURE — 25000003 PHARM REV CODE 250: Performed by: INTERNAL MEDICINE

## 2023-11-13 RX ORDER — HYDROCODONE BITARTRATE AND ACETAMINOPHEN 500; 5 MG/1; MG/1
TABLET ORAL ONCE
Status: COMPLETED | OUTPATIENT
Start: 2023-11-13 | End: 2023-11-13

## 2023-11-13 RX ORDER — HYDROCODONE BITARTRATE AND ACETAMINOPHEN 500; 5 MG/1; MG/1
TABLET ORAL ONCE
Status: CANCELLED | OUTPATIENT
Start: 2023-11-13 | End: 2023-11-13

## 2023-11-13 RX ADMIN — SODIUM CHLORIDE: 9 INJECTION, SOLUTION INTRAVENOUS at 09:11

## 2023-11-16 ENCOUNTER — INFUSION (OUTPATIENT)
Dept: INFUSION THERAPY | Facility: HOSPITAL | Age: 73
End: 2023-11-16
Attending: INTERNAL MEDICINE
Payer: MEDICARE

## 2023-11-16 VITALS
SYSTOLIC BLOOD PRESSURE: 126 MMHG | TEMPERATURE: 98 F | HEART RATE: 66 BPM | RESPIRATION RATE: 18 BRPM | DIASTOLIC BLOOD PRESSURE: 77 MMHG

## 2023-11-16 DIAGNOSIS — C92.00 ACUTE MYELOID LEUKEMIA NOT HAVING ACHIEVED REMISSION: ICD-10-CM

## 2023-11-16 DIAGNOSIS — C92.00 ACUTE MYELOID LEUKEMIA NOT HAVING ACHIEVED REMISSION: Primary | ICD-10-CM

## 2023-11-16 PROCEDURE — P9037 PLATE PHERES LEUKOREDU IRRAD: HCPCS | Performed by: INTERNAL MEDICINE

## 2023-11-16 PROCEDURE — 36430 TRANSFUSION BLD/BLD COMPNT: CPT

## 2023-11-16 RX ORDER — HYDROCODONE BITARTRATE AND ACETAMINOPHEN 500; 5 MG/1; MG/1
TABLET ORAL ONCE
Status: ACTIVE | OUTPATIENT
Start: 2023-11-16

## 2023-11-16 RX ORDER — HYDROCODONE BITARTRATE AND ACETAMINOPHEN 500; 5 MG/1; MG/1
TABLET ORAL ONCE
Status: CANCELLED | OUTPATIENT
Start: 2023-11-16 | End: 2023-11-16

## 2023-11-20 ENCOUNTER — INFUSION (OUTPATIENT)
Dept: INFUSION THERAPY | Facility: HOSPITAL | Age: 73
End: 2023-11-20
Attending: INTERNAL MEDICINE
Payer: MEDICARE

## 2023-11-20 VITALS
SYSTOLIC BLOOD PRESSURE: 137 MMHG | OXYGEN SATURATION: 98 % | WEIGHT: 181.38 LBS | DIASTOLIC BLOOD PRESSURE: 74 MMHG | HEIGHT: 72 IN | HEART RATE: 64 BPM | TEMPERATURE: 98 F | BODY MASS INDEX: 24.57 KG/M2 | RESPIRATION RATE: 18 BRPM

## 2023-11-20 DIAGNOSIS — C92.00 ACUTE MYELOID LEUKEMIA NOT HAVING ACHIEVED REMISSION: Chronic | ICD-10-CM

## 2023-11-20 DIAGNOSIS — D61.818 PANCYTOPENIA: Chronic | ICD-10-CM

## 2023-11-20 DIAGNOSIS — D61.818 PANCYTOPENIA: Primary | Chronic | ICD-10-CM

## 2023-11-20 LAB
ABO + RH BLD: NORMAL
BLD PROD TYP BPU: NORMAL
BLOOD UNIT EXPIRATION DATE: NORMAL
BLOOD UNIT TYPE CODE: 7300
CROSSMATCH INTERPRETATION: NORMAL
DISPENSE STATUS: NORMAL
RBCS: NORMAL
UNIT NUMBER: NORMAL

## 2023-11-20 PROCEDURE — 25000003 PHARM REV CODE 250: Performed by: NURSE PRACTITIONER

## 2023-11-20 PROCEDURE — P9037 PLATE PHERES LEUKOREDU IRRAD: HCPCS | Performed by: NURSE PRACTITIONER

## 2023-11-20 PROCEDURE — 36430 TRANSFUSION BLD/BLD COMPNT: CPT

## 2023-11-20 RX ORDER — HYDROCODONE BITARTRATE AND ACETAMINOPHEN 500; 5 MG/1; MG/1
TABLET ORAL ONCE
Status: CANCELLED | OUTPATIENT
Start: 2023-11-20 | End: 2023-11-20

## 2023-11-20 RX ORDER — HYDROCODONE BITARTRATE AND ACETAMINOPHEN 500; 5 MG/1; MG/1
TABLET ORAL ONCE
Status: COMPLETED | OUTPATIENT
Start: 2023-11-20 | End: 2023-11-20

## 2023-11-20 RX ORDER — HYDROCODONE BITARTRATE AND ACETAMINOPHEN 500; 5 MG/1; MG/1
TABLET ORAL ONCE
Status: ACTIVE | OUTPATIENT
Start: 2023-11-20

## 2023-11-20 RX ADMIN — SODIUM CHLORIDE: 9 INJECTION, SOLUTION INTRAVENOUS at 09:11

## 2023-11-21 ENCOUNTER — HOSPITAL ENCOUNTER (OUTPATIENT)
Facility: HOSPITAL | Age: 73
Discharge: HOME | End: 2023-11-21
Attending: PATHOLOGY | Admitting: PATHOLOGY
Payer: MEDICARE

## 2023-11-21 DIAGNOSIS — C92.00 ACUTE MYELOID LEUKEMIA NOT HAVING ACHIEVED REMISSION: ICD-10-CM

## 2023-11-21 DIAGNOSIS — C92.00 ACUTE MYELOSIS: ICD-10-CM

## 2023-11-21 LAB
ABS NEUT (OLG): 0.06 X10(3)/MCL (ref 2.1–9.2)
B-HCG SERPL QL: 4 %
ERYTHROCYTE [DISTWIDTH] IN BLOOD BY AUTOMATED COUNT: 15.4 % (ref 11.5–17)
HCT VFR BLD AUTO: 22.3 % (ref 42–52)
HGB BLD-MCNC: 7.6 G/DL (ref 14–18)
INSTRUMENT WBC (OLG): 0.48 X10(3)/MCL
LYMPHOCYTES NFR BLD MANUAL: 0.37 X10(3)/MCL
LYMPHOCYTES NFR BLD MANUAL: 78 %
MCH RBC QN AUTO: 30.8 PG (ref 27–31)
MCHC RBC AUTO-ENTMCNC: 34.1 G/DL (ref 33–36)
MCV RBC AUTO: 90.3 FL (ref 80–94)
MONOCYTES NFR BLD MANUAL: 0.03 X10(3)/MCL (ref 0.1–1.3)
MONOCYTES NFR BLD MANUAL: 6 %
NEUTROPHILS NFR BLD MANUAL: 13 %
NRBC BLD AUTO-RTO: 6.3 %
NRBC BLD MANUAL-RTO: 1 %
PLATELET # BLD AUTO: 29 X10(3)/MCL (ref 130–400)
PLATELET # BLD EST: ABNORMAL 10*3/UL
PLATELETS.RETICULATED NFR BLD AUTO: 0.5 % (ref 0.9–11.2)
PMV BLD AUTO: 10.6 FL (ref 7.4–10.4)
RBC # BLD AUTO: 2.47 X10(6)/MCL (ref 4.7–6.1)
RBC MORPH BLD: NORMAL
WBC # SPEC AUTO: 0.48 X10(3)/MCL (ref 4.5–11.5)

## 2023-11-21 PROCEDURE — 88341 IMHCHEM/IMCYTCHM EA ADD ANTB: CPT

## 2023-11-21 PROCEDURE — 88264 CHROMOSOME ANALYSIS 20-25: CPT

## 2023-11-21 PROCEDURE — 88311 DECALCIFY TISSUE: CPT

## 2023-11-21 PROCEDURE — 85027 COMPLETE CBC AUTOMATED: CPT | Performed by: INTERNAL MEDICINE

## 2023-11-21 PROCEDURE — 88305 TISSUE EXAM BY PATHOLOGIST: CPT | Performed by: INTERNAL MEDICINE

## 2023-11-21 PROCEDURE — 81450 HL NEO GSAP 5-50DNA/DNA&RNA: CPT

## 2023-11-21 PROCEDURE — 88342 IMHCHEM/IMCYTCHM 1ST ANTB: CPT

## 2023-11-21 PROCEDURE — 63600175 PHARM REV CODE 636 W HCPCS: Performed by: PATHOLOGY

## 2023-11-21 PROCEDURE — 38222 DX BONE MARROW BX & ASPIR: CPT | Performed by: PATHOLOGY

## 2023-11-21 PROCEDURE — 88184 FLOWCYTOMETRY/ TC 1 MARKER: CPT

## 2023-11-21 PROCEDURE — 88185 FLOWCYTOMETRY/TC ADD-ON: CPT

## 2023-11-21 PROCEDURE — 99152 MOD SED SAME PHYS/QHP 5/>YRS: CPT | Performed by: PATHOLOGY

## 2023-11-21 PROCEDURE — 88237 TISSUE CULTURE BONE MARROW: CPT

## 2023-11-21 PROCEDURE — 88313 SPECIAL STAINS GROUP 2: CPT

## 2023-11-21 RX ORDER — MIDAZOLAM HYDROCHLORIDE 5 MG/ML
10 INJECTION INTRAMUSCULAR; INTRAVENOUS ONCE
Status: COMPLETED | OUTPATIENT
Start: 2023-11-21 | End: 2023-11-21

## 2023-11-21 RX ORDER — SODIUM CHLORIDE 9 MG/ML
INJECTION, SOLUTION INTRAVENOUS CONTINUOUS
Status: DISCONTINUED | OUTPATIENT
Start: 2023-11-21 | End: 2023-11-21 | Stop reason: HOSPADM

## 2023-11-21 RX ORDER — FLUMAZENIL 0.1 MG/ML
0.2 INJECTION INTRAVENOUS ONCE
Status: DISCONTINUED | OUTPATIENT
Start: 2023-11-21 | End: 2023-11-21 | Stop reason: HOSPADM

## 2023-11-21 RX ADMIN — MIDAZOLAM HYDROCHLORIDE 10 MG: 5 INJECTION, SOLUTION INTRAMUSCULAR; INTRAVENOUS at 11:11

## 2023-11-21 RX ADMIN — MIDAZOLAM HYDROCHLORIDE 3 MG: 5 INJECTION, SOLUTION INTRAMUSCULAR; INTRAVENOUS at 10:11

## 2023-11-21 RX ADMIN — MIDAZOLAM HYDROCHLORIDE 1 MG: 5 INJECTION, SOLUTION INTRAMUSCULAR; INTRAVENOUS at 11:11

## 2023-11-21 NOTE — PROGRESS NOTES
Phase II completed. Mr. Gonzalez was ready for discharge (status post bone marrow biopsy). After reviewing post-op instructions with Mr. Gonzalez and his significant other Ms. Workman, they were instructed to wait for wheelchair transport. Approximately eight minutes later, I rounded on Mr. Gonzalez to see if he was dressed and ready for the wheelchair. They were not in the room.  Apparently he walked out without being seen.

## 2023-11-21 NOTE — PROCEDURES
"Hussein Gonzalez Jr. is a 73 y.o. male patient.    Temp: 98.6 °F (37 °C) (11/21/23 0819)  Pulse: 63 (11/21/23 1216)  Resp: 14 (11/21/23 1111)  BP: 135/83 (11/21/23 1202)  SpO2: 99 % (11/21/23 1216)  Weight: 79.8 kg (175 lb 14.8 oz) (11/21/23 0915)  Height: 6' 1" (185.4 cm) (11/21/23 0915)  ASA Classification: Class 2    Bone marrow    Date/Time: 11/21/2023 1:24 PM    Performed by: Junior Castro MD  Authorized by: Junior Castro MD    Consent Done?:   Immediately prior to procedure a time out was called to verify the correct patient, procedure, equipment, support staff and site/side marked as required. .      Assistants?: Yes    I was present for the entire procedure.    Position: left lateral  Anesthesia: local infiltration  Local anesthetic: lidocaine 1% without epinephrine  Aspiration?: Yes   Biopsy?: Yes    Specimen source: posterior iliac crest  Patient tolerated the procedure well with no immediate complications.  Post-operative instructions were provided for the patient.  Patient was discharged and will follow up if any complications occur.       11/21/2023    "

## 2023-11-21 NOTE — DISCHARGE INSTRUCTIONS
Bone Marrow Biopsy    -No driving for 24 hours.    -May remove band-aid tomorrow and shower. No tub baths/submerging in water until site is fully healed, about 7 days.     -A purple marking pen was used to kathy the site prior to procedure, a rounded stain like smear around the dressing is normal.    -If bleeding occurs, hold firm pressure at the site for at least 5 minutes. Replace the dressing or bandaid, and re-time for another 24 hours before removal.     - Report to ER with any uncontrollable bleeding, heavy increase bruising at site (growing in size), chest pain, shortness of breath, and/or SEVERE/SUDDEN unrelieved abdominal pain.    INFECTION:  watch for any signs or symptoms of infection such as chills, fever, redness or drainage at surgical site. Notify your doctor     PAIN: It is normal to experience some discomfort and/or soreness at the site of procedure. Apply an ice pack on and off every 15 minutes as needed. We recommend over the counter tylenol or ibuprofen if you are able to take it.      - Biopsy results will be sent to your oncologist/referring physician.You should have a follow up appointment already scheduled for results.     - Call with any questions or concerns.

## 2023-11-22 LAB — HEMATOLOGIST REVIEW: NORMAL

## 2023-11-24 ENCOUNTER — LAB VISIT (OUTPATIENT)
Dept: LAB | Facility: HOSPITAL | Age: 73
End: 2023-11-24
Attending: INTERNAL MEDICINE
Payer: MEDICARE

## 2023-11-24 ENCOUNTER — INFUSION (OUTPATIENT)
Dept: INFUSION THERAPY | Facility: HOSPITAL | Age: 73
End: 2023-11-24
Attending: INTERNAL MEDICINE
Payer: MEDICARE

## 2023-11-24 VITALS
TEMPERATURE: 98 F | SYSTOLIC BLOOD PRESSURE: 152 MMHG | HEART RATE: 63 BPM | RESPIRATION RATE: 18 BRPM | DIASTOLIC BLOOD PRESSURE: 74 MMHG

## 2023-11-24 DIAGNOSIS — C92.00 ACUTE MYELOID LEUKEMIA NOT HAVING ACHIEVED REMISSION: ICD-10-CM

## 2023-11-24 DIAGNOSIS — D61.818 PANCYTOPENIA: Chronic | ICD-10-CM

## 2023-11-24 DIAGNOSIS — D61.818 PANCYTOPENIA: Primary | Chronic | ICD-10-CM

## 2023-11-24 LAB
ABO + RH BLD: NORMAL
ABS NEUT CALC (OHS): 0.07 X10(3)/MCL (ref 2.1–9.2)
ALBUMIN SERPL-MCNC: 3.6 G/DL (ref 3.4–4.8)
ALBUMIN/GLOB SERPL: 1.3 RATIO (ref 1.1–2)
ALP SERPL-CCNC: 84 UNIT/L (ref 40–150)
ALT SERPL-CCNC: 9 UNIT/L (ref 0–55)
ANISOCYTOSIS BLD QL SMEAR: ABNORMAL
AST SERPL-CCNC: 12 UNIT/L (ref 5–34)
B-HCG SERPL QL: 4 %
BILIRUB SERPL-MCNC: 0.9 MG/DL
BLD PROD TYP BPU: NORMAL
BLOOD UNIT EXPIRATION DATE: NORMAL
BLOOD UNIT TYPE CODE: 6200
BUN SERPL-MCNC: 20.6 MG/DL (ref 8.4–25.7)
BURR CELLS (OLG): ABNORMAL
CALCIUM SERPL-MCNC: 8.9 MG/DL (ref 8.8–10)
CHLORIDE SERPL-SCNC: 108 MMOL/L (ref 98–107)
CO2 SERPL-SCNC: 28 MMOL/L (ref 23–31)
CREAT SERPL-MCNC: 0.87 MG/DL (ref 0.73–1.18)
CROSSMATCH INTERPRETATION: NORMAL
DISPENSE STATUS: NORMAL
ERYTHROCYTE [DISTWIDTH] IN BLOOD BY AUTOMATED COUNT: 16.4 % (ref 11.5–17)
GFR SERPLBLD CREATININE-BSD FMLA CKD-EPI: >60 MLS/MIN/1.73/M2
GLOBULIN SER-MCNC: 2.7 GM/DL (ref 2.4–3.5)
GLUCOSE SERPL-MCNC: 87 MG/DL (ref 82–115)
GROUP & RH: NORMAL
HCT VFR BLD AUTO: 27.2 % (ref 42–52)
HGB BLD-MCNC: 8.2 G/DL (ref 14–18)
INDIRECT COOMBS GEL: NORMAL
LYMPHOCYTES NFR BLD MANUAL: 0.64 X10(3)/MCL
LYMPHOCYTES NFR BLD MANUAL: 85 % (ref 13–40)
MCH RBC QN AUTO: 29 PG (ref 27–31)
MCHC RBC AUTO-ENTMCNC: 30.1 G/DL (ref 33–36)
MCV RBC AUTO: 96.1 FL (ref 80–94)
MONOCYTES NFR BLD MANUAL: 0.01 X10(3)/MCL (ref 0.1–1.3)
MONOCYTES NFR BLD MANUAL: 1 % (ref 2–11)
NEUTROPHILS NFR BLD MANUAL: 10 % (ref 47–80)
PLATELET # BLD AUTO: 15 X10(3)/MCL (ref 130–400)
PLATELET # BLD EST: ABNORMAL 10*3/UL
PMV BLD AUTO: 10 FL (ref 7.4–10.4)
POIKILOCYTOSIS BLD QL SMEAR: ABNORMAL
POLYCHROMASIA BLD QL SMEAR: ABNORMAL
POTASSIUM SERPL-SCNC: 4.1 MMOL/L (ref 3.5–5.1)
PROT SERPL-MCNC: 6.3 GM/DL (ref 5.8–7.6)
RBC # BLD AUTO: 2.83 X10(6)/MCL (ref 4.7–6.1)
RBC MORPH BLD: ABNORMAL
RBCS: NORMAL
SODIUM SERPL-SCNC: 142 MMOL/L (ref 136–145)
SPECIMEN OUTDATE: NORMAL
TEAR DROP CELL (OLG): ABNORMAL
UNIT NUMBER: NORMAL
WBC # SPEC AUTO: 0.75 X10(3)/MCL (ref 4.5–11.5)

## 2023-11-24 PROCEDURE — 36415 COLL VENOUS BLD VENIPUNCTURE: CPT

## 2023-11-24 PROCEDURE — 36430 TRANSFUSION BLD/BLD COMPNT: CPT

## 2023-11-24 PROCEDURE — P9037 PLATE PHERES LEUKOREDU IRRAD: HCPCS | Performed by: INTERNAL MEDICINE

## 2023-11-24 PROCEDURE — 86850 RBC ANTIBODY SCREEN: CPT | Performed by: NURSE PRACTITIONER

## 2023-11-24 PROCEDURE — 85027 COMPLETE CBC AUTOMATED: CPT

## 2023-11-24 PROCEDURE — 80053 COMPREHEN METABOLIC PANEL: CPT

## 2023-11-24 RX ORDER — HYDROCODONE BITARTRATE AND ACETAMINOPHEN 500; 5 MG/1; MG/1
TABLET ORAL ONCE
Status: ACTIVE | OUTPATIENT
Start: 2023-11-24

## 2023-11-24 RX ORDER — HYDROCODONE BITARTRATE AND ACETAMINOPHEN 500; 5 MG/1; MG/1
TABLET ORAL ONCE
Status: CANCELLED | OUTPATIENT
Start: 2023-11-24 | End: 2023-11-24

## 2023-11-27 ENCOUNTER — LAB VISIT (OUTPATIENT)
Dept: LAB | Facility: HOSPITAL | Age: 73
End: 2023-11-27
Attending: INTERNAL MEDICINE
Payer: MEDICARE

## 2023-11-27 ENCOUNTER — INFUSION (OUTPATIENT)
Dept: INFUSION THERAPY | Facility: HOSPITAL | Age: 73
End: 2023-11-27
Attending: INTERNAL MEDICINE
Payer: MEDICARE

## 2023-11-27 VITALS
OXYGEN SATURATION: 99 % | TEMPERATURE: 99 F | SYSTOLIC BLOOD PRESSURE: 135 MMHG | DIASTOLIC BLOOD PRESSURE: 83 MMHG | HEART RATE: 63 BPM | RESPIRATION RATE: 14 BRPM | HEIGHT: 73 IN | BODY MASS INDEX: 23.32 KG/M2 | WEIGHT: 175.94 LBS

## 2023-11-27 DIAGNOSIS — C92.00 ACUTE MYELOID LEUKEMIA NOT HAVING ACHIEVED REMISSION: ICD-10-CM

## 2023-11-27 LAB
ALBUMIN SERPL-MCNC: 3.5 G/DL (ref 3.4–4.8)
ALBUMIN/GLOB SERPL: 1.3 RATIO (ref 1.1–2)
ALP SERPL-CCNC: 85 UNIT/L (ref 40–150)
ALT SERPL-CCNC: 11 UNIT/L (ref 0–55)
AST SERPL-CCNC: 12 UNIT/L (ref 5–34)
BASOPHILS # BLD AUTO: 0 X10(3)/MCL
BASOPHILS NFR BLD AUTO: 0 %
BILIRUB SERPL-MCNC: 1.2 MG/DL
BUN SERPL-MCNC: 15.6 MG/DL (ref 8.4–25.7)
CALCIUM SERPL-MCNC: 8.7 MG/DL (ref 8.8–10)
CHLORIDE SERPL-SCNC: 108 MMOL/L (ref 98–107)
CO2 SERPL-SCNC: 29 MMOL/L (ref 23–31)
CREAT SERPL-MCNC: 0.86 MG/DL (ref 0.73–1.18)
EOSINOPHIL # BLD AUTO: 0 X10(3)/MCL (ref 0–0.9)
EOSINOPHIL NFR BLD AUTO: 0 %
ERYTHROCYTE [DISTWIDTH] IN BLOOD BY AUTOMATED COUNT: 17.7 % (ref 11.5–17)
GFR SERPLBLD CREATININE-BSD FMLA CKD-EPI: >60 MLS/MIN/1.73/M2
GLOBULIN SER-MCNC: 2.7 GM/DL (ref 2.4–3.5)
GLUCOSE SERPL-MCNC: 90 MG/DL (ref 82–115)
HCT VFR BLD AUTO: 25.8 % (ref 42–52)
HGB BLD-MCNC: 7.7 G/DL (ref 14–18)
IMM GRANULOCYTES # BLD AUTO: 0 X10(3)/MCL (ref 0–0.04)
IMM GRANULOCYTES NFR BLD AUTO: 0 %
LYMPHOCYTES # BLD AUTO: 0.48 X10(3)/MCL (ref 0.6–4.6)
LYMPHOCYTES NFR BLD AUTO: 80 %
MCH RBC QN AUTO: 28.9 PG (ref 27–31)
MCHC RBC AUTO-ENTMCNC: 29.8 G/DL (ref 33–36)
MCV RBC AUTO: 97 FL (ref 80–94)
MONOCYTES # BLD AUTO: 0.09 X10(3)/MCL (ref 0.1–1.3)
MONOCYTES NFR BLD AUTO: 15 %
NEUTROPHILS # BLD AUTO: 0.03 X10(3)/MCL (ref 2.1–9.2)
NEUTROPHILS NFR BLD AUTO: 5 %
PLATELET # BLD AUTO: 16 X10(3)/MCL (ref 130–400)
PMV BLD AUTO: 8.9 FL (ref 7.4–10.4)
POTASSIUM SERPL-SCNC: 3.8 MMOL/L (ref 3.5–5.1)
PROT SERPL-MCNC: 6.2 GM/DL (ref 5.8–7.6)
RBC # BLD AUTO: 2.66 X10(6)/MCL (ref 4.7–6.1)
SODIUM SERPL-SCNC: 143 MMOL/L (ref 136–145)
WBC # SPEC AUTO: 0.6 X10(3)/MCL (ref 4.5–11.5)

## 2023-11-27 PROCEDURE — 80053 COMPREHEN METABOLIC PANEL: CPT

## 2023-11-27 PROCEDURE — 85025 COMPLETE CBC W/AUTO DIFF WBC: CPT

## 2023-11-27 PROCEDURE — 36415 COLL VENOUS BLD VENIPUNCTURE: CPT

## 2023-11-30 ENCOUNTER — INFUSION (OUTPATIENT)
Dept: INFUSION THERAPY | Facility: HOSPITAL | Age: 73
End: 2023-11-30
Attending: INTERNAL MEDICINE
Payer: MEDICARE

## 2023-11-30 VITALS
DIASTOLIC BLOOD PRESSURE: 80 MMHG | OXYGEN SATURATION: 98 % | SYSTOLIC BLOOD PRESSURE: 142 MMHG | RESPIRATION RATE: 16 BRPM | TEMPERATURE: 99 F | HEART RATE: 67 BPM

## 2023-11-30 DIAGNOSIS — C92.00 ACUTE MYELOID LEUKEMIA NOT HAVING ACHIEVED REMISSION: Primary | ICD-10-CM

## 2023-11-30 DIAGNOSIS — C92.00 ACUTE MYELOID LEUKEMIA NOT HAVING ACHIEVED REMISSION: ICD-10-CM

## 2023-11-30 PROCEDURE — 36430 TRANSFUSION BLD/BLD COMPNT: CPT

## 2023-11-30 PROCEDURE — 25000003 PHARM REV CODE 250: Performed by: INTERNAL MEDICINE

## 2023-11-30 PROCEDURE — P9037 PLATE PHERES LEUKOREDU IRRAD: HCPCS | Performed by: INTERNAL MEDICINE

## 2023-11-30 RX ORDER — HYDROCODONE BITARTRATE AND ACETAMINOPHEN 500; 5 MG/1; MG/1
TABLET ORAL ONCE
Status: ACTIVE | OUTPATIENT
Start: 2023-11-30

## 2023-11-30 RX ORDER — HYDROCODONE BITARTRATE AND ACETAMINOPHEN 500; 5 MG/1; MG/1
TABLET ORAL ONCE
Status: CANCELLED | OUTPATIENT
Start: 2023-11-30 | End: 2023-11-30

## 2023-11-30 RX ORDER — HYDROCODONE BITARTRATE AND ACETAMINOPHEN 500; 5 MG/1; MG/1
TABLET ORAL ONCE
Status: COMPLETED | OUTPATIENT
Start: 2023-11-30 | End: 2023-11-30

## 2023-11-30 RX ADMIN — SODIUM CHLORIDE: 9 INJECTION, SOLUTION INTRAVENOUS at 09:11

## 2023-11-30 NOTE — PLAN OF CARE
1 Platelet and 1u PRBC given. Tolerated well. Next appts confirmed with pt. Dc'd home in stable condition.

## 2023-12-01 NOTE — PROGRESS NOTES
Subjective:       Patient ID: Hussein Gonzalez Jr. is a 73 y.o. male.    Chief Complaint: Follow-up (No concerns today)      Diagnosis:  AML with 27% blasts, background dysplasia, normal cytogenetics and normal FISH with NGS showing ASXL1, SRSF2, TET2, and STAG2 mutations.    Current Treatment:   Azacitidine and venetoclax started on 02/27/2023.  Started 7 out of 28 days, did this through cycle 5.  With cycle 6, moved to 5 out of 28 days.  Referral for possible stem cell transplant evaluation.    Treatment History:  N/A    HPI:  73-year-old male with a history of CVA, hypertension, BPH, and melanoma who presented to the emergency department following a syncopal episode.  In February of 2022, the patient did have a slightly low white blood cell count, hemoglobin, and platelet count.  Per the patient, 3 months prior to admission, he was having dizziness, fatigue, weakness, shortness of breath, back pain, and leg pain.  The dizziness was worse with standing and walking.  On 01/20/2023, the patient had a syncopal episode.  He was brought to the emergency department and was found to be pancytopenic.  He also had stool that was positive for occult blood in the emergency department.  He received blood transfusion.  His CBC on 01/20/2023 revealed a white blood cell count of 2.6, hemoglobin of 7.7, and platelet count of 47,000. Reticulocytes were appropriately elevated, B12 and folate were within normal limits, iron level was slightly decreased.  Repeat blood work on 01/21/2023 revealed a white blood cell count of 2.8, post transfusion hemoglobin of 9.6, and a platelet count of 70934.  Hematology was consulted for pancytopenia.  EGD and colonoscopy done on 01/24/2023, EGD revealed nonbleeding ulcers, colonoscopy unremarkable, however had to be aborted due to tortuous colon.  Bone Marrow Biopsy done on 01/25/2023.  Preliminary reveals AML with 27% blasts in a background of dysplasia.  Cytogenetics are within normal limits,  FISH is within normal limits.  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  Started azacitidine and venetoclax on 02/27/2023, completed cycle 4 on 05/30/2023.  Held after this cycle and repeated a bone marrow biopsy, this was done on 06/27/2023, this showed a normocellular marrow (40%) with relative erythroid hyperplasia, sequential granulopoiesis and megakaryopoiesis.  The bone marrow was slightly suboptimal given it was predominantly subcortical bone with focal hematopoietic elements.  There was no morphologic or immunophenotypic evidence of previous acute myeloid leukemia, cytogenetics were normal, FISH was normal, NGS showed persistent ASXL1, SRSF2, TET2.    Repeat bone marrow biopsy done on 11/21/2023 still pending, however preliminary report suggests no increase in blasts.    Interval History:   Patient presents for scheduled follow up appointment for AML.  He remains on Vidaza and venetoclax.  He states that overall he is doing well.  He has good energy, performing all his ADLs, with minimal to no limitations.  He also states that back pain that he was previously having has subsided.      Past Medical History:   Diagnosis Date    Anxiety and depression     BPH (benign prostatic hyperplasia)     History of CVA (cerebrovascular accident) without residual deficits     HTN (hypertension)     Malignant melanoma of skin, unspecified     NSTEMI (non-ST elevated myocardial infarction)     Sepsis, Gram negative     Sleep apnea       Past Surgical History:   Procedure Laterality Date    BONE MARROW ASPIRATION N/A 6/27/2023    Procedure: ASPIRATION, BONE MARROW;  Surgeon: Sabino Núñez MD;  Location: [facility];  Service: General;  Laterality: N/A;  ALL ASSESS FOR MOLECULAR MRD    BONE MARROW BIOPSY N/A 1/25/2023    Procedure: Biopsy-bone marrow;  Surgeon: Rd Carlos MD;  Location: [facility] OR;  Service: General;  Laterality: N/A;    BONE MARROW BIOPSY N/A 11/21/2023    Procedure:  Biopsy-bone marrow;  Surgeon: Junior Castro MD;  Location: [facility];  Service: General;  Laterality: N/A;    COLONOSCOPY      COLONOSCOPY N/A 1/24/2023    Procedure: COLONOSCOPY (DOUBLE);  Surgeon: Jimy Baker MD;  Location: [facility] ENDOSCOPY;  Service: Gastroenterology;  Laterality: N/A;    EGD, WITH CLOSED BIOPSY N/A 1/24/2023    Procedure: EGD, WITH CLOSED BIOPSY;  Surgeon: Jimy Baker MD;  Location: [facility] ENDOSCOPY;  Service: Gastroenterology;  Laterality: N/A;    EXCISION OF MELANOMA      SINUS SURGERY      TONSILLECTOMY       Social History     Socioeconomic History    Marital status: Significant Other   Tobacco Use    Smoking status: Never    Smokeless tobacco: Never   Substance and Sexual Activity    Alcohol use: Not Currently     Alcohol/week: 7.0 standard drinks of alcohol     Types: 7 Cans of beer per week    Drug use: Never    Sexual activity: Yes     Social Determinants of Health     Financial Resource Strain: Low Risk  (4/20/2023)    Overall Financial Resource Strain (CARDIA)     Difficulty of Paying Living Expenses: Not hard at all   Food Insecurity: No Food Insecurity (4/20/2023)    Hunger Vital Sign     Worried About Running Out of Food in the Last Year: Never true     Ran Out of Food in the Last Year: Never true   Transportation Needs: No Transportation Needs (4/20/2023)    PRAPARE - Transportation     Lack of Transportation (Medical): No     Lack of Transportation (Non-Medical): No   Physical Activity: Insufficiently Active (4/20/2023)    Exercise Vital Sign     Days of Exercise per Week: 3 days     Minutes of Exercise per Session: 30 min   Stress: No Stress Concern Present (4/20/2023)    [employer] - Occupational Stress Questionnaire     Feeling of Stress : Not at all   Social Connections: Unknown (4/20/2023)    Social Connection and Isolation Panel [NHANES]     Frequency of Communication with Friends and Family: More than three times a week      Frequency of Social Gatherings with Friends and Family: More than three times a week     Attends Gnosticism Services: Patient refused     Active Member of Clubs or Organizations: Patient refused     Attends Club or Organization Meetings: Patient refused     Marital Status:    Housing Stability: Low Risk  (4/20/2023)    Housing Stability Vital Sign     Unable to Pay for Housing in the Last Year: No     Number of Places Lived in the Last Year: 1     Unstable Housing in the Last Year: No      Family History   Problem Relation Age of Onset    Cancer Mother     Cancer Father       Review of patient's allergies indicates:   Allergen Reactions    Acetaminophen Other (See Comments)     Restless leg     Benadryl [diphenhydramine hcl] Other (See Comments)     Restless Legs      Review of Systems   Constitutional:  Negative for chills, diaphoresis, fatigue, fever and unexpected weight change.   HENT:  Negative for nasal congestion, mouth sores, sinus pressure/congestion and sore throat.    Eyes:  Negative for pain and visual disturbance.   Respiratory:  Negative for cough, chest tightness and shortness of breath.    Cardiovascular:  Negative for chest pain, palpitations and leg swelling.   Gastrointestinal:  Negative for abdominal distention, abdominal pain, blood in stool, constipation and diarrhea.   Genitourinary:  Negative for dysuria, frequency and hematuria.   Musculoskeletal:  Positive for arthralgias and myalgias. Negative for back pain.   Integumentary:  Negative for rash.   Neurological:  Negative for dizziness, syncope, weakness, numbness and headaches.   Hematological:  Negative for adenopathy.   Psychiatric/Behavioral:  Negative for confusion.          Objective:      Physical Exam  Vitals reviewed.   Constitutional:       General: He is awake.      Appearance: Normal appearance.   HENT:      Head: Normocephalic and atraumatic.      Right Ear: Hearing normal.      Left Ear: Hearing normal.      Nose: Nose  normal.   Eyes:      General: Lids are normal. Vision grossly intact.      Extraocular Movements: Extraocular movements intact.      Conjunctiva/sclera: Conjunctivae normal.   Cardiovascular:      Rate and Rhythm: Normal rate and regular rhythm.      Pulses: Normal pulses.      Heart sounds: Normal heart sounds.   Pulmonary:      Effort: Pulmonary effort is normal.      Breath sounds: Normal breath sounds. No wheezing, rhonchi or rales.   Abdominal:      General: Bowel sounds are normal.      Palpations: Abdomen is soft.      Tenderness: There is no abdominal tenderness.   Musculoskeletal:      Cervical back: Full passive range of motion without pain.      Right lower leg: No edema.      Left lower leg: No edema.   Skin:     General: Skin is warm.      Comments:      Neurological:      General: No focal deficit present.      Mental Status: He is alert and oriented to person, place, and time.   Psychiatric:         Attention and Perception: Attention normal.         Mood and Affect: Mood and affect normal.         Behavior: Behavior is cooperative.         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    Treating with azacitidine and venetoclax, Started on 02/27/2023.  Bone marrow biopsy after cycle 4 shows no immunophenotypic or morphologic evidence of AML.  NGS showed continued ASXL1, SRSF2, TET2 mutations.  Cytogenetics were normal.    Evaluated by Dr. Valdez in [place].     Continue prophylaxis with 500 mg of levofloxacin daily, 200 mg of acyclovir b.i.d., 200 mg of fluconazole daily.    Twice weekly labs  (Monday/Thursday) with transfusions as needed.    He restarted treatment on 07/10/2023, on 08/14/2023, we dose reduced by changing venetoclax to 14 out of every 28 days.    Venetoclax 200 mg on days 1-14 of a 28 day cycle    Proceed with Azacididine this week.  He treated with azacitidine 7 days out of 28 days for the first 5 cycles, with cycle 6 moving forward, we did 5 days.  He is currently treating with 3 days.    Flexeril as needed for muscle spasms    Magic mouthwash as needed for mouth sores    Repeat bone marrow biopsy done on 11/21/2023 results still pending.    Return to clinic in 4 weeks    Eduard Luna II, MD    I, Terra Chisholm LPN, acted solely as a scribe for and in the presence of, Dr. Eduard Luna who performed the service.

## 2023-12-04 ENCOUNTER — OFFICE VISIT (OUTPATIENT)
Dept: HEMATOLOGY/ONCOLOGY | Facility: CLINIC | Age: 73
End: 2023-12-04
Payer: MEDICARE

## 2023-12-04 ENCOUNTER — INFUSION (OUTPATIENT)
Dept: INFUSION THERAPY | Facility: HOSPITAL | Age: 73
End: 2023-12-04
Attending: INTERNAL MEDICINE
Payer: MEDICARE

## 2023-12-04 VITALS
OXYGEN SATURATION: 99 % | DIASTOLIC BLOOD PRESSURE: 78 MMHG | RESPIRATION RATE: 18 BRPM | SYSTOLIC BLOOD PRESSURE: 134 MMHG | DIASTOLIC BLOOD PRESSURE: 83 MMHG | SYSTOLIC BLOOD PRESSURE: 147 MMHG | TEMPERATURE: 98 F | RESPIRATION RATE: 18 BRPM | HEART RATE: 73 BPM | OXYGEN SATURATION: 99 % | WEIGHT: 180.44 LBS | BODY MASS INDEX: 23.92 KG/M2 | HEIGHT: 73 IN | TEMPERATURE: 98 F | HEART RATE: 70 BPM

## 2023-12-04 DIAGNOSIS — C92.00 ACUTE MYELOID LEUKEMIA NOT HAVING ACHIEVED REMISSION: Primary | ICD-10-CM

## 2023-12-04 DIAGNOSIS — C92.00 ACUTE MYELOID LEUKEMIA NOT HAVING ACHIEVED REMISSION: Primary | Chronic | ICD-10-CM

## 2023-12-04 LAB — RBCS: NORMAL

## 2023-12-04 PROCEDURE — 63600175 PHARM REV CODE 636 W HCPCS: Performed by: INTERNAL MEDICINE

## 2023-12-04 PROCEDURE — 25000003 PHARM REV CODE 250: Performed by: INTERNAL MEDICINE

## 2023-12-04 PROCEDURE — 99999 PR PBB SHADOW E&M-EST. PATIENT-LVL V: ICD-10-PCS | Mod: PBBFAC,,, | Performed by: INTERNAL MEDICINE

## 2023-12-04 PROCEDURE — 99215 OFFICE O/P EST HI 40 MIN: CPT | Mod: 25,PBBFAC | Performed by: INTERNAL MEDICINE

## 2023-12-04 PROCEDURE — 99999 PR PBB SHADOW E&M-EST. PATIENT-LVL V: CPT | Mod: PBBFAC,,, | Performed by: INTERNAL MEDICINE

## 2023-12-04 PROCEDURE — 96401 CHEMO ANTI-NEOPL SQ/IM: CPT

## 2023-12-04 PROCEDURE — 99214 OFFICE O/P EST MOD 30 MIN: CPT | Mod: S$PBB,,, | Performed by: INTERNAL MEDICINE

## 2023-12-04 PROCEDURE — 99214 PR OFFICE/OUTPT VISIT, EST, LEVL IV, 30-39 MIN: ICD-10-PCS | Mod: S$PBB,,, | Performed by: INTERNAL MEDICINE

## 2023-12-04 RX ORDER — ONDANSETRON 4 MG/1
16 TABLET, ORALLY DISINTEGRATING ORAL
Status: CANCELLED | OUTPATIENT
Start: 2023-12-06

## 2023-12-04 RX ORDER — AZACITIDINE 100 MG/1
75 INJECTION, POWDER, LYOPHILIZED, FOR SOLUTION INTRAVENOUS; SUBCUTANEOUS
Status: COMPLETED | OUTPATIENT
Start: 2023-12-04 | End: 2023-12-04

## 2023-12-04 RX ORDER — AZACITIDINE 100 MG/1
75 INJECTION, POWDER, LYOPHILIZED, FOR SOLUTION INTRAVENOUS; SUBCUTANEOUS
Status: CANCELLED | OUTPATIENT
Start: 2023-12-08

## 2023-12-04 RX ORDER — ONDANSETRON 4 MG/1
16 TABLET, ORALLY DISINTEGRATING ORAL
Status: CANCELLED | OUTPATIENT
Start: 2023-12-05

## 2023-12-04 RX ORDER — AZACITIDINE 100 MG/1
75 INJECTION, POWDER, LYOPHILIZED, FOR SOLUTION INTRAVENOUS; SUBCUTANEOUS
Status: CANCELLED | OUTPATIENT
Start: 2023-12-06

## 2023-12-04 RX ORDER — AZACITIDINE 100 MG/1
75 INJECTION, POWDER, LYOPHILIZED, FOR SOLUTION INTRAVENOUS; SUBCUTANEOUS
Status: CANCELLED | OUTPATIENT
Start: 2023-12-07

## 2023-12-04 RX ORDER — AZACITIDINE 100 MG/1
75 INJECTION, POWDER, LYOPHILIZED, FOR SOLUTION INTRAVENOUS; SUBCUTANEOUS
Status: CANCELLED | OUTPATIENT
Start: 2023-12-04

## 2023-12-04 RX ORDER — ONDANSETRON 4 MG/1
16 TABLET, ORALLY DISINTEGRATING ORAL
Status: COMPLETED | OUTPATIENT
Start: 2023-12-04 | End: 2023-12-04

## 2023-12-04 RX ORDER — ONDANSETRON 4 MG/1
16 TABLET, ORALLY DISINTEGRATING ORAL
Status: CANCELLED | OUTPATIENT
Start: 2023-12-08

## 2023-12-04 RX ORDER — AZACITIDINE 100 MG/1
75 INJECTION, POWDER, LYOPHILIZED, FOR SOLUTION INTRAVENOUS; SUBCUTANEOUS
Status: CANCELLED | OUTPATIENT
Start: 2023-12-05

## 2023-12-04 RX ORDER — ONDANSETRON 4 MG/1
16 TABLET, ORALLY DISINTEGRATING ORAL
Status: CANCELLED | OUTPATIENT
Start: 2023-12-07

## 2023-12-04 RX ORDER — ONDANSETRON 4 MG/1
16 TABLET, ORALLY DISINTEGRATING ORAL
Status: CANCELLED | OUTPATIENT
Start: 2023-12-04

## 2023-12-04 RX ADMIN — ONDANSETRON 16 MG: 4 TABLET, ORALLY DISINTEGRATING ORAL at 09:12

## 2023-12-04 RX ADMIN — AZACITIDINE 155 MG: 100 INJECTION, POWDER, LYOPHILIZED, FOR SOLUTION INTRAVENOUS; SUBCUTANEOUS at 10:12

## 2023-12-04 NOTE — NURSING
Pt here today for labs, vidaza and possible blood. Hgb 8.2, platelets 25/ no blood products needed. Will start vidaza today. Per the pt he will only get 3 days of vidaza this week. Vidaza given via 2 separate injections in rlq and Llq. Will rtc tomorrow

## 2023-12-05 ENCOUNTER — INFUSION (OUTPATIENT)
Dept: INFUSION THERAPY | Facility: HOSPITAL | Age: 73
End: 2023-12-05
Attending: INTERNAL MEDICINE
Payer: MEDICARE

## 2023-12-05 VITALS
HEART RATE: 62 BPM | TEMPERATURE: 98 F | RESPIRATION RATE: 16 BRPM | SYSTOLIC BLOOD PRESSURE: 135 MMHG | DIASTOLIC BLOOD PRESSURE: 75 MMHG | OXYGEN SATURATION: 99 %

## 2023-12-05 DIAGNOSIS — C92.00 ACUTE MYELOID LEUKEMIA NOT HAVING ACHIEVED REMISSION: Primary | ICD-10-CM

## 2023-12-05 PROCEDURE — 25000003 PHARM REV CODE 250: Performed by: INTERNAL MEDICINE

## 2023-12-05 PROCEDURE — 96401 CHEMO ANTI-NEOPL SQ/IM: CPT

## 2023-12-05 PROCEDURE — 63600175 PHARM REV CODE 636 W HCPCS: Performed by: INTERNAL MEDICINE

## 2023-12-05 RX ORDER — AZACITIDINE 100 MG/1
75 INJECTION, POWDER, LYOPHILIZED, FOR SOLUTION INTRAVENOUS; SUBCUTANEOUS
Status: COMPLETED | OUTPATIENT
Start: 2023-12-05 | End: 2023-12-05

## 2023-12-05 RX ORDER — ONDANSETRON 4 MG/1
16 TABLET, ORALLY DISINTEGRATING ORAL
Status: COMPLETED | OUTPATIENT
Start: 2023-12-05 | End: 2023-12-05

## 2023-12-05 RX ADMIN — ONDANSETRON 16 MG: 4 TABLET, ORALLY DISINTEGRATING ORAL at 10:12

## 2023-12-05 RX ADMIN — AZACITIDINE 155 MG: 100 INJECTION, POWDER, LYOPHILIZED, FOR SOLUTION INTRAVENOUS; SUBCUTANEOUS at 10:12

## 2023-12-05 NOTE — PLAN OF CARE
C10 D2 Vidaza given. Tolerated well. Next appts confirmed with pt. Dc'd home in stable condition.

## 2023-12-06 ENCOUNTER — INFUSION (OUTPATIENT)
Dept: INFUSION THERAPY | Facility: HOSPITAL | Age: 73
End: 2023-12-06
Attending: INTERNAL MEDICINE
Payer: MEDICARE

## 2023-12-06 VITALS
SYSTOLIC BLOOD PRESSURE: 130 MMHG | HEART RATE: 62 BPM | OXYGEN SATURATION: 99 % | TEMPERATURE: 98 F | DIASTOLIC BLOOD PRESSURE: 68 MMHG | RESPIRATION RATE: 18 BRPM

## 2023-12-06 DIAGNOSIS — C92.00 ACUTE MYELOID LEUKEMIA NOT HAVING ACHIEVED REMISSION: Primary | ICD-10-CM

## 2023-12-06 PROCEDURE — 25000003 PHARM REV CODE 250: Performed by: INTERNAL MEDICINE

## 2023-12-06 PROCEDURE — 63600175 PHARM REV CODE 636 W HCPCS: Performed by: INTERNAL MEDICINE

## 2023-12-06 PROCEDURE — 96401 CHEMO ANTI-NEOPL SQ/IM: CPT

## 2023-12-06 RX ORDER — ONDANSETRON 4 MG/1
16 TABLET, ORALLY DISINTEGRATING ORAL
Status: COMPLETED | OUTPATIENT
Start: 2023-12-06 | End: 2023-12-06

## 2023-12-06 RX ORDER — AZACITIDINE 100 MG/1
75 INJECTION, POWDER, LYOPHILIZED, FOR SOLUTION INTRAVENOUS; SUBCUTANEOUS
Status: COMPLETED | OUTPATIENT
Start: 2023-12-06 | End: 2023-12-06

## 2023-12-06 RX ADMIN — ONDANSETRON 16 MG: 4 TABLET, ORALLY DISINTEGRATING ORAL at 10:12

## 2023-12-06 RX ADMIN — AZACITIDINE 155 MG: 100 INJECTION, POWDER, LYOPHILIZED, FOR SOLUTION INTRAVENOUS; SUBCUTANEOUS at 10:12

## 2023-12-07 ENCOUNTER — INFUSION (OUTPATIENT)
Dept: INFUSION THERAPY | Facility: HOSPITAL | Age: 73
End: 2023-12-07
Attending: INTERNAL MEDICINE
Payer: MEDICARE

## 2023-12-07 VITALS
OXYGEN SATURATION: 100 % | SYSTOLIC BLOOD PRESSURE: 151 MMHG | HEART RATE: 74 BPM | TEMPERATURE: 98 F | RESPIRATION RATE: 16 BRPM | DIASTOLIC BLOOD PRESSURE: 78 MMHG

## 2023-12-07 DIAGNOSIS — C92.00 ACUTE MYELOID LEUKEMIA NOT HAVING ACHIEVED REMISSION: ICD-10-CM

## 2023-12-07 DIAGNOSIS — C92.00 ACUTE MYELOID LEUKEMIA NOT HAVING ACHIEVED REMISSION: Primary | ICD-10-CM

## 2023-12-07 LAB
ABO + RH BLD: NORMAL
ABO + RH BLD: NORMAL
BLD PROD TYP BPU: NORMAL
BLD PROD TYP BPU: NORMAL
BLOOD UNIT EXPIRATION DATE: NORMAL
BLOOD UNIT EXPIRATION DATE: NORMAL
BLOOD UNIT TYPE CODE: 5100
BLOOD UNIT TYPE CODE: 6200
CROSSMATCH INTERPRETATION: NORMAL
CROSSMATCH INTERPRETATION: NORMAL
DISPENSE STATUS: NORMAL
DISPENSE STATUS: NORMAL
UNIT NUMBER: NORMAL
UNIT NUMBER: NORMAL

## 2023-12-07 PROCEDURE — 25000003 PHARM REV CODE 250: Performed by: INTERNAL MEDICINE

## 2023-12-07 PROCEDURE — 36430 TRANSFUSION BLD/BLD COMPNT: CPT

## 2023-12-07 PROCEDURE — P9037 PLATE PHERES LEUKOREDU IRRAD: HCPCS | Performed by: INTERNAL MEDICINE

## 2023-12-07 RX ORDER — HYDROCODONE BITARTRATE AND ACETAMINOPHEN 500; 5 MG/1; MG/1
TABLET ORAL ONCE
Status: CANCELLED | OUTPATIENT
Start: 2023-12-07 | End: 2023-12-07

## 2023-12-07 RX ORDER — HYDROCODONE BITARTRATE AND ACETAMINOPHEN 500; 5 MG/1; MG/1
TABLET ORAL ONCE
Status: COMPLETED | OUTPATIENT
Start: 2023-12-07 | End: 2023-12-07

## 2023-12-07 RX ADMIN — SODIUM CHLORIDE: 9 INJECTION, SOLUTION INTRAVENOUS at 09:12

## 2023-12-07 NOTE — NURSING
Tolerated transfusion with no complaints.  States understanding to contact provider for any issues. .  IV catheter discontinued intact. Site without signs and symptoms of complications. Dressing and pressure applied.  No complaints.

## 2023-12-11 ENCOUNTER — LAB VISIT (OUTPATIENT)
Dept: LAB | Facility: HOSPITAL | Age: 73
End: 2023-12-11
Attending: INTERNAL MEDICINE
Payer: MEDICARE

## 2023-12-11 DIAGNOSIS — C92.00 ACUTE MYELOID LEUKEMIA NOT HAVING ACHIEVED REMISSION: ICD-10-CM

## 2023-12-11 LAB
ABS NEUT CALC (OHS): 0.05 X10(3)/MCL (ref 2.1–9.2)
ANISOCYTOSIS BLD QL SMEAR: ABNORMAL
ERYTHROCYTE [DISTWIDTH] IN BLOOD BY AUTOMATED COUNT: 17 % (ref 11.5–17)
HCT VFR BLD AUTO: 23.9 % (ref 42–52)
HGB BLD-MCNC: 7.7 G/DL (ref 14–18)
LYMPH ABN # BLD MANUAL: 4 %
LYMPHOCYTES NFR BLD MANUAL: 0.46 X10(3)/MCL
LYMPHOCYTES NFR BLD MANUAL: 83 % (ref 13–40)
MCH RBC QN AUTO: 30.3 PG (ref 27–31)
MCHC RBC AUTO-ENTMCNC: 32.2 G/DL (ref 33–36)
MCV RBC AUTO: 94.1 FL (ref 80–94)
MONOCYTES NFR BLD MANUAL: 0.02 X10(3)/MCL (ref 0.1–1.3)
MONOCYTES NFR BLD MANUAL: 4 % (ref 2–11)
NEUTROPHILS NFR BLD MANUAL: 9 % (ref 47–80)
PLATELET # BLD AUTO: 30 X10(3)/MCL (ref 130–400)
PLATELET # BLD EST: ABNORMAL 10*3/UL
PMV BLD AUTO: 12.9 FL (ref 7.4–10.4)
RBC # BLD AUTO: 2.54 X10(6)/MCL (ref 4.7–6.1)
RBC MORPH BLD: ABNORMAL
WBC # SPEC AUTO: 0.55 X10(3)/MCL (ref 4.5–11.5)

## 2023-12-11 PROCEDURE — 36415 COLL VENOUS BLD VENIPUNCTURE: CPT

## 2023-12-11 PROCEDURE — 85007 BL SMEAR W/DIFF WBC COUNT: CPT

## 2023-12-11 PROCEDURE — 85027 COMPLETE CBC AUTOMATED: CPT

## 2023-12-14 ENCOUNTER — INFUSION (OUTPATIENT)
Dept: INFUSION THERAPY | Facility: HOSPITAL | Age: 73
End: 2023-12-14
Attending: INTERNAL MEDICINE
Payer: MEDICARE

## 2023-12-14 VITALS
HEART RATE: 70 BPM | DIASTOLIC BLOOD PRESSURE: 77 MMHG | TEMPERATURE: 98 F | SYSTOLIC BLOOD PRESSURE: 133 MMHG | OXYGEN SATURATION: 98 %

## 2023-12-14 DIAGNOSIS — C92.00 ACUTE MYELOID LEUKEMIA NOT HAVING ACHIEVED REMISSION: Primary | ICD-10-CM

## 2023-12-14 DIAGNOSIS — C92.00 ACUTE MYELOID LEUKEMIA NOT HAVING ACHIEVED REMISSION: ICD-10-CM

## 2023-12-14 LAB
ABO + RH BLD: NORMAL
ABO + RH BLD: NORMAL
BLD PROD TYP BPU: NORMAL
BLD PROD TYP BPU: NORMAL
BLOOD UNIT EXPIRATION DATE: NORMAL
BLOOD UNIT EXPIRATION DATE: NORMAL
BLOOD UNIT TYPE CODE: 5100
BLOOD UNIT TYPE CODE: 600
CROSSMATCH INTERPRETATION: NORMAL
CROSSMATCH INTERPRETATION: NORMAL
DISPENSE STATUS: NORMAL
DISPENSE STATUS: NORMAL
UNIT NUMBER: NORMAL
UNIT NUMBER: NORMAL

## 2023-12-14 PROCEDURE — P9037 PLATE PHERES LEUKOREDU IRRAD: HCPCS | Performed by: INTERNAL MEDICINE

## 2023-12-14 PROCEDURE — 36430 TRANSFUSION BLD/BLD COMPNT: CPT

## 2023-12-14 RX ORDER — HYDROCODONE BITARTRATE AND ACETAMINOPHEN 500; 5 MG/1; MG/1
TABLET ORAL ONCE
Status: CANCELLED | OUTPATIENT
Start: 2023-12-14 | End: 2023-12-14

## 2023-12-14 RX ORDER — HYDROCODONE BITARTRATE AND ACETAMINOPHEN 500; 5 MG/1; MG/1
TABLET ORAL ONCE
Status: ACTIVE | OUTPATIENT
Start: 2023-12-14

## 2023-12-18 ENCOUNTER — INFUSION (OUTPATIENT)
Dept: INFUSION THERAPY | Facility: HOSPITAL | Age: 73
End: 2023-12-18
Attending: INTERNAL MEDICINE
Payer: MEDICARE

## 2023-12-18 VITALS
DIASTOLIC BLOOD PRESSURE: 71 MMHG | HEART RATE: 70 BPM | SYSTOLIC BLOOD PRESSURE: 133 MMHG | TEMPERATURE: 98 F | OXYGEN SATURATION: 99 %

## 2023-12-18 DIAGNOSIS — C92.00 ACUTE MYELOID LEUKEMIA NOT HAVING ACHIEVED REMISSION: Primary | ICD-10-CM

## 2023-12-18 DIAGNOSIS — C92.00 ACUTE MYELOID LEUKEMIA NOT HAVING ACHIEVED REMISSION: ICD-10-CM

## 2023-12-18 LAB
ABO + RH BLD: NORMAL
BLD PROD TYP BPU: NORMAL
BLOOD UNIT EXPIRATION DATE: NORMAL
BLOOD UNIT TYPE CODE: 8400
CROSSMATCH INTERPRETATION: NORMAL
DISPENSE STATUS: NORMAL
RBCS: NORMAL
UNIT NUMBER: NORMAL

## 2023-12-18 PROCEDURE — P9037 PLATE PHERES LEUKOREDU IRRAD: HCPCS | Performed by: INTERNAL MEDICINE

## 2023-12-18 PROCEDURE — 36430 TRANSFUSION BLD/BLD COMPNT: CPT

## 2023-12-18 RX ORDER — HYDROCODONE BITARTRATE AND ACETAMINOPHEN 500; 5 MG/1; MG/1
TABLET ORAL ONCE
Status: ACTIVE | OUTPATIENT
Start: 2023-12-18

## 2023-12-18 RX ORDER — HYDROCODONE BITARTRATE AND ACETAMINOPHEN 500; 5 MG/1; MG/1
TABLET ORAL ONCE
Status: CANCELLED | OUTPATIENT
Start: 2023-12-18 | End: 2023-12-18

## 2023-12-21 ENCOUNTER — INFUSION (OUTPATIENT)
Dept: INFUSION THERAPY | Facility: HOSPITAL | Age: 73
End: 2023-12-21
Attending: INTERNAL MEDICINE
Payer: MEDICARE

## 2023-12-21 VITALS
SYSTOLIC BLOOD PRESSURE: 138 MMHG | RESPIRATION RATE: 18 BRPM | DIASTOLIC BLOOD PRESSURE: 70 MMHG | TEMPERATURE: 98 F | HEART RATE: 63 BPM

## 2023-12-21 DIAGNOSIS — C92.00 ACUTE MYELOID LEUKEMIA NOT HAVING ACHIEVED REMISSION: Primary | ICD-10-CM

## 2023-12-21 DIAGNOSIS — C92.00 ACUTE MYELOID LEUKEMIA NOT HAVING ACHIEVED REMISSION: ICD-10-CM

## 2023-12-21 LAB
ABO + RH BLD: NORMAL
ABO + RH BLD: NORMAL
BLD PROD TYP BPU: NORMAL
BLD PROD TYP BPU: NORMAL
BLOOD UNIT EXPIRATION DATE: NORMAL
BLOOD UNIT EXPIRATION DATE: NORMAL
BLOOD UNIT TYPE CODE: 5100
BLOOD UNIT TYPE CODE: 8400
CROSSMATCH INTERPRETATION: NORMAL
CROSSMATCH INTERPRETATION: NORMAL
DISPENSE STATUS: NORMAL
DISPENSE STATUS: NORMAL
UNIT NUMBER: NORMAL
UNIT NUMBER: NORMAL

## 2023-12-21 PROCEDURE — 36430 TRANSFUSION BLD/BLD COMPNT: CPT

## 2023-12-21 PROCEDURE — P9037 PLATE PHERES LEUKOREDU IRRAD: HCPCS | Performed by: INTERNAL MEDICINE

## 2023-12-21 RX ORDER — HYDROCODONE BITARTRATE AND ACETAMINOPHEN 500; 5 MG/1; MG/1
TABLET ORAL ONCE
Status: ACTIVE | OUTPATIENT
Start: 2023-12-21

## 2023-12-21 RX ORDER — HYDROCODONE BITARTRATE AND ACETAMINOPHEN 500; 5 MG/1; MG/1
TABLET ORAL ONCE
Status: CANCELLED | OUTPATIENT
Start: 2023-12-21 | End: 2023-12-21

## 2023-12-25 LAB — RBCS: NORMAL

## 2023-12-26 ENCOUNTER — INFUSION (OUTPATIENT)
Dept: INFUSION THERAPY | Facility: HOSPITAL | Age: 73
End: 2023-12-26
Attending: INTERNAL MEDICINE
Payer: MEDICARE

## 2023-12-26 VITALS
HEIGHT: 73 IN | DIASTOLIC BLOOD PRESSURE: 58 MMHG | BODY MASS INDEX: 23.86 KG/M2 | WEIGHT: 180 LBS | HEART RATE: 71 BPM | TEMPERATURE: 98 F | SYSTOLIC BLOOD PRESSURE: 118 MMHG

## 2023-12-26 DIAGNOSIS — C92.00 ACUTE MYELOID LEUKEMIA NOT HAVING ACHIEVED REMISSION: Primary | ICD-10-CM

## 2023-12-26 DIAGNOSIS — C92.00 ACUTE MYELOID LEUKEMIA NOT HAVING ACHIEVED REMISSION: ICD-10-CM

## 2023-12-26 PROCEDURE — 36430 TRANSFUSION BLD/BLD COMPNT: CPT

## 2023-12-26 PROCEDURE — P9037 PLATE PHERES LEUKOREDU IRRAD: HCPCS | Performed by: INTERNAL MEDICINE

## 2023-12-26 RX ORDER — HYDROCODONE BITARTRATE AND ACETAMINOPHEN 500; 5 MG/1; MG/1
TABLET ORAL ONCE
Status: ACTIVE | OUTPATIENT
Start: 2023-12-26

## 2023-12-26 RX ORDER — HYDROCODONE BITARTRATE AND ACETAMINOPHEN 500; 5 MG/1; MG/1
TABLET ORAL ONCE
Status: CANCELLED | OUTPATIENT
Start: 2023-12-26 | End: 2023-12-26

## 2023-12-27 ENCOUNTER — OFFICE VISIT (OUTPATIENT)
Dept: INTERNAL MEDICINE | Facility: CLINIC | Age: 73
End: 2023-12-27
Payer: MEDICARE

## 2023-12-27 VITALS
DIASTOLIC BLOOD PRESSURE: 62 MMHG | BODY MASS INDEX: 24.04 KG/M2 | OXYGEN SATURATION: 97 % | RESPIRATION RATE: 18 BRPM | TEMPERATURE: 98 F | HEART RATE: 103 BPM | WEIGHT: 182.19 LBS | SYSTOLIC BLOOD PRESSURE: 116 MMHG

## 2023-12-27 DIAGNOSIS — J06.9 UPPER RESPIRATORY TRACT INFECTION, UNSPECIFIED TYPE: Primary | ICD-10-CM

## 2023-12-27 PROCEDURE — 99214 OFFICE O/P EST MOD 30 MIN: CPT | Mod: ,,,

## 2023-12-27 PROCEDURE — 99214 PR OFFICE/OUTPT VISIT, EST, LEVL IV, 30-39 MIN: ICD-10-PCS | Mod: ,,,

## 2023-12-27 RX ORDER — FLUTICASONE PROPIONATE 50 MCG
1 SPRAY, SUSPENSION (ML) NASAL DAILY
Qty: 9.9 ML | Refills: 0 | Status: SHIPPED | OUTPATIENT
Start: 2023-12-27 | End: 2024-01-10

## 2023-12-27 NOTE — ASSESSMENT & PLAN NOTE
-obtain COVID/RSV/FLU PCR  -Encourage daily Antihistamine  -Okay to utilize Flonase BID  -Steam inhalation  -Tylenol/ibuprofen for body aches and low-grade temps  -OTC cough syrup as indicated  -okay to utilize Vit C , Vit D, and Zinc  -safety precautions/quarantine discussed   -notify clinic for any new or worsening symptoms

## 2023-12-27 NOTE — PROGRESS NOTES
Patient ID: Hussein Gonzalez Jr. is a 73 y.o. male.    Chief Complaint: Follow-up (Pt states he has been doing well just got over a head cold )    72-year-old gentleman who is here today for  a requested visit.   Medical comorbidities include HTN, CVA s/t embolic phenomena, melanoma s/p resection XRT for recurrence (2019) and another resection on his right ear this year.   Also with pancytopenia established with Hematology/Oncology.  Currently being treated with with azacitidine and venetoclax. Prophylacticly being treated with 500 mg of levofloxacin daily, 200 mg of acyclovir b.i.d., 200 mg of fluconazole daily.  Currently being evaluated for stem cell transplant in [place].  Today presents with new onset URI symptoms.      URI   This is a new problem. The current episode started in the past 7 days. The problem has been unchanged. There has been no fever. Associated symptoms include congestion, coughing and rhinorrhea. Pertinent negatives include no chest pain, diarrhea, dysuria, plugged ear sensation, sore throat or wheezing. He has tried decongestant for the symptoms. The treatment provided mild relief.       MEDICAL HISTORY:    Past Medical History:   Diagnosis Date    Anxiety and depression     BPH (benign prostatic hyperplasia)     History of CVA (cerebrovascular accident) without residual deficits     HTN (hypertension)     Malignant melanoma of skin, unspecified     NSTEMI (non-ST elevated myocardial infarction)     Sepsis, Gram negative     Sleep apnea       Past Surgical History:   Procedure Laterality Date    BONE MARROW ASPIRATION N/A 6/27/2023    Procedure: ASPIRATION, BONE MARROW;  Surgeon: Sabino Núñez MD;  Location[facility];  Service: General;  Laterality: N/A;  ALL ASSESS FOR MOLECULAR MRD    BONE MARROW BIOPSY N/A 1/25/2023    Procedure: Biopsy-bone marrow;  Surgeon: Rd Carlos MD;  Location[facility] OR;  Service: General;  Laterality: N/A;    BONE MARROW BIOPSY N/A 11/21/2023     Procedure: Biopsy-bone marrow;  Surgeon: Junior Castro MD;  Location: [facility] OR;  Service: General;  Laterality: N/A;    COLONOSCOPY      COLONOSCOPY N/A 1/24/2023    Procedure: COLONOSCOPY (DOUBLE);  Surgeon: Jimy Baker MD;  Location: [facility] ENDOSCOPY;  Service: Gastroenterology;  Laterality: N/A;    EGD, WITH CLOSED BIOPSY N/A 1/24/2023    Procedure: EGD, WITH CLOSED BIOPSY;  Surgeon: Jimy Baker MD;  Location: [facility] ENDOSCOPY;  Service: Gastroenterology;  Laterality: N/A;    EXCISION OF MELANOMA      SINUS SURGERY      TONSILLECTOMY        Social History     Tobacco Use    Smoking status: Never    Smokeless tobacco: Never   Substance Use Topics    Alcohol use: Not Currently     Alcohol/week: 7.0 standard drinks of alcohol     Types: 7 Cans of beer per week    Drug use: Never          Health Maintenance Due   Topic Date Due    Hepatitis C Screening  Never done    COVID-19 Vaccine (1) Never done    Pneumococcal Vaccines (Age 65+) (1 - PCV) Never done    TETANUS VACCINE  Never done    Shingles Vaccine (1 of 2) Never done    RSV Vaccine (Age 60+ and Pregnant patients) (1 - 1-dose 60+ series) Never done    Colorectal Cancer Screening  07/24/2023    Influenza Vaccine (1) Never done          Patient Care Team:  Nayely Andres MD as PCP - General (Internal Medicine)  Nayely Andres MD      Review of Systems   Constitutional:  Negative for chills, fatigue and fever.   HENT:  Positive for congestion, postnasal drip and rhinorrhea. Negative for sore throat and trouble swallowing.    Eyes:  Negative for redness and visual disturbance.   Respiratory:  Positive for cough. Negative for chest tightness, shortness of breath, wheezing and stridor.    Cardiovascular:  Negative for chest pain and palpitations.   Gastrointestinal:  Negative for constipation and diarrhea.   Genitourinary:  Negative for dysuria, flank pain, frequency and urgency.   Musculoskeletal:  Negative for gait problem.   Skin:   Negative for wound.   Neurological:  Negative for facial asymmetry and speech difficulty.   All other systems reviewed and are negative.      Objective:   /62 (BP Location: Left arm, Patient Position: Sitting, BP Method: Small (Automatic))   Pulse 103   Temp 98.4 °F (36.9 °C) (Temporal)   Resp 18   Wt 82.6 kg (182 lb 3.2 oz)   SpO2 97%   BMI 24.04 kg/m²      Physical Exam  Constitutional:       General: He is not in acute distress.     Appearance: Normal appearance.   HENT:      Right Ear: Tympanic membrane, ear canal and external ear normal.      Left Ear: Tympanic membrane, ear canal and external ear normal.      Nose: Nose normal.      Mouth/Throat:      Mouth: Mucous membranes are moist.      Pharynx: Oropharynx is clear.   Eyes:      Extraocular Movements: Extraocular movements intact.      Conjunctiva/sclera: Conjunctivae normal.      Pupils: Pupils are equal, round, and reactive to light.   Cardiovascular:      Rate and Rhythm: Normal rate and regular rhythm.      Pulses: Normal pulses.      Heart sounds: Normal heart sounds. No murmur heard.     No gallop.   Pulmonary:      Effort: Pulmonary effort is normal.      Breath sounds: Normal breath sounds. No wheezing.   Abdominal:      General: Bowel sounds are normal. There is no distension.      Palpations: Abdomen is soft. There is no mass.      Tenderness: There is no abdominal tenderness. There is no guarding.   Musculoskeletal:         General: Normal range of motion.   Skin:     General: Skin is warm and dry.   Neurological:      Mental Status: He is alert. Mental status is at baseline.      Sensory: No sensory deficit.      Motor: No weakness.           Assessment:       ICD-10-CM ICD-9-CM   1. Upper respiratory tract infection, unspecified type  J06.9 465.9        Plan:     Problem List Items Addressed This Visit          ENT    Upper respiratory tract infection - Primary     -obtain COVID/RSV/FLU PCR  -Encourage daily Antihistamine  -Okay to  utilize Flonase BID  -Steam inhalation  -Tylenol/ibuprofen for body aches and low-grade temps  -OTC cough syrup as indicated  -okay to utilize Vit C , Vit D, and Zinc  -safety precautions/quarantine discussed   -notify clinic for any new or worsening symptoms          Relevant Medications    fluticasone propionate (FLONASE) 50 mcg/actuation nasal spray    Other Relevant Orders    COVID/RSV/FLU A&B PCR          Follow up in about 4 months (around 4/27/2024) for Blood Pressure, with Dr. Andres.   -plan specifics discussed above    Orders Placed This Encounter    COVID/RSV/FLU A&B PCR    fluticasone propionate (FLONASE) 50 mcg/actuation nasal spray        Medication List with Changes/Refills   New Medications    FLUTICASONE PROPIONATE (FLONASE) 50 MCG/ACTUATION NASAL SPRAY    1 spray (50 mcg total) by Each Nostril route once daily. for 14 days   Current Medications    (MAGIC MOUTHWASH) 1:1:1 DIPHENHYDRAMINE(BENADRYL) 12.5MG/5ML LIQ, ALUMINUM & MAGNESIUM HYDROXIDE-SIMETHICONE (MAALOX), LIDOCAINE VISCOUS 2%    Swish and spit 15 mLs every 4 (four) hours as needed (mouth sores). for mouth sores    ACYCLOVIR (ZOVIRAX) 400 MG TABLET    Take 1 tablet by mouth twice daily    CITALOPRAM (CELEXA) 40 MG TABLET    Take 1 tablet by mouth once daily    FERROUS SULFATE (IRON ORAL)    Take 1,000 mg by mouth.    GABAPENTIN (NEURONTIN) 100 MG CAPSULE    TAKE 1 CAPSULE BY MOUTH IN THE EVENING    HYOSCYAMINE 0.125 MG SUBL    Place 1 tablet (0.125 mg total) under the tongue every 4 (four) hours as needed.    LACTOBACILLUS RHAMNOSUS GG (CULTURELLE) 10 BILLION CELL CAPSULE    Take 3 capsules by mouth once daily. Pt states he takes 3 in the morning and 2 and night    LISINOPRIL-HYDROCHLOROTHIAZIDE (PRINZIDE,ZESTORETIC) 20-12.5 MG PER TABLET    Take 1 tablet by mouth once daily    PROCHLORPERAZINE (COMPAZINE) 10 MG TABLET    Take 1 tablet (10 mg total) by mouth every 6 (six) hours as needed (for nausea). 2nd choice, can cause drowsiness.     PYRIDOXINE, VITAMIN B6, (B-6) 100 MG TAB    Take 100 mg by mouth every other day.    ROSUVASTATIN (CRESTOR) 40 MG TAB    Take 40 mg by mouth once daily.    TAMSULOSIN (FLOMAX) 0.4 MG CAP    Take 1 capsule (0.4 mg total) by mouth once daily.    VENETOCLAX (VENCLEXTA) 100 MG TAB    Take 200 mg on days 1-14 of a 28 day cycle.    VITAMIN D (VITAMIN D3) 1000 UNITS TAB    Take 1,000 Units by mouth once daily.    VITAMIN E 400 UNIT CAPSULE    Take 180 mg by mouth Daily.

## 2023-12-28 ENCOUNTER — TELEPHONE (OUTPATIENT)
Dept: INTERNAL MEDICINE | Facility: CLINIC | Age: 73
End: 2023-12-28
Payer: MEDICARE

## 2023-12-28 DIAGNOSIS — J32.9 BACTERIAL SINUSITIS: Primary | ICD-10-CM

## 2023-12-28 DIAGNOSIS — B96.89 BACTERIAL SINUSITIS: Primary | ICD-10-CM

## 2023-12-28 LAB — PSYCHE PATHOLOGY RESULT: NORMAL

## 2023-12-28 RX ORDER — AZITHROMYCIN 250 MG/1
TABLET, FILM COATED ORAL
Qty: 6 TABLET | Refills: 0 | Status: SHIPPED | OUTPATIENT
Start: 2023-12-28 | End: 2024-01-02

## 2023-12-29 ENCOUNTER — HOSPITAL ENCOUNTER (EMERGENCY)
Facility: HOSPITAL | Age: 73
Discharge: HOME | End: 2023-12-29
Attending: EMERGENCY MEDICINE
Payer: MEDICARE

## 2023-12-29 VITALS
RESPIRATION RATE: 20 BRPM | HEART RATE: 70 BPM | HEIGHT: 73 IN | SYSTOLIC BLOOD PRESSURE: 112 MMHG | OXYGEN SATURATION: 100 % | TEMPERATURE: 98 F | BODY MASS INDEX: 24.12 KG/M2 | WEIGHT: 182 LBS | DIASTOLIC BLOOD PRESSURE: 71 MMHG

## 2023-12-29 DIAGNOSIS — R52 PAIN: ICD-10-CM

## 2023-12-29 DIAGNOSIS — N45.1 EPIDIDYMITIS: Primary | ICD-10-CM

## 2023-12-29 LAB
APPEARANCE UR: ABNORMAL
BACTERIA #/AREA URNS AUTO: ABNORMAL /HPF
BILIRUB UR QL STRIP.AUTO: NEGATIVE
COLOR UR AUTO: YELLOW
GLUCOSE UR QL STRIP.AUTO: NORMAL
HYALINE CASTS #/AREA URNS LPF: ABNORMAL /LPF
KETONES UR QL STRIP.AUTO: NEGATIVE
LEUKOCYTE ESTERASE UR QL STRIP.AUTO: 250
NITRITE UR QL STRIP.AUTO: NEGATIVE
PH UR STRIP.AUTO: 5.5 [PH]
PROT UR QL STRIP.AUTO: ABNORMAL
RBC #/AREA URNS AUTO: ABNORMAL /HPF
RBC UR QL AUTO: ABNORMAL
SP GR UR STRIP.AUTO: 1.02 (ref 1–1.03)
SQUAMOUS #/AREA URNS LPF: ABNORMAL /HPF
UROBILINOGEN UR STRIP-ACNC: 4
WBC #/AREA URNS AUTO: ABNORMAL /HPF

## 2023-12-29 PROCEDURE — 99284 EMERGENCY DEPT VISIT MOD MDM: CPT

## 2023-12-29 PROCEDURE — 81001 URINALYSIS AUTO W/SCOPE: CPT | Performed by: EMERGENCY MEDICINE

## 2023-12-29 PROCEDURE — 87077 CULTURE AEROBIC IDENTIFY: CPT | Performed by: EMERGENCY MEDICINE

## 2023-12-29 RX ORDER — LEVOFLOXACIN 500 MG/1
500 TABLET, FILM COATED ORAL DAILY
Qty: 10 TABLET | Refills: 0 | Status: SHIPPED | OUTPATIENT
Start: 2023-12-29 | End: 2024-01-08

## 2023-12-29 NOTE — ED PROVIDER NOTES
Encounter Date: 12/29/2023    SCRIBE #1 NOTE: I, Thania Morel, am scribing for, and in the presence of,  Ana Luisa Flores MD. I have scribed the following portions of the note - Other sections scribed: HPI, ROS and physical.       History     Chief Complaint   Patient presents with    Groin Swelling     C/o right testicle swelling, pain, and worsening generalized weakness since yesterday afternoon; denies dysuria or known fever. Reports currently undergoing PO chemo for leukemia, oncologist Dr. Luna.      This is a 74 y/o male with a medical hx of HTN, melanoma, BPH, and CVA that presents to the ED for testicle pain. The pt reports that the pain began yesterday afternoon along his R testicle. He reports that the pain is worsened when sitting or walking. Pt denies any obvious skin changes, and recent heavy lifting, or changes to medications. Denies fever. Reports testicle pain and groin swelling.     Oncologist: Dr. Vogel. Pt had radiation 4 years ago and is currently taking chemotherapy pills.   PCP: Nayely Andres MD.     The history is provided by the patient. No  was used.     Review of patient's allergies indicates:   Allergen Reactions    Acetaminophen Other (See Comments)     Restless leg     Benadryl [diphenhydramine hcl] Other (See Comments)     Restless Legs     Past Medical History:   Diagnosis Date    Anxiety and depression     BPH (benign prostatic hyperplasia)     History of CVA (cerebrovascular accident) without residual deficits     HTN (hypertension)     Malignant melanoma of skin, unspecified     NSTEMI (non-ST elevated myocardial infarction)     Sepsis, Gram negative     Sleep apnea      Past Surgical History:   Procedure Laterality Date    BONE MARROW ASPIRATION N/A 6/27/2023    Procedure: ASPIRATION, BONE MARROW;  Surgeon: Sabino Núñez MD;  Location: [facility];  Service: General;  Laterality: N/A;  ALL ASSESS FOR MOLECULAR MRD    BONE MARROW BIOPSY N/A 1/25/2023     Procedure: Biopsy-bone marrow;  Surgeon: Rd Carlos MD;  Location: [facility];  Service: General;  Laterality: N/A;    BONE MARROW BIOPSY N/A 11/21/2023    Procedure: Biopsy-bone marrow;  Surgeon: Junior Castro MD;  Location: [facility] OR;  Service: General;  Laterality: N/A;    COLONOSCOPY      COLONOSCOPY N/A 1/24/2023    Procedure: COLONOSCOPY (DOUBLE);  Surgeon: Jimy Baker MD;  Location: [facility] ENDOSCOPY;  Service: Gastroenterology;  Laterality: N/A;    EGD, WITH CLOSED BIOPSY N/A 1/24/2023    Procedure: EGD, WITH CLOSED BIOPSY;  Surgeon: Jimy Baker MD;  Location: [facility] ENDOSCOPY;  Service: Gastroenterology;  Laterality: N/A;    EXCISION OF MELANOMA      SINUS SURGERY      TONSILLECTOMY       Family History   Problem Relation Age of Onset    Cancer Mother     Cancer Father      Social History     Tobacco Use    Smoking status: Never    Smokeless tobacco: Never   Substance Use Topics    Alcohol use: Not Currently     Alcohol/week: 7.0 standard drinks of alcohol     Types: 7 Cans of beer per week    Drug use: Never     Review of Systems   Constitutional:  Negative for fever.   Genitourinary:  Positive for scrotal swelling and testicular pain.       Physical Exam     Initial Vitals [12/29/23 0724]   BP Pulse Resp Temp SpO2   112/65 87 20 97.9 °F (36.6 °C) 98 %      MAP       --         Physical Exam    Nursing note and vitals reviewed.  Constitutional: He appears well-developed and well-nourished. He is not diaphoretic. No distress.   HENT:   Head: Normocephalic and atraumatic.   Nose: Nose normal.   Mouth/Throat: Oropharynx is clear and moist.   Eyes: Conjunctivae and EOM are normal. Pupils are equal, round, and reactive to light.   Neck: Trachea normal. Neck supple.   Normal range of motion.  Cardiovascular:  Normal rate, regular rhythm, normal heart sounds and intact distal pulses.     Exam reveals no gallop and no friction rub.       No murmur heard.  Pulmonary/Chest: Breath sounds normal. No  respiratory distress. He has no wheezes. He has no rhonchi. He has no rales. He exhibits no tenderness.   Abdominal: Abdomen is soft. Bowel sounds are normal. He exhibits no distension and no mass. There is no abdominal tenderness. There is no rebound.   Genitourinary: Cremasteric reflex is present. Right testis shows tenderness (lateral aspect). No penile erythema. No discharge found.    Genitourinary Comments: R testes slight riding higher       Musculoskeletal:         General: No tenderness or edema. Normal range of motion.      Cervical back: Normal range of motion and neck supple.      Lumbar back: Normal. No tenderness. Normal range of motion.     Neurological: He is alert and oriented to person, place, and time. He has normal strength. No cranial nerve deficit or sensory deficit.   Skin: Skin is warm and dry. Capillary refill takes less than 2 seconds. No rash and no abscess noted. No erythema. No pallor.   Psychiatric: He has a normal mood and affect. His behavior is normal. Judgment and thought content normal.         ED Course   Procedures  Labs Reviewed   URINALYSIS, REFLEX TO URINE CULTURE          Imaging Results              US Scrotum And Testicles (Final result)  Result time 12/29/23 10:03:35      Final result by Marni Little MD (12/29/23 10:03:35)                   Impression:      Findings compatible with right epididymal orchitis      Electronically signed by: Marni Little  Date:    12/29/2023  Time:    10:03               Narrative:    EXAMINATION:  US SCROTUM AND TESTICLES    CLINICAL HISTORY:  Pain, unspecified    TECHNIQUE:  Sonography of the scrotum and testes.  Grayscale, color Doppler and spectral Doppler evaluation.    COMPARISON:  None.    FINDINGS:  RIGHT TESTICLE:    Size: 3.3 x 2.8 x 2.4 cm    Appearance: Normal echotexture. No mass.    Flow: Hyperemic.  Arterial and venous flow demonstrated    Epididymis: Hyperemic epididymis.  6 mm right epididymal cyst.  6 mm right  epididymal calcification.    Hydrocele: Small.    Varicocele: None.    LEFT TESTICLE:    Size: 3.9 x 2.7 x 2.5 cm    Appearance: Normal echotexture. No mass.    Flow: Normal arterial and venous flow    Epididymis: Normal.    Hydrocele: None.    Varicocele: None.    OTHER: N/A.                                       Medications - No data to display  Medical Decision Making  Differential Diagnosis includes, but is not limited to:  Epididymitis, orchitis, hydrocele, varicocele  Ua ordered and reviewed  Testicular us with epididymitis and orchitis  Discussed treatment, f/u and he voiced understanding sher wolf and is comforatbl with plan    Problems Addressed:  Epididymitis: acute illness or injury that poses a threat to life or bodily functions  Pain: acute illness or injury that poses a threat to life or bodily functions    Amount and/or Complexity of Data Reviewed  Independent Historian: spouse  External Data Reviewed: notes.     Details: [facility] visit for sinusitis  Labs: ordered.  Radiology: ordered.    Risk  OTC drugs.  Prescription drug management.            Scribe Attestation:   Scribe #1: I performed the above scribed service and the documentation accurately describes the services I performed. I attest to the accuracy of the note.  Comments: Attending:   Physician Attestation Statement for Scribe #1: IAna Luisa MD, personally performed the services described in this documentation. All medical record entries made by the scribe were at my direction and in my presence.  I have reviewed the chart and agree that the record reflects my personal performance and is accurate and complete.        Attending Attestation:           Physician Attestation for Scribe:  Physician Attestation Statement for Scribe #1: IAna Luisa MD, reviewed documentation, as scribed by Thania Morel in my presence, and it is both accurate and complete.             ED Course as of 12/29/23 1226   Fri Dec 29, 2023   1007  Discussed epididymorchitis, treatment of abx, will otbain ua prirot o dc [BS]   1015 Instructed to only take the levaquin  [BS]      ED Course User Index  [BS] Ana Luisa Flores MD               Medical Decision Making:   History:   Old Medical Records: I decided to obtain old medical records.  Old Records Summarized: records from clinic visits.  Initial Assessment:   See hpi  Clinical Tests:   Lab Tests: Ordered and Reviewed  Radiological Study: Ordered and Reviewed             Clinical Impression:  Final diagnoses:  [R52] Pain  [N45.1] Epididymitis (Primary)          ED Disposition Condition    Discharge Stable          ED Prescriptions       Medication Sig Dispense Start Date End Date Auth. Provider    levoFLOXacin (LEVAQUIN) 500 MG tablet Take 1 tablet (500 mg total) by mouth once daily. for 10 days 10 tablet 12/29/2023 1/8/2024 Ana Luisa Flores MD          Follow-up Information       Follow up With Specialties Details Why Contact Info    Nayely Andres MD Internal Medicine Schedule an appointment as soon as possible for a visit   [address] [place] [id number]  [phone]      [facility] - Emergency Dept Emergency Medicine  As needed, If symptoms worsen [address] [facility] [id number] [phone]             Ana Luisa Flores MD  12/29/23 1224

## 2023-12-29 NOTE — DISCHARGE INSTRUCTIONS
Do not  the azithromycin (z pack) that was sent to the pharmacy for your sinus infection yesterday and only  and take the levofloxacin (levaquin) that I sent in today

## 2024-01-01 LAB — BACTERIA UR CULT: ABNORMAL

## 2024-01-02 ENCOUNTER — OFFICE VISIT (OUTPATIENT)
Dept: HEMATOLOGY/ONCOLOGY | Facility: CLINIC | Age: 74
End: 2024-01-02
Payer: MEDICARE

## 2024-01-02 ENCOUNTER — INFUSION (OUTPATIENT)
Dept: INFUSION THERAPY | Facility: HOSPITAL | Age: 74
End: 2024-01-02
Attending: INTERNAL MEDICINE
Payer: MEDICARE

## 2024-01-02 ENCOUNTER — TELEPHONE (OUTPATIENT)
Dept: INTERNAL MEDICINE | Facility: CLINIC | Age: 74
End: 2024-01-02
Payer: MEDICARE

## 2024-01-02 VITALS
DIASTOLIC BLOOD PRESSURE: 78 MMHG | TEMPERATURE: 98 F | HEART RATE: 68 BPM | RESPIRATION RATE: 18 BRPM | SYSTOLIC BLOOD PRESSURE: 133 MMHG | OXYGEN SATURATION: 99 %

## 2024-01-02 VITALS
HEART RATE: 70 BPM | SYSTOLIC BLOOD PRESSURE: 107 MMHG | RESPIRATION RATE: 18 BRPM | OXYGEN SATURATION: 100 % | HEIGHT: 73 IN | BODY MASS INDEX: 23.87 KG/M2 | TEMPERATURE: 98 F | WEIGHT: 180.13 LBS | DIASTOLIC BLOOD PRESSURE: 63 MMHG

## 2024-01-02 DIAGNOSIS — C92.00 ACUTE MYELOID LEUKEMIA NOT HAVING ACHIEVED REMISSION: Chronic | ICD-10-CM

## 2024-01-02 DIAGNOSIS — D61.818 PANCYTOPENIA: Chronic | ICD-10-CM

## 2024-01-02 DIAGNOSIS — C92.00 ACUTE MYELOID LEUKEMIA NOT HAVING ACHIEVED REMISSION: Primary | Chronic | ICD-10-CM

## 2024-01-02 DIAGNOSIS — D64.9 TRANSFUSION-DEPENDENT ANEMIA: ICD-10-CM

## 2024-01-02 DIAGNOSIS — D69.6 THROMBOCYTOPENIA: ICD-10-CM

## 2024-01-02 DIAGNOSIS — N45.1 EPIDIDYMITIS: ICD-10-CM

## 2024-01-02 LAB
ABO + RH BLD: NORMAL
BLD PROD TYP BPU: NORMAL
BLOOD UNIT EXPIRATION DATE: NORMAL
BLOOD UNIT TYPE CODE: 5100
BLOOD UNIT TYPE CODE: 6200
BLOOD UNIT TYPE CODE: 9500
CROSSMATCH INTERPRETATION: NORMAL
DISPENSE STATUS: NORMAL
GROUP & RH: NORMAL
INDIRECT COOMBS: NORMAL
SPECIMEN OUTDATE: NORMAL
UNIT NUMBER: NORMAL

## 2024-01-02 PROCEDURE — P9040 RBC LEUKOREDUCED IRRADIATED: HCPCS | Performed by: NURSE PRACTITIONER

## 2024-01-02 PROCEDURE — 86901 BLOOD TYPING SEROLOGIC RH(D): CPT | Performed by: NURSE PRACTITIONER

## 2024-01-02 PROCEDURE — 99215 OFFICE O/P EST HI 40 MIN: CPT | Mod: S$PBB,,, | Performed by: NURSE PRACTITIONER

## 2024-01-02 PROCEDURE — 99999 PR PBB SHADOW E&M-EST. PATIENT-LVL V: CPT | Mod: PBBFAC,,, | Performed by: NURSE PRACTITIONER

## 2024-01-02 PROCEDURE — 99215 OFFICE O/P EST HI 40 MIN: CPT | Mod: PBBFAC,25 | Performed by: NURSE PRACTITIONER

## 2024-01-02 PROCEDURE — 86920 COMPATIBILITY TEST SPIN: CPT | Performed by: NURSE PRACTITIONER

## 2024-01-02 PROCEDURE — P9037 PLATE PHERES LEUKOREDU IRRAD: HCPCS | Performed by: NURSE PRACTITIONER

## 2024-01-02 PROCEDURE — 36415 COLL VENOUS BLD VENIPUNCTURE: CPT

## 2024-01-02 PROCEDURE — 36430 TRANSFUSION BLD/BLD COMPNT: CPT

## 2024-01-02 RX ORDER — HYDROCODONE BITARTRATE AND ACETAMINOPHEN 500; 5 MG/1; MG/1
TABLET ORAL ONCE
Status: CANCELLED | OUTPATIENT
Start: 2024-01-02 | End: 2024-01-02

## 2024-01-02 RX ORDER — HYDROCODONE BITARTRATE AND ACETAMINOPHEN 500; 5 MG/1; MG/1
TABLET ORAL ONCE
Status: ACTIVE | OUTPATIENT
Start: 2024-01-02

## 2024-01-02 NOTE — TELEPHONE ENCOUNTER
----- Message from Idalia Hyde LPN sent at 1/2/2024  1:47 PM CST -----  Regarding: FW: sooner appt  Can you add to cancel list    ----- Message -----  From: Martina Webb  Sent: 1/2/2024   1:35 PM CST  To: [facility]ARD Staff  Subject: sooner appt                                      Type:  Sooner Apoointment Request    Caller is requesting a sooner appointment.      Name of Caller:Pratima nurse @ [facility]  When is the first available appointment?1/8    Best Call Back Number:[phone] matilda @ [facility]    Additional Information: oncologist would like pt to be seen as soon as possible due to an infection while having lukemia    1st available appt is with NP on 1/8  Please contact pt if you can overbook or move appt up

## 2024-01-02 NOTE — PROGRESS NOTES
Subjective:       Patient ID: Hussein Gonzalez Jr. is a 73 y.o. male.    Chief Complaint: Follow-up (Patient reports weakness. Patient went to the hospital on 12/29 due to a swollen testicle and would like to speak with you about it.  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    Current Treatment:   Azacitidine and venetoclax started on 02/27/2023.  Started 7 out of 28 days, did this through cycle 5.  With cycle 6, moved to 5 out of 28 days.  Referral for possible stem cell transplant evaluation.    Treatment History:  N/A    HPI:  73-year-old male with a history of CVA, hypertension, BPH, and melanoma who presented to the emergency department following a syncopal episode.  In February of 2022, the patient did have a slightly low white blood cell count, hemoglobin, and platelet count.  Per the patient, 3 months prior to admission, he was having dizziness, fatigue, weakness, shortness of breath, back pain, and leg pain.  The dizziness was worse with standing and walking.  On 01/20/2023, the patient had a syncopal episode.  He was brought to the emergency department and was found to be pancytopenic.  He also had stool that was positive for occult blood in the emergency department.  He received blood transfusion.  His CBC on 01/20/2023 revealed a white blood cell count of 2.6, hemoglobin of 7.7, and platelet count of 47,000. Reticulocytes were appropriately elevated, B12 and folate were within normal limits, iron level was slightly decreased.  Repeat blood work on 01/21/2023 revealed a white blood cell count of 2.8, post transfusion hemoglobin of 9.6, and a platelet count of 65374.  Hematology was consulted for pancytopenia.  EGD and colonoscopy done on 01/24/2023, EGD revealed nonbleeding ulcers, colonoscopy unremarkable, however had to be aborted due to tortuous colon.  Bone Marrow Biopsy done on  01/25/2023.  Preliminary reveals AML with 27% blasts in a background of dysplasia.  Cytogenetics are within normal limits, FISH is within normal limits.  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  Started azacitidine and venetoclax on 02/27/2023, completed cycle 4 on 05/30/2023.  Held after this cycle and repeated a bone marrow biopsy, this was done on 06/27/2023, this showed a normocellular marrow (40%) with relative erythroid hyperplasia, sequential granulopoiesis and megakaryopoiesis.  The bone marrow was slightly suboptimal given it was predominantly subcortical bone with focal hematopoietic elements.  There was no morphologic or immunophenotypic evidence of previous acute myeloid leukemia, cytogenetics were normal, FISH was normal, NGS showed persistent ASXL1, SRSF2, TET2.    Repeat bone marrow biopsy done on 11/21/2023 still pending, however preliminary report suggests no increase in blasts.    Interval History:   Patient presents for scheduled follow up appointment for AML.  He remains on Vidaza and venetoclax.  He has not feeling well today, he is very weak, having neck pain.  He did present to the emergency room on Friday due to scrotal swelling and was diagnosed with epididymitis.  No labs were checked in the emergency room other than a urinalysis.  He was sent home on Levaquin and switched to clindamycin when sensitivities came back yesterday.  He does deny any fevers.  He denies any urinary complaints today but does continue with scrotal swelling.    Past Medical History:   Diagnosis Date    Anxiety and depression     BPH (benign prostatic hyperplasia)     History of CVA (cerebrovascular accident) without residual deficits     HTN (hypertension)     Malignant melanoma of skin, unspecified     NSTEMI (non-ST elevated myocardial infarction)     Sepsis, Gram negative     Sleep apnea       Past Surgical History:   Procedure Laterality Date    BONE MARROW ASPIRATION N/A  6/27/2023    Procedure: ASPIRATION, BONE MARROW;  Surgeon: Sabino Núñez MD;  Location: [facility] OR;  Service: General;  Laterality: N/A;  ALL ASSESS FOR MOLECULAR MRD    BONE MARROW BIOPSY N/A 1/25/2023    Procedure: Biopsy-bone marrow;  Surgeon: Rd Carlos MD;  Location: [facility] OR;  Service: General;  Laterality: N/A;    BONE MARROW BIOPSY N/A 11/21/2023    Procedure: Biopsy-bone marrow;  Surgeon: Junior Castro MD;  Location: [facility] OR;  Service: General;  Laterality: N/A;    COLONOSCOPY      COLONOSCOPY N/A 1/24/2023    Procedure: COLONOSCOPY (DOUBLE);  Surgeon: Jimy Baker MD;  Location: [facility] ENDOSCOPY;  Service: Gastroenterology;  Laterality: N/A;    EGD, WITH CLOSED BIOPSY N/A 1/24/2023    Procedure: EGD, WITH CLOSED BIOPSY;  Surgeon: Jimy Baker MD;  Location: [facility] ENDOSCOPY;  Service: Gastroenterology;  Laterality: N/A;    EXCISION OF MELANOMA      SINUS SURGERY      TONSILLECTOMY       Social History     Socioeconomic History    Marital status:    Tobacco Use    Smoking status: Never    Smokeless tobacco: Never   Substance and Sexual Activity    Alcohol use: Not Currently     Alcohol/week: 7.0 standard drinks of alcohol     Types: 7 Cans of beer per week    Drug use: Never    Sexual activity: Yes     Social Determinants of Health     Financial Resource Strain: Low Risk  (4/20/2023)    Overall Financial Resource Strain (CARDIA)     Difficulty of Paying Living Expenses: Not hard at all   Food Insecurity: No Food Insecurity (4/20/2023)    Hunger Vital Sign     Worried About Running Out of Food in the Last Year: Never true     Ran Out of Food in the Last Year: Never true   Transportation Needs: No Transportation Needs (4/20/2023)    PRAPARE - Transportation     Lack of Transportation (Medical): No     Lack of Transportation (Non-Medical): No   Physical Activity: Insufficiently Active (4/20/2023)    Exercise Vital Sign     Days of Exercise per Week: 3 days     Minutes of Exercise per  Session: 30 min   Stress: No Stress Concern Present (4/20/2023)    Citizen of [place] [employer] - Occupational Stress Questionnaire     Feeling of Stress : Not at all   Social Connections: Unknown (4/20/2023)    Social Connection and Isolation Panel [NHANES]     Frequency of Communication with Friends and Family: More than three times a week     Frequency of Social Gatherings with Friends and Family: More than three times a week     Attends Spiritism Services: Patient declined     Active Member of Clubs or Organizations: Patient declined     Attends Club or Organization Meetings: Patient declined     Marital Status:    Housing Stability: Low Risk  (4/20/2023)    Housing Stability Vital Sign     Unable to Pay for Housing in the Last Year: No     Number of Places Lived in the Last Year: 1     Unstable Housing in the Last Year: No      Family History   Problem Relation Age of Onset    Cancer Mother     Cancer Father       Review of patient's allergies indicates:   Allergen Reactions    Acetaminophen Other (See Comments)     Restless leg     Benadryl [diphenhydramine hcl] Other (See Comments)     Restless Legs      Review of Systems   Constitutional:  Negative for chills, diaphoresis, fatigue, fever and unexpected weight change.   HENT:  Negative for nasal congestion, mouth sores, sinus pressure/congestion and sore throat.    Eyes:  Negative for pain and visual disturbance.   Respiratory:  Negative for cough, chest tightness and shortness of breath.    Cardiovascular:  Negative for chest pain, palpitations and leg swelling.   Gastrointestinal:  Negative for abdominal distention, abdominal pain, blood in stool, constipation and diarrhea.   Genitourinary:  Negative for dysuria, frequency and hematuria.   Musculoskeletal:  Positive for arthralgias and myalgias. Negative for back pain.   Integumentary:  Negative for rash.   Neurological:  Negative for dizziness, syncope, weakness, numbness and headaches.    Hematological:  Negative for adenopathy.   Psychiatric/Behavioral:  Negative for confusion.          Objective:      Physical Exam  Vitals reviewed.   Constitutional:       General: He is awake.      Appearance: Normal appearance.   HENT:      Head: Normocephalic and atraumatic.      Right Ear: Hearing normal.      Left Ear: Hearing normal.      Nose: Nose normal.   Eyes:      General: Lids are normal. Vision grossly intact.      Extraocular Movements: Extraocular movements intact.      Conjunctiva/sclera: Conjunctivae normal.   Cardiovascular:      Rate and Rhythm: Normal rate and regular rhythm.      Pulses: Normal pulses.      Heart sounds: Normal heart sounds.   Pulmonary:      Effort: Pulmonary effort is normal.      Breath sounds: Normal breath sounds. No wheezing, rhonchi or rales.   Abdominal:      General: Bowel sounds are normal.      Palpations: Abdomen is soft.      Tenderness: There is no abdominal tenderness.   Musculoskeletal:      Cervical back: Full passive range of motion without pain.      Right lower leg: No edema.      Left lower leg: No edema.   Skin:     General: Skin is warm.      Comments:      Neurological:      General: No focal deficit present.      Mental Status: He is alert and oriented to person, place, and time.   Psychiatric:         Attention and Perception: Attention normal.         Mood and Affect: Mood and affect normal.         Behavior: Behavior is cooperative.         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    Treating with azacitidine and venetoclax, Started on 02/27/2023.   Bone marrow biopsy after cycle 4 shows no immunophenotypic or morphologic evidence of AML.  NGS showed continued ASXL1, SRSF2, TET2 mutations.  Cytogenetics were normal.    Evaluated by Dr. Valdez in [place].     Continue prophylaxis with 500 mg of levofloxacin daily, 200 mg of acyclovir b.i.d., 200 mg of fluconazole daily.    Twice weekly labs (Monday/Thursday) with transfusions as needed.    He restarted treatment on 07/10/2023, on 08/14/2023, we dose reduced by changing venetoclax to 14 out of every 28 days.    Venetoclax 200 mg on days 1-14 of a 28 day cycle    Hold Azacididine and venetoclax this week due to active infection.  He treated with azacitidine 7 days out of 28 days for the first 5 cycles, with cycle 6 moving forward, we did 5 days.  He is currently treating with 3 days.    Flexeril as needed for muscle spasms    Magic mouthwash as needed for mouth sores    Repeat bone marrow biopsy done on 11/21/2023 with no morphologic or immunophenotypic evidence of persistent AML    We will give 1 unit of blood and 2 units of platelets today.  Patient knows to monitor closely for fever and worsening of infection due to his neutropenic status.    Return to clinic in 4 weeks    MATTY Hays

## 2024-01-03 ENCOUNTER — OFFICE VISIT (OUTPATIENT)
Dept: INTERNAL MEDICINE | Facility: CLINIC | Age: 74
End: 2024-01-03
Payer: MEDICARE

## 2024-01-03 VITALS
OXYGEN SATURATION: 99 % | TEMPERATURE: 99 F | RESPIRATION RATE: 18 BRPM | BODY MASS INDEX: 23.83 KG/M2 | SYSTOLIC BLOOD PRESSURE: 132 MMHG | HEART RATE: 75 BPM | WEIGHT: 180.63 LBS | DIASTOLIC BLOOD PRESSURE: 80 MMHG

## 2024-01-03 DIAGNOSIS — N45.2 ORCHITIS OF RIGHT TESTICLE: Primary | ICD-10-CM

## 2024-01-03 PROCEDURE — 99214 OFFICE O/P EST MOD 30 MIN: CPT | Mod: ,,,

## 2024-01-03 NOTE — PROGRESS NOTES
Patient ID: Hussein Gonzalez Jr. is a 73 y.o. male.    Chief Complaint: Follow-up (Pt states he is doing well over all, has some mild pain )    73-year-old gentleman who is here today for  a  ER follow-up visit.   Medical comorbidities include HTN, CVA s/t embolic phenomena, melanoma s/p resection XRT for recurrence (2019) and another resection on his right ear this year. Also with pancytopenia established with Hematology/Oncology.  Currently being treated with with azacitidine and venetoclax.  Currently being evaluated for stem cell transplant in [place].  Since last visit patient had recent ER visit (12/29/2023).  Initially presented to right testicular pain discomfort worsened with positional changes, sitting/walking.  Right ultrasound indicated right epididymal orchitis.  Subsequently discharged on Levaquin 500 mg x 10 days.  Currently on day #5. Of antibiotics.  Today reports  Lingering discomfort, however improvement of symptoms overall.  No fevers, chills, dysuria, or hematuria.  Does have upcoming appointment with Urology as well.  Otherwise stable considering recent ER visit.        MEDICAL HISTORY:    Past Medical History:   Diagnosis Date    Anxiety and depression     BPH (benign prostatic hyperplasia)     History of CVA (cerebrovascular accident) without residual deficits     HTN (hypertension)     Malignant melanoma of skin, unspecified     NSTEMI (non-ST elevated myocardial infarction)     Sepsis, Gram negative     Sleep apnea       Past Surgical History:   Procedure Laterality Date    BONE MARROW ASPIRATION N/A 6/27/2023    Procedure: ASPIRATION, BONE MARROW;  Surgeon: Sabino Núñez MD;  Location: [facility] OR;  Service: General;  Laterality: N/A;  ALL ASSESS FOR MOLECULAR MRD    BONE MARROW BIOPSY N/A 1/25/2023    Procedure: Biopsy-bone marrow;  Surgeon: Rd Carlos MD;  Location: [facility] OR;  Service: General;  Laterality: N/A;    BONE MARROW BIOPSY N/A 11/21/2023    Procedure: Biopsy-bone  marrow;  Surgeon: Junior Castro MD;  Location: [facility] OR;  Service: General;  Laterality: N/A;    COLONOSCOPY      COLONOSCOPY N/A 1/24/2023    Procedure: COLONOSCOPY (DOUBLE);  Surgeon: Jimy Baker MD;  Location: [facility] ENDOSCOPY;  Service: Gastroenterology;  Laterality: N/A;    EGD, WITH CLOSED BIOPSY N/A 1/24/2023    Procedure: EGD, WITH CLOSED BIOPSY;  Surgeon: Jimy Baker MD;  Location: [facility] ENDOSCOPY;  Service: Gastroenterology;  Laterality: N/A;    EXCISION OF MELANOMA      SINUS SURGERY      TONSILLECTOMY        Social History     Tobacco Use    Smoking status: Never    Smokeless tobacco: Never   Substance Use Topics    Alcohol use: Not Currently     Alcohol/week: 7.0 standard drinks of alcohol     Types: 7 Cans of beer per week    Drug use: Never          Health Maintenance Due   Topic Date Due    Hepatitis C Screening  Never done    COVID-19 Vaccine (1) Never done    Pneumococcal Vaccines (Age 65+) (1 - PCV) Never done    TETANUS VACCINE  Never done    Shingles Vaccine (1 of 2) Never done    RSV Vaccine (Age 60+ and Pregnant patients) (1 - 1-dose 60+ series) Never done    Colorectal Cancer Screening  07/24/2023    Influenza Vaccine (1) Never done          Patient Care Team:  Nayely Andres MD as PCP - General (Internal Medicine)  Nayely Andres MD      Review of Systems   Constitutional:  Negative for fatigue and fever.   HENT:  Negative for congestion, rhinorrhea, sore throat and trouble swallowing.    Eyes:  Negative for redness and visual disturbance.   Respiratory:  Negative for cough, chest tightness and shortness of breath.    Cardiovascular:  Negative for chest pain and palpitations.   Gastrointestinal:  Negative for abdominal pain, constipation, diarrhea, nausea and vomiting.   Genitourinary:  Positive for testicular pain. Negative for dysuria, flank pain, frequency and urgency.   Musculoskeletal:  Negative for arthralgias, gait problem and myalgias.   Skin:  Negative  for rash and wound.   Neurological:  Negative for facial asymmetry, speech difficulty, weakness and headaches.   All other systems reviewed and are negative.      Objective:   /80 (BP Location: Left arm, Patient Position: Sitting, BP Method: Small (Automatic))   Pulse 75   Temp 98.5 °F (36.9 °C) (Tympanic)   Resp 18   Wt 81.9 kg (180 lb 9.6 oz)   SpO2 99%   BMI 23.83 kg/m²      Physical Exam  Constitutional:       General: He is not in acute distress.     Appearance: Normal appearance.   HENT:      Right Ear: Tympanic membrane, ear canal and external ear normal.      Left Ear: Tympanic membrane, ear canal and external ear normal.      Nose: Nose normal.      Mouth/Throat:      Mouth: Mucous membranes are moist.      Pharynx: Oropharynx is clear.   Eyes:      Extraocular Movements: Extraocular movements intact.      Conjunctiva/sclera: Conjunctivae normal.      Pupils: Pupils are equal, round, and reactive to light.   Cardiovascular:      Rate and Rhythm: Normal rate and regular rhythm.      Pulses: Normal pulses.      Heart sounds: Normal heart sounds. No murmur heard.     No gallop.   Pulmonary:      Effort: Pulmonary effort is normal.      Breath sounds: Normal breath sounds. No wheezing.   Abdominal:      General: Bowel sounds are normal. There is no distension.      Palpations: Abdomen is soft. There is no mass.      Tenderness: There is no abdominal tenderness. There is no guarding.   Genitourinary:     Testes:         Right: Tenderness present. Mass not present. Cremasteric reflex is present.    Musculoskeletal:         General: Normal range of motion.   Skin:     General: Skin is warm and dry.   Neurological:      Mental Status: He is alert. Mental status is at baseline.      Sensory: No sensory deficit.      Motor: No weakness.           Assessment:       ICD-10-CM ICD-9-CM   1. Orchitis of right testicle  N45.2 604.90        Plan:     Problem List Items Addressed This Visit          Renal/     Orchitis of right testicle - Primary     -acute and improving  -ER visit notes and diagnostics reviewed   -currently on Levaquin 500 mg times 10 days, continuing complete   -established with Urology, encouraged close follow-up               Follow up for Previously scheduled and PRN if need.   -plan specifics discussed above          Medication List with Changes/Refills   Current Medications    (MAGIC MOUTHWASH) 1:1:1 DIPHENHYDRAMINE(BENADRYL) 12.5MG/5ML LIQ, ALUMINUM & MAGNESIUM HYDROXIDE-SIMETHICONE (MAALOX), LIDOCAINE VISCOUS 2%    Swish and spit 15 mLs every 4 (four) hours as needed (mouth sores). for mouth sores    ACYCLOVIR (ZOVIRAX) 400 MG TABLET    Take 1 tablet by mouth twice daily    CITALOPRAM (CELEXA) 40 MG TABLET    Take 1 tablet by mouth once daily    FERROUS SULFATE (IRON ORAL)    Take 1,000 mg by mouth.    FLUTICASONE PROPIONATE (FLONASE) 50 MCG/ACTUATION NASAL SPRAY    1 spray (50 mcg total) by Each Nostril route once daily. for 14 days    GABAPENTIN (NEURONTIN) 100 MG CAPSULE    TAKE 1 CAPSULE BY MOUTH IN THE EVENING    HYOSCYAMINE 0.125 MG SUBL    Place 1 tablet (0.125 mg total) under the tongue every 4 (four) hours as needed.    LACTOBACILLUS RHAMNOSUS GG (CULTURELLE) 10 BILLION CELL CAPSULE    Take 3 capsules by mouth once daily. Pt states he takes 3 in the morning and 2 and night    LEVOFLOXACIN (LEVAQUIN) 500 MG TABLET    Take 1 tablet (500 mg total) by mouth once daily. for 10 days    LISINOPRIL-HYDROCHLOROTHIAZIDE (PRINZIDE,ZESTORETIC) 20-12.5 MG PER TABLET    Take 1 tablet by mouth once daily    PROCHLORPERAZINE (COMPAZINE) 10 MG TABLET    Take 1 tablet (10 mg total) by mouth every 6 (six) hours as needed (for nausea). 2nd choice, can cause drowsiness.    PYRIDOXINE, VITAMIN B6, (B-6) 100 MG TAB    Take 100 mg by mouth every other day.    ROSUVASTATIN (CRESTOR) 40 MG TAB    Take 40 mg by mouth once daily.    TAMSULOSIN (FLOMAX) 0.4 MG CAP    Take 1 capsule (0.4 mg total) by mouth once  daily.    VENETOCLAX (VENCLEXTA) 100 MG TAB    Take 200 mg on days 1-14 of a 28 day cycle.    VITAMIN D (VITAMIN D3) 1000 UNITS TAB    Take 1,000 Units by mouth once daily.    VITAMIN E 400 UNIT CAPSULE    Take 180 mg by mouth Daily.

## 2024-01-03 NOTE — ASSESSMENT & PLAN NOTE
-acute and improving  -ER visit notes and diagnostics reviewed   -currently on Levaquin 500 mg times 10 days, continuing complete   -established with Urology, encouraged close follow-up

## 2024-01-04 ENCOUNTER — INFUSION (OUTPATIENT)
Dept: INFUSION THERAPY | Facility: HOSPITAL | Age: 74
End: 2024-01-04
Attending: INTERNAL MEDICINE
Payer: MEDICARE

## 2024-01-04 ENCOUNTER — LAB VISIT (OUTPATIENT)
Dept: LAB | Facility: HOSPITAL | Age: 74
End: 2024-01-04
Attending: INTERNAL MEDICINE

## 2024-01-04 VITALS
TEMPERATURE: 98 F | SYSTOLIC BLOOD PRESSURE: 128 MMHG | HEART RATE: 61 BPM | RESPIRATION RATE: 18 BRPM | DIASTOLIC BLOOD PRESSURE: 72 MMHG | OXYGEN SATURATION: 98 %

## 2024-01-04 DIAGNOSIS — D61.818 PANCYTOPENIA: Primary | Chronic | ICD-10-CM

## 2024-01-04 DIAGNOSIS — C92.00 ACUTE MYELOID LEUKEMIA NOT HAVING ACHIEVED REMISSION: ICD-10-CM

## 2024-01-04 DIAGNOSIS — D61.818 PANCYTOPENIA: Chronic | ICD-10-CM

## 2024-01-04 LAB
ABO + RH BLD: NORMAL
BLD PROD TYP BPU: NORMAL
BLOOD UNIT EXPIRATION DATE: NORMAL
BLOOD UNIT TYPE CODE: 5100
CROSSMATCH INTERPRETATION: NORMAL
DISPENSE STATUS: NORMAL
GROUP & RH: NORMAL
INDIRECT COOMBS: NORMAL
SPECIMEN OUTDATE: NORMAL
UNIT NUMBER: NORMAL

## 2024-01-04 PROCEDURE — 86850 RBC ANTIBODY SCREEN: CPT | Performed by: NURSE PRACTITIONER

## 2024-01-04 PROCEDURE — P9040 RBC LEUKOREDUCED IRRADIATED: HCPCS | Performed by: NURSE PRACTITIONER

## 2024-01-04 PROCEDURE — 36430 TRANSFUSION BLD/BLD COMPNT: CPT

## 2024-01-04 PROCEDURE — 86923 COMPATIBILITY TEST ELECTRIC: CPT | Performed by: NURSE PRACTITIONER

## 2024-01-04 PROCEDURE — 25000003 PHARM REV CODE 250: Performed by: NURSE PRACTITIONER

## 2024-01-04 RX ORDER — HYDROCODONE BITARTRATE AND ACETAMINOPHEN 500; 5 MG/1; MG/1
TABLET ORAL ONCE
Status: CANCELLED | OUTPATIENT
Start: 2024-01-04 | End: 2024-01-04

## 2024-01-04 RX ORDER — HYDROCODONE BITARTRATE AND ACETAMINOPHEN 500; 5 MG/1; MG/1
TABLET ORAL ONCE
Status: COMPLETED | OUTPATIENT
Start: 2024-01-04 | End: 2024-01-04

## 2024-01-04 RX ADMIN — SODIUM CHLORIDE: 9 INJECTION, SOLUTION INTRAVENOUS at 01:01

## 2024-01-06 LAB
RBCS: NORMAL
RBCS: NORMAL

## 2024-01-08 ENCOUNTER — LAB VISIT (OUTPATIENT)
Dept: LAB | Facility: HOSPITAL | Age: 74
End: 2024-01-08
Attending: INTERNAL MEDICINE

## 2024-01-08 ENCOUNTER — INFUSION (OUTPATIENT)
Dept: INFUSION THERAPY | Facility: HOSPITAL | Age: 74
End: 2024-01-08
Attending: INTERNAL MEDICINE
Payer: MEDICARE

## 2024-01-08 VITALS
SYSTOLIC BLOOD PRESSURE: 124 MMHG | OXYGEN SATURATION: 97 % | DIASTOLIC BLOOD PRESSURE: 72 MMHG | TEMPERATURE: 98 F | HEART RATE: 60 BPM | RESPIRATION RATE: 16 BRPM

## 2024-01-08 DIAGNOSIS — D61.818 PANCYTOPENIA: Chronic | ICD-10-CM

## 2024-01-08 DIAGNOSIS — C92.00 ACUTE MYELOID LEUKEMIA NOT HAVING ACHIEVED REMISSION: ICD-10-CM

## 2024-01-08 DIAGNOSIS — D61.818 PANCYTOPENIA: Primary | Chronic | ICD-10-CM

## 2024-01-08 PROCEDURE — P9037 PLATE PHERES LEUKOREDU IRRAD: HCPCS | Performed by: NURSE PRACTITIONER

## 2024-01-08 PROCEDURE — 36430 TRANSFUSION BLD/BLD COMPNT: CPT

## 2024-01-08 PROCEDURE — 25000003 PHARM REV CODE 250: Performed by: NURSE PRACTITIONER

## 2024-01-08 RX ORDER — HYDROCODONE BITARTRATE AND ACETAMINOPHEN 500; 5 MG/1; MG/1
TABLET ORAL ONCE
Status: COMPLETED | OUTPATIENT
Start: 2024-01-08 | End: 2024-01-08

## 2024-01-08 RX ORDER — HYDROCODONE BITARTRATE AND ACETAMINOPHEN 500; 5 MG/1; MG/1
TABLET ORAL ONCE
Status: CANCELLED | OUTPATIENT
Start: 2024-01-08 | End: 2024-01-08

## 2024-01-08 RX ADMIN — SODIUM CHLORIDE: 9 INJECTION, SOLUTION INTRAVENOUS at 09:01

## 2024-01-11 ENCOUNTER — LAB VISIT (OUTPATIENT)
Dept: LAB | Facility: HOSPITAL | Age: 74
End: 2024-01-11
Attending: INTERNAL MEDICINE
Payer: MEDICARE

## 2024-01-11 DIAGNOSIS — C92.00 ACUTE MYELOID LEUKEMIA NOT HAVING ACHIEVED REMISSION: ICD-10-CM

## 2024-01-11 LAB
ALBUMIN SERPL-MCNC: 3.8 G/DL (ref 3.4–4.8)
ALBUMIN/GLOB SERPL: 1.2 RATIO (ref 1.1–2)
ALP SERPL-CCNC: 97 UNIT/L (ref 40–150)
ALT SERPL-CCNC: 13 UNIT/L (ref 0–55)
AST SERPL-CCNC: 14 UNIT/L (ref 5–34)
BASOPHILS # BLD AUTO: 0 X10(3)/MCL
BASOPHILS NFR BLD AUTO: 0 %
BILIRUB SERPL-MCNC: 0.7 MG/DL
BUN SERPL-MCNC: 25.6 MG/DL (ref 8.4–25.7)
CALCIUM SERPL-MCNC: 9.3 MG/DL (ref 8.8–10)
CHLORIDE SERPL-SCNC: 108 MMOL/L (ref 98–107)
CO2 SERPL-SCNC: 27 MMOL/L (ref 23–31)
CREAT SERPL-MCNC: 1.05 MG/DL (ref 0.73–1.18)
EOSINOPHIL # BLD AUTO: 0 X10(3)/MCL (ref 0–0.9)
EOSINOPHIL NFR BLD AUTO: 0 %
ERYTHROCYTE [DISTWIDTH] IN BLOOD BY AUTOMATED COUNT: 20.4 % (ref 11.5–17)
GFR SERPLBLD CREATININE-BSD FMLA CKD-EPI: >60 MLS/MIN/1.73/M2
GLOBULIN SER-MCNC: 3.1 GM/DL (ref 2.4–3.5)
GLUCOSE SERPL-MCNC: 93 MG/DL (ref 82–115)
HCT VFR BLD AUTO: 26.2 % (ref 42–52)
HGB BLD-MCNC: 7.9 G/DL (ref 14–18)
IMM GRANULOCYTES # BLD AUTO: 0 X10(3)/MCL (ref 0–0.04)
IMM GRANULOCYTES NFR BLD AUTO: 0 %
LYMPHOCYTES # BLD AUTO: 0.6 X10(3)/MCL (ref 0.6–4.6)
LYMPHOCYTES NFR BLD AUTO: 77.9 %
MCH RBC QN AUTO: 28.9 PG (ref 27–31)
MCHC RBC AUTO-ENTMCNC: 30.2 G/DL (ref 33–36)
MCV RBC AUTO: 96 FL (ref 80–94)
MONOCYTES # BLD AUTO: 0.12 X10(3)/MCL (ref 0.1–1.3)
MONOCYTES NFR BLD AUTO: 15.6 %
NEUTROPHILS # BLD AUTO: 0.05 X10(3)/MCL (ref 2.1–9.2)
NEUTROPHILS NFR BLD AUTO: 6.5 %
PLATELET # BLD AUTO: 30 X10(3)/MCL (ref 130–400)
PMV BLD AUTO: ABNORMAL FL
POTASSIUM SERPL-SCNC: 4.5 MMOL/L (ref 3.5–5.1)
PROT SERPL-MCNC: 6.9 GM/DL (ref 5.8–7.6)
RBC # BLD AUTO: 2.73 X10(6)/MCL (ref 4.7–6.1)
SODIUM SERPL-SCNC: 142 MMOL/L (ref 136–145)
WBC # SPEC AUTO: 0.77 X10(3)/MCL (ref 4.5–11.5)

## 2024-01-11 PROCEDURE — 36415 COLL VENOUS BLD VENIPUNCTURE: CPT

## 2024-01-11 PROCEDURE — 80053 COMPREHEN METABOLIC PANEL: CPT

## 2024-01-11 PROCEDURE — 85025 COMPLETE CBC W/AUTO DIFF WBC: CPT

## 2024-01-18 ENCOUNTER — INFUSION (OUTPATIENT)
Dept: INFUSION THERAPY | Facility: HOSPITAL | Age: 74
End: 2024-01-18
Attending: INTERNAL MEDICINE
Payer: MEDICARE

## 2024-01-18 VITALS
DIASTOLIC BLOOD PRESSURE: 70 MMHG | HEART RATE: 58 BPM | OXYGEN SATURATION: 99 % | TEMPERATURE: 99 F | SYSTOLIC BLOOD PRESSURE: 135 MMHG

## 2024-01-18 DIAGNOSIS — C92.00 ACUTE MYELOID LEUKEMIA NOT HAVING ACHIEVED REMISSION: ICD-10-CM

## 2024-01-18 DIAGNOSIS — D64.9 TRANSFUSION-DEPENDENT ANEMIA: ICD-10-CM

## 2024-01-18 DIAGNOSIS — D61.818 PANCYTOPENIA: ICD-10-CM

## 2024-01-18 DIAGNOSIS — D61.818 PANCYTOPENIA: Primary | ICD-10-CM

## 2024-01-18 LAB
ABO + RH BLD: NORMAL
ABO + RH BLD: NORMAL
BLD PROD TYP BPU: NORMAL
BLD PROD TYP BPU: NORMAL
BLOOD UNIT EXPIRATION DATE: NORMAL
BLOOD UNIT EXPIRATION DATE: NORMAL
BLOOD UNIT TYPE CODE: 6200
BLOOD UNIT TYPE CODE: 9500
CROSSMATCH INTERPRETATION: NORMAL
CROSSMATCH INTERPRETATION: NORMAL
DISPENSE STATUS: NORMAL
DISPENSE STATUS: NORMAL
UNIT NUMBER: NORMAL
UNIT NUMBER: NORMAL

## 2024-01-18 PROCEDURE — 36430 TRANSFUSION BLD/BLD COMPNT: CPT

## 2024-01-18 PROCEDURE — P9037 PLATE PHERES LEUKOREDU IRRAD: HCPCS | Performed by: NURSE PRACTITIONER

## 2024-01-18 RX ORDER — HYDROCODONE BITARTRATE AND ACETAMINOPHEN 500; 5 MG/1; MG/1
TABLET ORAL ONCE
Status: DISCONTINUED | OUTPATIENT
Start: 2024-01-18 | End: 2024-05-22 | Stop reason: CLARIF

## 2024-01-18 RX ORDER — HYDROCODONE BITARTRATE AND ACETAMINOPHEN 500; 5 MG/1; MG/1
TABLET ORAL ONCE
Status: CANCELLED | OUTPATIENT
Start: 2024-01-18 | End: 2024-01-18

## 2024-01-22 ENCOUNTER — LAB VISIT (OUTPATIENT)
Dept: LAB | Facility: HOSPITAL | Age: 74
End: 2024-01-22
Attending: INTERNAL MEDICINE
Payer: MEDICARE

## 2024-01-22 DIAGNOSIS — C92.00 ACUTE MYELOID LEUKEMIA NOT HAVING ACHIEVED REMISSION: ICD-10-CM

## 2024-01-22 LAB
ALBUMIN SERPL-MCNC: 3.8 G/DL (ref 3.4–4.8)
ALBUMIN/GLOB SERPL: 1.3 RATIO (ref 1.1–2)
ALP SERPL-CCNC: 90 UNIT/L (ref 40–150)
ALT SERPL-CCNC: 16 UNIT/L (ref 0–55)
AST SERPL-CCNC: 15 UNIT/L (ref 5–34)
BASOPHILS # BLD AUTO: 0.01 X10(3)/MCL
BASOPHILS NFR BLD AUTO: 0.8 %
BILIRUB SERPL-MCNC: 1 MG/DL
BUN SERPL-MCNC: 19.5 MG/DL (ref 8.4–25.7)
CALCIUM SERPL-MCNC: 8.8 MG/DL (ref 8.8–10)
CHLORIDE SERPL-SCNC: 106 MMOL/L (ref 98–107)
CO2 SERPL-SCNC: 30 MMOL/L (ref 23–31)
CREAT SERPL-MCNC: 0.93 MG/DL (ref 0.73–1.18)
EOSINOPHIL # BLD AUTO: 0 X10(3)/MCL (ref 0–0.9)
EOSINOPHIL NFR BLD AUTO: 0 %
ERYTHROCYTE [DISTWIDTH] IN BLOOD BY AUTOMATED COUNT: 21.5 % (ref 11.5–17)
GFR SERPLBLD CREATININE-BSD FMLA CKD-EPI: >60 MLS/MIN/1.73/M2
GLOBULIN SER-MCNC: 2.9 GM/DL (ref 2.4–3.5)
GLUCOSE SERPL-MCNC: 85 MG/DL (ref 82–115)
HCT VFR BLD AUTO: 27.3 % (ref 42–52)
HGB BLD-MCNC: 8.1 G/DL (ref 14–18)
IMM GRANULOCYTES # BLD AUTO: 0.01 X10(3)/MCL (ref 0–0.04)
IMM GRANULOCYTES NFR BLD AUTO: 0.8 %
LYMPHOCYTES # BLD AUTO: 0.91 X10(3)/MCL (ref 0.6–4.6)
LYMPHOCYTES NFR BLD AUTO: 74.6 %
MCH RBC QN AUTO: 29 PG (ref 27–31)
MCHC RBC AUTO-ENTMCNC: 29.7 G/DL (ref 33–36)
MCV RBC AUTO: 97.8 FL (ref 80–94)
MONOCYTES # BLD AUTO: 0.19 X10(3)/MCL (ref 0.1–1.3)
MONOCYTES NFR BLD AUTO: 15.6 %
NEUTROPHILS # BLD AUTO: 0.1 X10(3)/MCL (ref 2.1–9.2)
NEUTROPHILS NFR BLD AUTO: 8.2 %
PLATELET # BLD AUTO: 29 X10(3)/MCL (ref 130–400)
PLT APH: NORMAL
PMV BLD AUTO: 11 FL (ref 7.4–10.4)
POTASSIUM SERPL-SCNC: 4.1 MMOL/L (ref 3.5–5.1)
PROT SERPL-MCNC: 6.7 GM/DL (ref 5.8–7.6)
RBC # BLD AUTO: 2.79 X10(6)/MCL (ref 4.7–6.1)
RBCS: NORMAL
SODIUM SERPL-SCNC: 138 MMOL/L (ref 136–145)
WBC # SPEC AUTO: 1.22 X10(3)/MCL (ref 4.5–11.5)

## 2024-01-22 PROCEDURE — 85025 COMPLETE CBC W/AUTO DIFF WBC: CPT

## 2024-01-22 PROCEDURE — 80053 COMPREHEN METABOLIC PANEL: CPT

## 2024-01-22 PROCEDURE — 36415 COLL VENOUS BLD VENIPUNCTURE: CPT

## 2024-01-25 ENCOUNTER — INFUSION (OUTPATIENT)
Dept: INFUSION THERAPY | Facility: HOSPITAL | Age: 74
End: 2024-01-25
Attending: INTERNAL MEDICINE
Payer: MEDICARE

## 2024-01-25 VITALS
RESPIRATION RATE: 18 BRPM | DIASTOLIC BLOOD PRESSURE: 65 MMHG | TEMPERATURE: 98 F | SYSTOLIC BLOOD PRESSURE: 123 MMHG | HEART RATE: 65 BPM

## 2024-01-25 DIAGNOSIS — F32.A ANXIETY AND DEPRESSION: ICD-10-CM

## 2024-01-25 DIAGNOSIS — D61.818 PANCYTOPENIA: Primary | ICD-10-CM

## 2024-01-25 DIAGNOSIS — C92.00 ACUTE MYELOID LEUKEMIA NOT HAVING ACHIEVED REMISSION: ICD-10-CM

## 2024-01-25 DIAGNOSIS — D61.818 PANCYTOPENIA: ICD-10-CM

## 2024-01-25 DIAGNOSIS — F41.9 ANXIETY AND DEPRESSION: ICD-10-CM

## 2024-01-25 PROCEDURE — 36430 TRANSFUSION BLD/BLD COMPNT: CPT

## 2024-01-25 PROCEDURE — P9037 PLATE PHERES LEUKOREDU IRRAD: HCPCS | Performed by: INTERNAL MEDICINE

## 2024-01-25 RX ORDER — HYDROCODONE BITARTRATE AND ACETAMINOPHEN 500; 5 MG/1; MG/1
TABLET ORAL ONCE
Status: CANCELLED | OUTPATIENT
Start: 2024-01-25 | End: 2024-01-25

## 2024-01-25 RX ORDER — CITALOPRAM 40 MG/1
40 TABLET, FILM COATED ORAL
Qty: 90 TABLET | Refills: 3 | Status: SHIPPED | OUTPATIENT
Start: 2024-01-25

## 2024-01-25 RX ORDER — HYDROCODONE BITARTRATE AND ACETAMINOPHEN 500; 5 MG/1; MG/1
TABLET ORAL ONCE
Status: COMPLETED | OUTPATIENT
Start: 2024-01-25 | End: 2024-02-01

## 2024-01-26 NOTE — PROGRESS NOTES
Subjective:       Patient ID: Hussein Gonzalez Jr. is a 73 y.o. male.    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    Current Treatment:   Azacitidine and venetoclax started on 02/27/2023.  Started 7 out of 28 days, did this through cycle 5.  With cycle 6, moved to 5 out of 28 days.  Referral for possible stem cell transplant evaluation.    Treatment History:  N/A    HPI:  73-year-old male with a history of CVA, hypertension, BPH, and melanoma who presented to the emergency department following a syncopal episode.  In February of 2022, the patient did have a slightly low white blood cell count, hemoglobin, and platelet count.  Per the patient, 3 months prior to admission, he was having dizziness, fatigue, weakness, shortness of breath, back pain, and leg pain.  The dizziness was worse with standing and walking.  On 01/20/2023, the patient had a syncopal episode.  He was brought to the emergency department and was found to be pancytopenic.  He also had stool that was positive for occult blood in the emergency department.  He received blood transfusion.  His CBC on 01/20/2023 revealed a white blood cell count of 2.6, hemoglobin of 7.7, and platelet count of 47,000. Reticulocytes were appropriately elevated, B12 and folate were within normal limits, iron level was slightly decreased.  Repeat blood work on 01/21/2023 revealed a white blood cell count of 2.8, post transfusion hemoglobin of 9.6, and a [place] 405[address].  Hematology was consulted for pancytopenia.  EGD and colonoscopy done on 01/24/2023, EGD revealed nonbleeding ulcers, colonoscopy unremarkable, however had to be aborted due to tortuous colon.  Bone Marrow Biopsy done on 01/25/2023.  Preliminary reveals AML with 27% blasts in a background of dysplasia.  Cytogenetics are within  normal limits, FISH is within normal limits.  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  Started azacitidine and venetoclax on 02/27/2023, completed cycle 4 on 05/30/2023.  Held after this cycle and repeated a bone marrow biopsy, this was done on 06/27/2023, this showed a normocellular marrow (40%) with relative erythroid hyperplasia, sequential granulopoiesis and megakaryopoiesis.  The bone marrow was slightly suboptimal given it was predominantly subcortical bone with focal hematopoietic elements.  There was no morphologic or immunophenotypic evidence of previous acute myeloid leukemia, cytogenetics were normal, FISH was normal, NGS showed persistent ASXL1, SRSF2, TET2.    Repeat bone marrow biopsy done on 11/21/2023 showed no morphologic or immunophenotypic evidence of persistent acute myeloid leukemia. There were ASXL1, CEBPA, RUNX1, SRSF2, STAG2, and TET2 mutations.    Interval History:   Patient presents for scheduled follow up appointment for AML.  He continues to have issues with his testicle, he is being treated with antibiotics by Urology and will have a procedure on 02/05/2024.    Past Medical History:   Diagnosis Date    Anxiety and depression     BPH (benign prostatic hyperplasia)     History of CVA (cerebrovascular accident) without residual deficits     HTN (hypertension)     Malignant melanoma of skin, unspecified     NSTEMI (non-ST elevated myocardial infarction)     Sepsis, Gram negative     Sleep apnea       Past Surgical History:   Procedure Laterality Date    BONE MARROW ASPIRATION N/A 6/27/2023    Procedure: ASPIRATION, BONE MARROW;  Surgeon: Sabino Núñez MD;  Location: [facility];  Service: General;  Laterality: N/A;  ALL ASSESS FOR MOLECULAR MRD    BONE MARROW BIOPSY N/A 1/25/2023    Procedure: Biopsy-bone marrow;  Surgeon: Rd Carlos MD;  Location: [facility] OR;  Service: General;  Laterality: N/A;    BONE MARROW BIOPSY N/A 11/21/2023    Procedure: Biopsy-bone  marrow;  Surgeon: Junior Castro MD;  Location: [facility];  Service: General;  Laterality: N/A;    COLONOSCOPY      COLONOSCOPY N/A 1/24/2023    Procedure: COLONOSCOPY (DOUBLE);  Surgeon: Jimy Baker MD;  Location: [facility] ENDOSCOPY;  Service: Gastroenterology;  Laterality: N/A;    EGD, WITH CLOSED BIOPSY N/A 1/24/2023    Procedure: EGD, WITH CLOSED BIOPSY;  Surgeon: Jimy Baker MD;  Location: [facility] ENDOSCOPY;  Service: Gastroenterology;  Laterality: N/A;    EXCISION OF MELANOMA      SINUS SURGERY      TONSILLECTOMY       Social History     Socioeconomic History    Marital status:    Tobacco Use    Smoking status: Never    Smokeless tobacco: Never   Substance and Sexual Activity    Alcohol use: Not Currently     Alcohol/week: 7.0 standard drinks of alcohol     Types: 7 Cans of beer per week    Drug use: Never    Sexual activity: Yes     Social Determinants of Health     Financial Resource Strain: Low Risk  (4/20/2023)    Overall Financial Resource Strain (CARDIA)     Difficulty of Paying Living Expenses: Not hard at all   Food Insecurity: No Food Insecurity (4/20/2023)    Hunger Vital Sign     Worried About Running Out of Food in the Last Year: Never true     Ran Out of Food in the Last Year: Never true   Transportation Needs: No Transportation Needs (4/20/2023)    PRAPARE - Transportation     Lack of Transportation (Medical): No     Lack of Transportation (Non-Medical): No   Physical Activity: Insufficiently Active (4/20/2023)    Exercise Vital Sign     Days of Exercise per Week: 3 days     Minutes of Exercise per Session: 30 min   Stress: No Stress Concern Present (4/20/2023)    [employer] [place] [employer] - Occupational Stress Questionnaire     Feeling of Stress : Not at all   Social Connections: Unknown (4/20/2023)    Social Connection and Isolation Panel [NHANES]     Frequency of Communication with Friends and Family: More than three times a week     Frequency of Social  Gatherings with Friends and Family: More than three times a week     Attends Confucianist Services: Patient declined     Active Member of Clubs or Organizations: Patient declined     Attends Club or Organization Meetings: Patient declined     Marital Status:    Housing Stability: Low Risk  (4/20/2023)    Housing Stability Vital Sign     Unable to Pay for Housing in the Last Year: No     Number of Places Lived in the Last Year: 1     Unstable Housing in the Last Year: No      Family History   Problem Relation Age of Onset    Cancer Mother     Cancer Father       Review of patient's allergies indicates:   Allergen Reactions    Acetaminophen Other (See Comments)     Restless leg     Benadryl [diphenhydramine hcl] Other (See Comments)     Restless Legs      Review of Systems   Constitutional:  Negative for chills, diaphoresis, fatigue, fever and unexpected weight change.   HENT:  Negative for nasal congestion, mouth sores, sinus pressure/congestion and sore throat.    Eyes:  Negative for pain and visual disturbance.   Respiratory:  Negative for cough, chest tightness and shortness of breath.    Cardiovascular:  Negative for chest pain, palpitations and leg swelling.   Gastrointestinal:  Negative for abdominal distention, abdominal pain, blood in stool, constipation and diarrhea.   Genitourinary:  Negative for dysuria, frequency and hematuria.   Musculoskeletal:  Positive for arthralgias and myalgias. Negative for back pain.   Integumentary:  Negative for rash.   Neurological:  Negative for dizziness, syncope, weakness, numbness and headaches.   Hematological:  Negative for adenopathy.   Psychiatric/Behavioral:  Negative for confusion.          Objective:      Physical Exam  Vitals reviewed.   Constitutional:       General: He is awake.      Appearance: Normal appearance.   HENT:      Head: Normocephalic and atraumatic.      Right Ear: Hearing normal.      Left Ear: Hearing normal.      Nose: Nose normal.   Eyes:       General: Lids are normal. Vision grossly intact.      Extraocular Movements: Extraocular movements intact.      Conjunctiva/sclera: Conjunctivae normal.   Cardiovascular:      Rate and Rhythm: Normal rate and regular rhythm.      Pulses: Normal pulses.      Heart sounds: Normal heart sounds.   Pulmonary:      Effort: Pulmonary effort is normal.      Breath sounds: Normal breath sounds. No wheezing, rhonchi or rales.   Abdominal:      General: Bowel sounds are normal.      Palpations: Abdomen is soft.      Tenderness: There is no abdominal tenderness.   Musculoskeletal:      Cervical back: Full passive range of motion without pain.      Right lower leg: No edema.      Left lower leg: No edema.   Skin:     General: Skin is warm.      Comments:      Neurological:      General: No focal deficit present.      Mental Status: He is alert and oriented to person, place, and time.   Psychiatric:         Attention and Perception: Attention normal.         Mood and Affect: Mood and affect normal.         Behavior: Behavior is cooperative.         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    Treating with azacitidine and venetoclax, Started on 02/27/2023.  Bone marrow biopsy after cycle 4 shows no immunophenotypic or morphologic evidence of AML.  NGS showed continued ASXL1, SRSF2, TET2 mutations.  Cytogenetics were normal.    Evaluated by Dr. Valdez in [place].     Continue prophylaxis with 500 mg of levofloxacin daily, 200 mg of acyclovir b.i.d., 200 mg of fluconazole daily.    Twice weekly labs (Monday/Thursday) with transfusions  as needed.    He restarted treatment on 07/10/2023, on 08/14/2023, we dose reduced by changing venetoclax to 14 out of every 28 days.    Venetoclax 200 mg on days 1-14 of a 28 day cycle    Hold Azacididine and venetoclax this week due to active infection.  He treated with azacitidine 7 days out of 28 days for the first 5 cycles, with cycle 6 moving forward, we did 5 days.  He is currently treating with 3 days.    Flexeril as needed for muscle spasms    Magic mouthwash as needed for mouth sores    Repeat bone marrow biopsy done on 11/21/2023 with no morphologic or immunophenotypic evidence of persistent AML    Patient knows to monitor closely for fever and worsening of infection due to his neutropenic status.    Continue to hold therapy while he was being treated for infection by Urology, he will return to clinic in 4 weeks and hopefully we will be able to resume treatment then.  We will resume azacitidine 3 out of every 28 days and venetoclax 14 out of every 28 days.    Return to clinic in 4 weeks     Eduard Luna II, MD

## 2024-01-29 ENCOUNTER — OFFICE VISIT (OUTPATIENT)
Dept: HEMATOLOGY/ONCOLOGY | Facility: CLINIC | Age: 74
End: 2024-01-29
Payer: MEDICARE

## 2024-01-29 VITALS
SYSTOLIC BLOOD PRESSURE: 107 MMHG | HEIGHT: 73 IN | TEMPERATURE: 98 F | BODY MASS INDEX: 24.22 KG/M2 | DIASTOLIC BLOOD PRESSURE: 66 MMHG | HEART RATE: 77 BPM | RESPIRATION RATE: 18 BRPM | WEIGHT: 182.75 LBS | OXYGEN SATURATION: 100 %

## 2024-01-29 DIAGNOSIS — C92.00 ACUTE MYELOID LEUKEMIA NOT HAVING ACHIEVED REMISSION: Primary | ICD-10-CM

## 2024-01-29 PROCEDURE — 99214 OFFICE O/P EST MOD 30 MIN: CPT | Mod: S$PBB,,, | Performed by: INTERNAL MEDICINE

## 2024-01-29 PROCEDURE — 99999 PR PBB SHADOW E&M-EST. PATIENT-LVL V: CPT | Mod: PBBFAC,,, | Performed by: INTERNAL MEDICINE

## 2024-01-29 PROCEDURE — 99215 OFFICE O/P EST HI 40 MIN: CPT | Mod: PBBFAC | Performed by: INTERNAL MEDICINE

## 2024-02-01 ENCOUNTER — LAB VISIT (OUTPATIENT)
Dept: LAB | Facility: HOSPITAL | Age: 74
End: 2024-02-01
Attending: INTERNAL MEDICINE
Payer: MEDICARE

## 2024-02-01 ENCOUNTER — INFUSION (OUTPATIENT)
Dept: INFUSION THERAPY | Facility: HOSPITAL | Age: 74
End: 2024-02-01
Attending: INTERNAL MEDICINE
Payer: MEDICARE

## 2024-02-01 VITALS
DIASTOLIC BLOOD PRESSURE: 73 MMHG | OXYGEN SATURATION: 100 % | TEMPERATURE: 98 F | SYSTOLIC BLOOD PRESSURE: 125 MMHG | HEART RATE: 65 BPM

## 2024-02-01 DIAGNOSIS — C92.00 ACUTE MYELOID LEUKEMIA NOT HAVING ACHIEVED REMISSION: ICD-10-CM

## 2024-02-01 DIAGNOSIS — C92.00 ACUTE MYELOID LEUKEMIA NOT HAVING ACHIEVED REMISSION: Primary | ICD-10-CM

## 2024-02-01 LAB
ABO + RH BLD: NORMAL
ABO + RH BLD: NORMAL
BASOPHILS # BLD AUTO: 0.02 X10(3)/MCL
BASOPHILS NFR BLD AUTO: 1.5 %
BLD PROD TYP BPU: NORMAL
BLD PROD TYP BPU: NORMAL
BLOOD UNIT EXPIRATION DATE: NORMAL
BLOOD UNIT EXPIRATION DATE: NORMAL
BLOOD UNIT TYPE CODE: 5100
BLOOD UNIT TYPE CODE: 6200
CROSSMATCH INTERPRETATION: NORMAL
CROSSMATCH INTERPRETATION: NORMAL
DISPENSE STATUS: NORMAL
DISPENSE STATUS: NORMAL
EOSINOPHIL # BLD AUTO: 0 X10(3)/MCL (ref 0–0.9)
EOSINOPHIL NFR BLD AUTO: 0 %
ERYTHROCYTE [DISTWIDTH] IN BLOOD BY AUTOMATED COUNT: 22.4 % (ref 11.5–17)
GROUP & RH: NORMAL
HCT VFR BLD AUTO: 25 % (ref 42–52)
HGB BLD-MCNC: 7.3 G/DL (ref 14–18)
IMM GRANULOCYTES # BLD AUTO: 0 X10(3)/MCL (ref 0–0.04)
IMM GRANULOCYTES NFR BLD AUTO: 0 %
INDIRECT COOMBS: NORMAL
LYMPHOCYTES # BLD AUTO: 0.8 X10(3)/MCL (ref 0.6–4.6)
LYMPHOCYTES NFR BLD AUTO: 58.8 %
MCH RBC QN AUTO: 29 PG (ref 27–31)
MCHC RBC AUTO-ENTMCNC: 29.2 G/DL (ref 33–36)
MCV RBC AUTO: 99.2 FL (ref 80–94)
MONOCYTES # BLD AUTO: 0.38 X10(3)/MCL (ref 0.1–1.3)
MONOCYTES NFR BLD AUTO: 27.9 %
NEUTROPHILS # BLD AUTO: 0.16 X10(3)/MCL (ref 2.1–9.2)
NEUTROPHILS NFR BLD AUTO: 11.8 %
PLATELET # BLD AUTO: 5 X10(3)/MCL (ref 130–400)
PMV BLD AUTO: ABNORMAL FL
RBC # BLD AUTO: 2.52 X10(6)/MCL (ref 4.7–6.1)
SPECIMEN OUTDATE: NORMAL
UNIT NUMBER: NORMAL
UNIT NUMBER: NORMAL
WBC # SPEC AUTO: 1.36 X10(3)/MCL (ref 4.5–11.5)

## 2024-02-01 PROCEDURE — 85025 COMPLETE CBC W/AUTO DIFF WBC: CPT

## 2024-02-01 PROCEDURE — P9037 PLATE PHERES LEUKOREDU IRRAD: HCPCS | Performed by: INTERNAL MEDICINE

## 2024-02-01 PROCEDURE — 36415 COLL VENOUS BLD VENIPUNCTURE: CPT

## 2024-02-01 PROCEDURE — 86923 COMPATIBILITY TEST ELECTRIC: CPT | Performed by: INTERNAL MEDICINE

## 2024-02-01 PROCEDURE — 86901 BLOOD TYPING SEROLOGIC RH(D): CPT | Performed by: INTERNAL MEDICINE

## 2024-02-01 PROCEDURE — 36430 TRANSFUSION BLD/BLD COMPNT: CPT

## 2024-02-01 PROCEDURE — 25000003 PHARM REV CODE 250: Performed by: INTERNAL MEDICINE

## 2024-02-01 PROCEDURE — P9040 RBC LEUKOREDUCED IRRADIATED: HCPCS | Performed by: INTERNAL MEDICINE

## 2024-02-01 RX ORDER — HYDROCODONE BITARTRATE AND ACETAMINOPHEN 500; 5 MG/1; MG/1
TABLET ORAL ONCE
Status: DISCONTINUED | OUTPATIENT
Start: 2024-02-01 | End: 2024-05-22 | Stop reason: CLARIF

## 2024-02-01 RX ORDER — HYDROCODONE BITARTRATE AND ACETAMINOPHEN 500; 5 MG/1; MG/1
TABLET ORAL ONCE
Status: CANCELLED | OUTPATIENT
Start: 2024-02-01 | End: 2024-02-01

## 2024-02-01 RX ADMIN — SODIUM CHLORIDE: 9 INJECTION, SOLUTION INTRAVENOUS at 09:02

## 2024-02-05 ENCOUNTER — LAB VISIT (OUTPATIENT)
Dept: LAB | Facility: HOSPITAL | Age: 74
End: 2024-02-05
Attending: INTERNAL MEDICINE
Payer: MEDICARE

## 2024-02-05 DIAGNOSIS — C92.00 ACUTE MYELOID LEUKEMIA NOT HAVING ACHIEVED REMISSION: ICD-10-CM

## 2024-02-05 LAB
ABS NEUT CALC (OHS): 0.3 X10(3)/MCL (ref 2.1–9.2)
ALBUMIN SERPL-MCNC: 3.8 G/DL (ref 3.4–4.8)
ALBUMIN/GLOB SERPL: 1.3 RATIO (ref 1.1–2)
ALP SERPL-CCNC: 88 UNIT/L (ref 40–150)
ALT SERPL-CCNC: 16 UNIT/L (ref 0–55)
ANISOCYTOSIS BLD QL SMEAR: ABNORMAL
AST SERPL-CCNC: 17 UNIT/L (ref 5–34)
B-HCG SERPL QL: 26 %
BILIRUB SERPL-MCNC: 1 MG/DL
BUN SERPL-MCNC: 23.2 MG/DL (ref 8.4–25.7)
CALCIUM SERPL-MCNC: 8.8 MG/DL (ref 8.8–10)
CHLORIDE SERPL-SCNC: 107 MMOL/L (ref 98–107)
CO2 SERPL-SCNC: 29 MMOL/L (ref 23–31)
CREAT SERPL-MCNC: 0.99 MG/DL (ref 0.73–1.18)
ERYTHROCYTE [DISTWIDTH] IN BLOOD BY AUTOMATED COUNT: 21.5 % (ref 11.5–17)
GFR SERPLBLD CREATININE-BSD FMLA CKD-EPI: >60 MLS/MIN/1.73/M2
GLOBULIN SER-MCNC: 2.9 GM/DL (ref 2.4–3.5)
GLUCOSE SERPL-MCNC: 109 MG/DL (ref 82–115)
HCT VFR BLD AUTO: 25.8 % (ref 42–52)
HGB BLD-MCNC: 7.8 G/DL (ref 14–18)
LYMPHOCYTES NFR BLD MANUAL: 0.87 X10(3)/MCL
LYMPHOCYTES NFR BLD MANUAL: 55 % (ref 13–40)
MACROCYTES BLD QL SMEAR: ABNORMAL
MCH RBC QN AUTO: 29.5 PG (ref 27–31)
MCHC RBC AUTO-ENTMCNC: 30.2 G/DL (ref 33–36)
MCV RBC AUTO: 97.7 FL (ref 80–94)
MICROCYTES BLD QL SMEAR: ABNORMAL
NEUTROPHILS NFR BLD MANUAL: 19 % (ref 47–80)
NRBC BLD MANUAL-RTO: 4 %
PAPPENHEIMER BODIES (OLG): ABNORMAL
PLATELET # BLD AUTO: 11 X10(3)/MCL (ref 130–400)
PLATELET # BLD EST: ABNORMAL 10*3/UL
PMV BLD AUTO: ABNORMAL FL
POIKILOCYTOSIS BLD QL SMEAR: ABNORMAL
POLYCHROMASIA BLD QL SMEAR: ABNORMAL
POTASSIUM SERPL-SCNC: 4 MMOL/L (ref 3.5–5.1)
PROT SERPL-MCNC: 6.7 GM/DL (ref 5.8–7.6)
RBC # BLD AUTO: 2.64 X10(6)/MCL (ref 4.7–6.1)
RBC MORPH BLD: ABNORMAL
RBCS: NORMAL
SODIUM SERPL-SCNC: 142 MMOL/L (ref 136–145)
STOMATOCYTES (OLG): ABNORMAL
TEAR DROP CELL (OLG): ABNORMAL
WBC # SPEC AUTO: 1.58 X10(3)/MCL (ref 4.5–11.5)

## 2024-02-05 PROCEDURE — 80053 COMPREHEN METABOLIC PANEL: CPT

## 2024-02-05 PROCEDURE — 36415 COLL VENOUS BLD VENIPUNCTURE: CPT

## 2024-02-05 PROCEDURE — 85027 COMPLETE CBC AUTOMATED: CPT

## 2024-02-06 LAB — HEMATOLOGIST REVIEW: NORMAL

## 2024-02-08 ENCOUNTER — LAB VISIT (OUTPATIENT)
Dept: LAB | Facility: HOSPITAL | Age: 74
End: 2024-02-08
Attending: INTERNAL MEDICINE
Payer: MEDICARE

## 2024-02-08 ENCOUNTER — INFUSION (OUTPATIENT)
Dept: INFUSION THERAPY | Facility: HOSPITAL | Age: 74
End: 2024-02-08
Attending: INTERNAL MEDICINE
Payer: MEDICARE

## 2024-02-08 VITALS
HEIGHT: 73 IN | SYSTOLIC BLOOD PRESSURE: 136 MMHG | RESPIRATION RATE: 18 BRPM | DIASTOLIC BLOOD PRESSURE: 72 MMHG | BODY MASS INDEX: 24.25 KG/M2 | HEART RATE: 62 BPM | TEMPERATURE: 98 F | WEIGHT: 183 LBS

## 2024-02-08 DIAGNOSIS — C92.00 ACUTE MYELOID LEUKEMIA NOT HAVING ACHIEVED REMISSION: Primary | ICD-10-CM

## 2024-02-08 DIAGNOSIS — C92.00 ACUTE MYELOID LEUKEMIA NOT HAVING ACHIEVED REMISSION: ICD-10-CM

## 2024-02-08 LAB
ABO + RH BLD: NORMAL
ABO + RH BLD: NORMAL
ALBUMIN SERPL-MCNC: 3.9 G/DL (ref 3.4–4.8)
ALBUMIN/GLOB SERPL: 1.4 RATIO (ref 1.1–2)
ALP SERPL-CCNC: 89 UNIT/L (ref 40–150)
ALT SERPL-CCNC: 17 UNIT/L (ref 0–55)
AST SERPL-CCNC: 17 UNIT/L (ref 5–34)
BASOPHILS # BLD AUTO: 0.02 X10(3)/MCL
BASOPHILS NFR BLD AUTO: 1.4 %
BILIRUB SERPL-MCNC: 0.9 MG/DL
BLD PROD TYP BPU: NORMAL
BLD PROD TYP BPU: NORMAL
BLOOD UNIT EXPIRATION DATE: NORMAL
BLOOD UNIT EXPIRATION DATE: NORMAL
BLOOD UNIT TYPE CODE: 5100
BLOOD UNIT TYPE CODE: 6200
BUN SERPL-MCNC: 19.3 MG/DL (ref 8.4–25.7)
CALCIUM SERPL-MCNC: 8.8 MG/DL (ref 8.8–10)
CHLORIDE SERPL-SCNC: 107 MMOL/L (ref 98–107)
CO2 SERPL-SCNC: 29 MMOL/L (ref 23–31)
CREAT SERPL-MCNC: 1.02 MG/DL (ref 0.73–1.18)
CROSSMATCH INTERPRETATION: NORMAL
CROSSMATCH INTERPRETATION: NORMAL
DISPENSE STATUS: NORMAL
DISPENSE STATUS: NORMAL
EOSINOPHIL # BLD AUTO: 0 X10(3)/MCL (ref 0–0.9)
EOSINOPHIL NFR BLD AUTO: 0 %
ERYTHROCYTE [DISTWIDTH] IN BLOOD BY AUTOMATED COUNT: 21.4 % (ref 11.5–17)
GFR SERPLBLD CREATININE-BSD FMLA CKD-EPI: >60 MLS/MIN/1.73/M2
GLOBULIN SER-MCNC: 2.8 GM/DL (ref 2.4–3.5)
GLUCOSE SERPL-MCNC: 114 MG/DL (ref 82–115)
HCT VFR BLD AUTO: 25.8 % (ref 42–52)
HGB BLD-MCNC: 7.6 G/DL (ref 14–18)
IMM GRANULOCYTES # BLD AUTO: 0 X10(3)/MCL (ref 0–0.04)
IMM GRANULOCYTES NFR BLD AUTO: 0 %
LYMPHOCYTES # BLD AUTO: 0.83 X10(3)/MCL (ref 0.6–4.6)
LYMPHOCYTES NFR BLD AUTO: 56.1 %
MCH RBC QN AUTO: 29.3 PG (ref 27–31)
MCHC RBC AUTO-ENTMCNC: 29.5 G/DL (ref 33–36)
MCV RBC AUTO: 99.6 FL (ref 80–94)
MONOCYTES # BLD AUTO: 0.51 X10(3)/MCL (ref 0.1–1.3)
MONOCYTES NFR BLD AUTO: 34.5 %
NEUTROPHILS # BLD AUTO: 0.12 X10(3)/MCL (ref 2.1–9.2)
NEUTROPHILS NFR BLD AUTO: 8 %
PLATELET # BLD AUTO: 5 X10(3)/MCL (ref 130–400)
PMV BLD AUTO: ABNORMAL FL
POTASSIUM SERPL-SCNC: 3.8 MMOL/L (ref 3.5–5.1)
PROT SERPL-MCNC: 6.7 GM/DL (ref 5.8–7.6)
RBC # BLD AUTO: 2.59 X10(6)/MCL (ref 4.7–6.1)
SODIUM SERPL-SCNC: 141 MMOL/L (ref 136–145)
UNIT NUMBER: NORMAL
UNIT NUMBER: NORMAL
WBC # SPEC AUTO: 1.48 X10(3)/MCL (ref 4.5–11.5)

## 2024-02-08 PROCEDURE — 36415 COLL VENOUS BLD VENIPUNCTURE: CPT

## 2024-02-08 PROCEDURE — 36430 TRANSFUSION BLD/BLD COMPNT: CPT

## 2024-02-08 PROCEDURE — 80053 COMPREHEN METABOLIC PANEL: CPT

## 2024-02-08 PROCEDURE — P9037 PLATE PHERES LEUKOREDU IRRAD: HCPCS | Performed by: INTERNAL MEDICINE

## 2024-02-08 PROCEDURE — 85025 COMPLETE CBC W/AUTO DIFF WBC: CPT

## 2024-02-08 RX ORDER — HYDROCODONE BITARTRATE AND ACETAMINOPHEN 500; 5 MG/1; MG/1
TABLET ORAL ONCE
Status: CANCELLED | OUTPATIENT
Start: 2024-02-08 | End: 2024-02-08

## 2024-02-08 RX ORDER — HYDROCODONE BITARTRATE AND ACETAMINOPHEN 500; 5 MG/1; MG/1
TABLET ORAL ONCE
Status: DISCONTINUED | OUTPATIENT
Start: 2024-02-08 | End: 2024-05-22 | Stop reason: CLARIF

## 2024-02-12 ENCOUNTER — INFUSION (OUTPATIENT)
Dept: INFUSION THERAPY | Facility: HOSPITAL | Age: 74
End: 2024-02-12
Attending: INTERNAL MEDICINE
Payer: MEDICARE

## 2024-02-12 ENCOUNTER — LAB VISIT (OUTPATIENT)
Dept: LAB | Facility: HOSPITAL | Age: 74
End: 2024-02-12
Attending: INTERNAL MEDICINE
Payer: MEDICARE

## 2024-02-12 DIAGNOSIS — C92.00 ACUTE MYELOID LEUKEMIA NOT HAVING ACHIEVED REMISSION: ICD-10-CM

## 2024-02-12 LAB
ALBUMIN SERPL-MCNC: 3.7 G/DL (ref 3.4–4.8)
ALBUMIN/GLOB SERPL: 1.4 RATIO (ref 1.1–2)
ALP SERPL-CCNC: 84 UNIT/L (ref 40–150)
ALT SERPL-CCNC: 19 UNIT/L (ref 0–55)
AST SERPL-CCNC: 20 UNIT/L (ref 5–34)
BASOPHILS # BLD AUTO: 0.03 X10(3)/MCL
BASOPHILS NFR BLD AUTO: 1.4 %
BILIRUB SERPL-MCNC: 1 MG/DL
BUN SERPL-MCNC: 23.1 MG/DL (ref 8.4–25.7)
CALCIUM SERPL-MCNC: 8.6 MG/DL (ref 8.8–10)
CHLORIDE SERPL-SCNC: 107 MMOL/L (ref 98–107)
CO2 SERPL-SCNC: 27 MMOL/L (ref 23–31)
CREAT SERPL-MCNC: 1.05 MG/DL (ref 0.73–1.18)
EOSINOPHIL # BLD AUTO: 0 X10(3)/MCL (ref 0–0.9)
EOSINOPHIL NFR BLD AUTO: 0 %
ERYTHROCYTE [DISTWIDTH] IN BLOOD BY AUTOMATED COUNT: 22.3 % (ref 11.5–17)
GFR SERPLBLD CREATININE-BSD FMLA CKD-EPI: >60 MLS/MIN/1.73/M2
GLOBULIN SER-MCNC: 2.7 GM/DL (ref 2.4–3.5)
GLUCOSE SERPL-MCNC: 105 MG/DL (ref 82–115)
HCT VFR BLD AUTO: 24.6 % (ref 42–52)
HGB BLD-MCNC: 7.1 G/DL (ref 14–18)
IMM GRANULOCYTES # BLD AUTO: 0.01 X10(3)/MCL (ref 0–0.04)
IMM GRANULOCYTES NFR BLD AUTO: 0.5 %
LYMPHOCYTES # BLD AUTO: 1.1 X10(3)/MCL (ref 0.6–4.6)
LYMPHOCYTES NFR BLD AUTO: 52.1 %
MCH RBC QN AUTO: 29.2 PG (ref 27–31)
MCHC RBC AUTO-ENTMCNC: 28.9 G/DL (ref 33–36)
MCV RBC AUTO: 101.2 FL (ref 80–94)
MONOCYTES # BLD AUTO: 0.75 X10(3)/MCL (ref 0.1–1.3)
MONOCYTES NFR BLD AUTO: 35.5 %
NEUTROPHILS # BLD AUTO: 0.22 X10(3)/MCL (ref 2.1–9.2)
NEUTROPHILS NFR BLD AUTO: 10.5 %
PLATELET # BLD AUTO: 30 X10(3)/MCL (ref 130–400)
PMV BLD AUTO: ABNORMAL FL
POTASSIUM SERPL-SCNC: 4 MMOL/L (ref 3.5–5.1)
PROT SERPL-MCNC: 6.4 GM/DL (ref 5.8–7.6)
RBC # BLD AUTO: 2.43 X10(6)/MCL (ref 4.7–6.1)
SODIUM SERPL-SCNC: 142 MMOL/L (ref 136–145)
WBC # SPEC AUTO: 2.11 X10(3)/MCL (ref 4.5–11.5)

## 2024-02-12 PROCEDURE — 85025 COMPLETE CBC W/AUTO DIFF WBC: CPT

## 2024-02-12 PROCEDURE — 80053 COMPREHEN METABOLIC PANEL: CPT

## 2024-02-12 PROCEDURE — 36415 COLL VENOUS BLD VENIPUNCTURE: CPT

## 2024-02-15 ENCOUNTER — LAB VISIT (OUTPATIENT)
Dept: LAB | Facility: HOSPITAL | Age: 74
End: 2024-02-15
Attending: INTERNAL MEDICINE
Payer: MEDICARE

## 2024-02-15 ENCOUNTER — INFUSION (OUTPATIENT)
Dept: INFUSION THERAPY | Facility: HOSPITAL | Age: 74
End: 2024-02-15
Attending: INTERNAL MEDICINE
Payer: MEDICARE

## 2024-02-15 VITALS
TEMPERATURE: 98 F | WEIGHT: 186 LBS | SYSTOLIC BLOOD PRESSURE: 140 MMHG | HEART RATE: 68 BPM | RESPIRATION RATE: 18 BRPM | DIASTOLIC BLOOD PRESSURE: 65 MMHG | BODY MASS INDEX: 24.65 KG/M2 | OXYGEN SATURATION: 97 % | HEIGHT: 73 IN

## 2024-02-15 DIAGNOSIS — C92.00 ACUTE MYELOID LEUKEMIA NOT HAVING ACHIEVED REMISSION: ICD-10-CM

## 2024-02-15 DIAGNOSIS — C92.00 ACUTE MYELOID LEUKEMIA NOT HAVING ACHIEVED REMISSION: Primary | ICD-10-CM

## 2024-02-15 LAB
ABO + RH BLD: NORMAL
ALBUMIN SERPL-MCNC: 3.6 G/DL (ref 3.4–4.8)
ALBUMIN/GLOB SERPL: 1.3 RATIO (ref 1.1–2)
ALP SERPL-CCNC: 85 UNIT/L (ref 40–150)
ALT SERPL-CCNC: 19 UNIT/L (ref 0–55)
AST SERPL-CCNC: 18 UNIT/L (ref 5–34)
BASOPHILS # BLD AUTO: 0.03 X10(3)/MCL
BASOPHILS NFR BLD AUTO: 1.7 %
BILIRUB SERPL-MCNC: 1.5 MG/DL
BLD PROD TYP BPU: NORMAL
BLOOD UNIT EXPIRATION DATE: NORMAL
BLOOD UNIT TYPE CODE: 5100
BLOOD UNIT TYPE CODE: 5100
BLOOD UNIT TYPE CODE: 6200
BUN SERPL-MCNC: 26.6 MG/DL (ref 8.4–25.7)
CALCIUM SERPL-MCNC: 8.8 MG/DL (ref 8.8–10)
CHLORIDE SERPL-SCNC: 107 MMOL/L (ref 98–107)
CO2 SERPL-SCNC: 25 MMOL/L (ref 23–31)
CREAT SERPL-MCNC: 1.21 MG/DL (ref 0.73–1.18)
CROSSMATCH INTERPRETATION: NORMAL
DISPENSE STATUS: NORMAL
EOSINOPHIL # BLD AUTO: 0 X10(3)/MCL (ref 0–0.9)
EOSINOPHIL NFR BLD AUTO: 0 %
ERYTHROCYTE [DISTWIDTH] IN BLOOD BY AUTOMATED COUNT: 22.6 % (ref 11.5–17)
GFR SERPLBLD CREATININE-BSD FMLA CKD-EPI: >60 MLS/MIN/1.73/M2
GLOBULIN SER-MCNC: 2.7 GM/DL (ref 2.4–3.5)
GLUCOSE SERPL-MCNC: 118 MG/DL (ref 82–115)
GROUP & RH: NORMAL
HCT VFR BLD AUTO: 23 % (ref 42–52)
HGB BLD-MCNC: 6.7 G/DL (ref 14–18)
IMM GRANULOCYTES # BLD AUTO: 0 X10(3)/MCL (ref 0–0.04)
IMM GRANULOCYTES NFR BLD AUTO: 0 %
INDIRECT COOMBS: NORMAL
LYMPHOCYTES # BLD AUTO: 0.94 X10(3)/MCL (ref 0.6–4.6)
LYMPHOCYTES NFR BLD AUTO: 52.2 %
MCH RBC QN AUTO: 29 PG (ref 27–31)
MCHC RBC AUTO-ENTMCNC: 29.1 G/DL (ref 33–36)
MCV RBC AUTO: 99.6 FL (ref 80–94)
MONOCYTES # BLD AUTO: 0.62 X10(3)/MCL (ref 0.1–1.3)
MONOCYTES NFR BLD AUTO: 34.4 %
NEUTROPHILS # BLD AUTO: 0.21 X10(3)/MCL (ref 2.1–9.2)
NEUTROPHILS NFR BLD AUTO: 11.7 %
PLATELET # BLD AUTO: 9 X10(3)/MCL (ref 130–400)
PMV BLD AUTO: ABNORMAL FL
POTASSIUM SERPL-SCNC: 3.8 MMOL/L (ref 3.5–5.1)
PROT SERPL-MCNC: 6.3 GM/DL (ref 5.8–7.6)
RBC # BLD AUTO: 2.31 X10(6)/MCL (ref 4.7–6.1)
SODIUM SERPL-SCNC: 143 MMOL/L (ref 136–145)
SPECIMEN OUTDATE: NORMAL
UNIT NUMBER: NORMAL
WBC # SPEC AUTO: 1.8 X10(3)/MCL (ref 4.5–11.5)

## 2024-02-15 PROCEDURE — 36430 TRANSFUSION BLD/BLD COMPNT: CPT

## 2024-02-15 PROCEDURE — 85025 COMPLETE CBC W/AUTO DIFF WBC: CPT

## 2024-02-15 PROCEDURE — P9037 PLATE PHERES LEUKOREDU IRRAD: HCPCS | Performed by: INTERNAL MEDICINE

## 2024-02-15 PROCEDURE — 25000003 PHARM REV CODE 250: Performed by: INTERNAL MEDICINE

## 2024-02-15 PROCEDURE — 36415 COLL VENOUS BLD VENIPUNCTURE: CPT

## 2024-02-15 PROCEDURE — 86850 RBC ANTIBODY SCREEN: CPT | Performed by: INTERNAL MEDICINE

## 2024-02-15 PROCEDURE — P9040 RBC LEUKOREDUCED IRRADIATED: HCPCS | Performed by: INTERNAL MEDICINE

## 2024-02-15 PROCEDURE — 86923 COMPATIBILITY TEST ELECTRIC: CPT | Performed by: INTERNAL MEDICINE

## 2024-02-15 PROCEDURE — 80053 COMPREHEN METABOLIC PANEL: CPT

## 2024-02-15 RX ORDER — HYDROCODONE BITARTRATE AND ACETAMINOPHEN 500; 5 MG/1; MG/1
TABLET ORAL ONCE
Status: DISCONTINUED | OUTPATIENT
Start: 2024-02-15 | End: 2024-05-22 | Stop reason: CLARIF

## 2024-02-15 RX ORDER — HYDROCODONE BITARTRATE AND ACETAMINOPHEN 500; 5 MG/1; MG/1
TABLET ORAL ONCE
Status: CANCELLED | OUTPATIENT
Start: 2024-02-15 | End: 2024-02-15

## 2024-02-15 RX ORDER — HYDROCODONE BITARTRATE AND ACETAMINOPHEN 500; 5 MG/1; MG/1
TABLET ORAL ONCE
Status: COMPLETED | OUTPATIENT
Start: 2024-02-15 | End: 2024-02-15

## 2024-02-15 RX ADMIN — SODIUM CHLORIDE: 9 INJECTION, SOLUTION INTRAVENOUS at 08:02

## 2024-02-19 ENCOUNTER — LAB VISIT (OUTPATIENT)
Dept: LAB | Facility: HOSPITAL | Age: 74
End: 2024-02-19
Attending: INTERNAL MEDICINE
Payer: MEDICARE

## 2024-02-19 ENCOUNTER — INFUSION (OUTPATIENT)
Dept: INFUSION THERAPY | Facility: HOSPITAL | Age: 74
End: 2024-02-19
Attending: INTERNAL MEDICINE
Payer: MEDICARE

## 2024-02-19 DIAGNOSIS — C92.00 ACUTE MYELOID LEUKEMIA NOT HAVING ACHIEVED REMISSION: ICD-10-CM

## 2024-02-19 LAB
ABS NEUT CALC (OHS): 0.27 X10(3)/MCL (ref 2.1–9.2)
ALBUMIN SERPL-MCNC: 3.8 G/DL (ref 3.4–4.8)
ALBUMIN/GLOB SERPL: 1.4 RATIO (ref 1.1–2)
ALP SERPL-CCNC: 89 UNIT/L (ref 40–150)
ALT SERPL-CCNC: 15 UNIT/L (ref 0–55)
ANISOCYTOSIS BLD QL SMEAR: ABNORMAL
AST SERPL-CCNC: 17 UNIT/L (ref 5–34)
B-HCG SERPL QL: 4 %
BASOPHILS NFR BLD MANUAL: 0.02 X10(3)/MCL (ref 0–0.2)
BASOPHILS NFR BLD MANUAL: 1 % (ref 0–2)
BILIRUB SERPL-MCNC: 1.2 MG/DL
BUN SERPL-MCNC: 22.1 MG/DL (ref 8.4–25.7)
CALCIUM SERPL-MCNC: 9 MG/DL (ref 8.8–10)
CHLORIDE SERPL-SCNC: 108 MMOL/L (ref 98–107)
CO2 SERPL-SCNC: 27 MMOL/L (ref 23–31)
CREAT SERPL-MCNC: 0.82 MG/DL (ref 0.73–1.18)
EOSINOPHIL NFR BLD MANUAL: 0.02 X10(3)/MCL (ref 0–0.9)
EOSINOPHIL NFR BLD MANUAL: 1 % (ref 0–8)
ERYTHROCYTE [DISTWIDTH] IN BLOOD BY AUTOMATED COUNT: 21.6 % (ref 11.5–17)
GFR SERPLBLD CREATININE-BSD FMLA CKD-EPI: >60 MLS/MIN/1.73/M2
GLOBULIN SER-MCNC: 2.8 GM/DL (ref 2.4–3.5)
GLUCOSE SERPL-MCNC: 100 MG/DL (ref 82–115)
HCT VFR BLD AUTO: 28 % (ref 42–52)
HGB BLD-MCNC: 8 G/DL (ref 14–18)
HYPOCHROMIA BLD QL SMEAR: ABNORMAL
LYMPHOCYTES NFR BLD MANUAL: 1.55 X10(3)/MCL
LYMPHOCYTES NFR BLD MANUAL: 74 % (ref 13–40)
MACROCYTES BLD QL SMEAR: ABNORMAL
MCH RBC QN AUTO: 28.9 PG (ref 27–31)
MCHC RBC AUTO-ENTMCNC: 28.6 G/DL (ref 33–36)
MCV RBC AUTO: 101.1 FL (ref 80–94)
MONOCYTES NFR BLD MANUAL: 0.15 X10(3)/MCL (ref 0.1–1.3)
MONOCYTES NFR BLD MANUAL: 7 % (ref 2–11)
NEUTROPHILS NFR BLD MANUAL: 13 % (ref 47–80)
NEUTS HYPERSEG BLD QL SMEAR: SLIGHT
OVALOCYTES (OLG): ABNORMAL
PLATELET # BLD AUTO: 38 X10(3)/MCL (ref 130–400)
PLATELET # BLD EST: ABNORMAL 10*3/UL
PMV BLD AUTO: ABNORMAL FL
POIKILOCYTOSIS BLD QL SMEAR: ABNORMAL
POLYCHROMASIA BLD QL SMEAR: ABNORMAL
POTASSIUM SERPL-SCNC: 4.1 MMOL/L (ref 3.5–5.1)
PROT SERPL-MCNC: 6.6 GM/DL (ref 5.8–7.6)
RBC # BLD AUTO: 2.77 X10(6)/MCL (ref 4.7–6.1)
RBC MORPH BLD: ABNORMAL
RBCS: NORMAL
SCHISTOCYTE (OLG): SLIGHT
SODIUM SERPL-SCNC: 141 MMOL/L (ref 136–145)
TEAR DROP CELL (OLG): ABNORMAL
WBC # SPEC AUTO: 2.1 X10(3)/MCL (ref 4.5–11.5)

## 2024-02-19 PROCEDURE — 85027 COMPLETE CBC AUTOMATED: CPT

## 2024-02-19 PROCEDURE — 36415 COLL VENOUS BLD VENIPUNCTURE: CPT

## 2024-02-19 PROCEDURE — 80053 COMPREHEN METABOLIC PANEL: CPT

## 2024-02-19 NOTE — NURSING
Hgb 8.0 and Plt 38 on today's CBC. No blood products needed. Next appts confirmed with pt. Dc'd home in stable condition.

## 2024-02-22 ENCOUNTER — LAB VISIT (OUTPATIENT)
Dept: LAB | Facility: HOSPITAL | Age: 74
End: 2024-02-22
Attending: INTERNAL MEDICINE
Payer: MEDICARE

## 2024-02-22 ENCOUNTER — INFUSION (OUTPATIENT)
Dept: INFUSION THERAPY | Facility: HOSPITAL | Age: 74
End: 2024-02-22
Attending: INTERNAL MEDICINE
Payer: MEDICARE

## 2024-02-22 VITALS
TEMPERATURE: 98 F | HEART RATE: 57 BPM | OXYGEN SATURATION: 98 % | SYSTOLIC BLOOD PRESSURE: 144 MMHG | DIASTOLIC BLOOD PRESSURE: 73 MMHG | RESPIRATION RATE: 16 BRPM

## 2024-02-22 DIAGNOSIS — C92.00 ACUTE MYELOID LEUKEMIA NOT HAVING ACHIEVED REMISSION: ICD-10-CM

## 2024-02-22 DIAGNOSIS — D61.818 PANCYTOPENIA: ICD-10-CM

## 2024-02-22 DIAGNOSIS — D64.9 TRANSFUSION-DEPENDENT ANEMIA: ICD-10-CM

## 2024-02-22 DIAGNOSIS — N45.1 EPIDIDYMITIS: ICD-10-CM

## 2024-02-22 DIAGNOSIS — C92.00 ACUTE MYELOID LEUKEMIA NOT HAVING ACHIEVED REMISSION: Primary | ICD-10-CM

## 2024-02-22 DIAGNOSIS — D69.6 THROMBOCYTOPENIA: ICD-10-CM

## 2024-02-22 LAB
ABO + RH BLD: NORMAL
ABS NEUT CALC (OHS): 0.31 X10(3)/MCL (ref 2.1–9.2)
ALBUMIN SERPL-MCNC: 3.9 G/DL (ref 3.4–4.8)
ALBUMIN/GLOB SERPL: 1.3 RATIO (ref 1.1–2)
ALP SERPL-CCNC: 90 UNIT/L (ref 40–150)
ALT SERPL-CCNC: 15 UNIT/L (ref 0–55)
ANISOCYTOSIS BLD QL SMEAR: ABNORMAL
AST SERPL-CCNC: 19 UNIT/L (ref 5–34)
BILIRUB SERPL-MCNC: 1.2 MG/DL
BLD PROD TYP BPU: NORMAL
BLOOD UNIT EXPIRATION DATE: NORMAL
BLOOD UNIT TYPE CODE: 6200
BUN SERPL-MCNC: 21.7 MG/DL (ref 8.4–25.7)
CALCIUM SERPL-MCNC: 8.9 MG/DL (ref 8.8–10)
CHLORIDE SERPL-SCNC: 106 MMOL/L (ref 98–107)
CO2 SERPL-SCNC: 29 MMOL/L (ref 23–31)
CREAT SERPL-MCNC: 1.04 MG/DL (ref 0.73–1.18)
CROSSMATCH INTERPRETATION: NORMAL
DISPENSE STATUS: NORMAL
ERYTHROCYTE [DISTWIDTH] IN BLOOD BY AUTOMATED COUNT: 21.1 % (ref 11.5–17)
GFR SERPLBLD CREATININE-BSD FMLA CKD-EPI: >60 MLS/MIN/1.73/M2
GLOBULIN SER-MCNC: 3 GM/DL (ref 2.4–3.5)
GLUCOSE SERPL-MCNC: 113 MG/DL (ref 82–115)
HCT VFR BLD AUTO: 28.6 % (ref 42–52)
HGB BLD-MCNC: 8.1 G/DL (ref 14–18)
LYMPHOCYTES NFR BLD MANUAL: 1.84 X10(3)/MCL
LYMPHOCYTES NFR BLD MANUAL: 82 % (ref 13–40)
MACROCYTES BLD QL SMEAR: ABNORMAL
MCH RBC QN AUTO: 28.6 PG (ref 27–31)
MCHC RBC AUTO-ENTMCNC: 28.3 G/DL (ref 33–36)
MCV RBC AUTO: 101.1 FL (ref 80–94)
MONOCYTES NFR BLD MANUAL: 0.07 X10(3)/MCL (ref 0.1–1.3)
MONOCYTES NFR BLD MANUAL: 3 % (ref 2–11)
NEUTROPHILS NFR BLD MANUAL: 14 % (ref 47–80)
PLATELET # BLD AUTO: 11 X10(3)/MCL (ref 130–400)
PLATELET # BLD EST: ABNORMAL 10*3/UL
PMV BLD AUTO: ABNORMAL FL
POTASSIUM SERPL-SCNC: 4.1 MMOL/L (ref 3.5–5.1)
PROT SERPL-MCNC: 6.9 GM/DL (ref 5.8–7.6)
RBC # BLD AUTO: 2.83 X10(6)/MCL (ref 4.7–6.1)
RBC MORPH BLD: ABNORMAL
SODIUM SERPL-SCNC: 142 MMOL/L (ref 136–145)
TARGETS BLD QL SMEAR: ABNORMAL
UNIT NUMBER: NORMAL
WBC # SPEC AUTO: 2.24 X10(3)/MCL (ref 4.5–11.5)

## 2024-02-22 PROCEDURE — 25000003 PHARM REV CODE 250: Performed by: INTERNAL MEDICINE

## 2024-02-22 PROCEDURE — 85027 COMPLETE CBC AUTOMATED: CPT

## 2024-02-22 PROCEDURE — 80053 COMPREHEN METABOLIC PANEL: CPT

## 2024-02-22 PROCEDURE — 36430 TRANSFUSION BLD/BLD COMPNT: CPT

## 2024-02-22 PROCEDURE — P9037 PLATE PHERES LEUKOREDU IRRAD: HCPCS | Performed by: INTERNAL MEDICINE

## 2024-02-22 PROCEDURE — 36415 COLL VENOUS BLD VENIPUNCTURE: CPT

## 2024-02-22 RX ORDER — DIPHENHYDRAMINE HCL 25 MG
25 CAPSULE ORAL ONCE
Status: ACTIVE | OUTPATIENT
Start: 2024-02-22

## 2024-02-22 RX ORDER — DIPHENHYDRAMINE HCL 25 MG
25 CAPSULE ORAL ONCE
Status: CANCELLED | OUTPATIENT
Start: 2024-02-22 | End: 2024-02-22

## 2024-02-22 RX ORDER — HYDROCODONE BITARTRATE AND ACETAMINOPHEN 500; 5 MG/1; MG/1
TABLET ORAL ONCE
Status: CANCELLED | OUTPATIENT
Start: 2024-02-22 | End: 2024-02-22

## 2024-02-22 RX ORDER — ACETAMINOPHEN 325 MG/1
650 TABLET ORAL ONCE
Status: ACTIVE | OUTPATIENT
Start: 2024-02-22

## 2024-02-22 RX ORDER — HYDROCODONE BITARTRATE AND ACETAMINOPHEN 500; 5 MG/1; MG/1
TABLET ORAL ONCE
Status: COMPLETED | OUTPATIENT
Start: 2024-02-22 | End: 2024-02-22

## 2024-02-22 RX ORDER — ACETAMINOPHEN 325 MG/1
650 TABLET ORAL ONCE
Status: CANCELLED | OUTPATIENT
Start: 2024-02-22 | End: 2024-02-22

## 2024-02-22 RX ADMIN — SODIUM CHLORIDE: 9 INJECTION, SOLUTION INTRAVENOUS at 08:02

## 2024-02-22 NOTE — NURSING
Plan of care reviewed with patient. Discussed if there are any new or ongoing concerns.  IV catheter discontinued intact. Site without signs and symptoms of complications. Dressing and pressure applied.  No complaints.

## 2024-02-26 ENCOUNTER — OFFICE VISIT (OUTPATIENT)
Dept: HEMATOLOGY/ONCOLOGY | Facility: CLINIC | Age: 74
End: 2024-02-26
Payer: MEDICARE

## 2024-02-26 VITALS
WEIGHT: 185.5 LBS | OXYGEN SATURATION: 98 % | SYSTOLIC BLOOD PRESSURE: 151 MMHG | HEART RATE: 67 BPM | BODY MASS INDEX: 24.58 KG/M2 | DIASTOLIC BLOOD PRESSURE: 78 MMHG | HEIGHT: 73 IN | RESPIRATION RATE: 18 BRPM

## 2024-02-26 DIAGNOSIS — D61.818 PANCYTOPENIA: Chronic | ICD-10-CM

## 2024-02-26 DIAGNOSIS — C92.00 ACUTE MYELOID LEUKEMIA NOT HAVING ACHIEVED REMISSION: Primary | Chronic | ICD-10-CM

## 2024-02-26 DIAGNOSIS — Z79.899 ON ANTINEOPLASTIC CHEMOTHERAPY: ICD-10-CM

## 2024-02-26 PROCEDURE — 99999 PR PBB SHADOW E&M-EST. PATIENT-LVL V: CPT | Mod: PBBFAC,,, | Performed by: NURSE PRACTITIONER

## 2024-02-26 PROCEDURE — 99215 OFFICE O/P EST HI 40 MIN: CPT | Mod: PBBFAC | Performed by: NURSE PRACTITIONER

## 2024-02-26 PROCEDURE — 99215 OFFICE O/P EST HI 40 MIN: CPT | Mod: S$PBB,,, | Performed by: NURSE PRACTITIONER

## 2024-02-26 RX ORDER — AZACITIDINE 100 MG/1
75 INJECTION, POWDER, LYOPHILIZED, FOR SOLUTION INTRAVENOUS; SUBCUTANEOUS
Status: CANCELLED | OUTPATIENT
Start: 2024-02-28

## 2024-02-26 RX ORDER — ONDANSETRON 4 MG/1
16 TABLET, ORALLY DISINTEGRATING ORAL
Status: CANCELLED | OUTPATIENT
Start: 2024-03-01

## 2024-02-26 RX ORDER — AZACITIDINE 100 MG/1
75 INJECTION, POWDER, LYOPHILIZED, FOR SOLUTION INTRAVENOUS; SUBCUTANEOUS
Status: CANCELLED | OUTPATIENT
Start: 2024-02-29

## 2024-02-26 RX ORDER — ONDANSETRON 4 MG/1
16 TABLET, ORALLY DISINTEGRATING ORAL
Status: CANCELLED | OUTPATIENT
Start: 2024-02-28

## 2024-02-26 RX ORDER — AZACITIDINE 100 MG/1
75 INJECTION, POWDER, LYOPHILIZED, FOR SOLUTION INTRAVENOUS; SUBCUTANEOUS
Status: CANCELLED | OUTPATIENT
Start: 2024-03-01

## 2024-02-26 RX ORDER — ONDANSETRON 4 MG/1
16 TABLET, ORALLY DISINTEGRATING ORAL
Status: CANCELLED | OUTPATIENT
Start: 2024-02-29

## 2024-02-26 NOTE — PROGRESS NOTES
Subjective:       Patient ID: Hussein Gonzalez Jr. is a 73 y.o. male.    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    Current Treatment:   Azacitidine and venetoclax started on 02/27/2023.  Started 7 out of 28 days, did this through cycle 5.  With cycle 6, moved to 5 out of 28 days.  Referral for possible stem cell transplant evaluation.    Treatment History:  N/A    HPI:  73-year-old male with a history of CVA, hypertension, BPH, and melanoma who presented to the emergency department following a syncopal episode.  In February of 2022, the patient did have a slightly low white blood cell count, hemoglobin, and platelet count.  Per the patient, 3 months prior to admission, he was having dizziness, fatigue, weakness, shortness of breath, back pain, and leg pain.  The dizziness was worse with standing and walking.  On 01/20/2023, the patient had a syncopal episode.  He was brought to the emergency department and was found to be pancytopenic.  He also had stool that was positive for occult blood in the emergency department.  He received blood transfusion.  His CBC on 01/20/2023 revealed a white blood cell count of 2.6, hemoglobin of 7.7, and platelet count of 47,000. Reticulocytes were appropriately elevated, B12 and folate were within normal limits, iron level was slightly decreased.  Repeat blood work on 01/21/2023 revealed a white blood cell count of 2.8, post transfusion hemoglobin of 9.6, and a platelet count of 52449.  Hematology was consulted for pancytopenia.  EGD and colonoscopy done on 01/24/2023, EGD revealed nonbleeding ulcers, colonoscopy unremarkable, however had to be aborted due to tortuous colon.  Bone Marrow Biopsy done on 01/25/2023.  Preliminary reveals AML with 27% blasts in a background of dysplasia.  Cytogenetics are within  normal limits, FISH is within normal limits.  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  Started azacitidine and venetoclax on 02/27/2023, completed cycle 4 on 05/30/2023.  Held after this cycle and repeated a bone marrow biopsy, this was done on 06/27/2023, this showed a normocellular marrow (40%) with relative erythroid hyperplasia, sequential granulopoiesis and megakaryopoiesis.  The bone marrow was slightly suboptimal given it was predominantly subcortical bone with focal hematopoietic elements.  There was no morphologic or immunophenotypic evidence of previous acute myeloid leukemia, cytogenetics were normal, FISH was normal, NGS showed persistent ASXL1, SRSF2, TET2.    Repeat bone marrow biopsy done on 11/21/2023 showed no morphologic or immunophenotypic evidence of persistent acute myeloid leukemia. There were ASXL1, CEBPA, RUNX1, SRSF2, STAG2, and TET2 mutations.    Interval History:   Patient presents for scheduled follow up appointment for AML.  He is feeling well overall.  He has been doing projects around his house.  He is not having any fevers, pain to his scrotum or swelling.  He denies any bleeding.  He is eating and drinking well.    Past Medical History:   Diagnosis Date    Anxiety and depression     BPH (benign prostatic hyperplasia)     History of CVA (cerebrovascular accident) without residual deficits     HTN (hypertension)     Malignant melanoma of skin, unspecified     NSTEMI (non-ST elevated myocardial infarction)     Sepsis, Gram negative     Sleep apnea       Past Surgical History:   Procedure Laterality Date    BONE MARROW ASPIRATION N/A 6/27/2023    Procedure: ASPIRATION, BONE MARROW;  Surgeon: Sabino Núñez MD;  Location: [facility];  Service: General;  Laterality: N/A;  ALL ASSESS FOR MOLECULAR MRD    BONE MARROW BIOPSY N/A 1/25/2023    Procedure: Biopsy-bone marrow;  Surgeon: Rd Carlos MD;  Location: [facility] OR;  Service: General;  Laterality: N/A;     BONE MARROW BIOPSY N/A 11/21/2023    Procedure: Biopsy-bone marrow;  Surgeon: Junior Castro MD;  Location: [facility];  Service: General;  Laterality: N/A;    COLONOSCOPY      COLONOSCOPY N/A 1/24/2023    Procedure: COLONOSCOPY (DOUBLE);  Surgeon: Jimy Baker MD;  Location: [facility] ENDOSCOPY;  Service: Gastroenterology;  Laterality: N/A;    EGD, WITH CLOSED BIOPSY N/A 1/24/2023    Procedure: EGD, WITH CLOSED BIOPSY;  Surgeon: Jimy Baker MD;  Location: [facility] ENDOSCOPY;  Service: Gastroenterology;  Laterality: N/A;    EXCISION OF MELANOMA      SINUS SURGERY      TONSILLECTOMY       Social History     Socioeconomic History    Marital status:    Tobacco Use    Smoking status: Never    Smokeless tobacco: Never   Substance and Sexual Activity    Alcohol use: Not Currently     Alcohol/week: 7.0 standard drinks of alcohol     Types: 7 Cans of beer per week    Drug use: Never    Sexual activity: Yes     Social Determinants of Health     Financial Resource Strain: Low Risk  (4/20/2023)    Overall Financial Resource Strain (CARDIA)     Difficulty of Paying Living Expenses: Not hard at all   Food Insecurity: No Food Insecurity (4/20/2023)    Hunger Vital Sign     Worried About Running Out of Food in the Last Year: Never true     Ran Out of Food in the Last Year: Never true   Transportation Needs: No Transportation Needs (4/20/2023)    PRAPARE - Transportation     Lack of Transportation (Medical): No     Lack of Transportation (Non-Medical): No   Physical Activity: Insufficiently Active (4/20/2023)    Exercise Vital Sign     Days of Exercise per Week: 3 days     Minutes of Exercise per Session: 30 min   Stress: No Stress Concern Present (4/20/2023)    [place] [employer] - Occupational Stress Questionnaire     Feeling of Stress : Not at all   Social Connections: Unknown (4/20/2023)    Social Connection and Isolation Panel [NHANES]     Frequency of Communication with Friends and Family:  More than three times a week     Frequency of Social Gatherings with Friends and Family: More than three times a week     Attends Mandaen Services: Patient declined     Active Member of Clubs or Organizations: Patient declined     Attends Club or Organization Meetings: Patient declined     Marital Status:    Housing Stability: Low Risk  (4/20/2023)    Housing Stability Vital Sign     Unable to Pay for Housing in the Last Year: No     Number of Places Lived in the Last Year: 1     Unstable Housing in the Last Year: No      Family History   Problem Relation Age of Onset    Cancer Mother     Cancer Father       Review of patient's allergies indicates:   Allergen Reactions    Acetaminophen Other (See Comments)     Restless leg     Benadryl [diphenhydramine hcl] Other (See Comments)     Restless Legs      Review of Systems   Constitutional:  Negative for chills, diaphoresis, fatigue, fever and unexpected weight change.   HENT:  Negative for nasal congestion, mouth sores, sinus pressure/congestion and sore throat.    Eyes:  Negative for pain and visual disturbance.   Respiratory:  Negative for cough, chest tightness and shortness of breath.    Cardiovascular:  Negative for chest pain, palpitations and leg swelling.   Gastrointestinal:  Negative for abdominal distention, abdominal pain, blood in stool, constipation and diarrhea.   Genitourinary:  Negative for dysuria, frequency and hematuria.   Musculoskeletal:  Positive for arthralgias and myalgias. Negative for back pain.   Integumentary:  Negative for rash.   Neurological:  Negative for dizziness, syncope, weakness, numbness and headaches.   Hematological:  Negative for adenopathy.   Psychiatric/Behavioral:  Negative for confusion.          Objective:      Physical Exam  Vitals reviewed.   Constitutional:       General: He is awake.      Appearance: Normal appearance.   HENT:      Head: Normocephalic and atraumatic.      Right Ear: Hearing normal.      Left  Ear: Hearing normal.      Nose: Nose normal.   Eyes:      General: Lids are normal. Vision grossly intact.      Extraocular Movements: Extraocular movements intact.      Conjunctiva/sclera: Conjunctivae normal.   Cardiovascular:      Rate and Rhythm: Normal rate and regular rhythm.      Pulses: Normal pulses.      Heart sounds: Normal heart sounds.   Pulmonary:      Effort: Pulmonary effort is normal.      Breath sounds: Normal breath sounds. No wheezing, rhonchi or rales.   Abdominal:      General: Bowel sounds are normal.      Palpations: Abdomen is soft.      Tenderness: There is no abdominal tenderness.   Musculoskeletal:      Cervical back: Full passive range of motion without pain.      Right lower leg: No edema.      Left lower leg: No edema.   Skin:     General: Skin is warm.      Comments:      Neurological:      General: No focal deficit present.      Mental Status: He is alert and oriented to person, place, and time.   Psychiatric:         Attention and Perception: Attention normal.         Mood and Affect: Mood and affect normal.         Behavior: Behavior is cooperative.         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    Treating with azacitidine and venetoclax, Started on 02/27/2023.  Bone marrow biopsy after cycle 4 shows no immunophenotypic or morphologic evidence of AML.  NGS showed continued ASXL1, SRSF2, TET2 mutations.  Cytogenetics were normal.    Evaluated by Dr. Valdez in [place].     Continue prophylaxis with 500 mg of levofloxacin daily, 200 mg of acyclovir b.i.d., 200 mg of fluconazole  daily.    Twice weekly labs (Monday/Thursday) with transfusions as needed.    He restarted treatment on 07/10/2023, on 08/14/2023, we dose reduced by changing venetoclax to 14 out of every 28 days.    Venetoclax 200 mg on days 1-14 of a 28 day cycle     He treated with azacitidine 7 days out of 28 days for the first 5 cycles, with cycle 6 moving forward, we did 5 days.  He is currently treating with 3 days.  We will go ahead and resume at the end of this week.    Flexeril as needed for muscle spasms    Magic mouthwash as needed for mouth sores    Repeat bone marrow biopsy done on 11/21/2023 with no morphologic or immunophenotypic evidence of persistent AML    Patient knows to monitor closely for fever and worsening of infection due to his neutropenic status.      Return to clinic in 4 weeks     MATTY Hays

## 2024-02-28 ENCOUNTER — INFUSION (OUTPATIENT)
Dept: INFUSION THERAPY | Facility: HOSPITAL | Age: 74
End: 2024-02-28
Attending: INTERNAL MEDICINE
Payer: MEDICARE

## 2024-02-28 VITALS
RESPIRATION RATE: 18 BRPM | BODY MASS INDEX: 24.58 KG/M2 | HEART RATE: 68 BPM | SYSTOLIC BLOOD PRESSURE: 145 MMHG | OXYGEN SATURATION: 98 % | WEIGHT: 185.5 LBS | HEIGHT: 73 IN | DIASTOLIC BLOOD PRESSURE: 86 MMHG | TEMPERATURE: 98 F

## 2024-02-28 DIAGNOSIS — C92.00 ACUTE MYELOID LEUKEMIA NOT HAVING ACHIEVED REMISSION: Primary | ICD-10-CM

## 2024-02-28 PROCEDURE — 63600175 PHARM REV CODE 636 W HCPCS: Performed by: NURSE PRACTITIONER

## 2024-02-28 PROCEDURE — 96401 CHEMO ANTI-NEOPL SQ/IM: CPT

## 2024-02-28 PROCEDURE — 25000003 PHARM REV CODE 250: Performed by: NURSE PRACTITIONER

## 2024-02-28 RX ORDER — AZACITIDINE 100 MG/1
75 INJECTION, POWDER, LYOPHILIZED, FOR SOLUTION INTRAVENOUS; SUBCUTANEOUS
Status: COMPLETED | OUTPATIENT
Start: 2024-02-28 | End: 2024-02-28

## 2024-02-28 RX ORDER — ONDANSETRON 4 MG/1
16 TABLET, ORALLY DISINTEGRATING ORAL
Status: COMPLETED | OUTPATIENT
Start: 2024-02-28 | End: 2024-02-28

## 2024-02-28 RX ADMIN — ONDANSETRON 16 MG: 4 TABLET, ORALLY DISINTEGRATING ORAL at 11:02

## 2024-02-28 RX ADMIN — AZACITIDINE 155 MG: 100 INJECTION, POWDER, LYOPHILIZED, FOR SOLUTION INTRAVENOUS; SUBCUTANEOUS at 11:02

## 2024-02-28 NOTE — NURSING
Pt tolerated C11 D1 Vidaza SQ injections (2) without any adverse events. Pt discharged home in stable condition, aware of future appts.

## 2024-02-29 ENCOUNTER — TELEPHONE (OUTPATIENT)
Dept: HEMATOLOGY/ONCOLOGY | Facility: CLINIC | Age: 74
End: 2024-02-29
Payer: MEDICARE

## 2024-02-29 ENCOUNTER — INFUSION (OUTPATIENT)
Dept: INFUSION THERAPY | Facility: HOSPITAL | Age: 74
End: 2024-02-29
Attending: INTERNAL MEDICINE
Payer: MEDICARE

## 2024-02-29 ENCOUNTER — LAB VISIT (OUTPATIENT)
Dept: LAB | Facility: HOSPITAL | Age: 74
End: 2024-02-29
Attending: INTERNAL MEDICINE
Payer: MEDICARE

## 2024-02-29 VITALS
SYSTOLIC BLOOD PRESSURE: 110 MMHG | RESPIRATION RATE: 16 BRPM | HEIGHT: 73 IN | DIASTOLIC BLOOD PRESSURE: 62 MMHG | WEIGHT: 185.5 LBS | TEMPERATURE: 98 F | BODY MASS INDEX: 24.58 KG/M2 | HEART RATE: 64 BPM

## 2024-02-29 DIAGNOSIS — C92.00 ACUTE MYELOID LEUKEMIA NOT HAVING ACHIEVED REMISSION: Primary | ICD-10-CM

## 2024-02-29 DIAGNOSIS — D69.6 THROMBOCYTOPENIA: ICD-10-CM

## 2024-02-29 DIAGNOSIS — D64.9 TRANSFUSION-DEPENDENT ANEMIA: ICD-10-CM

## 2024-02-29 DIAGNOSIS — N45.1 EPIDIDYMITIS: ICD-10-CM

## 2024-02-29 DIAGNOSIS — D61.818 PANCYTOPENIA: ICD-10-CM

## 2024-02-29 DIAGNOSIS — C92.00 ACUTE MYELOID LEUKEMIA NOT HAVING ACHIEVED REMISSION: ICD-10-CM

## 2024-02-29 LAB
ABO + RH BLD: NORMAL
ALBUMIN SERPL-MCNC: 4 G/DL (ref 3.4–4.8)
ALBUMIN/GLOB SERPL: 1.4 RATIO (ref 1.1–2)
ALP SERPL-CCNC: 91 UNIT/L (ref 40–150)
ALT SERPL-CCNC: 16 UNIT/L (ref 0–55)
AST SERPL-CCNC: 16 UNIT/L (ref 5–34)
BASOPHILS # BLD AUTO: 0.06 X10(3)/MCL
BASOPHILS NFR BLD AUTO: 3.7 %
BILIRUB SERPL-MCNC: 1.6 MG/DL
BLD PROD TYP BPU: NORMAL
BLOOD UNIT EXPIRATION DATE: NORMAL
BLOOD UNIT TYPE CODE: 600
BUN SERPL-MCNC: 21.4 MG/DL (ref 8.4–25.7)
CALCIUM SERPL-MCNC: 9.2 MG/DL (ref 8.8–10)
CHLORIDE SERPL-SCNC: 107 MMOL/L (ref 98–107)
CO2 SERPL-SCNC: 28 MMOL/L (ref 23–31)
CREAT SERPL-MCNC: 1.09 MG/DL (ref 0.73–1.18)
CROSSMATCH INTERPRETATION: NORMAL
DISPENSE STATUS: NORMAL
EOSINOPHIL # BLD AUTO: 0 X10(3)/MCL (ref 0–0.9)
EOSINOPHIL NFR BLD AUTO: 0 %
ERYTHROCYTE [DISTWIDTH] IN BLOOD BY AUTOMATED COUNT: 21 % (ref 11.5–17)
GFR SERPLBLD CREATININE-BSD FMLA CKD-EPI: >60 MLS/MIN/1.73/M2
GLOBULIN SER-MCNC: 2.9 GM/DL (ref 2.4–3.5)
GLUCOSE SERPL-MCNC: 115 MG/DL (ref 82–115)
HCT VFR BLD AUTO: 31.5 % (ref 42–52)
HGB BLD-MCNC: 8.7 G/DL (ref 14–18)
IMM GRANULOCYTES # BLD AUTO: 0 X10(3)/MCL (ref 0–0.04)
IMM GRANULOCYTES NFR BLD AUTO: 0 %
LYMPHOCYTES # BLD AUTO: 0.92 X10(3)/MCL (ref 0.6–4.6)
LYMPHOCYTES NFR BLD AUTO: 57.1 %
MCH RBC QN AUTO: 28.2 PG (ref 27–31)
MCHC RBC AUTO-ENTMCNC: 27.6 G/DL (ref 33–36)
MCV RBC AUTO: 101.9 FL (ref 80–94)
MONOCYTES # BLD AUTO: 0.39 X10(3)/MCL (ref 0.1–1.3)
MONOCYTES NFR BLD AUTO: 24.2 %
NEUTROPHILS # BLD AUTO: 0.24 X10(3)/MCL (ref 2.1–9.2)
NEUTROPHILS NFR BLD AUTO: 15 %
PLATELET # BLD AUTO: 8 X10(3)/MCL (ref 130–400)
PMV BLD AUTO: ABNORMAL FL
POTASSIUM SERPL-SCNC: 4 MMOL/L (ref 3.5–5.1)
PROT SERPL-MCNC: 6.9 GM/DL (ref 5.8–7.6)
RBC # BLD AUTO: 3.09 X10(6)/MCL (ref 4.7–6.1)
SODIUM SERPL-SCNC: 143 MMOL/L (ref 136–145)
UNIT NUMBER: NORMAL
WBC # SPEC AUTO: 1.61 X10(3)/MCL (ref 4.5–11.5)

## 2024-02-29 PROCEDURE — 36430 TRANSFUSION BLD/BLD COMPNT: CPT

## 2024-02-29 PROCEDURE — P9037 PLATE PHERES LEUKOREDU IRRAD: HCPCS | Performed by: INTERNAL MEDICINE

## 2024-02-29 PROCEDURE — 25000003 PHARM REV CODE 250: Performed by: NURSE PRACTITIONER

## 2024-02-29 PROCEDURE — 80053 COMPREHEN METABOLIC PANEL: CPT

## 2024-02-29 PROCEDURE — 85025 COMPLETE CBC W/AUTO DIFF WBC: CPT

## 2024-02-29 PROCEDURE — 63600175 PHARM REV CODE 636 W HCPCS: Performed by: NURSE PRACTITIONER

## 2024-02-29 PROCEDURE — 36415 COLL VENOUS BLD VENIPUNCTURE: CPT

## 2024-02-29 PROCEDURE — 96401 CHEMO ANTI-NEOPL SQ/IM: CPT

## 2024-02-29 RX ORDER — HYDROCODONE BITARTRATE AND ACETAMINOPHEN 500; 5 MG/1; MG/1
TABLET ORAL ONCE
Status: CANCELLED | OUTPATIENT
Start: 2024-02-29 | End: 2024-02-29

## 2024-02-29 RX ORDER — HYDROCODONE BITARTRATE AND ACETAMINOPHEN 500; 5 MG/1; MG/1
TABLET ORAL ONCE
Status: COMPLETED | OUTPATIENT
Start: 2024-02-29 | End: 2024-03-07

## 2024-02-29 RX ORDER — ONDANSETRON 4 MG/1
16 TABLET, ORALLY DISINTEGRATING ORAL
Status: COMPLETED | OUTPATIENT
Start: 2024-02-29 | End: 2024-02-29

## 2024-02-29 RX ORDER — AZACITIDINE 100 MG/1
75 INJECTION, POWDER, LYOPHILIZED, FOR SOLUTION INTRAVENOUS; SUBCUTANEOUS
Status: COMPLETED | OUTPATIENT
Start: 2024-02-29 | End: 2024-02-29

## 2024-02-29 RX ADMIN — AZACITIDINE 155 MG: 100 INJECTION, POWDER, LYOPHILIZED, FOR SOLUTION INTRAVENOUS; SUBCUTANEOUS at 09:02

## 2024-02-29 RX ADMIN — ONDANSETRON 16 MG: 4 TABLET, ORALLY DISINTEGRATING ORAL at 09:02

## 2024-02-29 NOTE — TELEPHONE ENCOUNTER
Patient going into ER in [place]. States he started to feeling itching and noticed hives before leaving infusion today, but I don't think that he notified anyone here. He states he has worsened since he got home and is having itching in his throat and mild sob. A relative is on their way to bring him in. I advised that he contact the medics and not wait if he is having trouble breathing. He voiced understanding. I will check status later this afternoon.

## 2024-03-01 ENCOUNTER — INFUSION (OUTPATIENT)
Dept: INFUSION THERAPY | Facility: HOSPITAL | Age: 74
End: 2024-03-01
Attending: INTERNAL MEDICINE
Payer: MEDICARE

## 2024-03-01 VITALS
RESPIRATION RATE: 16 BRPM | TEMPERATURE: 98 F | HEART RATE: 67 BPM | SYSTOLIC BLOOD PRESSURE: 136 MMHG | DIASTOLIC BLOOD PRESSURE: 75 MMHG | OXYGEN SATURATION: 99 %

## 2024-03-01 DIAGNOSIS — C92.00 ACUTE MYELOID LEUKEMIA NOT HAVING ACHIEVED REMISSION: Primary | ICD-10-CM

## 2024-03-01 PROCEDURE — 25000003 PHARM REV CODE 250: Performed by: NURSE PRACTITIONER

## 2024-03-01 PROCEDURE — 63600175 PHARM REV CODE 636 W HCPCS: Performed by: NURSE PRACTITIONER

## 2024-03-01 PROCEDURE — 96401 CHEMO ANTI-NEOPL SQ/IM: CPT

## 2024-03-01 RX ORDER — AZACITIDINE 100 MG/1
75 INJECTION, POWDER, LYOPHILIZED, FOR SOLUTION INTRAVENOUS; SUBCUTANEOUS
Status: COMPLETED | OUTPATIENT
Start: 2024-03-01 | End: 2024-03-01

## 2024-03-01 RX ORDER — ONDANSETRON 4 MG/1
16 TABLET, ORALLY DISINTEGRATING ORAL
Status: COMPLETED | OUTPATIENT
Start: 2024-03-01 | End: 2024-03-01

## 2024-03-01 RX ADMIN — AZACITIDINE 155 MG: 100 INJECTION, POWDER, LYOPHILIZED, FOR SOLUTION INTRAVENOUS; SUBCUTANEOUS at 09:03

## 2024-03-01 RX ADMIN — ONDANSETRON 16 MG: 4 TABLET, ORALLY DISINTEGRATING ORAL at 09:03

## 2024-03-01 NOTE — NURSING
He went to ER after tx yesterday (vidaza and plts) with itching, rash, sob.  Was treated with epi, solu medrol, benadryl, and pepcid.  Symptoms resolved and have not returned.  Feels well today.  OK to treat per Dr. Luna.

## 2024-03-04 ENCOUNTER — LAB VISIT (OUTPATIENT)
Dept: LAB | Facility: HOSPITAL | Age: 74
End: 2024-03-04
Attending: INTERNAL MEDICINE
Payer: MEDICARE

## 2024-03-04 DIAGNOSIS — D61.818 PANCYTOPENIA: ICD-10-CM

## 2024-03-04 DIAGNOSIS — C92.00 ACUTE MYELOID LEUKEMIA NOT HAVING ACHIEVED REMISSION: ICD-10-CM

## 2024-03-04 LAB
ABS NEUT CALC (OHS): 0.17 X10(3)/MCL (ref 2.1–9.2)
ALBUMIN SERPL-MCNC: 4 G/DL (ref 3.4–4.8)
ALBUMIN/GLOB SERPL: 1.5 RATIO (ref 1.1–2)
ALP SERPL-CCNC: 96 UNIT/L (ref 40–150)
ALT SERPL-CCNC: 17 UNIT/L (ref 0–55)
ANISOCYTOSIS BLD QL SMEAR: ABNORMAL
AST SERPL-CCNC: 14 UNIT/L (ref 5–34)
B-HCG SERPL QL: 1 %
BILIRUB SERPL-MCNC: 0.9 MG/DL
BUN SERPL-MCNC: 24.4 MG/DL (ref 8.4–25.7)
CALCIUM SERPL-MCNC: 9.4 MG/DL (ref 8.8–10)
CHLORIDE SERPL-SCNC: 107 MMOL/L (ref 98–107)
CO2 SERPL-SCNC: 28 MMOL/L (ref 23–31)
CREAT SERPL-MCNC: 1.07 MG/DL (ref 0.73–1.18)
ERYTHROCYTE [DISTWIDTH] IN BLOOD BY AUTOMATED COUNT: 20.1 % (ref 11.5–17)
GFR SERPLBLD CREATININE-BSD FMLA CKD-EPI: >60 MLS/MIN/1.73/M2
GLOBULIN SER-MCNC: 2.7 GM/DL (ref 2.4–3.5)
GLUCOSE SERPL-MCNC: 111 MG/DL (ref 82–115)
HCT VFR BLD AUTO: 27.7 % (ref 42–52)
HGB BLD-MCNC: 7.8 G/DL (ref 14–18)
HYPOCHROMIA BLD QL SMEAR: ABNORMAL
LYMPHOCYTES NFR BLD MANUAL: 0.82 X10(3)/MCL
LYMPHOCYTES NFR BLD MANUAL: 73 % (ref 13–40)
MACROCYTES BLD QL SMEAR: ABNORMAL
MCH RBC QN AUTO: 28.1 PG (ref 27–31)
MCHC RBC AUTO-ENTMCNC: 28.2 G/DL (ref 33–36)
MCV RBC AUTO: 99.6 FL (ref 80–94)
METAMYELOCYTES NFR BLD MANUAL: 1 %
MONOCYTES NFR BLD MANUAL: 0.11 X10(3)/MCL (ref 0.1–1.3)
MONOCYTES NFR BLD MANUAL: 10 % (ref 2–11)
NEUTROPHILS NFR BLD MANUAL: 15 % (ref 47–80)
NRBC BLD MANUAL-RTO: 1 %
OVALOCYTES (OLG): ABNORMAL
PLATELET # BLD AUTO: 16 X10(3)/MCL (ref 130–400)
PLATELET # BLD EST: ABNORMAL 10*3/UL
PMV BLD AUTO: ABNORMAL FL
POIKILOCYTOSIS BLD QL SMEAR: ABNORMAL
POLYCHROMASIA BLD QL SMEAR: ABNORMAL
POTASSIUM SERPL-SCNC: 3.9 MMOL/L (ref 3.5–5.1)
PROT SERPL-MCNC: 6.7 GM/DL (ref 5.8–7.6)
RBC # BLD AUTO: 2.78 X10(6)/MCL (ref 4.7–6.1)
RBC MORPH BLD: ABNORMAL
SCHISTOCYTE (OLG): ABNORMAL
SODIUM SERPL-SCNC: 141 MMOL/L (ref 136–145)
STIPPLED RBC (OHS): SLIGHT
TEAR DROP CELL (OLG): ABNORMAL
WBC # SPEC AUTO: 1.13 X10(3)/MCL (ref 4.5–11.5)

## 2024-03-04 PROCEDURE — 80053 COMPREHEN METABOLIC PANEL: CPT

## 2024-03-04 PROCEDURE — 36415 COLL VENOUS BLD VENIPUNCTURE: CPT

## 2024-03-04 PROCEDURE — 85027 COMPLETE CBC AUTOMATED: CPT

## 2024-03-07 ENCOUNTER — LAB VISIT (OUTPATIENT)
Dept: LAB | Facility: HOSPITAL | Age: 74
End: 2024-03-07
Attending: INTERNAL MEDICINE
Payer: MEDICARE

## 2024-03-07 ENCOUNTER — INFUSION (OUTPATIENT)
Dept: INFUSION THERAPY | Facility: HOSPITAL | Age: 74
End: 2024-03-07
Attending: INTERNAL MEDICINE
Payer: MEDICARE

## 2024-03-07 VITALS
TEMPERATURE: 98 F | OXYGEN SATURATION: 100 % | HEART RATE: 67 BPM | DIASTOLIC BLOOD PRESSURE: 67 MMHG | SYSTOLIC BLOOD PRESSURE: 112 MMHG

## 2024-03-07 DIAGNOSIS — C92.00 ACUTE MYELOID LEUKEMIA NOT HAVING ACHIEVED REMISSION: ICD-10-CM

## 2024-03-07 DIAGNOSIS — D61.818 PANCYTOPENIA: ICD-10-CM

## 2024-03-07 DIAGNOSIS — C92.00 ACUTE MYELOID LEUKEMIA NOT HAVING ACHIEVED REMISSION: Primary | ICD-10-CM

## 2024-03-07 LAB
ABO + RH BLD: NORMAL
ALBUMIN SERPL-MCNC: 4.1 G/DL (ref 3.4–4.8)
ALBUMIN/GLOB SERPL: 1.6 RATIO (ref 1.1–2)
ALP SERPL-CCNC: 93 UNIT/L (ref 40–150)
ALT SERPL-CCNC: 23 UNIT/L (ref 0–55)
AST SERPL-CCNC: 19 UNIT/L (ref 5–34)
BASOPHILS # BLD AUTO: 0.07 X10(3)/MCL
BASOPHILS NFR BLD AUTO: 4.6 %
BILIRUB SERPL-MCNC: 1.3 MG/DL
BLD PROD TYP BPU: NORMAL
BLOOD UNIT EXPIRATION DATE: NORMAL
BLOOD UNIT TYPE CODE: 6200
BUN SERPL-MCNC: 31.3 MG/DL (ref 8.4–25.7)
CALCIUM SERPL-MCNC: 8.9 MG/DL (ref 8.8–10)
CHLORIDE SERPL-SCNC: 108 MMOL/L (ref 98–107)
CO2 SERPL-SCNC: 27 MMOL/L (ref 23–31)
CREAT SERPL-MCNC: 1.23 MG/DL (ref 0.73–1.18)
CROSSMATCH INTERPRETATION: NORMAL
DISPENSE STATUS: NORMAL
EOSINOPHIL # BLD AUTO: 0 X10(3)/MCL (ref 0–0.9)
EOSINOPHIL NFR BLD AUTO: 0 %
ERYTHROCYTE [DISTWIDTH] IN BLOOD BY AUTOMATED COUNT: 20 % (ref 11.5–17)
GFR SERPLBLD CREATININE-BSD FMLA CKD-EPI: >60 MLS/MIN/1.73/M2
GLOBULIN SER-MCNC: 2.6 GM/DL (ref 2.4–3.5)
GLUCOSE SERPL-MCNC: 121 MG/DL (ref 82–115)
HCT VFR BLD AUTO: 27.4 % (ref 42–52)
HGB BLD-MCNC: 7.8 G/DL (ref 14–18)
IMM GRANULOCYTES # BLD AUTO: 0 X10(3)/MCL (ref 0–0.04)
IMM GRANULOCYTES NFR BLD AUTO: 0 %
LYMPHOCYTES # BLD AUTO: 0.95 X10(3)/MCL (ref 0.6–4.6)
LYMPHOCYTES NFR BLD AUTO: 62.5 %
MCH RBC QN AUTO: 28.1 PG (ref 27–31)
MCHC RBC AUTO-ENTMCNC: 28.5 G/DL (ref 33–36)
MCV RBC AUTO: 98.6 FL (ref 80–94)
MONOCYTES # BLD AUTO: 0.23 X10(3)/MCL (ref 0.1–1.3)
MONOCYTES NFR BLD AUTO: 15.1 %
NEUTROPHILS # BLD AUTO: 0.27 X10(3)/MCL (ref 2.1–9.2)
NEUTROPHILS NFR BLD AUTO: 17.8 %
PLATELET # BLD AUTO: 6 X10(3)/MCL (ref 130–400)
PMV BLD AUTO: ABNORMAL FL
POTASSIUM SERPL-SCNC: 4.4 MMOL/L (ref 3.5–5.1)
PROT SERPL-MCNC: 6.7 GM/DL (ref 5.8–7.6)
RBC # BLD AUTO: 2.78 X10(6)/MCL (ref 4.7–6.1)
SODIUM SERPL-SCNC: 141 MMOL/L (ref 136–145)
UNIT NUMBER: NORMAL
WBC # SPEC AUTO: 1.52 X10(3)/MCL (ref 4.5–11.5)

## 2024-03-07 PROCEDURE — 36430 TRANSFUSION BLD/BLD COMPNT: CPT

## 2024-03-07 PROCEDURE — P9037 PLATE PHERES LEUKOREDU IRRAD: HCPCS | Performed by: INTERNAL MEDICINE

## 2024-03-07 PROCEDURE — 36415 COLL VENOUS BLD VENIPUNCTURE: CPT

## 2024-03-07 PROCEDURE — 96374 THER/PROPH/DIAG INJ IV PUSH: CPT

## 2024-03-07 PROCEDURE — 80053 COMPREHEN METABOLIC PANEL: CPT

## 2024-03-07 PROCEDURE — 85025 COMPLETE CBC W/AUTO DIFF WBC: CPT

## 2024-03-07 PROCEDURE — 25000003 PHARM REV CODE 250: Performed by: INTERNAL MEDICINE

## 2024-03-07 RX ORDER — HYDROCODONE BITARTRATE AND ACETAMINOPHEN 500; 5 MG/1; MG/1
TABLET ORAL ONCE
Status: CANCELLED | OUTPATIENT
Start: 2024-03-07 | End: 2024-03-07

## 2024-03-07 RX ORDER — FAMOTIDINE 10 MG/ML
20 INJECTION INTRAVENOUS
Status: COMPLETED | OUTPATIENT
Start: 2024-03-07 | End: 2024-03-07

## 2024-03-07 RX ORDER — HYDROCODONE BITARTRATE AND ACETAMINOPHEN 500; 5 MG/1; MG/1
TABLET ORAL ONCE
Status: DISCONTINUED | OUTPATIENT
Start: 2024-03-07 | End: 2024-05-22 | Stop reason: CLARIF

## 2024-03-07 RX ADMIN — FAMOTIDINE 20 MG: 10 INJECTION INTRAVENOUS at 09:03

## 2024-03-07 RX ADMIN — SODIUM CHLORIDE: 9 INJECTION, SOLUTION INTRAVENOUS at 09:03

## 2024-03-11 ENCOUNTER — INFUSION (OUTPATIENT)
Dept: INFUSION THERAPY | Facility: HOSPITAL | Age: 74
End: 2024-03-11
Attending: INTERNAL MEDICINE
Payer: MEDICARE

## 2024-03-11 NOTE — NURSING
Hgb 7.5 and Platelets 48,000 today. No need for transfusion, pt discharged home in stable condition, aware of next appt on 3-14-24.

## 2024-03-14 ENCOUNTER — LAB VISIT (OUTPATIENT)
Dept: LAB | Facility: HOSPITAL | Age: 74
End: 2024-03-14
Attending: INTERNAL MEDICINE
Payer: MEDICARE

## 2024-03-14 DIAGNOSIS — C92.00 ACUTE MYELOID LEUKEMIA NOT HAVING ACHIEVED REMISSION: ICD-10-CM

## 2024-03-14 DIAGNOSIS — D61.818 PANCYTOPENIA: ICD-10-CM

## 2024-03-14 LAB
ALBUMIN SERPL-MCNC: 3.7 G/DL (ref 3.4–4.8)
ALBUMIN/GLOB SERPL: 1.4 RATIO (ref 1.1–2)
ALP SERPL-CCNC: 81 UNIT/L (ref 40–150)
ALT SERPL-CCNC: 21 UNIT/L (ref 0–55)
AST SERPL-CCNC: 17 UNIT/L (ref 5–34)
BASOPHILS # BLD AUTO: 0.05 X10(3)/MCL
BASOPHILS NFR BLD AUTO: 3.8 %
BILIRUB SERPL-MCNC: 1.1 MG/DL
BUN SERPL-MCNC: 20.9 MG/DL (ref 8.4–25.7)
CALCIUM SERPL-MCNC: 8.6 MG/DL (ref 8.8–10)
CHLORIDE SERPL-SCNC: 107 MMOL/L (ref 98–107)
CO2 SERPL-SCNC: 28 MMOL/L (ref 23–31)
CREAT SERPL-MCNC: 1.27 MG/DL (ref 0.73–1.18)
EOSINOPHIL # BLD AUTO: 0 X10(3)/MCL (ref 0–0.9)
EOSINOPHIL NFR BLD AUTO: 0 %
ERYTHROCYTE [DISTWIDTH] IN BLOOD BY AUTOMATED COUNT: 20.4 % (ref 11.5–17)
GFR SERPLBLD CREATININE-BSD FMLA CKD-EPI: 59 MLS/MIN/1.73/M2
GLOBULIN SER-MCNC: 2.7 GM/DL (ref 2.4–3.5)
GLUCOSE SERPL-MCNC: 108 MG/DL (ref 82–115)
HCT VFR BLD AUTO: 25.3 % (ref 42–52)
HGB BLD-MCNC: 7.2 G/DL (ref 14–18)
IMM GRANULOCYTES # BLD AUTO: 0.01 X10(3)/MCL (ref 0–0.04)
IMM GRANULOCYTES NFR BLD AUTO: 0.8 %
LYMPHOCYTES # BLD AUTO: 0.92 X10(3)/MCL (ref 0.6–4.6)
LYMPHOCYTES NFR BLD AUTO: 70.2 %
MCH RBC QN AUTO: 28.1 PG (ref 27–31)
MCHC RBC AUTO-ENTMCNC: 28.5 G/DL (ref 33–36)
MCV RBC AUTO: 98.8 FL (ref 80–94)
MONOCYTES # BLD AUTO: 0.15 X10(3)/MCL (ref 0.1–1.3)
MONOCYTES NFR BLD AUTO: 11.5 %
NEUTROPHILS # BLD AUTO: 0.18 X10(3)/MCL (ref 2.1–9.2)
NEUTROPHILS NFR BLD AUTO: 13.7 %
PLATELET # BLD AUTO: 34 X10(3)/MCL (ref 130–400)
PMV BLD AUTO: ABNORMAL FL
POTASSIUM SERPL-SCNC: 4 MMOL/L (ref 3.5–5.1)
PROT SERPL-MCNC: 6.4 GM/DL (ref 5.8–7.6)
RBC # BLD AUTO: 2.56 X10(6)/MCL (ref 4.7–6.1)
SODIUM SERPL-SCNC: 142 MMOL/L (ref 136–145)
WBC # SPEC AUTO: 1.31 X10(3)/MCL (ref 4.5–11.5)

## 2024-03-14 PROCEDURE — 36415 COLL VENOUS BLD VENIPUNCTURE: CPT

## 2024-03-14 PROCEDURE — 80053 COMPREHEN METABOLIC PANEL: CPT

## 2024-03-14 PROCEDURE — 85025 COMPLETE CBC W/AUTO DIFF WBC: CPT

## 2024-03-18 ENCOUNTER — INFUSION (OUTPATIENT)
Dept: INFUSION THERAPY | Facility: HOSPITAL | Age: 74
End: 2024-03-18
Attending: INTERNAL MEDICINE
Payer: MEDICARE

## 2024-03-18 VITALS — HEART RATE: 60 BPM | DIASTOLIC BLOOD PRESSURE: 78 MMHG | TEMPERATURE: 98 F | SYSTOLIC BLOOD PRESSURE: 135 MMHG

## 2024-03-18 DIAGNOSIS — C92.00 ACUTE MYELOID LEUKEMIA NOT HAVING ACHIEVED REMISSION: ICD-10-CM

## 2024-03-18 DIAGNOSIS — C92.00 ACUTE MYELOID LEUKEMIA NOT HAVING ACHIEVED REMISSION: Primary | ICD-10-CM

## 2024-03-18 LAB
ABO + RH BLD: NORMAL
BLD PROD TYP BPU: NORMAL
BLOOD UNIT EXPIRATION DATE: NORMAL
BLOOD UNIT TYPE CODE: 2800
CROSSMATCH INTERPRETATION: NORMAL
DISPENSE STATUS: NORMAL
UNIT NUMBER: NORMAL

## 2024-03-18 PROCEDURE — 25000003 PHARM REV CODE 250: Performed by: INTERNAL MEDICINE

## 2024-03-18 PROCEDURE — P9037 PLATE PHERES LEUKOREDU IRRAD: HCPCS | Performed by: NURSE PRACTITIONER

## 2024-03-18 PROCEDURE — 36430 TRANSFUSION BLD/BLD COMPNT: CPT

## 2024-03-18 RX ORDER — HYDROCODONE BITARTRATE AND ACETAMINOPHEN 500; 5 MG/1; MG/1
TABLET ORAL ONCE
Status: DISCONTINUED | OUTPATIENT
Start: 2024-03-18 | End: 2024-05-22 | Stop reason: CLARIF

## 2024-03-18 RX ORDER — DIPHENHYDRAMINE HYDROCHLORIDE 50 MG/ML
25 INJECTION INTRAMUSCULAR; INTRAVENOUS
Status: DISCONTINUED | OUTPATIENT
Start: 2024-03-18 | End: 2024-03-18

## 2024-03-18 RX ORDER — FAMOTIDINE 10 MG/ML
20 INJECTION INTRAVENOUS ONCE
Status: DISCONTINUED | OUTPATIENT
Start: 2024-03-18 | End: 2024-05-09

## 2024-03-18 RX ORDER — ACETAMINOPHEN 325 MG/1
650 TABLET ORAL
Status: DISCONTINUED | OUTPATIENT
Start: 2024-03-18 | End: 2024-03-18

## 2024-03-18 RX ORDER — HYDROCODONE BITARTRATE AND ACETAMINOPHEN 500; 5 MG/1; MG/1
TABLET ORAL ONCE
Status: CANCELLED | OUTPATIENT
Start: 2024-03-18 | End: 2024-03-18

## 2024-03-18 RX ORDER — FAMOTIDINE 10 MG/ML
20 INJECTION INTRAVENOUS
Status: COMPLETED | OUTPATIENT
Start: 2024-03-18 | End: 2024-03-18

## 2024-03-18 RX ADMIN — FAMOTIDINE 20 MG: 10 INJECTION INTRAVENOUS at 09:03

## 2024-03-20 DIAGNOSIS — C92.00 ACUTE MYELOID LEUKEMIA NOT HAVING ACHIEVED REMISSION: ICD-10-CM

## 2024-03-21 ENCOUNTER — INFUSION (OUTPATIENT)
Dept: INFUSION THERAPY | Facility: HOSPITAL | Age: 74
End: 2024-03-21
Attending: INTERNAL MEDICINE
Payer: MEDICARE

## 2024-03-21 VITALS
RESPIRATION RATE: 16 BRPM | DIASTOLIC BLOOD PRESSURE: 82 MMHG | SYSTOLIC BLOOD PRESSURE: 159 MMHG | OXYGEN SATURATION: 99 % | BODY MASS INDEX: 24.81 KG/M2 | HEART RATE: 63 BPM | WEIGHT: 187.19 LBS | HEIGHT: 73 IN | TEMPERATURE: 98 F

## 2024-03-21 DIAGNOSIS — C92.00 ACUTE MYELOID LEUKEMIA NOT HAVING ACHIEVED REMISSION: Primary | ICD-10-CM

## 2024-03-21 DIAGNOSIS — C92.00 ACUTE MYELOID LEUKEMIA NOT HAVING ACHIEVED REMISSION: ICD-10-CM

## 2024-03-21 PROCEDURE — 25000003 PHARM REV CODE 250: Performed by: INTERNAL MEDICINE

## 2024-03-21 PROCEDURE — P9040 RBC LEUKOREDUCED IRRADIATED: HCPCS | Performed by: INTERNAL MEDICINE

## 2024-03-21 PROCEDURE — 36430 TRANSFUSION BLD/BLD COMPNT: CPT

## 2024-03-21 PROCEDURE — 86923 COMPATIBILITY TEST ELECTRIC: CPT | Performed by: INTERNAL MEDICINE

## 2024-03-21 PROCEDURE — 96374 THER/PROPH/DIAG INJ IV PUSH: CPT

## 2024-03-21 RX ORDER — FAMOTIDINE 10 MG/ML
20 INJECTION INTRAVENOUS ONCE
Status: CANCELLED | OUTPATIENT
Start: 2024-03-21

## 2024-03-21 RX ORDER — HYDROCODONE BITARTRATE AND ACETAMINOPHEN 500; 5 MG/1; MG/1
TABLET ORAL ONCE
Status: COMPLETED | OUTPATIENT
Start: 2024-03-21 | End: 2024-03-21

## 2024-03-21 RX ORDER — HYDROCODONE BITARTRATE AND ACETAMINOPHEN 500; 5 MG/1; MG/1
TABLET ORAL ONCE
Status: CANCELLED | OUTPATIENT
Start: 2024-03-21 | End: 2024-03-21

## 2024-03-21 RX ORDER — FAMOTIDINE 10 MG/ML
20 INJECTION INTRAVENOUS ONCE
Status: COMPLETED | OUTPATIENT
Start: 2024-03-21 | End: 2024-03-21

## 2024-03-21 RX ADMIN — FAMOTIDINE 20 MG: 10 INJECTION INTRAVENOUS at 10:03

## 2024-03-21 RX ADMIN — SODIUM CHLORIDE: 9 INJECTION, SOLUTION INTRAVENOUS at 10:03

## 2024-03-21 NOTE — PLAN OF CARE
IV dc'd with tip intact. Gauze and coban applied as dressing. Instructed to remove dressing in 30 mins or before if feeling too tight.

## 2024-03-21 NOTE — PROGRESS NOTES
Subjective:       Patient ID: Hussein Gonzalez Jr. is a 73 y.o. male.    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    Current Treatment:   Azacitidine and venetoclax started on 02/27/2023.  Started 7 out of 28 days, did this through cycle 5.  With cycle 6, moved to 5 out of 28 days.  Referral for possible stem cell transplant evaluation.    Treatment History:      HPI:  73-year-old male with a history of CVA, hypertension, BPH, and melanoma who presented to the emergency department following a syncopal episode.  In February of 2022, the patient did have a slightly low white blood cell count, hemoglobin, and platelet count.  Per the patient, 3 months prior to admission, he was having dizziness, fatigue, weakness, shortness of breath, back pain, and leg pain.  The dizziness was worse with standing and walking.  On 01/20/2023, the patient had a syncopal episode.  He was brought to the emergency department and was found to be pancytopenic.  He also had stool that was positive for occult blood in the emergency department.  He received blood transfusion.  His CBC on 01/20/2023 revealed a white blood cell count of 2.6, hemoglobin of 7.7, and platelet count of 47,000. Reticulocytes were appropriately elevated, B12 and folate were within normal limits, iron level was slightly decreased.  Repeat blood work on 01/21/2023 revealed a white blood cell count of 2.8, post transfusion hemoglobin of 9.6, and a platelet count of 07506.  Hematology was consulted for pancytopenia.  EGD and colonoscopy done on 01/24/2023, EGD revealed nonbleeding ulcers, colonoscopy unremarkable, however had to be aborted due to tortuous colon.  Bone Marrow Biopsy done on 01/25/2023.  Preliminary reveals AML with 27% blasts in a background of dysplasia.  Cytogenetics are within normal  limits, FISH is within normal limits.  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  Started azacitidine and venetoclax on 02/27/2023, completed cycle 4 on 05/30/2023.  Held after this cycle and repeated a bone marrow biopsy, this was done on 06/27/2023, this showed a normocellular marrow (40%) with relative erythroid hyperplasia, sequential granulopoiesis and megakaryopoiesis.  The bone marrow was slightly suboptimal given it was predominantly subcortical bone with focal hematopoietic elements.  There was no morphologic or immunophenotypic evidence of previous acute myeloid leukemia, cytogenetics were normal, FISH was normal, NGS showed persistent ASXL1, SRSF2, TET2.    Repeat bone marrow biopsy done on 11/21/2023 showed no morphologic or immunophenotypic evidence of persistent acute myeloid leukemia. There were ASXL1, CEBPA, RUNX1, SRSF2, STAG2, and TET2 mutations.    Interval History:   Patient presents for scheduled follow up appointment for AML.  He is feeling well overall.  He states that he has been doing very well.  He states that he feels like a million bucks.  He recently started working outside and doing things around his house, he feels like he has new life.      Past Medical History:   Diagnosis Date    Anxiety and depression     BPH (benign prostatic hyperplasia)     History of CVA (cerebrovascular accident) without residual deficits     HTN (hypertension)     Malignant melanoma of skin, unspecified     NSTEMI (non-ST elevated myocardial infarction)     Sepsis, Gram negative     Sleep apnea       Past Surgical History:   Procedure Laterality Date    BONE MARROW ASPIRATION N/A 6/27/2023    Procedure: ASPIRATION, BONE MARROW;  Surgeon: Sabino Núñez MD;  Location: [facility];  Service: General;  Laterality: N/A;  ALL ASSESS FOR MOLECULAR MRD    BONE MARROW BIOPSY N/A 1/25/2023    Procedure: Biopsy-bone marrow;  Surgeon: Rd Carlos MD;  [facility] OR;  Service: General;   Laterality: N/A;    BONE MARROW BIOPSY N/A 11/21/2023    Procedure: Biopsy-bone marrow;  Surgeon: Junior Castro MD;  Location: [facility];  Service: General;  Laterality: N/A;    COLONOSCOPY      COLONOSCOPY N/A 1/24/2023    Procedure: COLONOSCOPY (DOUBLE);  Surgeon: Jimy Baker MD;  Location: [facility] ENDOSCOPY;  Service: Gastroenterology;  Laterality: N/A;    EGD, WITH CLOSED BIOPSY N/A 1/24/2023    Procedure: EGD, WITH CLOSED BIOPSY;  Surgeon: Jimy Baker MD;  Location: [facility] ENDOSCOPY;  Service: Gastroenterology;  Laterality: N/A;    EXCISION OF MELANOMA      SINUS SURGERY      TONSILLECTOMY       Social History     Socioeconomic History    Marital status:    Tobacco Use    Smoking status: Never    Smokeless tobacco: Never   Substance and Sexual Activity    Alcohol use: Not Currently     Alcohol/week: 7.0 standard drinks of alcohol     Types: 7 Cans of beer per week    Drug use: Never    Sexual activity: Yes     Social Determinants of Health     Financial Resource Strain: Low Risk  (4/20/2023)    Overall Financial Resource Strain (CARDIA)     Difficulty of Paying Living Expenses: Not hard at all   Food Insecurity: No Food Insecurity (4/20/2023)    Hunger Vital Sign     Worried About Running Out of Food in the Last Year: Never true     Ran Out of Food in the Last Year: Never true   Transportation Needs: No Transportation Needs (4/20/2023)    PRAPARE - Transportation     Lack of Transportation (Medical): No     Lack of Transportation (Non-Medical): No   Physical Activity: Insufficiently Active (4/20/2023)    Exercise Vital Sign     Days of Exercise per Week: 3 days     Minutes of Exercise per Session: 30 min   Stress: No Stress Concern Present (4/20/2023)    [employer]izen of [place] [employer] - Occupational Stress Questionnaire     Feeling of Stress : Not at all   Social Connections: Unknown (4/20/2023)    Social Connection and Isolation Panel [NHANES]     Frequency of Communication with  Friends and Family: More than three times a week     Frequency of Social Gatherings with Friends and Family: More than three times a week     Attends Shinto Services: Patient declined     Active Member of Clubs or Organizations: Patient declined     Attends Club or Organization Meetings: Patient declined     Marital Status:    Housing Stability: Low Risk  (4/20/2023)    Housing Stability Vital Sign     Unable to Pay for Housing in the Last Year: No     Number of Places Lived in the Last Year: 1     Unstable Housing in the Last Year: No      Family History   Problem Relation Age of Onset    Cancer Mother     Cancer Father       Review of patient's allergies indicates:   Allergen Reactions    Acetaminophen Other (See Comments)     Restless leg     Benadryl [diphenhydramine hcl] Other (See Comments)     Restless Legs      Review of Systems   Constitutional:  Negative for chills, diaphoresis, fatigue, fever and unexpected weight change.   HENT:  Negative for nasal congestion, mouth sores, sinus pressure/congestion and sore throat.    Eyes:  Negative for pain and visual disturbance.   Respiratory:  Negative for cough, chest tightness and shortness of breath.    Cardiovascular:  Negative for chest pain, palpitations and leg swelling.   Gastrointestinal:  Negative for abdominal distention, abdominal pain, blood in stool, constipation and diarrhea.   Genitourinary:  Negative for dysuria, frequency and hematuria.   Musculoskeletal:  Positive for arthralgias and myalgias. Negative for back pain.   Integumentary:  Negative for rash.   Neurological:  Negative for dizziness, syncope, weakness, numbness and headaches.   Hematological:  Negative for adenopathy.   Psychiatric/Behavioral:  Negative for confusion.          Objective:      Physical Exam  Vitals reviewed.   Constitutional:       General: He is awake.      Appearance: Normal appearance.   HENT:      Head: Normocephalic and atraumatic.      Right Ear: Hearing  normal.      Left Ear: Hearing normal.      Nose: Nose normal.   Eyes:      General: Lids are normal. Vision grossly intact.      Extraocular Movements: Extraocular movements intact.      Conjunctiva/sclera: Conjunctivae normal.   Cardiovascular:      Rate and Rhythm: Normal rate and regular rhythm.      Pulses: Normal pulses.      Heart sounds: Normal heart sounds.   Pulmonary:      Effort: Pulmonary effort is normal.      Breath sounds: Normal breath sounds. No wheezing, rhonchi or rales.   Abdominal:      General: Bowel sounds are normal.      Palpations: Abdomen is soft.      Tenderness: There is no abdominal tenderness.   Musculoskeletal:      Cervical back: Full passive range of motion without pain.      Right lower leg: No edema.      Left lower leg: No edema.   Skin:     General: Skin is warm.      Comments:      Neurological:      General: No focal deficit present.      Mental Status: He is alert and oriented to person, place, and time.   Psychiatric:         Attention and Perception: Attention normal.         Mood and Affect: Mood and affect normal.         Behavior: Behavior is cooperative.         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    Treating with azacitidine and venetoclax, Started on 02/27/2023.  Bone marrow biopsy after cycle 4 shows no immunophenotypic or morphologic evidence of AML.  NGS showed continued ASXL1, SRSF2, TET2 mutations.  Cytogenetics were normal.    Evaluated by Dr. Valdez in [place].     Continue prophylaxis with 500 mg of levofloxacin daily, 200 mg of acyclovir b.i.d., 200 mg of  fluconazole daily.    Twice weekly labs (Monday/Thursday) with transfusions as needed.    He restarted treatment on 07/10/2023, on 08/14/2023, we dose reduced by changing venetoclax to 14 out of every 28 days.    Venetoclax 200 mg on days 1-14 of a 28 day cycle    He treated with azacitidine 7 days out of 28 days for the first 5 cycles, with cycle 6 moving forward, we did 5 days.  He is currently treating with 3 days.  Next round to start this week, cycle 12.    Repeat bone marrow biopsy done on 11/21/2023 with no morphologic or immunophenotypic evidence of persistent AML    1 unit of platelets today.    Return to clinic in 4 weeks    Eduard Luna II, MD

## 2024-03-25 ENCOUNTER — OFFICE VISIT (OUTPATIENT)
Dept: HEMATOLOGY/ONCOLOGY | Facility: CLINIC | Age: 74
End: 2024-03-25
Payer: MEDICARE

## 2024-03-25 ENCOUNTER — INFUSION (OUTPATIENT)
Dept: INFUSION THERAPY | Facility: HOSPITAL | Age: 74
End: 2024-03-25
Attending: INTERNAL MEDICINE
Payer: MEDICARE

## 2024-03-25 VITALS
SYSTOLIC BLOOD PRESSURE: 115 MMHG | DIASTOLIC BLOOD PRESSURE: 77 MMHG | BODY MASS INDEX: 24.45 KG/M2 | OXYGEN SATURATION: 100 % | HEIGHT: 73 IN | HEART RATE: 72 BPM | RESPIRATION RATE: 18 BRPM | WEIGHT: 184.5 LBS

## 2024-03-25 VITALS
DIASTOLIC BLOOD PRESSURE: 72 MMHG | RESPIRATION RATE: 16 BRPM | TEMPERATURE: 98 F | HEART RATE: 59 BPM | SYSTOLIC BLOOD PRESSURE: 134 MMHG | OXYGEN SATURATION: 98 %

## 2024-03-25 DIAGNOSIS — C92.00 ACUTE MYELOID LEUKEMIA NOT HAVING ACHIEVED REMISSION: Chronic | ICD-10-CM

## 2024-03-25 DIAGNOSIS — C92.00 ACUTE MYELOID LEUKEMIA NOT HAVING ACHIEVED REMISSION: Primary | Chronic | ICD-10-CM

## 2024-03-25 PROCEDURE — 99214 OFFICE O/P EST MOD 30 MIN: CPT | Mod: S$PBB,,, | Performed by: INTERNAL MEDICINE

## 2024-03-25 PROCEDURE — 96374 THER/PROPH/DIAG INJ IV PUSH: CPT

## 2024-03-25 PROCEDURE — P9037 PLATE PHERES LEUKOREDU IRRAD: HCPCS | Performed by: INTERNAL MEDICINE

## 2024-03-25 PROCEDURE — 36430 TRANSFUSION BLD/BLD COMPNT: CPT

## 2024-03-25 PROCEDURE — 25000003 PHARM REV CODE 250: Performed by: INTERNAL MEDICINE

## 2024-03-25 PROCEDURE — 99215 OFFICE O/P EST HI 40 MIN: CPT | Mod: PBBFAC | Performed by: INTERNAL MEDICINE

## 2024-03-25 PROCEDURE — 99999 PR PBB SHADOW E&M-EST. PATIENT-LVL V: CPT | Mod: PBBFAC,,, | Performed by: INTERNAL MEDICINE

## 2024-03-25 RX ORDER — ONDANSETRON 4 MG/1
16 TABLET, ORALLY DISINTEGRATING ORAL
Status: CANCELLED | OUTPATIENT
Start: 2024-03-26

## 2024-03-25 RX ORDER — FAMOTIDINE 10 MG/ML
20 INJECTION INTRAVENOUS
Status: COMPLETED | OUTPATIENT
Start: 2024-03-25 | End: 2024-03-25

## 2024-03-25 RX ORDER — HYDROCODONE BITARTRATE AND ACETAMINOPHEN 500; 5 MG/1; MG/1
TABLET ORAL ONCE
Status: COMPLETED | OUTPATIENT
Start: 2024-03-25 | End: 2024-03-25

## 2024-03-25 RX ORDER — FAMOTIDINE 10 MG/ML
20 INJECTION INTRAVENOUS ONCE
Status: DISCONTINUED | OUTPATIENT
Start: 2024-03-25 | End: 2024-05-09

## 2024-03-25 RX ORDER — ONDANSETRON 4 MG/1
16 TABLET, ORALLY DISINTEGRATING ORAL
Status: CANCELLED | OUTPATIENT
Start: 2024-03-27

## 2024-03-25 RX ORDER — AZACITIDINE 100 MG/1
75 INJECTION, POWDER, LYOPHILIZED, FOR SOLUTION INTRAVENOUS; SUBCUTANEOUS
Status: CANCELLED | OUTPATIENT
Start: 2024-03-27

## 2024-03-25 RX ORDER — HYDROCODONE BITARTRATE AND ACETAMINOPHEN 500; 5 MG/1; MG/1
TABLET ORAL ONCE
Status: CANCELLED | OUTPATIENT
Start: 2024-03-25 | End: 2024-03-25

## 2024-03-25 RX ORDER — AZACITIDINE 100 MG/1
75 INJECTION, POWDER, LYOPHILIZED, FOR SOLUTION INTRAVENOUS; SUBCUTANEOUS
Status: CANCELLED | OUTPATIENT
Start: 2024-03-28

## 2024-03-25 RX ORDER — ONDANSETRON 4 MG/1
16 TABLET, ORALLY DISINTEGRATING ORAL
Status: CANCELLED | OUTPATIENT
Start: 2024-03-28

## 2024-03-25 RX ORDER — AZACITIDINE 100 MG/1
75 INJECTION, POWDER, LYOPHILIZED, FOR SOLUTION INTRAVENOUS; SUBCUTANEOUS
Status: CANCELLED | OUTPATIENT
Start: 2024-03-26

## 2024-03-25 RX ADMIN — FAMOTIDINE 20 MG: 10 INJECTION INTRAVENOUS at 10:03

## 2024-03-25 RX ADMIN — SODIUM CHLORIDE: 9 INJECTION, SOLUTION INTRAVENOUS at 09:03

## 2024-03-25 NOTE — PLAN OF CARE
Pt here for labs, platelet count 12, given 1 unit platelets. Pt qing well. Will rtc tomorrow for vidaza injection for 3 days

## 2024-03-26 ENCOUNTER — INFUSION (OUTPATIENT)
Dept: INFUSION THERAPY | Facility: HOSPITAL | Age: 74
End: 2024-03-26
Attending: INTERNAL MEDICINE
Payer: MEDICARE

## 2024-03-26 VITALS
RESPIRATION RATE: 16 BRPM | TEMPERATURE: 98 F | OXYGEN SATURATION: 96 % | DIASTOLIC BLOOD PRESSURE: 78 MMHG | SYSTOLIC BLOOD PRESSURE: 161 MMHG | HEART RATE: 65 BPM

## 2024-03-26 DIAGNOSIS — C92.00 ACUTE MYELOID LEUKEMIA NOT HAVING ACHIEVED REMISSION: Primary | ICD-10-CM

## 2024-03-26 PROCEDURE — 63600175 PHARM REV CODE 636 W HCPCS: Performed by: INTERNAL MEDICINE

## 2024-03-26 PROCEDURE — 96401 CHEMO ANTI-NEOPL SQ/IM: CPT

## 2024-03-26 PROCEDURE — 25000003 PHARM REV CODE 250: Performed by: INTERNAL MEDICINE

## 2024-03-26 RX ORDER — ONDANSETRON 4 MG/1
16 TABLET, ORALLY DISINTEGRATING ORAL
Status: COMPLETED | OUTPATIENT
Start: 2024-03-26 | End: 2024-03-26

## 2024-03-26 RX ORDER — AZACITIDINE 100 MG/1
75 INJECTION, POWDER, LYOPHILIZED, FOR SOLUTION INTRAVENOUS; SUBCUTANEOUS
Status: COMPLETED | OUTPATIENT
Start: 2024-03-26 | End: 2024-03-26

## 2024-03-26 RX ADMIN — ONDANSETRON 16 MG: 4 TABLET, ORALLY DISINTEGRATING ORAL at 08:03

## 2024-03-26 RX ADMIN — AZACITIDINE 155 MG: 100 INJECTION, POWDER, LYOPHILIZED, FOR SOLUTION INTRAVENOUS; SUBCUTANEOUS at 08:03

## 2024-03-27 ENCOUNTER — INFUSION (OUTPATIENT)
Dept: INFUSION THERAPY | Facility: HOSPITAL | Age: 74
End: 2024-03-27
Attending: INTERNAL MEDICINE
Payer: MEDICARE

## 2024-03-27 VITALS
HEART RATE: 65 BPM | DIASTOLIC BLOOD PRESSURE: 73 MMHG | SYSTOLIC BLOOD PRESSURE: 134 MMHG | TEMPERATURE: 97 F | OXYGEN SATURATION: 98 % | RESPIRATION RATE: 18 BRPM

## 2024-03-27 DIAGNOSIS — C92.00 ACUTE MYELOID LEUKEMIA NOT HAVING ACHIEVED REMISSION: Primary | ICD-10-CM

## 2024-03-27 PROCEDURE — 25000003 PHARM REV CODE 250: Performed by: INTERNAL MEDICINE

## 2024-03-27 PROCEDURE — 96401 CHEMO ANTI-NEOPL SQ/IM: CPT

## 2024-03-27 PROCEDURE — 63600175 PHARM REV CODE 636 W HCPCS: Performed by: INTERNAL MEDICINE

## 2024-03-27 RX ORDER — ONDANSETRON 4 MG/1
16 TABLET, ORALLY DISINTEGRATING ORAL
Status: COMPLETED | OUTPATIENT
Start: 2024-03-27 | End: 2024-03-27

## 2024-03-27 RX ORDER — VENETOCLAX 100 MG/1
TABLET, FILM COATED ORAL
Qty: 28 TABLET | Refills: 5 | Status: SHIPPED | OUTPATIENT
Start: 2024-03-27

## 2024-03-27 RX ORDER — AZACITIDINE 100 MG/1
75 INJECTION, POWDER, LYOPHILIZED, FOR SOLUTION INTRAVENOUS; SUBCUTANEOUS
Status: COMPLETED | OUTPATIENT
Start: 2024-03-27 | End: 2024-03-27

## 2024-03-27 RX ADMIN — AZACITIDINE 155 MG: 100 INJECTION, POWDER, LYOPHILIZED, FOR SOLUTION INTRAVENOUS; SUBCUTANEOUS at 08:03

## 2024-03-27 RX ADMIN — ONDANSETRON 16 MG: 4 TABLET, ORALLY DISINTEGRATING ORAL at 08:03

## 2024-03-27 NOTE — PLAN OF CARE
C12 D2 Vidaza given. Tolerated well. Next appts confirmed with pt. Dc'd home in stable condition.

## 2024-03-28 ENCOUNTER — INFUSION (OUTPATIENT)
Dept: INFUSION THERAPY | Facility: HOSPITAL | Age: 74
End: 2024-03-28
Attending: INTERNAL MEDICINE
Payer: MEDICARE

## 2024-03-28 ENCOUNTER — DOCUMENTATION ONLY (OUTPATIENT)
Dept: HEMATOLOGY/ONCOLOGY | Facility: CLINIC | Age: 74
End: 2024-03-28
Payer: MEDICARE

## 2024-03-28 VITALS
BODY MASS INDEX: 24.45 KG/M2 | HEART RATE: 88 BPM | OXYGEN SATURATION: 99 % | TEMPERATURE: 98 F | DIASTOLIC BLOOD PRESSURE: 70 MMHG | WEIGHT: 184.5 LBS | RESPIRATION RATE: 18 BRPM | HEIGHT: 73 IN | SYSTOLIC BLOOD PRESSURE: 130 MMHG

## 2024-03-28 DIAGNOSIS — C92.00 ACUTE MYELOID LEUKEMIA NOT HAVING ACHIEVED REMISSION: Primary | Chronic | ICD-10-CM

## 2024-03-28 DIAGNOSIS — C92.00 ACUTE MYELOID LEUKEMIA NOT HAVING ACHIEVED REMISSION: Primary | ICD-10-CM

## 2024-03-28 PROCEDURE — 25000003 PHARM REV CODE 250: Performed by: INTERNAL MEDICINE

## 2024-03-28 PROCEDURE — 36430 TRANSFUSION BLD/BLD COMPNT: CPT

## 2024-03-28 PROCEDURE — 25000003 PHARM REV CODE 250: Performed by: NURSE PRACTITIONER

## 2024-03-28 PROCEDURE — 96375 TX/PRO/DX INJ NEW DRUG ADDON: CPT

## 2024-03-28 PROCEDURE — 96401 CHEMO ANTI-NEOPL SQ/IM: CPT

## 2024-03-28 PROCEDURE — 63600175 PHARM REV CODE 636 W HCPCS: Performed by: INTERNAL MEDICINE

## 2024-03-28 PROCEDURE — P9037 PLATE PHERES LEUKOREDU IRRAD: HCPCS | Performed by: NURSE PRACTITIONER

## 2024-03-28 RX ORDER — ONDANSETRON 4 MG/1
16 TABLET, ORALLY DISINTEGRATING ORAL
Status: COMPLETED | OUTPATIENT
Start: 2024-03-28 | End: 2024-03-28

## 2024-03-28 RX ORDER — FAMOTIDINE 10 MG/ML
20 INJECTION INTRAVENOUS
Status: CANCELLED
Start: 2024-03-29

## 2024-03-28 RX ORDER — HYDROCODONE BITARTRATE AND ACETAMINOPHEN 500; 5 MG/1; MG/1
TABLET ORAL ONCE
Status: COMPLETED | OUTPATIENT
Start: 2024-03-28 | End: 2024-03-28

## 2024-03-28 RX ORDER — ACETAMINOPHEN 325 MG/1
650 TABLET ORAL ONCE
Status: CANCELLED | OUTPATIENT
Start: 2024-03-28 | End: 2024-03-28

## 2024-03-28 RX ORDER — DIPHENHYDRAMINE HCL 25 MG
25 CAPSULE ORAL ONCE
Status: DISCONTINUED | OUTPATIENT
Start: 2024-03-28 | End: 2024-03-28

## 2024-03-28 RX ORDER — DIPHENHYDRAMINE HCL 25 MG
25 CAPSULE ORAL ONCE
Status: CANCELLED | OUTPATIENT
Start: 2024-03-28 | End: 2024-03-28

## 2024-03-28 RX ORDER — HYDROCODONE BITARTRATE AND ACETAMINOPHEN 500; 5 MG/1; MG/1
TABLET ORAL ONCE
Status: CANCELLED | OUTPATIENT
Start: 2024-03-28 | End: 2024-03-28

## 2024-03-28 RX ORDER — ACETAMINOPHEN 325 MG/1
650 TABLET ORAL ONCE
Status: DISCONTINUED | OUTPATIENT
Start: 2024-03-28 | End: 2024-03-28

## 2024-03-28 RX ORDER — FAMOTIDINE 10 MG/ML
20 INJECTION INTRAVENOUS
Status: COMPLETED | OUTPATIENT
Start: 2024-03-28 | End: 2024-03-28

## 2024-03-28 RX ORDER — AZACITIDINE 100 MG/1
75 INJECTION, POWDER, LYOPHILIZED, FOR SOLUTION INTRAVENOUS; SUBCUTANEOUS
Status: COMPLETED | OUTPATIENT
Start: 2024-03-28 | End: 2024-03-28

## 2024-03-28 RX ADMIN — ONDANSETRON 16 MG: 4 TABLET, ORALLY DISINTEGRATING ORAL at 08:03

## 2024-03-28 RX ADMIN — AZACITIDINE 155 MG: 100 INJECTION, POWDER, LYOPHILIZED, FOR SOLUTION INTRAVENOUS; SUBCUTANEOUS at 10:03

## 2024-03-28 RX ADMIN — SODIUM CHLORIDE: 9 INJECTION, SOLUTION INTRAVENOUS at 09:03

## 2024-03-28 RX ADMIN — FAMOTIDINE 20 MG: 10 INJECTION INTRAVENOUS at 09:03

## 2024-03-28 NOTE — NURSING
I met Hussein in the infusion clinic. I introduced myself and explained my role. I told him about Koko and explained her role as well.  I assessed Hussein's emotional wellbeing. He denied any feelings of anxiety or depression. I assessed barriers to treatment. There are no barriers at this time. I encouraged Hussein to reach out to Koko if needed.     MADY Hartman Intern    Approved by: Koko Jansen LCSW

## 2024-03-28 NOTE — NURSING
C12 D3 Vidaza injections given, tolerated well. Platelets 13,000 today, 1 Unit Platelets given, tolerated well. Pt discharged home in stable condition, aware of future appts.

## 2024-04-01 ENCOUNTER — LAB VISIT (OUTPATIENT)
Dept: LAB | Facility: HOSPITAL | Age: 74
End: 2024-04-01
Attending: INTERNAL MEDICINE
Payer: MEDICARE

## 2024-04-01 ENCOUNTER — INFUSION (OUTPATIENT)
Dept: INFUSION THERAPY | Facility: HOSPITAL | Age: 74
End: 2024-04-01
Attending: INTERNAL MEDICINE
Payer: MEDICARE

## 2024-04-01 VITALS
OXYGEN SATURATION: 97 % | HEART RATE: 65 BPM | RESPIRATION RATE: 16 BRPM | SYSTOLIC BLOOD PRESSURE: 156 MMHG | TEMPERATURE: 98 F | DIASTOLIC BLOOD PRESSURE: 82 MMHG

## 2024-04-01 DIAGNOSIS — I10 PRIMARY HYPERTENSION: ICD-10-CM

## 2024-04-01 DIAGNOSIS — Z13.83 SCREENING FOR CARDIOVASCULAR, RESPIRATORY, AND GENITOURINARY DISEASES: ICD-10-CM

## 2024-04-01 DIAGNOSIS — Z13.228 SCREENING FOR ENDOCRINE, NUTRITIONAL, METABOLIC AND IMMUNITY DISORDER: ICD-10-CM

## 2024-04-01 DIAGNOSIS — Z13.89 SCREENING FOR CARDIOVASCULAR, RESPIRATORY, AND GENITOURINARY DISEASES: ICD-10-CM

## 2024-04-01 DIAGNOSIS — C92.00 ACUTE MYELOID LEUKEMIA NOT HAVING ACHIEVED REMISSION: ICD-10-CM

## 2024-04-01 DIAGNOSIS — Z86.73 HISTORY OF CVA (CEREBROVASCULAR ACCIDENT) WITHOUT RESIDUAL DEFICITS: Primary | ICD-10-CM

## 2024-04-01 DIAGNOSIS — D69.6 THROMBOCYTOPENIA: Primary | ICD-10-CM

## 2024-04-01 DIAGNOSIS — D61.818 PANCYTOPENIA: ICD-10-CM

## 2024-04-01 DIAGNOSIS — Z13.6 SCREENING FOR CARDIOVASCULAR, RESPIRATORY, AND GENITOURINARY DISEASES: ICD-10-CM

## 2024-04-01 DIAGNOSIS — Z13.0 SCREENING FOR ENDOCRINE, NUTRITIONAL, METABOLIC AND IMMUNITY DISORDER: ICD-10-CM

## 2024-04-01 DIAGNOSIS — Z12.5 SCREENING FOR PROSTATE CANCER: ICD-10-CM

## 2024-04-01 DIAGNOSIS — D69.6 THROMBOCYTOPENIA: ICD-10-CM

## 2024-04-01 DIAGNOSIS — E55.9 VITAMIN D DEFICIENCY: ICD-10-CM

## 2024-04-01 DIAGNOSIS — Z13.21 SCREENING FOR ENDOCRINE, NUTRITIONAL, METABOLIC AND IMMUNITY DISORDER: ICD-10-CM

## 2024-04-01 DIAGNOSIS — Z13.29 SCREENING FOR ENDOCRINE, NUTRITIONAL, METABOLIC AND IMMUNITY DISORDER: ICD-10-CM

## 2024-04-01 LAB
ABO + RH BLD: NORMAL
ALBUMIN SERPL-MCNC: 3.7 G/DL (ref 3.4–4.8)
ALBUMIN/GLOB SERPL: 1.4 RATIO (ref 1.1–2)
ALP SERPL-CCNC: 79 UNIT/L (ref 40–150)
ALT SERPL-CCNC: 16 UNIT/L (ref 0–55)
AST SERPL-CCNC: 16 UNIT/L (ref 5–34)
BASOPHILS # BLD AUTO: 0.01 X10(3)/MCL
BASOPHILS NFR BLD AUTO: 1.3 %
BILIRUB SERPL-MCNC: 0.8 MG/DL
BLD PROD TYP BPU: NORMAL
BLOOD UNIT EXPIRATION DATE: NORMAL
BLOOD UNIT TYPE CODE: 6200
BUN SERPL-MCNC: 25.9 MG/DL (ref 8.4–25.7)
CALCIUM SERPL-MCNC: 9 MG/DL (ref 8.8–10)
CHLORIDE SERPL-SCNC: 110 MMOL/L (ref 98–107)
CO2 SERPL-SCNC: 26 MMOL/L (ref 23–31)
CREAT SERPL-MCNC: 0.94 MG/DL (ref 0.73–1.18)
CROSSMATCH INTERPRETATION: NORMAL
DISPENSE STATUS: NORMAL
EOSINOPHIL # BLD AUTO: 0 X10(3)/MCL (ref 0–0.9)
EOSINOPHIL NFR BLD AUTO: 0 %
ERYTHROCYTE [DISTWIDTH] IN BLOOD BY AUTOMATED COUNT: 19 % (ref 11.5–17)
GFR SERPLBLD CREATININE-BSD FMLA CKD-EPI: >60 MLS/MIN/1.73/M2
GLOBULIN SER-MCNC: 2.6 GM/DL (ref 2.4–3.5)
GLUCOSE SERPL-MCNC: 120 MG/DL (ref 82–115)
HCT VFR BLD AUTO: 26.1 % (ref 42–52)
HGB BLD-MCNC: 7.4 G/DL (ref 14–18)
IMM GRANULOCYTES # BLD AUTO: 0 X10(3)/MCL (ref 0–0.04)
IMM GRANULOCYTES NFR BLD AUTO: 0 %
LYMPHOCYTES # BLD AUTO: 0.58 X10(3)/MCL (ref 0.6–4.6)
LYMPHOCYTES NFR BLD AUTO: 73.4 %
MCH RBC QN AUTO: 27.4 PG (ref 27–31)
MCHC RBC AUTO-ENTMCNC: 28.4 G/DL (ref 33–36)
MCV RBC AUTO: 96.7 FL (ref 80–94)
MONOCYTES # BLD AUTO: 0.09 X10(3)/MCL (ref 0.1–1.3)
MONOCYTES NFR BLD AUTO: 11.4 %
NEUTROPHILS # BLD AUTO: 0.11 X10(3)/MCL (ref 2.1–9.2)
NEUTROPHILS NFR BLD AUTO: 13.9 %
PLATELET # BLD AUTO: 12 X10(3)/MCL (ref 130–400)
PMV BLD AUTO: ABNORMAL FL
POTASSIUM SERPL-SCNC: 4 MMOL/L (ref 3.5–5.1)
PROT SERPL-MCNC: 6.3 GM/DL (ref 5.8–7.6)
RBC # BLD AUTO: 2.7 X10(6)/MCL (ref 4.7–6.1)
SODIUM SERPL-SCNC: 143 MMOL/L (ref 136–145)
UNIT NUMBER: NORMAL
WBC # SPEC AUTO: 0.79 X10(3)/MCL (ref 4.5–11.5)

## 2024-04-01 PROCEDURE — 36430 TRANSFUSION BLD/BLD COMPNT: CPT

## 2024-04-01 PROCEDURE — 96374 THER/PROPH/DIAG INJ IV PUSH: CPT

## 2024-04-01 PROCEDURE — 36415 COLL VENOUS BLD VENIPUNCTURE: CPT

## 2024-04-01 PROCEDURE — 80053 COMPREHEN METABOLIC PANEL: CPT

## 2024-04-01 PROCEDURE — P9037 PLATE PHERES LEUKOREDU IRRAD: HCPCS | Performed by: NURSE PRACTITIONER

## 2024-04-01 PROCEDURE — 85025 COMPLETE CBC W/AUTO DIFF WBC: CPT

## 2024-04-01 PROCEDURE — 25000003 PHARM REV CODE 250: Performed by: NURSE PRACTITIONER

## 2024-04-01 RX ORDER — HYDROCODONE BITARTRATE AND ACETAMINOPHEN 500; 5 MG/1; MG/1
TABLET ORAL ONCE
Status: CANCELLED | OUTPATIENT
Start: 2024-04-01 | End: 2024-04-01

## 2024-04-01 RX ORDER — FAMOTIDINE 10 MG/ML
20 INJECTION INTRAVENOUS
Status: COMPLETED | OUTPATIENT
Start: 2024-04-01 | End: 2024-04-01

## 2024-04-01 RX ORDER — ACETAMINOPHEN 325 MG/1
650 TABLET ORAL ONCE
Status: ACTIVE | OUTPATIENT
Start: 2024-04-01

## 2024-04-01 RX ORDER — DIPHENHYDRAMINE HCL 25 MG
25 CAPSULE ORAL ONCE
Status: ACTIVE | OUTPATIENT
Start: 2024-04-01

## 2024-04-01 RX ORDER — DIPHENHYDRAMINE HCL 25 MG
25 CAPSULE ORAL ONCE
Status: CANCELLED | OUTPATIENT
Start: 2024-04-01 | End: 2024-04-01

## 2024-04-01 RX ORDER — HYDROCODONE BITARTRATE AND ACETAMINOPHEN 500; 5 MG/1; MG/1
TABLET ORAL ONCE
Status: COMPLETED | OUTPATIENT
Start: 2024-04-01 | End: 2024-04-01

## 2024-04-01 RX ORDER — ACETAMINOPHEN 325 MG/1
650 TABLET ORAL ONCE
Status: CANCELLED | OUTPATIENT
Start: 2024-04-01 | End: 2024-04-01

## 2024-04-01 RX ADMIN — FAMOTIDINE 20 MG: 10 INJECTION INTRAVENOUS at 08:04

## 2024-04-01 RX ADMIN — SODIUM CHLORIDE: 9 INJECTION, SOLUTION INTRAVENOUS at 08:04

## 2024-04-01 NOTE — PLAN OF CARE
Pt here for labs. Plt 12, given 1 unit platelets.pt qing well. Will rtc thurs for labs and blood if needed

## 2024-04-04 ENCOUNTER — INFUSION (OUTPATIENT)
Dept: INFUSION THERAPY | Facility: HOSPITAL | Age: 74
End: 2024-04-04
Attending: INTERNAL MEDICINE
Payer: MEDICARE

## 2024-04-04 VITALS
HEART RATE: 61 BPM | RESPIRATION RATE: 18 BRPM | SYSTOLIC BLOOD PRESSURE: 144 MMHG | HEIGHT: 73 IN | TEMPERATURE: 98 F | DIASTOLIC BLOOD PRESSURE: 74 MMHG | WEIGHT: 183 LBS | OXYGEN SATURATION: 99 % | BODY MASS INDEX: 24.25 KG/M2

## 2024-04-04 DIAGNOSIS — D69.6 THROMBOCYTOPENIA: Primary | ICD-10-CM

## 2024-04-04 DIAGNOSIS — D69.6 THROMBOCYTOPENIA: ICD-10-CM

## 2024-04-04 LAB
ABO + RH BLD: NORMAL
BLD PROD TYP BPU: NORMAL
BLOOD UNIT EXPIRATION DATE: NORMAL
BLOOD UNIT TYPE CODE: 7300
CROSSMATCH INTERPRETATION: NORMAL
DISPENSE STATUS: NORMAL
UNIT NUMBER: NORMAL

## 2024-04-04 PROCEDURE — 36430 TRANSFUSION BLD/BLD COMPNT: CPT

## 2024-04-04 PROCEDURE — 25000003 PHARM REV CODE 250: Performed by: INTERNAL MEDICINE

## 2024-04-04 PROCEDURE — P9037 PLATE PHERES LEUKOREDU IRRAD: HCPCS | Performed by: INTERNAL MEDICINE

## 2024-04-04 RX ORDER — FAMOTIDINE 10 MG/ML
20 INJECTION INTRAVENOUS
Status: COMPLETED | OUTPATIENT
Start: 2024-04-04 | End: 2024-04-04

## 2024-04-04 RX ORDER — HYDROCODONE BITARTRATE AND ACETAMINOPHEN 500; 5 MG/1; MG/1
TABLET ORAL ONCE
Status: CANCELLED | OUTPATIENT
Start: 2024-04-04 | End: 2024-04-04

## 2024-04-04 RX ORDER — HYDROCODONE BITARTRATE AND ACETAMINOPHEN 500; 5 MG/1; MG/1
TABLET ORAL ONCE
Status: COMPLETED | OUTPATIENT
Start: 2024-04-04 | End: 2024-05-06

## 2024-04-04 RX ORDER — FAMOTIDINE 10 MG/ML
20 INJECTION INTRAVENOUS ONCE
Status: DISCONTINUED | OUTPATIENT
Start: 2024-04-04 | End: 2024-05-09

## 2024-04-04 RX ADMIN — FAMOTIDINE 20 MG: 10 INJECTION INTRAVENOUS at 09:04

## 2024-04-08 ENCOUNTER — LAB VISIT (OUTPATIENT)
Dept: LAB | Facility: HOSPITAL | Age: 74
End: 2024-04-08
Attending: INTERNAL MEDICINE
Payer: MEDICARE

## 2024-04-08 ENCOUNTER — INFUSION (OUTPATIENT)
Dept: INFUSION THERAPY | Facility: HOSPITAL | Age: 74
End: 2024-04-08
Attending: INTERNAL MEDICINE
Payer: MEDICARE

## 2024-04-08 DIAGNOSIS — C92.00 ACUTE MYELOID LEUKEMIA NOT HAVING ACHIEVED REMISSION: ICD-10-CM

## 2024-04-08 LAB
ABS NEUT CALC (OHS): 0.11 X10(3)/MCL (ref 2.1–9.2)
ALBUMIN SERPL-MCNC: 3.9 G/DL (ref 3.4–4.8)
ALBUMIN/GLOB SERPL: 1.5 RATIO (ref 1.1–2)
ALP SERPL-CCNC: 84 UNIT/L (ref 40–150)
ALT SERPL-CCNC: 20 UNIT/L (ref 0–55)
ANISOCYTOSIS BLD QL SMEAR: ABNORMAL
AST SERPL-CCNC: 17 UNIT/L (ref 5–34)
BASOPHILS NFR BLD MANUAL: 0.01 X10(3)/MCL (ref 0–0.2)
BASOPHILS NFR BLD MANUAL: 1 % (ref 0–2)
BILIRUB SERPL-MCNC: 1.1 MG/DL
BUN SERPL-MCNC: 18.6 MG/DL (ref 8.4–25.7)
CALCIUM SERPL-MCNC: 9.3 MG/DL (ref 8.8–10)
CHLORIDE SERPL-SCNC: 108 MMOL/L (ref 98–107)
CO2 SERPL-SCNC: 25 MMOL/L (ref 23–31)
CREAT SERPL-MCNC: 0.97 MG/DL (ref 0.73–1.18)
ERYTHROCYTE [DISTWIDTH] IN BLOOD BY AUTOMATED COUNT: 20.7 % (ref 11.5–17)
GFR SERPLBLD CREATININE-BSD FMLA CKD-EPI: >60 MLS/MIN/1.73/M2
GLOBULIN SER-MCNC: 2.6 GM/DL (ref 2.4–3.5)
GLUCOSE SERPL-MCNC: 133 MG/DL (ref 82–115)
HCT VFR BLD AUTO: 27 % (ref 42–52)
HGB BLD-MCNC: 7.8 G/DL (ref 14–18)
HYPOCHROMIA BLD QL SMEAR: ABNORMAL
LYMPHOCYTES NFR BLD MANUAL: 0.72 X10(3)/MCL
LYMPHOCYTES NFR BLD MANUAL: 76 % (ref 13–40)
MACROCYTES BLD QL SMEAR: ABNORMAL
MCH RBC QN AUTO: 28.1 PG (ref 27–31)
MCHC RBC AUTO-ENTMCNC: 28.9 G/DL (ref 33–36)
MCV RBC AUTO: 97.1 FL (ref 80–94)
MONOCYTES NFR BLD MANUAL: 0.1 X10(3)/MCL (ref 0.1–1.3)
MONOCYTES NFR BLD MANUAL: 11 % (ref 2–11)
NEUTROPHILS NFR BLD MANUAL: 12 % (ref 47–80)
OVALOCYTES (OLG): ABNORMAL
PLATELET # BLD AUTO: 13 X10(3)/MCL (ref 130–400)
PLATELET # BLD EST: ABNORMAL 10*3/UL
PMV BLD AUTO: ABNORMAL FL
POIKILOCYTOSIS BLD QL SMEAR: ABNORMAL
POLYCHROMASIA BLD QL SMEAR: SLIGHT
POTASSIUM SERPL-SCNC: 3.9 MMOL/L (ref 3.5–5.1)
PROT SERPL-MCNC: 6.5 GM/DL (ref 5.8–7.6)
RBC # BLD AUTO: 2.78 X10(6)/MCL (ref 4.7–6.1)
RBC MORPH BLD: ABNORMAL
SODIUM SERPL-SCNC: 141 MMOL/L (ref 136–145)
STIPPLED RBC (OHS): SLIGHT
WBC # SPEC AUTO: 0.95 X10(3)/MCL (ref 4.5–11.5)

## 2024-04-08 PROCEDURE — 80053 COMPREHEN METABOLIC PANEL: CPT

## 2024-04-08 PROCEDURE — 85007 BL SMEAR W/DIFF WBC COUNT: CPT

## 2024-04-08 PROCEDURE — 36415 COLL VENOUS BLD VENIPUNCTURE: CPT

## 2024-04-11 ENCOUNTER — DOCUMENTATION ONLY (OUTPATIENT)
Dept: HEMATOLOGY/ONCOLOGY | Facility: CLINIC | Age: 74
End: 2024-04-11
Payer: MEDICARE

## 2024-04-11 ENCOUNTER — INFUSION (OUTPATIENT)
Dept: INFUSION THERAPY | Facility: HOSPITAL | Age: 74
End: 2024-04-11
Attending: INTERNAL MEDICINE
Payer: MEDICARE

## 2024-04-11 VITALS
WEIGHT: 187 LBS | HEART RATE: 65 BPM | TEMPERATURE: 98 F | RESPIRATION RATE: 18 BRPM | DIASTOLIC BLOOD PRESSURE: 66 MMHG | SYSTOLIC BLOOD PRESSURE: 133 MMHG | OXYGEN SATURATION: 98 % | HEIGHT: 73 IN | BODY MASS INDEX: 24.78 KG/M2

## 2024-04-11 DIAGNOSIS — C92.00 ACUTE MYELOID LEUKEMIA NOT HAVING ACHIEVED REMISSION: Primary | ICD-10-CM

## 2024-04-11 PROCEDURE — 25000003 PHARM REV CODE 250: Performed by: INTERNAL MEDICINE

## 2024-04-11 PROCEDURE — 96374 THER/PROPH/DIAG INJ IV PUSH: CPT

## 2024-04-11 PROCEDURE — 25000003 PHARM REV CODE 250: Performed by: NURSE PRACTITIONER

## 2024-04-11 PROCEDURE — P9037 PLATE PHERES LEUKOREDU IRRAD: HCPCS | Performed by: NURSE PRACTITIONER

## 2024-04-11 PROCEDURE — 36430 TRANSFUSION BLD/BLD COMPNT: CPT

## 2024-04-11 RX ORDER — FAMOTIDINE 10 MG/ML
20 INJECTION INTRAVENOUS
Status: CANCELLED
Start: 2024-04-11

## 2024-04-11 RX ORDER — FAMOTIDINE 10 MG/ML
20 INJECTION INTRAVENOUS
Status: COMPLETED | OUTPATIENT
Start: 2024-04-11 | End: 2024-04-11

## 2024-04-11 RX ORDER — HYDROCODONE BITARTRATE AND ACETAMINOPHEN 500; 5 MG/1; MG/1
TABLET ORAL ONCE
Status: CANCELLED | OUTPATIENT
Start: 2024-04-11 | End: 2024-04-11

## 2024-04-11 RX ORDER — HYDROCODONE BITARTRATE AND ACETAMINOPHEN 500; 5 MG/1; MG/1
TABLET ORAL ONCE
Status: COMPLETED | OUTPATIENT
Start: 2024-04-11 | End: 2024-04-11

## 2024-04-11 RX ORDER — FAMOTIDINE 10 MG/ML
20 INJECTION INTRAVENOUS ONCE
Status: DISCONTINUED | OUTPATIENT
Start: 2024-04-11 | End: 2024-05-09

## 2024-04-11 RX ADMIN — SODIUM CHLORIDE: 9 INJECTION, SOLUTION INTRAVENOUS at 09:04

## 2024-04-11 RX ADMIN — FAMOTIDINE 20 MG: 10 INJECTION INTRAVENOUS at 09:04

## 2024-04-11 NOTE — NURSING
I met Talon in the infusion clinic. I assessed feelings of anxiousness and depression. He denied any feelings of anxiety and depression. I asked if there were any barriers to treatment. He declined. I encouraged him to reach out to Koko if needed.     Ana Michael, MADY Intern    Approved by: Koko Jansen LCSW

## 2024-04-11 NOTE — NURSING
1 Unit Platelets given, tolerated well. Pt discharged home in stable condition, aware of future appts.

## 2024-04-15 ENCOUNTER — INFUSION (OUTPATIENT)
Dept: INFUSION THERAPY | Facility: HOSPITAL | Age: 74
End: 2024-04-15
Attending: INTERNAL MEDICINE
Payer: MEDICARE

## 2024-04-15 ENCOUNTER — LAB VISIT (OUTPATIENT)
Dept: LAB | Facility: HOSPITAL | Age: 74
End: 2024-04-15
Attending: INTERNAL MEDICINE
Payer: MEDICARE

## 2024-04-15 VITALS
WEIGHT: 189.31 LBS | BODY MASS INDEX: 25.09 KG/M2 | TEMPERATURE: 98 F | DIASTOLIC BLOOD PRESSURE: 53 MMHG | OXYGEN SATURATION: 98 % | RESPIRATION RATE: 18 BRPM | HEIGHT: 73 IN | HEART RATE: 67 BPM | SYSTOLIC BLOOD PRESSURE: 129 MMHG

## 2024-04-15 DIAGNOSIS — C92.00 ACUTE MYELOID LEUKEMIA NOT HAVING ACHIEVED REMISSION: ICD-10-CM

## 2024-04-15 LAB
ABS NEUT CALC (OHS): 0.15 X10(3)/MCL (ref 2.1–9.2)
ALBUMIN SERPL-MCNC: 3.8 G/DL (ref 3.4–4.8)
ALBUMIN/GLOB SERPL: 1.5 RATIO (ref 1.1–2)
ALP SERPL-CCNC: 88 UNIT/L (ref 40–150)
ALT SERPL-CCNC: 26 UNIT/L (ref 0–55)
ANISOCYTOSIS BLD QL SMEAR: ABNORMAL
AST SERPL-CCNC: 20 UNIT/L (ref 5–34)
BASOPHILS NFR BLD MANUAL: 0.01 X10(3)/MCL (ref 0–0.2)
BASOPHILS NFR BLD MANUAL: 1 % (ref 0–2)
BILIRUB SERPL-MCNC: 0.9 MG/DL
BUN SERPL-MCNC: 24.9 MG/DL (ref 8.4–25.7)
CALCIUM SERPL-MCNC: 9.1 MG/DL (ref 8.8–10)
CHLORIDE SERPL-SCNC: 107 MMOL/L (ref 98–107)
CO2 SERPL-SCNC: 27 MMOL/L (ref 23–31)
CREAT SERPL-MCNC: 0.9 MG/DL (ref 0.73–1.18)
ERYTHROCYTE [DISTWIDTH] IN BLOOD BY AUTOMATED COUNT: 22.3 % (ref 11.5–17)
GFR SERPLBLD CREATININE-BSD FMLA CKD-EPI: >60 MLS/MIN/1.73/M2
GLOBULIN SER-MCNC: 2.5 GM/DL (ref 2.4–3.5)
GLUCOSE SERPL-MCNC: 130 MG/DL (ref 82–115)
HCT VFR BLD AUTO: 26.1 % (ref 42–52)
HGB BLD-MCNC: 7.4 G/DL (ref 14–18)
HYPOCHROMIA BLD QL SMEAR: ABNORMAL
LYMPHOCYTES NFR BLD MANUAL: 0.69 X10(3)/MCL
LYMPHOCYTES NFR BLD MANUAL: 73 % (ref 13–40)
MACROCYTES BLD QL SMEAR: ABNORMAL
MCH RBC QN AUTO: 28.1 PG (ref 27–31)
MCHC RBC AUTO-ENTMCNC: 28.4 G/DL (ref 33–36)
MCV RBC AUTO: 99.2 FL (ref 80–94)
MONOCYTES NFR BLD MANUAL: 0.1 X10(3)/MCL (ref 0.1–1.3)
MONOCYTES NFR BLD MANUAL: 10 % (ref 2–11)
NEUTROPHILS NFR BLD MANUAL: 16 % (ref 47–80)
OVALOCYTES (OLG): ABNORMAL
PLATELET # BLD AUTO: 15 X10(3)/MCL (ref 130–400)
PLATELET # BLD EST: ABNORMAL 10*3/UL
PMV BLD AUTO: ABNORMAL FL
POIKILOCYTOSIS BLD QL SMEAR: ABNORMAL
POLYCHROMASIA BLD QL SMEAR: ABNORMAL
POTASSIUM SERPL-SCNC: 3.8 MMOL/L (ref 3.5–5.1)
PROT SERPL-MCNC: 6.3 GM/DL (ref 5.8–7.6)
RBC # BLD AUTO: 2.63 X10(6)/MCL (ref 4.7–6.1)
RBC MORPH BLD: ABNORMAL
SCHISTOCYTE (OLG): SLIGHT
SODIUM SERPL-SCNC: 141 MMOL/L (ref 136–145)
STIPPLED RBC (OHS): ABNORMAL
STOMATOCYTES (OLG): SLIGHT
TEAR DROP CELL (OLG): ABNORMAL
WBC # SPEC AUTO: 0.95 X10(3)/MCL (ref 4.5–11.5)

## 2024-04-15 PROCEDURE — 80053 COMPREHEN METABOLIC PANEL: CPT

## 2024-04-15 PROCEDURE — 36415 COLL VENOUS BLD VENIPUNCTURE: CPT

## 2024-04-15 PROCEDURE — 85027 COMPLETE CBC AUTOMATED: CPT

## 2024-04-15 NOTE — PLAN OF CARE
"Pt here for blood check. Hgb 7.4 Plt 15, denies any problems or bleeding. Stated he "feels great". Care team updated and stated no blood products needed today. Instructed to return Thursday for blood check and to call before if any problems arise. Verbalized understanding.   "

## 2024-04-18 ENCOUNTER — INFUSION (OUTPATIENT)
Dept: INFUSION THERAPY | Facility: HOSPITAL | Age: 74
End: 2024-04-18
Attending: INTERNAL MEDICINE
Payer: MEDICARE

## 2024-04-18 VITALS
TEMPERATURE: 98 F | DIASTOLIC BLOOD PRESSURE: 76 MMHG | RESPIRATION RATE: 18 BRPM | OXYGEN SATURATION: 97 % | SYSTOLIC BLOOD PRESSURE: 143 MMHG | HEART RATE: 59 BPM

## 2024-04-18 DIAGNOSIS — C92.00 ACUTE MYELOID LEUKEMIA NOT HAVING ACHIEVED REMISSION: Primary | ICD-10-CM

## 2024-04-18 DIAGNOSIS — C92.00 ACUTE MYELOID LEUKEMIA NOT HAVING ACHIEVED REMISSION: ICD-10-CM

## 2024-04-18 PROCEDURE — P9037 PLATE PHERES LEUKOREDU IRRAD: HCPCS | Performed by: NURSE PRACTITIONER

## 2024-04-18 PROCEDURE — 25000003 PHARM REV CODE 250: Performed by: NURSE PRACTITIONER

## 2024-04-18 PROCEDURE — 36430 TRANSFUSION BLD/BLD COMPNT: CPT

## 2024-04-18 PROCEDURE — 96374 THER/PROPH/DIAG INJ IV PUSH: CPT

## 2024-04-18 RX ORDER — HYDROCODONE BITARTRATE AND ACETAMINOPHEN 500; 5 MG/1; MG/1
TABLET ORAL ONCE
Status: CANCELLED | OUTPATIENT
Start: 2024-04-18 | End: 2024-04-18

## 2024-04-18 RX ORDER — FAMOTIDINE 10 MG/ML
20 INJECTION INTRAVENOUS ONCE
Status: COMPLETED | OUTPATIENT
Start: 2024-04-18 | End: 2024-04-18

## 2024-04-18 RX ORDER — FAMOTIDINE 10 MG/ML
20 INJECTION INTRAVENOUS ONCE
Status: CANCELLED | OUTPATIENT
Start: 2024-04-18

## 2024-04-18 RX ORDER — HYDROCODONE BITARTRATE AND ACETAMINOPHEN 500; 5 MG/1; MG/1
TABLET ORAL ONCE
Status: COMPLETED | OUTPATIENT
Start: 2024-04-18 | End: 2024-04-18

## 2024-04-18 RX ADMIN — SODIUM CHLORIDE: 9 INJECTION, SOLUTION INTRAVENOUS at 09:04

## 2024-04-18 RX ADMIN — FAMOTIDINE 20 MG: 10 INJECTION INTRAVENOUS at 09:04

## 2024-04-18 NOTE — NURSING
Patient c/o itching to right arm after infusion completed. Instructed to take benadryl when he gets home. Report to ER if symptoms get worst. Voiced understanding.

## 2024-04-22 ENCOUNTER — INFUSION (OUTPATIENT)
Dept: INFUSION THERAPY | Facility: HOSPITAL | Age: 74
End: 2024-04-22
Attending: INTERNAL MEDICINE
Payer: MEDICARE

## 2024-04-22 ENCOUNTER — OFFICE VISIT (OUTPATIENT)
Dept: HEMATOLOGY/ONCOLOGY | Facility: CLINIC | Age: 74
End: 2024-04-22
Payer: MEDICARE

## 2024-04-22 VITALS
OXYGEN SATURATION: 98 % | SYSTOLIC BLOOD PRESSURE: 143 MMHG | HEART RATE: 63 BPM | RESPIRATION RATE: 18 BRPM | TEMPERATURE: 98 F | DIASTOLIC BLOOD PRESSURE: 69 MMHG

## 2024-04-22 VITALS
HEIGHT: 73 IN | RESPIRATION RATE: 18 BRPM | WEIGHT: 188.38 LBS | HEART RATE: 83 BPM | BODY MASS INDEX: 24.97 KG/M2 | OXYGEN SATURATION: 99 % | TEMPERATURE: 98 F | DIASTOLIC BLOOD PRESSURE: 61 MMHG | SYSTOLIC BLOOD PRESSURE: 109 MMHG

## 2024-04-22 DIAGNOSIS — C92.00 ACUTE MYELOID LEUKEMIA NOT HAVING ACHIEVED REMISSION: Primary | Chronic | ICD-10-CM

## 2024-04-22 DIAGNOSIS — D61.818 PANCYTOPENIA: Chronic | ICD-10-CM

## 2024-04-22 DIAGNOSIS — Z79.899 ON ANTINEOPLASTIC CHEMOTHERAPY: ICD-10-CM

## 2024-04-22 PROCEDURE — 96375 TX/PRO/DX INJ NEW DRUG ADDON: CPT

## 2024-04-22 PROCEDURE — 25000003 PHARM REV CODE 250: Performed by: NURSE PRACTITIONER

## 2024-04-22 PROCEDURE — 63600175 PHARM REV CODE 636 W HCPCS: Performed by: NURSE PRACTITIONER

## 2024-04-22 PROCEDURE — 96401 CHEMO ANTI-NEOPL SQ/IM: CPT

## 2024-04-22 PROCEDURE — P9040 RBC LEUKOREDUCED IRRADIATED: HCPCS | Performed by: NURSE PRACTITIONER

## 2024-04-22 PROCEDURE — 25000003 PHARM REV CODE 250: Performed by: INTERNAL MEDICINE

## 2024-04-22 PROCEDURE — 99215 OFFICE O/P EST HI 40 MIN: CPT | Mod: S$PBB,,, | Performed by: NURSE PRACTITIONER

## 2024-04-22 PROCEDURE — 86923 COMPATIBILITY TEST ELECTRIC: CPT | Performed by: NURSE PRACTITIONER

## 2024-04-22 PROCEDURE — 99215 OFFICE O/P EST HI 40 MIN: CPT | Mod: PBBFAC | Performed by: NURSE PRACTITIONER

## 2024-04-22 PROCEDURE — 99999 PR PBB SHADOW E&M-EST. PATIENT-LVL V: CPT | Mod: PBBFAC,,, | Performed by: NURSE PRACTITIONER

## 2024-04-22 PROCEDURE — 36430 TRANSFUSION BLD/BLD COMPNT: CPT

## 2024-04-22 RX ORDER — DIPHENHYDRAMINE HCL 25 MG
25 CAPSULE ORAL ONCE
Status: ACTIVE | OUTPATIENT
Start: 2024-04-22

## 2024-04-22 RX ORDER — ONDANSETRON 4 MG/1
16 TABLET, ORALLY DISINTEGRATING ORAL
Status: CANCELLED | OUTPATIENT
Start: 2024-04-23

## 2024-04-22 RX ORDER — AZACITIDINE 100 MG/1
75 INJECTION, POWDER, LYOPHILIZED, FOR SOLUTION INTRAVENOUS; SUBCUTANEOUS
Status: CANCELLED | OUTPATIENT
Start: 2024-04-24

## 2024-04-22 RX ORDER — ONDANSETRON 4 MG/1
16 TABLET, ORALLY DISINTEGRATING ORAL
Status: COMPLETED | OUTPATIENT
Start: 2024-04-22 | End: 2024-04-22

## 2024-04-22 RX ORDER — AZACITIDINE 100 MG/1
75 INJECTION, POWDER, LYOPHILIZED, FOR SOLUTION INTRAVENOUS; SUBCUTANEOUS
Status: CANCELLED | OUTPATIENT
Start: 2024-04-23

## 2024-04-22 RX ORDER — ACETAMINOPHEN 325 MG/1
650 TABLET ORAL ONCE
Status: DISCONTINUED | OUTPATIENT
Start: 2024-04-22 | End: 2024-04-22

## 2024-04-22 RX ORDER — HYDROCODONE BITARTRATE AND ACETAMINOPHEN 500; 5 MG/1; MG/1
TABLET ORAL ONCE
Status: CANCELLED | OUTPATIENT
Start: 2024-04-22 | End: 2024-04-22

## 2024-04-22 RX ORDER — HYDROCODONE BITARTRATE AND ACETAMINOPHEN 500; 5 MG/1; MG/1
TABLET ORAL ONCE
Status: COMPLETED | OUTPATIENT
Start: 2024-04-22 | End: 2024-04-22

## 2024-04-22 RX ORDER — ONDANSETRON 4 MG/1
16 TABLET, ORALLY DISINTEGRATING ORAL
Status: CANCELLED | OUTPATIENT
Start: 2024-04-24

## 2024-04-22 RX ORDER — DIPHENHYDRAMINE HCL 25 MG
25 CAPSULE ORAL ONCE
Status: CANCELLED | OUTPATIENT
Start: 2024-04-22 | End: 2024-04-22

## 2024-04-22 RX ORDER — AZACITIDINE 100 MG/1
75 INJECTION, POWDER, LYOPHILIZED, FOR SOLUTION INTRAVENOUS; SUBCUTANEOUS
Status: COMPLETED | OUTPATIENT
Start: 2024-04-22 | End: 2024-04-22

## 2024-04-22 RX ORDER — FAMOTIDINE 10 MG/ML
20 INJECTION INTRAVENOUS
Status: CANCELLED
Start: 2024-04-22

## 2024-04-22 RX ORDER — FAMOTIDINE 10 MG/ML
20 INJECTION INTRAVENOUS
Status: COMPLETED | OUTPATIENT
Start: 2024-04-22 | End: 2024-04-22

## 2024-04-22 RX ORDER — ACETAMINOPHEN 325 MG/1
650 TABLET ORAL ONCE
Status: CANCELLED | OUTPATIENT
Start: 2024-04-22 | End: 2024-04-22

## 2024-04-22 RX ADMIN — SODIUM CHLORIDE: 9 INJECTION, SOLUTION INTRAVENOUS at 11:04

## 2024-04-22 RX ADMIN — FAMOTIDINE 20 MG: 10 INJECTION INTRAVENOUS at 11:04

## 2024-04-22 RX ADMIN — ONDANSETRON 16 MG: 4 TABLET, ORALLY DISINTEGRATING ORAL at 11:04

## 2024-04-22 RX ADMIN — AZACITIDINE 155 MG: 100 INJECTION, POWDER, LYOPHILIZED, FOR SOLUTION INTRAVENOUS; SUBCUTANEOUS at 11:04

## 2024-04-22 NOTE — NURSING
Tolerated transfusion with no complaints.  States understanding to contact provider for any issues. IV catheter discontinued intact. Site without signs and symptoms of complications. Coban dressing applied.  Pt instructed to remove coban dressing in 30 minutes or sooner if it felt that it was getting tighter.  Pt stated understanding.

## 2024-04-22 NOTE — PROGRESS NOTES
Subjective:       Patient ID: Hussein Gonzalez Jr. is a 73 y.o. male.    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    Current Treatment:   Azacitidine and venetoclax started on 02/27/2023.  Started 7 out of 28 days, did this through cycle 5.  With cycle 6, moved to 5 out of 28 days.  Referral for possible stem cell transplant evaluation.    Treatment History:      HPI:  73-year-old male with a history of CVA, hypertension, BPH, and melanoma who presented to the emergency department following a syncopal episode.  In February of 2022, the patient did have a slightly low white blood cell count, hemoglobin, and platelet count.  Per the patient, 3 months prior to admission, he was having dizziness, fatigue, weakness, shortness of breath, back pain, and leg pain.  The dizziness was worse with standing and walking.  On 01/20/2023, the patient had a syncopal episode.  He was brought to the emergency department and was found to be pancytopenic.  He also had stool that was positive for occult blood in the emergency department.  He received blood transfusion.  His CBC on 01/20/2023 revealed a white blood cell count of 2.6, hemoglobin of 7.7, and platelet count of 47,000. Reticulocytes were appropriately elevated, B12 and folate were within normal limits, iron level was slightly decreased.  Repeat blood work on 01/21/2023 revealed a white blood cell count of 2.8, post transfusion hemoglobin of 9.6, and a [place] count of 23440.  Hematology was consulted for pancytopenia.  EGD and colonoscopy done on 01/24/2023, EGD revealed nonbleeding ulcers, colonoscopy unremarkable, however had to be aborted due to tortuous colon.  Bone Marrow Biopsy done on 01/25/2023.  Preliminary reveals AML with 27% blasts in a background of dysplasia.  Cytogenetics are within normal  limits, FISH is within normal limits.  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  Started azacitidine and venetoclax on 02/27/2023, completed cycle 4 on 05/30/2023.  Held after this cycle and repeated a bone marrow biopsy, this was done on 06/27/2023, this showed a normocellular marrow (40%) with relative erythroid hyperplasia, sequential granulopoiesis and megakaryopoiesis.  The bone marrow was slightly suboptimal given it was predominantly subcortical bone with focal hematopoietic elements.  There was no morphologic or immunophenotypic evidence of previous acute myeloid leukemia, cytogenetics were normal, FISH was normal, NGS showed persistent ASXL1, SRSF2, TET2.    Repeat bone marrow biopsy done on 11/21/2023 showed no morphologic or immunophenotypic evidence of persistent acute myeloid leukemia. There were ASXL1, CEBPA, RUNX1, SRSF2, STAG2, and TET2 mutations.    Interval History:   Patient presents for scheduled follow up appointment for AML.  He is feeling good and is eating well.  He denies any fevers or bleeding.    Past Medical History:   Diagnosis Date    Anxiety and depression     BPH (benign prostatic hyperplasia)     History of CVA (cerebrovascular accident) without residual deficits     HTN (hypertension)     Malignant melanoma of skin, unspecified     NSTEMI (non-ST elevated myocardial infarction)     Sepsis, Gram negative     Sleep apnea       Past Surgical History:   Procedure Laterality Date    BONE MARROW ASPIRATION N/A 6/27/2023    Procedure: ASPIRATION, BONE MARROW;  Surgeon: Sabino Núñez MD;  Location: [facility];  Service: General;  Laterality: N/A;  ALL ASSESS FOR MOLECULAR MRD    BONE MARROW BIOPSY N/A 1/25/2023    Procedure: Biopsy-bone marrow;  Surgeon: Rd Carlos MD;  Location: [facility] OR;  Service: General;  Laterality: N/A;    BONE MARROW BIOPSY N/A 11/21/2023    Procedure: Biopsy-bone marrow;  Surgeon: Junior Castro MD;  Location: [facility];  Service:  General;  Laterality: N/A;    COLONOSCOPY      COLONOSCOPY N/A 1/24/2023    Procedure: COLONOSCOPY (DOUBLE);  Surgeon: Jimy Baker MD;  Location: [facility];  Service: Gastroenterology;  Laterality: N/A;    EGD, WITH CLOSED BIOPSY N/A 1/24/2023    Procedure: EGD, WITH CLOSED BIOPSY;  Surgeon: Jimy Baker MD;  Location: [facility];  Service: Gastroenterology;  Laterality: N/A;    EXCISION OF MELANOMA      SINUS SURGERY      TONSILLECTOMY       Social History     Socioeconomic History    Marital status:    Tobacco Use    Smoking status: Never    Smokeless tobacco: Never   Substance and Sexual Activity    Alcohol use: Not Currently     Alcohol/week: 7.0 standard drinks of alcohol     Types: 7 Cans of beer per week    Drug use: Never    Sexual activity: Yes     Social Determinants of Health     Financial Resource Strain: Low Risk  (4/20/2023)    Overall Financial Resource Strain (CARDIA)     Difficulty of Paying Living Expenses: Not hard at all   Food Insecurity: No Food Insecurity (4/20/2023)    Hunger Vital Sign     Worried About Running Out of Food in the Last Year: Never true     Ran Out of Food in the Last Year: Never true   Transportation Needs: No Transportation Needs (4/20/2023)    PRAPARE - Transportation     Lack of Transportation (Medical): No     Lack of Transportation (Non-Medical): No   Physical Activity: Insufficiently Active (4/20/2023)    Exercise Vital Sign     Days of Exercise per Week: 3 days     Minutes of Exercise per Session: 30 min   Stress: No Stress Concern Present (4/20/2023)    [place] [employer] - Occupational Stress Questionnaire     Feeling of Stress : Not at all   Social Connections: Unknown (4/20/2023)    Social Connection and Isolation Panel [NHANES]     Frequency of Communication with Friends and Family: More than three times a week     Frequency of Social Gatherings with Friends and Family: More than three times a week     Attends  Congregational Services: Patient declined     Active Member of Clubs or Organizations: Patient declined     Attends Club or Organization Meetings: Patient declined     Marital Status:    Housing Stability: Low Risk  (4/20/2023)    Housing Stability Vital Sign     Unable to Pay for Housing in the Last Year: No     Number of Places Lived in the Last Year: 1     Unstable Housing in the Last Year: No      Family History   Problem Relation Name Age of Onset    Cancer Mother      Cancer Father        Review of patient's allergies indicates:   Allergen Reactions    Acetaminophen Other (See Comments)     Restless leg     Benadryl [diphenhydramine hcl] Other (See Comments)     Restless Legs      Review of Systems   Constitutional:  Negative for chills, diaphoresis, fatigue, fever and unexpected weight change.   HENT:  Negative for nasal congestion, mouth sores, sinus pressure/congestion and sore throat.    Eyes:  Negative for pain and visual disturbance.   Respiratory:  Negative for cough, chest tightness and shortness of breath.    Cardiovascular:  Negative for chest pain, palpitations and leg swelling.   Gastrointestinal:  Negative for abdominal distention, abdominal pain, blood in stool, constipation and diarrhea.   Genitourinary:  Negative for dysuria, frequency and hematuria.   Musculoskeletal:  Positive for arthralgias and myalgias. Negative for back pain.   Integumentary:  Negative for rash.   Neurological:  Negative for dizziness, syncope, weakness, numbness and headaches.   Hematological:  Negative for adenopathy.   Psychiatric/Behavioral:  Negative for confusion.          Objective:      Physical Exam  Vitals reviewed.   Constitutional:       General: He is awake.      Appearance: Normal appearance.   HENT:      Head: Normocephalic and atraumatic.      Right Ear: Hearing normal.      Left Ear: Hearing normal.      Nose: Nose normal.   Eyes:      General: Lids are normal. Vision grossly intact.      Extraocular  Movements: Extraocular movements intact.      Conjunctiva/sclera: Conjunctivae normal.   Cardiovascular:      Rate and Rhythm: Normal rate and regular rhythm.      Pulses: Normal pulses.   Pulmonary:      Effort: Pulmonary effort is normal.      Breath sounds: Normal breath sounds. No wheezing, rhonchi or rales.   Musculoskeletal:      Cervical back: Full passive range of motion without pain.      Right lower leg: No edema.      Left lower leg: No edema.   Skin:     General: Skin is warm.      Comments:      Neurological:      General: No focal deficit present.      Mental Status: He is alert and oriented to person, place, and time.   Psychiatric:         Attention and Perception: Attention normal.         Mood and Affect: Mood and affect normal.         Behavior: Behavior is cooperative.         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    Treating with azacitidine and venetoclax, Started on 02/27/2023.  Bone marrow biopsy after cycle 4 shows no immunophenotypic or morphologic evidence of AML.  NGS showed continued ASXL1, SRSF2, TET2 mutations.  Cytogenetics were normal.    Evaluated by Dr. Valdez in [place].     Continue prophylaxis with 500 mg of levofloxacin daily, 200 mg of acyclovir b.i.d., 200 mg of fluconazole daily.    Twice weekly labs (Monday/Thursday) with transfusions as needed.    He restarted treatment on 07/10/2023, on 08/14/2023, we dose reduced by changing venetoclax to 14 out of every 28 days.    Venetoclax 200 mg on days 1-14 of a 28 day cycle    He treated with azacitidine 7 days out of 28 days for  the first 5 cycles, with cycle 6 moving forward, we did 5 days.  He is currently treating with 3 days.  Next round to start this week, cycle 13.    Repeat bone marrow biopsy done on 11/21/2023 with no morphologic or immunophenotypic evidence of persistent AML    1 unit of blood today.    Return to clinic in 4 weeks    MATTY Hays

## 2024-04-23 ENCOUNTER — INFUSION (OUTPATIENT)
Dept: INFUSION THERAPY | Facility: HOSPITAL | Age: 74
End: 2024-04-23
Attending: INTERNAL MEDICINE
Payer: MEDICARE

## 2024-04-23 VITALS
RESPIRATION RATE: 18 BRPM | DIASTOLIC BLOOD PRESSURE: 58 MMHG | BODY MASS INDEX: 24.97 KG/M2 | HEART RATE: 91 BPM | WEIGHT: 188.38 LBS | HEIGHT: 73 IN | SYSTOLIC BLOOD PRESSURE: 131 MMHG | TEMPERATURE: 98 F

## 2024-04-23 DIAGNOSIS — C92.00 ACUTE MYELOID LEUKEMIA NOT HAVING ACHIEVED REMISSION: Primary | ICD-10-CM

## 2024-04-23 PROCEDURE — 63600175 PHARM REV CODE 636 W HCPCS: Performed by: NURSE PRACTITIONER

## 2024-04-23 PROCEDURE — 96401 CHEMO ANTI-NEOPL SQ/IM: CPT

## 2024-04-23 PROCEDURE — 25000003 PHARM REV CODE 250: Performed by: NURSE PRACTITIONER

## 2024-04-23 RX ORDER — FAMOTIDINE 10 MG/ML
20 INJECTION INTRAVENOUS ONCE
Status: CANCELLED | OUTPATIENT
Start: 2024-04-23

## 2024-04-23 RX ORDER — HYDROCODONE BITARTRATE AND ACETAMINOPHEN 500; 5 MG/1; MG/1
TABLET ORAL ONCE
Status: CANCELLED | OUTPATIENT
Start: 2024-04-23 | End: 2024-04-23

## 2024-04-23 RX ORDER — ONDANSETRON 4 MG/1
16 TABLET, ORALLY DISINTEGRATING ORAL
Status: COMPLETED | OUTPATIENT
Start: 2024-04-23 | End: 2024-04-23

## 2024-04-23 RX ORDER — AZACITIDINE 100 MG/1
75 INJECTION, POWDER, LYOPHILIZED, FOR SOLUTION INTRAVENOUS; SUBCUTANEOUS
Status: COMPLETED | OUTPATIENT
Start: 2024-04-23 | End: 2024-04-23

## 2024-04-23 RX ADMIN — AZACITIDINE 155 MG: 100 INJECTION, POWDER, LYOPHILIZED, FOR SOLUTION INTRAVENOUS; SUBCUTANEOUS at 08:04

## 2024-04-23 RX ADMIN — ONDANSETRON 16 MG: 4 TABLET, ORALLY DISINTEGRATING ORAL at 08:04

## 2024-04-24 ENCOUNTER — INFUSION (OUTPATIENT)
Dept: INFUSION THERAPY | Facility: HOSPITAL | Age: 74
End: 2024-04-24
Attending: INTERNAL MEDICINE
Payer: MEDICARE

## 2024-04-24 ENCOUNTER — OFFICE VISIT (OUTPATIENT)
Dept: INTERNAL MEDICINE | Facility: CLINIC | Age: 74
End: 2024-04-24
Payer: MEDICARE

## 2024-04-24 VITALS
HEIGHT: 73 IN | TEMPERATURE: 98 F | SYSTOLIC BLOOD PRESSURE: 135 MMHG | HEART RATE: 65 BPM | RESPIRATION RATE: 18 BRPM | WEIGHT: 188.38 LBS | BODY MASS INDEX: 24.97 KG/M2 | DIASTOLIC BLOOD PRESSURE: 64 MMHG | OXYGEN SATURATION: 97 %

## 2024-04-24 VITALS
DIASTOLIC BLOOD PRESSURE: 60 MMHG | HEART RATE: 73 BPM | SYSTOLIC BLOOD PRESSURE: 112 MMHG | OXYGEN SATURATION: 96 % | HEIGHT: 72 IN | WEIGHT: 186 LBS | BODY MASS INDEX: 25.19 KG/M2

## 2024-04-24 DIAGNOSIS — N40.0 BENIGN PROSTATIC HYPERPLASIA WITHOUT LOWER URINARY TRACT SYMPTOMS: Chronic | ICD-10-CM

## 2024-04-24 DIAGNOSIS — Z00.00 MEDICARE ANNUAL WELLNESS VISIT, SUBSEQUENT: ICD-10-CM

## 2024-04-24 DIAGNOSIS — C92.00 ACUTE MYELOID LEUKEMIA NOT HAVING ACHIEVED REMISSION: Primary | ICD-10-CM

## 2024-04-24 DIAGNOSIS — N45.2 ORCHITIS OF RIGHT TESTICLE: ICD-10-CM

## 2024-04-24 DIAGNOSIS — D69.6 THROMBOCYTOPENIA: ICD-10-CM

## 2024-04-24 DIAGNOSIS — C92.00 ACUTE MYELOID LEUKEMIA NOT HAVING ACHIEVED REMISSION: Primary | Chronic | ICD-10-CM

## 2024-04-24 DIAGNOSIS — I70.213 ATHEROSCLEROSIS OF NATIVE ARTERIES OF EXTREMITIES WITH INTERMITTENT CLAUDICATION, BILATERAL LEGS: ICD-10-CM

## 2024-04-24 PROCEDURE — 36430 TRANSFUSION BLD/BLD COMPNT: CPT

## 2024-04-24 PROCEDURE — P9037 PLATE PHERES LEUKOREDU IRRAD: HCPCS | Performed by: INTERNAL MEDICINE

## 2024-04-24 PROCEDURE — 63600175 PHARM REV CODE 636 W HCPCS: Performed by: NURSE PRACTITIONER

## 2024-04-24 PROCEDURE — 96401 CHEMO ANTI-NEOPL SQ/IM: CPT

## 2024-04-24 PROCEDURE — G0439 PPPS, SUBSEQ VISIT: HCPCS | Mod: ,,, | Performed by: INTERNAL MEDICINE

## 2024-04-24 PROCEDURE — 25000003 PHARM REV CODE 250: Performed by: INTERNAL MEDICINE

## 2024-04-24 PROCEDURE — 25000003 PHARM REV CODE 250: Performed by: NURSE PRACTITIONER

## 2024-04-24 PROCEDURE — 96374 THER/PROPH/DIAG INJ IV PUSH: CPT

## 2024-04-24 RX ORDER — MULTIVITAMIN
1 TABLET ORAL DAILY
COMMUNITY

## 2024-04-24 RX ORDER — FAMOTIDINE 10 MG/ML
20 INJECTION INTRAVENOUS ONCE
Status: COMPLETED | OUTPATIENT
Start: 2024-04-24 | End: 2024-04-24

## 2024-04-24 RX ORDER — LEVOFLOXACIN 500 MG/1
500 TABLET, FILM COATED ORAL DAILY
COMMUNITY

## 2024-04-24 RX ORDER — CLINDAMYCIN HYDROCHLORIDE 150 MG/1
150 CAPSULE ORAL
COMMUNITY

## 2024-04-24 RX ORDER — AZACITIDINE 100 MG/1
75 INJECTION, POWDER, LYOPHILIZED, FOR SOLUTION INTRAVENOUS; SUBCUTANEOUS
Status: COMPLETED | OUTPATIENT
Start: 2024-04-24 | End: 2024-04-24

## 2024-04-24 RX ORDER — HYDROCODONE BITARTRATE AND ACETAMINOPHEN 500; 5 MG/1; MG/1
TABLET ORAL ONCE
Status: COMPLETED | OUTPATIENT
Start: 2024-04-24 | End: 2024-04-24

## 2024-04-24 RX ORDER — ONDANSETRON 4 MG/1
16 TABLET, ORALLY DISINTEGRATING ORAL
Status: COMPLETED | OUTPATIENT
Start: 2024-04-24 | End: 2024-04-24

## 2024-04-24 RX ADMIN — FAMOTIDINE 20 MG: 10 INJECTION INTRAVENOUS at 08:04

## 2024-04-24 RX ADMIN — SODIUM CHLORIDE: 9 INJECTION, SOLUTION INTRAVENOUS at 08:04

## 2024-04-24 RX ADMIN — AZACITIDINE 155 MG: 100 INJECTION, POWDER, LYOPHILIZED, FOR SOLUTION INTRAVENOUS; SUBCUTANEOUS at 08:04

## 2024-04-24 RX ADMIN — ONDANSETRON 16 MG: 4 TABLET, ORALLY DISINTEGRATING ORAL at 08:04

## 2024-04-24 NOTE — PROGRESS NOTES
Patient ID: [id number]     Chief Complaint: Medicare AWV (Wellness/)      HPI:     Hussein Gonzalez Jr. is a 73 y.o. male here today for a Medicare Wellness. No other complaints today.     73-year-old gentleman who is here today for  a Medicare wellness visit.   Medical comorbidities include HTN, CVA s/t embolic phenomena, melanoma s/p resection XRT for recurrence (2019) and another resection on his right ear this year. Also with pancytopenia established with Hematology/Oncology and currently being treated for AML with 27% blasts and tolerating the treatment well..  Currently being treated with with azacitidine and venetoclax.  Currently being evaluated for stem cell transplant in [place].  Lab work noted with critically low platelets at 6 and Oncology was notified and patient is already status post a unit of platelets this morning.  Overall he seems to be doing well without any acute needs or complaints.          -------------------------------------    Anxiety and depression    BPH (benign prostatic hyperplasia)    History of CVA (cerebrovascular accident) without residual deficits    HTN (hypertension)    Malignant melanoma of skin, unspecified    NSTEMI (non-ST elevated myocardial infarction)    Sepsis, Gram negative    Sleep apnea        Past Surgical History:   Procedure Laterality Date    BONE MARROW ASPIRATION N/A 6/27/2023    Procedure: ASPIRATION, BONE MARROW;  Surgeon: Sabino Núñez MD;  Location: [facility];  Service: General;  Laterality: N/A;  ALL ASSESS FOR MOLECULAR MRD    BONE MARROW BIOPSY N/A 1/25/2023    Procedure: Biopsy-bone marrow;  Surgeon: Rd Carlos MD;  Location: [facility] OR;  Service: General;  Laterality: N/A;    BONE MARROW BIOPSY N/A 11/21/2023    Procedure: Biopsy-bone marrow;  Surgeon: Junior Castro MD;  Location: [facility] OR;  Service: General;  Laterality: N/A;    COLONOSCOPY      COLONOSCOPY N/A 1/24/2023    Procedure: COLONOSCOPY (DOUBLE);  Surgeon: Jimy Baker MD;  Location:  [facility] ENDOSCOPY;  Service: Gastroenterology;  Laterality: N/A;    EGD, WITH CLOSED BIOPSY N/A 1/24/2023    Procedure: EGD, WITH CLOSED BIOPSY;  Surgeon: Jimy Baker MD;  Location: [facility] ENDOSCOPY;  Service: Gastroenterology;  Laterality: N/A;    EXCISION OF MELANOMA      SINUS SURGERY      TONSILLECTOMY         Review of patient's allergies indicates:   Allergen Reactions    Acetaminophen Other (See Comments)     Restless leg     Benadryl [diphenhydramine hcl] Other (See Comments)     Restless Legs       Current Outpatient Medications   Medication Sig Dispense Refill    (Magic mouthwash) 1:1:1 diphenhydrAMINE(Benadryl) 12.5mg/5ml liq, aluminum & magnesium hydroxide-simethicone (Maalox), LIDOcaine viscous 2% Swish and spit 15 mLs every 4 (four) hours as needed (mouth sores). for mouth sores 600 mL 1    citalopram (CELEXA) 40 MG tablet Take 1 tablet by mouth once daily 90 tablet 3    clindamycin (CLEOCIN) 150 MG capsule Take 150 mg by mouth. 3 caps daily      levoFLOXacin (LEVAQUIN) 500 MG tablet Take 500 mg by mouth once daily.      lisinopriL-hydrochlorothiazide (PRINZIDE,ZESTORETIC) 20-12.5 mg per tablet Take 1 tablet by mouth once daily (Patient taking differently: 0.5 tab daily) 90 tablet 2    multivitamin (THERAGRAN) per tablet Take 1 tablet by mouth once daily.      rosuvastatin (CRESTOR) 40 MG Tab Take 40 mg by mouth once daily.      tamsulosin (FLOMAX) 0.4 mg Cap Take 1 capsule (0.4 mg total) by mouth once daily. 30 capsule 11    venetoclax (VENCLEXTA) 100 mg Tab TAKE 2 TABLETS BY MOUTH ONCE DAILY ON DAYS 1-14 OF A 28 DAY CYCLE 28 tablet 5    acyclovir (ZOVIRAX) 400 MG tablet Take 1 tablet by mouth twice daily 60 tablet 0     Current Facility-Administered Medications   Medication Dose Route Frequency Provider Last Rate Last Admin    0.9%  NaCl infusion (for blood administration)   Intravenous Once Eduard Luna II, MD        0.9%  NaCl infusion (for blood administration)   Intravenous Once  ProuetEduard E II, MD        0.9%  NaCl infusion (for blood administration)   Intravenous Once Prouet, Eduard E II, MD        0.9%  NaCl infusion (for blood administration)   Intravenous Once Prouet, Eduard E II, MD        0.9%  NaCl infusion (for blood administration)   Intravenous Once Bobby, Jenna L, FNP        0.9%  NaCl infusion (for blood administration)   Intravenous Once Bobby, Jenna L, FNP        0.9%  NaCl infusion (for blood administration)   Intravenous Once Prouet, Eduard E II, MD        0.9%  NaCl infusion (for blood administration)   Intravenous Once Prouet, Eduard E II, MD        0.9%  NaCl infusion (for blood administration)   Intravenous Once Prouet, Eduard E II, MD        0.9%  NaCl infusion (for blood administration)   Intravenous Once Prouet, Eduard E II, MD        0.9%  NaCl infusion (for blood administration)   Intravenous Once Prouet, Eduard E II, MD        0.9%  NaCl infusion (for blood administration)   Intravenous Once ProuetEduard E II, MD        0.9%  NaCl infusion (for blood administration)   Intravenous Once Prouet, Eduard E II, MD        0.9%  NaCl infusion (for blood administration)   Intravenous Once Prouet, Eduard E II, MD        0.9%  NaCl infusion (for blood administration)   Intravenous Once Prouet, Eduard E II, MD        0.9%  NaCl infusion (for blood administration)   Intravenous Once Prouet, Eduard E II, MD        0.9%  NaCl infusion (for blood administration)   Intravenous Once Prouet, Eduard E II, MD        0.9%  NaCl infusion (for blood administration)   Intravenous Once Bobby, Jenna L, FNP        0.9%  NaCl infusion (for blood administration)   Intravenous Once Bobby, Jenna L, FNP        0.9%  NaCl infusion (for blood administration)   Intravenous Once Bobby, Jenna L, FNP        0.9%  NaCl infusion (for blood administration)   Intravenous Once Bobby, Jenna L, FNP        0.9%  NaCl infusion (for blood administration)   Intravenous Once Bobby, Jenna  L, FNP        0.9%  NaCl infusion (for blood administration)   Intravenous Once Bobby, Jenna L, FNP        0.9%  NaCl infusion (for blood administration)   Intravenous Once ProEduard ayala II, MD        0.9%  NaCl infusion (for blood administration)   Intravenous Once Eduard Luna II, MD        0.9%  NaCl infusion (for blood administration)   Intravenous Once Eduard Luna II, MD        0.9%  NaCl infusion (for blood administration)   Intravenous Once ProueEduard romero II, MD        0.9%  NaCl infusion (for blood administration)   Intravenous Once ProueEduard romero II, MD        0.9%  NaCl infusion (for blood administration)   Intravenous Once Eduard Luna II, MD        0.9%  NaCl infusion (for blood administration)   Intravenous Once ProueEduard romero II, MD        0.9%  NaCl infusion (for blood administration)   Intravenous Once Sharlene Bobbyily L, FNP        0.9%  NaCl infusion (for blood administration)   Intravenous Once Eduard Luna II, MD        acetaminophen tablet 650 mg  650 mg Oral Once ProuetEduard II, MD        acetaminophen tablet 650 mg  650 mg Oral Once Bobby, Jenna L, FNP        diphenhydrAMINE capsule 25 mg  25 mg Oral Once ProuetEduard II, MD        diphenhydrAMINE capsule 25 mg  25 mg Oral Once Bobby, Jenna L, FNP        diphenhydrAMINE capsule 25 mg  25 mg Oral Once Bobby, Jenna L, FNP        famotidine (PF) injection 20 mg  20 mg Intravenous Once Bobby, Jenna L, FNP        famotidine (PF) injection 20 mg  20 mg Intravenous Once ProuetEduard II, MD        famotidine (PF) injection 20 mg  20 mg Intravenous Once ProuetEduard II, MD        famotidine (PF) injection 20 mg  20 mg Intravenous Once Bobby, Jenna L, FNP           Social History     Socioeconomic History    Marital status:    Tobacco Use    Smoking status: Never    Smokeless tobacco: Never   Substance and Sexual Activity    Alcohol use: Not Currently     Alcohol/week: 7.0 standard drinks of  alcohol     Types: 7 Cans of beer per week    Drug use: Never    Sexual activity: Yes     Social Determinants of Health     Financial Resource Strain: Low Risk  (4/20/2023)    Overall Financial Resource Strain (CARDIA)     Difficulty of Paying Living Expenses: Not hard at all   Food Insecurity: No Food Insecurity (4/20/2023)    Hunger Vital Sign     Worried About Running Out of Food in the Last Year: Never true     Ran Out of Food in the Last Year: Never true   Transportation Needs: No Transportation Needs (4/20/2023)    PRAPARE - Transportation     Lack of Transportation (Medical): No     Lack of Transportation (Non-Medical): No   Physical Activity: Insufficiently Active (4/20/2023)    Exercise Vital Sign     Days of Exercise per Week: 3 days     Minutes of Exercise per Session: 30 min   Stress: No Stress Concern Present (4/20/2023)    [employer] - Occupational Stress Questionnaire     Feeling of Stress : Not at all   Social Connections: Unknown (4/20/2023)    Social Connection and Isolation Panel [NHANES]     Frequency of Communication with Friends and Family: More than three times a week     Frequency of Social Gatherings with Friends and Family: More than three times a week     Attends Zoroastrian Services: Patient declined     Active Member of Clubs or Organizations: Patient declined     Attends Club or Organization Meetings: Patient declined     Marital Status:    Housing Stability: Low Risk  (4/20/2023)    Housing Stability Vital Sign     Unable to Pay for Housing in the Last Year: No     Number of Places Lived in the Last Year: 1     Unstable Housing in the Last Year: No        Family History   Problem Relation Name Age of Onset    Cancer Mother      Cancer Father          Patient Care Team:  Nayely Andres MD as PCP - General (Internal Medicine)  Nayely Andres MD     Opioid Screening: Patient medication list reviewed, patient is not taking prescription  opioids. Patient is not using additional opioids than prescribed. Patient is at low risk of substance abuse based on this opioid use history.       Subjective:     ROS    A comprehensive review of systems is obtained and is essentially negative except for that stated in the HPI     Patient Reported Health Risk Assessment  [age] 70-79  Are you male or female?: Male  During the past four weeks, how much have you been bothered by emotional problems such as feeling anxious, depressed, irritable, sad, or downhearted and blue?: Not at all  During the past five weeks, has your physical and/or emotional health limited your social activities with family, friends, neighbors, or groups?: Not at all  During the past four weeks, how much bodily pain have you generally had?: No pain  During the past four weeks, was someone available to help if you needed and wanted help?: Yes, as much as I wanted  During the past four weeks, what was the hardest physical activity you could do for at least two minutes?: Heavy  Can you get to places out of walking distance without help?  (For example, can you travel alone on buses or taxis, or drive your own car?): Yes  Can you go shopping for groceries or clothes without someone's help?: Yes  Can you prepare your own meals?: Yes  Can you do your own housework without help?: Yes  Because of any health problems, do you need the help of another person with your personal care needs such as eating, bathing, dressing, or getting around the house?: No  Can you handle your own money without help?: Yes  During the past four weeks, how would you rate your health in general?: Good  How have things been going for you during the past four weeks?: Very well  Are you having difficulties driving your car?: No  Do you always fasten your seat belt when you are in a car?: Yes, usually  How often in the past four weeks have you been bothered by falling or dizzy when standing up?: Never  How often in the  past four weeks have you been bothered by sexual problems?: Never  How often in the past four weeks have you been bothered by trouble eating well?: Never  How often in the past four weeks have you been bothered by teeth or denture problems?: Never  How often in the past four weeks have you been bothered with problems using the telephone?: Never  How often in the past four weeks have you been bothered by tiredness or fatigue?: Never  Have you fallen two or more times in the past year?: No  Are you afraid of falling?: No  Are you a smoker?: No  During the past four weeks, how many drinks of wine, beer, or other alcoholic beverages did you have?: 2-5 drinks per weeks  Do you exercise for about 20 minutes three or more days a week?: Yes, most of the time  Have you been given any information to help you with hazards in your house that might hurt you?: Yes  Have you been given any information to help you with keeping track of your medications?: Yes  How often do you have trouble taking medicines the way you've been told to take them?: I always take them as prescribed  How confident are you that you can control and manage most of your health problems?: Very confident  What is your race? (Check all that apply.):     Objective:     /60 (BP Location: Left arm, Patient Position: Sitting, BP Method: Small (Manual))   Pulse 73   Ht 6' (1.829 m)   Wt 84.4 kg (186 lb)   SpO2 96%   BMI 25.23 kg/m²     Physical Exam  Constitutional:       Appearance: Normal appearance.   HENT:      Head: Normocephalic and atraumatic.      Nose: Nose normal.      Mouth/Throat:      Mouth: Mucous membranes are moist.      Pharynx: Oropharynx is clear.   Eyes:      Extraocular Movements: Extraocular movements intact.      Pupils: Pupils are equal, round, and reactive to light.   Cardiovascular:      Rate and Rhythm: Normal rate and regular rhythm.      Pulses: Normal pulses.   Pulmonary:      Effort: Pulmonary effort is normal.       Breath sounds: Normal breath sounds.   Abdominal:      General: Bowel sounds are normal.      Palpations: Abdomen is soft.   Musculoskeletal:         General: Normal range of motion.      Cervical back: Normal range of motion and neck supple.   Skin:     General: Skin is warm.   Neurological:      General: No focal deficit present.      Mental Status: He is alert and oriented to person, place, and time. Mental status is at baseline.   Psychiatric:         Mood and Affect: Mood normal.                No data to display                  4/24/2024     9:20 AM 4/24/2024     8:00 AM 4/23/2024     8:00 AM 4/22/2024    10:00 AM 4/18/2024     9:00 AM 4/15/2024     9:00 AM 4/11/2024     9:00 AM   Fall Risk Assessment - Outpatient   Mobility Status Ambulatory Ambulatory Ambulatory Ambulatory Ambulatory Ambulatory Ambulatory   Number of falls 0 0 0 0 0 0 0   Identified as fall risk False False False False False False False           Depression Screening  Over the past two weeks, has the patient felt down, depressed, or hopeless?: No  Over the past two weeks, has the patient felt little interest or pleasure in doing things?: No  Functional Ability/Safety Screening  Was the patient's timed Up & Go test unsteady or longer than 30 seconds?: No  Does the patient need help with phone, transportation, shopping, preparing meals, housework, laundry, meds, or managing money?: No  Does the patient's home have rugs in the hallway, lack grab bars in the bathroom, lack handrails on the stairs or have poor lighting?: No  Have you noticed any hearing difficulties?: No  Cognitive Function (Assessed through direct observation with due consideration of information obtained by way of patient reports and/or concerns raised by family, friends, caretakers, or others)    Does the patient repeat questions/statements in the same day?: No  Does the patient have trouble remembering the date, year, and time?: No  Does the patient have difficulty managing  finances?: No  Does the patient have a decreased sense of direction?: No      Assessment:     Problem List Items Addressed This Visit          Cardiac/Vascular    Atherosclerosis of native arteries of extremities with intermittent claudication, bilateral legs     -stable, on statin, continue            Renal/    BPH (benign prostatic hyperplasia) (Chronic)    Orchitis of right testicle     -established with Urology, doing well, no acute issues            Hematology    Thrombocytopenia     -patient is status post 1 unit of platelets this morning, has blood work again scheduled for tomorrow            Oncology    Acute myeloid leukemia not having achieved remission - Primary (Chronic)     -established with Oncology   -being evaluated for stem cell transplant [place]   -currently on Venclexta and as Azacitidine , continue  -also on acyclovir, fluconazole, and Levaquin daily prophylactically            Other    Medicare annual wellness visit, subsequent     -labs are reviewed and consistent with pancytopenia with critical low platelets status post platelet transfusion  -patient is advised on importance of watching his carbohydrate intake and saturated fat intake, making the right nutritional choices and exercising on a regular basis  -up-to-date with the screening             Plan:     Medicare Annual Wellness and Personalized Prevention Plan:   Fall Risk + Home Safety + Hearing Impairment + Depression Screen + Cognitive Impairment Screen + Health Risk Assessment all reviewed.       Health Maintenance Topics with due status: Not Due       Topic Last Completion Date    High Dose Statin 03/25/2024    Lipid Panel 04/23/2024      The patient's Health Maintenance was reviewed and the following appears to be due at this time:   Health Maintenance Due   Topic Date Due    Hepatitis C Screening  Never done    COVID-19 Vaccine (1) Never done    Pneumococcal Vaccines (Age 65+) (1 of 2 - PCV) Never done    TETANUS VACCINE   Never done    Shingles Vaccine (1 of 2) Never done    RSV Vaccine (Age 60+ and Pregnant patients) (1 - 1-dose 60+ series) Never done    Colorectal Cancer Screening  07/24/2023    Influenza Vaccine (1) Never done         Advance Care Planning     Date: 04/24/2024  Patient did not wish or was not able to name a surrogate decision maker or provide an Advance Care Plan.           Medication List with Changes/Refills   Current Medications    (MAGIC MOUTHWASH) 1:1:1 DIPHENHYDRAMINE(BENADRYL) 12.5MG/5ML LIQ, ALUMINUM & MAGNESIUM HYDROXIDE-SIMETHICONE (MAALOX), LIDOCAINE VISCOUS 2%    Swish and spit 15 mLs every 4 (four) hours as needed (mouth sores). for mouth sores       Start Date: 10/9/2023 End Date: --    ACYCLOVIR (ZOVIRAX) 400 MG TABLET    Take 1 tablet by mouth twice daily       Start Date: 4/20/2023 End Date: --    CITALOPRAM (CELEXA) 40 MG TABLET    Take 1 tablet by mouth once daily       Start Date: 1/25/2024 End Date: --    CLINDAMYCIN (CLEOCIN) 150 MG CAPSULE    Take 150 mg by mouth. 3 caps daily       Start Date: --        End Date: --    LEVOFLOXACIN (LEVAQUIN) 500 MG TABLET    Take 500 mg by mouth once daily.       Start Date: --        End Date: --    LISINOPRIL-HYDROCHLOROTHIAZIDE (PRINZIDE,ZESTORETIC) 20-12.5 MG PER TABLET    Take 1 tablet by mouth once daily       Start Date: 1/9/2023  End Date: --    MULTIVITAMIN (THERAGRAN) PER TABLET    Take 1 tablet by mouth once daily.       Start Date: --        End Date: --    ROSUVASTATIN (CRESTOR) 40 MG TAB    Take 40 mg by mouth once daily.       Start Date: 8/15/2023 End Date: --    TAMSULOSIN (FLOMAX) 0.4 MG CAP    Take 1 capsule (0.4 mg total) by mouth once daily.       Start Date: 10/28/2023End Date: 10/27/2024    VENETOCLAX (VENCLEXTA) 100 MG TAB    TAKE 2 TABLETS BY MOUTH ONCE DAILY ON DAYS 1-14 OF A 28 DAY CYCLE       Start Date: 3/27/2024 End Date: --   Discontinued Medications    FERROUS SULFATE (IRON ORAL)    Take 1,000 mg by mouth.       Start Date:  --        End Date: 4/24/2024    GABAPENTIN (NEURONTIN) 100 MG CAPSULE    TAKE 1 CAPSULE BY MOUTH IN THE EVENING       Start Date: 5/18/2023 End Date: 4/24/2024    HYOSCYAMINE 0.125 MG SUBL    Place 1 tablet (0.125 mg total) under the tongue every 4 (four) hours as needed.       Start Date: 10/27/2023End Date: 4/24/2024    LACTOBACILLUS RHAMNOSUS GG (CULTURELLE) 10 BILLION CELL CAPSULE    Take 3 capsules by mouth once daily. Pt states he takes 3 in the morning and 2 and night       Start Date: --        End Date: 4/24/2024    PYRIDOXINE, VITAMIN B6, (B-6) 100 MG TAB    Take 100 mg by mouth every other day.       Start Date: --        End Date: 4/24/2024    VITAMIN D (VITAMIN D3) 1000 UNITS TAB    Take 1,000 Units by mouth once daily.       Start Date: --        End Date: 4/24/2024    VITAMIN E 400 UNIT CAPSULE    Take 180 mg by mouth Daily.       Start Date: 8/27/2021 End Date: 4/24/2024        Follow up in about 6 months (around 10/24/2024) for BP Check. In addition to their scheduled follow up, the patient has also been instructed to follow up on as needed basis.

## 2024-04-24 NOTE — ASSESSMENT & PLAN NOTE
-patient is status post 1 unit of platelets this morning, has blood work again scheduled for tomorrow

## 2024-04-24 NOTE — ASSESSMENT & PLAN NOTE
-labs are reviewed and consistent with pancytopenia with critical low platelets status post platelet transfusion  -patient is advised on importance of watching his carbohydrate intake and saturated fat intake, making the right nutritional choices and exercising on a regular basis  -up-to-date with the screening

## 2024-04-24 NOTE — ASSESSMENT & PLAN NOTE
-established with Oncology   -being evaluated for stem cell transplant [place]   -currently on Venclexta and as Azacitidine , continue  -also on acyclovir, fluconazole, and Levaquin daily prophylactically

## 2024-04-25 ENCOUNTER — INFUSION (OUTPATIENT)
Dept: INFUSION THERAPY | Facility: HOSPITAL | Age: 74
End: 2024-04-25
Attending: INTERNAL MEDICINE
Payer: MEDICARE

## 2024-04-25 ENCOUNTER — LAB VISIT (OUTPATIENT)
Dept: LAB | Facility: HOSPITAL | Age: 74
End: 2024-04-25
Attending: NURSE PRACTITIONER
Payer: MEDICARE

## 2024-04-25 DIAGNOSIS — C92.00 ACUTE MYELOID LEUKEMIA NOT HAVING ACHIEVED REMISSION: ICD-10-CM

## 2024-04-25 DIAGNOSIS — D61.818 PANCYTOPENIA: ICD-10-CM

## 2024-04-25 LAB
ALBUMIN SERPL-MCNC: 3.6 G/DL (ref 3.4–4.8)
ALBUMIN/GLOB SERPL: 1.3 RATIO (ref 1.1–2)
ALP SERPL-CCNC: 75 UNIT/L (ref 40–150)
ALT SERPL-CCNC: 14 UNIT/L (ref 0–55)
AST SERPL-CCNC: 15 UNIT/L (ref 5–34)
BASOPHILS # BLD AUTO: 0.01 X10(3)/MCL
BASOPHILS NFR BLD AUTO: 0.9 %
BILIRUB SERPL-MCNC: 1 MG/DL
BUN SERPL-MCNC: 21.1 MG/DL (ref 8.4–25.7)
CALCIUM SERPL-MCNC: 8.4 MG/DL (ref 8.8–10)
CHLORIDE SERPL-SCNC: 104 MMOL/L (ref 98–107)
CO2 SERPL-SCNC: 27 MMOL/L (ref 23–31)
CREAT SERPL-MCNC: 1.08 MG/DL (ref 0.73–1.18)
EOSINOPHIL # BLD AUTO: 0 X10(3)/MCL (ref 0–0.9)
EOSINOPHIL NFR BLD AUTO: 0 %
ERYTHROCYTE [DISTWIDTH] IN BLOOD BY AUTOMATED COUNT: 20.5 % (ref 11.5–17)
GFR SERPLBLD CREATININE-BSD FMLA CKD-EPI: >60 MLS/MIN/1.73/M2
GLOBULIN SER-MCNC: 2.8 GM/DL (ref 2.4–3.5)
GLUCOSE SERPL-MCNC: 142 MG/DL (ref 82–115)
HCT VFR BLD AUTO: 25.7 % (ref 42–52)
HGB BLD-MCNC: 7.5 G/DL (ref 14–18)
IMM GRANULOCYTES # BLD AUTO: 0.01 X10(3)/MCL (ref 0–0.04)
IMM GRANULOCYTES NFR BLD AUTO: 0.9 %
LYMPHOCYTES # BLD AUTO: 0.48 X10(3)/MCL (ref 0.6–4.6)
LYMPHOCYTES NFR BLD AUTO: 41 %
MCH RBC QN AUTO: 28.2 PG (ref 27–31)
MCHC RBC AUTO-ENTMCNC: 29.2 G/DL (ref 33–36)
MCV RBC AUTO: 96.6 FL (ref 80–94)
MONOCYTES # BLD AUTO: 0.15 X10(3)/MCL (ref 0.1–1.3)
MONOCYTES NFR BLD AUTO: 12.8 %
NEUTROPHILS # BLD AUTO: 0.52 X10(3)/MCL (ref 2.1–9.2)
NEUTROPHILS NFR BLD AUTO: 44.4 %
PLATELET # BLD AUTO: 63 X10(3)/MCL (ref 130–400)
PMV BLD AUTO: ABNORMAL FL
POTASSIUM SERPL-SCNC: 3.8 MMOL/L (ref 3.5–5.1)
PROT SERPL-MCNC: 6.4 GM/DL (ref 5.8–7.6)
RBC # BLD AUTO: 2.66 X10(6)/MCL (ref 4.7–6.1)
SODIUM SERPL-SCNC: 138 MMOL/L (ref 136–145)
WBC # SPEC AUTO: 1.17 X10(3)/MCL (ref 4.5–11.5)

## 2024-04-25 PROCEDURE — 80053 COMPREHEN METABOLIC PANEL: CPT

## 2024-04-25 PROCEDURE — 85025 COMPLETE CBC W/AUTO DIFF WBC: CPT

## 2024-04-25 PROCEDURE — 36415 COLL VENOUS BLD VENIPUNCTURE: CPT

## 2024-04-25 NOTE — NURSING
Patient here for labs. HGB 7.5. PLTS 63. Patient feels good. Paul Dressler, RN, spoke to Dr. Luna. No PRBC today. Patient is aware to return on Monday.

## 2024-04-29 ENCOUNTER — INFUSION (OUTPATIENT)
Dept: INFUSION THERAPY | Facility: HOSPITAL | Age: 74
End: 2024-04-29
Attending: INTERNAL MEDICINE
Payer: MEDICARE

## 2024-04-29 VITALS
OXYGEN SATURATION: 98 % | WEIGHT: 183.5 LBS | BODY MASS INDEX: 24.85 KG/M2 | RESPIRATION RATE: 16 BRPM | HEART RATE: 62 BPM | DIASTOLIC BLOOD PRESSURE: 56 MMHG | HEIGHT: 72 IN | SYSTOLIC BLOOD PRESSURE: 127 MMHG | TEMPERATURE: 98 F

## 2024-04-29 DIAGNOSIS — C92.00 ACUTE MYELOID LEUKEMIA NOT HAVING ACHIEVED REMISSION: Primary | Chronic | ICD-10-CM

## 2024-04-29 PROCEDURE — P9037 PLATE PHERES LEUKOREDU IRRAD: HCPCS | Performed by: NURSE PRACTITIONER

## 2024-04-29 PROCEDURE — 36430 TRANSFUSION BLD/BLD COMPNT: CPT

## 2024-04-29 PROCEDURE — 25000003 PHARM REV CODE 250: Performed by: INTERNAL MEDICINE

## 2024-04-29 PROCEDURE — 25000003 PHARM REV CODE 250: Performed by: NURSE PRACTITIONER

## 2024-04-29 PROCEDURE — 96374 THER/PROPH/DIAG INJ IV PUSH: CPT

## 2024-04-29 RX ORDER — FAMOTIDINE 10 MG/ML
20 INJECTION INTRAVENOUS
Status: COMPLETED | OUTPATIENT
Start: 2024-04-29 | End: 2024-04-29

## 2024-04-29 RX ORDER — HYDROCODONE BITARTRATE AND ACETAMINOPHEN 500; 5 MG/1; MG/1
TABLET ORAL ONCE
Status: COMPLETED | OUTPATIENT
Start: 2024-04-29 | End: 2024-04-29

## 2024-04-29 RX ORDER — ACETAMINOPHEN 325 MG/1
650 TABLET ORAL ONCE
Status: ACTIVE | OUTPATIENT
Start: 2024-04-29

## 2024-04-29 RX ORDER — DIPHENHYDRAMINE HCL 25 MG
25 CAPSULE ORAL ONCE
Status: CANCELLED | OUTPATIENT
Start: 2024-04-29 | End: 2024-04-29

## 2024-04-29 RX ORDER — ACETAMINOPHEN 325 MG/1
650 TABLET ORAL ONCE
Status: CANCELLED | OUTPATIENT
Start: 2024-04-29 | End: 2024-04-29

## 2024-04-29 RX ORDER — FAMOTIDINE 10 MG/ML
20 INJECTION INTRAVENOUS
Status: CANCELLED
Start: 2024-04-29

## 2024-04-29 RX ORDER — DIPHENHYDRAMINE HCL 25 MG
25 CAPSULE ORAL ONCE
Status: ACTIVE | OUTPATIENT
Start: 2024-04-29

## 2024-04-29 RX ORDER — HYDROCODONE BITARTRATE AND ACETAMINOPHEN 500; 5 MG/1; MG/1
TABLET ORAL ONCE
Status: CANCELLED | OUTPATIENT
Start: 2024-04-29 | End: 2024-04-29

## 2024-04-29 RX ADMIN — SODIUM CHLORIDE: 9 INJECTION, SOLUTION INTRAVENOUS at 09:04

## 2024-04-29 RX ADMIN — FAMOTIDINE 20 MG: 10 INJECTION, SOLUTION INTRAVENOUS at 09:04

## 2024-05-02 ENCOUNTER — LAB VISIT (OUTPATIENT)
Dept: LAB | Facility: HOSPITAL | Age: 74
End: 2024-05-02
Attending: INTERNAL MEDICINE
Payer: MEDICARE

## 2024-05-02 DIAGNOSIS — C92.00 ACUTE MYELOID LEUKEMIA NOT HAVING ACHIEVED REMISSION: ICD-10-CM

## 2024-05-02 DIAGNOSIS — D61.818 PANCYTOPENIA: ICD-10-CM

## 2024-05-02 LAB
ALBUMIN SERPL-MCNC: 3.7 G/DL (ref 3.4–4.8)
ALBUMIN/GLOB SERPL: 1.2 RATIO (ref 1.1–2)
ALP SERPL-CCNC: 83 UNIT/L (ref 40–150)
ALT SERPL-CCNC: 13 UNIT/L (ref 0–55)
AST SERPL-CCNC: 14 UNIT/L (ref 5–34)
BASOPHILS # BLD AUTO: 0.01 X10(3)/MCL
BASOPHILS NFR BLD AUTO: 0.7 %
BILIRUB SERPL-MCNC: 1 MG/DL
BUN SERPL-MCNC: 23.7 MG/DL (ref 8.4–25.7)
CALCIUM SERPL-MCNC: 8.9 MG/DL (ref 8.8–10)
CHLORIDE SERPL-SCNC: 105 MMOL/L (ref 98–107)
CO2 SERPL-SCNC: 27 MMOL/L (ref 23–31)
CREAT SERPL-MCNC: 0.98 MG/DL (ref 0.73–1.18)
EOSINOPHIL # BLD AUTO: 0 X10(3)/MCL (ref 0–0.9)
EOSINOPHIL NFR BLD AUTO: 0 %
ERYTHROCYTE [DISTWIDTH] IN BLOOD BY AUTOMATED COUNT: 20.6 % (ref 11.5–17)
GFR SERPLBLD CREATININE-BSD FMLA CKD-EPI: >60 MLS/MIN/1.73/M2
GLOBULIN SER-MCNC: 3 GM/DL (ref 2.4–3.5)
GLUCOSE SERPL-MCNC: 108 MG/DL (ref 82–115)
HCT VFR BLD AUTO: 25.1 % (ref 42–52)
HGB BLD-MCNC: 7.3 G/DL (ref 14–18)
IMM GRANULOCYTES # BLD AUTO: 0 X10(3)/MCL (ref 0–0.04)
IMM GRANULOCYTES NFR BLD AUTO: 0 %
LYMPHOCYTES # BLD AUTO: 0.76 X10(3)/MCL (ref 0.6–4.6)
LYMPHOCYTES NFR BLD AUTO: 50.7 %
MCH RBC QN AUTO: 28.2 PG (ref 27–31)
MCHC RBC AUTO-ENTMCNC: 29.1 G/DL (ref 33–36)
MCV RBC AUTO: 96.9 FL (ref 80–94)
MONOCYTES # BLD AUTO: 0.28 X10(3)/MCL (ref 0.1–1.3)
MONOCYTES NFR BLD AUTO: 18.7 %
NEUTROPHILS # BLD AUTO: 0.45 X10(3)/MCL (ref 2.1–9.2)
NEUTROPHILS NFR BLD AUTO: 29.9 %
PLATELET # BLD AUTO: 13 X10(3)/MCL (ref 130–400)
PMV BLD AUTO: ABNORMAL FL
POTASSIUM SERPL-SCNC: 4.3 MMOL/L (ref 3.5–5.1)
PROT SERPL-MCNC: 6.7 GM/DL (ref 5.8–7.6)
RBC # BLD AUTO: 2.59 X10(6)/MCL (ref 4.7–6.1)
SODIUM SERPL-SCNC: 140 MMOL/L (ref 136–145)
WBC # SPEC AUTO: 1.5 X10(3)/MCL (ref 4.5–11.5)

## 2024-05-02 PROCEDURE — 36415 COLL VENOUS BLD VENIPUNCTURE: CPT

## 2024-05-02 PROCEDURE — 80053 COMPREHEN METABOLIC PANEL: CPT

## 2024-05-02 PROCEDURE — 85025 COMPLETE CBC W/AUTO DIFF WBC: CPT

## 2024-05-06 ENCOUNTER — INFUSION (OUTPATIENT)
Dept: INFUSION THERAPY | Facility: HOSPITAL | Age: 74
End: 2024-05-06
Attending: INTERNAL MEDICINE
Payer: MEDICARE

## 2024-05-06 VITALS
RESPIRATION RATE: 18 BRPM | TEMPERATURE: 98 F | OXYGEN SATURATION: 98 % | HEART RATE: 65 BPM | DIASTOLIC BLOOD PRESSURE: 61 MMHG | SYSTOLIC BLOOD PRESSURE: 116 MMHG

## 2024-05-06 DIAGNOSIS — C92.00 ACUTE MYELOID LEUKEMIA NOT HAVING ACHIEVED REMISSION: Primary | ICD-10-CM

## 2024-05-06 DIAGNOSIS — C92.00 ACUTE MYELOID LEUKEMIA NOT HAVING ACHIEVED REMISSION: ICD-10-CM

## 2024-05-06 LAB
ABO + RH BLD: NORMAL
ABO + RH BLD: NORMAL
BLD PROD TYP BPU: NORMAL
BLD PROD TYP BPU: NORMAL
BLOOD UNIT EXPIRATION DATE: NORMAL
BLOOD UNIT EXPIRATION DATE: NORMAL
BLOOD UNIT TYPE CODE: 5100
BLOOD UNIT TYPE CODE: 6200
CROSSMATCH INTERPRETATION: NORMAL
CROSSMATCH INTERPRETATION: NORMAL
DISPENSE STATUS: NORMAL
DISPENSE STATUS: NORMAL
GROUP & RH: NORMAL
INDIRECT COOMBS: NORMAL
SPECIMEN OUTDATE: NORMAL
UNIT NUMBER: NORMAL
UNIT NUMBER: NORMAL

## 2024-05-06 PROCEDURE — 36430 TRANSFUSION BLD/BLD COMPNT: CPT

## 2024-05-06 PROCEDURE — 86923 COMPATIBILITY TEST ELECTRIC: CPT | Performed by: INTERNAL MEDICINE

## 2024-05-06 PROCEDURE — P9040 RBC LEUKOREDUCED IRRADIATED: HCPCS | Performed by: INTERNAL MEDICINE

## 2024-05-06 PROCEDURE — 36415 COLL VENOUS BLD VENIPUNCTURE: CPT

## 2024-05-06 PROCEDURE — 86850 RBC ANTIBODY SCREEN: CPT | Performed by: INTERNAL MEDICINE

## 2024-05-06 PROCEDURE — 25000003 PHARM REV CODE 250: Performed by: INTERNAL MEDICINE

## 2024-05-06 PROCEDURE — 96375 TX/PRO/DX INJ NEW DRUG ADDON: CPT

## 2024-05-06 PROCEDURE — 96374 THER/PROPH/DIAG INJ IV PUSH: CPT

## 2024-05-06 PROCEDURE — P9037 PLATE PHERES LEUKOREDU IRRAD: HCPCS | Performed by: INTERNAL MEDICINE

## 2024-05-06 RX ORDER — HYDROCODONE BITARTRATE AND ACETAMINOPHEN 500; 5 MG/1; MG/1
TABLET ORAL ONCE
Status: CANCELLED | OUTPATIENT
Start: 2024-05-06 | End: 2024-05-06

## 2024-05-06 RX ORDER — FAMOTIDINE 10 MG/ML
20 INJECTION INTRAVENOUS ONCE
Status: COMPLETED | OUTPATIENT
Start: 2024-05-06 | End: 2024-05-06

## 2024-05-06 RX ORDER — FAMOTIDINE 10 MG/ML
20 INJECTION INTRAVENOUS ONCE
Status: CANCELLED | OUTPATIENT
Start: 2024-05-06

## 2024-05-06 RX ORDER — HYDROCODONE BITARTRATE AND ACETAMINOPHEN 500; 5 MG/1; MG/1
TABLET ORAL ONCE
Status: COMPLETED | OUTPATIENT
Start: 2024-05-06 | End: 2024-05-06

## 2024-05-06 RX ADMIN — SODIUM CHLORIDE: 9 INJECTION, SOLUTION INTRAVENOUS at 09:05

## 2024-05-06 RX ADMIN — FAMOTIDINE 20 MG: 10 INJECTION, SOLUTION INTRAVENOUS at 08:05

## 2024-05-06 RX ADMIN — SODIUM CHLORIDE: 9 INJECTION, SOLUTION INTRAVENOUS at 08:05

## 2024-05-09 ENCOUNTER — INFUSION (OUTPATIENT)
Dept: INFUSION THERAPY | Facility: HOSPITAL | Age: 74
End: 2024-05-09
Attending: INTERNAL MEDICINE
Payer: MEDICARE

## 2024-05-09 VITALS
SYSTOLIC BLOOD PRESSURE: 116 MMHG | HEART RATE: 68 BPM | DIASTOLIC BLOOD PRESSURE: 68 MMHG | TEMPERATURE: 98 F | RESPIRATION RATE: 18 BRPM

## 2024-05-09 DIAGNOSIS — C92.00 ACUTE MYELOID LEUKEMIA NOT HAVING ACHIEVED REMISSION: ICD-10-CM

## 2024-05-09 DIAGNOSIS — C92.00 ACUTE MYELOID LEUKEMIA NOT HAVING ACHIEVED REMISSION: Primary | ICD-10-CM

## 2024-05-09 PROCEDURE — 25000003 PHARM REV CODE 250: Performed by: INTERNAL MEDICINE

## 2024-05-09 PROCEDURE — P9037 PLATE PHERES LEUKOREDU IRRAD: HCPCS | Performed by: INTERNAL MEDICINE

## 2024-05-09 PROCEDURE — 96375 TX/PRO/DX INJ NEW DRUG ADDON: CPT

## 2024-05-09 PROCEDURE — 96374 THER/PROPH/DIAG INJ IV PUSH: CPT

## 2024-05-09 PROCEDURE — 36430 TRANSFUSION BLD/BLD COMPNT: CPT

## 2024-05-09 RX ORDER — FAMOTIDINE 10 MG/ML
20 INJECTION INTRAVENOUS ONCE
Status: COMPLETED | OUTPATIENT
Start: 2024-05-09 | End: 2024-05-09

## 2024-05-09 RX ORDER — FAMOTIDINE 10 MG/ML
20 INJECTION INTRAVENOUS ONCE
Status: CANCELLED | OUTPATIENT
Start: 2024-05-09

## 2024-05-09 RX ORDER — HYDROCODONE BITARTRATE AND ACETAMINOPHEN 500; 5 MG/1; MG/1
TABLET ORAL ONCE
Status: CANCELLED | OUTPATIENT
Start: 2024-05-09 | End: 2024-05-09

## 2024-05-09 RX ORDER — HYDROCODONE BITARTRATE AND ACETAMINOPHEN 500; 5 MG/1; MG/1
TABLET ORAL ONCE
Status: DISCONTINUED | OUTPATIENT
Start: 2024-05-09 | End: 2024-05-22 | Stop reason: CLARIF

## 2024-05-09 RX ADMIN — FAMOTIDINE 20 MG: 10 INJECTION, SOLUTION INTRAVENOUS at 08:05

## 2024-05-10 LAB
ABO + RH BLD: NORMAL
ABO + RH BLD: NORMAL
BLD PROD TYP BPU: NORMAL
BLD PROD TYP BPU: NORMAL
BLOOD UNIT EXPIRATION DATE: NORMAL
BLOOD UNIT EXPIRATION DATE: NORMAL
BLOOD UNIT TYPE CODE: 1700
BLOOD UNIT TYPE CODE: 1700
CROSSMATCH INTERPRETATION: NORMAL
CROSSMATCH INTERPRETATION: NORMAL
DISPENSE STATUS: NORMAL
DISPENSE STATUS: NORMAL
RBCS: NORMAL
UNIT NUMBER: NORMAL
UNIT NUMBER: NORMAL

## 2024-05-13 ENCOUNTER — INFUSION (OUTPATIENT)
Dept: INFUSION THERAPY | Facility: HOSPITAL | Age: 74
End: 2024-05-13
Attending: INTERNAL MEDICINE
Payer: MEDICARE

## 2024-05-13 VITALS
TEMPERATURE: 98 F | HEART RATE: 60 BPM | DIASTOLIC BLOOD PRESSURE: 72 MMHG | RESPIRATION RATE: 18 BRPM | SYSTOLIC BLOOD PRESSURE: 136 MMHG

## 2024-05-13 DIAGNOSIS — C92.00 ACUTE MYELOID LEUKEMIA NOT HAVING ACHIEVED REMISSION: ICD-10-CM

## 2024-05-13 DIAGNOSIS — C92.00 ACUTE MYELOID LEUKEMIA NOT HAVING ACHIEVED REMISSION: Primary | ICD-10-CM

## 2024-05-13 PROCEDURE — 36430 TRANSFUSION BLD/BLD COMPNT: CPT

## 2024-05-13 PROCEDURE — 96375 TX/PRO/DX INJ NEW DRUG ADDON: CPT

## 2024-05-13 PROCEDURE — 25000003 PHARM REV CODE 250: Performed by: INTERNAL MEDICINE

## 2024-05-13 PROCEDURE — P9037 PLATE PHERES LEUKOREDU IRRAD: HCPCS | Performed by: INTERNAL MEDICINE

## 2024-05-13 PROCEDURE — 96374 THER/PROPH/DIAG INJ IV PUSH: CPT

## 2024-05-13 RX ORDER — HYDROCODONE BITARTRATE AND ACETAMINOPHEN 500; 5 MG/1; MG/1
TABLET ORAL ONCE
Status: DISCONTINUED | OUTPATIENT
Start: 2024-05-13 | End: 2024-05-22 | Stop reason: CLARIF

## 2024-05-13 RX ORDER — FAMOTIDINE 10 MG/ML
20 INJECTION INTRAVENOUS ONCE
Status: COMPLETED | OUTPATIENT
Start: 2024-05-13 | End: 2024-05-13

## 2024-05-13 RX ORDER — HYDROCODONE BITARTRATE AND ACETAMINOPHEN 500; 5 MG/1; MG/1
TABLET ORAL ONCE
Status: CANCELLED | OUTPATIENT
Start: 2024-05-13 | End: 2024-05-13

## 2024-05-13 RX ORDER — FAMOTIDINE 10 MG/ML
20 INJECTION INTRAVENOUS ONCE
Status: CANCELLED | OUTPATIENT
Start: 2024-05-13

## 2024-05-13 RX ADMIN — FAMOTIDINE 20 MG: 10 INJECTION, SOLUTION INTRAVENOUS at 09:05

## 2024-05-16 ENCOUNTER — INFUSION (OUTPATIENT)
Dept: INFUSION THERAPY | Facility: HOSPITAL | Age: 74
End: 2024-05-16
Attending: INTERNAL MEDICINE
Payer: MEDICARE

## 2024-05-16 ENCOUNTER — LAB VISIT (OUTPATIENT)
Dept: LAB | Facility: HOSPITAL | Age: 74
End: 2024-05-16
Attending: INTERNAL MEDICINE
Payer: MEDICARE

## 2024-05-16 DIAGNOSIS — C92.00 ACUTE MYELOID LEUKEMIA NOT HAVING ACHIEVED REMISSION: ICD-10-CM

## 2024-05-16 DIAGNOSIS — D61.818 PANCYTOPENIA: ICD-10-CM

## 2024-05-16 LAB
ALBUMIN SERPL-MCNC: 3.5 G/DL (ref 3.4–4.8)
ALBUMIN/GLOB SERPL: 1.2 RATIO (ref 1.1–2)
ALP SERPL-CCNC: 80 UNIT/L (ref 40–150)
ALT SERPL-CCNC: 12 UNIT/L (ref 0–55)
AST SERPL-CCNC: 14 UNIT/L (ref 5–34)
BASOPHILS # BLD AUTO: 0 X10(3)/MCL
BASOPHILS NFR BLD AUTO: 0 %
BILIRUB SERPL-MCNC: 0.8 MG/DL
BUN SERPL-MCNC: 21.6 MG/DL (ref 8.4–25.7)
CALCIUM SERPL-MCNC: 8.3 MG/DL (ref 8.8–10)
CHLORIDE SERPL-SCNC: 107 MMOL/L (ref 98–107)
CO2 SERPL-SCNC: 26 MMOL/L (ref 23–31)
CREAT SERPL-MCNC: 0.85 MG/DL (ref 0.73–1.18)
EOSINOPHIL # BLD AUTO: 0 X10(3)/MCL (ref 0–0.9)
EOSINOPHIL NFR BLD AUTO: 0 %
ERYTHROCYTE [DISTWIDTH] IN BLOOD BY AUTOMATED COUNT: 20.1 % (ref 11.5–17)
GFR SERPLBLD CREATININE-BSD FMLA CKD-EPI: >60 ML/MIN/1.73/M2
GLOBULIN SER-MCNC: 3 GM/DL (ref 2.4–3.5)
GLUCOSE SERPL-MCNC: 101 MG/DL (ref 82–115)
HCT VFR BLD AUTO: 27.2 % (ref 42–52)
HGB BLD-MCNC: 7.9 G/DL (ref 14–18)
IMM GRANULOCYTES # BLD AUTO: 0.03 X10(3)/MCL (ref 0–0.04)
IMM GRANULOCYTES NFR BLD AUTO: 2.1 %
LYMPHOCYTES # BLD AUTO: 0.68 X10(3)/MCL (ref 0.6–4.6)
LYMPHOCYTES NFR BLD AUTO: 46.9 %
MCH RBC QN AUTO: 28.5 PG (ref 27–31)
MCHC RBC AUTO-ENTMCNC: 29 G/DL (ref 33–36)
MCV RBC AUTO: 98.2 FL (ref 80–94)
MONOCYTES # BLD AUTO: 0.48 X10(3)/MCL (ref 0.1–1.3)
MONOCYTES NFR BLD AUTO: 33.1 %
NEUTROPHILS # BLD AUTO: 0.26 X10(3)/MCL (ref 2.1–9.2)
NEUTROPHILS NFR BLD AUTO: 17.9 %
PLATELET # BLD AUTO: 55 X10(3)/MCL (ref 130–400)
PMV BLD AUTO: ABNORMAL FL
POTASSIUM SERPL-SCNC: 4.2 MMOL/L (ref 3.5–5.1)
PROT SERPL-MCNC: 6.5 GM/DL (ref 5.8–7.6)
RBC # BLD AUTO: 2.77 X10(6)/MCL (ref 4.7–6.1)
SODIUM SERPL-SCNC: 141 MMOL/L (ref 136–145)
WBC # SPEC AUTO: 1.45 X10(3)/MCL (ref 4.5–11.5)

## 2024-05-16 PROCEDURE — 85025 COMPLETE CBC W/AUTO DIFF WBC: CPT

## 2024-05-16 PROCEDURE — 80053 COMPREHEN METABOLIC PANEL: CPT

## 2024-05-16 PROCEDURE — 36415 COLL VENOUS BLD VENIPUNCTURE: CPT

## 2024-05-17 LAB — RBCS: NORMAL

## 2024-05-17 NOTE — PROGRESS NOTES
Subjective:       Patient ID: Hussein Gonzalez Jr. is a 73 y.o. male.    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    Current Treatment:   Azacitidine and venetoclax started on 02/27/2023.  Started 7 out of 28 days, did this through cycle 5.  With cycle 6, moved to 5 out of 28 days.  Referral for possible stem cell transplant evaluation.    Treatment History:      HPI:  73-year-old male with a history of CVA, hypertension, BPH, and melanoma who presented to the emergency department following a syncopal episode.  In February of 2022, the patient did have a slightly low white blood cell count, hemoglobin, and platelet count.  Per the patient, 3 months prior to admission, he was having dizziness, fatigue, weakness, shortness of breath, back pain, and leg pain.  The dizziness was worse with standing and walking.  On 01/20/2023, the patient had a syncopal episode.  He was brought to the emergency department and was found to be pancytopenic.  He also had stool that was positive for occult blood in the emergency department.  He received blood transfusion.  His CBC on 01/20/2023 revealed a white blood cell count of 2.6, hemoglobin of 7.7, and platelet count of 47,000. Reticulocytes were appropriately elevated, B12 and folate were within normal limits, iron level was slightly decreased.  Repeat blood work on 01/21/2023 revealed a white blood cell count of 2.8, post transfusion hemoglobin of 9.6, and a platelet count of 26882.  Hematology was consulted for pancytopenia.  EGD and colonoscopy done on 01/24/2023, EGD revealed nonbleeding ulcers, colonoscopy unremarkable, however had to be aborted due to tortuous colon.  Bone Marrow Biopsy done on 01/25/2023.  Preliminary reveals AML with 27% blasts in a background of dysplasia.  Cytogenetics are within normal  limits, FISH is within normal limits.  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  Started azacitidine and venetoclax on 02/27/2023, completed cycle 4 on 05/30/2023.  Held after this cycle and repeated a bone marrow biopsy, this was done on 06/27/2023, this showed a normocellular marrow (40%) with relative erythroid hyperplasia, sequential granulopoiesis and megakaryopoiesis.  The bone marrow was slightly suboptimal given it was predominantly subcortical bone with focal hematopoietic elements.  There was no morphologic or immunophenotypic evidence of previous acute myeloid leukemia, cytogenetics were normal, FISH was normal, NGS showed persistent ASXL1, SRSF2, TET2.    Repeat bone marrow biopsy done on 11/21/2023 showed no morphologic or immunophenotypic evidence of persistent acute myeloid leukemia. There were ASXL1, CEBPA, RUNX1, SRSF2, STAG2, and TET2 mutations.    Interval History:   Patient presents for scheduled follow up appointment for AML.  He does have some fatigue.  He states that overall he feels well, but he does have limitations in daily activities.        Past Medical History:   Diagnosis Date    Anxiety and depression     BPH (benign prostatic hyperplasia)     History of CVA (cerebrovascular accident) without residual deficits     HTN (hypertension)     Malignant melanoma of skin, unspecified     NSTEMI (non-ST elevated myocardial infarction)     Sepsis, Gram negative     Sleep apnea       Past Surgical History:   Procedure Laterality Date    BONE MARROW ASPIRATION N/A 6/27/2023    Procedure: ASPIRATION, BONE MARROW;  Surgeon: Sabino Núñez MD;  Location: [facility];  Service: General;  Laterality: N/A;  ALL ASSESS FOR MOLECULAR MRD    BONE MARROW BIOPSY N/A 1/25/2023    Procedure: Biopsy-bone marrow;  Surgeon: Rd Carlos MD;  Location: [facility] OR;  Service: General;  Laterality: N/A;    BONE MARROW BIOPSY N/A 11/21/2023    Procedure: Biopsy-bone marrow;  Surgeon:  Junior Castro MD;  Location: [facility];  Service: General;  Laterality: N/A;    COLONOSCOPY      COLONOSCOPY N/A 1/24/2023    Procedure: COLONOSCOPY (DOUBLE);  Surgeon: Jimy Baker MD;  Location: [facility] ENDOSCOPY;  Service: Gastroenterology;  Laterality: N/A;    EGD, WITH CLOSED BIOPSY N/A 1/24/2023    Procedure: EGD, WITH CLOSED BIOPSY;  Surgeon: Jimy Baker MD;  Location: [facility] ENDOSCOPY;  Service: Gastroenterology;  Laterality: N/A;    EXCISION OF MELANOMA      SINUS SURGERY      TONSILLECTOMY       Social History     Socioeconomic History    Marital status:    Tobacco Use    Smoking status: Never    Smokeless tobacco: Never   Substance and Sexual Activity    Alcohol use: Not Currently     Alcohol/week: 7.0 standard drinks of alcohol     Types: 7 Cans of beer per week    Drug use: Never    Sexual activity: Yes     Social Determinants of Health     Financial Resource Strain: Low Risk  (4/20/2023)    Overall Financial Resource Strain (CARDIA)     Difficulty of Paying Living Expenses: Not hard at all   Food Insecurity: No Food Insecurity (4/20/2023)    Hunger Vital Sign     Worried About Running Out of Food in the Last Year: Never true     Ran Out of Food in the Last Year: Never true   Transportation Needs: No Transportation Needs (4/20/2023)    PRAPARE - Transportation     Lack of Transportation (Medical): No     Lack of Transportation (Non-Medical): No   Physical Activity: Insufficiently Active (4/20/2023)    Exercise Vital Sign     Days of Exercise per Week: 3 days     Minutes of Exercise per Session: 30 min   Stress: No Stress Concern Present (4/20/2023)    [employer] - Occupational Stress Questionnaire     Feeling of Stress : Not at all   Housing Stability: Low Risk  (4/20/2023)    Housing Stability Vital Sign     Unable to Pay for Housing in the Last Year: No     Number of Places Lived in the Last Year: 1     Unstable Housing in the Last Year: No      Family  History   Problem Relation Name Age of Onset    Cancer Mother      Cancer Father        Review of patient's allergies indicates:   Allergen Reactions    Acetaminophen Other (See Comments)     Restless leg     Benadryl [diphenhydramine hcl] Other (See Comments)     Restless Legs      Review of Systems   Constitutional:  Negative for chills, diaphoresis, fatigue, fever and unexpected weight change.   HENT:  Negative for nasal congestion, mouth sores, sinus pressure/congestion and sore throat.    Eyes:  Negative for pain and visual disturbance.   Respiratory:  Negative for cough, chest tightness and shortness of breath.    Cardiovascular:  Negative for chest pain, palpitations and leg swelling.   Gastrointestinal:  Negative for abdominal distention, abdominal pain, blood in stool, constipation and diarrhea.   Genitourinary:  Negative for dysuria, frequency and hematuria.   Musculoskeletal:  Positive for arthralgias and myalgias. Negative for back pain.   Integumentary:  Negative for rash.   Neurological:  Negative for dizziness, syncope, weakness, numbness and headaches.   Hematological:  Negative for adenopathy.   Psychiatric/Behavioral:  Negative for confusion.          Objective:      Physical Exam  Vitals reviewed.   Constitutional:       General: He is awake.      Appearance: Normal appearance.   HENT:      Head: Normocephalic and atraumatic.      Right Ear: Hearing normal.      Left Ear: Hearing normal.      Nose: Nose normal.   Eyes:      General: Lids are normal. Vision grossly intact.      Extraocular Movements: Extraocular movements intact.      Conjunctiva/sclera: Conjunctivae normal.   Cardiovascular:      Rate and Rhythm: Normal rate and regular rhythm.      Pulses: Normal pulses.   Pulmonary:      Effort: Pulmonary effort is normal.      Breath sounds: Normal breath sounds. No wheezing, rhonchi or rales.   Musculoskeletal:      Cervical back: Full passive range of motion without pain.      Right lower  leg: No edema.      Left lower leg: No edema.   Skin:     General: Skin is warm.      Comments:      Neurological:      General: No focal deficit present.      Mental Status: He is alert and oriented to person, place, and time.   Psychiatric:         Attention and Perception: Attention normal.         Mood and Affect: Mood and affect normal.         Behavior: Behavior is cooperative.         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    Treating with azacitidine and venetoclax, Started on 02/27/2023.  Bone marrow biopsy after cycle 4 shows no immunophenotypic or morphologic evidence of AML.  NGS showed continued ASXL1, SRSF2, TET2 mutations.  Cytogenetics were normal.    Evaluated by Dr. Valdez in [place].     Continue prophylaxis with 500 mg of levofloxacin daily, 200 mg of acyclovir b.i.d., 200 mg of fluconazole daily.    He restarted treatment on 07/10/2023, on 08/14/2023, we dose reduced by changing venetoclax to 14 out of every 28 days.    Venetoclax 200 mg on days 1-14 of a 28 day cycle    He treated with azacitidine 7 days out of 28 days for the first 5 cycles, with cycle 6 moving forward, we did 5 days.  He is currently treating with 3 days.  Next round to start this week, cycle 14.    Repeat bone marrow biopsy done on 11/21/2023 with no morphologic or immunophenotypic evidence of persistent AML    Twice weekly labs (Monday/Thursday) with transfusions as needed.     1 unit of platelets today     Repeat bone marrow biopsy     Return to clinic in 4 weeks to review bone marrow results    Eduard Luna II,  MD

## 2024-05-20 ENCOUNTER — OFFICE VISIT (OUTPATIENT)
Dept: HEMATOLOGY/ONCOLOGY | Facility: CLINIC | Age: 74
End: 2024-05-20
Payer: MEDICARE

## 2024-05-20 ENCOUNTER — INFUSION (OUTPATIENT)
Dept: INFUSION THERAPY | Facility: HOSPITAL | Age: 74
End: 2024-05-20
Attending: INTERNAL MEDICINE
Payer: MEDICARE

## 2024-05-20 VITALS
TEMPERATURE: 98 F | WEIGHT: 180 LBS | BODY MASS INDEX: 24.38 KG/M2 | HEART RATE: 82 BPM | SYSTOLIC BLOOD PRESSURE: 99 MMHG | OXYGEN SATURATION: 98 % | RESPIRATION RATE: 18 BRPM | HEIGHT: 72 IN | DIASTOLIC BLOOD PRESSURE: 59 MMHG

## 2024-05-20 VITALS
OXYGEN SATURATION: 97 % | SYSTOLIC BLOOD PRESSURE: 132 MMHG | HEART RATE: 60 BPM | RESPIRATION RATE: 16 BRPM | DIASTOLIC BLOOD PRESSURE: 75 MMHG | TEMPERATURE: 98 F

## 2024-05-20 DIAGNOSIS — D61.818 PANCYTOPENIA: ICD-10-CM

## 2024-05-20 DIAGNOSIS — C92.00 ACUTE MYELOID LEUKEMIA NOT HAVING ACHIEVED REMISSION: Primary | ICD-10-CM

## 2024-05-20 PROCEDURE — 25000003 PHARM REV CODE 250: Performed by: INTERNAL MEDICINE

## 2024-05-20 PROCEDURE — P9037 PLATE PHERES LEUKOREDU IRRAD: HCPCS | Performed by: INTERNAL MEDICINE

## 2024-05-20 PROCEDURE — 96401 CHEMO ANTI-NEOPL SQ/IM: CPT

## 2024-05-20 PROCEDURE — 99999 PR PBB SHADOW E&M-EST. PATIENT-LVL V: CPT | Mod: PBBFAC,,, | Performed by: INTERNAL MEDICINE

## 2024-05-20 PROCEDURE — 36430 TRANSFUSION BLD/BLD COMPNT: CPT

## 2024-05-20 PROCEDURE — 99214 OFFICE O/P EST MOD 30 MIN: CPT | Mod: S$PBB,,, | Performed by: INTERNAL MEDICINE

## 2024-05-20 PROCEDURE — 96374 THER/PROPH/DIAG INJ IV PUSH: CPT

## 2024-05-20 PROCEDURE — 63600175 PHARM REV CODE 636 W HCPCS: Performed by: INTERNAL MEDICINE

## 2024-05-20 PROCEDURE — 99215 OFFICE O/P EST HI 40 MIN: CPT | Mod: PBBFAC | Performed by: INTERNAL MEDICINE

## 2024-05-20 RX ORDER — FAMOTIDINE 10 MG/ML
20 INJECTION INTRAVENOUS ONCE
Status: COMPLETED | OUTPATIENT
Start: 2024-05-20 | End: 2024-05-20

## 2024-05-20 RX ORDER — AZACITIDINE 100 MG/1
75 INJECTION, POWDER, LYOPHILIZED, FOR SOLUTION INTRAVENOUS; SUBCUTANEOUS
Status: COMPLETED | OUTPATIENT
Start: 2024-05-20 | End: 2024-05-20

## 2024-05-20 RX ORDER — ONDANSETRON 4 MG/1
16 TABLET, ORALLY DISINTEGRATING ORAL
Status: CANCELLED | OUTPATIENT
Start: 2024-05-21

## 2024-05-20 RX ORDER — ONDANSETRON 4 MG/1
16 TABLET, ORALLY DISINTEGRATING ORAL
Status: CANCELLED | OUTPATIENT
Start: 2024-05-22

## 2024-05-20 RX ORDER — HYDROCODONE BITARTRATE AND ACETAMINOPHEN 500; 5 MG/1; MG/1
TABLET ORAL ONCE
Status: CANCELLED | OUTPATIENT
Start: 2024-05-20 | End: 2024-05-20

## 2024-05-20 RX ORDER — FAMOTIDINE 10 MG/ML
20 INJECTION INTRAVENOUS ONCE
Status: CANCELLED | OUTPATIENT
Start: 2024-05-20

## 2024-05-20 RX ORDER — AZACITIDINE 100 MG/1
75 INJECTION, POWDER, LYOPHILIZED, FOR SOLUTION INTRAVENOUS; SUBCUTANEOUS
Status: CANCELLED | OUTPATIENT
Start: 2024-05-22

## 2024-05-20 RX ORDER — ONDANSETRON 4 MG/1
16 TABLET, ORALLY DISINTEGRATING ORAL
Status: COMPLETED | OUTPATIENT
Start: 2024-05-20 | End: 2024-05-20

## 2024-05-20 RX ORDER — AZACITIDINE 100 MG/1
75 INJECTION, POWDER, LYOPHILIZED, FOR SOLUTION INTRAVENOUS; SUBCUTANEOUS
Status: CANCELLED | OUTPATIENT
Start: 2024-05-21

## 2024-05-20 RX ORDER — DOXYCYCLINE HYCLATE 100 MG
100 TABLET ORAL 2 TIMES DAILY
COMMUNITY
Start: 2024-05-14

## 2024-05-20 RX ORDER — HYDROCODONE BITARTRATE AND ACETAMINOPHEN 500; 5 MG/1; MG/1
TABLET ORAL ONCE
Status: DISCONTINUED | OUTPATIENT
Start: 2024-05-20 | End: 2024-05-22 | Stop reason: CLARIF

## 2024-05-20 RX ADMIN — AZACITIDINE 155 MG: 100 INJECTION, POWDER, LYOPHILIZED, FOR SOLUTION INTRAVENOUS; SUBCUTANEOUS at 10:05

## 2024-05-20 RX ADMIN — FAMOTIDINE 20 MG: 10 INJECTION, SOLUTION INTRAVENOUS at 10:05

## 2024-05-20 RX ADMIN — ONDANSETRON 16 MG: 4 TABLET, ORALLY DISINTEGRATING ORAL at 10:05

## 2024-05-20 NOTE — PLAN OF CARE
Problem: Adult Inpatient Plan of Care  Goal: Plan of Care Review  Outcome: Met  Flowsheets (Taken 5/20/2024 1119)  Plan of Care Reviewed With: patient  Goal: Absence of Hospital-Acquired Illness or Injury  Outcome: Met  Intervention: Identify and Manage Fall Risk  Flowsheets (Taken 5/20/2024 1119)  Safety Promotion/Fall Prevention:   assistive device/personal item within reach   Fall Risk reviewed with patient/family   in recliner, wheels locked     Next appt reviewed; pt denied questions or further needs at the time of discharge.

## 2024-05-21 ENCOUNTER — INFUSION (OUTPATIENT)
Dept: INFUSION THERAPY | Facility: HOSPITAL | Age: 74
End: 2024-05-21
Attending: INTERNAL MEDICINE
Payer: MEDICARE

## 2024-05-21 VITALS
SYSTOLIC BLOOD PRESSURE: 105 MMHG | OXYGEN SATURATION: 99 % | HEIGHT: 72 IN | BODY MASS INDEX: 24.38 KG/M2 | WEIGHT: 180 LBS | RESPIRATION RATE: 18 BRPM | HEART RATE: 84 BPM | DIASTOLIC BLOOD PRESSURE: 67 MMHG | TEMPERATURE: 98 F

## 2024-05-21 DIAGNOSIS — C92.00 ACUTE MYELOID LEUKEMIA NOT HAVING ACHIEVED REMISSION: Primary | ICD-10-CM

## 2024-05-21 PROCEDURE — 63600175 PHARM REV CODE 636 W HCPCS: Performed by: INTERNAL MEDICINE

## 2024-05-21 PROCEDURE — 96401 CHEMO ANTI-NEOPL SQ/IM: CPT

## 2024-05-21 PROCEDURE — 25000003 PHARM REV CODE 250: Performed by: INTERNAL MEDICINE

## 2024-05-21 RX ORDER — ONDANSETRON 4 MG/1
16 TABLET, ORALLY DISINTEGRATING ORAL
Status: COMPLETED | OUTPATIENT
Start: 2024-05-21 | End: 2024-05-21

## 2024-05-21 RX ORDER — AZACITIDINE 100 MG/1
75 INJECTION, POWDER, LYOPHILIZED, FOR SOLUTION INTRAVENOUS; SUBCUTANEOUS
Status: COMPLETED | OUTPATIENT
Start: 2024-05-21 | End: 2024-05-21

## 2024-05-21 RX ORDER — AZACITIDINE 100 MG/1
75 INJECTION, POWDER, LYOPHILIZED, FOR SOLUTION INTRAVENOUS; SUBCUTANEOUS
Status: CANCELLED | OUTPATIENT
Start: 2024-05-22

## 2024-05-21 RX ORDER — ONDANSETRON 4 MG/1
16 TABLET, ORALLY DISINTEGRATING ORAL
Status: CANCELLED | OUTPATIENT
Start: 2024-05-22

## 2024-05-21 RX ADMIN — AZACITIDINE 155 MG: 100 INJECTION, POWDER, LYOPHILIZED, FOR SOLUTION INTRAVENOUS; SUBCUTANEOUS at 02:05

## 2024-05-21 RX ADMIN — ONDANSETRON 16 MG: 4 TABLET, ORALLY DISINTEGRATING ORAL at 02:05

## 2024-05-21 NOTE — NURSING
C14 D2 Vidaza injections given, tolerated well. Pt discharged home in stable condition, aware of future appts.

## 2024-05-22 ENCOUNTER — HOSPITAL ENCOUNTER (OUTPATIENT)
Facility: HOSPITAL | Age: 74
Discharge: HOME | End: 2024-05-22
Attending: PATHOLOGY | Admitting: PATHOLOGY
Payer: MEDICARE

## 2024-05-22 ENCOUNTER — INFUSION (OUTPATIENT)
Dept: INFUSION THERAPY | Facility: HOSPITAL | Age: 74
End: 2024-05-22
Attending: INTERNAL MEDICINE
Payer: MEDICARE

## 2024-05-22 VITALS
TEMPERATURE: 98 F | RESPIRATION RATE: 16 BRPM | HEART RATE: 67 BPM | OXYGEN SATURATION: 97 % | SYSTOLIC BLOOD PRESSURE: 119 MMHG | DIASTOLIC BLOOD PRESSURE: 71 MMHG

## 2024-05-22 DIAGNOSIS — C92.00 ACUTE MYELOID LEUKEMIA NOT HAVING ACHIEVED REMISSION: Primary | ICD-10-CM

## 2024-05-22 DIAGNOSIS — D69.6 THROMBOCYTOPENIA: ICD-10-CM

## 2024-05-22 DIAGNOSIS — C92.00 ACUTE MYELOID LEUKEMIA NOT HAVING ACHIEVED REMISSION: ICD-10-CM

## 2024-05-22 DIAGNOSIS — D69.6 THROMBOCYTOPENIA: Primary | ICD-10-CM

## 2024-05-22 LAB
ABO + RH BLD: NORMAL
ABS NEUT (OLG): 0.46 X10(3)/MCL (ref 2.1–9.2)
ANISOCYTOSIS BLD QL SMEAR: ABNORMAL
B-HCG SERPL QL: 4 %
BLD PROD TYP BPU: NORMAL
BLOOD UNIT EXPIRATION DATE: NORMAL
BLOOD UNIT TYPE CODE: 7300
CROSSMATCH INTERPRETATION: NORMAL
DISPENSE STATUS: NORMAL
ERYTHROCYTE [DISTWIDTH] IN BLOOD BY AUTOMATED COUNT: 20.9 % (ref 11.5–17)
HCT VFR BLD AUTO: 25.7 % (ref 42–52)
HEMATOLOGIST REVIEW: NORMAL
HGB BLD-MCNC: 7.3 G/DL (ref 14–18)
INSTRUMENT WBC (OLG): 1.13 X10(3)/MCL
LYMPHOCYTES NFR BLD MANUAL: 0.56 X10(3)/MCL
LYMPHOCYTES NFR BLD MANUAL: 50 %
MACROCYTES BLD QL SMEAR: ABNORMAL
MCH RBC QN AUTO: 28 PG (ref 27–31)
MCHC RBC AUTO-ENTMCNC: 28.4 G/DL (ref 33–36)
MCV RBC AUTO: 98.5 FL (ref 80–94)
MONOCYTES NFR BLD MANUAL: 0.05 X10(3)/MCL (ref 0.1–1.3)
MONOCYTES NFR BLD MANUAL: 4 %
MYELOCYTES NFR BLD MANUAL: 1 %
NEUTROPHILS NFR BLD MANUAL: 41 %
NRBC BLD AUTO-RTO: 1.8 %
NRBC BLD MANUAL-RTO: 2 %
OVALOCYTES (OLG): ABNORMAL
PLATELET # BLD AUTO: 6 X10(3)/MCL (ref 130–400)
PLATELET # BLD EST: ABNORMAL 10*3/UL
PLATELETS.RETICULATED NFR BLD AUTO: 1.2 % (ref 0.9–11.2)
PMV BLD AUTO: ABNORMAL FL
POIKILOCYTOSIS BLD QL SMEAR: ABNORMAL
POLYCHROMASIA BLD QL SMEAR: ABNORMAL
RBC # BLD AUTO: 2.61 X10(6)/MCL (ref 4.7–6.1)
RBC MORPH BLD: ABNORMAL
TEAR DROP CELL (OLG): ABNORMAL
UNIT NUMBER: NORMAL
WBC # SPEC AUTO: 1.13 X10(3)/MCL (ref 4.5–11.5)

## 2024-05-22 PROCEDURE — 88342 IMHCHEM/IMCYTCHM 1ST ANTB: CPT

## 2024-05-22 PROCEDURE — 38222 DX BONE MARROW BX & ASPIR: CPT | Performed by: PATHOLOGY

## 2024-05-22 PROCEDURE — 85027 COMPLETE CBC AUTOMATED: CPT | Performed by: PATHOLOGY

## 2024-05-22 PROCEDURE — 88313 SPECIAL STAINS GROUP 2: CPT | Mod: 59

## 2024-05-22 PROCEDURE — P9037 PLATE PHERES LEUKOREDU IRRAD: HCPCS | Performed by: NURSE PRACTITIONER

## 2024-05-22 PROCEDURE — 88341 IMHCHEM/IMCYTCHM EA ADD ANTB: CPT | Mod: 59

## 2024-05-22 PROCEDURE — 36430 TRANSFUSION BLD/BLD COMPNT: CPT

## 2024-05-22 PROCEDURE — 63600175 PHARM REV CODE 636 W HCPCS: Performed by: NURSE PRACTITIONER

## 2024-05-22 PROCEDURE — 88311 DECALCIFY TISSUE: CPT

## 2024-05-22 PROCEDURE — 25000003 PHARM REV CODE 250: Performed by: PATHOLOGY

## 2024-05-22 PROCEDURE — 36415 COLL VENOUS BLD VENIPUNCTURE: CPT | Performed by: PATHOLOGY

## 2024-05-22 PROCEDURE — 96401 CHEMO ANTI-NEOPL SQ/IM: CPT

## 2024-05-22 PROCEDURE — 25000003 PHARM REV CODE 250: Performed by: NURSE PRACTITIONER

## 2024-05-22 PROCEDURE — 25000003 PHARM REV CODE 250: Performed by: INTERNAL MEDICINE

## 2024-05-22 PROCEDURE — 88305 TISSUE EXAM BY PATHOLOGIST: CPT | Mod: 59 | Performed by: INTERNAL MEDICINE

## 2024-05-22 PROCEDURE — 99152 MOD SED SAME PHYS/QHP 5/>YRS: CPT | Performed by: PATHOLOGY

## 2024-05-22 PROCEDURE — 63600175 PHARM REV CODE 636 W HCPCS: Performed by: PATHOLOGY

## 2024-05-22 PROCEDURE — 96374 THER/PROPH/DIAG INJ IV PUSH: CPT

## 2024-05-22 RX ORDER — FAMOTIDINE 10 MG/ML
20 INJECTION INTRAVENOUS
Status: CANCELLED
Start: 2024-05-22

## 2024-05-22 RX ORDER — HYDROCODONE BITARTRATE AND ACETAMINOPHEN 500; 5 MG/1; MG/1
TABLET ORAL ONCE
Status: CANCELLED | OUTPATIENT
Start: 2024-05-22 | End: 2024-05-22

## 2024-05-22 RX ORDER — FAMOTIDINE 10 MG/ML
20 INJECTION INTRAVENOUS
Status: COMPLETED | OUTPATIENT
Start: 2024-05-22 | End: 2024-05-22

## 2024-05-22 RX ORDER — SODIUM CHLORIDE 9 MG/ML
INJECTION, SOLUTION INTRAVENOUS CONTINUOUS
Status: DISCONTINUED | OUTPATIENT
Start: 2024-05-22 | End: 2024-05-22 | Stop reason: HOSPADM

## 2024-05-22 RX ORDER — MIDAZOLAM HYDROCHLORIDE 2 MG/2ML
1 INJECTION, SOLUTION INTRAMUSCULAR; INTRAVENOUS
Status: DISCONTINUED | OUTPATIENT
Start: 2024-05-22 | End: 2024-05-22 | Stop reason: HOSPADM

## 2024-05-22 RX ORDER — ONDANSETRON 4 MG/1
16 TABLET, ORALLY DISINTEGRATING ORAL
Status: COMPLETED | OUTPATIENT
Start: 2024-05-22 | End: 2024-05-22

## 2024-05-22 RX ORDER — FLUMAZENIL 0.1 MG/ML
0.2 INJECTION INTRAVENOUS
Status: DISCONTINUED | OUTPATIENT
Start: 2024-05-22 | End: 2024-05-22 | Stop reason: HOSPADM

## 2024-05-22 RX ORDER — AZACITIDINE 100 MG/1
75 INJECTION, POWDER, LYOPHILIZED, FOR SOLUTION INTRAVENOUS; SUBCUTANEOUS
Status: COMPLETED | OUTPATIENT
Start: 2024-05-22 | End: 2024-05-22

## 2024-05-22 RX ORDER — HYDROCODONE BITARTRATE AND ACETAMINOPHEN 500; 5 MG/1; MG/1
TABLET ORAL ONCE
Status: COMPLETED | OUTPATIENT
Start: 2024-05-22 | End: 2024-05-22

## 2024-05-22 RX ADMIN — AZACITIDINE 155 MG: 100 INJECTION, POWDER, LYOPHILIZED, FOR SOLUTION INTRAVENOUS; SUBCUTANEOUS at 12:05

## 2024-05-22 RX ADMIN — SODIUM CHLORIDE: 9 INJECTION, SOLUTION INTRAVENOUS at 12:05

## 2024-05-22 RX ADMIN — FAMOTIDINE 20 MG: 10 INJECTION, SOLUTION INTRAVENOUS at 12:05

## 2024-05-22 RX ADMIN — ONDANSETRON 16 MG: 4 TABLET, ORALLY DISINTEGRATING ORAL at 12:05

## 2024-05-22 RX ADMIN — SODIUM CHLORIDE: 9 INJECTION, SOLUTION INTRAVENOUS at 10:05

## 2024-05-22 RX ADMIN — MIDAZOLAM HYDROCHLORIDE 4 MG: 1 INJECTION, SOLUTION INTRAMUSCULAR; INTRAVENOUS at 10:05

## 2024-05-22 RX ADMIN — MIDAZOLAM HYDROCHLORIDE 1 MG: 1 INJECTION, SOLUTION INTRAMUSCULAR; INTRAVENOUS at 10:05

## 2024-05-22 NOTE — PROCEDURES
Hussein Gonzalez Jr. is a 73 y.o. male patient.    Temp: 98 °F (36.7 °C) (05/22/24 0810)  Pulse: 66 (05/22/24 1020)  Resp: 14 (05/22/24 1020)  BP: 122/67 (05/22/24 1017)  SpO2: 99 % (05/22/24 1020)  Weight: 80.6 kg (177 lb 11.1 oz) (05/22/24 0927)  Height: 6' (182.9 cm) (05/22/24 0927)  Mallampati Scale: Class II  ASA Classification: Class 2    Bone marrow    Date/Time: 5/22/2024 10:35 AM    Performed by: Sabino Núñez MD  Authorized by: Sabino Núñez MD    Consent Done?: Yes (Written)   Immediately prior to procedure a time out was called to verify the correct patient, procedure, equipment, support staff and site/side marked as required.   Patient was prepped and draped in the usual sterile fashion.      Assistants?: Yes    List of assistants: Roseanne Mercado was present for the entire procedure.    Position: left lateral  Anesthesia: local infiltration and topical anesthetic  Local anesthetic: lidocaine 1% without epinephrine  Aspiration?: Yes   Biopsy?: Yes    Specimen source: right posterior iliac crest  Number of Specimens: 2  Estimated blood loss (cc):2  Patient tolerated the procedure well with no immediate complications.  Post-operative instructions were provided for the patient.  Patient was discharged and will follow up if any complications occur.       5/22/2024

## 2024-05-22 NOTE — DISCHARGE SUMMARY
[facility] - Periop Services  Discharge Note  Short Stay    Procedure(s) (LRB):  ASPIRATION, BONE MARROW (N/A)      OUTCOME: Patient tolerated treatment/procedure well without complication and is now ready for discharge.    DISPOSITION: Home or Self Care    FINAL DIAGNOSIS:  <principal problem not specified>    FOLLOWUP: In clinic    DISCHARGE INSTRUCTIONS:  No discharge procedures on file.     TIME SPENT ON DISCHARGE: 15 minutes

## 2024-05-22 NOTE — PLAN OF CARE
C14 D3 Vidaza + 1Plt given. Tolerated well. Next appts confirmed with pt. Dc'd home in stable condition.

## 2024-05-22 NOTE — DISCHARGE INSTRUCTIONS
Bone Marrow Biopsy    -No driving for 24 hours.    -May remove band-aid tomorrow and shower. No tub baths/submerging in water until site is fully healed, about 7 days.     -A purple marking pen was used to kathy the site prior to procedure, a rounded stain like smear around the dressing is normal.    [facility] at discharge for platelet transfusion.         -If bleeding occurs, hold firm pressure at the site for at least 5 minutes. Replace the dressing or bandaid, and re-time for another 24 hours before removal.     - Report to ER with any uncontrollable bleeding, heavy increase bruising at site (growing in size), chest pain, shortness of breath, and/or SEVERE/SUDDEN unrelieved abdominal pain.    INFECTION:  watch for any signs or symptoms of infection such as chills, fever, redness or drainage at surgical site. Notify your doctor     PAIN: It is normal to experience some discomfort and/or soreness at the site of procedure. Apply an ice pack on and off every 15 minutes as needed. We recommend over the counter tylenol or ibuprofen if you are able to take it.      - Biopsy results will be sent to your oncologist/referring physician.You should have a follow up appointment already scheduled for results.     - Call with any questions or concerns.

## 2024-05-23 ENCOUNTER — INFUSION (OUTPATIENT)
Dept: INFUSION THERAPY | Facility: HOSPITAL | Age: 74
End: 2024-05-23
Attending: INTERNAL MEDICINE
Payer: MEDICARE

## 2024-05-23 VITALS
HEART RATE: 70 BPM | DIASTOLIC BLOOD PRESSURE: 70 MMHG | WEIGHT: 177.69 LBS | BODY MASS INDEX: 24.07 KG/M2 | HEIGHT: 72 IN | SYSTOLIC BLOOD PRESSURE: 122 MMHG | TEMPERATURE: 98 F | RESPIRATION RATE: 18 BRPM

## 2024-05-23 VITALS
SYSTOLIC BLOOD PRESSURE: 126 MMHG | TEMPERATURE: 98 F | HEIGHT: 72 IN | DIASTOLIC BLOOD PRESSURE: 77 MMHG | RESPIRATION RATE: 16 BRPM | HEART RATE: 61 BPM | BODY MASS INDEX: 24.07 KG/M2 | WEIGHT: 177.69 LBS | OXYGEN SATURATION: 96 %

## 2024-05-23 DIAGNOSIS — D61.818 PANCYTOPENIA: Chronic | ICD-10-CM

## 2024-05-23 DIAGNOSIS — D61.818 PANCYTOPENIA: Primary | Chronic | ICD-10-CM

## 2024-05-23 DIAGNOSIS — C92.00 ACUTE MYELOID LEUKEMIA NOT HAVING ACHIEVED REMISSION: Primary | ICD-10-CM

## 2024-05-23 PROCEDURE — 25000003 PHARM REV CODE 250: Performed by: INTERNAL MEDICINE

## 2024-05-23 PROCEDURE — 96374 THER/PROPH/DIAG INJ IV PUSH: CPT

## 2024-05-23 PROCEDURE — P9040 RBC LEUKOREDUCED IRRADIATED: HCPCS | Performed by: NURSE PRACTITIONER

## 2024-05-23 PROCEDURE — 36430 TRANSFUSION BLD/BLD COMPNT: CPT

## 2024-05-23 PROCEDURE — 86923 COMPATIBILITY TEST ELECTRIC: CPT | Performed by: NURSE PRACTITIONER

## 2024-05-23 RX ORDER — HYDROCODONE BITARTRATE AND ACETAMINOPHEN 500; 5 MG/1; MG/1
TABLET ORAL ONCE
Status: CANCELLED | OUTPATIENT
Start: 2024-05-23 | End: 2024-05-23

## 2024-05-23 RX ORDER — HYDROCODONE BITARTRATE AND ACETAMINOPHEN 500; 5 MG/1; MG/1
TABLET ORAL ONCE
Status: ACTIVE | OUTPATIENT
Start: 2024-05-23

## 2024-05-23 RX ORDER — FAMOTIDINE 10 MG/ML
20 INJECTION INTRAVENOUS
Status: COMPLETED | OUTPATIENT
Start: 2024-05-23 | End: 2024-05-23

## 2024-05-23 RX ORDER — FAMOTIDINE 10 MG/ML
20 INJECTION INTRAVENOUS
Status: CANCELLED
Start: 2024-05-23

## 2024-05-23 RX ADMIN — FAMOTIDINE 20 MG: 10 INJECTION, SOLUTION INTRAVENOUS at 10:05

## 2024-05-28 ENCOUNTER — INFUSION (OUTPATIENT)
Dept: INFUSION THERAPY | Facility: HOSPITAL | Age: 74
End: 2024-05-28
Attending: INTERNAL MEDICINE
Payer: MEDICARE

## 2024-05-28 VITALS
RESPIRATION RATE: 18 BRPM | HEART RATE: 61 BPM | SYSTOLIC BLOOD PRESSURE: 126 MMHG | DIASTOLIC BLOOD PRESSURE: 71 MMHG | TEMPERATURE: 99 F

## 2024-05-28 DIAGNOSIS — D61.818 PANCYTOPENIA: Primary | ICD-10-CM

## 2024-05-28 DIAGNOSIS — C92.00 ACUTE MYELOID LEUKEMIA NOT HAVING ACHIEVED REMISSION: ICD-10-CM

## 2024-05-28 LAB
ABO + RH BLD: NORMAL
BLD PROD TYP BPU: NORMAL
BLOOD UNIT EXPIRATION DATE: NORMAL
BLOOD UNIT TYPE CODE: 600
CROSSMATCH INTERPRETATION: NORMAL
DISPENSE STATUS: NORMAL
UNIT NUMBER: NORMAL

## 2024-05-28 PROCEDURE — 25000003 PHARM REV CODE 250: Performed by: INTERNAL MEDICINE

## 2024-05-28 PROCEDURE — 96375 TX/PRO/DX INJ NEW DRUG ADDON: CPT

## 2024-05-28 PROCEDURE — 36430 TRANSFUSION BLD/BLD COMPNT: CPT

## 2024-05-28 PROCEDURE — P9037 PLATE PHERES LEUKOREDU IRRAD: HCPCS | Performed by: INTERNAL MEDICINE

## 2024-05-28 RX ORDER — HYDROCODONE BITARTRATE AND ACETAMINOPHEN 500; 5 MG/1; MG/1
TABLET ORAL ONCE
Status: ACTIVE | OUTPATIENT
Start: 2024-05-28

## 2024-05-28 RX ORDER — HYDROCODONE BITARTRATE AND ACETAMINOPHEN 500; 5 MG/1; MG/1
TABLET ORAL ONCE
Status: CANCELLED | OUTPATIENT
Start: 2024-05-28 | End: 2024-05-28

## 2024-05-28 RX ORDER — FAMOTIDINE 10 MG/ML
20 INJECTION INTRAVENOUS ONCE
Status: COMPLETED | OUTPATIENT
Start: 2024-05-28 | End: 2024-05-28

## 2024-05-28 RX ORDER — FAMOTIDINE 10 MG/ML
20 INJECTION INTRAVENOUS ONCE
Status: CANCELLED | OUTPATIENT
Start: 2024-05-28

## 2024-05-28 RX ADMIN — FAMOTIDINE 20 MG: 10 INJECTION, SOLUTION INTRAVENOUS at 08:05

## 2024-05-30 ENCOUNTER — LAB VISIT (OUTPATIENT)
Dept: LAB | Facility: HOSPITAL | Age: 74
End: 2024-05-30
Attending: NURSE PRACTITIONER
Payer: MEDICARE

## 2024-05-30 DIAGNOSIS — N45.1 EPIDIDYMITIS: ICD-10-CM

## 2024-05-30 DIAGNOSIS — D69.6 THROMBOCYTOPENIA: ICD-10-CM

## 2024-05-30 DIAGNOSIS — C92.00 ACUTE MYELOID LEUKEMIA NOT HAVING ACHIEVED REMISSION: ICD-10-CM

## 2024-05-30 DIAGNOSIS — D61.818 PANCYTOPENIA: ICD-10-CM

## 2024-05-30 DIAGNOSIS — D64.9 TRANSFUSION-DEPENDENT ANEMIA: ICD-10-CM

## 2024-05-30 LAB
ALBUMIN SERPL-MCNC: 3.7 G/DL (ref 3.4–4.8)
ALBUMIN/GLOB SERPL: 1.3 RATIO (ref 1.1–2)
ALP SERPL-CCNC: 87 UNIT/L (ref 40–150)
ALT SERPL-CCNC: 18 UNIT/L (ref 0–55)
ANION GAP SERPL CALC-SCNC: 5 MEQ/L
AST SERPL-CCNC: 15 UNIT/L (ref 5–34)
BASOPHILS # BLD AUTO: 0.01 X10(3)/MCL
BASOPHILS NFR BLD AUTO: 0.9 %
BILIRUB SERPL-MCNC: 1 MG/DL
BUN SERPL-MCNC: 21.6 MG/DL (ref 8.4–25.7)
CALCIUM SERPL-MCNC: 8.6 MG/DL (ref 8.8–10)
CHLORIDE SERPL-SCNC: 105 MMOL/L (ref 98–107)
CO2 SERPL-SCNC: 31 MMOL/L (ref 23–31)
CREAT SERPL-MCNC: 0.9 MG/DL (ref 0.73–1.18)
CREAT/UREA NIT SERPL: 24
EOSINOPHIL # BLD AUTO: 0 X10(3)/MCL (ref 0–0.9)
EOSINOPHIL NFR BLD AUTO: 0 %
ERYTHROCYTE [DISTWIDTH] IN BLOOD BY AUTOMATED COUNT: 19.6 % (ref 11.5–17)
GFR SERPLBLD CREATININE-BSD FMLA CKD-EPI: >60 ML/MIN/1.73/M2
GLOBULIN SER-MCNC: 2.9 GM/DL (ref 2.4–3.5)
GLUCOSE SERPL-MCNC: 93 MG/DL (ref 82–115)
HCT VFR BLD AUTO: 25.6 % (ref 42–52)
HGB BLD-MCNC: 7.6 G/DL (ref 14–18)
IMM GRANULOCYTES # BLD AUTO: 0.02 X10(3)/MCL (ref 0–0.04)
IMM GRANULOCYTES NFR BLD AUTO: 1.8 %
LYMPHOCYTES # BLD AUTO: 0.59 X10(3)/MCL (ref 0.6–4.6)
LYMPHOCYTES NFR BLD AUTO: 52.7 %
MCH RBC QN AUTO: 28.9 PG (ref 27–31)
MCHC RBC AUTO-ENTMCNC: 29.7 G/DL (ref 33–36)
MCV RBC AUTO: 97.3 FL (ref 80–94)
MONOCYTES # BLD AUTO: 0.26 X10(3)/MCL (ref 0.1–1.3)
MONOCYTES NFR BLD AUTO: 23.2 %
NEUTROPHILS # BLD AUTO: 0.24 X10(3)/MCL (ref 2.1–9.2)
NEUTROPHILS NFR BLD AUTO: 21.4 %
PLATELET # BLD AUTO: 35 X10(3)/MCL (ref 130–400)
PMV BLD AUTO: ABNORMAL FL
POTASSIUM SERPL-SCNC: 4.1 MMOL/L (ref 3.5–5.1)
PROT SERPL-MCNC: 6.6 GM/DL (ref 5.8–7.6)
RBC # BLD AUTO: 2.63 X10(6)/MCL (ref 4.7–6.1)
SODIUM SERPL-SCNC: 141 MMOL/L (ref 136–145)
WBC # SPEC AUTO: 1.12 X10(3)/MCL (ref 4.5–11.5)

## 2024-05-30 PROCEDURE — 85025 COMPLETE CBC W/AUTO DIFF WBC: CPT

## 2024-05-30 PROCEDURE — 80053 COMPREHEN METABOLIC PANEL: CPT

## 2024-05-30 PROCEDURE — 36415 COLL VENOUS BLD VENIPUNCTURE: CPT

## 2024-06-03 ENCOUNTER — INFUSION (OUTPATIENT)
Dept: INFUSION THERAPY | Facility: HOSPITAL | Age: 74
End: 2024-06-03
Attending: INTERNAL MEDICINE
Payer: MEDICARE

## 2024-06-03 ENCOUNTER — LAB VISIT (OUTPATIENT)
Dept: LAB | Facility: HOSPITAL | Age: 74
End: 2024-06-03
Attending: NURSE PRACTITIONER
Payer: MEDICARE

## 2024-06-03 VITALS
HEART RATE: 61 BPM | RESPIRATION RATE: 16 BRPM | SYSTOLIC BLOOD PRESSURE: 125 MMHG | TEMPERATURE: 98 F | OXYGEN SATURATION: 98 % | DIASTOLIC BLOOD PRESSURE: 72 MMHG

## 2024-06-03 DIAGNOSIS — D69.6 THROMBOCYTOPENIA: ICD-10-CM

## 2024-06-03 DIAGNOSIS — D61.818 PANCYTOPENIA: Chronic | ICD-10-CM

## 2024-06-03 DIAGNOSIS — C92.00 ACUTE MYELOID LEUKEMIA NOT HAVING ACHIEVED REMISSION: Primary | Chronic | ICD-10-CM

## 2024-06-03 DIAGNOSIS — D61.818 PANCYTOPENIA: ICD-10-CM

## 2024-06-03 DIAGNOSIS — D64.9 TRANSFUSION-DEPENDENT ANEMIA: ICD-10-CM

## 2024-06-03 DIAGNOSIS — C92.00 ACUTE MYELOID LEUKEMIA NOT HAVING ACHIEVED REMISSION: ICD-10-CM

## 2024-06-03 DIAGNOSIS — N45.1 EPIDIDYMITIS: ICD-10-CM

## 2024-06-03 LAB
ABO + RH BLD: NORMAL
ABS NEUT CALC (OHS): 0.37 X10(3)/MCL (ref 2.1–9.2)
ALBUMIN SERPL-MCNC: 3.7 G/DL (ref 3.4–4.8)
ALBUMIN/GLOB SERPL: 1.3 RATIO (ref 1.1–2)
ALP SERPL-CCNC: 85 UNIT/L (ref 40–150)
ALT SERPL-CCNC: 20 UNIT/L (ref 0–55)
ANION GAP SERPL CALC-SCNC: 7 MEQ/L
ANISOCYTOSIS BLD QL SMEAR: ABNORMAL
AST SERPL-CCNC: 16 UNIT/L (ref 5–34)
B-HCG SERPL QL: 12 %
BILIRUB SERPL-MCNC: 1 MG/DL
BLD PROD TYP BPU: NORMAL
BLOOD UNIT EXPIRATION DATE: NORMAL
BLOOD UNIT TYPE CODE: 5100
BUN SERPL-MCNC: 17.4 MG/DL (ref 8.4–25.7)
CALCIUM SERPL-MCNC: 8.6 MG/DL (ref 8.8–10)
CHLORIDE SERPL-SCNC: 106 MMOL/L (ref 98–107)
CO2 SERPL-SCNC: 27 MMOL/L (ref 23–31)
CREAT SERPL-MCNC: 0.97 MG/DL (ref 0.73–1.18)
CREAT/UREA NIT SERPL: 18
CROSSMATCH INTERPRETATION: NORMAL
DISPENSE STATUS: NORMAL
ERYTHROCYTE [DISTWIDTH] IN BLOOD BY AUTOMATED COUNT: 20.6 % (ref 11.5–17)
GFR SERPLBLD CREATININE-BSD FMLA CKD-EPI: >60 ML/MIN/1.73/M2
GLOBULIN SER-MCNC: 2.8 GM/DL (ref 2.4–3.5)
GLUCOSE SERPL-MCNC: 129 MG/DL (ref 82–115)
HCT VFR BLD AUTO: 25.4 % (ref 42–52)
HGB BLD-MCNC: 7.5 G/DL (ref 14–18)
LYMPHOCYTES NFR BLD MANUAL: 0.73 X10(3)/MCL
LYMPHOCYTES NFR BLD MANUAL: 55 % (ref 13–40)
MACROCYTES BLD QL SMEAR: ABNORMAL
MCH RBC QN AUTO: 29.1 PG (ref 27–31)
MCHC RBC AUTO-ENTMCNC: 29.5 G/DL (ref 33–36)
MCV RBC AUTO: 98.4 FL (ref 80–94)
MONOCYTES NFR BLD MANUAL: 0.07 X10(3)/MCL (ref 0.1–1.3)
MONOCYTES NFR BLD MANUAL: 5 % (ref 2–11)
NEUTROPHILS NFR BLD MANUAL: 28 % (ref 47–80)
OVALOCYTES (OLG): ABNORMAL
PLATELET # BLD AUTO: 12 X10(3)/MCL (ref 130–400)
PLATELET # BLD EST: ABNORMAL 10*3/UL
PMV BLD AUTO: ABNORMAL FL
POIKILOCYTOSIS BLD QL SMEAR: ABNORMAL
POTASSIUM SERPL-SCNC: 3.6 MMOL/L (ref 3.5–5.1)
PROT SERPL-MCNC: 6.5 GM/DL (ref 5.8–7.6)
RBC # BLD AUTO: 2.58 X10(6)/MCL (ref 4.7–6.1)
RBC MORPH BLD: ABNORMAL
SODIUM SERPL-SCNC: 140 MMOL/L (ref 136–145)
STOMATOCYTES (OLG): ABNORMAL
UNIT NUMBER: NORMAL
WBC # SPEC AUTO: 1.33 X10(3)/MCL (ref 4.5–11.5)

## 2024-06-03 PROCEDURE — 36415 COLL VENOUS BLD VENIPUNCTURE: CPT

## 2024-06-03 PROCEDURE — P9037 PLATE PHERES LEUKOREDU IRRAD: HCPCS | Performed by: NURSE PRACTITIONER

## 2024-06-03 PROCEDURE — 25000003 PHARM REV CODE 250: Performed by: INTERNAL MEDICINE

## 2024-06-03 PROCEDURE — 80053 COMPREHEN METABOLIC PANEL: CPT

## 2024-06-03 PROCEDURE — 96374 THER/PROPH/DIAG INJ IV PUSH: CPT

## 2024-06-03 PROCEDURE — 85007 BL SMEAR W/DIFF WBC COUNT: CPT

## 2024-06-03 PROCEDURE — 36430 TRANSFUSION BLD/BLD COMPNT: CPT

## 2024-06-03 PROCEDURE — 25000003 PHARM REV CODE 250: Performed by: NURSE PRACTITIONER

## 2024-06-03 PROCEDURE — 85027 COMPLETE CBC AUTOMATED: CPT

## 2024-06-03 RX ORDER — HYDROCODONE BITARTRATE AND ACETAMINOPHEN 500; 5 MG/1; MG/1
TABLET ORAL ONCE
Status: CANCELLED | OUTPATIENT
Start: 2024-06-03 | End: 2024-06-03

## 2024-06-03 RX ORDER — FAMOTIDINE 10 MG/ML
20 INJECTION INTRAVENOUS
Status: CANCELLED
Start: 2024-06-03

## 2024-06-03 RX ORDER — HYDROCODONE BITARTRATE AND ACETAMINOPHEN 500; 5 MG/1; MG/1
TABLET ORAL ONCE
Status: COMPLETED | OUTPATIENT
Start: 2024-06-03 | End: 2024-06-03

## 2024-06-03 RX ORDER — FAMOTIDINE 10 MG/ML
20 INJECTION INTRAVENOUS
Status: COMPLETED | OUTPATIENT
Start: 2024-06-03 | End: 2024-06-03

## 2024-06-03 RX ADMIN — FAMOTIDINE 20 MG: 10 INJECTION INTRAVENOUS at 09:06

## 2024-06-03 RX ADMIN — SODIUM CHLORIDE: 9 INJECTION, SOLUTION INTRAVENOUS at 09:06

## 2024-06-03 NOTE — PLAN OF CARE
Pt here for labs and poss blood. Plt count 12. Pt got 1 unit plts. Pt qing well.  Till rtc on 6/6 for labs

## 2024-06-06 ENCOUNTER — LAB VISIT (OUTPATIENT)
Dept: LAB | Facility: HOSPITAL | Age: 74
End: 2024-06-06
Attending: NURSE PRACTITIONER
Payer: MEDICARE

## 2024-06-06 ENCOUNTER — INFUSION (OUTPATIENT)
Dept: INFUSION THERAPY | Facility: HOSPITAL | Age: 74
End: 2024-06-06
Attending: INTERNAL MEDICINE
Payer: MEDICARE

## 2024-06-06 VITALS
OXYGEN SATURATION: 99 % | HEART RATE: 59 BPM | RESPIRATION RATE: 16 BRPM | SYSTOLIC BLOOD PRESSURE: 159 MMHG | TEMPERATURE: 98 F | DIASTOLIC BLOOD PRESSURE: 83 MMHG

## 2024-06-06 DIAGNOSIS — C92.00 ACUTE MYELOID LEUKEMIA NOT HAVING ACHIEVED REMISSION: ICD-10-CM

## 2024-06-06 DIAGNOSIS — D64.9 TRANSFUSION-DEPENDENT ANEMIA: ICD-10-CM

## 2024-06-06 DIAGNOSIS — C92.00 ACUTE MYELOID LEUKEMIA NOT HAVING ACHIEVED REMISSION: Primary | Chronic | ICD-10-CM

## 2024-06-06 DIAGNOSIS — N45.1 EPIDIDYMITIS: ICD-10-CM

## 2024-06-06 DIAGNOSIS — D61.818 PANCYTOPENIA: ICD-10-CM

## 2024-06-06 DIAGNOSIS — D69.6 THROMBOCYTOPENIA: ICD-10-CM

## 2024-06-06 LAB
ABO + RH BLD: NORMAL
ABS NEUT CALC (OHS): 0.45 X10(3)/MCL (ref 2.1–9.2)
ALBUMIN SERPL-MCNC: 3.5 G/DL (ref 3.4–4.8)
ALBUMIN/GLOB SERPL: 1.2 RATIO (ref 1.1–2)
ALP SERPL-CCNC: 87 UNIT/L (ref 40–150)
ALT SERPL-CCNC: 18 UNIT/L (ref 0–55)
ANION GAP SERPL CALC-SCNC: 7 MEQ/L
AST SERPL-CCNC: 15 UNIT/L (ref 5–34)
B-HCG SERPL QL: 20 %
BILIRUB SERPL-MCNC: 1 MG/DL
BLD PROD TYP BPU: NORMAL
BLOOD UNIT EXPIRATION DATE: NORMAL
BLOOD UNIT TYPE CODE: 5100
BUN SERPL-MCNC: 19.3 MG/DL (ref 8.4–25.7)
CALCIUM SERPL-MCNC: 8.4 MG/DL (ref 8.8–10)
CHLORIDE SERPL-SCNC: 108 MMOL/L (ref 98–107)
CO2 SERPL-SCNC: 26 MMOL/L (ref 23–31)
CREAT SERPL-MCNC: 1.04 MG/DL (ref 0.73–1.18)
CREAT/UREA NIT SERPL: 19
CROSSMATCH INTERPRETATION: NORMAL
DISPENSE STATUS: NORMAL
ELLIPTOCYTOSIS (OHS): ABNORMAL
ERYTHROCYTE [DISTWIDTH] IN BLOOD BY AUTOMATED COUNT: 21 % (ref 11.5–17)
GFR SERPLBLD CREATININE-BSD FMLA CKD-EPI: >60 ML/MIN/1.73/M2
GLOBULIN SER-MCNC: 2.9 GM/DL (ref 2.4–3.5)
GLUCOSE SERPL-MCNC: 94 MG/DL (ref 82–115)
GROUP & RH: NORMAL
HCT VFR BLD AUTO: 23.7 % (ref 42–52)
HGB BLD-MCNC: 7 G/DL (ref 14–18)
INDIRECT COOMBS: NORMAL
LYMPHOCYTES NFR BLD MANUAL: 0.9 X10(3)/MCL
LYMPHOCYTES NFR BLD MANUAL: 52 % (ref 13–40)
MCH RBC QN AUTO: 29.2 PG (ref 27–31)
MCHC RBC AUTO-ENTMCNC: 29.5 G/DL (ref 33–36)
MCV RBC AUTO: 98.8 FL (ref 80–94)
MONOCYTES NFR BLD MANUAL: 0.03 X10(3)/MCL (ref 0.1–1.3)
MONOCYTES NFR BLD MANUAL: 2 % (ref 2–11)
NEUTROPHILS NFR BLD MANUAL: 26 % (ref 47–80)
NRBC BLD MANUAL-RTO: 6 %
PLATELET # BLD AUTO: 41 X10(3)/MCL (ref 130–400)
PLATELET # BLD EST: ABNORMAL 10*3/UL
PMV BLD AUTO: ABNORMAL FL
POIKILOCYTOSIS BLD QL SMEAR: ABNORMAL
POLYCHROMASIA BLD QL SMEAR: ABNORMAL
POTASSIUM SERPL-SCNC: 3.8 MMOL/L (ref 3.5–5.1)
PROT SERPL-MCNC: 6.4 GM/DL (ref 5.8–7.6)
RBC # BLD AUTO: 2.4 X10(6)/MCL (ref 4.7–6.1)
RBC MORPH BLD: ABNORMAL
SODIUM SERPL-SCNC: 141 MMOL/L (ref 136–145)
SPECIMEN OUTDATE: NORMAL
STOMATOCYTES (OLG): ABNORMAL
UNIT NUMBER: NORMAL
WBC # SPEC AUTO: 1.74 X10(3)/MCL (ref 4.5–11.5)

## 2024-06-06 PROCEDURE — 80053 COMPREHEN METABOLIC PANEL: CPT

## 2024-06-06 PROCEDURE — 86901 BLOOD TYPING SEROLOGIC RH(D): CPT | Performed by: NURSE PRACTITIONER

## 2024-06-06 PROCEDURE — P9040 RBC LEUKOREDUCED IRRADIATED: HCPCS | Performed by: NURSE PRACTITIONER

## 2024-06-06 PROCEDURE — 36415 COLL VENOUS BLD VENIPUNCTURE: CPT

## 2024-06-06 PROCEDURE — 86923 COMPATIBILITY TEST ELECTRIC: CPT | Performed by: NURSE PRACTITIONER

## 2024-06-06 PROCEDURE — 85027 COMPLETE CBC AUTOMATED: CPT

## 2024-06-06 PROCEDURE — 36430 TRANSFUSION BLD/BLD COMPNT: CPT

## 2024-06-06 PROCEDURE — 25000003 PHARM REV CODE 250: Performed by: NURSE PRACTITIONER

## 2024-06-06 PROCEDURE — 25000003 PHARM REV CODE 250: Performed by: INTERNAL MEDICINE

## 2024-06-06 PROCEDURE — 96374 THER/PROPH/DIAG INJ IV PUSH: CPT

## 2024-06-06 PROCEDURE — 85007 BL SMEAR W/DIFF WBC COUNT: CPT

## 2024-06-06 RX ORDER — HYDROCODONE BITARTRATE AND ACETAMINOPHEN 500; 5 MG/1; MG/1
TABLET ORAL ONCE
Status: CANCELLED | OUTPATIENT
Start: 2024-06-06 | End: 2024-06-06

## 2024-06-06 RX ORDER — FAMOTIDINE 10 MG/ML
20 INJECTION INTRAVENOUS
Status: CANCELLED
Start: 2024-06-06

## 2024-06-06 RX ORDER — FAMOTIDINE 10 MG/ML
20 INJECTION INTRAVENOUS
Status: COMPLETED | OUTPATIENT
Start: 2024-06-06 | End: 2024-06-06

## 2024-06-06 RX ORDER — HYDROCODONE BITARTRATE AND ACETAMINOPHEN 500; 5 MG/1; MG/1
TABLET ORAL ONCE
Status: COMPLETED | OUTPATIENT
Start: 2024-06-06 | End: 2024-06-06

## 2024-06-06 RX ADMIN — FAMOTIDINE 20 MG: 10 INJECTION, SOLUTION INTRAVENOUS at 11:06

## 2024-06-06 RX ADMIN — SODIUM CHLORIDE: 9 INJECTION, SOLUTION INTRAVENOUS at 11:06

## 2024-06-10 ENCOUNTER — INFUSION (OUTPATIENT)
Dept: INFUSION THERAPY | Facility: HOSPITAL | Age: 74
End: 2024-06-10
Attending: INTERNAL MEDICINE
Payer: MEDICARE

## 2024-06-10 ENCOUNTER — LAB VISIT (OUTPATIENT)
Dept: LAB | Facility: HOSPITAL | Age: 74
End: 2024-06-10
Attending: NURSE PRACTITIONER
Payer: MEDICARE

## 2024-06-10 VITALS
TEMPERATURE: 98 F | DIASTOLIC BLOOD PRESSURE: 63 MMHG | SYSTOLIC BLOOD PRESSURE: 120 MMHG | HEIGHT: 72 IN | BODY MASS INDEX: 24.07 KG/M2 | HEART RATE: 65 BPM | WEIGHT: 177.69 LBS | RESPIRATION RATE: 18 BRPM

## 2024-06-10 DIAGNOSIS — D69.6 THROMBOCYTOPENIA: Primary | ICD-10-CM

## 2024-06-10 DIAGNOSIS — C92.00 ACUTE MYELOID LEUKEMIA NOT HAVING ACHIEVED REMISSION: Chronic | ICD-10-CM

## 2024-06-10 DIAGNOSIS — C92.00 ACUTE MYELOID LEUKEMIA NOT HAVING ACHIEVED REMISSION: ICD-10-CM

## 2024-06-10 DIAGNOSIS — D69.6 THROMBOCYTOPENIA: ICD-10-CM

## 2024-06-10 DIAGNOSIS — N45.1 EPIDIDYMITIS: ICD-10-CM

## 2024-06-10 DIAGNOSIS — C92.00 ACUTE MYELOID LEUKEMIA NOT HAVING ACHIEVED REMISSION: Primary | Chronic | ICD-10-CM

## 2024-06-10 DIAGNOSIS — D61.818 PANCYTOPENIA: ICD-10-CM

## 2024-06-10 DIAGNOSIS — D64.9 TRANSFUSION-DEPENDENT ANEMIA: ICD-10-CM

## 2024-06-10 LAB
ABO + RH BLD: NORMAL
ALBUMIN SERPL-MCNC: 3.7 G/DL (ref 3.4–4.8)
ALBUMIN/GLOB SERPL: 1.2 RATIO (ref 1.1–2)
ALP SERPL-CCNC: 92 UNIT/L (ref 40–150)
ALT SERPL-CCNC: 16 UNIT/L (ref 0–55)
ANION GAP SERPL CALC-SCNC: 8 MEQ/L
AST SERPL-CCNC: 14 UNIT/L (ref 5–34)
BASOPHILS # BLD AUTO: 0 X10(3)/MCL
BASOPHILS NFR BLD AUTO: 0 %
BILIRUB SERPL-MCNC: 1.1 MG/DL
BLD PROD TYP BPU: NORMAL
BLOOD UNIT EXPIRATION DATE: NORMAL
BLOOD UNIT TYPE CODE: 5100
BUN SERPL-MCNC: 18.7 MG/DL (ref 8.4–25.7)
CALCIUM SERPL-MCNC: 9 MG/DL (ref 8.8–10)
CHLORIDE SERPL-SCNC: 106 MMOL/L (ref 98–107)
CO2 SERPL-SCNC: 26 MMOL/L (ref 23–31)
CREAT SERPL-MCNC: 1.17 MG/DL (ref 0.73–1.18)
CREAT/UREA NIT SERPL: 16
CROSSMATCH INTERPRETATION: NORMAL
DISPENSE STATUS: NORMAL
EOSINOPHIL # BLD AUTO: 0 X10(3)/MCL (ref 0–0.9)
EOSINOPHIL NFR BLD AUTO: 0 %
ERYTHROCYTE [DISTWIDTH] IN BLOOD BY AUTOMATED COUNT: 20.1 % (ref 11.5–17)
GFR SERPLBLD CREATININE-BSD FMLA CKD-EPI: >60 ML/MIN/1.73/M2
GLOBULIN SER-MCNC: 3.1 GM/DL (ref 2.4–3.5)
GLUCOSE SERPL-MCNC: 94 MG/DL (ref 82–115)
HCT VFR BLD AUTO: 28.8 % (ref 42–52)
HGB BLD-MCNC: 8.7 G/DL (ref 14–18)
IMM GRANULOCYTES # BLD AUTO: 0.03 X10(3)/MCL (ref 0–0.04)
IMM GRANULOCYTES NFR BLD AUTO: 1.1 %
LYMPHOCYTES # BLD AUTO: 1.15 X10(3)/MCL (ref 0.6–4.6)
LYMPHOCYTES NFR BLD AUTO: 40.6 %
MCH RBC QN AUTO: 29.3 PG (ref 27–31)
MCHC RBC AUTO-ENTMCNC: 30.2 G/DL (ref 33–36)
MCV RBC AUTO: 97 FL (ref 80–94)
MONOCYTES # BLD AUTO: 1.14 X10(3)/MCL (ref 0.1–1.3)
MONOCYTES NFR BLD AUTO: 40.3 %
NEUTROPHILS # BLD AUTO: 0.51 X10(3)/MCL (ref 2.1–9.2)
NEUTROPHILS NFR BLD AUTO: 18 %
PLATELET # BLD AUTO: 6 X10(3)/MCL (ref 130–400)
PMV BLD AUTO: ABNORMAL FL
POTASSIUM SERPL-SCNC: 4 MMOL/L (ref 3.5–5.1)
PROT SERPL-MCNC: 6.8 GM/DL (ref 5.8–7.6)
RBC # BLD AUTO: 2.97 X10(6)/MCL (ref 4.7–6.1)
SODIUM SERPL-SCNC: 140 MMOL/L (ref 136–145)
UNIT NUMBER: NORMAL
WBC # SPEC AUTO: 2.83 X10(3)/MCL (ref 4.5–11.5)

## 2024-06-10 PROCEDURE — 36430 TRANSFUSION BLD/BLD COMPNT: CPT

## 2024-06-10 PROCEDURE — 36415 COLL VENOUS BLD VENIPUNCTURE: CPT

## 2024-06-10 PROCEDURE — P9037 PLATE PHERES LEUKOREDU IRRAD: HCPCS | Performed by: NURSE PRACTITIONER

## 2024-06-10 PROCEDURE — 25000003 PHARM REV CODE 250: Performed by: INTERNAL MEDICINE

## 2024-06-10 PROCEDURE — 85025 COMPLETE CBC W/AUTO DIFF WBC: CPT

## 2024-06-10 PROCEDURE — 96375 TX/PRO/DX INJ NEW DRUG ADDON: CPT

## 2024-06-10 PROCEDURE — 80053 COMPREHEN METABOLIC PANEL: CPT

## 2024-06-10 RX ORDER — FAMOTIDINE 10 MG/ML
20 INJECTION INTRAVENOUS
Status: CANCELLED
Start: 2024-06-10

## 2024-06-10 RX ORDER — HYDROCODONE BITARTRATE AND ACETAMINOPHEN 500; 5 MG/1; MG/1
TABLET ORAL ONCE
Status: CANCELLED | OUTPATIENT
Start: 2024-06-10 | End: 2024-06-10

## 2024-06-10 RX ORDER — HYDROCODONE BITARTRATE AND ACETAMINOPHEN 500; 5 MG/1; MG/1
TABLET ORAL ONCE
Status: ACTIVE | OUTPATIENT
Start: 2024-06-10

## 2024-06-10 RX ORDER — FAMOTIDINE 10 MG/ML
20 INJECTION INTRAVENOUS
Status: COMPLETED | OUTPATIENT
Start: 2024-06-10 | End: 2024-06-10

## 2024-06-10 RX ADMIN — FAMOTIDINE 20 MG: 10 INJECTION INTRAVENOUS at 09:06

## 2024-06-11 LAB — PSYCHE PATHOLOGY RESULT: NORMAL

## 2024-06-13 ENCOUNTER — LAB VISIT (OUTPATIENT)
Dept: LAB | Facility: HOSPITAL | Age: 74
End: 2024-06-13
Attending: NURSE PRACTITIONER
Payer: MEDICARE

## 2024-06-13 ENCOUNTER — INFUSION (OUTPATIENT)
Dept: INFUSION THERAPY | Facility: HOSPITAL | Age: 74
End: 2024-06-13
Attending: INTERNAL MEDICINE
Payer: MEDICARE

## 2024-06-13 DIAGNOSIS — C92.00 ACUTE MYELOID LEUKEMIA NOT HAVING ACHIEVED REMISSION: ICD-10-CM

## 2024-06-13 LAB
ABS NEUT CALC (OHS): 1.15 X10(3)/MCL (ref 2.1–9.2)
ALBUMIN SERPL-MCNC: 3.7 G/DL (ref 3.4–4.8)
ALBUMIN/GLOB SERPL: 1.2 RATIO (ref 1.1–2)
ALP SERPL-CCNC: 89 UNIT/L (ref 40–150)
ALT SERPL-CCNC: 15 UNIT/L (ref 0–55)
ANION GAP SERPL CALC-SCNC: 8 MEQ/L
ANISOCYTOSIS BLD QL SMEAR: ABNORMAL
AST SERPL-CCNC: 13 UNIT/L (ref 5–34)
B-HCG SERPL QL: 32 %
BILIRUB SERPL-MCNC: 1 MG/DL
BUN SERPL-MCNC: 17.9 MG/DL (ref 8.4–25.7)
CALCIUM SERPL-MCNC: 8.9 MG/DL (ref 8.8–10)
CHLORIDE SERPL-SCNC: 104 MMOL/L (ref 98–107)
CO2 SERPL-SCNC: 28 MMOL/L (ref 23–31)
CREAT SERPL-MCNC: 1.02 MG/DL (ref 0.73–1.18)
CREAT/UREA NIT SERPL: 18
ERYTHROCYTE [DISTWIDTH] IN BLOOD BY AUTOMATED COUNT: 19.5 % (ref 11.5–17)
GFR SERPLBLD CREATININE-BSD FMLA CKD-EPI: >60 ML/MIN/1.73/M2
GLOBULIN SER-MCNC: 3.2 GM/DL (ref 2.4–3.5)
GLUCOSE SERPL-MCNC: 122 MG/DL (ref 82–115)
HCT VFR BLD AUTO: 27.6 % (ref 42–52)
HGB BLD-MCNC: 8.2 G/DL (ref 14–18)
HYPOCHROMIA BLD QL SMEAR: SLIGHT
LYMPHOCYTES NFR BLD MANUAL: 1.57 X10(3)/MCL
LYMPHOCYTES NFR BLD MANUAL: 37 % (ref 13–40)
MACROCYTES BLD QL SMEAR: ABNORMAL
MCH RBC QN AUTO: 29 PG (ref 27–31)
MCHC RBC AUTO-ENTMCNC: 29.7 G/DL (ref 33–36)
MCV RBC AUTO: 97.5 FL (ref 80–94)
MONOCYTES NFR BLD MANUAL: 0.17 X10(3)/MCL (ref 0.1–1.3)
MONOCYTES NFR BLD MANUAL: 4 % (ref 2–11)
NEUTROPHILS NFR BLD MANUAL: 27 % (ref 47–80)
NRBC BLD MANUAL-RTO: 3 %
OVALOCYTES (OLG): ABNORMAL
PLATELET # BLD AUTO: 23 X10(3)/MCL (ref 130–400)
PLATELET # BLD EST: ABNORMAL 10*3/UL
PMV BLD AUTO: ABNORMAL FL
POIKILOCYTOSIS BLD QL SMEAR: ABNORMAL
POLYCHROMASIA BLD QL SMEAR: SLIGHT
POTASSIUM SERPL-SCNC: 4.5 MMOL/L (ref 3.5–5.1)
PROT SERPL-MCNC: 6.9 GM/DL (ref 5.8–7.6)
RBC # BLD AUTO: 2.83 X10(6)/MCL (ref 4.7–6.1)
RBC MORPH BLD: ABNORMAL
SODIUM SERPL-SCNC: 140 MMOL/L (ref 136–145)
STOMATOCYTES (OLG): ABNORMAL
WBC # SPEC AUTO: 4.25 X10(3)/MCL (ref 4.5–11.5)

## 2024-06-13 PROCEDURE — 85027 COMPLETE CBC AUTOMATED: CPT

## 2024-06-13 PROCEDURE — 80053 COMPREHEN METABOLIC PANEL: CPT

## 2024-06-13 PROCEDURE — 36415 COLL VENOUS BLD VENIPUNCTURE: CPT

## 2024-06-16 NOTE — PROGRESS NOTES
Subjective:       Patient ID: Hussein Gonzalez Jr. is a 73 y.o. male.    Chief Complaint: Follow-up (Patient reports fatigue, SOB, and dizziness )      Diagnosis:  AML with 27% blasts, background dysplasia, normal cytogenetics and normal FISH with NGS showing ASXL1, SRSF2, TET2, and STAG2 mutations.    Current Treatment:   Plan to start low-dose cytarabine and venetoclax on 06/24/2024.    Treatment History:  Azacitidine and venetoclax started on 02/27/2023.  Started 7 out of 28 days, did this through cycle 5.  With cycle 6, moved to 5 out of 28 days.  Ultimately ended up on azacitidine 3/28 days and venetoclax 1 through 14/28 days.  Referral for possible stem cell transplant evaluation.    HPI:  73-year-old male with a history of CVA, hypertension, BPH, and melanoma who presented to the emergency department following a syncopal episode.  In February of 2022, the patient did have a slightly low white blood cell count, hemoglobin, and platelet count.  Per the patient, 3 months prior to admission, he was having dizziness, fatigue, weakness, shortness of breath, back pain, and leg pain.  The dizziness was worse with standing and walking.  On 01/20/2023, the patient had a syncopal episode.  He was brought to the emergency department and was found to be pancytopenic.  He also had stool that was positive for occult blood in the emergency department.  He received blood transfusion.  His CBC on 01/20/2023 revealed a white blood cell count of 2.6, hemoglobin of 7.7, and platelet count of 47,000. Reticulocytes were appropriately elevated, B12 and folate were within normal limits, iron level was slightly decreased.  Repeat blood work on 01/21/2023 revealed a white blood cell count of 2.8, post transfusion hemoglobin of 9.6, and a platelet count of [id number].  Hematology was consulted for pancytopenia.  EGD and colonoscopy done on 01/24/2023, EGD revealed nonbleeding ulcers, colonoscopy unremarkable, however had to be aborted due  to tortuous colon.  Bone Marrow Biopsy done on 01/25/2023.  Preliminary reveals AML with 27% blasts in a background of dysplasia.  Cytogenetics are within normal limits, FISH is within normal limits.  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  Started azacitidine and venetoclax on 02/27/2023, completed cycle 4 on 05/30/2023.  Held after this cycle and repeated a bone marrow biopsy, this was done on 06/27/2023, this showed a normocellular marrow (40%) with relative erythroid hyperplasia, sequential granulopoiesis and megakaryopoiesis.  The bone marrow was slightly suboptimal given it was predominantly subcortical bone with focal hematopoietic elements.  There was no morphologic or immunophenotypic evidence of previous acute myeloid leukemia, cytogenetics were normal, FISH was normal, NGS showed persistent ASXL1, SRSF2, TET2.    Repeat bone marrow biopsy done on 11/21/2023 showed no morphologic or immunophenotypic evidence of persistent acute myeloid leukemia. There were ASXL1, CEBPA, RUNX1, SRSF2, STAG2, and TET2 mutations.    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     Interval History:   Patient presents for scheduled follow up appointment for AML to review bone marrow biopsy results.  He was very tearful today.  He stated that on Thursday, when he was here last, he felt well.  The following day, he began to feel weak and has spent the last 3 days in bed.  He states that he was just so weak and wants to feel better.          Past Medical History:   Diagnosis Date    Anxiety and depression     BPH (benign prostatic hyperplasia)     History of CVA (cerebrovascular accident) without residual deficits     HTN (hypertension)     Malignant melanoma of skin, unspecified     NSTEMI (non-ST elevated  myocardial infarction)     Sepsis, Gram negative     Sleep apnea       Past Surgical History:   Procedure Laterality Date    BONE MARROW ASPIRATION N/A 6/27/2023    Procedure: ASPIRATION, BONE MARROW;  Surgeon: Sabino Núñez MD;  Location: [facility] OR;  Service: General;  Laterality: N/A;  ALL ASSESS FOR MOLECULAR MRD    BONE MARROW ASPIRATION N/A 5/22/2024    Procedure: ASPIRATION, BONE MARROW;  Surgeon: Sabino Núñez MD;  Location: [facility] OR;  Service: General;  Laterality: N/A;    BONE MARROW BIOPSY N/A 1/25/2023    Procedure: Biopsy-bone marrow;  Surgeon: Rd Carlos MD;  Location: [facility] OR;  Service: General;  Laterality: N/A;    BONE MARROW BIOPSY N/A 11/21/2023    Procedure: Biopsy-bone marrow;  Surgeon: Junior Castro MD;  Location: [facility] OR;  Service: General;  Laterality: N/A;    COLONOSCOPY      COLONOSCOPY N/A 1/24/2023    Procedure: COLONOSCOPY (DOUBLE);  Surgeon: Jimy Baker MD;  Location: [facility] ENDOSCOPY;  Service: Gastroenterology;  Laterality: N/A;    EGD, WITH CLOSED BIOPSY N/A 1/24/2023    Procedure: EGD, WITH CLOSED BIOPSY;  Surgeon: Jimy Baker MD;  Location: [facility] ENDOSCOPY;  Service: Gastroenterology;  Laterality: N/A;    EXCISION OF MELANOMA      SINUS SURGERY      TONSILLECTOMY       Social History     Socioeconomic History    Marital status:    Tobacco Use    Smoking status: Never    Smokeless tobacco: Never   Substance and Sexual Activity    Alcohol use: Not Currently     Alcohol/week: 7.0 standard drinks of alcohol     Types: 7 Cans of beer per week    Drug use: Never    Sexual activity: Yes     Social Determinants of Health     Financial Resource Strain: Low Risk  (4/20/2023)    Overall Financial Resource Strain (CARDIA)     Difficulty of Paying Living Expenses: Not hard at all   Food Insecurity: No Food Insecurity (4/20/2023)    Hunger Vital Sign     Worried About Running Out of Food in the Last Year: Never true     Ran Out of Food in the Last Year: Never true    Transportation Needs: No Transportation Needs (4/20/2023)    PRAPARE - Transportation     Lack of Transportation (Medical): No     Lack of Transportation (Non-Medical): No   Physical Activity: Insufficiently Active (4/20/2023)    Exercise Vital Sign     Days of Exercise per Week: 3 days     Minutes of Exercise per Session: 30 min   Stress: No Stress Concern Present (4/20/2023)    [employer] - Occupational Stress Questionnaire     Feeling of Stress : Not at all   Housing Stability: Low Risk  (4/20/2023)    Housing Stability Vital Sign     Unable to Pay for Housing in the Last Year: No     Number of Places Lived in the Last Year: 1     Unstable Housing in the Last Year: No      Family History   Problem Relation Name Age of Onset    Cancer Mother      Cancer Father        Review of patient's allergies indicates:   Allergen Reactions    Acetaminophen Other (See Comments)     Restless leg     Benadryl [diphenhydramine hcl] Other (See Comments)     Restless Legs      Review of Systems   Constitutional:  Negative for chills, diaphoresis, fatigue, fever and unexpected weight change.   HENT:  Negative for nasal congestion, mouth sores, sinus pressure/congestion and sore throat.    Eyes:  Negative for pain and visual disturbance.   Respiratory:  Negative for cough, chest tightness and shortness of breath.    Cardiovascular:  Negative for chest pain, palpitations and leg swelling.   Gastrointestinal:  Negative for abdominal distention, abdominal pain, blood in stool, constipation and diarrhea.   Genitourinary:  Negative for dysuria, frequency and hematuria.   Musculoskeletal:  Positive for arthralgias and myalgias. Negative for back pain.   Integumentary:  Negative for rash.   Neurological:  Negative for dizziness, syncope, weakness, numbness and headaches.   Hematological:  Negative for adenopathy.   Psychiatric/Behavioral:  Negative for confusion.          Objective:      Physical Exam  Vitals  reviewed.   Constitutional:       General: He is awake.      Appearance: Normal appearance.   HENT:      Head: Normocephalic and atraumatic.      Right Ear: Hearing normal.      Left Ear: Hearing normal.      Nose: Nose normal.   Eyes:      General: Lids are normal. Vision grossly intact.      Extraocular Movements: Extraocular movements intact.      Conjunctiva/sclera: Conjunctivae normal.   Cardiovascular:      Rate and Rhythm: Normal rate and regular rhythm.      Pulses: Normal pulses.   Pulmonary:      Effort: Pulmonary effort is normal.      Breath sounds: Normal breath sounds. No wheezing, rhonchi or rales.   Musculoskeletal:      Cervical back: Full passive range of motion without pain.      Right lower leg: No edema.      Left lower leg: No edema.   Skin:     General: Skin is warm.      Comments:      Neurological:      General: No focal deficit present.      Mental Status: He is alert and oriented to person, place, and time.   Psychiatric:         Attention and Perception: Attention normal.         Mood and Affect: Mood and affect normal.         Behavior: Behavior is cooperative.         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    Treating with azacitidine and venetoclax, Started on 02/27/2023.  Bone marrow biopsy after cycle 4 shows no immunophenotypic or morphologic evidence of AML.  NGS showed continued ASXL1, SRSF2, TET2 mutations.  Cytogenetics were normal.    Evaluated by Dr. Valdez in [place].     Continue prophylaxis with 500 mg of levofloxacin daily, 200 mg of acyclovir b.i.d., 200 mg of fluconazole  daily.    He restarted treatment on 07/10/2023, on 08/14/2023, we dose reduced by changing venetoclax to 14 out of every 28 days.    Patient was getting venetoclax on days 1 through 14 out of every 28 days and azacitidine on days 1 through 3 out of every 28 days.  His bone marrow biopsy on 05/22/2024 shows recurrent AML.    We will give 2 units of blood today and 1 unit of platelets today.    Continue twice weekly labs (Monday/Thursday) with transfusions as needed.     We will start low-dose cytarabine and venetoclax on Monday, 06/24/2024.    I will see him back in 2 weeks to see how he was doing.  We also discuss the possibility of referral to [facility] for clinical trial versus focusing on quality and comfort in the form of hospice.  He stated that he would like to try low-dose cytarabine here in town.    Eduard Luna II, MD

## 2024-06-17 ENCOUNTER — LAB VISIT (OUTPATIENT)
Dept: LAB | Facility: HOSPITAL | Age: 74
End: 2024-06-17
Attending: INTERNAL MEDICINE
Payer: MEDICARE

## 2024-06-17 ENCOUNTER — OFFICE VISIT (OUTPATIENT)
Dept: HEMATOLOGY/ONCOLOGY | Facility: CLINIC | Age: 74
End: 2024-06-17
Payer: MEDICARE

## 2024-06-17 ENCOUNTER — INFUSION (OUTPATIENT)
Dept: INFUSION THERAPY | Facility: HOSPITAL | Age: 74
End: 2024-06-17
Attending: INTERNAL MEDICINE
Payer: MEDICARE

## 2024-06-17 VITALS
OXYGEN SATURATION: 97 % | TEMPERATURE: 99 F | WEIGHT: 180 LBS | SYSTOLIC BLOOD PRESSURE: 100 MMHG | DIASTOLIC BLOOD PRESSURE: 63 MMHG | HEART RATE: 85 BPM | HEIGHT: 72 IN | RESPIRATION RATE: 18 BRPM | BODY MASS INDEX: 24.38 KG/M2

## 2024-06-17 VITALS
RESPIRATION RATE: 16 BRPM | HEART RATE: 61 BPM | OXYGEN SATURATION: 99 % | SYSTOLIC BLOOD PRESSURE: 140 MMHG | DIASTOLIC BLOOD PRESSURE: 80 MMHG | TEMPERATURE: 98 F

## 2024-06-17 DIAGNOSIS — C92.00 ACUTE MYELOID LEUKEMIA NOT HAVING ACHIEVED REMISSION: Primary | ICD-10-CM

## 2024-06-17 DIAGNOSIS — C92.00 ACUTE MYELOID LEUKEMIA NOT HAVING ACHIEVED REMISSION: ICD-10-CM

## 2024-06-17 LAB
ABO + RH BLD: NORMAL
ALBUMIN SERPL-MCNC: 3.3 G/DL (ref 3.4–4.8)
ALBUMIN/GLOB SERPL: 1 RATIO (ref 1.1–2)
ALP SERPL-CCNC: 80 UNIT/L (ref 40–150)
ALT SERPL-CCNC: 13 UNIT/L (ref 0–55)
ANION GAP SERPL CALC-SCNC: 4 MEQ/L
AST SERPL-CCNC: 10 UNIT/L (ref 5–34)
BASOPHILS # BLD AUTO: 0.02 X10(3)/MCL
BASOPHILS NFR BLD AUTO: 0.3 %
BILIRUB SERPL-MCNC: 0.8 MG/DL
BLD PROD TYP BPU: NORMAL
BLOOD UNIT EXPIRATION DATE: NORMAL
BLOOD UNIT TYPE CODE: 5100
BLOOD UNIT TYPE CODE: 5100
BLOOD UNIT TYPE CODE: 6200
BUN SERPL-MCNC: 21.8 MG/DL (ref 8.4–25.7)
CALCIUM SERPL-MCNC: 8.1 MG/DL (ref 8.8–10)
CHLORIDE SERPL-SCNC: 106 MMOL/L (ref 98–107)
CO2 SERPL-SCNC: 27 MMOL/L (ref 23–31)
CREAT SERPL-MCNC: 1.14 MG/DL (ref 0.73–1.18)
CREAT/UREA NIT SERPL: 19
CROSSMATCH INTERPRETATION: NORMAL
DISPENSE STATUS: NORMAL
EOSINOPHIL # BLD AUTO: 0 X10(3)/MCL (ref 0–0.9)
EOSINOPHIL NFR BLD AUTO: 0 %
ERYTHROCYTE [DISTWIDTH] IN BLOOD BY AUTOMATED COUNT: 18.8 % (ref 11.5–17)
GFR SERPLBLD CREATININE-BSD FMLA CKD-EPI: >60 ML/MIN/1.73/M2
GLOBULIN SER-MCNC: 3.2 GM/DL (ref 2.4–3.5)
GLUCOSE SERPL-MCNC: 130 MG/DL (ref 82–115)
GROUP & RH: NORMAL
HCT VFR BLD AUTO: 23.6 % (ref 42–52)
HGB BLD-MCNC: 7.2 G/DL (ref 14–18)
IMM GRANULOCYTES # BLD AUTO: 0.08 X10(3)/MCL (ref 0–0.04)
IMM GRANULOCYTES NFR BLD AUTO: 1.1 %
INDIRECT COOMBS: NORMAL
LYMPHOCYTES # BLD AUTO: 2.39 X10(3)/MCL (ref 0.6–4.6)
LYMPHOCYTES NFR BLD AUTO: 34 %
MCH RBC QN AUTO: 28.8 PG (ref 27–31)
MCHC RBC AUTO-ENTMCNC: 30.5 G/DL (ref 33–36)
MCV RBC AUTO: 94.4 FL (ref 80–94)
MONOCYTES # BLD AUTO: 3.54 X10(3)/MCL (ref 0.1–1.3)
MONOCYTES NFR BLD AUTO: 50.4 %
NEUTROPHILS # BLD AUTO: 0.99 X10(3)/MCL (ref 2.1–9.2)
NEUTROPHILS NFR BLD AUTO: 14.2 %
PLATELET # BLD AUTO: 9 X10(3)/MCL (ref 130–400)
PMV BLD AUTO: ABNORMAL FL
POTASSIUM SERPL-SCNC: 3.5 MMOL/L (ref 3.5–5.1)
PROT SERPL-MCNC: 6.5 GM/DL (ref 5.8–7.6)
RBC # BLD AUTO: 2.5 X10(6)/MCL (ref 4.7–6.1)
SODIUM SERPL-SCNC: 137 MMOL/L (ref 136–145)
SPECIMEN OUTDATE: NORMAL
UNIT NUMBER: NORMAL
WBC # BLD AUTO: 7.02 X10(3)/MCL (ref 4.5–11.5)

## 2024-06-17 PROCEDURE — 80053 COMPREHEN METABOLIC PANEL: CPT

## 2024-06-17 PROCEDURE — 25000003 PHARM REV CODE 250: Performed by: INTERNAL MEDICINE

## 2024-06-17 PROCEDURE — P9037 PLATE PHERES LEUKOREDU IRRAD: HCPCS | Performed by: INTERNAL MEDICINE

## 2024-06-17 PROCEDURE — 99999 PR PBB SHADOW E&M-EST. PATIENT-LVL V: CPT | Mod: PBBFAC,,, | Performed by: INTERNAL MEDICINE

## 2024-06-17 PROCEDURE — 99215 OFFICE O/P EST HI 40 MIN: CPT | Mod: S$PBB,,, | Performed by: INTERNAL MEDICINE

## 2024-06-17 PROCEDURE — 86900 BLOOD TYPING SEROLOGIC ABO: CPT | Performed by: INTERNAL MEDICINE

## 2024-06-17 PROCEDURE — 99215 OFFICE O/P EST HI 40 MIN: CPT | Mod: PBBFAC,25 | Performed by: INTERNAL MEDICINE

## 2024-06-17 PROCEDURE — 36415 COLL VENOUS BLD VENIPUNCTURE: CPT | Mod: 91

## 2024-06-17 PROCEDURE — 85025 COMPLETE CBC W/AUTO DIFF WBC: CPT

## 2024-06-17 PROCEDURE — 96374 THER/PROPH/DIAG INJ IV PUSH: CPT

## 2024-06-17 PROCEDURE — 86923 COMPATIBILITY TEST ELECTRIC: CPT | Mod: 91 | Performed by: INTERNAL MEDICINE

## 2024-06-17 PROCEDURE — 36430 TRANSFUSION BLD/BLD COMPNT: CPT

## 2024-06-17 PROCEDURE — P9040 RBC LEUKOREDUCED IRRADIATED: HCPCS | Performed by: INTERNAL MEDICINE

## 2024-06-17 PROCEDURE — 86850 RBC ANTIBODY SCREEN: CPT | Performed by: INTERNAL MEDICINE

## 2024-06-17 RX ORDER — FAMOTIDINE 10 MG/ML
20 INJECTION INTRAVENOUS
Status: COMPLETED | OUTPATIENT
Start: 2024-06-17 | End: 2024-06-17

## 2024-06-17 RX ORDER — CYTARABINE 20 MG/ML
20 INJECTION, SOLUTION INTRATHECAL; INTRAVENOUS; SUBCUTANEOUS ONCE
OUTPATIENT
Start: 2024-07-02 | End: 2024-07-02

## 2024-06-17 RX ORDER — DIPHENHYDRAMINE HCL 25 MG
25 CAPSULE ORAL ONCE
Status: CANCELLED | OUTPATIENT
Start: 2024-06-17 | End: 2024-06-17

## 2024-06-17 RX ORDER — CYTARABINE 20 MG/ML
20 INJECTION, SOLUTION INTRATHECAL; INTRAVENOUS; SUBCUTANEOUS ONCE
OUTPATIENT
Start: 2024-07-04 | End: 2024-07-04

## 2024-06-17 RX ORDER — CYTARABINE 20 MG/ML
20 INJECTION, SOLUTION INTRATHECAL; INTRAVENOUS; SUBCUTANEOUS ONCE
OUTPATIENT
Start: 2024-07-01 | End: 2024-07-01

## 2024-06-17 RX ORDER — HYDROCODONE BITARTRATE AND ACETAMINOPHEN 500; 5 MG/1; MG/1
TABLET ORAL ONCE
Status: ACTIVE | OUTPATIENT
Start: 2024-06-17

## 2024-06-17 RX ORDER — FAMOTIDINE 10 MG/ML
20 INJECTION INTRAVENOUS
Status: CANCELLED
Start: 2024-06-17

## 2024-06-17 RX ORDER — CYTARABINE 20 MG/ML
20 INJECTION, SOLUTION INTRATHECAL; INTRAVENOUS; SUBCUTANEOUS ONCE
OUTPATIENT
Start: 2024-06-28 | End: 2024-06-28

## 2024-06-17 RX ORDER — ACETAMINOPHEN 325 MG/1
650 TABLET ORAL ONCE
Status: ACTIVE | OUTPATIENT
Start: 2024-06-17

## 2024-06-17 RX ORDER — ACETAMINOPHEN 325 MG/1
650 TABLET ORAL ONCE
Status: CANCELLED | OUTPATIENT
Start: 2024-06-17 | End: 2024-06-17

## 2024-06-17 RX ORDER — CYTARABINE 20 MG/ML
20 INJECTION, SOLUTION INTRATHECAL; INTRAVENOUS; SUBCUTANEOUS ONCE
OUTPATIENT
Start: 2024-06-25 | End: 2024-06-25

## 2024-06-17 RX ORDER — CYTARABINE 20 MG/ML
20 INJECTION, SOLUTION INTRATHECAL; INTRAVENOUS; SUBCUTANEOUS ONCE
OUTPATIENT
Start: 2024-07-03 | End: 2024-07-03

## 2024-06-17 RX ORDER — CYTARABINE 20 MG/ML
20 INJECTION, SOLUTION INTRATHECAL; INTRAVENOUS; SUBCUTANEOUS ONCE
OUTPATIENT
Start: 2024-06-24 | End: 2024-06-24

## 2024-06-17 RX ORDER — CYTARABINE 20 MG/ML
20 INJECTION, SOLUTION INTRATHECAL; INTRAVENOUS; SUBCUTANEOUS ONCE
OUTPATIENT
Start: 2024-07-05 | End: 2024-07-05

## 2024-06-17 RX ORDER — HYDROCODONE BITARTRATE AND ACETAMINOPHEN 500; 5 MG/1; MG/1
TABLET ORAL ONCE
Status: COMPLETED | OUTPATIENT
Start: 2024-06-17 | End: 2024-06-17

## 2024-06-17 RX ORDER — CYTARABINE 20 MG/ML
20 INJECTION, SOLUTION INTRATHECAL; INTRAVENOUS; SUBCUTANEOUS ONCE
OUTPATIENT
Start: 2024-06-27 | End: 2024-06-27

## 2024-06-17 RX ORDER — DIPHENHYDRAMINE HCL 25 MG
25 CAPSULE ORAL ONCE
Status: ACTIVE | OUTPATIENT
Start: 2024-06-17

## 2024-06-17 RX ORDER — HYDROCODONE BITARTRATE AND ACETAMINOPHEN 500; 5 MG/1; MG/1
TABLET ORAL ONCE
Status: CANCELLED | OUTPATIENT
Start: 2024-06-17 | End: 2024-06-17

## 2024-06-17 RX ORDER — CYTARABINE 20 MG/ML
20 INJECTION, SOLUTION INTRATHECAL; INTRAVENOUS; SUBCUTANEOUS ONCE
OUTPATIENT
Start: 2024-06-26 | End: 2024-06-26

## 2024-06-17 RX ADMIN — SODIUM CHLORIDE: 9 INJECTION, SOLUTION INTRAVENOUS at 11:06

## 2024-06-17 RX ADMIN — FAMOTIDINE 20 MG: 10 INJECTION INTRAVENOUS at 11:06

## 2024-06-20 ENCOUNTER — LAB VISIT (OUTPATIENT)
Dept: LAB | Facility: HOSPITAL | Age: 74
End: 2024-06-20
Attending: INTERNAL MEDICINE
Payer: MEDICARE

## 2024-06-20 ENCOUNTER — INFUSION (OUTPATIENT)
Dept: INFUSION THERAPY | Facility: HOSPITAL | Age: 74
End: 2024-06-20
Attending: INTERNAL MEDICINE
Payer: MEDICARE

## 2024-06-20 VITALS
RESPIRATION RATE: 16 BRPM | SYSTOLIC BLOOD PRESSURE: 128 MMHG | DIASTOLIC BLOOD PRESSURE: 58 MMHG | HEART RATE: 67 BPM | TEMPERATURE: 98 F

## 2024-06-20 DIAGNOSIS — C92.00 ACUTE MYELOID LEUKEMIA NOT HAVING ACHIEVED REMISSION: Primary | ICD-10-CM

## 2024-06-20 DIAGNOSIS — C92.00 ACUTE MYELOID LEUKEMIA NOT HAVING ACHIEVED REMISSION: ICD-10-CM

## 2024-06-20 LAB
ABO + RH BLD: NORMAL
BASOPHILS # BLD AUTO: 0.01 X10(3)/MCL
BASOPHILS NFR BLD AUTO: 0.1 %
BLD PROD TYP BPU: NORMAL
BLOOD UNIT EXPIRATION DATE: NORMAL
BLOOD UNIT TYPE CODE: 6200
CROSSMATCH INTERPRETATION: NORMAL
DISPENSE STATUS: NORMAL
EOSINOPHIL # BLD AUTO: 0 X10(3)/MCL (ref 0–0.9)
EOSINOPHIL NFR BLD AUTO: 0 %
ERYTHROCYTE [DISTWIDTH] IN BLOOD BY AUTOMATED COUNT: 17.6 % (ref 11.5–17)
HCT VFR BLD AUTO: 30.3 % (ref 42–52)
HGB BLD-MCNC: 9.3 G/DL (ref 14–18)
IMM GRANULOCYTES # BLD AUTO: 0.22 X10(3)/MCL (ref 0–0.04)
IMM GRANULOCYTES NFR BLD AUTO: 2.7 %
LYMPHOCYTES # BLD AUTO: 2.55 X10(3)/MCL (ref 0.6–4.6)
LYMPHOCYTES NFR BLD AUTO: 31.3 %
MCH RBC QN AUTO: 28.4 PG (ref 27–31)
MCHC RBC AUTO-ENTMCNC: 30.7 G/DL (ref 33–36)
MCV RBC AUTO: 92.4 FL (ref 80–94)
MONOCYTES # BLD AUTO: 4.51 X10(3)/MCL (ref 0.1–1.3)
MONOCYTES NFR BLD AUTO: 55.3 %
NEUTROPHILS # BLD AUTO: 0.86 X10(3)/MCL (ref 2.1–9.2)
NEUTROPHILS NFR BLD AUTO: 10.6 %
PLATELET # BLD AUTO: 10 X10(3)/MCL (ref 130–400)
PMV BLD AUTO: ABNORMAL FL
RBC # BLD AUTO: 3.28 X10(6)/MCL (ref 4.7–6.1)
UNIT NUMBER: NORMAL
WBC # BLD AUTO: 8.15 X10(3)/MCL (ref 4.5–11.5)

## 2024-06-20 PROCEDURE — 25000003 PHARM REV CODE 250: Performed by: INTERNAL MEDICINE

## 2024-06-20 PROCEDURE — 96375 TX/PRO/DX INJ NEW DRUG ADDON: CPT

## 2024-06-20 PROCEDURE — 85025 COMPLETE CBC W/AUTO DIFF WBC: CPT

## 2024-06-20 PROCEDURE — 36430 TRANSFUSION BLD/BLD COMPNT: CPT

## 2024-06-20 PROCEDURE — P9037 PLATE PHERES LEUKOREDU IRRAD: HCPCS | Performed by: INTERNAL MEDICINE

## 2024-06-20 PROCEDURE — 36415 COLL VENOUS BLD VENIPUNCTURE: CPT

## 2024-06-20 RX ORDER — HYDROCODONE BITARTRATE AND ACETAMINOPHEN 500; 5 MG/1; MG/1
TABLET ORAL ONCE
Status: CANCELLED | OUTPATIENT
Start: 2024-06-20 | End: 2024-06-20

## 2024-06-20 RX ORDER — HYDROCODONE BITARTRATE AND ACETAMINOPHEN 500; 5 MG/1; MG/1
TABLET ORAL ONCE
Status: ACTIVE | OUTPATIENT
Start: 2024-06-20

## 2024-06-20 RX ORDER — FAMOTIDINE 10 MG/ML
20 INJECTION INTRAVENOUS ONCE
Status: SHIPPED | OUTPATIENT
Start: 2024-06-20

## 2024-06-20 RX ORDER — FAMOTIDINE 10 MG/ML
20 INJECTION INTRAVENOUS
Status: COMPLETED | OUTPATIENT
Start: 2024-06-20 | End: 2024-06-20

## 2024-06-20 RX ORDER — FAMOTIDINE 10 MG/ML
20 INJECTION INTRAVENOUS
Start: 2024-06-20

## 2024-06-20 RX ADMIN — FAMOTIDINE 20 MG: 10 INJECTION, SOLUTION INTRAVENOUS at 09:06

## 2024-06-21 LAB — RBCS: NORMAL

## 2024-06-24 ENCOUNTER — LAB VISIT (OUTPATIENT)
Dept: LAB | Facility: HOSPITAL | Age: 74
End: 2024-06-24
Attending: INTERNAL MEDICINE
Payer: MEDICARE

## 2024-06-24 ENCOUNTER — INFUSION (OUTPATIENT)
Dept: INFUSION THERAPY | Facility: HOSPITAL | Age: 74
End: 2024-06-24
Attending: INTERNAL MEDICINE
Payer: MEDICARE

## 2024-06-24 VITALS
OXYGEN SATURATION: 98 % | SYSTOLIC BLOOD PRESSURE: 103 MMHG | TEMPERATURE: 98 F | HEART RATE: 91 BPM | DIASTOLIC BLOOD PRESSURE: 60 MMHG

## 2024-06-24 DIAGNOSIS — C92.00 ACUTE MYELOID LEUKEMIA NOT HAVING ACHIEVED REMISSION: ICD-10-CM

## 2024-06-24 DIAGNOSIS — C92.00 ACUTE MYELOID LEUKEMIA NOT HAVING ACHIEVED REMISSION: Primary | ICD-10-CM

## 2024-06-24 LAB
ALBUMIN SERPL-MCNC: 3.3 G/DL (ref 3.4–4.8)
ALBUMIN/GLOB SERPL: 0.9 RATIO (ref 1.1–2)
ALP SERPL-CCNC: 79 UNIT/L (ref 40–150)
ALT SERPL-CCNC: 16 UNIT/L (ref 0–55)
ANION GAP SERPL CALC-SCNC: 8 MEQ/L
AST SERPL-CCNC: 17 UNIT/L (ref 5–34)
BASOPHILS # BLD AUTO: 0.02 X10(3)/MCL
BASOPHILS NFR BLD AUTO: 0.2 %
BILIRUB SERPL-MCNC: 0.7 MG/DL
BUN SERPL-MCNC: 23.4 MG/DL (ref 8.4–25.7)
CALCIUM SERPL-MCNC: 8.3 MG/DL (ref 8.8–10)
CHLORIDE SERPL-SCNC: 107 MMOL/L (ref 98–107)
CO2 SERPL-SCNC: 23 MMOL/L (ref 23–31)
CREAT SERPL-MCNC: 1.28 MG/DL (ref 0.73–1.18)
CREAT/UREA NIT SERPL: 18
EOSINOPHIL # BLD AUTO: 0 X10(3)/MCL (ref 0–0.9)
EOSINOPHIL NFR BLD AUTO: 0 %
ERYTHROCYTE [DISTWIDTH] IN BLOOD BY AUTOMATED COUNT: 17.5 % (ref 11.5–17)
GFR SERPLBLD CREATININE-BSD FMLA CKD-EPI: 59 ML/MIN/1.73/M2
GLOBULIN SER-MCNC: 3.6 GM/DL (ref 2.4–3.5)
GLUCOSE SERPL-MCNC: 145 MG/DL (ref 82–115)
HCT VFR BLD AUTO: 29.9 % (ref 42–52)
HGB BLD-MCNC: 9.2 G/DL (ref 14–18)
IMM GRANULOCYTES # BLD AUTO: 0.08 X10(3)/MCL (ref 0–0.04)
IMM GRANULOCYTES NFR BLD AUTO: 0.8 %
LYMPHOCYTES # BLD AUTO: 2.82 X10(3)/MCL (ref 0.6–4.6)
LYMPHOCYTES NFR BLD AUTO: 26.8 %
MCH RBC QN AUTO: 28.8 PG (ref 27–31)
MCHC RBC AUTO-ENTMCNC: 30.8 G/DL (ref 33–36)
MCV RBC AUTO: 93.7 FL (ref 80–94)
MONOCYTES # BLD AUTO: 6.82 X10(3)/MCL (ref 0.1–1.3)
MONOCYTES NFR BLD AUTO: 64.9 %
NEUTROPHILS # BLD AUTO: 0.77 X10(3)/MCL (ref 2.1–9.2)
NEUTROPHILS NFR BLD AUTO: 7.3 %
PLATELET # BLD AUTO: 17 X10(3)/MCL (ref 130–400)
PMV BLD AUTO: ABNORMAL FL
POTASSIUM SERPL-SCNC: 4.1 MMOL/L (ref 3.5–5.1)
PROT SERPL-MCNC: 6.9 GM/DL (ref 5.8–7.6)
RBC # BLD AUTO: 3.19 X10(6)/MCL (ref 4.7–6.1)
SODIUM SERPL-SCNC: 138 MMOL/L (ref 136–145)
WBC # BLD AUTO: 10.51 X10(3)/MCL (ref 4.5–11.5)

## 2024-06-24 PROCEDURE — 80053 COMPREHEN METABOLIC PANEL: CPT

## 2024-06-24 PROCEDURE — 85025 COMPLETE CBC W/AUTO DIFF WBC: CPT

## 2024-06-24 PROCEDURE — 96401 CHEMO ANTI-NEOPL SQ/IM: CPT

## 2024-06-24 PROCEDURE — 63600175 PHARM REV CODE 636 W HCPCS: Performed by: INTERNAL MEDICINE

## 2024-06-24 PROCEDURE — 36415 COLL VENOUS BLD VENIPUNCTURE: CPT

## 2024-06-24 PROCEDURE — 25000003 PHARM REV CODE 250: Performed by: NURSE PRACTITIONER

## 2024-06-24 RX ORDER — SODIUM CHLORIDE 9 MG/ML
INJECTION, SOLUTION INTRAVENOUS ONCE
Status: COMPLETED | OUTPATIENT
Start: 2024-06-24 | End: 2024-06-24

## 2024-06-24 RX ORDER — CYTARABINE 20 MG/ML
20 INJECTION, SOLUTION INTRATHECAL; INTRAVENOUS; SUBCUTANEOUS ONCE
Status: COMPLETED | OUTPATIENT
Start: 2024-06-24 | End: 2024-06-24

## 2024-06-24 RX ADMIN — CYTARABINE 40 MG: 20 INJECTION, SOLUTION INTRATHECAL; INTRAVENOUS; SUBCUTANEOUS at 09:06

## 2024-06-24 RX ADMIN — SODIUM CHLORIDE: 9 INJECTION, SOLUTION INTRAVENOUS at 09:06

## 2024-06-25 ENCOUNTER — INFUSION (OUTPATIENT)
Dept: INFUSION THERAPY | Facility: HOSPITAL | Age: 74
End: 2024-06-25
Attending: INTERNAL MEDICINE
Payer: MEDICARE

## 2024-06-25 VITALS
OXYGEN SATURATION: 98 % | HEART RATE: 80 BPM | TEMPERATURE: 98 F | SYSTOLIC BLOOD PRESSURE: 100 MMHG | DIASTOLIC BLOOD PRESSURE: 64 MMHG | RESPIRATION RATE: 18 BRPM

## 2024-06-25 DIAGNOSIS — C92.00 ACUTE MYELOID LEUKEMIA NOT HAVING ACHIEVED REMISSION: Primary | ICD-10-CM

## 2024-06-25 PROCEDURE — 96401 CHEMO ANTI-NEOPL SQ/IM: CPT

## 2024-06-25 PROCEDURE — 63600175 PHARM REV CODE 636 W HCPCS: Performed by: INTERNAL MEDICINE

## 2024-06-25 RX ORDER — CYTARABINE 20 MG/ML
20 INJECTION, SOLUTION INTRATHECAL; INTRAVENOUS; SUBCUTANEOUS ONCE
Status: COMPLETED | OUTPATIENT
Start: 2024-06-25 | End: 2024-06-25

## 2024-06-25 RX ADMIN — CYTARABINE 40 MG: 20 INJECTION, SOLUTION INTRATHECAL; INTRAVENOUS; SUBCUTANEOUS at 08:06

## 2024-06-26 ENCOUNTER — INFUSION (OUTPATIENT)
Dept: INFUSION THERAPY | Facility: HOSPITAL | Age: 74
End: 2024-06-26
Attending: INTERNAL MEDICINE
Payer: MEDICARE

## 2024-06-26 VITALS
RESPIRATION RATE: 18 BRPM | DIASTOLIC BLOOD PRESSURE: 62 MMHG | HEART RATE: 73 BPM | SYSTOLIC BLOOD PRESSURE: 103 MMHG | TEMPERATURE: 98 F | OXYGEN SATURATION: 97 %

## 2024-06-26 DIAGNOSIS — C92.00 ACUTE MYELOID LEUKEMIA NOT HAVING ACHIEVED REMISSION: Primary | ICD-10-CM

## 2024-06-26 PROCEDURE — 96401 CHEMO ANTI-NEOPL SQ/IM: CPT

## 2024-06-26 PROCEDURE — 63600175 PHARM REV CODE 636 W HCPCS: Performed by: INTERNAL MEDICINE

## 2024-06-26 RX ORDER — CYTARABINE 20 MG/ML
20 INJECTION, SOLUTION INTRATHECAL; INTRAVENOUS; SUBCUTANEOUS ONCE
Status: COMPLETED | OUTPATIENT
Start: 2024-06-26 | End: 2024-06-26

## 2024-06-26 RX ADMIN — CYTARABINE 40 MG: 20 INJECTION, SOLUTION INTRATHECAL; INTRAVENOUS; SUBCUTANEOUS at 08:06

## 2024-06-27 ENCOUNTER — INFUSION (OUTPATIENT)
Dept: INFUSION THERAPY | Facility: HOSPITAL | Age: 74
End: 2024-06-27
Attending: INTERNAL MEDICINE
Payer: MEDICARE

## 2024-06-27 ENCOUNTER — LAB VISIT (OUTPATIENT)
Dept: LAB | Facility: HOSPITAL | Age: 74
End: 2024-06-27
Attending: INTERNAL MEDICINE
Payer: MEDICARE

## 2024-06-27 VITALS — DIASTOLIC BLOOD PRESSURE: 63 MMHG | HEART RATE: 81 BPM | SYSTOLIC BLOOD PRESSURE: 127 MMHG | TEMPERATURE: 99 F

## 2024-06-27 DIAGNOSIS — C92.00 ACUTE MYELOID LEUKEMIA NOT HAVING ACHIEVED REMISSION: Primary | ICD-10-CM

## 2024-06-27 DIAGNOSIS — C92.00 ACUTE MYELOID LEUKEMIA NOT HAVING ACHIEVED REMISSION: ICD-10-CM

## 2024-06-27 DIAGNOSIS — C92.00 ACUTE MYELOID LEUKEMIA NOT HAVING ACHIEVED REMISSION: Primary | Chronic | ICD-10-CM

## 2024-06-27 LAB
ABO + RH BLD: NORMAL
ABO + RH BLD: NORMAL
BASOPHILS # BLD AUTO: 0.01 X10(3)/MCL
BASOPHILS NFR BLD AUTO: 0.7 %
BLD PROD TYP BPU: NORMAL
BLD PROD TYP BPU: NORMAL
BLOOD UNIT EXPIRATION DATE: NORMAL
BLOOD UNIT EXPIRATION DATE: NORMAL
BLOOD UNIT TYPE CODE: 6200
BLOOD UNIT TYPE CODE: 9500
CROSSMATCH INTERPRETATION: NORMAL
CROSSMATCH INTERPRETATION: NORMAL
DISPENSE STATUS: NORMAL
DISPENSE STATUS: NORMAL
EOSINOPHIL # BLD AUTO: 0 X10(3)/MCL (ref 0–0.9)
EOSINOPHIL NFR BLD AUTO: 0 %
ERYTHROCYTE [DISTWIDTH] IN BLOOD BY AUTOMATED COUNT: 16.6 % (ref 11.5–17)
HCT VFR BLD AUTO: 27.9 % (ref 42–52)
HGB BLD-MCNC: 8.7 G/DL (ref 14–18)
IMM GRANULOCYTES # BLD AUTO: 0.08 X10(3)/MCL (ref 0–0.04)
IMM GRANULOCYTES NFR BLD AUTO: 5.6 %
LYMPHOCYTES # BLD AUTO: 0.67 X10(3)/MCL (ref 0.6–4.6)
LYMPHOCYTES NFR BLD AUTO: 46.5 %
MCH RBC QN AUTO: 28.8 PG (ref 27–31)
MCHC RBC AUTO-ENTMCNC: 31.2 G/DL (ref 33–36)
MCV RBC AUTO: 92.4 FL (ref 80–94)
MONOCYTES # BLD AUTO: 0.31 X10(3)/MCL (ref 0.1–1.3)
MONOCYTES NFR BLD AUTO: 21.5 %
NEUTROPHILS # BLD AUTO: 0.37 X10(3)/MCL (ref 2.1–9.2)
NEUTROPHILS NFR BLD AUTO: 25.7 %
PLATELET # BLD AUTO: 6 X10(3)/MCL (ref 130–400)
PMV BLD AUTO: ABNORMAL FL
RBC # BLD AUTO: 3.02 X10(6)/MCL (ref 4.7–6.1)
UNIT NUMBER: NORMAL
UNIT NUMBER: NORMAL
WBC # BLD AUTO: 1.44 X10(3)/MCL (ref 4.5–11.5)

## 2024-06-27 PROCEDURE — 85025 COMPLETE CBC W/AUTO DIFF WBC: CPT

## 2024-06-27 PROCEDURE — P9037 PLATE PHERES LEUKOREDU IRRAD: HCPCS | Performed by: NURSE PRACTITIONER

## 2024-06-27 PROCEDURE — 63600175 PHARM REV CODE 636 W HCPCS: Performed by: INTERNAL MEDICINE

## 2024-06-27 PROCEDURE — 96374 THER/PROPH/DIAG INJ IV PUSH: CPT

## 2024-06-27 PROCEDURE — 25000003 PHARM REV CODE 250: Performed by: INTERNAL MEDICINE

## 2024-06-27 PROCEDURE — 36415 COLL VENOUS BLD VENIPUNCTURE: CPT

## 2024-06-27 PROCEDURE — 36430 TRANSFUSION BLD/BLD COMPNT: CPT

## 2024-06-27 PROCEDURE — 96401 CHEMO ANTI-NEOPL SQ/IM: CPT

## 2024-06-27 RX ORDER — HYDROCODONE BITARTRATE AND ACETAMINOPHEN 500; 5 MG/1; MG/1
TABLET ORAL ONCE
Status: CANCELLED | OUTPATIENT
Start: 2024-06-27 | End: 2024-06-27

## 2024-06-27 RX ORDER — CYTARABINE 20 MG/ML
20 INJECTION, SOLUTION INTRATHECAL; INTRAVENOUS; SUBCUTANEOUS ONCE
Status: COMPLETED | OUTPATIENT
Start: 2024-06-27 | End: 2024-06-27

## 2024-06-27 RX ORDER — FAMOTIDINE 10 MG/ML
20 INJECTION INTRAVENOUS
Status: COMPLETED | OUTPATIENT
Start: 2024-06-27 | End: 2024-06-27

## 2024-06-27 RX ORDER — FAMOTIDINE 10 MG/ML
20 INJECTION INTRAVENOUS
Start: 2024-06-27

## 2024-06-27 RX ORDER — HYDROCODONE BITARTRATE AND ACETAMINOPHEN 500; 5 MG/1; MG/1
TABLET ORAL ONCE
Status: ACTIVE | OUTPATIENT
Start: 2024-06-27

## 2024-06-27 RX ADMIN — CYTARABINE 40 MG: 20 INJECTION, SOLUTION INTRATHECAL; INTRAVENOUS; SUBCUTANEOUS at 08:06

## 2024-06-27 RX ADMIN — FAMOTIDINE 20 MG: 10 INJECTION INTRAVENOUS at 09:06

## 2024-06-28 ENCOUNTER — INFUSION (OUTPATIENT)
Dept: INFUSION THERAPY | Facility: HOSPITAL | Age: 74
End: 2024-06-28
Attending: INTERNAL MEDICINE
Payer: MEDICARE

## 2024-06-28 VITALS
HEART RATE: 97 BPM | SYSTOLIC BLOOD PRESSURE: 130 MMHG | DIASTOLIC BLOOD PRESSURE: 80 MMHG | TEMPERATURE: 98 F | HEIGHT: 72 IN | OXYGEN SATURATION: 97 % | WEIGHT: 181 LBS | BODY MASS INDEX: 24.52 KG/M2 | RESPIRATION RATE: 20 BRPM

## 2024-06-28 DIAGNOSIS — C92.00 ACUTE MYELOID LEUKEMIA NOT HAVING ACHIEVED REMISSION: Primary | ICD-10-CM

## 2024-06-28 PROCEDURE — 63600175 PHARM REV CODE 636 W HCPCS: Performed by: INTERNAL MEDICINE

## 2024-06-28 PROCEDURE — 96401 CHEMO ANTI-NEOPL SQ/IM: CPT

## 2024-06-28 RX ORDER — CYTARABINE 20 MG/ML
20 INJECTION, SOLUTION INTRATHECAL; INTRAVENOUS; SUBCUTANEOUS ONCE
Status: COMPLETED | OUTPATIENT
Start: 2024-06-28 | End: 2024-06-28

## 2024-06-28 RX ADMIN — CYTARABINE 40 MG: 20 INJECTION, SOLUTION INTRATHECAL; INTRAVENOUS; SUBCUTANEOUS at 08:06

## 2024-06-28 NOTE — NURSING
C1 D5 Cytarabine SQ injection given, tolerated well. Pt discharged home in stable condition, aware of future appts.

## 2024-07-01 ENCOUNTER — INFUSION (OUTPATIENT)
Dept: INFUSION THERAPY | Facility: HOSPITAL | Age: 74
End: 2024-07-01
Attending: INTERNAL MEDICINE
Payer: MEDICARE

## 2024-07-01 ENCOUNTER — LAB VISIT (OUTPATIENT)
Dept: LAB | Facility: HOSPITAL | Age: 74
End: 2024-07-01
Attending: INTERNAL MEDICINE
Payer: MEDICARE

## 2024-07-01 VITALS — SYSTOLIC BLOOD PRESSURE: 95 MMHG | DIASTOLIC BLOOD PRESSURE: 55 MMHG | HEART RATE: 88 BPM | TEMPERATURE: 98 F

## 2024-07-01 DIAGNOSIS — C92.00 ACUTE MYELOID LEUKEMIA NOT HAVING ACHIEVED REMISSION: Primary | ICD-10-CM

## 2024-07-01 DIAGNOSIS — C92.00 ACUTE MYELOID LEUKEMIA NOT HAVING ACHIEVED REMISSION: ICD-10-CM

## 2024-07-01 LAB
ALBUMIN SERPL-MCNC: 3.6 G/DL (ref 3.4–4.8)
ALBUMIN/GLOB SERPL: 1.2 RATIO (ref 1.1–2)
ALP SERPL-CCNC: 82 UNIT/L (ref 40–150)
ALT SERPL-CCNC: 20 UNIT/L (ref 0–55)
ANION GAP SERPL CALC-SCNC: 8 MEQ/L
AST SERPL-CCNC: 13 UNIT/L (ref 5–34)
BASOPHILS # BLD AUTO: 0.01 X10(3)/MCL
BASOPHILS NFR BLD AUTO: 0.9 %
BILIRUB SERPL-MCNC: 1 MG/DL
BUN SERPL-MCNC: 25 MG/DL (ref 8.4–25.7)
CALCIUM SERPL-MCNC: 9 MG/DL (ref 8.8–10)
CHLORIDE SERPL-SCNC: 106 MMOL/L (ref 98–107)
CO2 SERPL-SCNC: 26 MMOL/L (ref 23–31)
CREAT SERPL-MCNC: 1.04 MG/DL (ref 0.73–1.18)
CREAT/UREA NIT SERPL: 24
EOSINOPHIL # BLD AUTO: 0 X10(3)/MCL (ref 0–0.9)
EOSINOPHIL NFR BLD AUTO: 0 %
ERYTHROCYTE [DISTWIDTH] IN BLOOD BY AUTOMATED COUNT: 16 % (ref 11.5–17)
GFR SERPLBLD CREATININE-BSD FMLA CKD-EPI: >60 ML/MIN/1.73/M2
GLOBULIN SER-MCNC: 3.1 GM/DL (ref 2.4–3.5)
GLUCOSE SERPL-MCNC: 149 MG/DL (ref 82–115)
HCT VFR BLD AUTO: 25.4 % (ref 42–52)
HGB BLD-MCNC: 7.9 G/DL (ref 14–18)
IMM GRANULOCYTES # BLD AUTO: 0.01 X10(3)/MCL (ref 0–0.04)
IMM GRANULOCYTES NFR BLD AUTO: 0.9 %
LYMPHOCYTES # BLD AUTO: 0.54 X10(3)/MCL (ref 0.6–4.6)
LYMPHOCYTES NFR BLD AUTO: 47.8 %
MCH RBC QN AUTO: 28.3 PG (ref 27–31)
MCHC RBC AUTO-ENTMCNC: 31.1 G/DL (ref 33–36)
MCV RBC AUTO: 91 FL (ref 80–94)
MONOCYTES # BLD AUTO: 0.17 X10(3)/MCL (ref 0.1–1.3)
MONOCYTES NFR BLD AUTO: 15 %
NEUTROPHILS # BLD AUTO: 0.4 X10(3)/MCL (ref 2.1–9.2)
NEUTROPHILS NFR BLD AUTO: 35.4 %
PLATELET # BLD AUTO: 29 X10(3)/MCL (ref 130–400)
PMV BLD AUTO: ABNORMAL FL
POTASSIUM SERPL-SCNC: 4 MMOL/L (ref 3.5–5.1)
PROT SERPL-MCNC: 6.7 GM/DL (ref 5.8–7.6)
RBC # BLD AUTO: 2.79 X10(6)/MCL (ref 4.7–6.1)
SODIUM SERPL-SCNC: 140 MMOL/L (ref 136–145)
WBC # BLD AUTO: 1.13 X10(3)/MCL (ref 4.5–11.5)

## 2024-07-01 PROCEDURE — 36415 COLL VENOUS BLD VENIPUNCTURE: CPT

## 2024-07-01 PROCEDURE — 80053 COMPREHEN METABOLIC PANEL: CPT

## 2024-07-01 PROCEDURE — 63600175 PHARM REV CODE 636 W HCPCS: Performed by: INTERNAL MEDICINE

## 2024-07-01 PROCEDURE — 96401 CHEMO ANTI-NEOPL SQ/IM: CPT

## 2024-07-01 PROCEDURE — 85025 COMPLETE CBC W/AUTO DIFF WBC: CPT

## 2024-07-01 RX ORDER — CYTARABINE 20 MG/ML
20 INJECTION, SOLUTION INTRATHECAL; INTRAVENOUS; SUBCUTANEOUS ONCE
Status: COMPLETED | OUTPATIENT
Start: 2024-07-01 | End: 2024-07-01

## 2024-07-01 RX ADMIN — CYTARABINE 40 MG: 20 INJECTION, SOLUTION INTRATHECAL; INTRAVENOUS; SUBCUTANEOUS at 09:07

## 2024-07-02 ENCOUNTER — INFUSION (OUTPATIENT)
Dept: INFUSION THERAPY | Facility: HOSPITAL | Age: 74
End: 2024-07-02
Attending: INTERNAL MEDICINE
Payer: MEDICARE

## 2024-07-02 VITALS
OXYGEN SATURATION: 99 % | SYSTOLIC BLOOD PRESSURE: 100 MMHG | DIASTOLIC BLOOD PRESSURE: 61 MMHG | RESPIRATION RATE: 18 BRPM | TEMPERATURE: 98 F | HEART RATE: 82 BPM

## 2024-07-02 DIAGNOSIS — C92.00 ACUTE MYELOID LEUKEMIA NOT HAVING ACHIEVED REMISSION: Primary | ICD-10-CM

## 2024-07-02 PROCEDURE — 63600175 PHARM REV CODE 636 W HCPCS: Performed by: INTERNAL MEDICINE

## 2024-07-02 PROCEDURE — 96401 CHEMO ANTI-NEOPL SQ/IM: CPT

## 2024-07-02 RX ORDER — CYTARABINE 20 MG/ML
20 INJECTION, SOLUTION INTRATHECAL; INTRAVENOUS; SUBCUTANEOUS ONCE
Status: COMPLETED | OUTPATIENT
Start: 2024-07-02 | End: 2024-07-02

## 2024-07-02 RX ADMIN — CYTARABINE 40 MG: 20 INJECTION, SOLUTION INTRATHECAL; INTRAVENOUS; SUBCUTANEOUS at 09:07

## 2024-07-03 ENCOUNTER — INFUSION (OUTPATIENT)
Dept: INFUSION THERAPY | Facility: HOSPITAL | Age: 74
End: 2024-07-03
Attending: INTERNAL MEDICINE
Payer: MEDICARE

## 2024-07-03 VITALS
TEMPERATURE: 98 F | HEART RATE: 76 BPM | SYSTOLIC BLOOD PRESSURE: 116 MMHG | DIASTOLIC BLOOD PRESSURE: 67 MMHG | OXYGEN SATURATION: 97 %

## 2024-07-03 DIAGNOSIS — C92.00 ACUTE MYELOID LEUKEMIA NOT HAVING ACHIEVED REMISSION: Primary | ICD-10-CM

## 2024-07-03 PROCEDURE — 63600175 PHARM REV CODE 636 W HCPCS: Performed by: INTERNAL MEDICINE

## 2024-07-03 PROCEDURE — 96401 CHEMO ANTI-NEOPL SQ/IM: CPT

## 2024-07-03 RX ORDER — CYTARABINE 20 MG/ML
20 INJECTION, SOLUTION INTRATHECAL; INTRAVENOUS; SUBCUTANEOUS ONCE
Status: COMPLETED | OUTPATIENT
Start: 2024-07-03 | End: 2024-07-03

## 2024-07-03 RX ADMIN — CYTARABINE 40 MG: 20 INJECTION, SOLUTION INTRATHECAL; INTRAVENOUS; SUBCUTANEOUS at 08:07

## 2024-07-03 NOTE — PLAN OF CARE
Pt received c1 d10 cytarabine; tolerated well.   Procedure Summary (Optional): Procedure summary: The patient was brought to the treatment couch and placed in the treatment position. The lesion was identified from comparison to photographic records and confirmed. The external hyperthermia device was used to elevate surface temperature of 103-107 degrees Fahrenheit prior to initiation of the prescribed therapeutic radiation dose. The patient tolerated the procedure with no complication and was discharged with skin care instructions. They will return for the next scheduled treatment. Detail Level: Detailed Cpt Code #1: 41146

## 2024-07-04 ENCOUNTER — HOSPITAL ENCOUNTER (INPATIENT)
Facility: HOSPITAL | Age: 74
LOS: 1 days | Discharge: HOME | DRG: 835 | End: 2024-07-05
Attending: STUDENT IN AN ORGANIZED HEALTH CARE EDUCATION/TRAINING PROGRAM | Admitting: INTERNAL MEDICINE
Payer: MEDICARE

## 2024-07-04 DIAGNOSIS — J18.9 PNEUMONIA DUE TO INFECTIOUS ORGANISM, UNSPECIFIED LATERALITY, UNSPECIFIED PART OF LUNG: ICD-10-CM

## 2024-07-04 DIAGNOSIS — D64.9 ANEMIA, UNSPECIFIED TYPE: ICD-10-CM

## 2024-07-04 DIAGNOSIS — R55 SYNCOPE, UNSPECIFIED SYNCOPE TYPE: ICD-10-CM

## 2024-07-04 DIAGNOSIS — R55 SYNCOPE AND COLLAPSE: ICD-10-CM

## 2024-07-04 DIAGNOSIS — D69.6 THROMBOCYTOPENIA: ICD-10-CM

## 2024-07-04 DIAGNOSIS — S70.11XA HEMATOMA OF RIGHT THIGH, INITIAL ENCOUNTER: ICD-10-CM

## 2024-07-04 DIAGNOSIS — R55 SYNCOPE: Primary | ICD-10-CM

## 2024-07-04 DIAGNOSIS — R55 VASOVAGAL SYNCOPE: ICD-10-CM

## 2024-07-04 LAB
ABO + RH BLD: NORMAL
ALBUMIN SERPL-MCNC: 3 G/DL (ref 3.4–4.8)
ALBUMIN/GLOB SERPL: 1.1 RATIO (ref 1.1–2)
ALP SERPL-CCNC: 63 UNIT/L (ref 40–150)
ALT SERPL-CCNC: 17 UNIT/L (ref 0–55)
ANION GAP SERPL CALC-SCNC: 11 MEQ/L
ANISOCYTOSIS BLD QL SMEAR: ABNORMAL
APTT PPP: 26.4 SECONDS (ref 23.2–33.7)
AST SERPL-CCNC: 11 UNIT/L (ref 5–34)
BASOPHILS # BLD AUTO: 0 X10(3)/MCL
BASOPHILS NFR BLD AUTO: 0 %
BILIRUB SERPL-MCNC: 1.5 MG/DL
BLD PROD TYP BPU: NORMAL
BLOOD UNIT EXPIRATION DATE: NORMAL
BLOOD UNIT TYPE CODE: 1700
BNP BLD-MCNC: 75.4 PG/ML
BUN SERPL-MCNC: 37.3 MG/DL (ref 8.4–25.7)
CALCIUM SERPL-MCNC: 7.8 MG/DL (ref 8.8–10)
CHLORIDE SERPL-SCNC: 106 MMOL/L (ref 98–107)
CO2 SERPL-SCNC: 20 MMOL/L (ref 23–31)
CREAT SERPL-MCNC: 1.27 MG/DL (ref 0.73–1.18)
CREAT/UREA NIT SERPL: 29
CROSSMATCH INTERPRETATION: NORMAL
DISPENSE STATUS: NORMAL
EOSINOPHIL # BLD AUTO: 0 X10(3)/MCL (ref 0–0.9)
EOSINOPHIL NFR BLD AUTO: 0 %
ERYTHROCYTE [DISTWIDTH] IN BLOOD BY AUTOMATED COUNT: 15.9 % (ref 11.5–17)
FLUAV AG UPPER RESP QL IA.RAPID: NOT DETECTED
FLUBV AG UPPER RESP QL IA.RAPID: NOT DETECTED
GFR SERPLBLD CREATININE-BSD FMLA CKD-EPI: 59 ML/MIN/1.73/M2
GLOBULIN SER-MCNC: 2.7 GM/DL (ref 2.4–3.5)
GLUCOSE SERPL-MCNC: 147 MG/DL (ref 82–115)
GROUP & RH: NORMAL
HCT VFR BLD AUTO: 17.6 % (ref 42–52)
HGB BLD-MCNC: 5.6 G/DL (ref 14–18)
IMM GRANULOCYTES # BLD AUTO: 0.06 X10(3)/MCL (ref 0–0.04)
IMM GRANULOCYTES NFR BLD AUTO: 1.3 %
INDIRECT COOMBS: NORMAL
INR PPP: 1.3
LACTATE SERPL-SCNC: 1.5 MMOL/L (ref 0.5–2.2)
LYMPHOCYTES # BLD AUTO: 0.27 X10(3)/MCL (ref 0.6–4.6)
LYMPHOCYTES NFR BLD AUTO: 6 %
MCH RBC QN AUTO: 28.3 PG (ref 27–31)
MCHC RBC AUTO-ENTMCNC: 31.8 G/DL (ref 33–36)
MCV RBC AUTO: 88.9 FL (ref 80–94)
MICROCYTES BLD QL SMEAR: ABNORMAL
MONOCYTES # BLD AUTO: 0.91 X10(3)/MCL (ref 0.1–1.3)
MONOCYTES NFR BLD AUTO: 20.3 %
NEUTROPHILS # BLD AUTO: 3.24 X10(3)/MCL (ref 2.1–9.2)
NEUTROPHILS NFR BLD AUTO: 72.4 %
NRBC BLD AUTO-RTO: 0 %
PLATELET # BLD AUTO: 1 X10(3)/MCL (ref 130–400)
PLATELET # BLD EST: ABNORMAL 10*3/UL
PLATELETS.RETICULATED NFR BLD AUTO: 4.8 % (ref 0.9–11.2)
PMV BLD AUTO: ABNORMAL FL
POTASSIUM SERPL-SCNC: 4 MMOL/L (ref 3.5–5.1)
PROT SERPL-MCNC: 5.7 GM/DL (ref 5.8–7.6)
PROTHROMBIN TIME: 15.8 SECONDS (ref 12.5–14.5)
RBC # BLD AUTO: 1.98 X10(6)/MCL (ref 4.7–6.1)
RBC MORPH BLD: ABNORMAL
SARS-COV-2 RNA RESP QL NAA+PROBE: NOT DETECTED
SODIUM SERPL-SCNC: 137 MMOL/L (ref 136–145)
SPECIMEN OUTDATE: NORMAL
TROPONIN I SERPL-MCNC: <0.01 NG/ML (ref 0–0.04)
UNIT NUMBER: NORMAL
WBC # BLD AUTO: 4.48 X10(3)/MCL (ref 4.5–11.5)

## 2024-07-04 PROCEDURE — 83880 ASSAY OF NATRIURETIC PEPTIDE: CPT | Performed by: STUDENT IN AN ORGANIZED HEALTH CARE EDUCATION/TRAINING PROGRAM

## 2024-07-04 PROCEDURE — 86850 RBC ANTIBODY SCREEN: CPT | Performed by: STUDENT IN AN ORGANIZED HEALTH CARE EDUCATION/TRAINING PROGRAM

## 2024-07-04 PROCEDURE — 87040 BLOOD CULTURE FOR BACTERIA: CPT | Performed by: STUDENT IN AN ORGANIZED HEALTH CARE EDUCATION/TRAINING PROGRAM

## 2024-07-04 PROCEDURE — 85610 PROTHROMBIN TIME: CPT | Performed by: STUDENT IN AN ORGANIZED HEALTH CARE EDUCATION/TRAINING PROGRAM

## 2024-07-04 PROCEDURE — 0240U COVID/FLU A&B PCR: CPT | Performed by: STUDENT IN AN ORGANIZED HEALTH CARE EDUCATION/TRAINING PROGRAM

## 2024-07-04 PROCEDURE — 84484 ASSAY OF TROPONIN QUANT: CPT | Performed by: STUDENT IN AN ORGANIZED HEALTH CARE EDUCATION/TRAINING PROGRAM

## 2024-07-04 PROCEDURE — 99291 CRITICAL CARE FIRST HOUR: CPT

## 2024-07-04 PROCEDURE — 93005 ELECTROCARDIOGRAM TRACING: CPT

## 2024-07-04 PROCEDURE — 86923 COMPATIBILITY TEST ELECTRIC: CPT | Performed by: STUDENT IN AN ORGANIZED HEALTH CARE EDUCATION/TRAINING PROGRAM

## 2024-07-04 PROCEDURE — P9037 PLATE PHERES LEUKOREDU IRRAD: HCPCS | Performed by: STUDENT IN AN ORGANIZED HEALTH CARE EDUCATION/TRAINING PROGRAM

## 2024-07-04 PROCEDURE — 36430 TRANSFUSION BLD/BLD COMPNT: CPT

## 2024-07-04 PROCEDURE — P9040 RBC LEUKOREDUCED IRRADIATED: HCPCS | Performed by: STUDENT IN AN ORGANIZED HEALTH CARE EDUCATION/TRAINING PROGRAM

## 2024-07-04 PROCEDURE — 25500020 PHARM REV CODE 255: Performed by: STUDENT IN AN ORGANIZED HEALTH CARE EDUCATION/TRAINING PROGRAM

## 2024-07-04 PROCEDURE — 86901 BLOOD TYPING SEROLOGIC RH(D): CPT | Performed by: STUDENT IN AN ORGANIZED HEALTH CARE EDUCATION/TRAINING PROGRAM

## 2024-07-04 PROCEDURE — 11000001 HC ACUTE MED/SURG PRIVATE ROOM

## 2024-07-04 PROCEDURE — 30233R1 TRANSFUSION OF NONAUTOLOGOUS PLATELETS INTO PERIPHERAL VEIN, PERCUTANEOUS APPROACH: ICD-10-PCS | Performed by: INTERNAL MEDICINE

## 2024-07-04 PROCEDURE — 85025 COMPLETE CBC W/AUTO DIFF WBC: CPT | Performed by: STUDENT IN AN ORGANIZED HEALTH CARE EDUCATION/TRAINING PROGRAM

## 2024-07-04 PROCEDURE — 83605 ASSAY OF LACTIC ACID: CPT | Performed by: STUDENT IN AN ORGANIZED HEALTH CARE EDUCATION/TRAINING PROGRAM

## 2024-07-04 PROCEDURE — 86900 BLOOD TYPING SEROLOGIC ABO: CPT | Performed by: STUDENT IN AN ORGANIZED HEALTH CARE EDUCATION/TRAINING PROGRAM

## 2024-07-04 PROCEDURE — 80053 COMPREHEN METABOLIC PANEL: CPT | Performed by: STUDENT IN AN ORGANIZED HEALTH CARE EDUCATION/TRAINING PROGRAM

## 2024-07-04 PROCEDURE — 85730 THROMBOPLASTIN TIME PARTIAL: CPT | Performed by: STUDENT IN AN ORGANIZED HEALTH CARE EDUCATION/TRAINING PROGRAM

## 2024-07-04 PROCEDURE — 30233N1 TRANSFUSION OF NONAUTOLOGOUS RED BLOOD CELLS INTO PERIPHERAL VEIN, PERCUTANEOUS APPROACH: ICD-10-PCS | Performed by: INTERNAL MEDICINE

## 2024-07-04 RX ORDER — ONDANSETRON HYDROCHLORIDE 2 MG/ML
4 INJECTION, SOLUTION INTRAVENOUS EVERY 8 HOURS PRN
Status: DISCONTINUED | OUTPATIENT
Start: 2024-07-05 | End: 2024-07-05 | Stop reason: HOSPADM

## 2024-07-04 RX ORDER — SODIUM CHLORIDE 0.9 % (FLUSH) 0.9 %
10 SYRINGE (ML) INJECTION
Status: DISCONTINUED | OUTPATIENT
Start: 2024-07-05 | End: 2024-07-05 | Stop reason: HOSPADM

## 2024-07-04 RX ORDER — TALC
6 POWDER (GRAM) TOPICAL NIGHTLY PRN
Status: DISCONTINUED | OUTPATIENT
Start: 2024-07-05 | End: 2024-07-05 | Stop reason: HOSPADM

## 2024-07-04 RX ORDER — OXYCODONE HYDROCHLORIDE 5 MG/1
5 TABLET ORAL EVERY 4 HOURS PRN
Status: DISCONTINUED | OUTPATIENT
Start: 2024-07-05 | End: 2024-07-05 | Stop reason: HOSPADM

## 2024-07-04 RX ORDER — HYDROCODONE BITARTRATE AND ACETAMINOPHEN 500; 5 MG/1; MG/1
TABLET ORAL
Status: DISCONTINUED | OUTPATIENT
Start: 2024-07-04 | End: 2024-07-05 | Stop reason: HOSPADM

## 2024-07-04 RX ORDER — SODIUM CHLORIDE 9 MG/ML
INJECTION, SOLUTION INTRAVENOUS CONTINUOUS
Status: DISCONTINUED | OUTPATIENT
Start: 2024-07-05 | End: 2024-07-05 | Stop reason: HOSPADM

## 2024-07-04 RX ADMIN — IOHEXOL 100 ML: 350 INJECTION, SOLUTION INTRAVENOUS at 08:07

## 2024-07-04 NOTE — Clinical Note
Diagnosis: Syncope [206001]   Future Attending Provider: NEETA VAZQUEZ [[id number]]   Admit to which facility:: [facility] [[id number]]   Reason for IP Medical Treatment  (Clinical interventions that can only be accomplished in the IP setting? ) :: Thrombocytopenia, loss of consciousness anemia   I certify that Inpatient services for greater than or equal to 2 midnights are medically necessary:: Yes   Plans for Post-Acute care--if anticipated (pick the single best option):: A. No post acute care anticipated at this time

## 2024-07-05 VITALS
HEART RATE: 70 BPM | OXYGEN SATURATION: 96 % | SYSTOLIC BLOOD PRESSURE: 115 MMHG | BODY MASS INDEX: 24.38 KG/M2 | RESPIRATION RATE: 19 BRPM | TEMPERATURE: 98 F | DIASTOLIC BLOOD PRESSURE: 62 MMHG | HEIGHT: 72 IN | WEIGHT: 180 LBS

## 2024-07-05 PROBLEM — R55 SYNCOPE: Status: ACTIVE | Noted: 2024-07-05

## 2024-07-05 LAB
ABO + RH BLD: NORMAL
ALBUMIN SERPL-MCNC: 2.9 G/DL (ref 3.4–4.8)
ALBUMIN/GLOB SERPL: 1.1 RATIO (ref 1.1–2)
ALP SERPL-CCNC: 61 UNIT/L (ref 40–150)
ALT SERPL-CCNC: 14 UNIT/L (ref 0–55)
ANION GAP SERPL CALC-SCNC: 10 MEQ/L
AST SERPL-CCNC: 13 UNIT/L (ref 5–34)
BACTERIA #/AREA URNS AUTO: ABNORMAL /HPF
BASOPHILS # BLD AUTO: 0 X10(3)/MCL
BASOPHILS NFR BLD AUTO: 0 %
BILIRUB SERPL-MCNC: 1.9 MG/DL
BILIRUB UR QL STRIP.AUTO: NEGATIVE
BLD PROD TYP BPU: NORMAL
BLOOD UNIT EXPIRATION DATE: NORMAL
BLOOD UNIT TYPE CODE: 5100
BUN SERPL-MCNC: 35.6 MG/DL (ref 8.4–25.7)
CALCIUM SERPL-MCNC: 7.4 MG/DL (ref 8.8–10)
CHLORIDE SERPL-SCNC: 107 MMOL/L (ref 98–107)
CLARITY UR: CLEAR
CO2 SERPL-SCNC: 21 MMOL/L (ref 23–31)
COLOR UR AUTO: YELLOW
CREAT SERPL-MCNC: 1.04 MG/DL (ref 0.73–1.18)
CREAT/UREA NIT SERPL: 34
CROSSMATCH INTERPRETATION: NORMAL
DISPENSE STATUS: NORMAL
EOSINOPHIL # BLD AUTO: 0 X10(3)/MCL (ref 0–0.9)
EOSINOPHIL NFR BLD AUTO: 0 %
ERYTHROCYTE [DISTWIDTH] IN BLOOD BY AUTOMATED COUNT: 14.7 % (ref 11.5–17)
GFR SERPLBLD CREATININE-BSD FMLA CKD-EPI: >60 ML/MIN/1.73/M2
GLOBULIN SER-MCNC: 2.6 GM/DL (ref 2.4–3.5)
GLUCOSE SERPL-MCNC: 116 MG/DL (ref 82–115)
GLUCOSE UR QL STRIP: NORMAL
HCT VFR BLD AUTO: 21.6 % (ref 42–52)
HGB BLD-MCNC: 7.1 G/DL (ref 14–18)
HGB UR QL STRIP: ABNORMAL
HYALINE CASTS #/AREA URNS LPF: ABNORMAL /LPF
IMM GRANULOCYTES # BLD AUTO: 0.03 X10(3)/MCL (ref 0–0.04)
IMM GRANULOCYTES NFR BLD AUTO: 0.8 %
KETONES UR QL STRIP: NEGATIVE
LEUKOCYTE ESTERASE UR QL STRIP: NEGATIVE
LYMPHOCYTES # BLD AUTO: 0.51 X10(3)/MCL (ref 0.6–4.6)
LYMPHOCYTES NFR BLD AUTO: 13.6 %
MCH RBC QN AUTO: 29.1 PG (ref 27–31)
MCHC RBC AUTO-ENTMCNC: 32.9 G/DL (ref 33–36)
MCV RBC AUTO: 88.5 FL (ref 80–94)
MONOCYTES # BLD AUTO: 0.91 X10(3)/MCL (ref 0.1–1.3)
MONOCYTES NFR BLD AUTO: 24.2 %
MUCOUS THREADS URNS QL MICRO: ABNORMAL /LPF
NEUTROPHILS # BLD AUTO: 2.31 X10(3)/MCL (ref 2.1–9.2)
NEUTROPHILS NFR BLD AUTO: 61.4 %
NITRITE UR QL STRIP: NEGATIVE
NRBC BLD AUTO-RTO: 0.5 %
OHS QRS DURATION: 88 MS
OHS QRS DURATION: 92 MS
OHS QTC CALCULATION: 443 MS
OHS QTC CALCULATION: 489 MS
PH UR STRIP: 5.5 [PH]
PLATELET # BLD AUTO: 17 X10(3)/MCL (ref 130–400)
PLATELETS.RETICULATED NFR BLD AUTO: 1.2 % (ref 0.9–11.2)
PMV BLD AUTO: ABNORMAL FL
POTASSIUM SERPL-SCNC: 3.8 MMOL/L (ref 3.5–5.1)
PROT SERPL-MCNC: 5.5 GM/DL (ref 5.8–7.6)
PROT UR QL STRIP: ABNORMAL
RBC # BLD AUTO: 2.44 X10(6)/MCL (ref 4.7–6.1)
RBC #/AREA URNS AUTO: ABNORMAL /HPF
SODIUM SERPL-SCNC: 138 MMOL/L (ref 136–145)
SP GR UR STRIP.AUTO: >1.05 (ref 1–1.03)
SQUAMOUS #/AREA URNS LPF: ABNORMAL /HPF
UNIT NUMBER: NORMAL
UROBILINOGEN UR STRIP-ACNC: NORMAL
WBC # BLD AUTO: 3.76 X10(3)/MCL (ref 4.5–11.5)
WBC #/AREA URNS AUTO: ABNORMAL /HPF

## 2024-07-05 PROCEDURE — 81001 URINALYSIS AUTO W/SCOPE: CPT | Performed by: STUDENT IN AN ORGANIZED HEALTH CARE EDUCATION/TRAINING PROGRAM

## 2024-07-05 PROCEDURE — 25000003 PHARM REV CODE 250: Performed by: STUDENT IN AN ORGANIZED HEALTH CARE EDUCATION/TRAINING PROGRAM

## 2024-07-05 PROCEDURE — 99235 HOSP IP/OBS SAME DATE MOD 70: CPT | Mod: ,,, | Performed by: INTERNAL MEDICINE

## 2024-07-05 PROCEDURE — 63600175 PHARM REV CODE 636 W HCPCS: Performed by: STUDENT IN AN ORGANIZED HEALTH CARE EDUCATION/TRAINING PROGRAM

## 2024-07-05 PROCEDURE — 80053 COMPREHEN METABOLIC PANEL: CPT | Performed by: STUDENT IN AN ORGANIZED HEALTH CARE EDUCATION/TRAINING PROGRAM

## 2024-07-05 PROCEDURE — 85025 COMPLETE CBC W/AUTO DIFF WBC: CPT | Performed by: STUDENT IN AN ORGANIZED HEALTH CARE EDUCATION/TRAINING PROGRAM

## 2024-07-05 PROCEDURE — 99222 1ST HOSP IP/OBS MODERATE 55: CPT | Mod: ,,, | Performed by: INTERNAL MEDICINE

## 2024-07-05 PROCEDURE — 25000003 PHARM REV CODE 250: Performed by: INTERNAL MEDICINE

## 2024-07-05 PROCEDURE — P9040 RBC LEUKOREDUCED IRRADIATED: HCPCS | Performed by: STUDENT IN AN ORGANIZED HEALTH CARE EDUCATION/TRAINING PROGRAM

## 2024-07-05 RX ORDER — ACYCLOVIR 200 MG/1
400 CAPSULE ORAL 2 TIMES DAILY
Status: DISCONTINUED | OUTPATIENT
Start: 2024-07-05 | End: 2024-07-05 | Stop reason: HOSPADM

## 2024-07-05 RX ORDER — LISINOPRIL AND HYDROCHLOROTHIAZIDE 12.5; 2 MG/1; MG/1
1 TABLET ORAL DAILY
Status: DISCONTINUED | OUTPATIENT
Start: 2024-07-05 | End: 2024-07-05

## 2024-07-05 RX ORDER — HYDROCHLOROTHIAZIDE 12.5 MG/1
12.5 TABLET ORAL DAILY
Status: DISCONTINUED | OUTPATIENT
Start: 2024-07-05 | End: 2024-07-05 | Stop reason: HOSPADM

## 2024-07-05 RX ORDER — LISINOPRIL 10 MG/1
20 TABLET ORAL DAILY
Status: DISCONTINUED | OUTPATIENT
Start: 2024-07-05 | End: 2024-07-05 | Stop reason: HOSPADM

## 2024-07-05 RX ORDER — CITALOPRAM 20 MG/1
40 TABLET, FILM COATED ORAL DAILY
Status: DISCONTINUED | OUTPATIENT
Start: 2024-07-05 | End: 2024-07-05 | Stop reason: HOSPADM

## 2024-07-05 RX ADMIN — ACYCLOVIR 400 MG: 200 CAPSULE ORAL at 09:07

## 2024-07-05 RX ADMIN — CITALOPRAM HYDROBROMIDE 40 MG: 20 TABLET ORAL at 09:07

## 2024-07-05 RX ADMIN — PIPERACILLIN AND TAZOBACTAM 4.5 G: 4; .5 INJECTION, POWDER, LYOPHILIZED, FOR SOLUTION INTRAVENOUS; PARENTERAL at 02:07

## 2024-07-05 RX ADMIN — LISINOPRIL 20 MG: 10 TABLET ORAL at 09:07

## 2024-07-05 RX ADMIN — SODIUM CHLORIDE: 9 INJECTION, SOLUTION INTRAVENOUS at 12:07

## 2024-07-05 RX ADMIN — VANCOMYCIN HYDROCHLORIDE 2000 MG: 500 INJECTION, POWDER, LYOPHILIZED, FOR SOLUTION INTRAVENOUS at 04:07

## 2024-07-05 NOTE — H&P
[facility] Medicine History & Physical Examination       Patient Name: Hussein Gonzalez Jr.  MRN: [id number]  Patient Class: IP- Inpatient   Admission Date: 7/4/2024   Admitting Physician: SHELLEY Service   Length of Stay: 1  Attending Physician: Nayely Andres MD  Primary Care Provider: Nayely Andres MD  Face-to-Face encounter date: 07/05/2024  Code Status:Full    Chief Complaint: Loss of Consciousness (Pt reports syncopal episode with fall onto right side. Pt reports right hip pain. Pt reports nausea 07/03/24 PM. Denies hitting head. Hx of Leukemia. Actively under treatment. PT GCS-15.)      Screening for Social Drivers for health:  Patient screened for food insecurity, housing instability, transportation needs, utility difficulties, and interpersonal safety (select all that apply as identified as concern)  []Housing or Food  []Transportation Needs  []Utility Difficulties  []Interpersonal safety  [x]None      Patient information was obtained from patient, patient's family, past medical records and ER records.  ED records were reviewed in detail and documented below    HISTORY OF PRESENT ILLNESS:   Hussein Gonzalez Jr. is a 74 y.o. male who  has a past medical history of Anxiety and depression, BPH (benign prostatic hyperplasia), History of CVA (cerebrovascular accident) without residual deficits, HTN (hypertension), Malignant melanoma of skin, unspecified, NSTEMI (non-ST elevated myocardial infarction), Sepsis, Gram negative, and Sleep apnea.. The patient presented to [facility] on 7/4/2024 with a primary complaint of syncope and fall    Mr. Hughes is a 74-year-old gentleman with  Medical comorbidities including HTN, CVA s/t embolic phenomena, melanoma s/p resection XRT for recurrence (2019) and another resection on his right ear this year. 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   Currently being evaluated for stem cell transplant in [place].  Patient presented with nausea, vomiting and diarrhea that started about 2 days ago.  He reported a syncopal episode causing him to fall and land on his right side.  Did not hit his head.  Apparently was on the ground for about an hour.  Did complain of some right hip pain and right arm pain.  No complaints of hematemesis, melena or bright red blood per rectum.    Nausea and vomiting have since resolved with no more complaints of diarrhea.      Lab work in the ED was noted with hemoglobin and hematocrit of 5.6 and 17.6 for which 2 units of packed red blood cells were ordered and completed.  Platelets were noted to be at 1 and he received 1 unit of platelets.  This morning in the ED patient is feeling much better.  He has been evaluated and cleared from the Hematology/Oncology standpoint for discharge.  No other signs of infection, does not need to be on any antibiotics as such.  Blood cultures have been ordered and are currently pending however we will follow closely to make sure this remained negative.  COVID-19 was noted to be negative.        PAST MEDICAL HISTORY:     Past Medical History:   Diagnosis Date    Anxiety and depression     BPH (benign prostatic hyperplasia)     History of CVA (cerebrovascular accident) without residual deficits     HTN (hypertension)     Malignant melanoma of skin, unspecified     NSTEMI (non-ST elevated myocardial infarction)     Sepsis, Gram negative     Sleep apnea        PAST SURGICAL HISTORY:     Past Surgical History:   Procedure Laterality Date    BONE MARROW ASPIRATION N/A 6/27/2023    Procedure: ASPIRATION, BONE MARROW;  Surgeon: Sabino Núñez MD;  Location: [facility];  Service: General;  Laterality: N/A;  ALL ASSESS FOR MOLECULAR MRD    BONE MARROW ASPIRATION N/A 5/22/2024    Procedure: ASPIRATION, BONE MARROW;  Surgeon: Sabino Núñez MD;  Location: [facility] OR;  Service: General;   Laterality: N/A;    BONE MARROW BIOPSY N/A 1/25/2023    Procedure: Biopsy-bone marrow;  Surgeon: Rd Carlos MD;  Location: [facility];  Service: General;  Laterality: N/A;    BONE MARROW BIOPSY N/A 11/21/2023    Procedure: Biopsy-bone marrow;  Surgeon: Junior Castro MD;  Location: [facility] OR;  Service: General;  Laterality: N/A;    COLONOSCOPY      COLONOSCOPY N/A 1/24/2023    Procedure: COLONOSCOPY (DOUBLE);  Surgeon: Jimy Baker MD;  Location: [facility] ENDOSCOPY;  Service: Gastroenterology;  Laterality: N/A;    EGD, WITH CLOSED BIOPSY N/A 1/24/2023    Procedure: EGD, WITH CLOSED BIOPSY;  Surgeon: Jimy Baker MD;  Location: [facility] ENDOSCOPY;  Service: Gastroenterology;  Laterality: N/A;    EXCISION OF MELANOMA      SINUS SURGERY      TONSILLECTOMY         ALLERGIES:   Acetaminophen and Benadryl [diphenhydramine hcl]    FAMILY HISTORY:   Reviewed and negative    SOCIAL HISTORY:     Social History     Tobacco Use    Smoking status: Never    Smokeless tobacco: Never   Substance Use Topics    Alcohol use: Not Currently     Alcohol/week: 7.0 standard drinks of alcohol     Types: 7 Cans of beer per week        HOME MEDICATIONS:     Prior to Admission medications    Medication Sig Start Date End Date Taking? Authorizing Provider   (Magic mouthwash) 1:1:1 diphenhydrAMINE(Benadryl) 12.5mg/5ml liq, aluminum & magnesium hydroxide-simethicone (Maalox), LIDOcaine viscous 2% Swish and spit 15 mLs every 4 (four) hours as needed (mouth sores). for mouth sores 10/9/23   Jenna Bobby FNP   acyclovir (ZOVIRAX) 400 MG tablet Take 1 tablet by mouth twice daily 4/20/23   Jenna Bobby FNP   citalopram (CELEXA) 40 MG tablet Take 1 tablet by mouth once daily 1/25/24   Luis Alberto Patel FNP   doxycycline (VIBRA-TABS) 100 MG tablet Take 100 mg by mouth 2 (two) times daily. 5/14/24   Provider, Historical   lisinopriL-hydrochlorothiazide (PRINZIDE,ZESTORETIC) 20-12.5 mg per tablet Take 1 tablet by mouth once daily  Patient  taking differently: 0.5 tab daily 1/9/23   Nayely Andres MD   multivitamin (THERAGRAN) per tablet Take 1 tablet by mouth once daily.    Provider, Historical   rosuvastatin (CRESTOR) 40 MG Tab Take 40 mg by mouth once daily. 8/15/23   Provider, Historical   tamsulosin (FLOMAX) 0.4 mg Cap Take 1 capsule (0.4 mg total) by mouth once daily. 10/28/23 10/27/24  Nayely Andres MD   venetoclax (VENCLEXTA) 100 mg Tab TAKE 2 TABLETS BY MOUTH ONCE DAILY ON DAYS 1-14 OF A 28 DAY CYCLE 3/27/24   Eudard Luna II, MD   clindamycin (CLEOCIN) 150 MG capsule Take 150 mg by mouth. 3 caps daily  7/5/24  Provider, Historical   levoFLOXacin (LEVAQUIN) 500 MG tablet Take 500 mg by mouth once daily.  7/5/24  Provider, Historical       REVIEW OF SYSTEMS:   Except as documented, all other systems reviewed and negative     PHYSICAL EXAM:     VITAL SIGNS: 24 HRS MIN & MAX LAST   Temp  Min: 97.5 °F (36.4 °C)  Max: 98.7 °F (37.1 °C) 98 °F (36.7 °C)   BP  Min: 95/45  Max: 118/63 110/63   Pulse  Min: 61  Max: 88  65   Resp  Min: 13  Max: 24 18   SpO2  Min: 95 %  Max: 99 % 99 %     General appearance:  Alert and oriented, no acute distress   HENT: Atraumatic head. Moist mucous membranes of oral cavity.  Eyes: Normal extraocular movements.   Neck: Supple.   Lungs: Clear to auscultation bilaterally. No wheezing present.   Heart: Regular rate and rhythm. S1 and S2 present with no murmurs/gallop/rub. No pedal edema. No JVD present.   Abdomen: Soft, non-distended, non-tender. No rebound tenderness/guarding. Bowel sounds are normal.   Extremities: No cyanosis, clubbing, or edema.  Skin:  Multiple ecchymosis on bilateral upper extremities    Neuro:  Nonfocal   Psych/mental status: Appropriate mood and affect. Responds appropriately to questions.     LABS AND IMAGING:     Recent Labs   Lab 07/01/24  0823 07/04/24  1830 07/05/24  0501   WBC 1.13* 4.48* 3.76*   RBC 2.79* 1.98* 2.44*   HGB 7.9* 5.6* 7.1*   HCT 25.4* 17.6* 21.6*   MCV 91.0  88.9 88.5   MCH 28.3 28.3 29.1   MCHC 31.1* 31.8* 32.9*   RDW 16.0 15.9 14.7   PLT 29* 1* 17*       Recent Labs   Lab 07/01/24  0823 07/04/24  1830 07/05/24  0501    137 138   K 4.0 4.0 3.8    106 107   CO2 26 20* 21*   BUN 25.0 37.3* 35.6*   CREATININE 1.04 1.27* 1.04   CALCIUM 9.0 7.8* 7.4*   ALBUMIN 3.6 3.0* 2.9*   ALKPHOS 82 63 61   ALT 20 17 14   AST 13 11 13   BILITOT 1.0 1.5 1.9*       Microbiology Results (last 7 days)       Procedure Component Value Units Date/Time    Blood culture #1 **CANNOT BE ORDERED STAT** [[id number]] Collected: 07/04/24 2142    Order Status: Resulted Specimen: Blood from Arm, Right Updated: 07/04/24 2256    Blood culture #2 **CANNOT BE ORDERED STAT** [[id number]] Collected: 07/04/24 2147    Order Status: Resulted Specimen: Blood from Hand, Left Updated: 07/04/24 2222             CT Cervical Spine Without Contrast  Narrative: EXAMINATION:  CT CERVICAL SPINE WITHOUT CONTRAST    CLINICAL HISTORY:  fall;    TECHNIQUE:  Noncontrast CT images of the cervical spine. Axial, coronal, and sagittal reformatted images were obtained. Dose length product is 509 mGycm. Automatic exposure control, adjustment of mA/kV or iterative reconstruction technique was used to limit radiation dose.    COMPARISON:  None    FINDINGS:  The cervical spine is visualized through the level of C7-T1.    There is no acute fracture identified.  There are multilevel degenerative changes with disc height loss, marginal osteophyte formation and facet arthropathy.  There is no paraspinal hematoma.  Impression: No acute fracture identified.    No significant change from the [facility] interpretation.    Electronically signed by: Jennifer Vitale  Date:    07/05/2024  Time:    06:59  CT Head Without Contrast  Narrative: EXAMINATION:  CT HEAD WITHOUT CONTRAST    CLINICAL HISTORY:  Syncope;    TECHNIQUE:  Axial scans were obtained from skull base to the vertex.    Coronal and sagittal reconstructions obtained from  the axial data.    Automatic exposure control was utilized to limit radiation dose.    Contrast: None    Radiation Dose:    Total DLP: 968 mGy*cm    COMPARISON:  None    FINDINGS:  There is no acute intracranial hemorrhage or edema. The gray-white matter differentiation is preserved.  Scattered hypodensities in the subcortical and periventricular white matter, mayda and cerebellum likely represent chronic microvascular ischemic changes.    There is no mass effect or midline shift.  There is diffuse parenchymal volume loss.  The basal cisterns are patent. There is no abnormal extra-axial fluid collection.  Carotid and vertebral artery calcifications are noted.    The calvarium and skull base are intact.  There is scattered paranasal sinus mucosal thickening.  Impression: 1. No acute intracranial abnormality.  2. Chronic microvascular ischemic changes.  No significant change from the [facility] interpretation.    Electronically signed by: Jennifer Vitale  Date:    07/05/2024  Time:    06:56      ASSESSMENT & PLAN:       Syncope and fall   -CT head was negative without any acute intracranial findings, chronic microvascular ischemia noted   -labs are reviewed and noted with profound anemia as well as low platelets for which patient is status post 2 units of packed red blood cells and a unit of platelets and responded well with improvement in numbers as expected   -nausea and vomiting have since resolved   -event possibly secondary to IV VD.  -has been evaluated by Hematology/Oncology and cleared from their standpoint for discharge     2. AML with 27% blasts  -followed by hematology oncology, on treatment and seems to be tolerating   -will defer dose to Monday and Tuesday next week   -being followed for stem cell transplant   -will continue with levofloxacin, acyclovir and fluconazole prophylactically.    -currently remains to venetoclax per Oncology        Patient condition:  Stable    Time spent: 81 minutes   Admit  discharge same day  __________________________________________________________________________  INPATIENT LIST OF MEDICATIONS     Scheduled Meds:   acyclovir  400 mg Oral BID    citalopram  40 mg Oral Daily    lisinopriL-hydrochlorothiazide  1 tablet Oral Daily     Continuous Infusions:   0.9% NaCl   Intravenous Continuous 100 mL/hr at 07/05/24 0030 New Bag at 07/05/24 0030     PRN Meds:.  Current Facility-Administered Medications:     0.9%  NaCl infusion (for blood administration), , Intravenous, Q24H PRN    0.9%  NaCl infusion (for blood administration), , Intravenous, Q24H PRN    melatonin, 6 mg, Oral, Nightly PRN    ondansetron, 4 mg, Intravenous, Q8H PRN    oxyCODONE, 5 mg, Oral, Q4H PRN    sodium chloride 0.9%, 10 mL, Intravenous, PRN      All diagnosis and differential diagnosis have been reviewed; assessment and plan has been documented; I have personally reviewed the labs and test results that are presently available; I have reviewed the patients medication list; I have reviewed the consulting providers response and recommendations. I have reviewed or attempted to review medical records based upon their availability.    All of the patient and family questions have been addressed and answered. Patient's is agreeable to the above stated plan. I will continue to monitor closely and make adjustments to medical management as needed.    If patient was admitted under observational status it is with my approval/permission.      Nayely Andres MD   07/05/2024

## 2024-07-05 NOTE — ED PROVIDER NOTES
Encounter Date: 7/4/2024    SCRIBE #1 NOTE: I, Jenna Laura, am scribing for, and in the presence of,  Hill Mo MD. I have scribed the following portions of the note - the EKG reading. Other sections scribed: HPI, ROS, PE.       History     Chief Complaint   Patient presents with    Loss of Consciousness     Pt reports syncopal episode with fall onto right side. Pt reports right hip pain. Pt reports nausea 07/03/24 PM. Denies hitting head. Hx of Leukemia. Actively under treatment. PT GCS-15.     Patient is a 74 year old male with a history of BPH, CVA, HTN, NSTEMI, and AML on chemo that presents to the ED following a syncopal episode today. Patient reports nausea, vomiting, and diarrhea onset 2300 last night. He reports syncopal episode today, causing him to fall and land onto his right side. He denies hitting his head. He reports being on the ground for an hour. He complains of right hip pain, right arm pain, and a productive cough. He denies chest pain, shortness of breath, abdominal pain, neck pain, blood in stool, and lower extremity pain.     Oncologist: Eduard Luna MD        Review of patient's allergies indicates:   Allergen Reactions    Acetaminophen Other (See Comments)     Restless leg     Benadryl [diphenhydramine hcl] Other (See Comments)     Restless Legs     Past Medical History:   Diagnosis Date    Anxiety and depression     BPH (benign prostatic hyperplasia)     History of CVA (cerebrovascular accident) without residual deficits     HTN (hypertension)     Malignant melanoma of skin, unspecified     NSTEMI (non-ST elevated myocardial infarction)     Sepsis, Gram negative     Sleep apnea      Past Surgical History:   Procedure Laterality Date    BONE MARROW ASPIRATION N/A 6/27/2023    Procedure: ASPIRATION, BONE MARROW;  Surgeon: Sabino Núñez MD;  Location: [facility];  Service: General;  Laterality: N/A;  ALL ASSESS FOR MOLECULAR MRD    BONE MARROW ASPIRATION N/A 5/22/2024    Procedure:  ASPIRATION, BONE MARROW;  Surgeon: Sabino Núñez MD;  Location: [facility];  Service: General;  Laterality: N/A;    BONE MARROW BIOPSY N/A 1/25/2023    Procedure: Biopsy-bone marrow;  Surgeon: Rd Carlos MD;  Location: [facility] OR;  Service: General;  Laterality: N/A;    BONE MARROW BIOPSY N/A 11/21/2023    Procedure: Biopsy-bone marrow;  Surgeon: Junior Castro MD;  Location: [facility] OR;  Service: General;  Laterality: N/A;    COLONOSCOPY      COLONOSCOPY N/A 1/24/2023    Procedure: COLONOSCOPY (DOUBLE);  Surgeon: Jimy Baker MD;  Location: [facility] ENDOSCOPY;  Service: Gastroenterology;  Laterality: N/A;    EGD, WITH CLOSED BIOPSY N/A 1/24/2023    Procedure: EGD, WITH CLOSED BIOPSY;  Surgeon: Jimy Baker MD;  Location: [facility] ENDOSCOPY;  Service: Gastroenterology;  Laterality: N/A;    EXCISION OF MELANOMA      SINUS SURGERY      TONSILLECTOMY       Family History   Problem Relation Name Age of Onset    Cancer Mother      Cancer Father       Social History     Tobacco Use    Smoking status: Never    Smokeless tobacco: Never   Substance Use Topics    Alcohol use: Not Currently     Alcohol/week: 7.0 standard drinks of alcohol     Types: 7 Cans of beer per week    Drug use: Never     Review of Systems   Constitutional:  Negative for fever.   HENT:  Negative for sore throat.    Eyes:  Negative for visual disturbance.   Respiratory:  Positive for cough. Negative for shortness of breath.    Cardiovascular:  Negative for chest pain.   Gastrointestinal:  Positive for diarrhea, nausea and vomiting. Negative for abdominal pain and blood in stool.   Genitourinary:  Negative for dysuria.   Musculoskeletal:  Negative for joint swelling and neck pain.        Right hip pain. Right arm pain.    Skin:  Negative for rash.   Neurological:  Positive for syncope. Negative for weakness.   Psychiatric/Behavioral:  Negative for confusion.    All other systems reviewed and are negative.      Physical Exam     Initial Vitals  [07/04/24 1740]   BP Pulse Resp Temp SpO2   (!) 105/53 88 19 98.3 °F (36.8 °C) 98 %      MAP       --         Physical Exam    Nursing note and vitals reviewed.  Constitutional: He appears well-developed and well-nourished. He is not diaphoretic. No distress.   HENT:   Head: Normocephalic and atraumatic.   Eyes: Conjunctivae and EOM are normal. Pupils are equal, round, and reactive to light.   Neck:   Normal range of motion.  Cardiovascular:  Normal rate, regular rhythm, normal heart sounds and intact distal pulses.           No murmur heard.  Pulmonary/Chest: Breath sounds normal. No respiratory distress. He has no wheezes. He has no rales.   Abdominal: Abdomen is soft. He exhibits no distension. There is no abdominal tenderness.   Genitourinary:    Genitourinary Comments: Rectal exam chaperoned by MARK Pardo: brown stool, no external hemorrhoids, hemoccult negative.      Musculoskeletal:         General: No edema. Normal range of motion.      Cervical back: Normal range of motion.      Comments: Hematoma to right hip with tenderness.      Neurological: He is alert and oriented to person, place, and time. No cranial nerve deficit.   Skin: Skin is warm and dry. Capillary refill takes less than 2 seconds. No rash noted. No erythema.   Skin tear to right elbow. .  Petechiae noted to lower extremities.   Psychiatric: He has a normal mood and affect.         ED Course   Critical Care    Date/Time: 7/4/2024 7:28 PM    Performed by: Hill Mo MD  Authorized by: Hill Mo MD  Direct patient critical care time: 10 minutes  Additional history critical care time: 6 minutes  Ordering / reviewing critical care time: 7 minutes  Documentation critical care time: 6 minutes  Consulting other physicians critical care time: 6 minutes  Consult with family critical care time: 5 minutes  Total critical care time (exclusive of procedural time) : 40 minutes  Critical care time was exclusive of teaching time and separately  billable procedures and treating other patients.  Critical care was necessary to treat or prevent imminent or life-threatening deterioration of the following conditions: cardiac failure, circulatory failure, metabolic crisis, shock, endocrine crisis and respiratory failure.  Critical care was time spent personally by me on the following activities: development of treatment plan with patient or surrogate, discussions with consultants, interpretation of cardiac output measurements, evaluation of patient's response to treatment, examination of patient, obtaining history from patient or surrogate, ordering and performing treatments and interventions, ordering and review of laboratory studies, ordering and review of radiographic studies, pulse oximetry, re-evaluation of patient's condition and review of old charts.        Labs Reviewed   COMPREHENSIVE METABOLIC PANEL - Abnormal; Notable for the following components:       Result Value    CO2 20 (*)     Glucose 147 (*)     Blood Urea Nitrogen 37.3 (*)     Creatinine 1.27 (*)     Calcium 7.8 (*)     Protein Total 5.7 (*)     Albumin 3.0 (*)     All other components within normal limits   PROTIME-INR - Abnormal; Notable for the following components:    PT 15.8 (*)     All other components within normal limits   CBC WITH DIFFERENTIAL - Abnormal; Notable for the following components:    WBC 4.48 (*)     RBC 1.98 (*)     Hgb 5.6 (*)     Hct 17.6 (*)     MCHC 31.8 (*)     Platelet 1 (*)     Lymph # 0.27 (*)     IG# 0.06 (*)     All other components within normal limits   BLOOD SMEAR MICROSCOPIC EXAM (OLG) - Abnormal; Notable for the following components:    RBC Morph Abnormal (*)     Anisocytosis 1+ (*)     Microcytosis 1+ (*)     Platelets Decreased (*)     All other components within normal limits   APTT - Normal   TROPONIN I - Normal   COVID/FLU A&B PCR - Normal    Narrative:     The Xpert Xpress SARS-CoV-2/FLU/RSV plus is a rapid, multiplexed real-time PCR test intended for  the simultaneous qualitative detection and differentiation of SARS-CoV-2, Influenza A, Influenza B, and respiratory syncytial virus (RSV) viral RNA in either nasopharyngeal swab or nasal swab specimens.         B-TYPE NATRIURETIC PEPTIDE - Normal   LACTIC ACID, PLASMA - Normal   BLOOD CULTURE OLG   BLOOD CULTURE OLG   CBC W/ AUTO DIFFERENTIAL    Narrative:     The following orders were created for panel order CBC auto differential.  Procedure                               Abnormality         Status                     ---------                               -----------         ------                     CBC with Differential[[id number]]       Abnormal            Final result                 Please view results for these tests on the individual orders.   URINALYSIS, REFLEX TO URINE CULTURE   TYPE & SCREEN   PREPARE RBC SOFT   PREPARE PLATELETS (DOSE) SOFT     EKG Readings: (Independently Interpreted)   Initial Reading: No STEMI. Rhythm: Normal Sinus Rhythm. Heart Rate: 82. Ectopy: No Ectopy. Conduction: Normal. ST Segments: Normal ST Segments. T Waves: Normal. Axis: Normal.   1738       Imaging Results              CT Chest Abdomen Pelvis With IV Contrast (XPD) NO Oral Contrast (Preliminary result)  Result time 07/04/24 21:18:00      Preliminary result by Brandon Willingham Jr., MD (07/04/24 21:18:00)                   Narrative:    START OF REPORT:  Technique: CT Scan of the chest abdomen and pelvis was performed with intravenous contrast with axial as well as sagittal and, coronal images.    Dosage Information: Automated Exposure Control was utilized.    Comparison: None.    Clinical History: Loss of Consciousness (Pt reports syncopal episode with fall onto right side. Pt reports right hip pain. Pt reports nausea 07/03/24 PM. Denies hitting head. Hx of Leukemia.    Findings:  Soft Tissues: Unremarkable.  Lines and Tubes: None.  Neck: The visualized soft tissues of the neck appear unremarkable.  Mediastinum: The  mediastinal structures are within normal limits.  Heart: The heart size is within normal limits.  Aorta: Mild aortic calcification is seen in the thoracic aorta.  Pulmonary Arteries: Unremarkable.  Lungs: There is moderate non specific dependent change at the lung bases. A mass like area of consolidation is seen in the right upper lobe (image 41 series 3). This may be a consolidated pneumonia, however an underlying solid mass cannot be totally ruled out. Additional evaluation/follow up advised. Subpleural and fibrolinear densities in the posterior right lower lobe may be scarring.  Pleura: No effusions or pneumothorax are identified.  Bony Structures:  Spine: Moderate spondylolytic changes are seen in the thoracic spine.  Ribs: No rib fractures are identified.  Abdomen:  Abdominal Wall: No abdominal wall pathology is seen.  Liver: The liver appears unremarkable.  Biliary System: There i s mild intrahepatic bile duct dilatation. This may be secondary to obstruction in the distal common bile duct secondary to a duodenal diverticulum.  Gallbladder: The gallbladder appears unremarkable.  Pancreas: The pancreas appears unremarkable.  Spleen: The spleen appears unremarkable.  Adrenals: The adrenal glands appear unremarkable.  Kidneys: The kidneys appear unremarkable with no stones cysts masses or hydronephrosis.  Aorta: There is moderate calcification of the abdominal aorta.  IVC: Unremarkable.  Bowel:  Esophagus: The visualized esophagus appears unremarkable.  Stomach: The stomach appears unremarkable.  Small Bowel: The small bowel appears unremarkable.  Colon: There is moderate stool in the colon which could reflect an element of constipation.  Appendix: The appendix appears unremarkable and is seen on Image 177, Series 2.  Peritoneum: No intraperitoneal free air or ascites is seen.    Pelvis:  Bladder: The bladder appears unremarkable.  Male:  Prostate gland: The prostate gland is enlarged.    Bony structures:  Dorsal  Spine: There is pronounced spondylosis of the visualized dorsal spine.  Bony Pelvis: There is moderate degenerative change of the bilateral hip.      Impression:  1. A mass like area of consolidation is seen in the right upper lobe (image 41 series 3). This may be a consolidated pneumonia, however an underlying solid mass cannot be totally ruled out. Additional evaluation/follow up advised. Subpleural and fibrolinear densities in the posterior right lower lobe may be scarring.  2. No acute intraabdominal or pelvic solid organ or bowel pathology identified. Details and other findings as discussed above.                                         CT Cervical Spine Without Contrast (Preliminary result)  Result time 07/04/24 20:55:01      Preliminary result by Brandon Willingham Jr., MD (07/04/24 20:55:01)                   Narrative:    START OF REPORT:  Technique: CT of the cervical spine was performed without intravenous contrast with axial as well as sagittal and coronal images.    Comparison: None.    Dosage Information: Automated exposure control was utilized.    Clinical history: Loss of Consciousness (Pt reports syncopal episode with fall onto right side. Pt reports right hip pain. Pt reports nausea 07/03/24 PM. Denies hitting head. Hx of Leukemia.    Findings:  Spine:  Spinal canal: The spinal canal appears unremarkable.  Spinal cord: The spinal cord appears unremarkable.  Mineralization: Mild osteopenia is seen in the visualized bony structures of the cervical spine.  Rotation: No significant rotation is seen.  Scoliosis: No significant scoliosis is seen.  Vertebral Fusion: No vertebral fusion is identified.  Listhesis: No significant listhesis is identified.  Lordosis: Straightening of the normal cervical lordosis is seen. This may reflect an element of myospasm.  Intervertebral disc spaces: Multilevel loss of disc height is seen.  Osteophytes: Moderate multilevel endplate osteophytes are seen.  Endplate  Sclerosis: No significant endplate sclerosis is seen.  Uncovertebral degenerative changes: Moderate to severe multilevel uncovertebral joint arthrosis is seen.  Facet degenerative changes: Mild multilevel facet degenerative changes are seen.  Calcifications: Small and linear globular ligamentous calcifications are seen anterior the disc spaces at multiple levels.  Fractures: No acute cervical spine fracture dislocation or subluxation is seen. Grade I anterior wedge compression of C5 is noted .    Miscellaneous: Mild atheromatous calcification of both carotid bulbs is noted.  Mastoid air cells: The visualized mastoid air cells appear clear.  Soft Tissues: Unremarkable.      Impression:  1. No acute cervical spine fracture dislocation or subluxation is seen.  2. Degenerative changes and other details as above. Details and other findings as noted above.                                         CT Head Without Contrast (Preliminary result)  Result time 07/04/24 20:51:28      Preliminary result by Brandon Willingham Jr., MD (07/04/24 20:51:28)                   Narrative:    START OF REPORT:  Technique: CT of the head was performed without intravenous contrast with direct axial as well as coronal and sagittal reconstruction images.    Comparison: None.    Clinical history: Loss of Consciousness (Pt reports syncopal episode with fall onto right side. Pt reports right hip pain. Pt reports nausea 07/03/24 PM. Denies hitting head. Hx of Leukemia.    FINDINGS:  Hemorrhage: No acute intracranial hemorrhage is seen.  CSF spaces: The ventricles sulci and basal cisterns are within normal limits.  Brain parenchyma: Minimal microvascular change is seen in portions of the periventricular and deep white matter tracts.  Cerebellum: Unremarkable.  Vascular: Mild atheromatous calcification of the intracranial arteries is seen.  Sella and skull base: Unremarkable.  Cerebellopontine angles: Normal.  Herniation: None.  Intracranial  calcifications: Incidental note is made of bilateral choroid plexus calcification. Incidental note is made of some pineal region calcification.  Calvarium: No acute linear or depressed skull fracture is seen.    Maxillofacial Structures:  Paranasal sinuses: There is some opacity left maxillary and right sphenoid sinuses. Correlate clinically as regards sinusitis.  Orbits: Unremarkable.  Zygomatic arches: Unremarkable.  Temporal bones and mastoids: Unremarkable.  TMJ: The mandibular condyles appear normally placed with respect to the mandibular fossa.      Impression:  1. No acute intracranial process identified. No acute intracranial traumatic injury identified. Details and findings as above.                                         Medications   0.9%  NaCl infusion (for blood administration) (has no administration in time range)   0.9%  NaCl infusion (for blood administration) (has no administration in time range)   vancomycin 2 g in dextrose 5 % 500 mL IVPB (has no administration in time range)   piperacillin-tazobactam (ZOSYN) 4.5 g in D5W 100 mL IVPB (MB+) (has no administration in time range)   sodium chloride 0.9% flush 10 mL (has no administration in time range)   melatonin tablet 6 mg (has no administration in time range)   0.9%  NaCl infusion (has no administration in time range)   oxyCODONE immediate release tablet 5 mg (has no administration in time range)   ondansetron injection 4 mg (has no administration in time range)   iohexoL (OMNIPAQUE 350) injection 100 mL (100 mLs Intravenous Given 7/4/24 2026)     Medical Decision Making  Judging by the patient's chief complaint and pertinent history, the patient has the following possible differential diagnoses, including but not limited to the following.  Some of these are deemed to be lower likelihood and some more likely based on my physical exam and history combined with possible lab work and/or imaging studies.   Please see the pertinent studies, and refer  to the [facility].  Some of these diagnoses will take further evaluation to fully rule out, perhaps as an outpatient and the patient was encouraged to follow up when discharged for more comprehensive evaluation.    Arrhythmia, dehydration, orthostatic episode, intracranial hemorrhage, seizure, anemia, infection, electrolyte derangement, drug use, hypoglycemic episode, ACS, structural heart disease    Problems Addressed:  Anemia, unspecified type: acute illness or injury that poses a threat to life or bodily functions  Hematoma of right thigh, initial encounter: acute illness or injury that poses a threat to life or bodily functions  Pneumonia due to infectious organism, unspecified laterality, unspecified part of lung: acute illness or injury that poses a threat to life or bodily functions  Syncope: acute illness or injury that poses a threat to life or bodily functions  Syncope and collapse: acute illness or injury that poses a threat to life or bodily functions  Syncope, unspecified syncope type: acute illness or injury that poses a threat to life or bodily functions  Thrombocytopenia: acute illness or injury that poses a threat to life or bodily functions    Amount and/or Complexity of Data Reviewed  Independent Historian: caregiver     Details: Collateral from family.  External Data Reviewed: notes.  Labs: ordered.  Radiology: ordered and independent interpretation performed.  ECG/medicine tests: ordered and independent interpretation performed.     Details: 1738. No STEMI. Rhythm: Normal Sinus Rhythm. Heart Rate: 82. Ectopy: No Ectopy. Conduction: Normal. ST Segments: Normal ST Segments. T Waves: Normal. Axis: Normal.       Risk  Prescription drug management.  Parenteral controlled substances.  Decision regarding hospitalization.  Diagnosis or treatment significantly limited by social determinants of health.            Scribe Attestation:   Scribe #1: I performed the above scribed service and the documentation  accurately describes the services I performed. I attest to the accuracy of the note.    Attending Attestation:           Physician Attestation for Scribe:  Physician Attestation Statement for Scribe #1: I, Hill Mo MD, reviewed documentation, as scribed by Jenna Sanchez in my presence, and it is both accurate and complete.             ED Course as of 07/04/24 2332   Thu Jul 04, 2024 1929 Calcium(!): 7.8 [RP]   1954 Paged hematology/oncology  [ED]   1955 Platelet Count(!!): 1 [RP]   1955 Hemoglobin(!!): 5.6 [RP]   2031 Discussed with oncology, Dr. Lejeune.  Irradiated platelets and blood products. [RP]   2046 Paged Nayely Andres MD [ED]   2057 Discussed with on-call for MD Mckenna - MD Enoc will admit. [RP]      ED Course User Index  [ED] Jenna Sanchez  [RP] Hill Mo MD                 Medical Decision Making:   History:   I obtained history from: someone other than patient.       <> Summary of History: Collateral from family.  Old Medical Records: I decided to obtain old medical records.  Old Records Summarized: records from clinic visits, records from previous admission(s) and records from another hospital.       <> Summary of Records: Reviewed old records, recent transfusion  Initial Assessment:   Weakness  Differential Diagnosis:   Differential listed above  Independently Interpreted Test(s):   I have ordered and independently interpreted X-rays - see prior notes.  I have ordered and independently interpreted EKG Reading(s) - see prior notes  Clinical Tests:   Lab Tests: Ordered and Reviewed  Radiological Study: Ordered and Reviewed  Medical Tests: Ordered and Reviewed  ED Management:  Patient is a 74-year-old male presents to emergency department for generalized weakness, syncopal episode at home.  See HPI.  See physical exam.  He reports passed out for approximately 1 hour.  He was not have any have any evidence of head trauma.  Vital signs stable on arrival.  Slightly hypotensive at  105/53.  Labs obtained.  Patient with a platelet count of 1.  Discussed with Hematology-Oncology.  Will transfuse unit of packed red blood cells as well as platelets.  Both irradiated.  Given that he has a platelet count of 1 with loss of consciousness, concern for additional hemorrhage.  CT of the head obtained without any evidence of intracranial bleed.  CT of the cervical spine without any acute abnormalities.  CT of the chest abdomen pelvis with lobar consolidative lesion noted.  This may be a pneumonia you may also be an underlying mass.  No previous CT of the chest available for comparison.  Started on antibiotics.  Blood cultures have been obtained.  If does not resolve with antibiotics may be mass.  Discussed with hospital medicine who will admit the patient.  No evidence of active bleeding noted.  All results discussed with the patient.  Answered all questions time.  Discussed need for admission further evaluation management treatment.  Patient verbalized understanding and agreed to plan.             Clinical Impression:  Final diagnoses:  [R55] Syncope (Primary)  [D69.6] Thrombocytopenia  [D64.9] Anemia, unspecified type  [R55] Syncope, unspecified syncope type  [R55] Syncope and collapse  [S70.11XA] Hematoma of right thigh, initial encounter  [J18.9] Pneumonia due to infectious organism, unspecified laterality, unspecified part of lung          ED Disposition Condition    Admit Stable                Hill Mo MD  07/04/24 3962

## 2024-07-05 NOTE — DISCHARGE SUMMARY
[facility] Medicine Discharge Summary    Admit Date: 7/4/2024  Discharge Date and Time: 7/5/20248:55 AM  Admitting Physician:  Team  Discharging Physician: Nayely Andres MD.  Primary Care Physician: Nayely Andres MD  Consults: Hematology/Oncology    Discharge Diagnoses:  AML, vasovagal syncope, resolved    Hospital Course:   Refer to \[facility] [place] [facility]\"" from 07/05/2024  Pt was seen and examined on the day of discharge  Vitals:  VITAL SIGNS: 24 HRS MIN & MAX LAST   Temp  Min: 97.5 °F (36.4 °C)  Max: 98.7 °F (37.1 °C) 98 °F (36.7 °C)   BP  Min: 95/45  Max: 118/63 110/63   Pulse  Min: 61  Max: 88  65   Resp  Min: 13  Max: 24 18   SpO2  Min: 95 %  Max: 99 % 99 %       Physical Exam:  General appearance:  Alert and oriented, no acute distress   HENT: Atraumatic head. Moist mucous membranes of oral cavity.  Eyes: Normal extraocular movements.   Neck: Supple.   Lungs: Clear to auscultation bilaterally. No wheezing present.   Heart: Regular rate and rhythm. S1 and S2 present with no murmurs/gallop/rub. No pedal edema. No JVD present.   Abdomen: Soft, non-distended, non-tender. No rebound tenderness/guarding. Bowel sounds are normal.   Extremities: No cyanosis, clubbing, or edema.  Skin:  Multiple ecchymosis on bilateral upper extremities    Neuro:  Nonfocal     Procedures Performed: No admission procedures for hospital encounter.     Significant Diagnostic Studies: See Full reports for all details    Recent Labs   Lab 07/01/24 0823 07/04/24 1830 07/05/24  0501   WBC 1.13* 4.48* 3.76*   RBC 2.79* 1.98* 2.44*   HGB 7.9* 5.6* 7.1*   HCT 25.4* 17.6* 21.6*   MCV 91.0 88.9 88.5   MCH 28.3 28.3 29.1   MCHC 31.1* 31.8* 32.9*   RDW 16.0 15.9 14.7   PLT 29* 1* 17*       Recent Labs   Lab 07/01/24 0823 07/04/24 1830 07/05/24  0501    137 138   K 4.0 4.0 3.8    106 107   CO2 26 20* 21*   BUN 25.0 37.3* 35.6*   CREATININE 1.04 1.27* 1.04   CALCIUM 9.0 7.8* 7.4*   ALBUMIN 3.6  3.0* 2.9*   ALKPHOS 82 63 61   ALT 20 17 14   AST 13 11 13   BILITOT 1.0 1.5 1.9*        Microbiology Results (last 7 days)       Procedure Component Value Units Date/Time    Blood culture #1 **CANNOT BE ORDERED STAT** [[id number]] Collected: 07/04/24 2142    Order Status: Resulted Specimen: Blood from Arm, Right Updated: 07/04/24 2256    Blood culture #2 **CANNOT BE ORDERED STAT** [[id number]] Collected: 07/04/24 2147    Order Status: Resulted Specimen: Blood from Hand, Left Updated: 07/04/24 2222             CT Cervical Spine Without Contrast  Narrative: EXAMINATION:  CT CERVICAL SPINE WITHOUT CONTRAST    CLINICAL HISTORY:  fall;    TECHNIQUE:  Noncontrast CT images of the cervical spine. Axial, coronal, and sagittal reformatted images were obtained. Dose length product is 509 mGycm. Automatic exposure control, adjustment of mA/kV or iterative reconstruction technique was used to limit radiation dose.    COMPARISON:  None    FINDINGS:  The cervical spine is visualized through the level of C7-T1.    There is no acute fracture identified.  There are multilevel degenerative changes with disc height loss, marginal osteophyte formation and facet arthropathy.  There is no paraspinal hematoma.  Impression: No acute fracture identified.    No significant change from the [facility] interpretation.    Electronically signed by: Jennifer Vitale  Date:    07/05/2024  Time:    06:59  CT Head Without Contrast  Narrative: EXAMINATION:  CT HEAD WITHOUT CONTRAST    CLINICAL HISTORY:  Syncope;    TECHNIQUE:  Axial scans were obtained from skull base to the vertex.    Coronal and sagittal reconstructions obtained from the axial data.    Automatic exposure control was utilized to limit radiation dose.    Contrast: None    Radiation Dose:    Total DLP: 968 mGy*cm    COMPARISON:  None    FINDINGS:  There is no acute intracranial hemorrhage or edema. The gray-white matter differentiation is preserved.  Scattered hypodensities in the  subcortical and periventricular white matter, mayda and cerebellum likely represent chronic microvascular ischemic changes.    There is no mass effect or midline shift.  There is diffuse parenchymal volume loss.  The basal cisterns are patent. There is no abnormal extra-axial fluid collection.  Carotid and vertebral artery calcifications are noted.    The calvarium and skull base are intact.  There is scattered paranasal sinus mucosal thickening.  Impression: 1. No acute intracranial abnormality.  2. Chronic microvascular ischemic changes.  No significant change from the [facility] interpretation.    Electronically signed by: Jennifer Vitale  Date:    07/05/2024  Time:    06:56         Medication List        CHANGE how you take these medications      lisinopriL-hydrochlorothiazide 20-12.5 mg per tablet  Commonly known as: PRINZIDE,ZESTORETIC  Take 1 tablet by mouth once daily  What changed:   how much to take  how to take this  when to take this  additional instructions            CONTINUE taking these medications      (MAGIC MOUTHWASH) 1:1:1 BENADRYL 12.5MG/5ML LIQ, ALUMINUM & MAGNESIUM  Swish and spit 15 mLs every 4 (four) hours as needed (mouth sores). for mouth sores     acyclovir 400 MG tablet  Commonly known as: ZOVIRAX  Take 1 tablet by mouth twice daily     citalopram 40 MG tablet  Commonly known as: CeleXA  Take 1 tablet by mouth once daily     doxycycline 100 MG tablet  Commonly known as: VIBRA-TABS     multivitamin per tablet  Commonly known as: THERAGRAN     rosuvastatin 40 MG Tab  Commonly known as: CRESTOR     tamsulosin 0.4 mg Cap  Commonly known as: FLOMAX  Take 1 capsule (0.4 mg total) by mouth once daily.     VENCLEXTA 100 mg Tab  Generic drug: venetoclax  TAKE 2 TABLETS BY MOUTH ONCE DAILY ON DAYS 1-14 OF A 28 DAY CYCLE            STOP taking these medications      clindamycin 150 MG capsule  Commonly known as: CLEOCIN     levoFLOXacin 500 MG tablet  Commonly known as: LEVAQUIN                Explained in detail to the patient about the discharge plan, medications, and follow-up visits. Pt understands and agrees with the treatment plan  Discharge Disposition: Home or Self Care   Discharged Condition: stable  Diet-  Regular, as tolerated  Medications Per DC med rec  Activities as tolerated    For further questions contact hospitalist office    Admit/discharge same day : 81 minutes  For worsening symptoms, chest pain, shortness of breath, increased abdominal pain, high grade fever, stroke or stroke like symptoms, immediately go to the nearest Emergency Room or call 911 as soon as possible.      Nayely Bruno M.D, on 7/5/2024. at 8:55 AM.

## 2024-07-05 NOTE — CONSULTS
Subjective:       Patient ID: Hussein Gonzalez Jr. is a 74 y.o. male.    Chief Complaint: Loss of Consciousness (Pt reports syncopal episode with fall onto right side. Pt reports right hip pain. Pt reports nausea 07/03/24 PM. Denies hitting head. Hx of Leukemia. Actively under treatment. 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    Current Treatment:   Plan to start low-dose cytarabine and venetoclax on 06/24/2024.    Treatment History:  Azacitidine and venetoclax started on 02/27/2023.  Started 7 out of 28 days, did this through cycle 5.  With cycle 6, moved to 5 out of 28 days.  Ultimately ended up on azacitidine 3/28 days and venetoclax 1 through 14/28 days.  Referral for possible stem cell transplant evaluation.    HPI:  73-year-old male with a history of CVA, hypertension, BPH, and melanoma who presented to the emergency department following a syncopal episode.  In February of 2022, the patient did have a slightly low white blood cell count, hemoglobin, and platelet count.  Per the patient, 3 months prior to admission, he was having dizziness, fatigue, weakness, shortness of breath, back pain, and leg pain.  The dizziness was worse with standing and walking.  On 01/20/2023, the patient had a syncopal episode.  He was brought to the emergency department and was found to be pancytopenic.  He also had stool that was positive for occult blood in the emergency department.  He received blood transfusion.  His CBC on 01/20/2023 revealed a white blood cell count of 2.6, hemoglobin of 7.7, and platelet count of 47,000. Reticulocytes were appropriately elevated, B12 and folate were within normal limits, iron level was slightly decreased.  Repeat blood work on 01/21/2023 revealed a white blood cell count of 2.8, post transfusion hemoglobin of 9.6, and a platelet count of 65448.  Hematology was  consulted for pancytopenia.  EGD and colonoscopy done on 01/24/2023, EGD revealed nonbleeding ulcers, colonoscopy unremarkable, however had to be aborted due to tortuous colon.  Bone Marrow Biopsy done on 01/25/2023.  Preliminary reveals AML with 27% blasts in a background of dysplasia.  Cytogenetics are within normal limits, FISH is within normal limits.  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  Started azacitidine and venetoclax on 02/27/2023, completed cycle 4 on 05/30/2023.  Held after this cycle and repeated a bone marrow biopsy, this was done on 06/27/2023, this showed a normocellular marrow (40%) with relative erythroid hyperplasia, sequential granulopoiesis and megakaryopoiesis.  The bone marrow was slightly suboptimal given it was predominantly subcortical bone with focal hematopoietic elements.  There was no morphologic or immunophenotypic evidence of previous acute myeloid leukemia, cytogenetics were normal, FISH was normal, NGS showed persistent ASXL1, SRSF2, TET2.    Repeat bone marrow biopsy done on 11/21/2023 showed no morphologic or immunophenotypic evidence of persistent acute myeloid leukemia. There were ASXL1, CEBPA, RUNX1, SRSF2, STAG2, and TET2 mutations.    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     Interval History:   Patient well known to me currently on low-dose cytarabine and venetoclax for acute myeloid leukemia.  He presented to the emergency department on 07/04/2024 after a syncopal episode.  He was found to have a hemoglobin of 5.6.  Received 2 units of packed red blood cells.  I saw the patient in the emergency department on the morning of 07/05/2024.  He stated that he was feeling much better.        Past Medical History:   Diagnosis Date    Anxiety and  depression     BPH (benign prostatic hyperplasia)     History of CVA (cerebrovascular accident) without residual deficits     HTN (hypertension)     Malignant melanoma of skin, unspecified     NSTEMI (non-ST elevated myocardial infarction)     Sepsis, Gram negative     Sleep apnea       Past Surgical History:   Procedure Laterality Date    BONE MARROW ASPIRATION N/A 6/27/2023    Procedure: ASPIRATION, BONE MARROW;  Surgeon: Sabino Núñez MD;  Location: [facility];  Service: General;  Laterality: N/A;  ALL ASSESS FOR MOLECULAR MRD    BONE MARROW ASPIRATION N/A 5/22/2024    Procedure: ASPIRATION, BONE MARROW;  Surgeon: Sabino Núñez MD;  Location: [facility] OR;  Service: General;  Laterality: N/A;    BONE MARROW BIOPSY N/A 1/25/2023    Procedure: Biopsy-bone marrow;  Surgeon: Rd Carlos MD;  Location: [facility];  Service: General;  Laterality: N/A;    BONE MARROW BIOPSY N/A 11/21/2023    Procedure: Biopsy-bone marrow;  Surgeon: Junior Castro MD;  Location: [facility];  Service: General;  Laterality: N/A;    COLONOSCOPY      COLONOSCOPY N/A 1/24/2023    Procedure: COLONOSCOPY (DOUBLE);  Surgeon: Jimy Baker MD;  Location: [facility];  Service: Gastroenterology;  Laterality: N/A;    EGD, WITH CLOSED BIOPSY N/A 1/24/2023    Procedure: EGD, WITH CLOSED BIOPSY;  Surgeon: Jimy Baker MD;  Location: [facility];  Service: Gastroenterology;  Laterality: N/A;    EXCISION OF MELANOMA      SINUS SURGERY      TONSILLECTOMY       Social History     Socioeconomic History    Marital status:    Tobacco Use    Smoking status: Never    Smokeless tobacco: Never   Substance and Sexual Activity    Alcohol use: Not Currently     Alcohol/week: 7.0 standard drinks of alcohol     Types: 7 Cans of beer per week    Drug use: Never    Sexual activity: Yes     Social Determinants of Health     Financial Resource Strain: Low Risk  (4/20/2023)    Overall Financial Resource Strain (CARDIA)     Difficulty of Paying Living  Expenses: Not hard at all   Food Insecurity: No Food Insecurity (4/20/2023)    Hunger Vital Sign     Worried About Running Out of Food in the Last Year: Never true     Ran Out of Food in the Last Year: Never true   Transportation Needs: No Transportation Needs (4/20/2023)    PRAPARE - Transportation     Lack of Transportation (Medical): No     Lack of Transportation (Non-Medical): No   Physical Activity: Insufficiently Active (4/20/2023)    Exercise Vital Sign     Days of Exercise per Week: 3 days     Minutes of Exercise per Session: 30 min   Stress: No Stress Concern Present (4/20/2023)    [employer] [place] [employer] - Occupational Stress Questionnaire     Feeling of Stress : Not at all   Housing Stability: Low Risk  (4/20/2023)    Housing Stability Vital Sign     Unable to Pay for Housing in the Last Year: No     Number of Places Lived in the Last Year: 1     Unstable Housing in the Last Year: No      Family History   Problem Relation Name Age of Onset    Cancer Mother      Cancer Father        Review of patient's allergies indicates:   Allergen Reactions    Acetaminophen Other (See Comments)     Restless leg     Benadryl [diphenhydramine hcl] Other (See Comments)     Restless Legs      Review of Systems   Constitutional:  Negative for chills, diaphoresis, fatigue, fever and unexpected weight change.   HENT:  Negative for nasal congestion, mouth sores, sinus pressure/congestion and sore throat.    Eyes:  Negative for pain and visual disturbance.   Respiratory:  Negative for cough, chest tightness and shortness of breath.    Cardiovascular:  Negative for chest pain, palpitations and leg swelling.   Gastrointestinal:  Negative for abdominal distention, abdominal pain, blood in stool, constipation and diarrhea.   Genitourinary:  Negative for dysuria, frequency and hematuria.   Musculoskeletal:  Positive for arthralgias and myalgias. Negative for back pain.   Integumentary:  Negative for rash.    Neurological:  Negative for dizziness, syncope, weakness, numbness and headaches.   Hematological:  Negative for adenopathy.   Psychiatric/Behavioral:  Negative for confusion.          Objective:      Physical Exam  Vitals reviewed.   Constitutional:       General: He is awake.      Appearance: Normal appearance.   HENT:      Head: Normocephalic and atraumatic.      Right Ear: Hearing normal.      Left Ear: Hearing normal.      Nose: Nose normal.   Eyes:      General: Lids are normal. Vision grossly intact.      Extraocular Movements: Extraocular movements intact.      Conjunctiva/sclera: Conjunctivae normal.   Cardiovascular:      Rate and Rhythm: Normal rate and regular rhythm.      Pulses: Normal pulses.   Pulmonary:      Effort: Pulmonary effort is normal.      Breath sounds: Normal breath sounds. No wheezing, rhonchi or rales.   Musculoskeletal:      Cervical back: Full passive range of motion without pain.      Right lower leg: No edema.      Left lower leg: No edema.   Skin:     General: Skin is warm.      Comments:      Neurological:      General: No focal deficit present.      Mental Status: He is alert and oriented to person, place, and time.   Psychiatric:         Attention and Perception: Attention normal.         Mood and Affect: Mood and affect normal.         Behavior: Behavior is cooperative.         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    Evaluated by Dr. Valdez in [place].     Continue prophylaxis with 500 mg of levofloxacin daily, 200 mg of acyclovir b.i.d., 200 mg of fluconazole daily.    He  restarted treatment on 07/10/2023, on 08/14/2023, we dose reduced by changing venetoclax to 14 out of every 28 days.    Patient was getting venetoclax on days 1 through 14 out of every 28 days and azacitidine on days 1 through 3 out of every 28 days.  His bone marrow biopsy on 05/22/2024 shows recurrent AML.    Started low-dose cytarabine and venetoclax on Monday, 06/24/2024.    He presented with a syncopal episode and was found to have significant anemia.  He received 2 units of packed red blood cells, his symptoms were much improved.    He already has follow up with me scheduled.  He was due to get low-dose cytarabine today, he will just get this next Monday and Tuesday (he also missed yesterday due to the holiday).    We will check a CBC on Monday.  He knows signs and symptoms for which he should returned back to the emergency department.    His platelet count was 1000, after transfusion increased to 17,000.    Of note, scans obtained did not show any intracranial hemorrhage.    Okay to discharge from Oncology standpoint with close follow up which he already has.    Eduard Luna II, MD

## 2024-07-08 ENCOUNTER — LAB VISIT (OUTPATIENT)
Dept: LAB | Facility: HOSPITAL | Age: 74
End: 2024-07-08
Attending: INTERNAL MEDICINE
Payer: MEDICARE

## 2024-07-08 ENCOUNTER — INFUSION (OUTPATIENT)
Dept: INFUSION THERAPY | Facility: HOSPITAL | Age: 74
End: 2024-07-08
Attending: INTERNAL MEDICINE
Payer: MEDICARE

## 2024-07-08 ENCOUNTER — PATIENT OUTREACH (OUTPATIENT)
Dept: ADMINISTRATIVE | Facility: CLINIC | Age: 74
End: 2024-07-08
Payer: MEDICARE

## 2024-07-08 VITALS
RESPIRATION RATE: 18 BRPM | WEIGHT: 180 LBS | SYSTOLIC BLOOD PRESSURE: 130 MMHG | TEMPERATURE: 98 F | HEART RATE: 71 BPM | HEIGHT: 72 IN | DIASTOLIC BLOOD PRESSURE: 72 MMHG | BODY MASS INDEX: 24.38 KG/M2

## 2024-07-08 DIAGNOSIS — C92.00 ACUTE MYELOID LEUKEMIA NOT HAVING ACHIEVED REMISSION: ICD-10-CM

## 2024-07-08 DIAGNOSIS — C92.00 ACUTE MYELOID LEUKEMIA NOT HAVING ACHIEVED REMISSION: Primary | ICD-10-CM

## 2024-07-08 LAB
ALBUMIN SERPL-MCNC: 3 G/DL (ref 3.4–4.8)
ALBUMIN/GLOB SERPL: 0.9 RATIO (ref 1.1–2)
ALP SERPL-CCNC: 84 UNIT/L (ref 40–150)
ALT SERPL-CCNC: 14 UNIT/L (ref 0–55)
ANION GAP SERPL CALC-SCNC: 8 MEQ/L
AST SERPL-CCNC: 13 UNIT/L (ref 5–34)
BASOPHILS # BLD AUTO: 0.01 X10(3)/MCL
BASOPHILS NFR BLD AUTO: 0.6 %
BILIRUB SERPL-MCNC: 1.4 MG/DL
BUN SERPL-MCNC: 15.3 MG/DL (ref 8.4–25.7)
CALCIUM SERPL-MCNC: 8.1 MG/DL (ref 8.8–10)
CHLORIDE SERPL-SCNC: 103 MMOL/L (ref 98–107)
CO2 SERPL-SCNC: 24 MMOL/L (ref 23–31)
CREAT SERPL-MCNC: 0.9 MG/DL (ref 0.73–1.18)
CREAT/UREA NIT SERPL: 17
EOSINOPHIL # BLD AUTO: 0 X10(3)/MCL (ref 0–0.9)
EOSINOPHIL NFR BLD AUTO: 0 %
ERYTHROCYTE [DISTWIDTH] IN BLOOD BY AUTOMATED COUNT: 14.6 % (ref 11.5–17)
GFR SERPLBLD CREATININE-BSD FMLA CKD-EPI: >60 ML/MIN/1.73/M2
GLOBULIN SER-MCNC: 3.3 GM/DL (ref 2.4–3.5)
GLUCOSE SERPL-MCNC: 94 MG/DL (ref 82–115)
GROUP & RH: NORMAL
HCT VFR BLD AUTO: 21.3 % (ref 42–52)
HGB BLD-MCNC: 7 G/DL (ref 14–18)
IMM GRANULOCYTES # BLD AUTO: 0.03 X10(3)/MCL (ref 0–0.04)
IMM GRANULOCYTES NFR BLD AUTO: 1.9 %
INDIRECT COOMBS: NORMAL
LYMPHOCYTES # BLD AUTO: 0.38 X10(3)/MCL (ref 0.6–4.6)
LYMPHOCYTES NFR BLD AUTO: 23.8 %
MCH RBC QN AUTO: 29.4 PG (ref 27–31)
MCHC RBC AUTO-ENTMCNC: 32.9 G/DL (ref 33–36)
MCV RBC AUTO: 89.5 FL (ref 80–94)
MONOCYTES # BLD AUTO: 0.39 X10(3)/MCL (ref 0.1–1.3)
MONOCYTES NFR BLD AUTO: 24.4 %
NEUTROPHILS # BLD AUTO: 0.79 X10(3)/MCL (ref 2.1–9.2)
NEUTROPHILS NFR BLD AUTO: 49.3 %
PLATELET # BLD AUTO: 14 X10(3)/MCL (ref 130–400)
PMV BLD AUTO: ABNORMAL FL
POTASSIUM SERPL-SCNC: 4 MMOL/L (ref 3.5–5.1)
PROT SERPL-MCNC: 6.3 GM/DL (ref 5.8–7.6)
RBC # BLD AUTO: 2.38 X10(6)/MCL (ref 4.7–6.1)
SODIUM SERPL-SCNC: 135 MMOL/L (ref 136–145)
SPECIMEN OUTDATE: NORMAL
WBC # BLD AUTO: 1.6 X10(3)/MCL (ref 4.5–11.5)

## 2024-07-08 PROCEDURE — 80053 COMPREHEN METABOLIC PANEL: CPT

## 2024-07-08 PROCEDURE — 96401 CHEMO ANTI-NEOPL SQ/IM: CPT

## 2024-07-08 PROCEDURE — 86900 BLOOD TYPING SEROLOGIC ABO: CPT | Performed by: NURSE PRACTITIONER

## 2024-07-08 PROCEDURE — 63600175 PHARM REV CODE 636 W HCPCS: Performed by: INTERNAL MEDICINE

## 2024-07-08 PROCEDURE — 85025 COMPLETE CBC W/AUTO DIFF WBC: CPT

## 2024-07-08 PROCEDURE — 86923 COMPATIBILITY TEST ELECTRIC: CPT | Mod: 91 | Performed by: INTERNAL MEDICINE

## 2024-07-08 PROCEDURE — 36430 TRANSFUSION BLD/BLD COMPNT: CPT

## 2024-07-08 PROCEDURE — P9040 RBC LEUKOREDUCED IRRADIATED: HCPCS | Performed by: INTERNAL MEDICINE

## 2024-07-08 PROCEDURE — 86901 BLOOD TYPING SEROLOGIC RH(D): CPT | Performed by: NURSE PRACTITIONER

## 2024-07-08 PROCEDURE — 36415 COLL VENOUS BLD VENIPUNCTURE: CPT

## 2024-07-08 RX ORDER — CYTARABINE 20 MG/ML
20 INJECTION, SOLUTION INTRATHECAL; INTRAVENOUS; SUBCUTANEOUS ONCE
Status: COMPLETED | OUTPATIENT
Start: 2024-07-08 | End: 2024-07-08

## 2024-07-08 RX ORDER — ACETAMINOPHEN 325 MG/1
650 TABLET ORAL ONCE
OUTPATIENT
Start: 2024-07-08 | End: 2024-07-08

## 2024-07-08 RX ORDER — DIPHENHYDRAMINE HCL 25 MG
25 CAPSULE ORAL ONCE
OUTPATIENT
Start: 2024-07-08 | End: 2024-07-08

## 2024-07-08 RX ORDER — HYDROCODONE BITARTRATE AND ACETAMINOPHEN 500; 5 MG/1; MG/1
TABLET ORAL ONCE
OUTPATIENT
Start: 2024-07-08 | End: 2024-07-08

## 2024-07-08 RX ADMIN — CYTARABINE 40 MG: 20 INJECTION, SOLUTION INTRATHECAL; INTRAVENOUS; SUBCUTANEOUS at 10:07

## 2024-07-09 ENCOUNTER — INFUSION (OUTPATIENT)
Dept: INFUSION THERAPY | Facility: HOSPITAL | Age: 74
End: 2024-07-09
Attending: INTERNAL MEDICINE
Payer: MEDICARE

## 2024-07-09 VITALS
DIASTOLIC BLOOD PRESSURE: 56 MMHG | SYSTOLIC BLOOD PRESSURE: 109 MMHG | HEART RATE: 65 BPM | OXYGEN SATURATION: 97 % | TEMPERATURE: 98 F | RESPIRATION RATE: 20 BRPM

## 2024-07-09 DIAGNOSIS — C92.00 ACUTE MYELOID LEUKEMIA NOT HAVING ACHIEVED REMISSION: Primary | ICD-10-CM

## 2024-07-09 LAB — BACTERIA BLD CULT: NORMAL

## 2024-07-09 PROCEDURE — 96401 CHEMO ANTI-NEOPL SQ/IM: CPT

## 2024-07-09 PROCEDURE — 63600175 PHARM REV CODE 636 W HCPCS: Performed by: INTERNAL MEDICINE

## 2024-07-09 RX ORDER — CYTARABINE 20 MG/ML
20 INJECTION, SOLUTION INTRATHECAL; INTRAVENOUS; SUBCUTANEOUS ONCE
Status: COMPLETED | OUTPATIENT
Start: 2024-07-09 | End: 2024-07-09

## 2024-07-09 RX ADMIN — CYTARABINE 40 MG: 20 INJECTION, SOLUTION INTRATHECAL; INTRAVENOUS; SUBCUTANEOUS at 08:07

## 2024-07-09 NOTE — PLAN OF CARE
C1 D12 Cytarabine given. Tolerated well. Next appts confirmed with pt. Dc'd home in stable condition.

## 2024-07-10 LAB — BACTERIA BLD CULT: NORMAL

## 2024-07-11 ENCOUNTER — INFUSION (OUTPATIENT)
Dept: INFUSION THERAPY | Facility: HOSPITAL | Age: 74
End: 2024-07-11
Attending: INTERNAL MEDICINE
Payer: MEDICARE

## 2024-07-11 ENCOUNTER — LAB VISIT (OUTPATIENT)
Dept: LAB | Facility: HOSPITAL | Age: 74
End: 2024-07-11
Attending: INTERNAL MEDICINE
Payer: MEDICARE

## 2024-07-11 ENCOUNTER — OFFICE VISIT (OUTPATIENT)
Dept: HEMATOLOGY/ONCOLOGY | Facility: CLINIC | Age: 74
End: 2024-07-11
Payer: MEDICARE

## 2024-07-11 VITALS
DIASTOLIC BLOOD PRESSURE: 58 MMHG | HEIGHT: 72 IN | HEART RATE: 89 BPM | WEIGHT: 180 LBS | SYSTOLIC BLOOD PRESSURE: 105 MMHG | BODY MASS INDEX: 24.38 KG/M2

## 2024-07-11 VITALS
SYSTOLIC BLOOD PRESSURE: 117 MMHG | TEMPERATURE: 98 F | DIASTOLIC BLOOD PRESSURE: 70 MMHG | HEART RATE: 67 BPM | RESPIRATION RATE: 18 BRPM

## 2024-07-11 DIAGNOSIS — C92.00 ACUTE MYELOID LEUKEMIA NOT HAVING ACHIEVED REMISSION: Primary | Chronic | ICD-10-CM

## 2024-07-11 DIAGNOSIS — C92.00 ACUTE MYELOID LEUKEMIA NOT HAVING ACHIEVED REMISSION: ICD-10-CM

## 2024-07-11 LAB
ABO + RH BLD: NORMAL
ALBUMIN SERPL-MCNC: 2.9 G/DL (ref 3.4–4.8)
ALBUMIN/GLOB SERPL: 0.9 RATIO (ref 1.1–2)
ALP SERPL-CCNC: 96 UNIT/L (ref 40–150)
ALT SERPL-CCNC: 19 UNIT/L (ref 0–55)
ANION GAP SERPL CALC-SCNC: 9 MEQ/L
AST SERPL-CCNC: 13 UNIT/L (ref 5–34)
BASOPHILS # BLD AUTO: 0 X10(3)/MCL
BASOPHILS NFR BLD AUTO: 0 %
BILIRUB SERPL-MCNC: 1 MG/DL
BLD PROD TYP BPU: NORMAL
BLOOD UNIT EXPIRATION DATE: NORMAL
BLOOD UNIT TYPE CODE: 5100
BUN SERPL-MCNC: 14.7 MG/DL (ref 8.4–25.7)
CALCIUM SERPL-MCNC: 8.4 MG/DL (ref 8.8–10)
CHLORIDE SERPL-SCNC: 100 MMOL/L (ref 98–107)
CO2 SERPL-SCNC: 28 MMOL/L (ref 23–31)
CREAT SERPL-MCNC: 1 MG/DL (ref 0.73–1.18)
CREAT/UREA NIT SERPL: 15
CROSSMATCH INTERPRETATION: NORMAL
DISPENSE STATUS: NORMAL
EOSINOPHIL # BLD AUTO: 0 X10(3)/MCL (ref 0–0.9)
EOSINOPHIL NFR BLD AUTO: 0 %
ERYTHROCYTE [DISTWIDTH] IN BLOOD BY AUTOMATED COUNT: 14.4 % (ref 11.5–17)
GFR SERPLBLD CREATININE-BSD FMLA CKD-EPI: >60 ML/MIN/1.73/M2
GLOBULIN SER-MCNC: 3.4 GM/DL (ref 2.4–3.5)
GLUCOSE SERPL-MCNC: 97 MG/DL (ref 82–115)
GROUP & RH: NORMAL
HCT VFR BLD AUTO: 22.2 % (ref 42–52)
HGB BLD-MCNC: 7.3 G/DL (ref 14–18)
IMM GRANULOCYTES # BLD AUTO: 0.01 X10(3)/MCL (ref 0–0.04)
IMM GRANULOCYTES NFR BLD AUTO: 1.3 %
INDIRECT COOMBS: NORMAL
LYMPHOCYTES # BLD AUTO: 0.39 X10(3)/MCL (ref 0.6–4.6)
LYMPHOCYTES NFR BLD AUTO: 50 %
MCH RBC QN AUTO: 29.3 PG (ref 27–31)
MCHC RBC AUTO-ENTMCNC: 32.9 G/DL (ref 33–36)
MCV RBC AUTO: 89.2 FL (ref 80–94)
MONOCYTES # BLD AUTO: 0.23 X10(3)/MCL (ref 0.1–1.3)
MONOCYTES NFR BLD AUTO: 29.5 %
NEUTROPHILS # BLD AUTO: 0.15 X10(3)/MCL (ref 2.1–9.2)
NEUTROPHILS NFR BLD AUTO: 19.2 %
PLATELET # BLD AUTO: 4 X10(3)/MCL (ref 130–400)
PMV BLD AUTO: ABNORMAL FL
POTASSIUM SERPL-SCNC: 3.6 MMOL/L (ref 3.5–5.1)
PROT SERPL-MCNC: 6.3 GM/DL (ref 5.8–7.6)
RBC # BLD AUTO: 2.49 X10(6)/MCL (ref 4.7–6.1)
SODIUM SERPL-SCNC: 137 MMOL/L (ref 136–145)
SPECIMEN OUTDATE: NORMAL
UNIT NUMBER: NORMAL
WBC # BLD AUTO: 0.78 X10(3)/MCL (ref 4.5–11.5)

## 2024-07-11 PROCEDURE — 86901 BLOOD TYPING SEROLOGIC RH(D): CPT | Performed by: INTERNAL MEDICINE

## 2024-07-11 PROCEDURE — 99213 OFFICE O/P EST LOW 20 MIN: CPT | Mod: PBBFAC | Performed by: INTERNAL MEDICINE

## 2024-07-11 PROCEDURE — 85025 COMPLETE CBC W/AUTO DIFF WBC: CPT

## 2024-07-11 PROCEDURE — 86923 COMPATIBILITY TEST ELECTRIC: CPT | Performed by: INTERNAL MEDICINE

## 2024-07-11 PROCEDURE — 99999 PR PBB SHADOW E&M-EST. PATIENT-LVL III: CPT | Mod: PBBFAC,,, | Performed by: INTERNAL MEDICINE

## 2024-07-11 PROCEDURE — 25000003 PHARM REV CODE 250: Performed by: INTERNAL MEDICINE

## 2024-07-11 PROCEDURE — 99214 OFFICE O/P EST MOD 30 MIN: CPT | Mod: S$PBB,,, | Performed by: INTERNAL MEDICINE

## 2024-07-11 PROCEDURE — 36430 TRANSFUSION BLD/BLD COMPNT: CPT

## 2024-07-11 PROCEDURE — 36415 COLL VENOUS BLD VENIPUNCTURE: CPT | Mod: 91

## 2024-07-11 PROCEDURE — 80053 COMPREHEN METABOLIC PANEL: CPT

## 2024-07-11 PROCEDURE — 96374 THER/PROPH/DIAG INJ IV PUSH: CPT

## 2024-07-11 PROCEDURE — P9037 PLATE PHERES LEUKOREDU IRRAD: HCPCS | Performed by: INTERNAL MEDICINE

## 2024-07-11 PROCEDURE — P9040 RBC LEUKOREDUCED IRRADIATED: HCPCS | Performed by: INTERNAL MEDICINE

## 2024-07-11 RX ORDER — HYDROCODONE BITARTRATE AND ACETAMINOPHEN 500; 5 MG/1; MG/1
TABLET ORAL ONCE
Status: ACTIVE | OUTPATIENT
Start: 2024-07-11

## 2024-07-11 RX ORDER — FAMOTIDINE 10 MG/ML
20 INJECTION INTRAVENOUS
Status: DISCONTINUED | OUTPATIENT
Start: 2024-07-11 | End: 2024-07-11 | Stop reason: SDUPTHER

## 2024-07-11 RX ORDER — HYDROCODONE BITARTRATE AND ACETAMINOPHEN 500; 5 MG/1; MG/1
TABLET ORAL ONCE
Status: CANCELLED | OUTPATIENT
Start: 2024-07-11 | End: 2024-07-11

## 2024-07-11 RX ORDER — FAMOTIDINE 10 MG/ML
20 INJECTION INTRAVENOUS
Start: 2024-07-11

## 2024-07-11 RX ORDER — FAMOTIDINE 10 MG/ML
20 INJECTION INTRAVENOUS ONCE
Status: CANCELLED | OUTPATIENT
Start: 2024-07-11

## 2024-07-11 RX ORDER — FAMOTIDINE 10 MG/ML
20 INJECTION INTRAVENOUS ONCE
Status: COMPLETED | OUTPATIENT
Start: 2024-07-11 | End: 2024-07-11

## 2024-07-11 RX ADMIN — FAMOTIDINE 20 MG: 10 INJECTION, SOLUTION INTRAVENOUS at 10:07

## 2024-07-11 NOTE — PROGRESS NOTES
Subjective:       Patient ID: Hussein Gonzalez Jr. is a 74 y.o. male.    Chief Complaint: No chief complaint on file.      Diagnosis:  AML with 27% blasts, background dysplasia, normal cytogenetics and normal FISH with NGS showing ASXL1, SRSF2, TET2, and STAG2 mutations.    Current Treatment:   Low-dose cytarabine and venetoclax started on 06/24/2024.    Treatment History:  Azacitidine and venetoclax started on 02/27/2023.  Started 7 out of 28 days, did this through cycle 5.  With cycle 6, moved to 5 out of 28 days.  Ultimately ended up on azacitidine 3/28 days and venetoclax 1 through 14/28 days.  Referral for possible stem cell transplant evaluation.    HPI:  73-year-old male with a history of CVA, hypertension, BPH, and melanoma who presented to the emergency department following a syncopal episode.  In February of 2022, the patient did have a slightly low white blood cell count, hemoglobin, and platelet count.  Per the patient, 3 months prior to admission, he was having dizziness, fatigue, weakness, shortness of breath, back pain, and leg pain.  The dizziness was worse with standing and walking.  On 01/20/2023, the patient had a syncopal episode.  He was brought to the emergency department and was found to be pancytopenic.  He also had stool that was positive for occult blood in the emergency department.  He received blood transfusion.  His CBC on 01/20/2023 revealed a white blood cell count of 2.6, hemoglobin of 7.7, and platelet count of 47,000. Reticulocytes were appropriately elevated, B12 and folate were within normal limits, iron level was slightly decreased.  Repeat blood work on 01/21/2023 revealed a white blood cell count of 2.8, post transfusion hemoglobin of 9.6, and a [place] count of 668[address].  Hematology was consulted for pancytopenia.  EGD and colonoscopy done on 01/24/2023, EGD revealed nonbleeding ulcers, colonoscopy unremarkable, however had to be aborted due to tortuous colon.  Bone Marrow  Biopsy done on 01/25/2023.  Preliminary reveals AML with 27% blasts in a background of dysplasia.  Cytogenetics are within normal limits, FISH is within normal limits.  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  Started azacitidine and venetoclax on 02/27/2023, completed cycle 4 on 05/30/2023.  Held after this cycle and repeated a bone marrow biopsy, this was done on 06/27/2023, this showed a normocellular marrow (40%) with relative erythroid hyperplasia, sequential granulopoiesis and megakaryopoiesis.  The bone marrow was slightly suboptimal given it was predominantly subcortical bone with focal hematopoietic elements.  There was no morphologic or immunophenotypic evidence of previous acute myeloid leukemia, cytogenetics were normal, FISH was normal, NGS showed persistent ASXL1, SRSF2, TET2.    Repeat bone marrow biopsy done on 11/21/2023 showed no morphologic or immunophenotypic evidence of persistent acute myeloid leukemia. There were ASXL1, CEBPA, RUNX1, SRSF2, STAG2, and TET2 mutations.    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     Started low-dose cytarabine and venetoclax on 06/24/2024.    Interval History:   Patient well known to me currently on low-dose cytarabine and venetoclax for acute myeloid leukemia.  He recently presented to the emergency department with a syncopal episode.  He received blood transfusion for symptomatic anemia and improved.  He presents today for clinic follow up visit.  Overall, he still remains fatigued, but he feels much better since being in the hospital last week.        Past Medical History:   Diagnosis Date    Anxiety and depression     BPH (benign prostatic hyperplasia)     History of CVA (cerebrovascular accident) without residual deficits     HTN  (hypertension)     Malignant melanoma of skin, unspecified     NSTEMI (non-ST elevated myocardial infarction)     Sepsis, Gram negative     Sleep apnea       Past Surgical History:   Procedure Laterality Date    BONE MARROW ASPIRATION N/A 6/27/2023    Procedure: ASPIRATION, BONE MARROW;  Surgeon: Sabino úNñez MD;  Location: [facility];  Service: General;  Laterality: N/A;  ALL ASSESS FOR MOLECULAR MRD    BONE MARROW ASPIRATION N/A 5/22/2024    Procedure: ASPIRATION, BONE MARROW;  Surgeon: Sabino Núñez MD;  Location: [facility] OR;  Service: General;  Laterality: N/A;    BONE MARROW BIOPSY N/A 1/25/2023    Procedure: Biopsy-bone marrow;  Surgeon: Rd Carlos MD;  Location: [facility];  Service: General;  Laterality: N/A;    BONE MARROW BIOPSY N/A 11/21/2023    Procedure: Biopsy-bone marrow;  Surgeon: Junior Castro MD;  Location: [facility];  Service: General;  Laterality: N/A;    COLONOSCOPY      COLONOSCOPY N/A 1/24/2023    Procedure: COLONOSCOPY (DOUBLE);  Surgeon: Jimy Baker MD;  Location: [facility] ENDOSCOPY;  Service: Gastroenterology;  Laterality: N/A;    EGD, WITH CLOSED BIOPSY N/A 1/24/2023    Procedure: EGD, WITH CLOSED BIOPSY;  Surgeon: Jimy Baker MD;  Location: [facility] ENDOSCOPY;  Service: Gastroenterology;  Laterality: N/A;    EXCISION OF MELANOMA      SINUS SURGERY      TONSILLECTOMY       Social History     Socioeconomic History    Marital status:    Tobacco Use    Smoking status: Never    Smokeless tobacco: Never   Substance and Sexual Activity    Alcohol use: Not Currently     Alcohol/week: 7.0 standard drinks of alcohol     Types: 7 Cans of beer per week    Drug use: Never    Sexual activity: Yes     Social Determinants of Health     Financial Resource Strain: Low Risk  (4/20/2023)    Overall Financial Resource Strain (CARDIA)     Difficulty of Paying Living Expenses: Not hard at all   Food Insecurity: No Food Insecurity (4/20/2023)    Hunger Vital Sign     Worried About Running Out of  Food in the Last Year: Never true     Ran Out of Food in the Last Year: Never true   Transportation Needs: No Transportation Needs (4/20/2023)    PRAPARE - Transportation     Lack of Transportation (Medical): No     Lack of Transportation (Non-Medical): No   Physical Activity: Insufficiently Active (4/20/2023)    Exercise Vital Sign     Days of Exercise per Week: 3 days     Minutes of Exercise per Session: 30 min   Stress: No Stress Concern Present (4/20/2023)    [employer] - Occupational Stress Questionnaire     Feeling of Stress : Not at all   Housing Stability: Low Risk  (4/20/2023)    Housing Stability Vital Sign     Unable to Pay for Housing in the Last Year: No     Number of Places Lived in the Last Year: 1     Unstable Housing in the Last Year: No      Family History   Problem Relation Name Age of Onset    Cancer Mother      Cancer Father        Review of patient's allergies indicates:   Allergen Reactions    Acetaminophen Other (See Comments)     Restless leg     Benadryl [diphenhydramine hcl] Other (See Comments)     Restless Legs      Review of Systems   Constitutional:  Negative for chills, diaphoresis, fatigue, fever and unexpected weight change.   HENT:  Negative for nasal congestion, mouth sores, sinus pressure/congestion and sore throat.    Eyes:  Negative for pain and visual disturbance.   Respiratory:  Negative for cough, chest tightness and shortness of breath.    Cardiovascular:  Negative for chest pain, palpitations and leg swelling.   Gastrointestinal:  Negative for abdominal distention, abdominal pain, blood in stool, constipation and diarrhea.   Genitourinary:  Negative for dysuria, frequency and hematuria.   Musculoskeletal:  Positive for arthralgias and myalgias. Negative for back pain.   Integumentary:  Negative for rash.   Neurological:  Negative for dizziness, syncope, weakness, numbness and headaches.   Hematological:  Negative for adenopathy.    Psychiatric/Behavioral:  Negative for confusion.          Objective:      Physical Exam  Vitals reviewed.   Constitutional:       General: He is awake.      Appearance: Normal appearance.   HENT:      Head: Normocephalic and atraumatic.      Right Ear: Hearing normal.      Left Ear: Hearing normal.      Nose: Nose normal.   Eyes:      General: Lids are normal. Vision grossly intact.      Extraocular Movements: Extraocular movements intact.      Conjunctiva/sclera: Conjunctivae normal.   Cardiovascular:      Rate and Rhythm: Normal rate and regular rhythm.      Pulses: Normal pulses.   Pulmonary:      Effort: Pulmonary effort is normal.      Breath sounds: Normal breath sounds. No wheezing, rhonchi or rales.   Musculoskeletal:      Cervical back: Full passive range of motion without pain.      Right lower leg: No edema.      Left lower leg: No edema.   Skin:     General: Skin is warm.      Comments:      Neurological:      General: No focal deficit present.      Mental Status: He is alert and oriented to person, place, and time.   Psychiatric:         Attention and Perception: Attention normal.         Mood and Affect: Mood and affect normal.         Behavior: Behavior is cooperative.         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    Evaluated by Dr. Valdez in [place].     Continue prophylaxis with 500 mg of levofloxacin daily, 200 mg of acyclovir b.i.d., 200 mg of fluconazole daily.    He restarted treatment on 07/10/2023, on 08/14/2023, we dose reduced by changing venetoclax to 14 out of every 28 days.    Patient  was getting venetoclax on days 1 through 14 out of every 28 days and azacitidine on days 1 through 3 out of every 28 days.  His bone marrow biopsy on 05/22/2024 shows recurrent AML.    Started low-dose cytarabine and venetoclax on Monday, 06/24/2024.    He presented with a syncopal episode and was found to have significant anemia.  He received 2 units of packed red blood cells, his symptoms were much improved.  He was discharged home.    He will get 1 unit of blood and 2 units of platelets today      Edaurd Luna II, MD

## 2024-07-15 ENCOUNTER — LAB VISIT (OUTPATIENT)
Dept: LAB | Facility: HOSPITAL | Age: 74
End: 2024-07-15
Attending: INTERNAL MEDICINE
Payer: MEDICARE

## 2024-07-15 DIAGNOSIS — C92.00 ACUTE MYELOID LEUKEMIA NOT HAVING ACHIEVED REMISSION: ICD-10-CM

## 2024-07-15 LAB
ALBUMIN SERPL-MCNC: 2.9 G/DL (ref 3.4–4.8)
ALBUMIN/GLOB SERPL: 0.8 RATIO (ref 1.1–2)
ALP SERPL-CCNC: 83 UNIT/L (ref 40–150)
ALT SERPL-CCNC: 18 UNIT/L (ref 0–55)
ANION GAP SERPL CALC-SCNC: 7 MEQ/L
AST SERPL-CCNC: 12 UNIT/L (ref 5–34)
BASOPHILS # BLD AUTO: 0 X10(3)/MCL
BASOPHILS NFR BLD AUTO: 0 %
BILIRUB SERPL-MCNC: 1.1 MG/DL
BUN SERPL-MCNC: 17.6 MG/DL (ref 8.4–25.7)
CALCIUM SERPL-MCNC: 8.5 MG/DL (ref 8.8–10)
CHLORIDE SERPL-SCNC: 104 MMOL/L (ref 98–107)
CO2 SERPL-SCNC: 29 MMOL/L (ref 23–31)
CREAT SERPL-MCNC: 0.98 MG/DL (ref 0.73–1.18)
CREAT/UREA NIT SERPL: 18
EOSINOPHIL # BLD AUTO: 0 X10(3)/MCL (ref 0–0.9)
EOSINOPHIL NFR BLD AUTO: 0 %
ERYTHROCYTE [DISTWIDTH] IN BLOOD BY AUTOMATED COUNT: 14.1 % (ref 11.5–17)
GFR SERPLBLD CREATININE-BSD FMLA CKD-EPI: >60 ML/MIN/1.73/M2
GLOBULIN SER-MCNC: 3.5 GM/DL (ref 2.4–3.5)
GLUCOSE SERPL-MCNC: 112 MG/DL (ref 82–115)
HCT VFR BLD AUTO: 23.2 % (ref 42–52)
HGB BLD-MCNC: 7.6 G/DL (ref 14–18)
IMM GRANULOCYTES # BLD AUTO: 0.03 X10(3)/MCL (ref 0–0.04)
IMM GRANULOCYTES NFR BLD AUTO: 3.6 %
LYMPHOCYTES # BLD AUTO: 0.37 X10(3)/MCL (ref 0.6–4.6)
LYMPHOCYTES NFR BLD AUTO: 44 %
MCH RBC QN AUTO: 29.8 PG (ref 27–31)
MCHC RBC AUTO-ENTMCNC: 32.8 G/DL (ref 33–36)
MCV RBC AUTO: 91 FL (ref 80–94)
MONOCYTES # BLD AUTO: 0.25 X10(3)/MCL (ref 0.1–1.3)
MONOCYTES NFR BLD AUTO: 29.8 %
NEUTROPHILS # BLD AUTO: 0.19 X10(3)/MCL (ref 2.1–9.2)
NEUTROPHILS NFR BLD AUTO: 22.6 %
PLATELET # BLD AUTO: 21 X10(3)/MCL (ref 130–400)
PMV BLD AUTO: 13.3 FL (ref 7.4–10.4)
POTASSIUM SERPL-SCNC: 4.1 MMOL/L (ref 3.5–5.1)
PROT SERPL-MCNC: 6.4 GM/DL (ref 5.8–7.6)
RBC # BLD AUTO: 2.55 X10(6)/MCL (ref 4.7–6.1)
SODIUM SERPL-SCNC: 140 MMOL/L (ref 136–145)
WBC # BLD AUTO: 0.84 X10(3)/MCL (ref 4.5–11.5)

## 2024-07-15 PROCEDURE — 80053 COMPREHEN METABOLIC PANEL: CPT

## 2024-07-15 PROCEDURE — 36415 COLL VENOUS BLD VENIPUNCTURE: CPT

## 2024-07-15 PROCEDURE — 85025 COMPLETE CBC W/AUTO DIFF WBC: CPT

## 2024-07-17 ENCOUNTER — OFFICE VISIT (OUTPATIENT)
Dept: INTERNAL MEDICINE | Facility: CLINIC | Age: 74
End: 2024-07-17
Payer: MEDICARE

## 2024-07-17 VITALS
DIASTOLIC BLOOD PRESSURE: 62 MMHG | OXYGEN SATURATION: 97 % | HEART RATE: 65 BPM | HEIGHT: 72 IN | SYSTOLIC BLOOD PRESSURE: 126 MMHG | WEIGHT: 178 LBS | BODY MASS INDEX: 24.11 KG/M2

## 2024-07-17 DIAGNOSIS — F41.9 ANXIETY AND DEPRESSION: Chronic | ICD-10-CM

## 2024-07-17 DIAGNOSIS — F32.A ANXIETY AND DEPRESSION: Chronic | ICD-10-CM

## 2024-07-17 DIAGNOSIS — C92.00 ACUTE MYELOID LEUKEMIA NOT HAVING ACHIEVED REMISSION: Chronic | ICD-10-CM

## 2024-07-17 DIAGNOSIS — Z09 HOSPITAL DISCHARGE FOLLOW-UP: Primary | ICD-10-CM

## 2024-07-17 DIAGNOSIS — I10 PRIMARY HYPERTENSION: Chronic | ICD-10-CM

## 2024-07-17 PROCEDURE — 99214 OFFICE O/P EST MOD 30 MIN: CPT | Mod: ,,, | Performed by: INTERNAL MEDICINE

## 2024-07-17 RX ORDER — CLINDAMYCIN HYDROCHLORIDE 150 MG/1
150 CAPSULE ORAL 3 TIMES DAILY
COMMUNITY
End: 2024-07-17

## 2024-07-17 RX ORDER — LEVOFLOXACIN 500 MG/1
500 TABLET, FILM COATED ORAL DAILY
COMMUNITY

## 2024-07-17 NOTE — PROGRESS NOTES
Patient ID: Hussein Gonzalez Jr. is a 74 y.o. male.    Chief Complaint: Cough (Coughing x 2 weeks. Went to the ER syncopal episode on 7/4/24 for visit.Patient was coughing up mucus and bloody sputum. Patient received blood and platelets while in the Er. Patient still reports productive cough at times. Was given Clindamycin and Levaquin. No fevers noted. )      Transitional Care Note    Family and/or Caretaker present at visit?  No.  Diagnostic tests reviewed/disposition: No diagnosic tests pending after this hospitalization.  Disease/illness education: performed  Home health/community services discussion/referrals: Patient does not have home health established from hospital visit.  They do not need home health.  If needed, we will set up home health for the patient.   Establishment or re-establishment of referral orders for community resources: No other necessary community resources.   Discussion with other health care providers: No discussion with other health care providers necessary.   I have reviewed the following:     Details / Date    []   Labs     []   Micro     []   Pathology     []   Imaging     []   Cardiology Procedures     []   Other         Mr Hanley is a 74 y.o. male here today for hospital discharge follow-up visit.  Medical comorbidities include HTN, CVA s/t embolic phenomena, melanoma s/p resection XRT for recurrence (2019) and another resection on his right ear this year. Also with pancytopenia established with Hematology/Oncology and currently being treated for AML.   Patient presented to [facility] on 7/4/2024 with a primary complaint of syncope and fall.  He had other complaints associated with nausea, vomiting and diarrhea that had started 2 days prior to that.  Labs indicated a hemoglobin and hematocrit of 5.6 and 17.6 for which 2 units of packed red blood cells; Platelets received 2 units of packed red blood cells and a unit of platelets.  He did well with the transfusions.  Cultures were  drawn and remained negative.  His COVID-19 was noted to be negative also.  Case was discussed with Oncology and they cleared from their standpoint for discharge to home since patient was feeling back to his normal self after the transfusions.    He is here today for hospital discharge follow-up.    He is currently being evaluated for stem cell transplant in [place] for his AML with blast crisis.  His other medical comorbidities including history of BPH, CVA, and STEMI have all remained stable.     CT scan consistent with opacity in the lung however patient does not have any complaints of fever, shortness a breath etc..  He does have a lingering cough however.  Patient is on chronic levofloxacin and antiviral treatment which will be continued.  No indication at this time to escalate antibiotics.       Oncologist: Eduard Luna MD          MEDICAL HISTORY:    Past Medical History:   Diagnosis Date    Anxiety and depression     BPH (benign prostatic hyperplasia)     History of CVA (cerebrovascular accident) without residual deficits     HTN (hypertension)     Malignant melanoma of skin, unspecified     NSTEMI (non-ST elevated myocardial infarction)     Sepsis, Gram negative     Sleep apnea       Past Surgical History:   Procedure Laterality Date    BONE MARROW ASPIRATION N/A 6/27/2023    Procedure: ASPIRATION, BONE MARROW;  Surgeon: Sabino Núñez MD;  Location: [facility];  Service: General;  Laterality: N/A;  ALL ASSESS FOR MOLECULAR MRD    BONE MARROW ASPIRATION N/A 5/22/2024    Procedure: ASPIRATION, BONE MARROW;  Surgeon: Sabino Núñez MD;  Location: [facility] OR;  Service: General;  Laterality: N/A;    BONE MARROW BIOPSY N/A 1/25/2023    Procedure: Biopsy-bone marrow;  Surgeon: Rd Carlos MD;  Location: [facility] OR;  Service: General;  Laterality: N/A;    BONE MARROW BIOPSY N/A 11/21/2023    Procedure: Biopsy-bone marrow;  Surgeon: Junior Castro MD;  Location: [facility] OR;  Service: General;  Laterality: N/A;     COLONOSCOPY      COLONOSCOPY N/A 1/24/2023    Procedure: COLONOSCOPY (DOUBLE);  Surgeon: Jimy Baker MD;  Location: [facility] ENDOSCOPY;  Service: Gastroenterology;  Laterality: N/A;    EGD, WITH CLOSED BIOPSY N/A 1/24/2023    Procedure: EGD, WITH CLOSED BIOPSY;  Surgeon: Jimy Baker MD;  Location: [facility] ENDOSCOPY;  Service: Gastroenterology;  Laterality: N/A;    EXCISION OF MELANOMA      SINUS SURGERY      TONSILLECTOMY        Social History     Tobacco Use    Smoking status: Never    Smokeless tobacco: Never   Substance Use Topics    Alcohol use: Not Currently     Alcohol/week: 7.0 standard drinks of alcohol     Types: 7 Cans of beer per week    Drug use: Never          Health Maintenance Due   Topic Date Due    Hepatitis C Screening  Never done    COVID-19 Vaccine (1) Never done    Pneumococcal Vaccines (Age 65+) (1 of 2 - PCV) Never done    TETANUS VACCINE  Never done    Shingles Vaccine (1 of 2) Never done    RSV Vaccine (Age 60+ and Pregnant patients) (1 - 1-dose 60+ series) Never done    Colorectal Cancer Screening  07/24/2023          Patient Care Team:  Nayely Andres MD as PCP - General (Internal Medicine)  Nayely Andres MD      Review of Systems    A comprehensive review of systems is obtained and is essentially negative except for that stated in the HPI     Objective:   /62 (BP Location: Right arm)   Pulse 65   Ht 6' (1.829 m)   Wt 80.7 kg (178 lb)   SpO2 97%   BMI 24.14 kg/m²      Physical Exam  Constitutional:       Appearance: Normal appearance.   HENT:      Head: Normocephalic and atraumatic.      Nose: Nose normal.      Mouth/Throat:      Mouth: Mucous membranes are moist.      Pharynx: Oropharynx is clear.   Eyes:      Extraocular Movements: Extraocular movements intact.      Pupils: Pupils are equal, round, and reactive to light.   Cardiovascular:      Rate and Rhythm: Normal rate and regular rhythm.      Pulses: Normal pulses.   Pulmonary:      Effort:  Pulmonary effort is normal.      Breath sounds: Normal breath sounds.   Abdominal:      General: Bowel sounds are normal.      Palpations: Abdomen is soft.   Musculoskeletal:         General: Normal range of motion.      Cervical back: Normal range of motion and neck supple.   Skin:     General: Skin is warm.   Neurological:      General: No focal deficit present.      Mental Status: He is alert and oriented to person, place, and time. Mental status is at baseline.   Psychiatric:         Mood and Affect: Mood normal.           Assessment:       ICD-10-CM ICD-9-CM   1. Hospital discharge follow-up  Z09 V67.59   2. Acute myeloid leukemia not having achieved remission  C92.00 205.00   3. Primary hypertension  I10 401.9   4. Anxiety and depression  F41.9 300.00    F32.A 311        Plan:     Problem List Items Addressed This Visit          Psychiatric    Anxiety and depression (Chronic)     -on citalopram, continue               Cardiac/Vascular    HTN (hypertension) (Chronic)     -on lisinopril hydrochlorothiazide, blood pressure well controlled, continue                Oncology    Acute myeloid leukemia not having achieved remission (Chronic)     Established with Oncology, being evaluated for stem cell transplant   -currently on Venclexta, continue   -on prophylaxis with levofloxacin and acyclovir            Other    Hospital discharge follow-up - Primary          No follow-ups on file.   -plan specifics discussed above          Medication List with Changes/Refills   Current Medications    (MAGIC MOUTHWASH) 1:1:1 DIPHENHYDRAMINE(BENADRYL) 12.5MG/5ML LIQ, ALUMINUM & MAGNESIUM HYDROXIDE-SIMETHICONE (MAALOX), LIDOCAINE VISCOUS 2%    Swish and spit 15 mLs every 4 (four) hours as needed (mouth sores). for mouth sores    ACYCLOVIR (ZOVIRAX) 400 MG TABLET    Take 1 tablet by mouth twice daily    CITALOPRAM (CELEXA) 40 MG TABLET    Take 1 tablet by mouth once daily    LEVOFLOXACIN (LEVAQUIN) 500 MG TABLET    Take 500 mg by  mouth once daily.    LISINOPRIL-HYDROCHLOROTHIAZIDE (PRINZIDE,ZESTORETIC) 20-12.5 MG PER TABLET    Take 1 tablet by mouth once daily    MULTIVITAMIN (THERAGRAN) PER TABLET    Take 1 tablet by mouth once daily.    ROSUVASTATIN (CRESTOR) 40 MG TAB    Take 40 mg by mouth once daily.    TAMSULOSIN (FLOMAX) 0.4 MG CAP    Take 1 capsule (0.4 mg total) by mouth once daily.    VENETOCLAX (VENCLEXTA) 100 MG TAB    TAKE 2 TABLETS BY MOUTH ONCE DAILY ON DAYS 1-14 OF A 28 DAY CYCLE   Discontinued Medications    CLINDAMYCIN (CLEOCIN) 150 MG CAPSULE    Take 150 mg by mouth 3 (three) times daily.    DOXYCYCLINE (VIBRA-TABS) 100 MG TABLET    Take 100 mg by mouth 2 (two) times daily.

## 2024-07-17 NOTE — ASSESSMENT & PLAN NOTE
Established with Oncology, being evaluated for stem cell transplant   -currently on Venclexta, continue   -on prophylaxis with levofloxacin and acyclovir

## 2024-07-17 NOTE — PROGRESS NOTES
Patient ID: Hussein Gonzalez Jr. is a 74 y.o. male.    Chief Complaint: Cough (Coughing x 2 weeks. Went to the ER syncopal episode on 7/4/24 for visit.Patient was coughing up mucus and bloody sputum. Patient received blood and platelets while in the Er. Patient still reports productive cough at times. Was given Clindamycin and Levaquin. No fevers noted. )     Medicare wellness visit.   Medical comorbidities include HTN, CVA s/t embolic phenomena, melanoma s/p resection XRT for recurrence (2019) and another resection on his right ear this year. Also with pancytopenia established with Hematology/Oncology and currently being treated for AML with 27% blasts and tolerating the treatment well..  Currently being treated with with azacitidine and venetoclax.  Currently being evaluated for stem cell transplant in [place].  Lab work noted with critically low platelets at 6 and Oncology was notified and patient is already status post a unit of platelets this morning.  Overall he seems to be doing well without any acute needs or complaints.            MEDICAL HISTORY:    Past Medical History:   Diagnosis Date    Anxiety and depression     BPH (benign prostatic hyperplasia)     History of CVA (cerebrovascular accident) without residual deficits     HTN (hypertension)     Malignant melanoma of skin, unspecified     NSTEMI (non-ST elevated myocardial infarction)     Sepsis, Gram negative     Sleep apnea       Past Surgical History:   Procedure Laterality Date    BONE MARROW ASPIRATION N/A 6/27/2023    Procedure: ASPIRATION, BONE MARROW;  Surgeon: Sabino Núñez MD;  Location: [facility];  Service: General;  Laterality: N/A;  ALL ASSESS FOR MOLECULAR MRD    BONE MARROW ASPIRATION N/A 5/22/2024    Procedure: ASPIRATION, BONE MARROW;  Surgeon: Sabino Núñez MD;  Location: [facility] OR;  Service: General;  Laterality: N/A;    BONE MARROW BIOPSY N/A 1/25/2023    Procedure: Biopsy-bone marrow;  Surgeon: Rd Carlos MD;  Location:  [facility] OR;  Service: General;  Laterality: N/A;    BONE MARROW BIOPSY N/A 11/21/2023    Procedure: Biopsy-bone marrow;  Surgeon: Junior Castro MD;  Location: [facility] OR;  Service: General;  Laterality: N/A;    COLONOSCOPY      COLONOSCOPY N/A 1/24/2023    Procedure: COLONOSCOPY (DOUBLE);  Surgeon: Jimy Baker MD;  Location: [facility] ENDOSCOPY;  Service: Gastroenterology;  Laterality: N/A;    EGD, WITH CLOSED BIOPSY N/A 1/24/2023    Procedure: EGD, WITH CLOSED BIOPSY;  Surgeon: Jimy Baker MD;  Location: [facility] ENDOSCOPY;  Service: Gastroenterology;  Laterality: N/A;    EXCISION OF MELANOMA      SINUS SURGERY      TONSILLECTOMY        Social History     Tobacco Use    Smoking status: Never    Smokeless tobacco: Never   Substance Use Topics    Alcohol use: Not Currently     Alcohol/week: 7.0 standard drinks of alcohol     Types: 7 Cans of beer per week    Drug use: Never          Health Maintenance Due   Topic Date Due    Hepatitis C Screening  Never done    COVID-19 Vaccine (1) Never done    Pneumococcal Vaccines (Age 65+) (1 of 2 - PCV) Never done    TETANUS VACCINE  Never done    Shingles Vaccine (1 of 2) Never done    RSV Vaccine (Age 60+ and Pregnant patients) (1 - 1-dose 60+ series) Never done    Colorectal Cancer Screening  07/24/2023          Patient Care Team:  Nayely Andres MD as PCP - General (Internal Medicine)  Nayely Andres MD      Review of Systems    Objective:   /62 (BP Location: Right arm)   Pulse 65   Ht 6' (1.829 m)   Wt 80.7 kg (178 lb)   SpO2 97%   BMI 24.14 kg/m²      Physical Exam      Assessment:   No diagnosis found.     Plan:     Problem List Items Addressed This Visit    None         No follow-ups on file.   -plan specifics discussed above          Medication List with Changes/Refills   Current Medications    (MAGIC MOUTHWASH) 1:1:1 DIPHENHYDRAMINE(BENADRYL) 12.5MG/5ML LIQ, ALUMINUM & MAGNESIUM HYDROXIDE-SIMETHICONE (MAALOX), LIDOCAINE VISCOUS 2%     Swish and spit 15 mLs every 4 (four) hours as needed (mouth sores). for mouth sores    ACYCLOVIR (ZOVIRAX) 400 MG TABLET    Take 1 tablet by mouth twice daily    CITALOPRAM (CELEXA) 40 MG TABLET    Take 1 tablet by mouth once daily    CLINDAMYCIN (CLEOCIN) 150 MG CAPSULE    Take 150 mg by mouth 3 (three) times daily.    DOXYCYCLINE (VIBRA-TABS) 100 MG TABLET    Take 100 mg by mouth 2 (two) times daily.    LEVOFLOXACIN (LEVAQUIN) 500 MG TABLET    Take 500 mg by mouth once daily.    LISINOPRIL-HYDROCHLOROTHIAZIDE (PRINZIDE,ZESTORETIC) 20-12.5 MG PER TABLET    Take 1 tablet by mouth once daily    MULTIVITAMIN (THERAGRAN) PER TABLET    Take 1 tablet by mouth once daily.    ROSUVASTATIN (CRESTOR) 40 MG TAB    Take 40 mg by mouth once daily.    TAMSULOSIN (FLOMAX) 0.4 MG CAP    Take 1 capsule (0.4 mg total) by mouth once daily.    VENETOCLAX (VENCLEXTA) 100 MG TAB    TAKE 2 TABLETS BY MOUTH ONCE DAILY ON DAYS 1-14 OF A 28 DAY CYCLE

## 2024-07-20 RX ORDER — CYTARABINE 20 MG/ML
20 INJECTION, SOLUTION INTRATHECAL; INTRAVENOUS; SUBCUTANEOUS ONCE
OUTPATIENT
Start: 2024-07-31 | End: 2024-08-04

## 2024-07-20 RX ORDER — CYTARABINE 20 MG/ML
20 INJECTION, SOLUTION INTRATHECAL; INTRAVENOUS; SUBCUTANEOUS ONCE
Status: CANCELLED | OUTPATIENT
Start: 2024-07-25 | End: 2024-07-29

## 2024-07-20 RX ORDER — CYTARABINE 20 MG/ML
20 INJECTION, SOLUTION INTRATHECAL; INTRAVENOUS; SUBCUTANEOUS ONCE
Status: CANCELLED | OUTPATIENT
Start: 2024-07-26 | End: 2024-07-30

## 2024-07-20 RX ORDER — CYTARABINE 20 MG/ML
20 INJECTION, SOLUTION INTRATHECAL; INTRAVENOUS; SUBCUTANEOUS ONCE
Status: CANCELLED | OUTPATIENT
Start: 2024-07-23 | End: 2024-07-27

## 2024-07-20 RX ORDER — CYTARABINE 20 MG/ML
20 INJECTION, SOLUTION INTRATHECAL; INTRAVENOUS; SUBCUTANEOUS ONCE
Status: CANCELLED | OUTPATIENT
Start: 2024-07-26 | End: 2024-07-26

## 2024-07-20 RX ORDER — CYTARABINE 20 MG/ML
20 INJECTION, SOLUTION INTRATHECAL; INTRAVENOUS; SUBCUTANEOUS ONCE
OUTPATIENT
Start: 2024-07-29 | End: 2024-08-02

## 2024-07-20 RX ORDER — CYTARABINE 20 MG/ML
20 INJECTION, SOLUTION INTRATHECAL; INTRAVENOUS; SUBCUTANEOUS ONCE
OUTPATIENT
Start: 2024-07-30 | End: 2024-08-03

## 2024-07-20 RX ORDER — CYTARABINE 20 MG/ML
20 INJECTION, SOLUTION INTRATHECAL; INTRAVENOUS; SUBCUTANEOUS ONCE
OUTPATIENT
Start: 2024-08-01 | End: 2024-08-05

## 2024-07-20 RX ORDER — CYTARABINE 20 MG/ML
20 INJECTION, SOLUTION INTRATHECAL; INTRAVENOUS; SUBCUTANEOUS ONCE
OUTPATIENT
Start: 2024-08-02 | End: 2024-08-06

## 2024-07-20 RX ORDER — CYTARABINE 20 MG/ML
20 INJECTION, SOLUTION INTRATHECAL; INTRAVENOUS; SUBCUTANEOUS ONCE
Status: CANCELLED | OUTPATIENT
Start: 2024-07-24 | End: 2024-07-28

## 2024-07-22 ENCOUNTER — INFUSION (OUTPATIENT)
Dept: INFUSION THERAPY | Facility: HOSPITAL | Age: 74
End: 2024-07-22
Attending: INTERNAL MEDICINE
Payer: MEDICARE

## 2024-07-22 ENCOUNTER — LAB VISIT (OUTPATIENT)
Dept: LAB | Facility: HOSPITAL | Age: 74
End: 2024-07-22
Attending: INTERNAL MEDICINE
Payer: MEDICARE

## 2024-07-22 VITALS
RESPIRATION RATE: 18 BRPM | HEART RATE: 82 BPM | OXYGEN SATURATION: 96 % | DIASTOLIC BLOOD PRESSURE: 67 MMHG | SYSTOLIC BLOOD PRESSURE: 108 MMHG | TEMPERATURE: 99 F

## 2024-07-22 DIAGNOSIS — C92.00 ACUTE MYELOID LEUKEMIA NOT HAVING ACHIEVED REMISSION: ICD-10-CM

## 2024-07-22 DIAGNOSIS — C92.00 ACUTE MYELOID LEUKEMIA NOT HAVING ACHIEVED REMISSION: Primary | ICD-10-CM

## 2024-07-22 LAB
ABO + RH BLD: NORMAL
ABO + RH BLD: NORMAL
ABS NEUT CALC (OHS): 0.76 X10(3)/MCL (ref 2.1–9.2)
ALBUMIN SERPL-MCNC: 3.2 G/DL (ref 3.4–4.8)
ALBUMIN/GLOB SERPL: 0.9 RATIO (ref 1.1–2)
ALP SERPL-CCNC: 95 UNIT/L (ref 40–150)
ALT SERPL-CCNC: 33 UNIT/L (ref 0–55)
ANION GAP SERPL CALC-SCNC: 9 MEQ/L
AST SERPL-CCNC: 20 UNIT/L (ref 5–34)
B-HCG SERPL QL: 30 %
BILIRUB SERPL-MCNC: 0.8 MG/DL
BLD PROD TYP BPU: NORMAL
BLD PROD TYP BPU: NORMAL
BLOOD UNIT EXPIRATION DATE: NORMAL
BLOOD UNIT EXPIRATION DATE: NORMAL
BLOOD UNIT TYPE CODE: 5100
BLOOD UNIT TYPE CODE: 6200
BUN SERPL-MCNC: 23.2 MG/DL (ref 8.4–25.7)
CALCIUM SERPL-MCNC: 9 MG/DL (ref 8.8–10)
CHLORIDE SERPL-SCNC: 102 MMOL/L (ref 98–107)
CO2 SERPL-SCNC: 26 MMOL/L (ref 23–31)
CREAT SERPL-MCNC: 1.07 MG/DL (ref 0.73–1.18)
CREAT/UREA NIT SERPL: 22
CROSSMATCH INTERPRETATION: NORMAL
CROSSMATCH INTERPRETATION: NORMAL
DISPENSE STATUS: NORMAL
DISPENSE STATUS: NORMAL
ERYTHROCYTE [DISTWIDTH] IN BLOOD BY AUTOMATED COUNT: 14.4 % (ref 11.5–17)
GFR SERPLBLD CREATININE-BSD FMLA CKD-EPI: >60 ML/MIN/1.73/M2
GLOBULIN SER-MCNC: 3.7 GM/DL (ref 2.4–3.5)
GLUCOSE SERPL-MCNC: 123 MG/DL (ref 82–115)
GROUP & RH: NORMAL
HCT VFR BLD AUTO: 22 % (ref 42–52)
HGB BLD-MCNC: 7 G/DL (ref 14–18)
INDIRECT COOMBS: NORMAL
LYMPHOCYTES NFR BLD MANUAL: 2.63 X10(3)/MCL
LYMPHOCYTES NFR BLD MANUAL: 52 % (ref 13–40)
MCH RBC QN AUTO: 29.2 PG (ref 27–31)
MCHC RBC AUTO-ENTMCNC: 31.8 G/DL (ref 33–36)
MCV RBC AUTO: 91.7 FL (ref 80–94)
MONOCYTES NFR BLD MANUAL: 0.15 X10(3)/MCL (ref 0.1–1.3)
MONOCYTES NFR BLD MANUAL: 3 % (ref 2–11)
NEUTROPHILS NFR BLD MANUAL: 15 % (ref 47–80)
OVALOCYTES (OLG): ABNORMAL
PLATELET # BLD AUTO: 4 X10(3)/MCL (ref 130–400)
PLATELET # BLD EST: ABNORMAL 10*3/UL
PMV BLD AUTO: ABNORMAL FL
POIKILOCYTOSIS BLD QL SMEAR: ABNORMAL
POLYCHROMASIA BLD QL SMEAR: ABNORMAL
POTASSIUM SERPL-SCNC: 3.7 MMOL/L (ref 3.5–5.1)
PROT SERPL-MCNC: 6.9 GM/DL (ref 5.8–7.6)
RBC # BLD AUTO: 2.4 X10(6)/MCL (ref 4.7–6.1)
RBC MORPH BLD: ABNORMAL
SODIUM SERPL-SCNC: 137 MMOL/L (ref 136–145)
SPECIMEN OUTDATE: NORMAL
UNIT NUMBER: NORMAL
UNIT NUMBER: NORMAL
WBC # BLD AUTO: 5.06 X10(3)/MCL (ref 4.5–11.5)

## 2024-07-22 PROCEDURE — P9040 RBC LEUKOREDUCED IRRADIATED: HCPCS | Performed by: INTERNAL MEDICINE

## 2024-07-22 PROCEDURE — 25000003 PHARM REV CODE 250: Performed by: INTERNAL MEDICINE

## 2024-07-22 PROCEDURE — P9037 PLATE PHERES LEUKOREDU IRRAD: HCPCS | Performed by: INTERNAL MEDICINE

## 2024-07-22 PROCEDURE — 96375 TX/PRO/DX INJ NEW DRUG ADDON: CPT

## 2024-07-22 PROCEDURE — 36430 TRANSFUSION BLD/BLD COMPNT: CPT

## 2024-07-22 PROCEDURE — 86850 RBC ANTIBODY SCREEN: CPT | Performed by: INTERNAL MEDICINE

## 2024-07-22 PROCEDURE — 36415 COLL VENOUS BLD VENIPUNCTURE: CPT | Mod: 91

## 2024-07-22 PROCEDURE — 86901 BLOOD TYPING SEROLOGIC RH(D): CPT | Performed by: INTERNAL MEDICINE

## 2024-07-22 PROCEDURE — 96401 CHEMO ANTI-NEOPL SQ/IM: CPT

## 2024-07-22 PROCEDURE — 96374 THER/PROPH/DIAG INJ IV PUSH: CPT

## 2024-07-22 PROCEDURE — 63600175 PHARM REV CODE 636 W HCPCS: Performed by: INTERNAL MEDICINE

## 2024-07-22 PROCEDURE — 85027 COMPLETE CBC AUTOMATED: CPT

## 2024-07-22 PROCEDURE — 86923 COMPATIBILITY TEST ELECTRIC: CPT | Performed by: INTERNAL MEDICINE

## 2024-07-22 PROCEDURE — 80053 COMPREHEN METABOLIC PANEL: CPT

## 2024-07-22 PROCEDURE — 86900 BLOOD TYPING SEROLOGIC ABO: CPT | Performed by: INTERNAL MEDICINE

## 2024-07-22 RX ORDER — DIPHENHYDRAMINE HCL 25 MG
25 CAPSULE ORAL ONCE
OUTPATIENT
Start: 2024-07-22 | End: 2024-07-22

## 2024-07-22 RX ORDER — HYDROCODONE BITARTRATE AND ACETAMINOPHEN 500; 5 MG/1; MG/1
TABLET ORAL ONCE
Status: CANCELLED | OUTPATIENT
Start: 2024-07-22 | End: 2024-07-22

## 2024-07-22 RX ORDER — ACETAMINOPHEN 325 MG/1
650 TABLET ORAL ONCE
OUTPATIENT
Start: 2024-07-22 | End: 2024-07-22

## 2024-07-22 RX ORDER — DIPHENHYDRAMINE HCL 25 MG
25 CAPSULE ORAL ONCE
Status: CANCELLED | OUTPATIENT
Start: 2024-07-22 | End: 2024-07-22

## 2024-07-22 RX ORDER — DIPHENHYDRAMINE HCL 25 MG
25 CAPSULE ORAL ONCE
Status: ACTIVE | OUTPATIENT
Start: 2024-07-22

## 2024-07-22 RX ORDER — FAMOTIDINE 10 MG/ML
20 INJECTION INTRAVENOUS
Start: 2024-07-22

## 2024-07-22 RX ORDER — CYTARABINE 20 MG/ML
20 INJECTION, SOLUTION INTRATHECAL; INTRAVENOUS; SUBCUTANEOUS ONCE
Status: COMPLETED | OUTPATIENT
Start: 2024-07-22 | End: 2024-07-22

## 2024-07-22 RX ORDER — ONDANSETRON HYDROCHLORIDE 2 MG/ML
8 INJECTION, SOLUTION INTRAVENOUS ONCE
Status: COMPLETED | OUTPATIENT
Start: 2024-07-22 | End: 2024-07-22

## 2024-07-22 RX ORDER — HYDROCODONE BITARTRATE AND ACETAMINOPHEN 500; 5 MG/1; MG/1
TABLET ORAL ONCE
OUTPATIENT
Start: 2024-07-22 | End: 2024-07-22

## 2024-07-22 RX ORDER — HYDROCODONE BITARTRATE AND ACETAMINOPHEN 500; 5 MG/1; MG/1
TABLET ORAL ONCE
Status: COMPLETED | OUTPATIENT
Start: 2024-07-22 | End: 2024-07-22

## 2024-07-22 RX ORDER — FAMOTIDINE 10 MG/ML
20 INJECTION INTRAVENOUS
Status: COMPLETED | OUTPATIENT
Start: 2024-07-22 | End: 2024-07-22

## 2024-07-22 RX ADMIN — FAMOTIDINE 20 MG: 10 INJECTION, SOLUTION INTRAVENOUS at 10:07

## 2024-07-22 RX ADMIN — ONDANSETRON 8 MG: 2 INJECTION INTRAMUSCULAR; INTRAVENOUS at 10:07

## 2024-07-22 RX ADMIN — CYTARABINE 40 MG: 20 INJECTION, SOLUTION INTRATHECAL; INTRAVENOUS; SUBCUTANEOUS at 03:07

## 2024-07-22 RX ADMIN — SODIUM CHLORIDE: 9 INJECTION, SOLUTION INTRAVENOUS at 10:07

## 2024-07-22 NOTE — NURSING
Approximately 20 ml of blood left to receive, patient c/o being cold, neck pain, and overall don't feel well. Blood stopped and started normal saline bolus, applied warm blankets. V/S 138/68 P-79 T-98.6. Notified care team. Jenna Np assess at bedside symptoms improving, instructed to report to ER if symptoms get worse. Voiced understanding. V/S 108/67 P-82 T-98.6 O2 sat 96% on RA  prior to leaving.

## 2024-07-23 ENCOUNTER — INFUSION (OUTPATIENT)
Dept: INFUSION THERAPY | Facility: HOSPITAL | Age: 74
End: 2024-07-23
Attending: INTERNAL MEDICINE
Payer: MEDICARE

## 2024-07-23 DIAGNOSIS — C92.00 ACUTE MYELOID LEUKEMIA NOT HAVING ACHIEVED REMISSION: Primary | ICD-10-CM

## 2024-07-23 PROCEDURE — 96401 CHEMO ANTI-NEOPL SQ/IM: CPT

## 2024-07-23 PROCEDURE — 63600175 PHARM REV CODE 636 W HCPCS: Performed by: INTERNAL MEDICINE

## 2024-07-23 RX ORDER — CYTARABINE 20 MG/ML
20 INJECTION, SOLUTION INTRATHECAL; INTRAVENOUS; SUBCUTANEOUS ONCE
Status: COMPLETED | OUTPATIENT
Start: 2024-07-23 | End: 2024-07-23

## 2024-07-23 RX ADMIN — CYTARABINE 40 MG: 20 INJECTION, SOLUTION INTRATHECAL; INTRAVENOUS; SUBCUTANEOUS at 02:07

## 2024-07-24 ENCOUNTER — INFUSION (OUTPATIENT)
Dept: INFUSION THERAPY | Facility: HOSPITAL | Age: 74
End: 2024-07-24
Attending: INTERNAL MEDICINE
Payer: MEDICARE

## 2024-07-24 DIAGNOSIS — C92.00 ACUTE MYELOID LEUKEMIA NOT HAVING ACHIEVED REMISSION: Primary | ICD-10-CM

## 2024-07-24 PROCEDURE — 63600175 PHARM REV CODE 636 W HCPCS: Performed by: INTERNAL MEDICINE

## 2024-07-24 PROCEDURE — 96401 CHEMO ANTI-NEOPL SQ/IM: CPT

## 2024-07-24 RX ORDER — CYTARABINE 20 MG/ML
20 INJECTION, SOLUTION INTRATHECAL; INTRAVENOUS; SUBCUTANEOUS ONCE
Status: COMPLETED | OUTPATIENT
Start: 2024-07-24 | End: 2024-07-24

## 2024-07-24 RX ADMIN — CYTARABINE 40 MG: 20 INJECTION, SOLUTION INTRATHECAL; INTRAVENOUS; SUBCUTANEOUS at 10:07

## 2024-07-25 ENCOUNTER — LAB VISIT (OUTPATIENT)
Dept: LAB | Facility: HOSPITAL | Age: 74
End: 2024-07-25
Attending: INTERNAL MEDICINE
Payer: MEDICARE

## 2024-07-25 ENCOUNTER — INFUSION (OUTPATIENT)
Dept: INFUSION THERAPY | Facility: HOSPITAL | Age: 74
End: 2024-07-25
Attending: INTERNAL MEDICINE
Payer: MEDICARE

## 2024-07-25 VITALS
HEART RATE: 77 BPM | SYSTOLIC BLOOD PRESSURE: 116 MMHG | TEMPERATURE: 98 F | RESPIRATION RATE: 18 BRPM | DIASTOLIC BLOOD PRESSURE: 63 MMHG

## 2024-07-25 DIAGNOSIS — C92.00 ACUTE MYELOID LEUKEMIA NOT HAVING ACHIEVED REMISSION: Primary | ICD-10-CM

## 2024-07-25 DIAGNOSIS — C92.00 ACUTE MYELOID LEUKEMIA NOT HAVING ACHIEVED REMISSION: ICD-10-CM

## 2024-07-25 LAB
ALBUMIN SERPL-MCNC: 3.2 G/DL (ref 3.4–4.8)
ALBUMIN/GLOB SERPL: 1.1 RATIO (ref 1.1–2)
ALP SERPL-CCNC: 87 UNIT/L (ref 40–150)
ALT SERPL-CCNC: 27 UNIT/L (ref 0–55)
ANION GAP SERPL CALC-SCNC: 9 MEQ/L
AST SERPL-CCNC: 15 UNIT/L (ref 5–34)
BASOPHILS # BLD AUTO: 0.01 X10(3)/MCL
BASOPHILS NFR BLD AUTO: 1 %
BILIRUB SERPL-MCNC: 0.8 MG/DL
BUN SERPL-MCNC: 21.8 MG/DL (ref 8.4–25.7)
CALCIUM SERPL-MCNC: 8.6 MG/DL (ref 8.8–10)
CHLORIDE SERPL-SCNC: 104 MMOL/L (ref 98–107)
CO2 SERPL-SCNC: 26 MMOL/L (ref 23–31)
CREAT SERPL-MCNC: 1 MG/DL (ref 0.73–1.18)
CREAT/UREA NIT SERPL: 22
EOSINOPHIL # BLD AUTO: 0 X10(3)/MCL (ref 0–0.9)
EOSINOPHIL NFR BLD AUTO: 0 %
ERYTHROCYTE [DISTWIDTH] IN BLOOD BY AUTOMATED COUNT: 14.6 % (ref 11.5–17)
GFR SERPLBLD CREATININE-BSD FMLA CKD-EPI: >60 ML/MIN/1.73/M2
GLOBULIN SER-MCNC: 3 GM/DL (ref 2.4–3.5)
GLUCOSE SERPL-MCNC: 110 MG/DL (ref 82–115)
HCT VFR BLD AUTO: 22.1 % (ref 42–52)
HGB BLD-MCNC: 7.1 G/DL (ref 14–18)
IMM GRANULOCYTES # BLD AUTO: 0.01 X10(3)/MCL (ref 0–0.04)
IMM GRANULOCYTES NFR BLD AUTO: 1 %
LYMPHOCYTES # BLD AUTO: 0.43 X10(3)/MCL (ref 0.6–4.6)
LYMPHOCYTES NFR BLD AUTO: 41 %
MCH RBC QN AUTO: 29.7 PG (ref 27–31)
MCHC RBC AUTO-ENTMCNC: 32.1 G/DL (ref 33–36)
MCV RBC AUTO: 92.5 FL (ref 80–94)
MONOCYTES # BLD AUTO: 0.36 X10(3)/MCL (ref 0.1–1.3)
MONOCYTES NFR BLD AUTO: 34.3 %
NEUTROPHILS # BLD AUTO: 0.24 X10(3)/MCL (ref 2.1–9.2)
NEUTROPHILS NFR BLD AUTO: 22.7 %
PLATELET # BLD AUTO: 20 X10(3)/MCL (ref 130–400)
PMV BLD AUTO: 12.5 FL (ref 7.4–10.4)
POTASSIUM SERPL-SCNC: 4.3 MMOL/L (ref 3.5–5.1)
PROT SERPL-MCNC: 6.2 GM/DL (ref 5.8–7.6)
RBC # BLD AUTO: 2.39 X10(6)/MCL (ref 4.7–6.1)
SODIUM SERPL-SCNC: 139 MMOL/L (ref 136–145)
WBC # BLD AUTO: 1.05 X10(3)/MCL (ref 4.5–11.5)

## 2024-07-25 PROCEDURE — 85025 COMPLETE CBC W/AUTO DIFF WBC: CPT

## 2024-07-25 PROCEDURE — 63600175 PHARM REV CODE 636 W HCPCS: Performed by: INTERNAL MEDICINE

## 2024-07-25 PROCEDURE — 80053 COMPREHEN METABOLIC PANEL: CPT

## 2024-07-25 PROCEDURE — 96401 CHEMO ANTI-NEOPL SQ/IM: CPT

## 2024-07-25 PROCEDURE — 36415 COLL VENOUS BLD VENIPUNCTURE: CPT

## 2024-07-25 RX ORDER — CYTARABINE 20 MG/ML
20 INJECTION, SOLUTION INTRATHECAL; INTRAVENOUS; SUBCUTANEOUS ONCE
Status: COMPLETED | OUTPATIENT
Start: 2024-07-25 | End: 2024-07-25

## 2024-07-25 RX ADMIN — CYTARABINE 40 MG: 20 INJECTION, SOLUTION INTRATHECAL; INTRAVENOUS; SUBCUTANEOUS at 08:07

## 2024-07-26 ENCOUNTER — INFUSION (OUTPATIENT)
Dept: INFUSION THERAPY | Facility: HOSPITAL | Age: 74
End: 2024-07-26
Attending: INTERNAL MEDICINE
Payer: MEDICARE

## 2024-07-26 VITALS — SYSTOLIC BLOOD PRESSURE: 113 MMHG | HEART RATE: 70 BPM | DIASTOLIC BLOOD PRESSURE: 66 MMHG

## 2024-07-26 DIAGNOSIS — C92.00 ACUTE MYELOID LEUKEMIA NOT HAVING ACHIEVED REMISSION: Primary | ICD-10-CM

## 2024-07-26 PROCEDURE — 63600175 PHARM REV CODE 636 W HCPCS: Performed by: INTERNAL MEDICINE

## 2024-07-26 PROCEDURE — 96401 CHEMO ANTI-NEOPL SQ/IM: CPT

## 2024-07-26 RX ORDER — CYTARABINE 20 MG/ML
20 INJECTION, SOLUTION INTRATHECAL; INTRAVENOUS; SUBCUTANEOUS ONCE
Status: COMPLETED | OUTPATIENT
Start: 2024-07-26 | End: 2024-07-26

## 2024-07-26 RX ADMIN — CYTARABINE 40 MG: 20 INJECTION, SOLUTION INTRATHECAL; INTRAVENOUS; SUBCUTANEOUS at 09:07

## 2024-07-29 ENCOUNTER — LAB VISIT (OUTPATIENT)
Dept: LAB | Facility: HOSPITAL | Age: 74
End: 2024-07-29
Attending: INTERNAL MEDICINE
Payer: MEDICARE

## 2024-07-29 ENCOUNTER — INFUSION (OUTPATIENT)
Dept: INFUSION THERAPY | Facility: HOSPITAL | Age: 74
End: 2024-07-29
Attending: INTERNAL MEDICINE
Payer: MEDICARE

## 2024-07-29 VITALS
TEMPERATURE: 98 F | DIASTOLIC BLOOD PRESSURE: 72 MMHG | SYSTOLIC BLOOD PRESSURE: 124 MMHG | BODY MASS INDEX: 23.35 KG/M2 | RESPIRATION RATE: 16 BRPM | HEIGHT: 73 IN | OXYGEN SATURATION: 100 % | HEART RATE: 78 BPM | WEIGHT: 176.19 LBS

## 2024-07-29 DIAGNOSIS — C92.00 ACUTE MYELOID LEUKEMIA NOT HAVING ACHIEVED REMISSION: Primary | ICD-10-CM

## 2024-07-29 DIAGNOSIS — C92.00 ACUTE MYELOID LEUKEMIA NOT HAVING ACHIEVED REMISSION: ICD-10-CM

## 2024-07-29 LAB
ABO + RH BLD: NORMAL
ABO + RH BLD: NORMAL
ALBUMIN SERPL-MCNC: 3.3 G/DL (ref 3.4–4.8)
ALBUMIN/GLOB SERPL: 0.9 RATIO (ref 1.1–2)
ALP SERPL-CCNC: 94 UNIT/L (ref 40–150)
ALT SERPL-CCNC: 23 UNIT/L (ref 0–55)
ANION GAP SERPL CALC-SCNC: 9 MEQ/L
AST SERPL-CCNC: 13 UNIT/L (ref 5–34)
BASOPHILS # BLD AUTO: 0 X10(3)/MCL
BASOPHILS NFR BLD AUTO: 0 %
BILIRUB SERPL-MCNC: 0.7 MG/DL
BLD PROD TYP BPU: NORMAL
BLD PROD TYP BPU: NORMAL
BLOOD UNIT EXPIRATION DATE: NORMAL
BLOOD UNIT EXPIRATION DATE: NORMAL
BLOOD UNIT TYPE CODE: 5100
BLOOD UNIT TYPE CODE: 5100
BUN SERPL-MCNC: 18.1 MG/DL (ref 8.4–25.7)
CALCIUM SERPL-MCNC: 9.1 MG/DL (ref 8.8–10)
CHLORIDE SERPL-SCNC: 104 MMOL/L (ref 98–107)
CO2 SERPL-SCNC: 26 MMOL/L (ref 23–31)
CREAT SERPL-MCNC: 1.17 MG/DL (ref 0.73–1.18)
CREAT/UREA NIT SERPL: 15
CROSSMATCH INTERPRETATION: NORMAL
CROSSMATCH INTERPRETATION: NORMAL
DISPENSE STATUS: NORMAL
DISPENSE STATUS: NORMAL
EOSINOPHIL # BLD AUTO: 0 X10(3)/MCL (ref 0–0.9)
EOSINOPHIL NFR BLD AUTO: 0 %
ERYTHROCYTE [DISTWIDTH] IN BLOOD BY AUTOMATED COUNT: 14 % (ref 11.5–17)
GFR SERPLBLD CREATININE-BSD FMLA CKD-EPI: >60 ML/MIN/1.73/M2
GLOBULIN SER-MCNC: 3.5 GM/DL (ref 2.4–3.5)
GLUCOSE SERPL-MCNC: 151 MG/DL (ref 82–115)
GROUP & RH: NORMAL
HCT VFR BLD AUTO: 20.4 % (ref 42–52)
HGB BLD-MCNC: 6.7 G/DL (ref 14–18)
IMM GRANULOCYTES # BLD AUTO: 0.01 X10(3)/MCL (ref 0–0.04)
IMM GRANULOCYTES NFR BLD AUTO: 0.9 %
INDIRECT COOMBS: NORMAL
LYMPHOCYTES # BLD AUTO: 0.39 X10(3)/MCL (ref 0.6–4.6)
LYMPHOCYTES NFR BLD AUTO: 34.5 %
MCH RBC QN AUTO: 29.5 PG (ref 27–31)
MCHC RBC AUTO-ENTMCNC: 32.8 G/DL (ref 33–36)
MCV RBC AUTO: 89.9 FL (ref 80–94)
MONOCYTES # BLD AUTO: 0.48 X10(3)/MCL (ref 0.1–1.3)
MONOCYTES NFR BLD AUTO: 42.5 %
NEUTROPHILS # BLD AUTO: 0.25 X10(3)/MCL (ref 2.1–9.2)
NEUTROPHILS NFR BLD AUTO: 22.1 %
PLATELET # BLD AUTO: 5 X10(3)/MCL (ref 130–400)
PMV BLD AUTO: ABNORMAL FL
POTASSIUM SERPL-SCNC: 4.3 MMOL/L (ref 3.5–5.1)
PROT SERPL-MCNC: 6.8 GM/DL (ref 5.8–7.6)
RBC # BLD AUTO: 2.27 X10(6)/MCL (ref 4.7–6.1)
SODIUM SERPL-SCNC: 139 MMOL/L (ref 136–145)
SPECIMEN OUTDATE: NORMAL
UNIT NUMBER: NORMAL
UNIT NUMBER: NORMAL
WBC # BLD AUTO: 1.13 X10(3)/MCL (ref 4.5–11.5)

## 2024-07-29 PROCEDURE — 36415 COLL VENOUS BLD VENIPUNCTURE: CPT | Mod: 91

## 2024-07-29 PROCEDURE — P9037 PLATE PHERES LEUKOREDU IRRAD: HCPCS | Performed by: INTERNAL MEDICINE

## 2024-07-29 PROCEDURE — 96374 THER/PROPH/DIAG INJ IV PUSH: CPT

## 2024-07-29 PROCEDURE — 86901 BLOOD TYPING SEROLOGIC RH(D): CPT | Performed by: INTERNAL MEDICINE

## 2024-07-29 PROCEDURE — 36430 TRANSFUSION BLD/BLD COMPNT: CPT

## 2024-07-29 PROCEDURE — 85025 COMPLETE CBC W/AUTO DIFF WBC: CPT

## 2024-07-29 PROCEDURE — 63600175 PHARM REV CODE 636 W HCPCS: Performed by: INTERNAL MEDICINE

## 2024-07-29 PROCEDURE — 86850 RBC ANTIBODY SCREEN: CPT | Performed by: INTERNAL MEDICINE

## 2024-07-29 PROCEDURE — P9040 RBC LEUKOREDUCED IRRADIATED: HCPCS | Performed by: INTERNAL MEDICINE

## 2024-07-29 PROCEDURE — 25000003 PHARM REV CODE 250: Performed by: INTERNAL MEDICINE

## 2024-07-29 PROCEDURE — 80053 COMPREHEN METABOLIC PANEL: CPT

## 2024-07-29 PROCEDURE — 25000003 PHARM REV CODE 250: Performed by: NURSE PRACTITIONER

## 2024-07-29 PROCEDURE — 86923 COMPATIBILITY TEST ELECTRIC: CPT | Performed by: INTERNAL MEDICINE

## 2024-07-29 PROCEDURE — 96401 CHEMO ANTI-NEOPL SQ/IM: CPT

## 2024-07-29 PROCEDURE — 86900 BLOOD TYPING SEROLOGIC ABO: CPT | Performed by: INTERNAL MEDICINE

## 2024-07-29 RX ORDER — HYDROCODONE BITARTRATE AND ACETAMINOPHEN 500; 5 MG/1; MG/1
TABLET ORAL ONCE
Status: COMPLETED | OUTPATIENT
Start: 2024-07-29 | End: 2024-07-29

## 2024-07-29 RX ORDER — FAMOTIDINE 10 MG/ML
20 INJECTION INTRAVENOUS ONCE
Status: CANCELLED | OUTPATIENT
Start: 2024-07-29

## 2024-07-29 RX ORDER — ONDANSETRON 4 MG/1
8 TABLET, ORALLY DISINTEGRATING ORAL
Status: COMPLETED | OUTPATIENT
Start: 2024-07-29 | End: 2024-07-29

## 2024-07-29 RX ORDER — HYDROCODONE BITARTRATE AND ACETAMINOPHEN 500; 5 MG/1; MG/1
TABLET ORAL ONCE
Status: CANCELLED | OUTPATIENT
Start: 2024-07-29 | End: 2024-07-29

## 2024-07-29 RX ORDER — FAMOTIDINE 10 MG/ML
20 INJECTION INTRAVENOUS ONCE
Status: COMPLETED | OUTPATIENT
Start: 2024-07-29 | End: 2024-07-29

## 2024-07-29 RX ORDER — CYTARABINE 20 MG/ML
20 INJECTION, SOLUTION INTRATHECAL; INTRAVENOUS; SUBCUTANEOUS ONCE
Status: COMPLETED | OUTPATIENT
Start: 2024-07-29 | End: 2024-07-29

## 2024-07-29 RX ADMIN — FAMOTIDINE 20 MG: 10 INJECTION INTRAVENOUS at 10:07

## 2024-07-29 RX ADMIN — CYTARABINE 40 MG: 20 INJECTION, SOLUTION INTRATHECAL; INTRAVENOUS; SUBCUTANEOUS at 09:07

## 2024-07-29 RX ADMIN — ONDANSETRON 8 MG: 4 TABLET, ORALLY DISINTEGRATING ORAL at 01:07

## 2024-07-29 RX ADMIN — SODIUM CHLORIDE: 9 INJECTION, SOLUTION INTRAVENOUS at 09:07

## 2024-07-29 NOTE — PLAN OF CARE
Plan of care reviewed with patient; patient in agreement. C2D8 Cytarabine given; 1 unit PRBC and 1 unit Plt tranfused. Appts reviewed with patient; patient uses portal.

## 2024-07-30 ENCOUNTER — INFUSION (OUTPATIENT)
Dept: INFUSION THERAPY | Facility: HOSPITAL | Age: 74
End: 2024-07-30
Attending: INTERNAL MEDICINE
Payer: MEDICARE

## 2024-07-30 VITALS
TEMPERATURE: 98 F | SYSTOLIC BLOOD PRESSURE: 106 MMHG | DIASTOLIC BLOOD PRESSURE: 52 MMHG | OXYGEN SATURATION: 98 % | BODY MASS INDEX: 23.35 KG/M2 | RESPIRATION RATE: 16 BRPM | WEIGHT: 176.19 LBS | HEART RATE: 64 BPM | HEIGHT: 73 IN

## 2024-07-30 DIAGNOSIS — C92.00 ACUTE MYELOID LEUKEMIA NOT HAVING ACHIEVED REMISSION: Primary | ICD-10-CM

## 2024-07-30 PROCEDURE — 63600175 PHARM REV CODE 636 W HCPCS: Performed by: INTERNAL MEDICINE

## 2024-07-30 PROCEDURE — 96401 CHEMO ANTI-NEOPL SQ/IM: CPT

## 2024-07-30 RX ORDER — CYTARABINE 20 MG/ML
20 INJECTION, SOLUTION INTRATHECAL; INTRAVENOUS; SUBCUTANEOUS ONCE
Status: COMPLETED | OUTPATIENT
Start: 2024-07-30 | End: 2024-07-30

## 2024-07-30 RX ADMIN — CYTARABINE 40 MG: 20 INJECTION, SOLUTION INTRATHECAL; INTRAVENOUS; SUBCUTANEOUS at 09:07

## 2024-07-31 ENCOUNTER — INFUSION (OUTPATIENT)
Dept: INFUSION THERAPY | Facility: HOSPITAL | Age: 74
End: 2024-07-31
Attending: INTERNAL MEDICINE
Payer: MEDICARE

## 2024-07-31 VITALS
HEART RATE: 76 BPM | HEIGHT: 73 IN | OXYGEN SATURATION: 98 % | RESPIRATION RATE: 20 BRPM | SYSTOLIC BLOOD PRESSURE: 119 MMHG | WEIGHT: 178.63 LBS | TEMPERATURE: 98 F | DIASTOLIC BLOOD PRESSURE: 66 MMHG | BODY MASS INDEX: 23.68 KG/M2

## 2024-07-31 DIAGNOSIS — C92.00 ACUTE MYELOID LEUKEMIA NOT HAVING ACHIEVED REMISSION: Primary | ICD-10-CM

## 2024-07-31 PROCEDURE — 96401 CHEMO ANTI-NEOPL SQ/IM: CPT

## 2024-07-31 PROCEDURE — 63600175 PHARM REV CODE 636 W HCPCS: Performed by: INTERNAL MEDICINE

## 2024-07-31 RX ORDER — CYTARABINE 20 MG/ML
20 INJECTION, SOLUTION INTRATHECAL; INTRAVENOUS; SUBCUTANEOUS ONCE
Status: COMPLETED | OUTPATIENT
Start: 2024-07-31 | End: 2024-07-31

## 2024-07-31 RX ADMIN — CYTARABINE 40 MG: 20 INJECTION, SOLUTION INTRATHECAL; INTRAVENOUS; SUBCUTANEOUS at 09:07

## 2024-08-01 ENCOUNTER — LAB VISIT (OUTPATIENT)
Dept: LAB | Facility: HOSPITAL | Age: 74
End: 2024-08-01
Attending: INTERNAL MEDICINE
Payer: MEDICARE

## 2024-08-01 ENCOUNTER — INFUSION (OUTPATIENT)
Dept: INFUSION THERAPY | Facility: HOSPITAL | Age: 74
End: 2024-08-01
Attending: INTERNAL MEDICINE
Payer: MEDICARE

## 2024-08-01 VITALS
OXYGEN SATURATION: 99 % | RESPIRATION RATE: 18 BRPM | BODY MASS INDEX: 23.68 KG/M2 | WEIGHT: 178.63 LBS | HEIGHT: 73 IN | HEART RATE: 63 BPM | DIASTOLIC BLOOD PRESSURE: 73 MMHG | TEMPERATURE: 98 F | SYSTOLIC BLOOD PRESSURE: 130 MMHG

## 2024-08-01 DIAGNOSIS — C92.00 ACUTE MYELOID LEUKEMIA NOT HAVING ACHIEVED REMISSION: Primary | ICD-10-CM

## 2024-08-01 DIAGNOSIS — C92.00 ACUTE MYELOID LEUKEMIA NOT HAVING ACHIEVED REMISSION: ICD-10-CM

## 2024-08-01 LAB
ABO + RH BLD: NORMAL
ABO + RH BLD: NORMAL
ALBUMIN SERPL-MCNC: 3.2 G/DL (ref 3.4–4.8)
ALBUMIN/GLOB SERPL: 0.9 RATIO (ref 1.1–2)
ALP SERPL-CCNC: 82 UNIT/L (ref 40–150)
ALT SERPL-CCNC: 17 UNIT/L (ref 0–55)
ANION GAP SERPL CALC-SCNC: 6 MEQ/L
AST SERPL-CCNC: 12 UNIT/L (ref 5–34)
BASOPHILS # BLD AUTO: 0 X10(3)/MCL
BASOPHILS NFR BLD AUTO: 0 %
BILIRUB SERPL-MCNC: 0.6 MG/DL
BLD PROD TYP BPU: NORMAL
BLD PROD TYP BPU: NORMAL
BLOOD UNIT EXPIRATION DATE: NORMAL
BLOOD UNIT EXPIRATION DATE: NORMAL
BLOOD UNIT TYPE CODE: 5100
BLOOD UNIT TYPE CODE: 5100
BUN SERPL-MCNC: 19.1 MG/DL (ref 8.4–25.7)
CALCIUM SERPL-MCNC: 8.8 MG/DL (ref 8.8–10)
CHLORIDE SERPL-SCNC: 106 MMOL/L (ref 98–107)
CO2 SERPL-SCNC: 28 MMOL/L (ref 23–31)
CREAT SERPL-MCNC: 0.92 MG/DL (ref 0.73–1.18)
CREAT/UREA NIT SERPL: 21
CROSSMATCH INTERPRETATION: NORMAL
CROSSMATCH INTERPRETATION: NORMAL
DISPENSE STATUS: NORMAL
DISPENSE STATUS: NORMAL
EOSINOPHIL # BLD AUTO: 0 X10(3)/MCL (ref 0–0.9)
EOSINOPHIL NFR BLD AUTO: 0 %
ERYTHROCYTE [DISTWIDTH] IN BLOOD BY AUTOMATED COUNT: 14.1 % (ref 11.5–17)
GFR SERPLBLD CREATININE-BSD FMLA CKD-EPI: >60 ML/MIN/1.73/M2
GLOBULIN SER-MCNC: 3.4 GM/DL (ref 2.4–3.5)
GLUCOSE SERPL-MCNC: 128 MG/DL (ref 82–115)
GROUP & RH: NORMAL
HCT VFR BLD AUTO: 19.7 % (ref 42–52)
HGB BLD-MCNC: 6.5 G/DL (ref 14–18)
IMM GRANULOCYTES # BLD AUTO: 0.01 X10(3)/MCL (ref 0–0.04)
IMM GRANULOCYTES NFR BLD AUTO: 1.3 %
INDIRECT COOMBS: NORMAL
LYMPHOCYTES # BLD AUTO: 0.33 X10(3)/MCL (ref 0.6–4.6)
LYMPHOCYTES NFR BLD AUTO: 44 %
MCH RBC QN AUTO: 30.1 PG (ref 27–31)
MCHC RBC AUTO-ENTMCNC: 33 G/DL (ref 33–36)
MCV RBC AUTO: 91.2 FL (ref 80–94)
MONOCYTES # BLD AUTO: 0.26 X10(3)/MCL (ref 0.1–1.3)
MONOCYTES NFR BLD AUTO: 34.7 %
NEUTROPHILS # BLD AUTO: 0.15 X10(3)/MCL (ref 2.1–9.2)
NEUTROPHILS NFR BLD AUTO: 20 %
PLATELET # BLD AUTO: 13 X10(3)/MCL (ref 130–400)
PMV BLD AUTO: 10.3 FL (ref 7.4–10.4)
POTASSIUM SERPL-SCNC: 3.7 MMOL/L (ref 3.5–5.1)
PROT SERPL-MCNC: 6.6 GM/DL (ref 5.8–7.6)
RBC # BLD AUTO: 2.16 X10(6)/MCL (ref 4.7–6.1)
SODIUM SERPL-SCNC: 140 MMOL/L (ref 136–145)
SPECIMEN OUTDATE: NORMAL
UNIT NUMBER: NORMAL
UNIT NUMBER: NORMAL
WBC # BLD AUTO: 0.75 X10(3)/MCL (ref 4.5–11.5)

## 2024-08-01 PROCEDURE — 80053 COMPREHEN METABOLIC PANEL: CPT

## 2024-08-01 PROCEDURE — 86901 BLOOD TYPING SEROLOGIC RH(D): CPT | Performed by: INTERNAL MEDICINE

## 2024-08-01 PROCEDURE — 36430 TRANSFUSION BLD/BLD COMPNT: CPT

## 2024-08-01 PROCEDURE — 86900 BLOOD TYPING SEROLOGIC ABO: CPT | Performed by: INTERNAL MEDICINE

## 2024-08-01 PROCEDURE — 63600175 PHARM REV CODE 636 W HCPCS: Performed by: INTERNAL MEDICINE

## 2024-08-01 PROCEDURE — 86923 COMPATIBILITY TEST ELECTRIC: CPT | Performed by: INTERNAL MEDICINE

## 2024-08-01 PROCEDURE — 85025 COMPLETE CBC W/AUTO DIFF WBC: CPT

## 2024-08-01 PROCEDURE — 36415 COLL VENOUS BLD VENIPUNCTURE: CPT

## 2024-08-01 PROCEDURE — 86850 RBC ANTIBODY SCREEN: CPT | Performed by: INTERNAL MEDICINE

## 2024-08-01 PROCEDURE — 25000003 PHARM REV CODE 250: Performed by: INTERNAL MEDICINE

## 2024-08-01 PROCEDURE — 96374 THER/PROPH/DIAG INJ IV PUSH: CPT

## 2024-08-01 PROCEDURE — P9040 RBC LEUKOREDUCED IRRADIATED: HCPCS | Performed by: INTERNAL MEDICINE

## 2024-08-01 PROCEDURE — P9037 PLATE PHERES LEUKOREDU IRRAD: HCPCS | Performed by: INTERNAL MEDICINE

## 2024-08-01 PROCEDURE — 96401 CHEMO ANTI-NEOPL SQ/IM: CPT

## 2024-08-01 RX ORDER — CYTARABINE 20 MG/ML
20 INJECTION, SOLUTION INTRATHECAL; INTRAVENOUS; SUBCUTANEOUS ONCE
Status: COMPLETED | OUTPATIENT
Start: 2024-08-01 | End: 2024-08-01

## 2024-08-01 RX ORDER — FAMOTIDINE 10 MG/ML
20 INJECTION INTRAVENOUS ONCE
Status: CANCELLED | OUTPATIENT
Start: 2024-08-01

## 2024-08-01 RX ORDER — FAMOTIDINE 10 MG/ML
20 INJECTION INTRAVENOUS ONCE
Status: COMPLETED | OUTPATIENT
Start: 2024-08-01 | End: 2024-08-01

## 2024-08-01 RX ORDER — HYDROCODONE BITARTRATE AND ACETAMINOPHEN 500; 5 MG/1; MG/1
TABLET ORAL ONCE
Status: COMPLETED | OUTPATIENT
Start: 2024-08-01 | End: 2024-08-01

## 2024-08-01 RX ORDER — HYDROCODONE BITARTRATE AND ACETAMINOPHEN 500; 5 MG/1; MG/1
TABLET ORAL ONCE
Status: CANCELLED | OUTPATIENT
Start: 2024-08-01 | End: 2024-08-01

## 2024-08-01 RX ADMIN — SODIUM CHLORIDE: 9 INJECTION, SOLUTION INTRAVENOUS at 09:08

## 2024-08-01 RX ADMIN — CYTARABINE 40 MG: 20 INJECTION, SOLUTION INTRATHECAL; INTRAVENOUS; SUBCUTANEOUS at 09:08

## 2024-08-01 RX ADMIN — FAMOTIDINE 20 MG: 10 INJECTION INTRAVENOUS at 09:08

## 2024-08-01 NOTE — PLAN OF CARE
C2D11 cytarabine + 1u PLT + 1u of PRBCs given; tolerated well; next appointment discussed with patient; discharged home in stable condition

## 2024-08-02 ENCOUNTER — INFUSION (OUTPATIENT)
Dept: INFUSION THERAPY | Facility: HOSPITAL | Age: 74
End: 2024-08-02
Attending: INTERNAL MEDICINE
Payer: MEDICARE

## 2024-08-02 VITALS
SYSTOLIC BLOOD PRESSURE: 93 MMHG | DIASTOLIC BLOOD PRESSURE: 51 MMHG | OXYGEN SATURATION: 99 % | HEART RATE: 90 BPM | TEMPERATURE: 98 F

## 2024-08-02 DIAGNOSIS — C92.00 ACUTE MYELOID LEUKEMIA NOT HAVING ACHIEVED REMISSION: Primary | ICD-10-CM

## 2024-08-02 LAB
RBCS: NORMAL
RBCS: NORMAL

## 2024-08-02 PROCEDURE — 63600175 PHARM REV CODE 636 W HCPCS: Performed by: INTERNAL MEDICINE

## 2024-08-02 PROCEDURE — 96401 CHEMO ANTI-NEOPL SQ/IM: CPT

## 2024-08-02 RX ORDER — CYTARABINE 20 MG/ML
20 INJECTION, SOLUTION INTRATHECAL; INTRAVENOUS; SUBCUTANEOUS ONCE
Status: COMPLETED | OUTPATIENT
Start: 2024-08-02 | End: 2024-08-02

## 2024-08-02 RX ADMIN — CYTARABINE 40 MG: 20 INJECTION, SOLUTION INTRATHECAL; INTRAVENOUS; SUBCUTANEOUS at 09:08

## 2024-08-08 ENCOUNTER — LAB VISIT (OUTPATIENT)
Dept: LAB | Facility: HOSPITAL | Age: 74
End: 2024-08-08
Attending: INTERNAL MEDICINE
Payer: MEDICARE

## 2024-08-08 ENCOUNTER — OFFICE VISIT (OUTPATIENT)
Dept: HEMATOLOGY/ONCOLOGY | Facility: CLINIC | Age: 74
End: 2024-08-08
Payer: MEDICARE

## 2024-08-08 ENCOUNTER — INFUSION (OUTPATIENT)
Dept: INFUSION THERAPY | Facility: HOSPITAL | Age: 74
End: 2024-08-08
Attending: INTERNAL MEDICINE
Payer: MEDICARE

## 2024-08-08 VITALS
WEIGHT: 172.94 LBS | SYSTOLIC BLOOD PRESSURE: 99 MMHG | HEART RATE: 79 BPM | BODY MASS INDEX: 22.92 KG/M2 | OXYGEN SATURATION: 96 % | DIASTOLIC BLOOD PRESSURE: 62 MMHG | HEIGHT: 73 IN | RESPIRATION RATE: 16 BRPM | TEMPERATURE: 98 F

## 2024-08-08 VITALS
DIASTOLIC BLOOD PRESSURE: 65 MMHG | HEART RATE: 80 BPM | TEMPERATURE: 98 F | SYSTOLIC BLOOD PRESSURE: 98 MMHG | OXYGEN SATURATION: 98 %

## 2024-08-08 DIAGNOSIS — D61.818 PANCYTOPENIA: Chronic | ICD-10-CM

## 2024-08-08 DIAGNOSIS — Z79.899 ON ANTINEOPLASTIC CHEMOTHERAPY: ICD-10-CM

## 2024-08-08 DIAGNOSIS — R00.2 PALPITATIONS: ICD-10-CM

## 2024-08-08 DIAGNOSIS — C92.00 ACUTE MYELOID LEUKEMIA NOT HAVING ACHIEVED REMISSION: Primary | Chronic | ICD-10-CM

## 2024-08-08 DIAGNOSIS — C92.00 ACUTE MYELOID LEUKEMIA NOT HAVING ACHIEVED REMISSION: ICD-10-CM

## 2024-08-08 DIAGNOSIS — C92.00 ACUTE MYELOID LEUKEMIA NOT HAVING ACHIEVED REMISSION: Chronic | ICD-10-CM

## 2024-08-08 LAB
ABO + RH BLD: NORMAL
ALBUMIN SERPL-MCNC: 3.3 G/DL (ref 3.4–4.8)
ALBUMIN/GLOB SERPL: 0.9 RATIO (ref 1.1–2)
ALP SERPL-CCNC: 78 UNIT/L (ref 40–150)
ALT SERPL-CCNC: 18 UNIT/L (ref 0–55)
ANION GAP SERPL CALC-SCNC: 9 MEQ/L
AST SERPL-CCNC: 12 UNIT/L (ref 5–34)
BASOPHILS # BLD AUTO: 0 X10(3)/MCL
BASOPHILS NFR BLD AUTO: 0 %
BILIRUB SERPL-MCNC: 0.6 MG/DL
BLD PROD TYP BPU: NORMAL
BLOOD UNIT EXPIRATION DATE: NORMAL
BLOOD UNIT TYPE CODE: 5100
BUN SERPL-MCNC: 19.3 MG/DL (ref 8.4–25.7)
CALCIUM SERPL-MCNC: 8.5 MG/DL (ref 8.8–10)
CHLORIDE SERPL-SCNC: 103 MMOL/L (ref 98–107)
CO2 SERPL-SCNC: 27 MMOL/L (ref 23–31)
CREAT SERPL-MCNC: 1.11 MG/DL (ref 0.73–1.18)
CREAT/UREA NIT SERPL: 17
CROSSMATCH INTERPRETATION: NORMAL
DISPENSE STATUS: NORMAL
EOSINOPHIL # BLD AUTO: 0 X10(3)/MCL (ref 0–0.9)
EOSINOPHIL NFR BLD AUTO: 0 %
ERYTHROCYTE [DISTWIDTH] IN BLOOD BY AUTOMATED COUNT: 13.9 % (ref 11.5–17)
GFR SERPLBLD CREATININE-BSD FMLA CKD-EPI: >60 ML/MIN/1.73/M2
GLOBULIN SER-MCNC: 3.8 GM/DL (ref 2.4–3.5)
GLUCOSE SERPL-MCNC: 95 MG/DL (ref 82–115)
GROUP & RH: NORMAL
HCT VFR BLD AUTO: 22.5 % (ref 42–52)
HGB BLD-MCNC: 7.4 G/DL (ref 14–18)
IMM GRANULOCYTES # BLD AUTO: 0.05 X10(3)/MCL (ref 0–0.04)
IMM GRANULOCYTES NFR BLD AUTO: 2.1 %
INDIRECT COOMBS: NORMAL
LYMPHOCYTES # BLD AUTO: 0.7 X10(3)/MCL (ref 0.6–4.6)
LYMPHOCYTES NFR BLD AUTO: 29.7 %
MCH RBC QN AUTO: 29.5 PG (ref 27–31)
MCHC RBC AUTO-ENTMCNC: 32.9 G/DL (ref 33–36)
MCV RBC AUTO: 89.6 FL (ref 80–94)
MONOCYTES # BLD AUTO: 1.21 X10(3)/MCL (ref 0.1–1.3)
MONOCYTES NFR BLD AUTO: 51.3 %
NEUTROPHILS # BLD AUTO: 0.4 X10(3)/MCL (ref 2.1–9.2)
NEUTROPHILS NFR BLD AUTO: 16.9 %
PLATELET # BLD AUTO: 3 X10(3)/MCL (ref 130–400)
PMV BLD AUTO: 8.6 FL (ref 7.4–10.4)
POTASSIUM SERPL-SCNC: 3.6 MMOL/L (ref 3.5–5.1)
PROT SERPL-MCNC: 7.1 GM/DL (ref 5.8–7.6)
RBC # BLD AUTO: 2.51 X10(6)/MCL (ref 4.7–6.1)
SODIUM SERPL-SCNC: 139 MMOL/L (ref 136–145)
SPECIMEN OUTDATE: NORMAL
UNIT NUMBER: NORMAL
WBC # BLD AUTO: 2.36 X10(3)/MCL (ref 4.5–11.5)

## 2024-08-08 PROCEDURE — 36415 COLL VENOUS BLD VENIPUNCTURE: CPT

## 2024-08-08 PROCEDURE — 99999 PR PBB SHADOW E&M-EST. PATIENT-LVL V: CPT | Mod: PBBFAC,,, | Performed by: NURSE PRACTITIONER

## 2024-08-08 PROCEDURE — 25000003 PHARM REV CODE 250: Performed by: NURSE PRACTITIONER

## 2024-08-08 PROCEDURE — P9037 PLATE PHERES LEUKOREDU IRRAD: HCPCS | Performed by: NURSE PRACTITIONER

## 2024-08-08 PROCEDURE — 99215 OFFICE O/P EST HI 40 MIN: CPT | Mod: PBBFAC | Performed by: NURSE PRACTITIONER

## 2024-08-08 PROCEDURE — 85025 COMPLETE CBC W/AUTO DIFF WBC: CPT

## 2024-08-08 PROCEDURE — 36430 TRANSFUSION BLD/BLD COMPNT: CPT

## 2024-08-08 PROCEDURE — 80053 COMPREHEN METABOLIC PANEL: CPT

## 2024-08-08 PROCEDURE — 99215 OFFICE O/P EST HI 40 MIN: CPT | Mod: S$PBB,,, | Performed by: NURSE PRACTITIONER

## 2024-08-08 PROCEDURE — 86901 BLOOD TYPING SEROLOGIC RH(D): CPT | Performed by: NURSE PRACTITIONER

## 2024-08-08 PROCEDURE — 36415 COLL VENOUS BLD VENIPUNCTURE: CPT | Mod: 91

## 2024-08-08 PROCEDURE — 86923 COMPATIBILITY TEST ELECTRIC: CPT | Performed by: NURSE PRACTITIONER

## 2024-08-08 PROCEDURE — 86900 BLOOD TYPING SEROLOGIC ABO: CPT | Performed by: NURSE PRACTITIONER

## 2024-08-08 PROCEDURE — P9040 RBC LEUKOREDUCED IRRADIATED: HCPCS | Performed by: NURSE PRACTITIONER

## 2024-08-08 RX ORDER — HYDROCODONE BITARTRATE AND ACETAMINOPHEN 500; 5 MG/1; MG/1
TABLET ORAL ONCE
Status: CANCELLED | OUTPATIENT
Start: 2024-08-08 | End: 2024-08-08

## 2024-08-08 RX ORDER — HYDROCODONE BITARTRATE AND ACETAMINOPHEN 500; 5 MG/1; MG/1
TABLET ORAL ONCE
Status: COMPLETED | OUTPATIENT
Start: 2024-08-08 | End: 2024-08-08

## 2024-08-08 RX ADMIN — SODIUM CHLORIDE: 9 INJECTION, SOLUTION INTRAVENOUS at 03:08

## 2024-08-09 ENCOUNTER — INFUSION (OUTPATIENT)
Dept: INFUSION THERAPY | Facility: HOSPITAL | Age: 74
End: 2024-08-09
Attending: INTERNAL MEDICINE
Payer: MEDICARE

## 2024-08-09 VITALS
SYSTOLIC BLOOD PRESSURE: 125 MMHG | TEMPERATURE: 98 F | OXYGEN SATURATION: 99 % | HEART RATE: 80 BPM | DIASTOLIC BLOOD PRESSURE: 70 MMHG

## 2024-08-09 DIAGNOSIS — C92.00 ACUTE MYELOID LEUKEMIA NOT HAVING ACHIEVED REMISSION: Chronic | ICD-10-CM

## 2024-08-09 DIAGNOSIS — D61.818 PANCYTOPENIA: Chronic | ICD-10-CM

## 2024-08-09 DIAGNOSIS — R00.2 PALPITATIONS: ICD-10-CM

## 2024-08-09 PROCEDURE — 25000003 PHARM REV CODE 250: Performed by: NURSE PRACTITIONER

## 2024-08-09 PROCEDURE — 36430 TRANSFUSION BLD/BLD COMPNT: CPT

## 2024-08-09 RX ORDER — HYDROCODONE BITARTRATE AND ACETAMINOPHEN 500; 5 MG/1; MG/1
TABLET ORAL ONCE
Status: COMPLETED | OUTPATIENT
Start: 2024-08-09 | End: 2024-08-09

## 2024-08-09 RX ADMIN — SODIUM CHLORIDE: 9 INJECTION, SOLUTION INTRAVENOUS at 09:08

## 2024-08-12 ENCOUNTER — INFUSION (OUTPATIENT)
Dept: INFUSION THERAPY | Facility: HOSPITAL | Age: 74
End: 2024-08-12
Attending: INTERNAL MEDICINE
Payer: MEDICARE

## 2024-08-12 ENCOUNTER — LAB VISIT (OUTPATIENT)
Dept: LAB | Facility: HOSPITAL | Age: 74
End: 2024-08-12
Attending: INTERNAL MEDICINE
Payer: MEDICARE

## 2024-08-12 VITALS
WEIGHT: 177 LBS | SYSTOLIC BLOOD PRESSURE: 129 MMHG | HEART RATE: 59 BPM | DIASTOLIC BLOOD PRESSURE: 71 MMHG | BODY MASS INDEX: 23.46 KG/M2 | RESPIRATION RATE: 20 BRPM | OXYGEN SATURATION: 98 % | HEIGHT: 73 IN | TEMPERATURE: 98 F

## 2024-08-12 DIAGNOSIS — D61.818 PANCYTOPENIA: ICD-10-CM

## 2024-08-12 DIAGNOSIS — C92.00 ACUTE MYELOID LEUKEMIA NOT HAVING ACHIEVED REMISSION: ICD-10-CM

## 2024-08-12 DIAGNOSIS — R00.2 PALPITATIONS: ICD-10-CM

## 2024-08-12 DIAGNOSIS — D61.818 PANCYTOPENIA: Primary | ICD-10-CM

## 2024-08-12 DIAGNOSIS — C92.00 ACUTE MYELOID LEUKEMIA NOT HAVING ACHIEVED REMISSION: Primary | ICD-10-CM

## 2024-08-12 LAB
ABO + RH BLD: NORMAL
ABS NEUT CALC (OHS): 0.68 X10(3)/MCL (ref 2.1–9.2)
ALBUMIN SERPL-MCNC: 3.1 G/DL (ref 3.4–4.8)
ALBUMIN/GLOB SERPL: 0.9 RATIO (ref 1.1–2)
ALP SERPL-CCNC: 77 UNIT/L (ref 40–150)
ALT SERPL-CCNC: 16 UNIT/L (ref 0–55)
ANION GAP SERPL CALC-SCNC: 7 MEQ/L
AST SERPL-CCNC: 11 UNIT/L (ref 5–34)
B-HCG SERPL QL: 23 %
BILIRUB SERPL-MCNC: 0.5 MG/DL
BLD PROD TYP BPU: NORMAL
BLOOD UNIT EXPIRATION DATE: NORMAL
BLOOD UNIT TYPE CODE: 5100
BUN SERPL-MCNC: 17.2 MG/DL (ref 8.4–25.7)
CALCIUM SERPL-MCNC: 9 MG/DL (ref 8.8–10)
CHLORIDE SERPL-SCNC: 102 MMOL/L (ref 98–107)
CO2 SERPL-SCNC: 30 MMOL/L (ref 23–31)
CREAT SERPL-MCNC: 1.02 MG/DL (ref 0.73–1.18)
CREAT/UREA NIT SERPL: 17
CROSSMATCH INTERPRETATION: NORMAL
DISPENSE STATUS: NORMAL
ERYTHROCYTE [DISTWIDTH] IN BLOOD BY AUTOMATED COUNT: 14.3 % (ref 11.5–17)
GFR SERPLBLD CREATININE-BSD FMLA CKD-EPI: >60 ML/MIN/1.73/M2
GLOBULIN SER-MCNC: 3.5 GM/DL (ref 2.4–3.5)
GLUCOSE SERPL-MCNC: 197 MG/DL (ref 82–115)
HCT VFR BLD AUTO: 23.9 % (ref 42–52)
HGB BLD-MCNC: 7.8 G/DL (ref 14–18)
LYMPHOCYTES NFR BLD MANUAL: 2.46 X10(3)/MCL
LYMPHOCYTES NFR BLD MANUAL: 58 % (ref 13–40)
MCH RBC QN AUTO: 29.5 PG (ref 27–31)
MCHC RBC AUTO-ENTMCNC: 32.6 G/DL (ref 33–36)
MCV RBC AUTO: 90.5 FL (ref 80–94)
METAMYELOCYTES NFR BLD MANUAL: 1 %
MONOCYTES NFR BLD MANUAL: 0.08 X10(3)/MCL (ref 0.1–1.3)
MONOCYTES NFR BLD MANUAL: 2 % (ref 2–11)
NEUTROPHILS NFR BLD MANUAL: 16 % (ref 47–80)
OVALOCYTES (OLG): SLIGHT
PLATELET # BLD AUTO: 11 X10(3)/MCL (ref 130–400)
PLATELET # BLD EST: ABNORMAL 10*3/UL
PMV BLD AUTO: 10.4 FL (ref 7.4–10.4)
POIKILOCYTOSIS BLD QL SMEAR: ABNORMAL
POLYCHROMASIA BLD QL SMEAR: SLIGHT
POTASSIUM SERPL-SCNC: 3.3 MMOL/L (ref 3.5–5.1)
PROT SERPL-MCNC: 6.6 GM/DL (ref 5.8–7.6)
RBC # BLD AUTO: 2.64 X10(6)/MCL (ref 4.7–6.1)
RBC MORPH BLD: ABNORMAL
SODIUM SERPL-SCNC: 139 MMOL/L (ref 136–145)
TEAR DROP CELL (OLG): SLIGHT
UNIT NUMBER: NORMAL
WBC # BLD AUTO: 4.24 X10(3)/MCL (ref 4.5–11.5)

## 2024-08-12 PROCEDURE — 36415 COLL VENOUS BLD VENIPUNCTURE: CPT

## 2024-08-12 PROCEDURE — 80053 COMPREHEN METABOLIC PANEL: CPT

## 2024-08-12 PROCEDURE — P9037 PLATE PHERES LEUKOREDU IRRAD: HCPCS | Performed by: INTERNAL MEDICINE

## 2024-08-12 PROCEDURE — 85027 COMPLETE CBC AUTOMATED: CPT

## 2024-08-12 PROCEDURE — 25000003 PHARM REV CODE 250: Performed by: INTERNAL MEDICINE

## 2024-08-12 RX ORDER — FAMOTIDINE 10 MG/ML
20 INJECTION INTRAVENOUS
Status: COMPLETED | OUTPATIENT
Start: 2024-08-12 | End: 2024-08-12

## 2024-08-12 RX ORDER — FAMOTIDINE 10 MG/ML
20 INJECTION INTRAVENOUS ONCE
Status: CANCELLED | OUTPATIENT
Start: 2024-08-12

## 2024-08-12 RX ORDER — HYDROCODONE BITARTRATE AND ACETAMINOPHEN 500; 5 MG/1; MG/1
TABLET ORAL ONCE
Status: CANCELLED | OUTPATIENT
Start: 2024-08-12 | End: 2024-08-12

## 2024-08-12 RX ORDER — FAMOTIDINE 10 MG/ML
20 INJECTION INTRAVENOUS ONCE
Status: DISPENSED | OUTPATIENT
Start: 2024-08-12

## 2024-08-12 RX ORDER — HYDROCODONE BITARTRATE AND ACETAMINOPHEN 500; 5 MG/1; MG/1
TABLET ORAL ONCE
Status: ACTIVE | OUTPATIENT
Start: 2024-08-12

## 2024-08-12 RX ORDER — FAMOTIDINE 10 MG/ML
20 INJECTION INTRAVENOUS
Start: 2024-08-12

## 2024-08-12 RX ADMIN — FAMOTIDINE 20 MG: 10 INJECTION INTRAVENOUS at 09:08

## 2024-08-15 ENCOUNTER — LAB VISIT (OUTPATIENT)
Dept: LAB | Facility: HOSPITAL | Age: 74
End: 2024-08-15
Attending: INTERNAL MEDICINE
Payer: MEDICARE

## 2024-08-15 DIAGNOSIS — C92.00 ACUTE MYELOID LEUKEMIA NOT HAVING ACHIEVED REMISSION: ICD-10-CM

## 2024-08-15 DIAGNOSIS — D61.818 PANCYTOPENIA: ICD-10-CM

## 2024-08-15 DIAGNOSIS — R00.2 PALPITATIONS: ICD-10-CM

## 2024-08-15 LAB
BASOPHILS # BLD AUTO: 0.01 X10(3)/MCL
BASOPHILS NFR BLD AUTO: 0.1 %
EOSINOPHIL # BLD AUTO: 0 X10(3)/MCL (ref 0–0.9)
EOSINOPHIL NFR BLD AUTO: 0 %
ERYTHROCYTE [DISTWIDTH] IN BLOOD BY AUTOMATED COUNT: 14.5 % (ref 11.5–17)
HCT VFR BLD AUTO: 23.2 % (ref 42–52)
HGB BLD-MCNC: 7.4 G/DL (ref 14–18)
IMM GRANULOCYTES # BLD AUTO: 0.42 X10(3)/MCL (ref 0–0.04)
IMM GRANULOCYTES NFR BLD AUTO: 4.1 %
LYMPHOCYTES # BLD AUTO: 1.51 X10(3)/MCL (ref 0.6–4.6)
LYMPHOCYTES NFR BLD AUTO: 14.6 %
MCH RBC QN AUTO: 29.1 PG (ref 27–31)
MCHC RBC AUTO-ENTMCNC: 31.9 G/DL (ref 33–36)
MCV RBC AUTO: 91.3 FL (ref 80–94)
MONOCYTES # BLD AUTO: 7.11 X10(3)/MCL (ref 0.1–1.3)
MONOCYTES NFR BLD AUTO: 68.9 %
NEUTROPHILS # BLD AUTO: 1.27 X10(3)/MCL (ref 2.1–9.2)
NEUTROPHILS NFR BLD AUTO: 12.3 %
PLATELET # BLD AUTO: 18 X10(3)/MCL (ref 130–400)
PMV BLD AUTO: 10.8 FL (ref 7.4–10.4)
RBC # BLD AUTO: 2.54 X10(6)/MCL (ref 4.7–6.1)
WBC # BLD AUTO: 10.32 X10(3)/MCL (ref 4.5–11.5)

## 2024-08-15 PROCEDURE — 36415 COLL VENOUS BLD VENIPUNCTURE: CPT

## 2024-08-15 PROCEDURE — 85025 COMPLETE CBC W/AUTO DIFF WBC: CPT

## 2024-08-16 RX ORDER — CYTARABINE 20 MG/ML
20 INJECTION, SOLUTION INTRATHECAL; INTRAVENOUS; SUBCUTANEOUS ONCE
OUTPATIENT
Start: 2024-08-19 | End: 2024-08-19

## 2024-08-16 RX ORDER — CYTARABINE 20 MG/ML
20 INJECTION, SOLUTION INTRATHECAL; INTRAVENOUS; SUBCUTANEOUS ONCE
OUTPATIENT
Start: 2024-08-27 | End: 2024-08-27

## 2024-08-16 RX ORDER — CYTARABINE 20 MG/ML
20 INJECTION, SOLUTION INTRATHECAL; INTRAVENOUS; SUBCUTANEOUS ONCE
OUTPATIENT
Start: 2024-08-30 | End: 2024-08-30

## 2024-08-16 RX ORDER — CYTARABINE 20 MG/ML
20 INJECTION, SOLUTION INTRATHECAL; INTRAVENOUS; SUBCUTANEOUS ONCE
OUTPATIENT
Start: 2024-08-21 | End: 2024-08-21

## 2024-08-16 RX ORDER — CYTARABINE 20 MG/ML
20 INJECTION, SOLUTION INTRATHECAL; INTRAVENOUS; SUBCUTANEOUS ONCE
OUTPATIENT
Start: 2024-08-20 | End: 2024-08-20

## 2024-08-16 RX ORDER — CYTARABINE 20 MG/ML
20 INJECTION, SOLUTION INTRATHECAL; INTRAVENOUS; SUBCUTANEOUS ONCE
OUTPATIENT
Start: 2024-08-22 | End: 2024-08-22

## 2024-08-16 RX ORDER — CYTARABINE 20 MG/ML
20 INJECTION, SOLUTION INTRATHECAL; INTRAVENOUS; SUBCUTANEOUS ONCE
OUTPATIENT
Start: 2024-08-28 | End: 2024-08-28

## 2024-08-16 RX ORDER — CYTARABINE 20 MG/ML
20 INJECTION, SOLUTION INTRATHECAL; INTRAVENOUS; SUBCUTANEOUS ONCE
OUTPATIENT
Start: 2024-08-26 | End: 2024-08-26

## 2024-08-16 RX ORDER — CYTARABINE 20 MG/ML
20 INJECTION, SOLUTION INTRATHECAL; INTRAVENOUS; SUBCUTANEOUS ONCE
OUTPATIENT
Start: 2024-08-29 | End: 2024-08-29

## 2024-08-16 RX ORDER — CYTARABINE 20 MG/ML
20 INJECTION, SOLUTION INTRATHECAL; INTRAVENOUS; SUBCUTANEOUS ONCE
OUTPATIENT
Start: 2024-08-23 | End: 2024-08-23

## 2024-08-19 ENCOUNTER — INFUSION (OUTPATIENT)
Dept: INFUSION THERAPY | Facility: HOSPITAL | Age: 74
End: 2024-08-19
Attending: INTERNAL MEDICINE
Payer: MEDICARE

## 2024-08-19 ENCOUNTER — LAB VISIT (OUTPATIENT)
Dept: LAB | Facility: HOSPITAL | Age: 74
End: 2024-08-19
Attending: INTERNAL MEDICINE
Payer: MEDICARE

## 2024-08-19 ENCOUNTER — OFFICE VISIT (OUTPATIENT)
Dept: INTERNAL MEDICINE | Facility: CLINIC | Age: 74
End: 2024-08-19
Payer: MEDICARE

## 2024-08-19 VITALS
DIASTOLIC BLOOD PRESSURE: 67 MMHG | BODY MASS INDEX: 23.7 KG/M2 | HEART RATE: 76 BPM | HEIGHT: 72 IN | SYSTOLIC BLOOD PRESSURE: 110 MMHG | TEMPERATURE: 98 F | DIASTOLIC BLOOD PRESSURE: 62 MMHG | SYSTOLIC BLOOD PRESSURE: 131 MMHG | WEIGHT: 175 LBS | OXYGEN SATURATION: 98 % | HEART RATE: 71 BPM

## 2024-08-19 DIAGNOSIS — F41.9 ANXIETY AND DEPRESSION: Primary | Chronic | ICD-10-CM

## 2024-08-19 DIAGNOSIS — D61.818 PANCYTOPENIA: ICD-10-CM

## 2024-08-19 DIAGNOSIS — L03.011 INFECTION OF NAIL BED OF FINGER OF RIGHT HAND: ICD-10-CM

## 2024-08-19 DIAGNOSIS — C92.00 ACUTE MYELOID LEUKEMIA NOT HAVING ACHIEVED REMISSION: Primary | ICD-10-CM

## 2024-08-19 DIAGNOSIS — R00.2 PALPITATIONS: ICD-10-CM

## 2024-08-19 DIAGNOSIS — C92.00 ACUTE MYELOID LEUKEMIA NOT HAVING ACHIEVED REMISSION: ICD-10-CM

## 2024-08-19 DIAGNOSIS — D61.818 PANCYTOPENIA: Primary | ICD-10-CM

## 2024-08-19 DIAGNOSIS — D61.818 PANCYTOPENIA: Chronic | ICD-10-CM

## 2024-08-19 DIAGNOSIS — F32.A ANXIETY AND DEPRESSION: Primary | Chronic | ICD-10-CM

## 2024-08-19 DIAGNOSIS — I10 PRIMARY HYPERTENSION: Chronic | ICD-10-CM

## 2024-08-19 LAB
ABO + RH BLD: NORMAL
ABO + RH BLD: NORMAL
ALBUMIN SERPL-MCNC: 3.2 G/DL (ref 3.4–4.8)
ALBUMIN/GLOB SERPL: 0.9 RATIO (ref 1.1–2)
ALP SERPL-CCNC: 76 UNIT/L (ref 40–150)
ALT SERPL-CCNC: 20 UNIT/L (ref 0–55)
ANION GAP SERPL CALC-SCNC: 9 MEQ/L
AST SERPL-CCNC: 17 UNIT/L (ref 5–34)
BASOPHILS # BLD AUTO: 0.01 X10(3)/MCL
BASOPHILS NFR BLD AUTO: 0.2 %
BILIRUB SERPL-MCNC: 0.6 MG/DL
BLD PROD TYP BPU: NORMAL
BLD PROD TYP BPU: NORMAL
BLOOD UNIT EXPIRATION DATE: NORMAL
BLOOD UNIT EXPIRATION DATE: NORMAL
BLOOD UNIT TYPE CODE: 5100
BLOOD UNIT TYPE CODE: 6200
BUN SERPL-MCNC: 26.1 MG/DL (ref 8.4–25.7)
CALCIUM SERPL-MCNC: 8.6 MG/DL (ref 8.8–10)
CHLORIDE SERPL-SCNC: 104 MMOL/L (ref 98–107)
CO2 SERPL-SCNC: 25 MMOL/L (ref 23–31)
CREAT SERPL-MCNC: 1.2 MG/DL (ref 0.73–1.18)
CREAT/UREA NIT SERPL: 22
CROSSMATCH INTERPRETATION: NORMAL
CROSSMATCH INTERPRETATION: NORMAL
DISPENSE STATUS: NORMAL
DISPENSE STATUS: NORMAL
EOSINOPHIL # BLD AUTO: 0 X10(3)/MCL (ref 0–0.9)
EOSINOPHIL NFR BLD AUTO: 0 %
ERYTHROCYTE [DISTWIDTH] IN BLOOD BY AUTOMATED COUNT: 14.4 % (ref 11.5–17)
GFR SERPLBLD CREATININE-BSD FMLA CKD-EPI: >60 ML/MIN/1.73/M2
GLOBULIN SER-MCNC: 3.7 GM/DL (ref 2.4–3.5)
GLUCOSE SERPL-MCNC: 103 MG/DL (ref 82–115)
GROUP & RH: NORMAL
HCT VFR BLD AUTO: 21.2 % (ref 42–52)
HGB BLD-MCNC: 7 G/DL (ref 14–18)
IMM GRANULOCYTES # BLD AUTO: 0.08 X10(3)/MCL (ref 0–0.04)
IMM GRANULOCYTES NFR BLD AUTO: 1.8 %
INDIRECT COOMBS: NORMAL
LYMPHOCYTES # BLD AUTO: 0.94 X10(3)/MCL (ref 0.6–4.6)
LYMPHOCYTES NFR BLD AUTO: 21.4 %
MCH RBC QN AUTO: 29.5 PG (ref 27–31)
MCHC RBC AUTO-ENTMCNC: 33 G/DL (ref 33–36)
MCV RBC AUTO: 89.5 FL (ref 80–94)
MONOCYTES # BLD AUTO: 1.77 X10(3)/MCL (ref 0.1–1.3)
MONOCYTES NFR BLD AUTO: 40.3 %
NEUTROPHILS # BLD AUTO: 1.59 X10(3)/MCL (ref 2.1–9.2)
NEUTROPHILS NFR BLD AUTO: 36.3 %
PLATELET # BLD AUTO: 6 X10(3)/MCL (ref 130–400)
PMV BLD AUTO: ABNORMAL FL
POTASSIUM SERPL-SCNC: 3.8 MMOL/L (ref 3.5–5.1)
PROT SERPL-MCNC: 6.9 GM/DL (ref 5.8–7.6)
RBC # BLD AUTO: 2.37 X10(6)/MCL (ref 4.7–6.1)
SODIUM SERPL-SCNC: 138 MMOL/L (ref 136–145)
SPECIMEN OUTDATE: NORMAL
UNIT NUMBER: NORMAL
UNIT NUMBER: NORMAL
WBC # BLD AUTO: 4.39 X10(3)/MCL (ref 4.5–11.5)

## 2024-08-19 PROCEDURE — 86850 RBC ANTIBODY SCREEN: CPT | Performed by: INTERNAL MEDICINE

## 2024-08-19 PROCEDURE — P9040 RBC LEUKOREDUCED IRRADIATED: HCPCS | Performed by: INTERNAL MEDICINE

## 2024-08-19 PROCEDURE — 36415 COLL VENOUS BLD VENIPUNCTURE: CPT | Mod: 91

## 2024-08-19 PROCEDURE — 80053 COMPREHEN METABOLIC PANEL: CPT

## 2024-08-19 PROCEDURE — 86923 COMPATIBILITY TEST ELECTRIC: CPT | Performed by: INTERNAL MEDICINE

## 2024-08-19 PROCEDURE — 36415 COLL VENOUS BLD VENIPUNCTURE: CPT

## 2024-08-19 PROCEDURE — 96374 THER/PROPH/DIAG INJ IV PUSH: CPT

## 2024-08-19 PROCEDURE — 25000003 PHARM REV CODE 250: Performed by: INTERNAL MEDICINE

## 2024-08-19 PROCEDURE — 85025 COMPLETE CBC W/AUTO DIFF WBC: CPT

## 2024-08-19 PROCEDURE — P9037 PLATE PHERES LEUKOREDU IRRAD: HCPCS | Performed by: INTERNAL MEDICINE

## 2024-08-19 PROCEDURE — 99213 OFFICE O/P EST LOW 20 MIN: CPT | Mod: ,,, | Performed by: INTERNAL MEDICINE

## 2024-08-19 PROCEDURE — 96401 CHEMO ANTI-NEOPL SQ/IM: CPT

## 2024-08-19 PROCEDURE — 86901 BLOOD TYPING SEROLOGIC RH(D): CPT | Performed by: INTERNAL MEDICINE

## 2024-08-19 PROCEDURE — 36430 TRANSFUSION BLD/BLD COMPNT: CPT

## 2024-08-19 PROCEDURE — 86900 BLOOD TYPING SEROLOGIC ABO: CPT | Performed by: INTERNAL MEDICINE

## 2024-08-19 PROCEDURE — 63600175 PHARM REV CODE 636 W HCPCS: Performed by: INTERNAL MEDICINE

## 2024-08-19 RX ORDER — HYDROCODONE BITARTRATE AND ACETAMINOPHEN 500; 5 MG/1; MG/1
TABLET ORAL ONCE
Status: COMPLETED | OUTPATIENT
Start: 2024-08-19 | End: 2024-08-19

## 2024-08-19 RX ORDER — FAMOTIDINE 10 MG/ML
20 INJECTION INTRAVENOUS
Start: 2024-08-19

## 2024-08-19 RX ORDER — FAMOTIDINE 10 MG/ML
20 INJECTION INTRAVENOUS
Status: COMPLETED | OUTPATIENT
Start: 2024-08-19 | End: 2024-08-19

## 2024-08-19 RX ORDER — HYDROCODONE BITARTRATE AND ACETAMINOPHEN 500; 5 MG/1; MG/1
TABLET ORAL ONCE
Status: CANCELLED | OUTPATIENT
Start: 2024-08-19 | End: 2024-08-19

## 2024-08-19 RX ORDER — FAMOTIDINE 10 MG/ML
20 INJECTION INTRAVENOUS ONCE
Status: CANCELLED | OUTPATIENT
Start: 2024-08-19

## 2024-08-19 RX ORDER — CEPHALEXIN 500 MG/1
500 CAPSULE ORAL EVERY 8 HOURS
Qty: 21 CAPSULE | Refills: 0 | Status: SHIPPED | OUTPATIENT
Start: 2024-08-19 | End: 2024-08-26

## 2024-08-19 RX ORDER — FAMOTIDINE 10 MG/ML
20 INJECTION INTRAVENOUS ONCE
Status: DISPENSED | OUTPATIENT
Start: 2024-08-19

## 2024-08-19 RX ORDER — CYTARABINE 20 MG/ML
20 INJECTION, SOLUTION INTRATHECAL; INTRAVENOUS; SUBCUTANEOUS ONCE
Status: COMPLETED | OUTPATIENT
Start: 2024-08-19 | End: 2024-08-19

## 2024-08-19 RX ADMIN — CYTARABINE 40 MG: 20 INJECTION, SOLUTION INTRATHECAL; INTRAVENOUS; SUBCUTANEOUS at 11:08

## 2024-08-19 RX ADMIN — SODIUM CHLORIDE: 9 INJECTION, SOLUTION INTRAVENOUS at 11:08

## 2024-08-19 RX ADMIN — FAMOTIDINE 20 MG: 10 INJECTION INTRAVENOUS at 10:08

## 2024-08-19 NOTE — PROGRESS NOTES
Subjective:      Patient ID: Hussein Gonzalez Jr. is a 74 y.o. male.    Chief Complaint: Mass (Rt hand middle finger under fingernail)    Mr. Hughes is a 74-year-old gentleman who is medical comorbidities include HTN, CVA s/t embolic phenomena, melanoma s/p resection XRT for recurrence (2019) and another resection on his right ear this year. Also with pancytopenia established with Hematology/Oncology and currently being treated for AML.   He is here today with a nail bed infection of his right middle finger, had 1 of these happen before which looks like a heaped up tissue that comes off.  This time it seems to have a nail growing over it.  He has an upcoming appointment with Dermatology on Friday this week which he is encouraged to keep.  In the meantime I will treated with Keflex    The patient's Health Maintenance was reviewed and the following appears to be due at this time:   Health Maintenance Due   Topic Date Due    Hepatitis C Screening  Never done    COVID-19 Vaccine (1) Never done    Pneumococcal Vaccines (Age 65+) (1 of 2 - PCV) Never done    TETANUS VACCINE  Never done    Shingles Vaccine (1 of 2) Never done    RSV Vaccine (Age 60+ and Pregnant patients) (1 - 1-dose 60+ series) Never done    Colorectal Cancer Screening  07/24/2023        Past Medical History:  Past Medical History:   Diagnosis Date    Anxiety and depression     BPH (benign prostatic hyperplasia)     History of CVA (cerebrovascular accident) without residual deficits     HTN (hypertension)     Malignant melanoma of skin, unspecified     NSTEMI (non-ST elevated myocardial infarction)     Sepsis, Gram negative     Sleep apnea      Past Surgical History:   Procedure Laterality Date    BONE MARROW ASPIRATION N/A 6/27/2023    Procedure: ASPIRATION, BONE MARROW;  Surgeon: Sabino Núñez MD;  Location: [facility];  Service: General;  Laterality: N/A;  ALL ASSESS FOR MOLECULAR MRD    BONE MARROW ASPIRATION N/A 5/22/2024    Procedure: ASPIRATION, BONE  MARROW;  Surgeon: Sabino Núñez MD;  Location: [facility];  Service: General;  Laterality: N/A;    BONE MARROW BIOPSY N/A 1/25/2023    Procedure: Biopsy-bone marrow;  Surgeon: Rd Carlos MD;  Location: [facility] OR;  Service: General;  Laterality: N/A;    BONE MARROW BIOPSY N/A 11/21/2023    Procedure: Biopsy-bone marrow;  Surgeon: Junior Castro MD;  Location: [facility] OR;  Service: General;  Laterality: N/A;    COLONOSCOPY      COLONOSCOPY N/A 1/24/2023    Procedure: COLONOSCOPY (DOUBLE);  Surgeon: Jimy Baker MD;  Location: [facility] ENDOSCOPY;  Service: Gastroenterology;  Laterality: N/A;    EGD, WITH CLOSED BIOPSY N/A 1/24/2023    Procedure: EGD, WITH CLOSED BIOPSY;  Surgeon: Jimy Baker MD;  Location: [facility] ENDOSCOPY;  Service: Gastroenterology;  Laterality: N/A;    EXCISION OF MELANOMA      SINUS SURGERY      TONSILLECTOMY       Review of patient's allergies indicates:   Allergen Reactions    Acetaminophen Other (See Comments)     Restless leg     Benadryl [diphenhydramine hcl] Other (See Comments)     Restless Legs     Social History     Socioeconomic History    Marital status:    Tobacco Use    Smoking status: Never    Smokeless tobacco: Never   Substance and Sexual Activity    Alcohol use: Not Currently     Alcohol/week: 7.0 standard drinks of alcohol     Types: 7 Cans of beer per week    Drug use: Never    Sexual activity: Yes     Social Determinants of Health     Financial Resource Strain: Low Risk  (4/20/2023)    Overall Financial Resource Strain (CARDIA)     Difficulty of Paying Living Expenses: Not hard at all   Food Insecurity: No Food Insecurity (4/20/2023)    Hunger Vital Sign     Worried About Running Out of Food in the Last Year: Never true     Ran Out of Food in the Last Year: Never true   Transportation Needs: No Transportation Needs (4/20/2023)    PRAPARE - Transportation     Lack of Transportation (Medical): No     Lack of Transportation (Non-Medical): No   Physical Activity:  Insufficiently Active (4/20/2023)    Exercise Vital Sign     Days of Exercise per Week: 3 days     Minutes of Exercise per Session: 30 min   Stress: No Stress Concern Present (4/20/2023)    [employer] [place] [employer] - Occupational Stress Questionnaire     Feeling of Stress : Not at all   Housing Stability: Low Risk  (4/20/2023)    Housing Stability Vital Sign     Unable to Pay for Housing in the Last Year: No     Number of Places Lived in the Last Year: 1     Unstable Housing in the Last Year: No     Family History   Problem Relation Name Age of Onset    Cancer Mother      Cancer Father         Review of Systems    A comprehensive review of systems was performed and is negative except for that stated above  Objective:   /62 (BP Location: Left arm, Patient Position: Sitting, BP Method: Small (Manual))   Pulse 71   Ht 6' (1.829 m)   Wt 79.4 kg (175 lb)   SpO2 98%   BMI 23.73 kg/m²     Physical Exam  Constitutional:       Appearance: Normal appearance.   HENT:      Head: Normocephalic and atraumatic.      Nose: Nose normal.      Mouth/Throat:      Mouth: Mucous membranes are moist.      Pharynx: Oropharynx is clear.   Eyes:      Extraocular Movements: Extraocular movements intact.      Pupils: Pupils are equal, round, and reactive to light.   Cardiovascular:      Rate and Rhythm: Normal rate and regular rhythm.      Pulses: Normal pulses.   Pulmonary:      Effort: Pulmonary effort is normal.      Breath sounds: Normal breath sounds.   Abdominal:      General: Bowel sounds are normal.      Palpations: Abdomen is soft.   Musculoskeletal:         General: Normal range of motion.      Cervical back: Normal range of motion and neck supple.   Skin:     General: Skin is warm.   Neurological:      General: No focal deficit present.      Mental Status: He is alert and oriented to person, place, and time. Mental status is at baseline.   Psychiatric:         Mood and Affect: Mood normal.        Assessment/ Plan:   1. Anxiety and depression  Assessment & Plan:  -on citalopram, continue          2. Primary hypertension  Assessment & Plan:  -on lisinopril hydrochlorothiazide, blood pressure well controlled, continue          3. Pancytopenia  Assessment & Plan:  -being followed by Hematology Oncology         4. Infection of nail bed of finger of right hand  Overview:  -patient has an upcoming follow-up with Dermatology, encouraged to keep   -at this time I will go ahead and give him Keflex to help with preemptively treating prevent any infection    Assessment & Plan:  -patient has an upcoming follow-up with Dermatology, encouraged to keep   -at this time I will go ahead and give him Keflex to help with preemptively treating prevent any infection         Other orders  -     cephALEXin (KEFLEX) 500 MG capsule; Take 1 capsule (500 mg total) by mouth every 8 (eight) hours. for 7 days  Dispense: 21 capsule; Refill: 0

## 2024-08-19 NOTE — ASSESSMENT & PLAN NOTE
-patient has an upcoming follow-up with Dermatology, encouraged to keep   -at this time I will go ahead and give him Keflex to help with preemptively treating prevent any infection

## 2024-08-20 ENCOUNTER — INFUSION (OUTPATIENT)
Dept: INFUSION THERAPY | Facility: HOSPITAL | Age: 74
End: 2024-08-20
Attending: INTERNAL MEDICINE
Payer: MEDICARE

## 2024-08-20 VITALS
DIASTOLIC BLOOD PRESSURE: 66 MMHG | HEART RATE: 80 BPM | TEMPERATURE: 98 F | SYSTOLIC BLOOD PRESSURE: 106 MMHG | OXYGEN SATURATION: 100 %

## 2024-08-20 DIAGNOSIS — C92.00 ACUTE MYELOID LEUKEMIA NOT HAVING ACHIEVED REMISSION: Primary | ICD-10-CM

## 2024-08-20 PROCEDURE — 96401 CHEMO ANTI-NEOPL SQ/IM: CPT

## 2024-08-20 PROCEDURE — 63600175 PHARM REV CODE 636 W HCPCS: Performed by: INTERNAL MEDICINE

## 2024-08-20 RX ORDER — CYTARABINE 20 MG/ML
20 INJECTION, SOLUTION INTRATHECAL; INTRAVENOUS; SUBCUTANEOUS ONCE
Status: COMPLETED | OUTPATIENT
Start: 2024-08-20 | End: 2024-08-20

## 2024-08-20 RX ADMIN — CYTARABINE 40 MG: 20 INJECTION, SOLUTION INTRATHECAL; INTRAVENOUS; SUBCUTANEOUS at 09:08

## 2024-08-21 ENCOUNTER — INFUSION (OUTPATIENT)
Dept: INFUSION THERAPY | Facility: HOSPITAL | Age: 74
End: 2024-08-21
Attending: INTERNAL MEDICINE
Payer: MEDICARE

## 2024-08-21 VITALS
DIASTOLIC BLOOD PRESSURE: 64 MMHG | TEMPERATURE: 98 F | HEART RATE: 82 BPM | OXYGEN SATURATION: 99 % | SYSTOLIC BLOOD PRESSURE: 107 MMHG

## 2024-08-21 DIAGNOSIS — C92.00 ACUTE MYELOID LEUKEMIA NOT HAVING ACHIEVED REMISSION: Primary | ICD-10-CM

## 2024-08-21 PROCEDURE — 96401 CHEMO ANTI-NEOPL SQ/IM: CPT

## 2024-08-21 PROCEDURE — 63600175 PHARM REV CODE 636 W HCPCS: Performed by: INTERNAL MEDICINE

## 2024-08-21 RX ORDER — CYTARABINE 20 MG/ML
20 INJECTION, SOLUTION INTRATHECAL; INTRAVENOUS; SUBCUTANEOUS ONCE
Status: COMPLETED | OUTPATIENT
Start: 2024-08-21 | End: 2024-08-21

## 2024-08-21 RX ADMIN — CYTARABINE 40 MG: 20 INJECTION, SOLUTION INTRATHECAL; INTRAVENOUS; SUBCUTANEOUS at 09:08

## 2024-08-22 ENCOUNTER — LAB VISIT (OUTPATIENT)
Dept: LAB | Facility: HOSPITAL | Age: 74
End: 2024-08-22
Attending: INTERNAL MEDICINE
Payer: MEDICARE

## 2024-08-22 ENCOUNTER — INFUSION (OUTPATIENT)
Dept: INFUSION THERAPY | Facility: HOSPITAL | Age: 74
End: 2024-08-22
Attending: INTERNAL MEDICINE
Payer: MEDICARE

## 2024-08-22 VITALS
TEMPERATURE: 98 F | BODY MASS INDEX: 23.7 KG/M2 | DIASTOLIC BLOOD PRESSURE: 75 MMHG | RESPIRATION RATE: 18 BRPM | HEART RATE: 65 BPM | SYSTOLIC BLOOD PRESSURE: 158 MMHG | HEIGHT: 72 IN | WEIGHT: 175 LBS

## 2024-08-22 DIAGNOSIS — C92.00 ACUTE MYELOID LEUKEMIA NOT HAVING ACHIEVED REMISSION: ICD-10-CM

## 2024-08-22 DIAGNOSIS — D61.818 PANCYTOPENIA: ICD-10-CM

## 2024-08-22 DIAGNOSIS — C92.00 ACUTE MYELOID LEUKEMIA NOT HAVING ACHIEVED REMISSION: Primary | ICD-10-CM

## 2024-08-22 DIAGNOSIS — D61.818 PANCYTOPENIA: Primary | ICD-10-CM

## 2024-08-22 DIAGNOSIS — R00.2 PALPITATIONS: ICD-10-CM

## 2024-08-22 LAB
ABO + RH BLD: NORMAL
ABO + RH BLD: NORMAL
BASOPHILS # BLD AUTO: 0.01 X10(3)/MCL
BASOPHILS NFR BLD AUTO: 0.5 %
BLD PROD TYP BPU: NORMAL
BLD PROD TYP BPU: NORMAL
BLOOD UNIT EXPIRATION DATE: NORMAL
BLOOD UNIT EXPIRATION DATE: NORMAL
BLOOD UNIT TYPE CODE: 5100
BLOOD UNIT TYPE CODE: 9500
CROSSMATCH INTERPRETATION: NORMAL
CROSSMATCH INTERPRETATION: NORMAL
DISPENSE STATUS: NORMAL
DISPENSE STATUS: NORMAL
EOSINOPHIL # BLD AUTO: 0 X10(3)/MCL (ref 0–0.9)
EOSINOPHIL NFR BLD AUTO: 0 %
ERYTHROCYTE [DISTWIDTH] IN BLOOD BY AUTOMATED COUNT: 14.4 % (ref 11.5–17)
GROUP & RH: NORMAL
HCT VFR BLD AUTO: 21.4 % (ref 42–52)
HGB BLD-MCNC: 6.9 G/DL (ref 14–18)
IMM GRANULOCYTES # BLD AUTO: 0.04 X10(3)/MCL (ref 0–0.04)
IMM GRANULOCYTES NFR BLD AUTO: 2.2 %
INDIRECT COOMBS: NORMAL
LYMPHOCYTES # BLD AUTO: 0.35 X10(3)/MCL (ref 0.6–4.6)
LYMPHOCYTES NFR BLD AUTO: 18.9 %
MCH RBC QN AUTO: 28.9 PG (ref 27–31)
MCHC RBC AUTO-ENTMCNC: 32.2 G/DL (ref 33–36)
MCV RBC AUTO: 89.5 FL (ref 80–94)
MONOCYTES # BLD AUTO: 0.39 X10(3)/MCL (ref 0.1–1.3)
MONOCYTES NFR BLD AUTO: 21.1 %
NEUTROPHILS # BLD AUTO: 1.06 X10(3)/MCL (ref 2.1–9.2)
NEUTROPHILS NFR BLD AUTO: 57.3 %
PLATELET # BLD AUTO: 14 X10(3)/MCL (ref 130–400)
PMV BLD AUTO: 11.3 FL (ref 7.4–10.4)
RBC # BLD AUTO: 2.39 X10(6)/MCL (ref 4.7–6.1)
SPECIMEN OUTDATE: NORMAL
UNIT NUMBER: NORMAL
UNIT NUMBER: NORMAL
WBC # BLD AUTO: 1.85 X10(3)/MCL (ref 4.5–11.5)

## 2024-08-22 PROCEDURE — 86923 COMPATIBILITY TEST ELECTRIC: CPT | Performed by: NURSE PRACTITIONER

## 2024-08-22 PROCEDURE — 63600175 PHARM REV CODE 636 W HCPCS: Performed by: INTERNAL MEDICINE

## 2024-08-22 PROCEDURE — 85025 COMPLETE CBC W/AUTO DIFF WBC: CPT

## 2024-08-22 PROCEDURE — P9037 PLATE PHERES LEUKOREDU IRRAD: HCPCS | Performed by: NURSE PRACTITIONER

## 2024-08-22 PROCEDURE — 96401 CHEMO ANTI-NEOPL SQ/IM: CPT

## 2024-08-22 PROCEDURE — 36430 TRANSFUSION BLD/BLD COMPNT: CPT

## 2024-08-22 PROCEDURE — 86900 BLOOD TYPING SEROLOGIC ABO: CPT | Performed by: NURSE PRACTITIONER

## 2024-08-22 PROCEDURE — 86901 BLOOD TYPING SEROLOGIC RH(D): CPT | Performed by: NURSE PRACTITIONER

## 2024-08-22 PROCEDURE — 96374 THER/PROPH/DIAG INJ IV PUSH: CPT

## 2024-08-22 PROCEDURE — P9040 RBC LEUKOREDUCED IRRADIATED: HCPCS | Performed by: NURSE PRACTITIONER

## 2024-08-22 PROCEDURE — 25000003 PHARM REV CODE 250: Performed by: NURSE PRACTITIONER

## 2024-08-22 PROCEDURE — 36415 COLL VENOUS BLD VENIPUNCTURE: CPT | Mod: 91

## 2024-08-22 PROCEDURE — 63600175 PHARM REV CODE 636 W HCPCS: Performed by: NURSE PRACTITIONER

## 2024-08-22 PROCEDURE — 96375 TX/PRO/DX INJ NEW DRUG ADDON: CPT

## 2024-08-22 RX ORDER — ONDANSETRON HYDROCHLORIDE 2 MG/ML
8 INJECTION, SOLUTION INTRAVENOUS ONCE
Status: COMPLETED | OUTPATIENT
Start: 2024-08-22 | End: 2024-08-22

## 2024-08-22 RX ORDER — FAMOTIDINE 10 MG/ML
20 INJECTION INTRAVENOUS ONCE
Status: COMPLETED | OUTPATIENT
Start: 2024-08-22 | End: 2024-08-22

## 2024-08-22 RX ORDER — HYDROCODONE BITARTRATE AND ACETAMINOPHEN 500; 5 MG/1; MG/1
TABLET ORAL
Status: ACTIVE | OUTPATIENT
Start: 2024-08-22

## 2024-08-22 RX ORDER — DIPHENHYDRAMINE HYDROCHLORIDE 50 MG/ML
25 INJECTION INTRAMUSCULAR; INTRAVENOUS
Status: COMPLETED | OUTPATIENT
Start: 2024-08-22 | End: 2024-08-22

## 2024-08-22 RX ORDER — HYDROCODONE BITARTRATE AND ACETAMINOPHEN 500; 5 MG/1; MG/1
TABLET ORAL ONCE
Status: ACTIVE | OUTPATIENT
Start: 2024-08-22

## 2024-08-22 RX ORDER — CYTARABINE 20 MG/ML
20 INJECTION, SOLUTION INTRATHECAL; INTRAVENOUS; SUBCUTANEOUS ONCE
Status: COMPLETED | OUTPATIENT
Start: 2024-08-22 | End: 2024-08-22

## 2024-08-22 RX ORDER — FAMOTIDINE 10 MG/ML
20 INJECTION INTRAVENOUS ONCE
Status: CANCELLED | OUTPATIENT
Start: 2024-08-22

## 2024-08-22 RX ORDER — HYDROCODONE BITARTRATE AND ACETAMINOPHEN 500; 5 MG/1; MG/1
TABLET ORAL ONCE
Status: CANCELLED | OUTPATIENT
Start: 2024-08-22 | End: 2024-08-22

## 2024-08-22 RX ADMIN — CYTARABINE 40 MG: 20 INJECTION, SOLUTION INTRATHECAL; INTRAVENOUS; SUBCUTANEOUS at 09:08

## 2024-08-22 RX ADMIN — FAMOTIDINE 20 MG: 10 INJECTION, SOLUTION INTRAVENOUS at 11:08

## 2024-08-22 RX ADMIN — DIPHENHYDRAMINE HYDROCHLORIDE 25 MG: 50 INJECTION INTRAMUSCULAR; INTRAVENOUS at 12:08

## 2024-08-22 RX ADMIN — ONDANSETRON 8 MG: 2 INJECTION INTRAMUSCULAR; INTRAVENOUS at 02:08

## 2024-08-22 NOTE — NURSING
1405: Became nauseated with vomiting. Jenna notified.  1410: Zofran given.  1425: Nausea better.  No further itching.

## 2024-08-23 ENCOUNTER — INFUSION (OUTPATIENT)
Dept: INFUSION THERAPY | Facility: HOSPITAL | Age: 74
End: 2024-08-23
Attending: INTERNAL MEDICINE
Payer: MEDICARE

## 2024-08-23 VITALS
DIASTOLIC BLOOD PRESSURE: 64 MMHG | SYSTOLIC BLOOD PRESSURE: 105 MMHG | TEMPERATURE: 98 F | RESPIRATION RATE: 18 BRPM | HEART RATE: 66 BPM | OXYGEN SATURATION: 97 %

## 2024-08-23 DIAGNOSIS — C92.00 ACUTE MYELOID LEUKEMIA NOT HAVING ACHIEVED REMISSION: Primary | ICD-10-CM

## 2024-08-23 LAB — RBCS: NORMAL

## 2024-08-23 PROCEDURE — 63600175 PHARM REV CODE 636 W HCPCS: Performed by: INTERNAL MEDICINE

## 2024-08-23 PROCEDURE — 96401 CHEMO ANTI-NEOPL SQ/IM: CPT

## 2024-08-23 RX ORDER — CYTARABINE 20 MG/ML
20 INJECTION, SOLUTION INTRATHECAL; INTRAVENOUS; SUBCUTANEOUS ONCE
Status: COMPLETED | OUTPATIENT
Start: 2024-08-23 | End: 2024-08-23

## 2024-08-23 RX ADMIN — CYTARABINE 40 MG: 20 INJECTION, SOLUTION INTRATHECAL; INTRAVENOUS; SUBCUTANEOUS at 09:08

## 2024-08-26 ENCOUNTER — INFUSION (OUTPATIENT)
Dept: INFUSION THERAPY | Facility: HOSPITAL | Age: 74
End: 2024-08-26
Attending: INTERNAL MEDICINE
Payer: MEDICARE

## 2024-08-26 ENCOUNTER — LAB VISIT (OUTPATIENT)
Dept: LAB | Facility: HOSPITAL | Age: 74
End: 2024-08-26
Attending: INTERNAL MEDICINE
Payer: MEDICARE

## 2024-08-26 VITALS
HEART RATE: 63 BPM | RESPIRATION RATE: 18 BRPM | DIASTOLIC BLOOD PRESSURE: 62 MMHG | OXYGEN SATURATION: 99 % | SYSTOLIC BLOOD PRESSURE: 112 MMHG | TEMPERATURE: 98 F

## 2024-08-26 DIAGNOSIS — C92.00 ACUTE MYELOID LEUKEMIA NOT HAVING ACHIEVED REMISSION: Primary | Chronic | ICD-10-CM

## 2024-08-26 DIAGNOSIS — C92.00 ACUTE MYELOID LEUKEMIA NOT HAVING ACHIEVED REMISSION: ICD-10-CM

## 2024-08-26 DIAGNOSIS — R00.2 PALPITATIONS: ICD-10-CM

## 2024-08-26 DIAGNOSIS — D61.818 PANCYTOPENIA: Primary | Chronic | ICD-10-CM

## 2024-08-26 DIAGNOSIS — D61.818 PANCYTOPENIA: Chronic | ICD-10-CM

## 2024-08-26 DIAGNOSIS — C92.00 ACUTE MYELOID LEUKEMIA NOT HAVING ACHIEVED REMISSION: Chronic | ICD-10-CM

## 2024-08-26 DIAGNOSIS — D61.818 PANCYTOPENIA: ICD-10-CM

## 2024-08-26 LAB
ABO + RH BLD: NORMAL
ABS NEUT CALC (OHS): 0.85 X10(3)/MCL (ref 2.1–9.2)
ALBUMIN SERPL-MCNC: 3.2 G/DL (ref 3.4–4.8)
ALBUMIN/GLOB SERPL: 1 RATIO (ref 1.1–2)
ALP SERPL-CCNC: 78 UNIT/L (ref 40–150)
ALT SERPL-CCNC: 20 UNIT/L (ref 0–55)
ANION GAP SERPL CALC-SCNC: 10 MEQ/L
AST SERPL-CCNC: 14 UNIT/L (ref 5–34)
B-HCG SERPL QL: 7 %
BASOPHILS NFR BLD MANUAL: 0.02 X10(3)/MCL (ref 0–0.2)
BASOPHILS NFR BLD MANUAL: 1 % (ref 0–2)
BILIRUB SERPL-MCNC: 0.8 MG/DL
BLD PROD TYP BPU: NORMAL
BLOOD UNIT EXPIRATION DATE: NORMAL
BLOOD UNIT TYPE CODE: 6200
BUN SERPL-MCNC: 24.6 MG/DL (ref 8.4–25.7)
CALCIUM SERPL-MCNC: 8.6 MG/DL (ref 8.8–10)
CHLORIDE SERPL-SCNC: 105 MMOL/L (ref 98–107)
CO2 SERPL-SCNC: 28 MMOL/L (ref 23–31)
CREAT SERPL-MCNC: 1.09 MG/DL (ref 0.73–1.18)
CREAT/UREA NIT SERPL: 23
CROSSMATCH INTERPRETATION: NORMAL
DISPENSE STATUS: NORMAL
ERYTHROCYTE [DISTWIDTH] IN BLOOD BY AUTOMATED COUNT: 13.8 % (ref 11.5–17)
GFR SERPLBLD CREATININE-BSD FMLA CKD-EPI: >60 ML/MIN/1.73/M2
GLOBULIN SER-MCNC: 3.2 GM/DL (ref 2.4–3.5)
GLUCOSE SERPL-MCNC: 158 MG/DL (ref 82–115)
HCT VFR BLD AUTO: 23 % (ref 42–52)
HGB BLD-MCNC: 7.5 G/DL (ref 14–18)
LYMPHOCYTES NFR BLD MANUAL: 0.35 X10(3)/MCL
LYMPHOCYTES NFR BLD MANUAL: 22 % (ref 13–40)
MCH RBC QN AUTO: 29.2 PG (ref 27–31)
MCHC RBC AUTO-ENTMCNC: 32.6 G/DL (ref 33–36)
MCV RBC AUTO: 89.5 FL (ref 80–94)
MONOCYTES NFR BLD MANUAL: 0.27 X10(3)/MCL (ref 0.1–1.3)
MONOCYTES NFR BLD MANUAL: 17 % (ref 2–11)
NEUTROPHILS NFR BLD MANUAL: 53 % (ref 47–80)
PLATELET # BLD AUTO: 8 X10(3)/MCL (ref 130–400)
PLATELET # BLD EST: ABNORMAL 10*3/UL
PMV BLD AUTO: 10.6 FL (ref 7.4–10.4)
POTASSIUM SERPL-SCNC: 4.1 MMOL/L (ref 3.5–5.1)
PROT SERPL-MCNC: 6.4 GM/DL (ref 5.8–7.6)
RBC # BLD AUTO: 2.57 X10(6)/MCL (ref 4.7–6.1)
RBC MORPH BLD: NORMAL
SODIUM SERPL-SCNC: 143 MMOL/L (ref 136–145)
UNIT NUMBER: NORMAL
WBC # BLD AUTO: 1.6 X10(3)/MCL (ref 4.5–11.5)

## 2024-08-26 PROCEDURE — 80053 COMPREHEN METABOLIC PANEL: CPT

## 2024-08-26 PROCEDURE — 85027 COMPLETE CBC AUTOMATED: CPT

## 2024-08-26 PROCEDURE — 96374 THER/PROPH/DIAG INJ IV PUSH: CPT

## 2024-08-26 PROCEDURE — 63600175 PHARM REV CODE 636 W HCPCS: Performed by: INTERNAL MEDICINE

## 2024-08-26 PROCEDURE — 36415 COLL VENOUS BLD VENIPUNCTURE: CPT

## 2024-08-26 PROCEDURE — 25000003 PHARM REV CODE 250: Performed by: INTERNAL MEDICINE

## 2024-08-26 PROCEDURE — 36430 TRANSFUSION BLD/BLD COMPNT: CPT

## 2024-08-26 PROCEDURE — P9037 PLATE PHERES LEUKOREDU IRRAD: HCPCS | Performed by: NURSE PRACTITIONER

## 2024-08-26 PROCEDURE — 25000003 PHARM REV CODE 250: Performed by: NURSE PRACTITIONER

## 2024-08-26 PROCEDURE — 96401 CHEMO ANTI-NEOPL SQ/IM: CPT

## 2024-08-26 RX ORDER — CYTARABINE 20 MG/ML
20 INJECTION, SOLUTION INTRATHECAL; INTRAVENOUS; SUBCUTANEOUS ONCE
Status: COMPLETED | OUTPATIENT
Start: 2024-08-26 | End: 2024-08-26

## 2024-08-26 RX ORDER — HYDROCODONE BITARTRATE AND ACETAMINOPHEN 500; 5 MG/1; MG/1
TABLET ORAL ONCE
Status: COMPLETED | OUTPATIENT
Start: 2024-08-26 | End: 2024-08-26

## 2024-08-26 RX ORDER — FAMOTIDINE 10 MG/ML
20 INJECTION INTRAVENOUS 2 TIMES DAILY
Status: DISCONTINUED | OUTPATIENT
Start: 2024-08-26 | End: 2024-08-29

## 2024-08-26 RX ORDER — HYDROCODONE BITARTRATE AND ACETAMINOPHEN 500; 5 MG/1; MG/1
TABLET ORAL ONCE
Status: CANCELLED | OUTPATIENT
Start: 2024-08-26 | End: 2024-08-26

## 2024-08-26 RX ADMIN — CYTARABINE 40 MG: 20 INJECTION, SOLUTION INTRATHECAL; INTRAVENOUS; SUBCUTANEOUS at 09:08

## 2024-08-26 RX ADMIN — FAMOTIDINE 20 MG: 10 INJECTION, SOLUTION INTRAVENOUS at 09:08

## 2024-08-26 RX ADMIN — SODIUM CHLORIDE: 9 INJECTION, SOLUTION INTRAVENOUS at 09:08

## 2024-08-27 ENCOUNTER — INFUSION (OUTPATIENT)
Dept: INFUSION THERAPY | Facility: HOSPITAL | Age: 74
End: 2024-08-27
Attending: INTERNAL MEDICINE
Payer: MEDICARE

## 2024-08-27 VITALS — HEART RATE: 80 BPM | DIASTOLIC BLOOD PRESSURE: 50 MMHG | SYSTOLIC BLOOD PRESSURE: 93 MMHG | TEMPERATURE: 98 F

## 2024-08-27 DIAGNOSIS — C92.00 ACUTE MYELOID LEUKEMIA NOT HAVING ACHIEVED REMISSION: Primary | ICD-10-CM

## 2024-08-27 PROCEDURE — 96401 CHEMO ANTI-NEOPL SQ/IM: CPT

## 2024-08-27 PROCEDURE — 63600175 PHARM REV CODE 636 W HCPCS: Performed by: INTERNAL MEDICINE

## 2024-08-27 RX ORDER — CYTARABINE 20 MG/ML
20 INJECTION, SOLUTION INTRATHECAL; INTRAVENOUS; SUBCUTANEOUS ONCE
Status: COMPLETED | OUTPATIENT
Start: 2024-08-27 | End: 2024-08-27

## 2024-08-27 RX ADMIN — CYTARABINE 40 MG: 20 INJECTION, SOLUTION INTRATHECAL; INTRAVENOUS; SUBCUTANEOUS at 09:08

## 2024-08-28 ENCOUNTER — INFUSION (OUTPATIENT)
Dept: INFUSION THERAPY | Facility: HOSPITAL | Age: 74
End: 2024-08-28
Attending: INTERNAL MEDICINE
Payer: MEDICARE

## 2024-08-28 VITALS — DIASTOLIC BLOOD PRESSURE: 51 MMHG | SYSTOLIC BLOOD PRESSURE: 129 MMHG | HEART RATE: 86 BPM | TEMPERATURE: 99 F

## 2024-08-28 DIAGNOSIS — C92.00 ACUTE MYELOID LEUKEMIA NOT HAVING ACHIEVED REMISSION: Primary | ICD-10-CM

## 2024-08-28 PROCEDURE — 96401 CHEMO ANTI-NEOPL SQ/IM: CPT

## 2024-08-28 PROCEDURE — 63600175 PHARM REV CODE 636 W HCPCS: Performed by: INTERNAL MEDICINE

## 2024-08-28 RX ORDER — CYTARABINE 20 MG/ML
20 INJECTION, SOLUTION INTRATHECAL; INTRAVENOUS; SUBCUTANEOUS ONCE
Status: COMPLETED | OUTPATIENT
Start: 2024-08-28 | End: 2024-08-28

## 2024-08-28 RX ADMIN — CYTARABINE 40 MG: 20 INJECTION, SOLUTION INTRATHECAL; INTRAVENOUS; SUBCUTANEOUS at 01:08

## 2024-08-29 ENCOUNTER — LAB VISIT (OUTPATIENT)
Dept: LAB | Facility: HOSPITAL | Age: 74
End: 2024-08-29
Attending: INTERNAL MEDICINE
Payer: MEDICARE

## 2024-08-29 DIAGNOSIS — C92.00 ACUTE MYELOID LEUKEMIA NOT HAVING ACHIEVED REMISSION: ICD-10-CM

## 2024-08-29 DIAGNOSIS — D61.818 PANCYTOPENIA: Primary | ICD-10-CM

## 2024-08-29 DIAGNOSIS — D61.818 PANCYTOPENIA: ICD-10-CM

## 2024-08-29 DIAGNOSIS — R00.2 PALPITATIONS: ICD-10-CM

## 2024-08-29 LAB
ABO + RH BLD: NORMAL
BASOPHILS # BLD AUTO: 0 X10(3)/MCL
BASOPHILS NFR BLD AUTO: 0 %
BLD PROD TYP BPU: NORMAL
BLOOD UNIT EXPIRATION DATE: NORMAL
BLOOD UNIT TYPE CODE: 5100
CROSSMATCH INTERPRETATION: NORMAL
DISPENSE STATUS: NORMAL
EOSINOPHIL # BLD AUTO: 0 X10(3)/MCL (ref 0–0.9)
EOSINOPHIL NFR BLD AUTO: 0 %
ERYTHROCYTE [DISTWIDTH] IN BLOOD BY AUTOMATED COUNT: 13.8 % (ref 11.5–17)
GROUP & RH: NORMAL
HCT VFR BLD AUTO: 20.2 % (ref 42–52)
HGB BLD-MCNC: 6.6 G/DL (ref 14–18)
IMM GRANULOCYTES # BLD AUTO: 0.05 X10(3)/MCL (ref 0–0.04)
IMM GRANULOCYTES NFR BLD AUTO: 4.9 %
INDIRECT COOMBS: NORMAL
LYMPHOCYTES # BLD AUTO: 0.28 X10(3)/MCL (ref 0.6–4.6)
LYMPHOCYTES NFR BLD AUTO: 27.5 %
MCH RBC QN AUTO: 29.5 PG (ref 27–31)
MCHC RBC AUTO-ENTMCNC: 32.7 G/DL (ref 33–36)
MCV RBC AUTO: 90.2 FL (ref 80–94)
MONOCYTES # BLD AUTO: 0.46 X10(3)/MCL (ref 0.1–1.3)
MONOCYTES NFR BLD AUTO: 45.1 %
NEUTROPHILS # BLD AUTO: 0.23 X10(3)/MCL (ref 2.1–9.2)
NEUTROPHILS NFR BLD AUTO: 22.5 %
PLATELET # BLD AUTO: 9 X10(3)/MCL (ref 130–400)
PMV BLD AUTO: 9.5 FL (ref 7.4–10.4)
RBC # BLD AUTO: 2.24 X10(6)/MCL (ref 4.7–6.1)
SPECIMEN OUTDATE: NORMAL
UNIT NUMBER: NORMAL
WBC # BLD AUTO: 1.02 X10(3)/MCL (ref 4.5–11.5)

## 2024-08-29 PROCEDURE — 85025 COMPLETE CBC W/AUTO DIFF WBC: CPT

## 2024-08-29 PROCEDURE — 86900 BLOOD TYPING SEROLOGIC ABO: CPT | Performed by: INTERNAL MEDICINE

## 2024-08-29 PROCEDURE — 36415 COLL VENOUS BLD VENIPUNCTURE: CPT

## 2024-08-29 PROCEDURE — 86923 COMPATIBILITY TEST ELECTRIC: CPT | Performed by: INTERNAL MEDICINE

## 2024-08-29 PROCEDURE — 86901 BLOOD TYPING SEROLOGIC RH(D): CPT | Performed by: INTERNAL MEDICINE

## 2024-08-29 PROCEDURE — P9040 RBC LEUKOREDUCED IRRADIATED: HCPCS | Performed by: INTERNAL MEDICINE

## 2024-08-29 PROCEDURE — 36415 COLL VENOUS BLD VENIPUNCTURE: CPT | Mod: 91

## 2024-08-29 RX ORDER — HYDROCODONE BITARTRATE AND ACETAMINOPHEN 500; 5 MG/1; MG/1
TABLET ORAL ONCE
Status: CANCELLED | OUTPATIENT
Start: 2024-08-29 | End: 2024-08-29

## 2024-08-29 RX ORDER — FAMOTIDINE 10 MG/ML
20 INJECTION INTRAVENOUS ONCE
Status: CANCELLED | OUTPATIENT
Start: 2024-08-29

## 2024-08-30 ENCOUNTER — INFUSION (OUTPATIENT)
Dept: INFUSION THERAPY | Facility: HOSPITAL | Age: 74
End: 2024-08-30
Attending: INTERNAL MEDICINE
Payer: MEDICARE

## 2024-08-30 VITALS — HEART RATE: 79 BPM | SYSTOLIC BLOOD PRESSURE: 103 MMHG | TEMPERATURE: 98 F | DIASTOLIC BLOOD PRESSURE: 54 MMHG

## 2024-08-30 DIAGNOSIS — C92.00 ACUTE MYELOID LEUKEMIA NOT HAVING ACHIEVED REMISSION: Primary | ICD-10-CM

## 2024-08-30 PROCEDURE — 63600175 PHARM REV CODE 636 W HCPCS: Performed by: INTERNAL MEDICINE

## 2024-08-30 PROCEDURE — 96401 CHEMO ANTI-NEOPL SQ/IM: CPT

## 2024-08-30 RX ORDER — CYTARABINE 20 MG/ML
20 INJECTION, SOLUTION INTRATHECAL; INTRAVENOUS; SUBCUTANEOUS ONCE
Status: COMPLETED | OUTPATIENT
Start: 2024-08-30 | End: 2024-08-30

## 2024-08-30 RX ADMIN — CYTARABINE 40 MG: 20 INJECTION, SOLUTION INTRATHECAL; INTRAVENOUS; SUBCUTANEOUS at 09:08

## 2024-09-03 ENCOUNTER — LAB VISIT (OUTPATIENT)
Dept: LAB | Facility: HOSPITAL | Age: 74
End: 2024-09-03
Attending: INTERNAL MEDICINE
Payer: MEDICARE

## 2024-09-03 ENCOUNTER — INFUSION (OUTPATIENT)
Dept: INFUSION THERAPY | Facility: HOSPITAL | Age: 74
End: 2024-09-03
Attending: INTERNAL MEDICINE
Payer: MEDICARE

## 2024-09-03 VITALS
SYSTOLIC BLOOD PRESSURE: 143 MMHG | HEART RATE: 57 BPM | OXYGEN SATURATION: 100 % | DIASTOLIC BLOOD PRESSURE: 79 MMHG | TEMPERATURE: 98 F

## 2024-09-03 DIAGNOSIS — C92.00 ACUTE MYELOID LEUKEMIA NOT HAVING ACHIEVED REMISSION: ICD-10-CM

## 2024-09-03 DIAGNOSIS — C92.00 ACUTE MYELOID LEUKEMIA NOT HAVING ACHIEVED REMISSION: Primary | Chronic | ICD-10-CM

## 2024-09-03 DIAGNOSIS — R00.2 PALPITATIONS: ICD-10-CM

## 2024-09-03 DIAGNOSIS — D61.818 PANCYTOPENIA: ICD-10-CM

## 2024-09-03 LAB
ABO + RH BLD: NORMAL
ABO + RH BLD: NORMAL
ALBUMIN SERPL-MCNC: 2.9 G/DL (ref 3.4–4.8)
ALBUMIN/GLOB SERPL: 0.9 RATIO (ref 1.1–2)
ALP SERPL-CCNC: 75 UNIT/L (ref 40–150)
ALT SERPL-CCNC: 18 UNIT/L (ref 0–55)
ANION GAP SERPL CALC-SCNC: 9 MEQ/L
AST SERPL-CCNC: 12 UNIT/L (ref 5–34)
BASOPHILS # BLD AUTO: 0 X10(3)/MCL
BASOPHILS NFR BLD AUTO: 0 %
BILIRUB SERPL-MCNC: 0.8 MG/DL
BLD PROD TYP BPU: NORMAL
BLD PROD TYP BPU: NORMAL
BLOOD UNIT EXPIRATION DATE: NORMAL
BLOOD UNIT EXPIRATION DATE: NORMAL
BLOOD UNIT TYPE CODE: 5100
BLOOD UNIT TYPE CODE: 5100
BUN SERPL-MCNC: 17.7 MG/DL (ref 8.4–25.7)
CALCIUM SERPL-MCNC: 8.4 MG/DL (ref 8.8–10)
CHLORIDE SERPL-SCNC: 105 MMOL/L (ref 98–107)
CO2 SERPL-SCNC: 27 MMOL/L (ref 23–31)
CREAT SERPL-MCNC: 1.01 MG/DL (ref 0.73–1.18)
CREAT/UREA NIT SERPL: 18
CROSSMATCH INTERPRETATION: NORMAL
CROSSMATCH INTERPRETATION: NORMAL
DISPENSE STATUS: NORMAL
DISPENSE STATUS: NORMAL
EOSINOPHIL # BLD AUTO: 0 X10(3)/MCL (ref 0–0.9)
EOSINOPHIL NFR BLD AUTO: 0 %
ERYTHROCYTE [DISTWIDTH] IN BLOOD BY AUTOMATED COUNT: 13.7 % (ref 11.5–17)
GFR SERPLBLD CREATININE-BSD FMLA CKD-EPI: >60 ML/MIN/1.73/M2
GLOBULIN SER-MCNC: 3.4 GM/DL (ref 2.4–3.5)
GLUCOSE SERPL-MCNC: 104 MG/DL (ref 82–115)
GROUP & RH: NORMAL
HCT VFR BLD AUTO: 20.7 % (ref 42–52)
HGB BLD-MCNC: 6.7 G/DL (ref 14–18)
IMM GRANULOCYTES # BLD AUTO: 0.11 X10(3)/MCL (ref 0–0.04)
IMM GRANULOCYTES NFR BLD AUTO: 6.2 %
INDIRECT COOMBS: NORMAL
LYMPHOCYTES # BLD AUTO: 0.33 X10(3)/MCL (ref 0.6–4.6)
LYMPHOCYTES NFR BLD AUTO: 18.6 %
MCH RBC QN AUTO: 28.4 PG (ref 27–31)
MCHC RBC AUTO-ENTMCNC: 32.4 G/DL (ref 33–36)
MCV RBC AUTO: 87.7 FL (ref 80–94)
MONOCYTES # BLD AUTO: 1.19 X10(3)/MCL (ref 0.1–1.3)
MONOCYTES NFR BLD AUTO: 67.2 %
NEUTROPHILS # BLD AUTO: 0.14 X10(3)/MCL (ref 2.1–9.2)
NEUTROPHILS NFR BLD AUTO: 8 %
PLATELET # BLD AUTO: 8 X10(3)/MCL (ref 130–400)
PMV BLD AUTO: 9.5 FL (ref 7.4–10.4)
POTASSIUM SERPL-SCNC: 3.6 MMOL/L (ref 3.5–5.1)
PROT SERPL-MCNC: 6.3 GM/DL (ref 5.8–7.6)
RBC # BLD AUTO: 2.36 X10(6)/MCL (ref 4.7–6.1)
SODIUM SERPL-SCNC: 141 MMOL/L (ref 136–145)
SPECIMEN OUTDATE: NORMAL
UNIT NUMBER: NORMAL
UNIT NUMBER: NORMAL
WBC # BLD AUTO: 1.77 X10(3)/MCL (ref 4.5–11.5)

## 2024-09-03 PROCEDURE — 96374 THER/PROPH/DIAG INJ IV PUSH: CPT

## 2024-09-03 PROCEDURE — 85025 COMPLETE CBC W/AUTO DIFF WBC: CPT

## 2024-09-03 PROCEDURE — 86900 BLOOD TYPING SEROLOGIC ABO: CPT | Performed by: NURSE PRACTITIONER

## 2024-09-03 PROCEDURE — 36415 COLL VENOUS BLD VENIPUNCTURE: CPT

## 2024-09-03 PROCEDURE — P9037 PLATE PHERES LEUKOREDU IRRAD: HCPCS | Performed by: NURSE PRACTITIONER

## 2024-09-03 PROCEDURE — 25000003 PHARM REV CODE 250: Performed by: INTERNAL MEDICINE

## 2024-09-03 PROCEDURE — 80053 COMPREHEN METABOLIC PANEL: CPT

## 2024-09-03 PROCEDURE — 86850 RBC ANTIBODY SCREEN: CPT | Performed by: NURSE PRACTITIONER

## 2024-09-03 PROCEDURE — P9040 RBC LEUKOREDUCED IRRADIATED: HCPCS | Performed by: NURSE PRACTITIONER

## 2024-09-03 PROCEDURE — 36430 TRANSFUSION BLD/BLD COMPNT: CPT

## 2024-09-03 PROCEDURE — 86923 COMPATIBILITY TEST ELECTRIC: CPT | Performed by: NURSE PRACTITIONER

## 2024-09-03 RX ORDER — DIPHENHYDRAMINE HCL 25 MG
25 CAPSULE ORAL ONCE
Status: CANCELLED | OUTPATIENT
Start: 2024-09-03 | End: 2024-09-03

## 2024-09-03 RX ORDER — HYDROCODONE BITARTRATE AND ACETAMINOPHEN 500; 5 MG/1; MG/1
TABLET ORAL ONCE
Status: ACTIVE | OUTPATIENT
Start: 2024-09-03

## 2024-09-03 RX ORDER — FAMOTIDINE 10 MG/ML
20 INJECTION INTRAVENOUS
Start: 2024-09-03

## 2024-09-03 RX ORDER — ACETAMINOPHEN 325 MG/1
650 TABLET ORAL ONCE
Status: ACTIVE | OUTPATIENT
Start: 2024-09-03

## 2024-09-03 RX ORDER — FAMOTIDINE 10 MG/ML
20 INJECTION INTRAVENOUS
Status: COMPLETED | OUTPATIENT
Start: 2024-09-03 | End: 2024-09-03

## 2024-09-03 RX ORDER — DIPHENHYDRAMINE HCL 25 MG
25 CAPSULE ORAL ONCE
Status: ACTIVE | OUTPATIENT
Start: 2024-09-03

## 2024-09-03 RX ORDER — HYDROCODONE BITARTRATE AND ACETAMINOPHEN 500; 5 MG/1; MG/1
TABLET ORAL ONCE
Status: CANCELLED | OUTPATIENT
Start: 2024-09-03 | End: 2024-09-03

## 2024-09-03 RX ORDER — ACETAMINOPHEN 325 MG/1
650 TABLET ORAL ONCE
Status: CANCELLED | OUTPATIENT
Start: 2024-09-03 | End: 2024-09-03

## 2024-09-03 RX ADMIN — FAMOTIDINE 20 MG: 10 INJECTION INTRAVENOUS at 08:09

## 2024-09-05 ENCOUNTER — INFUSION (OUTPATIENT)
Dept: INFUSION THERAPY | Facility: HOSPITAL | Age: 74
End: 2024-09-05
Attending: INTERNAL MEDICINE
Payer: MEDICARE

## 2024-09-05 ENCOUNTER — LAB VISIT (OUTPATIENT)
Dept: LAB | Facility: HOSPITAL | Age: 74
End: 2024-09-05
Attending: INTERNAL MEDICINE
Payer: MEDICARE

## 2024-09-05 VITALS
DIASTOLIC BLOOD PRESSURE: 73 MMHG | HEART RATE: 69 BPM | SYSTOLIC BLOOD PRESSURE: 128 MMHG | OXYGEN SATURATION: 100 % | RESPIRATION RATE: 18 BRPM | TEMPERATURE: 98 F

## 2024-09-05 DIAGNOSIS — C92.00 ACUTE MYELOID LEUKEMIA NOT HAVING ACHIEVED REMISSION: ICD-10-CM

## 2024-09-05 LAB
BASOPHILS # BLD AUTO: 0 X10(3)/MCL
BASOPHILS NFR BLD AUTO: 0 %
EOSINOPHIL # BLD AUTO: 0 X10(3)/MCL (ref 0–0.9)
EOSINOPHIL NFR BLD AUTO: 0 %
ERYTHROCYTE [DISTWIDTH] IN BLOOD BY AUTOMATED COUNT: 14.1 % (ref 11.5–17)
HCT VFR BLD AUTO: 23.6 % (ref 42–52)
HGB BLD-MCNC: 7.7 G/DL (ref 14–18)
IMM GRANULOCYTES # BLD AUTO: 0.14 X10(3)/MCL (ref 0–0.04)
IMM GRANULOCYTES NFR BLD AUTO: 6.2 %
LYMPHOCYTES # BLD AUTO: 0.49 X10(3)/MCL (ref 0.6–4.6)
LYMPHOCYTES NFR BLD AUTO: 21.6 %
MCH RBC QN AUTO: 28.7 PG (ref 27–31)
MCHC RBC AUTO-ENTMCNC: 32.6 G/DL (ref 33–36)
MCV RBC AUTO: 88.1 FL (ref 80–94)
MONOCYTES # BLD AUTO: 1.5 X10(3)/MCL (ref 0.1–1.3)
MONOCYTES NFR BLD AUTO: 66.1 %
NEUTROPHILS # BLD AUTO: 0.14 X10(3)/MCL (ref 2.1–9.2)
NEUTROPHILS NFR BLD AUTO: 6.1 %
PLATELET # BLD AUTO: 17 X10(3)/MCL (ref 130–400)
PMV BLD AUTO: 8.8 FL (ref 7.4–10.4)
RBC # BLD AUTO: 2.68 X10(6)/MCL (ref 4.7–6.1)
WBC # BLD AUTO: 2.27 X10(3)/MCL (ref 4.5–11.5)

## 2024-09-05 PROCEDURE — 85025 COMPLETE CBC W/AUTO DIFF WBC: CPT

## 2024-09-05 PROCEDURE — 36415 COLL VENOUS BLD VENIPUNCTURE: CPT

## 2024-09-05 NOTE — PLAN OF CARE
No transfusion needed; Hgb 7.7, Plts 17; next appointments discussed with patient; discharged home in stable condition

## 2024-09-07 LAB — RBCS: NORMAL

## 2024-09-12 ENCOUNTER — INFUSION (OUTPATIENT)
Dept: INFUSION THERAPY | Facility: HOSPITAL | Age: 74
End: 2024-09-12
Attending: INTERNAL MEDICINE
Payer: MEDICARE

## 2024-09-12 ENCOUNTER — OFFICE VISIT (OUTPATIENT)
Dept: HEMATOLOGY/ONCOLOGY | Facility: CLINIC | Age: 74
End: 2024-09-12
Payer: MEDICARE

## 2024-09-12 ENCOUNTER — LAB VISIT (OUTPATIENT)
Dept: LAB | Facility: HOSPITAL | Age: 74
End: 2024-09-12
Attending: INTERNAL MEDICINE
Payer: MEDICARE

## 2024-09-12 VITALS
OXYGEN SATURATION: 97 % | TEMPERATURE: 99 F | SYSTOLIC BLOOD PRESSURE: 126 MMHG | HEART RATE: 63 BPM | DIASTOLIC BLOOD PRESSURE: 69 MMHG | RESPIRATION RATE: 18 BRPM

## 2024-09-12 VITALS
TEMPERATURE: 98 F | HEIGHT: 72 IN | OXYGEN SATURATION: 99 % | WEIGHT: 177.56 LBS | RESPIRATION RATE: 18 BRPM | BODY MASS INDEX: 24.05 KG/M2 | SYSTOLIC BLOOD PRESSURE: 106 MMHG | DIASTOLIC BLOOD PRESSURE: 64 MMHG | HEART RATE: 78 BPM

## 2024-09-12 DIAGNOSIS — C92.00 ACUTE MYELOID LEUKEMIA NOT HAVING ACHIEVED REMISSION: Primary | ICD-10-CM

## 2024-09-12 DIAGNOSIS — C92.00 ACUTE MYELOID LEUKEMIA NOT HAVING ACHIEVED REMISSION: ICD-10-CM

## 2024-09-12 LAB
ABO + RH BLD: NORMAL
BASOPHILS # BLD AUTO: 0 X10(3)/MCL
BASOPHILS NFR BLD AUTO: 0 %
BLD PROD TYP BPU: NORMAL
BLOOD UNIT EXPIRATION DATE: NORMAL
BLOOD UNIT TYPE CODE: 5100
BLOOD UNIT TYPE CODE: 5100
BLOOD UNIT TYPE CODE: 6200
CROSSMATCH INTERPRETATION: NORMAL
DISPENSE STATUS: NORMAL
EOSINOPHIL # BLD AUTO: 0 X10(3)/MCL (ref 0–0.9)
EOSINOPHIL NFR BLD AUTO: 0 %
ERYTHROCYTE [DISTWIDTH] IN BLOOD BY AUTOMATED COUNT: 14.3 % (ref 11.5–17)
GROUP & RH: NORMAL
HCT VFR BLD AUTO: 19.1 % (ref 42–52)
HGB BLD-MCNC: 6.2 G/DL (ref 14–18)
IMM GRANULOCYTES # BLD AUTO: 0.15 X10(3)/MCL (ref 0–0.04)
IMM GRANULOCYTES NFR BLD AUTO: 3.1 %
INDIRECT COOMBS: NORMAL
LYMPHOCYTES # BLD AUTO: 0.84 X10(3)/MCL (ref 0.6–4.6)
LYMPHOCYTES NFR BLD AUTO: 17.5 %
MCH RBC QN AUTO: 28.8 PG (ref 27–31)
MCHC RBC AUTO-ENTMCNC: 32.5 G/DL (ref 33–36)
MCV RBC AUTO: 88.8 FL (ref 80–94)
MONOCYTES # BLD AUTO: 3.56 X10(3)/MCL (ref 0.1–1.3)
MONOCYTES NFR BLD AUTO: 74.2 %
NEUTROPHILS # BLD AUTO: 0.25 X10(3)/MCL (ref 2.1–9.2)
NEUTROPHILS NFR BLD AUTO: 5.2 %
PLATELET # BLD AUTO: 4 X10(3)/MCL (ref 130–400)
PMV BLD AUTO: ABNORMAL FL
RBC # BLD AUTO: 2.15 X10(6)/MCL (ref 4.7–6.1)
SPECIMEN OUTDATE: NORMAL
UNIT NUMBER: NORMAL
WBC # BLD AUTO: 4.8 X10(3)/MCL (ref 4.5–11.5)

## 2024-09-12 PROCEDURE — 96374 THER/PROPH/DIAG INJ IV PUSH: CPT

## 2024-09-12 PROCEDURE — 36430 TRANSFUSION BLD/BLD COMPNT: CPT

## 2024-09-12 PROCEDURE — P9040 RBC LEUKOREDUCED IRRADIATED: HCPCS | Performed by: INTERNAL MEDICINE

## 2024-09-12 PROCEDURE — 85025 COMPLETE CBC W/AUTO DIFF WBC: CPT

## 2024-09-12 PROCEDURE — 25000003 PHARM REV CODE 250: Performed by: INTERNAL MEDICINE

## 2024-09-12 PROCEDURE — 99999 PR PBB SHADOW E&M-EST. PATIENT-LVL V: CPT | Mod: PBBFAC,,, | Performed by: INTERNAL MEDICINE

## 2024-09-12 PROCEDURE — 86900 BLOOD TYPING SEROLOGIC ABO: CPT | Performed by: INTERNAL MEDICINE

## 2024-09-12 PROCEDURE — P9037 PLATE PHERES LEUKOREDU IRRAD: HCPCS | Performed by: INTERNAL MEDICINE

## 2024-09-12 PROCEDURE — 86923 COMPATIBILITY TEST ELECTRIC: CPT | Performed by: INTERNAL MEDICINE

## 2024-09-12 PROCEDURE — 99215 OFFICE O/P EST HI 40 MIN: CPT | Mod: PBBFAC | Performed by: INTERNAL MEDICINE

## 2024-09-12 PROCEDURE — 86901 BLOOD TYPING SEROLOGIC RH(D): CPT | Performed by: INTERNAL MEDICINE

## 2024-09-12 PROCEDURE — 36415 COLL VENOUS BLD VENIPUNCTURE: CPT | Mod: 91

## 2024-09-12 PROCEDURE — 86850 RBC ANTIBODY SCREEN: CPT | Performed by: INTERNAL MEDICINE

## 2024-09-12 PROCEDURE — 99214 OFFICE O/P EST MOD 30 MIN: CPT | Mod: S$PBB,,, | Performed by: INTERNAL MEDICINE

## 2024-09-12 RX ORDER — AMINOCAPROIC ACID 0.25 G/ML
1 SYRUP ORAL
Qty: 400 ML | Refills: 2 | Status: SHIPPED | OUTPATIENT
Start: 2024-09-12 | End: 2024-09-12

## 2024-09-12 RX ORDER — GABAPENTIN 300 MG/1
300 CAPSULE ORAL 2 TIMES DAILY
COMMUNITY
Start: 2024-09-03

## 2024-09-12 RX ORDER — FAMOTIDINE 10 MG/ML
20 INJECTION INTRAVENOUS ONCE
Status: DISPENSED | OUTPATIENT
Start: 2024-09-12

## 2024-09-12 RX ORDER — HYDROCODONE BITARTRATE AND ACETAMINOPHEN 500; 5 MG/1; MG/1
TABLET ORAL ONCE
Status: ACTIVE | OUTPATIENT
Start: 2024-09-12

## 2024-09-12 RX ORDER — FAMOTIDINE 10 MG/ML
20 INJECTION INTRAVENOUS
Status: DISCONTINUED | OUTPATIENT
Start: 2024-09-12 | End: 2024-09-12 | Stop reason: SDUPTHER

## 2024-09-12 RX ORDER — FAMOTIDINE 10 MG/ML
20 INJECTION INTRAVENOUS
Start: 2024-09-12

## 2024-09-12 RX ORDER — HYDROCODONE BITARTRATE AND ACETAMINOPHEN 500; 5 MG/1; MG/1
TABLET ORAL ONCE
Status: CANCELLED | OUTPATIENT
Start: 2024-09-12 | End: 2024-09-12

## 2024-09-12 RX ORDER — TRANEXAMIC ACID 650 MG/1
650 TABLET ORAL EVERY 8 HOURS PRN
Qty: 90 TABLET | Refills: 2 | Status: SHIPPED | OUTPATIENT
Start: 2024-09-12 | End: 2024-09-12

## 2024-09-12 RX ORDER — AMINOCAPROIC ACID 0.25 G/ML
1 SYRUP ORAL
Qty: 400 ML | Refills: 2 | Status: SHIPPED | OUTPATIENT
Start: 2024-09-12 | End: 2024-10-12

## 2024-09-12 RX ORDER — FAMOTIDINE 10 MG/ML
20 INJECTION INTRAVENOUS ONCE
Status: CANCELLED | OUTPATIENT
Start: 2024-09-12

## 2024-09-12 RX ADMIN — FAMOTIDINE 20 MG: 10 INJECTION INTRAVENOUS at 11:09

## 2024-09-12 NOTE — NURSING
Patient received 2 PLT/ 1 PRBC, tolerated well. Will return tomorrow for lab with possible transfusion.

## 2024-09-12 NOTE — PROGRESS NOTES
Subjective:       Patient ID: Hussein Gonzalez Jr. is a 74 y.o. male.    Chief Complaint: Follow-up (Pt reports bleeding from gums)      Diagnosis:  AML with 27% blasts, background dysplasia, normal cytogenetics and normal FISH with NGS showing ASXL1, SRSF2, TET2, and STAG2 mutations.    Current Treatment:   Low-dose cytarabine and venetoclax started on 06/24/2024.    Treatment History:  Azacitidine and venetoclax started on 02/27/2023.  Started 7 out of 28 days, did this through cycle 5.  With cycle 6, moved to 5 out of 28 days.  Ultimately ended up on azacitidine 3/28 days and venetoclax 1 through 14/28 days.  Referral for possible stem cell transplant evaluation.    HPI:  74-year-old male with a history of CVA, hypertension, BPH, and melanoma who presented to the emergency department following a syncopal episode.  In February of 2022, the patient did have a slightly low white blood cell count, hemoglobin, and platelet count.  Per the patient, 3 months prior to admission, he was having dizziness, fatigue, weakness, shortness of breath, back pain, and leg pain.  The dizziness was worse with standing and walking.  On 01/20/2023, the patient had a syncopal episode.  He was brought to the emergency department and was found to be pancytopenic.  He also had stool that was positive for occult blood in the emergency department.  He received blood transfusion.  His CBC on 01/20/2023 revealed a white blood cell count of 2.6, hemoglobin of 7.7, and platelet count of 47,000. Reticulocytes were appropriately elevated, B12 and folate were within normal limits, iron level was slightly decreased.  Repeat blood work on 01/21/2023 revealed a white blood cell count of 2.8, post transfusion hemoglobin of 9.6, and a platelet count of 85979.  Hematology was consulted for pancytopenia.  EGD and colonoscopy done on 01/24/2023, EGD revealed nonbleeding ulcers, colonoscopy unremarkable, however had to be aborted due to tortuous colon.   Bone Marrow Biopsy done on 01/25/2023.  Preliminary reveals AML with 27% blasts in a background of dysplasia.  Cytogenetics are within normal limits, FISH is within normal limits.  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  Started azacitidine and venetoclax on 02/27/2023, completed cycle 4 on 05/30/2023.  Held after this cycle and repeated a bone marrow biopsy, this was done on 06/27/2023, this showed a normocellular marrow (40%) with relative erythroid hyperplasia, sequential granulopoiesis and megakaryopoiesis.  The bone marrow was slightly suboptimal given it was predominantly subcortical bone with focal hematopoietic elements.  There was no morphologic or immunophenotypic evidence of previous acute myeloid leukemia, cytogenetics were normal, FISH was normal, NGS showed persistent ASXL1, SRSF2, TET2.    Repeat bone marrow biopsy done on 11/21/2023 showed no morphologic or immunophenotypic evidence of persistent acute myeloid leukemia. There were ASXL1, CEBPA, RUNX1, SRSF2, STAG2, and TET2 mutations.    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     Started low-dose cytarabine and venetoclax on 06/24/2024.    Interval History:   Patient well known to me currently on low-dose cytarabine and venetoclax for acute myeloid leukemia.  He presents today for clinic follow up visit.  Overall, he still remains fatigued.  He also started having gum bleeding.  He has also noted petechiae in his mouth and on his face.  He stated that few days ago he was doing well, but now he started to feel bad.        Past Medical History:   Diagnosis Date    Anxiety and depression     BPH (benign prostatic hyperplasia)     History of CVA (cerebrovascular accident) without residual deficits     HTN (hypertension)      Malignant melanoma of skin, unspecified     NSTEMI (non-ST elevated myocardial infarction)     Sepsis, Gram negative     Sleep apnea       Past Surgical History:   Procedure Laterality Date    BONE MARROW ASPIRATION N/A 6/27/2023    Procedure: ASPIRATION, BONE MARROW;  Surgeon: Sabino Núñez MD;  Location: [facility];  Service: General;  Laterality: N/A;  ALL ASSESS FOR MOLECULAR MRD    BONE MARROW ASPIRATION N/A 5/22/2024    Procedure: ASPIRATION, BONE MARROW;  Surgeon: Sabino Núñez MD;  Location: [facility] OR;  Service: General;  Laterality: N/A;    BONE MARROW BIOPSY N/A 1/25/2023    Procedure: Biopsy-bone marrow;  Surgeon: Rd Carlos MD;  Location: [facility];  Service: General;  Laterality: N/A;    BONE MARROW BIOPSY N/A 11/21/2023    Procedure: Biopsy-bone marrow;  Surgeon: Junior Castro MD;  Location: [facility];  Service: General;  Laterality: N/A;    COLONOSCOPY      COLONOSCOPY N/A 1/24/2023    Procedure: COLONOSCOPY (DOUBLE);  Surgeon: Jimy Baker MD;  Location: [facility] ENDOSCOPY;  Service: Gastroenterology;  Laterality: N/A;    EGD, WITH CLOSED BIOPSY N/A 1/24/2023    Procedure: EGD, WITH CLOSED BIOPSY;  Surgeon: Jimy Baker MD;  Location: [facility] ENDOSCOPY;  Service: Gastroenterology;  Laterality: N/A;    EXCISION OF MELANOMA      SINUS SURGERY      TONSILLECTOMY       Social History     Socioeconomic History    Marital status:    Tobacco Use    Smoking status: Never    Smokeless tobacco: Never   Substance and Sexual Activity    Alcohol use: Not Currently     Alcohol/week: 7.0 standard drinks of alcohol     Types: 7 Cans of beer per week    Drug use: Never    Sexual activity: Yes     Social Determinants of Health     Financial Resource Strain: Low Risk  (4/20/2023)    Overall Financial Resource Strain (CARDIA)     Difficulty of Paying Living Expenses: Not hard at all   Food Insecurity: No Food Insecurity (4/20/2023)    Hunger Vital Sign     Worried About Running Out of Food in the Last  Year: Never true     Ran Out of Food in the Last Year: Never true   Transportation Needs: No Transportation Needs (4/20/2023)    PRAPARE - Transportation     Lack of Transportation (Medical): No     Lack of Transportation (Non-Medical): No   Physical Activity: Insufficiently Active (4/20/2023)    Exercise Vital Sign     Days of Exercise per Week: 3 days     Minutes of Exercise per Session: 30 min   Stress: No Stress Concern Present (4/20/2023)    [employer] [place] [employer] - Occupational Stress Questionnaire     Feeling of Stress : Not at all   Housing Stability: Low Risk  (4/20/2023)    Housing Stability Vital Sign     Unable to Pay for Housing in the Last Year: No     Number of Places Lived in the Last Year: 1     Unstable Housing in the Last Year: No      Family History   Problem Relation Name Age of Onset    Cancer Mother      Cancer Father        Review of patient's allergies indicates:   Allergen Reactions    Acetaminophen Other (See Comments)     Restless leg     Benadryl [diphenhydramine hcl] Other (See Comments)     Restless Legs      Review of Systems   Constitutional:  Negative for chills, diaphoresis, fatigue, fever and unexpected weight change.   HENT:  Negative for nasal congestion, mouth sores, sinus pressure/congestion and sore throat.    Eyes:  Negative for pain and visual disturbance.   Respiratory:  Negative for cough, chest tightness and shortness of breath.    Cardiovascular:  Negative for chest pain, palpitations and leg swelling.   Gastrointestinal:  Negative for abdominal distention, abdominal pain, blood in stool, constipation and diarrhea.   Genitourinary:  Negative for dysuria, frequency and hematuria.   Musculoskeletal:  Positive for arthralgias and myalgias. Negative for back pain.   Integumentary:  Negative for rash.   Neurological:  Negative for dizziness, syncope, weakness, numbness and headaches.   Hematological:  Negative for adenopathy.   Psychiatric/Behavioral:   Negative for confusion.          Objective:      Physical Exam  Vitals reviewed.   Constitutional:       General: He is awake.      Appearance: Normal appearance.   HENT:      Head: Normocephalic and atraumatic.      Right Ear: Hearing normal.      Left Ear: Hearing normal.      Nose: Nose normal.      Mouth/Throat:      Comments: Petechiae on the roof of his mouth.  Gum bleeding/oozing.  Eyes:      General: Lids are normal. Vision grossly intact.      Extraocular Movements: Extraocular movements intact.      Conjunctiva/sclera: Conjunctivae normal.   Cardiovascular:      Rate and Rhythm: Normal rate and regular rhythm.      Pulses: Normal pulses.   Pulmonary:      Effort: Pulmonary effort is normal.      Breath sounds: Normal breath sounds. No wheezing, rhonchi or rales.   Musculoskeletal:      Cervical back: Full passive range of motion without pain.      Right lower leg: No edema.      Left lower leg: No edema.   Skin:     General: Skin is warm.      Comments:      Neurological:      General: No focal deficit present.      Mental Status: He is alert and oriented to person, place, and time.   Psychiatric:         Attention and Perception: Attention normal.         Mood and Affect: Mood and affect normal.         Behavior: Behavior is cooperative.         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    Evaluated by Dr. Valdez in [place].     Continue prophylaxis with 500 mg of levofloxacin daily, 200 mg of acyclovir b.i.d., 200 mg of fluconazole daily.    He restarted treatment on 07/10/2023, on 08/14/2023, we dose  reduced by changing venetoclax to 14 out of every 28 days.    Patient was getting venetoclax on days 1 through 14 out of every 28 days and azacitidine on days 1 through 3 out of every 28 days.  His bone marrow biopsy on 05/22/2024 shows recurrent AML.    Started low-dose cytarabine and venetoclax on Monday, 06/24/2024.  Due to start cycle 3 on 09/16/2024.  We will keep this scheduled.    He will need 1 unit of blood and 2 units of platelets today.  I will also call in tranexamic acid oral and prescribed Amicar mouthwash.    Repeat CBC tomorrow with possible transfusion.      Eduard Luna II, MD

## 2024-09-13 ENCOUNTER — INFUSION (OUTPATIENT)
Dept: INFUSION THERAPY | Facility: HOSPITAL | Age: 74
End: 2024-09-13
Attending: INTERNAL MEDICINE
Payer: MEDICARE

## 2024-09-13 ENCOUNTER — LAB VISIT (OUTPATIENT)
Dept: LAB | Facility: HOSPITAL | Age: 74
End: 2024-09-13
Attending: INTERNAL MEDICINE
Payer: MEDICARE

## 2024-09-13 VITALS
OXYGEN SATURATION: 96 % | TEMPERATURE: 98 F | HEART RATE: 68 BPM | SYSTOLIC BLOOD PRESSURE: 134 MMHG | DIASTOLIC BLOOD PRESSURE: 84 MMHG

## 2024-09-13 DIAGNOSIS — C92.00 ACUTE MYELOID LEUKEMIA NOT HAVING ACHIEVED REMISSION: ICD-10-CM

## 2024-09-13 DIAGNOSIS — C92.00 ACUTE MYELOID LEUKEMIA NOT HAVING ACHIEVED REMISSION: Primary | ICD-10-CM

## 2024-09-13 LAB
ABO + RH BLD: NORMAL
BASOPHILS # BLD AUTO: 0.01 X10(3)/MCL
BASOPHILS NFR BLD AUTO: 0.2 %
BLD PROD TYP BPU: NORMAL
BLOOD UNIT EXPIRATION DATE: NORMAL
BLOOD UNIT TYPE CODE: 5100
CROSSMATCH INTERPRETATION: NORMAL
DISPENSE STATUS: NORMAL
EOSINOPHIL # BLD AUTO: 0 X10(3)/MCL (ref 0–0.9)
EOSINOPHIL NFR BLD AUTO: 0 %
ERYTHROCYTE [DISTWIDTH] IN BLOOD BY AUTOMATED COUNT: 14.2 % (ref 11.5–17)
GROUP & RH: NORMAL
HCT VFR BLD AUTO: 19.8 % (ref 42–52)
HGB BLD-MCNC: 6.6 G/DL (ref 14–18)
IMM GRANULOCYTES # BLD AUTO: 0.2 X10(3)/MCL (ref 0–0.04)
IMM GRANULOCYTES NFR BLD AUTO: 4.1 %
INDIRECT COOMBS: NORMAL
LYMPHOCYTES # BLD AUTO: 1 X10(3)/MCL (ref 0.6–4.6)
LYMPHOCYTES NFR BLD AUTO: 20.3 %
MCH RBC QN AUTO: 29.7 PG (ref 27–31)
MCHC RBC AUTO-ENTMCNC: 33.3 G/DL (ref 33–36)
MCV RBC AUTO: 89.2 FL (ref 80–94)
MONOCYTES # BLD AUTO: 3.4 X10(3)/MCL (ref 0.1–1.3)
MONOCYTES NFR BLD AUTO: 69 %
NEUTROPHILS # BLD AUTO: 0.32 X10(3)/MCL (ref 2.1–9.2)
NEUTROPHILS NFR BLD AUTO: 6.4 %
PLATELET # BLD AUTO: 37 X10(3)/MCL (ref 130–400)
PMV BLD AUTO: 8.7 FL (ref 7.4–10.4)
RBC # BLD AUTO: 2.22 X10(6)/MCL (ref 4.7–6.1)
SPECIMEN OUTDATE: NORMAL
UNIT NUMBER: NORMAL
WBC # BLD AUTO: 4.93 X10(3)/MCL (ref 4.5–11.5)

## 2024-09-13 PROCEDURE — 96374 THER/PROPH/DIAG INJ IV PUSH: CPT

## 2024-09-13 PROCEDURE — P9040 RBC LEUKOREDUCED IRRADIATED: HCPCS | Performed by: INTERNAL MEDICINE

## 2024-09-13 PROCEDURE — 36415 COLL VENOUS BLD VENIPUNCTURE: CPT

## 2024-09-13 PROCEDURE — 86850 RBC ANTIBODY SCREEN: CPT | Performed by: INTERNAL MEDICINE

## 2024-09-13 PROCEDURE — 25000003 PHARM REV CODE 250: Performed by: INTERNAL MEDICINE

## 2024-09-13 PROCEDURE — 85025 COMPLETE CBC W/AUTO DIFF WBC: CPT

## 2024-09-13 PROCEDURE — 86923 COMPATIBILITY TEST ELECTRIC: CPT | Performed by: INTERNAL MEDICINE

## 2024-09-13 PROCEDURE — 36430 TRANSFUSION BLD/BLD COMPNT: CPT

## 2024-09-13 PROCEDURE — 86900 BLOOD TYPING SEROLOGIC ABO: CPT | Performed by: INTERNAL MEDICINE

## 2024-09-13 RX ORDER — FAMOTIDINE 10 MG/ML
20 INJECTION INTRAVENOUS ONCE
Status: CANCELLED | OUTPATIENT
Start: 2024-09-13

## 2024-09-13 RX ORDER — HYDROCODONE BITARTRATE AND ACETAMINOPHEN 500; 5 MG/1; MG/1
TABLET ORAL ONCE
Status: COMPLETED | OUTPATIENT
Start: 2024-09-13 | End: 2024-09-13

## 2024-09-13 RX ORDER — FAMOTIDINE 10 MG/ML
20 INJECTION INTRAVENOUS ONCE
Status: COMPLETED | OUTPATIENT
Start: 2024-09-13 | End: 2024-09-13

## 2024-09-13 RX ORDER — HYDROCODONE BITARTRATE AND ACETAMINOPHEN 500; 5 MG/1; MG/1
TABLET ORAL ONCE
Status: CANCELLED | OUTPATIENT
Start: 2024-09-13 | End: 2024-09-13

## 2024-09-13 RX ADMIN — FAMOTIDINE 20 MG: 10 INJECTION INTRAVENOUS at 09:09

## 2024-09-13 RX ADMIN — SODIUM CHLORIDE: 9 INJECTION, SOLUTION INTRAVENOUS at 09:09

## 2024-09-16 ENCOUNTER — INFUSION (OUTPATIENT)
Dept: INFUSION THERAPY | Facility: HOSPITAL | Age: 74
End: 2024-09-16
Attending: INTERNAL MEDICINE
Payer: MEDICARE

## 2024-09-16 ENCOUNTER — LAB VISIT (OUTPATIENT)
Dept: LAB | Facility: HOSPITAL | Age: 74
End: 2024-09-16
Attending: INTERNAL MEDICINE
Payer: MEDICARE

## 2024-09-16 VITALS
WEIGHT: 178.31 LBS | OXYGEN SATURATION: 95 % | HEIGHT: 73 IN | RESPIRATION RATE: 16 BRPM | HEART RATE: 62 BPM | BODY MASS INDEX: 23.63 KG/M2 | DIASTOLIC BLOOD PRESSURE: 62 MMHG | TEMPERATURE: 99 F | SYSTOLIC BLOOD PRESSURE: 117 MMHG

## 2024-09-16 DIAGNOSIS — C92.00 ACUTE MYELOID LEUKEMIA NOT HAVING ACHIEVED REMISSION: ICD-10-CM

## 2024-09-16 DIAGNOSIS — C92.00 ACUTE MYELOID LEUKEMIA NOT HAVING ACHIEVED REMISSION: Primary | ICD-10-CM

## 2024-09-16 LAB
ABO + RH BLD: NORMAL
ALBUMIN SERPL-MCNC: 3 G/DL (ref 3.4–4.8)
ALBUMIN/GLOB SERPL: 0.8 RATIO (ref 1.1–2)
ALP SERPL-CCNC: 75 UNIT/L (ref 40–150)
ALT SERPL-CCNC: 18 UNIT/L (ref 0–55)
ANION GAP SERPL CALC-SCNC: 5 MEQ/L
AST SERPL-CCNC: 13 UNIT/L (ref 5–34)
BASOPHILS # BLD AUTO: 0.01 X10(3)/MCL
BASOPHILS NFR BLD AUTO: 0.3 %
BILIRUB SERPL-MCNC: 0.8 MG/DL
BLD PROD TYP BPU: NORMAL
BLOOD UNIT EXPIRATION DATE: NORMAL
BLOOD UNIT TYPE CODE: 7300
BUN SERPL-MCNC: 21.3 MG/DL (ref 8.4–25.7)
CALCIUM SERPL-MCNC: 8.3 MG/DL (ref 8.8–10)
CHLORIDE SERPL-SCNC: 106 MMOL/L (ref 98–107)
CO2 SERPL-SCNC: 27 MMOL/L (ref 23–31)
CREAT SERPL-MCNC: 0.97 MG/DL (ref 0.73–1.18)
CREAT/UREA NIT SERPL: 22
CROSSMATCH INTERPRETATION: NORMAL
DISPENSE STATUS: NORMAL
EOSINOPHIL # BLD AUTO: 0 X10(3)/MCL (ref 0–0.9)
EOSINOPHIL NFR BLD AUTO: 0 %
ERYTHROCYTE [DISTWIDTH] IN BLOOD BY AUTOMATED COUNT: 14.4 % (ref 11.5–17)
GFR SERPLBLD CREATININE-BSD FMLA CKD-EPI: >60 ML/MIN/1.73/M2
GLOBULIN SER-MCNC: 3.6 GM/DL (ref 2.4–3.5)
GLUCOSE SERPL-MCNC: 131 MG/DL (ref 82–115)
HCT VFR BLD AUTO: 23 % (ref 42–52)
HGB BLD-MCNC: 7.6 G/DL (ref 14–18)
IMM GRANULOCYTES # BLD AUTO: 0.14 X10(3)/MCL (ref 0–0.04)
IMM GRANULOCYTES NFR BLD AUTO: 4.3 %
LYMPHOCYTES # BLD AUTO: 0.46 X10(3)/MCL (ref 0.6–4.6)
LYMPHOCYTES NFR BLD AUTO: 14.1 %
MCH RBC QN AUTO: 29.7 PG (ref 27–31)
MCHC RBC AUTO-ENTMCNC: 33 G/DL (ref 33–36)
MCV RBC AUTO: 89.8 FL (ref 80–94)
MONOCYTES # BLD AUTO: 2.38 X10(3)/MCL (ref 0.1–1.3)
MONOCYTES NFR BLD AUTO: 72.8 %
NEUTROPHILS # BLD AUTO: 0.28 X10(3)/MCL (ref 2.1–9.2)
NEUTROPHILS NFR BLD AUTO: 8.5 %
PLATELET # BLD AUTO: 14 X10(3)/MCL (ref 130–400)
PMV BLD AUTO: 10.1 FL (ref 7.4–10.4)
POTASSIUM SERPL-SCNC: 3.8 MMOL/L (ref 3.5–5.1)
PROT SERPL-MCNC: 6.6 GM/DL (ref 5.8–7.6)
RBC # BLD AUTO: 2.56 X10(6)/MCL (ref 4.7–6.1)
SODIUM SERPL-SCNC: 138 MMOL/L (ref 136–145)
UNIT NUMBER: NORMAL
WBC # BLD AUTO: 3.27 X10(3)/MCL (ref 4.5–11.5)

## 2024-09-16 PROCEDURE — 85025 COMPLETE CBC W/AUTO DIFF WBC: CPT

## 2024-09-16 PROCEDURE — 96374 THER/PROPH/DIAG INJ IV PUSH: CPT

## 2024-09-16 PROCEDURE — 36430 TRANSFUSION BLD/BLD COMPNT: CPT

## 2024-09-16 PROCEDURE — 25000003 PHARM REV CODE 250: Performed by: INTERNAL MEDICINE

## 2024-09-16 PROCEDURE — 80053 COMPREHEN METABOLIC PANEL: CPT

## 2024-09-16 PROCEDURE — 96401 CHEMO ANTI-NEOPL SQ/IM: CPT

## 2024-09-16 PROCEDURE — 36415 COLL VENOUS BLD VENIPUNCTURE: CPT

## 2024-09-16 PROCEDURE — 63600175 PHARM REV CODE 636 W HCPCS: Performed by: NURSE PRACTITIONER

## 2024-09-16 PROCEDURE — P9037 PLATE PHERES LEUKOREDU IRRAD: HCPCS | Performed by: INTERNAL MEDICINE

## 2024-09-16 PROCEDURE — 96375 TX/PRO/DX INJ NEW DRUG ADDON: CPT

## 2024-09-16 RX ORDER — HYDROCODONE BITARTRATE AND ACETAMINOPHEN 500; 5 MG/1; MG/1
TABLET ORAL ONCE
Status: CANCELLED | OUTPATIENT
Start: 2024-09-16 | End: 2024-09-16

## 2024-09-16 RX ORDER — CYTARABINE 20 MG/ML
20 INJECTION, SOLUTION INTRATHECAL; INTRAVENOUS; SUBCUTANEOUS ONCE
Status: CANCELLED | OUTPATIENT
Start: 2024-09-16 | End: 2024-09-16

## 2024-09-16 RX ORDER — CYTARABINE 20 MG/ML
20 INJECTION, SOLUTION INTRATHECAL; INTRAVENOUS; SUBCUTANEOUS ONCE
Status: CANCELLED | OUTPATIENT
Start: 2024-09-18 | End: 2024-09-18

## 2024-09-16 RX ORDER — FAMOTIDINE 10 MG/ML
20 INJECTION INTRAVENOUS ONCE
Status: COMPLETED | OUTPATIENT
Start: 2024-09-16 | End: 2024-09-16

## 2024-09-16 RX ORDER — CYTARABINE 20 MG/ML
20 INJECTION, SOLUTION INTRATHECAL; INTRAVENOUS; SUBCUTANEOUS ONCE
OUTPATIENT
Start: 2024-09-25 | End: 2024-09-25

## 2024-09-16 RX ORDER — CYTARABINE 20 MG/ML
20 INJECTION, SOLUTION INTRATHECAL; INTRAVENOUS; SUBCUTANEOUS ONCE
OUTPATIENT
Start: 2024-09-27 | End: 2024-09-27

## 2024-09-16 RX ORDER — CYTARABINE 20 MG/ML
20 INJECTION, SOLUTION INTRATHECAL; INTRAVENOUS; SUBCUTANEOUS ONCE
Status: CANCELLED | OUTPATIENT
Start: 2024-09-17 | End: 2024-09-17

## 2024-09-16 RX ORDER — CYTARABINE 20 MG/ML
20 INJECTION, SOLUTION INTRATHECAL; INTRAVENOUS; SUBCUTANEOUS ONCE
OUTPATIENT
Start: 2024-09-24 | End: 2024-09-24

## 2024-09-16 RX ORDER — CYTARABINE 20 MG/ML
20 INJECTION, SOLUTION INTRATHECAL; INTRAVENOUS; SUBCUTANEOUS ONCE
Status: COMPLETED | OUTPATIENT
Start: 2024-09-16 | End: 2024-09-16

## 2024-09-16 RX ORDER — CYTARABINE 20 MG/ML
20 INJECTION, SOLUTION INTRATHECAL; INTRAVENOUS; SUBCUTANEOUS ONCE
Status: CANCELLED | OUTPATIENT
Start: 2024-09-19 | End: 2024-09-19

## 2024-09-16 RX ORDER — FAMOTIDINE 10 MG/ML
20 INJECTION INTRAVENOUS ONCE
Status: CANCELLED | OUTPATIENT
Start: 2024-09-16

## 2024-09-16 RX ORDER — CYTARABINE 20 MG/ML
20 INJECTION, SOLUTION INTRATHECAL; INTRAVENOUS; SUBCUTANEOUS ONCE
OUTPATIENT
Start: 2024-09-23 | End: 2024-09-23

## 2024-09-16 RX ORDER — CYTARABINE 20 MG/ML
20 INJECTION, SOLUTION INTRATHECAL; INTRAVENOUS; SUBCUTANEOUS ONCE
Status: CANCELLED | OUTPATIENT
Start: 2024-09-20 | End: 2024-09-20

## 2024-09-16 RX ORDER — CYTARABINE 20 MG/ML
20 INJECTION, SOLUTION INTRATHECAL; INTRAVENOUS; SUBCUTANEOUS ONCE
OUTPATIENT
Start: 2024-09-26 | End: 2024-09-26

## 2024-09-16 RX ORDER — HYDROCODONE BITARTRATE AND ACETAMINOPHEN 500; 5 MG/1; MG/1
TABLET ORAL ONCE
Status: COMPLETED | OUTPATIENT
Start: 2024-09-16 | End: 2024-09-16

## 2024-09-16 RX ADMIN — FAMOTIDINE 20 MG: 10 INJECTION, SOLUTION INTRAVENOUS at 09:09

## 2024-09-16 RX ADMIN — CYTARABINE 40 MG: 20 INJECTION, SOLUTION INTRATHECAL; INTRAVENOUS; SUBCUTANEOUS at 09:09

## 2024-09-16 RX ADMIN — SODIUM CHLORIDE: 9 INJECTION, SOLUTION INTRAVENOUS at 09:09

## 2024-09-16 NOTE — PLAN OF CARE
Plan of care reviewed with patient; patient in agreement. C4D1 Cytarabine + 1unit PLT for platelet count 14K completed. Appts reviewed and printed for patient.

## 2024-09-17 ENCOUNTER — INFUSION (OUTPATIENT)
Dept: INFUSION THERAPY | Facility: HOSPITAL | Age: 74
End: 2024-09-17
Attending: INTERNAL MEDICINE
Payer: MEDICARE

## 2024-09-17 VITALS
HEART RATE: 75 BPM | DIASTOLIC BLOOD PRESSURE: 73 MMHG | TEMPERATURE: 98 F | SYSTOLIC BLOOD PRESSURE: 122 MMHG | BODY MASS INDEX: 23.59 KG/M2 | WEIGHT: 178 LBS | HEIGHT: 73 IN | RESPIRATION RATE: 16 BRPM | OXYGEN SATURATION: 96 %

## 2024-09-17 DIAGNOSIS — C92.00 ACUTE MYELOID LEUKEMIA NOT HAVING ACHIEVED REMISSION: Primary | ICD-10-CM

## 2024-09-17 PROCEDURE — 96401 CHEMO ANTI-NEOPL SQ/IM: CPT

## 2024-09-17 PROCEDURE — 63600175 PHARM REV CODE 636 W HCPCS: Performed by: NURSE PRACTITIONER

## 2024-09-17 RX ORDER — CYTARABINE 20 MG/ML
20 INJECTION, SOLUTION INTRATHECAL; INTRAVENOUS; SUBCUTANEOUS ONCE
Status: COMPLETED | OUTPATIENT
Start: 2024-09-17 | End: 2024-09-17

## 2024-09-17 RX ADMIN — CYTARABINE 40 MG: 20 INJECTION, SOLUTION INTRATHECAL; INTRAVENOUS; SUBCUTANEOUS at 10:09

## 2024-09-18 ENCOUNTER — INFUSION (OUTPATIENT)
Dept: INFUSION THERAPY | Facility: HOSPITAL | Age: 74
End: 2024-09-18
Attending: INTERNAL MEDICINE
Payer: MEDICARE

## 2024-09-18 VITALS
SYSTOLIC BLOOD PRESSURE: 107 MMHG | DIASTOLIC BLOOD PRESSURE: 58 MMHG | BODY MASS INDEX: 23.59 KG/M2 | HEART RATE: 72 BPM | RESPIRATION RATE: 16 BRPM | OXYGEN SATURATION: 98 % | HEIGHT: 73 IN | WEIGHT: 178 LBS | TEMPERATURE: 98 F

## 2024-09-18 DIAGNOSIS — C92.00 ACUTE MYELOID LEUKEMIA NOT HAVING ACHIEVED REMISSION: Primary | ICD-10-CM

## 2024-09-18 PROCEDURE — 96401 CHEMO ANTI-NEOPL SQ/IM: CPT

## 2024-09-18 PROCEDURE — 63600175 PHARM REV CODE 636 W HCPCS: Performed by: NURSE PRACTITIONER

## 2024-09-18 RX ORDER — CYTARABINE 20 MG/ML
20 INJECTION, SOLUTION INTRATHECAL; INTRAVENOUS; SUBCUTANEOUS ONCE
Status: COMPLETED | OUTPATIENT
Start: 2024-09-18 | End: 2024-09-18

## 2024-09-18 RX ADMIN — CYTARABINE 40 MG: 20 INJECTION, SOLUTION INTRATHECAL; INTRAVENOUS; SUBCUTANEOUS at 09:09

## 2024-09-19 ENCOUNTER — LAB VISIT (OUTPATIENT)
Dept: LAB | Facility: HOSPITAL | Age: 74
End: 2024-09-19
Attending: INTERNAL MEDICINE
Payer: MEDICARE

## 2024-09-19 ENCOUNTER — INFUSION (OUTPATIENT)
Dept: INFUSION THERAPY | Facility: HOSPITAL | Age: 74
End: 2024-09-19
Attending: INTERNAL MEDICINE
Payer: MEDICARE

## 2024-09-19 VITALS
BODY MASS INDEX: 23.59 KG/M2 | DIASTOLIC BLOOD PRESSURE: 71 MMHG | TEMPERATURE: 98 F | HEIGHT: 73 IN | HEART RATE: 66 BPM | SYSTOLIC BLOOD PRESSURE: 135 MMHG | WEIGHT: 178 LBS | RESPIRATION RATE: 16 BRPM

## 2024-09-19 DIAGNOSIS — C92.00 ACUTE MYELOID LEUKEMIA NOT HAVING ACHIEVED REMISSION: Primary | ICD-10-CM

## 2024-09-19 DIAGNOSIS — D61.818 PANCYTOPENIA: Chronic | ICD-10-CM

## 2024-09-19 DIAGNOSIS — C92.00 ACUTE MYELOID LEUKEMIA NOT HAVING ACHIEVED REMISSION: ICD-10-CM

## 2024-09-19 LAB
ABO + RH BLD: NORMAL
ABO + RH BLD: NORMAL
BASOPHILS # BLD AUTO: 0.01 X10(3)/MCL
BASOPHILS NFR BLD AUTO: 0.3 %
BLD PROD TYP BPU: NORMAL
BLD PROD TYP BPU: NORMAL
BLOOD UNIT EXPIRATION DATE: NORMAL
BLOOD UNIT EXPIRATION DATE: NORMAL
BLOOD UNIT TYPE CODE: 5100
BLOOD UNIT TYPE CODE: 5100
CROSSMATCH INTERPRETATION: NORMAL
CROSSMATCH INTERPRETATION: NORMAL
DISPENSE STATUS: NORMAL
DISPENSE STATUS: NORMAL
EOSINOPHIL # BLD AUTO: 0 X10(3)/MCL (ref 0–0.9)
EOSINOPHIL NFR BLD AUTO: 0 %
ERYTHROCYTE [DISTWIDTH] IN BLOOD BY AUTOMATED COUNT: 14.3 % (ref 11.5–17)
GROUP & RH: NORMAL
HCT VFR BLD AUTO: 20.6 % (ref 42–52)
HGB BLD-MCNC: 6.8 G/DL (ref 14–18)
IMM GRANULOCYTES # BLD AUTO: 0.1 X10(3)/MCL (ref 0–0.04)
IMM GRANULOCYTES NFR BLD AUTO: 3.2 %
INDIRECT COOMBS: NORMAL
LYMPHOCYTES # BLD AUTO: 0.48 X10(3)/MCL (ref 0.6–4.6)
LYMPHOCYTES NFR BLD AUTO: 15.3 %
MCH RBC QN AUTO: 29.4 PG (ref 27–31)
MCHC RBC AUTO-ENTMCNC: 33 G/DL (ref 33–36)
MCV RBC AUTO: 89.2 FL (ref 80–94)
MONOCYTES # BLD AUTO: 2.22 X10(3)/MCL (ref 0.1–1.3)
MONOCYTES NFR BLD AUTO: 70.9 %
NEUTROPHILS # BLD AUTO: 0.32 X10(3)/MCL (ref 2.1–9.2)
NEUTROPHILS NFR BLD AUTO: 10.3 %
PLATELET # BLD AUTO: 14 X10(3)/MCL (ref 130–400)
PMV BLD AUTO: 10.9 FL (ref 7.4–10.4)
RBC # BLD AUTO: 2.31 X10(6)/MCL (ref 4.7–6.1)
SPECIMEN OUTDATE: NORMAL
UNIT NUMBER: NORMAL
UNIT NUMBER: NORMAL
WBC # BLD AUTO: 3.13 X10(3)/MCL (ref 4.5–11.5)

## 2024-09-19 PROCEDURE — 63600175 PHARM REV CODE 636 W HCPCS: Performed by: INTERNAL MEDICINE

## 2024-09-19 PROCEDURE — 96375 TX/PRO/DX INJ NEW DRUG ADDON: CPT

## 2024-09-19 PROCEDURE — 36415 COLL VENOUS BLD VENIPUNCTURE: CPT

## 2024-09-19 PROCEDURE — 85025 COMPLETE CBC W/AUTO DIFF WBC: CPT

## 2024-09-19 PROCEDURE — 86850 RBC ANTIBODY SCREEN: CPT | Performed by: NURSE PRACTITIONER

## 2024-09-19 PROCEDURE — 86901 BLOOD TYPING SEROLOGIC RH(D): CPT | Performed by: NURSE PRACTITIONER

## 2024-09-19 PROCEDURE — P9040 RBC LEUKOREDUCED IRRADIATED: HCPCS | Performed by: NURSE PRACTITIONER

## 2024-09-19 PROCEDURE — 63600175 PHARM REV CODE 636 W HCPCS: Performed by: NURSE PRACTITIONER

## 2024-09-19 PROCEDURE — 25000003 PHARM REV CODE 250: Performed by: INTERNAL MEDICINE

## 2024-09-19 PROCEDURE — 86900 BLOOD TYPING SEROLOGIC ABO: CPT | Performed by: NURSE PRACTITIONER

## 2024-09-19 PROCEDURE — P9037 PLATE PHERES LEUKOREDU IRRAD: HCPCS | Performed by: INTERNAL MEDICINE

## 2024-09-19 PROCEDURE — 36430 TRANSFUSION BLD/BLD COMPNT: CPT

## 2024-09-19 PROCEDURE — 86923 COMPATIBILITY TEST ELECTRIC: CPT | Performed by: NURSE PRACTITIONER

## 2024-09-19 PROCEDURE — 36415 COLL VENOUS BLD VENIPUNCTURE: CPT | Mod: 91

## 2024-09-19 PROCEDURE — 96401 CHEMO ANTI-NEOPL SQ/IM: CPT

## 2024-09-19 PROCEDURE — 96374 THER/PROPH/DIAG INJ IV PUSH: CPT

## 2024-09-19 RX ORDER — FAMOTIDINE 10 MG/ML
20 INJECTION INTRAVENOUS
Start: 2024-09-19

## 2024-09-19 RX ORDER — DIPHENHYDRAMINE HYDROCHLORIDE 50 MG/ML
25 INJECTION INTRAMUSCULAR; INTRAVENOUS ONCE
Status: COMPLETED | OUTPATIENT
Start: 2024-09-19 | End: 2024-09-19

## 2024-09-19 RX ORDER — CYTARABINE 20 MG/ML
20 INJECTION, SOLUTION INTRATHECAL; INTRAVENOUS; SUBCUTANEOUS ONCE
Status: COMPLETED | OUTPATIENT
Start: 2024-09-19 | End: 2024-09-19

## 2024-09-19 RX ORDER — FAMOTIDINE 10 MG/ML
20 INJECTION INTRAVENOUS
Status: COMPLETED | OUTPATIENT
Start: 2024-09-19 | End: 2024-09-19

## 2024-09-19 RX ORDER — FAMOTIDINE 10 MG/ML
20 INJECTION INTRAVENOUS ONCE
OUTPATIENT
Start: 2024-09-19

## 2024-09-19 RX ORDER — HYDROCODONE BITARTRATE AND ACETAMINOPHEN 500; 5 MG/1; MG/1
TABLET ORAL ONCE
OUTPATIENT
Start: 2024-09-19 | End: 2024-09-19

## 2024-09-19 RX ADMIN — DIPHENHYDRAMINE HYDROCHLORIDE 25 MG: 50 INJECTION INTRAMUSCULAR; INTRAVENOUS at 10:09

## 2024-09-19 RX ADMIN — FAMOTIDINE 20 MG: 10 INJECTION, SOLUTION INTRAVENOUS at 09:09

## 2024-09-19 RX ADMIN — CYTARABINE 40 MG: 20 INJECTION, SOLUTION INTRATHECAL; INTRAVENOUS; SUBCUTANEOUS at 08:09

## 2024-09-19 NOTE — NURSING
1005: Platlets completed.  1030: unit of PRBCs started. 1030: Developed hive on left upper arm and right forearm. C/O itching. Dr. Luna notified. Benadryl ordered and given @ 1040.  1050: Itching relieved and hives gone.

## 2024-09-20 ENCOUNTER — OFFICE VISIT (OUTPATIENT)
Dept: INTERNAL MEDICINE | Facility: CLINIC | Age: 74
End: 2024-09-20
Payer: MEDICARE

## 2024-09-20 ENCOUNTER — INFUSION (OUTPATIENT)
Dept: INFUSION THERAPY | Facility: HOSPITAL | Age: 74
End: 2024-09-20
Attending: INTERNAL MEDICINE
Payer: MEDICARE

## 2024-09-20 VITALS
WEIGHT: 178 LBS | HEIGHT: 72 IN | TEMPERATURE: 98 F | SYSTOLIC BLOOD PRESSURE: 122 MMHG | BODY MASS INDEX: 24.11 KG/M2 | DIASTOLIC BLOOD PRESSURE: 62 MMHG | HEART RATE: 80 BPM | RESPIRATION RATE: 16 BRPM

## 2024-09-20 VITALS
HEART RATE: 83 BPM | OXYGEN SATURATION: 95 % | HEIGHT: 72 IN | WEIGHT: 179 LBS | BODY MASS INDEX: 24.24 KG/M2 | DIASTOLIC BLOOD PRESSURE: 68 MMHG | SYSTOLIC BLOOD PRESSURE: 120 MMHG

## 2024-09-20 DIAGNOSIS — C92.00 ACUTE MYELOID LEUKEMIA NOT HAVING ACHIEVED REMISSION: Primary | ICD-10-CM

## 2024-09-20 DIAGNOSIS — R07.89 CHEST WALL DISCOMFORT: Primary | ICD-10-CM

## 2024-09-20 PROBLEM — K64.0 FIRST DEGREE HEMORRHOIDS: Status: ACTIVE | Noted: 2020-02-18

## 2024-09-20 PROBLEM — G47.33 OSA (OBSTRUCTIVE SLEEP APNEA): Status: ACTIVE | Noted: 2020-01-13

## 2024-09-20 PROBLEM — I48.91 ATRIAL FIBRILLATION: Status: ACTIVE | Noted: 2024-09-20

## 2024-09-20 PROBLEM — C77.0 SECONDARY AND UNSPECIFIED MALIGNANT NEOPLASM OF LYMPH NODES OF HEAD, FACE AND NECK: Status: ACTIVE | Noted: 2018-03-05

## 2024-09-20 PROBLEM — Z86.010 HISTORY OF COLON POLYPS: Status: ACTIVE | Noted: 2024-09-20

## 2024-09-20 PROBLEM — C43.9 MALIGNANT MELANOMA: Status: ACTIVE | Noted: 2018-02-27

## 2024-09-20 PROBLEM — Z85.820 HISTORY OF MALIGNANT MELANOMA OF SKIN: Status: ACTIVE | Noted: 2019-03-27

## 2024-09-20 PROBLEM — I63.9 STROKE: Status: ACTIVE | Noted: 2019-09-24

## 2024-09-20 PROBLEM — C43.39 MALIGNANT MELANOMA OF OTHER PARTS OF FACE: Status: ACTIVE | Noted: 2018-02-27

## 2024-09-20 PROBLEM — E78.2 MIXED HYPERLIPIDEMIA: Status: ACTIVE | Noted: 2024-09-20

## 2024-09-20 PROBLEM — Z85.89: Status: ACTIVE | Noted: 2018-08-20

## 2024-09-20 PROBLEM — Z86.0100 HISTORY OF COLON POLYPS: Status: ACTIVE | Noted: 2024-09-20

## 2024-09-20 PROBLEM — D12.5 BENIGN NEOPLASM OF SIGMOID COLON: Status: ACTIVE | Noted: 2020-02-18

## 2024-09-20 PROCEDURE — 96401 CHEMO ANTI-NEOPL SQ/IM: CPT

## 2024-09-20 PROCEDURE — 63600175 PHARM REV CODE 636 W HCPCS: Performed by: NURSE PRACTITIONER

## 2024-09-20 RX ORDER — CELECOXIB 200 MG/1
200 CAPSULE ORAL DAILY PRN
Qty: 14 CAPSULE | Refills: 0 | Status: SHIPPED | OUTPATIENT
Start: 2024-09-20

## 2024-09-20 RX ORDER — TRANEXAMIC ACID 650 MG/1
650 TABLET ORAL ONCE
COMMUNITY
Start: 2024-09-15

## 2024-09-20 RX ORDER — CYTARABINE 20 MG/ML
20 INJECTION, SOLUTION INTRATHECAL; INTRAVENOUS; SUBCUTANEOUS ONCE
Status: COMPLETED | OUTPATIENT
Start: 2024-09-20 | End: 2024-09-20

## 2024-09-20 RX ADMIN — CYTARABINE 40 MG: 20 INJECTION, SOLUTION INTRATHECAL; INTRAVENOUS; SUBCUTANEOUS at 08:09

## 2024-09-20 NOTE — ASSESSMENT & PLAN NOTE
Trial Celebrex 200mg daily x 5-7 days.  Can try ice and heat as needed for comfort.    Instructed to notify clinic if symptoms persist or worsen.  Could consider chest x-ray or other imaging at that point.

## 2024-09-20 NOTE — PROGRESS NOTES
Internal Medicine    Patient Name:  Hussein Gonzalez Jr.   Patient ID: [id number]     Chief Complaint: Chest Pain (Left rib pain)      HPI:     Hussein Gonzalez Jr. is a 74 y.o. male, known to Dr Andres, is here today for requested visit.  Medical comorbidities include HTN, CVA s/t embolic phenomena, melanoma s/p resection XRT for recurrence (2019) and another resection on his right ear this year. Also with pancytopenia established with Hematology/Oncology and currently being treated for AML.      Patient reports left lower rib pain that started 4 or 5 days ago.  Does not recall injury to the area.   He describes pain as sharp and intermittent.  Pain is worsened sneezes or coughs.  Discomfort is not worsened with position changes.  He has tried muscle rubs with some improvement. Overall, he reports pain is improving but continues to have sharp intermittent pain.  Denies pain with palpation.     Last AWV: 04/24/2024      Past Medical History:   Diagnosis Date    Anxiety and depression     BPH (benign prostatic hyperplasia)     History of CVA (cerebrovascular accident) without residual deficits     HTN (hypertension)     Malignant melanoma of skin, unspecified     NSTEMI (non-ST elevated myocardial infarction)     Sepsis, Gram negative     Sleep apnea         Past Surgical History:   Procedure Laterality Date    BONE MARROW ASPIRATION N/A 6/27/2023    Procedure: ASPIRATION, BONE MARROW;  Surgeon: Sabino Núeñz MD;  Location: [facility];  Service: General;  Laterality: N/A;  ALL ASSESS FOR MOLECULAR MRD    BONE MARROW ASPIRATION N/A 5/22/2024    Procedure: ASPIRATION, BONE MARROW;  Surgeon: Sabino Núñez MD;  Location: [facility] OR;  Service: General;  Laterality: N/A;    BONE MARROW BIOPSY N/A 1/25/2023    Procedure: Biopsy-bone marrow;  Surgeon: Rd Carlos MD;  Location: [facility] OR;  Service: General;  Laterality: N/A;    BONE MARROW BIOPSY N/A 11/21/2023    Procedure: Biopsy-bone marrow;  Surgeon: Junior Castro  MD;  Location: [facility] OR;  Service: General;  Laterality: N/A;    COLONOSCOPY      COLONOSCOPY N/A 1/24/2023    Procedure: COLONOSCOPY (DOUBLE);  Surgeon: Jimy Baker MD;  Location: [facility] ENDOSCOPY;  Service: Gastroenterology;  Laterality: N/A;    EGD, WITH CLOSED BIOPSY N/A 1/24/2023    Procedure: EGD, WITH CLOSED BIOPSY;  Surgeon: Jimy Baker MD;  Location: [facility] ENDOSCOPY;  Service: Gastroenterology;  Laterality: N/A;    EXCISION OF MELANOMA      SINUS SURGERY      TONSILLECTOMY          Social History     Tobacco Use    Smoking status: Never    Smokeless tobacco: Never   Substance and Sexual Activity    Alcohol use: Not Currently     Alcohol/week: 7.0 standard drinks of alcohol     Types: 7 Cans of beer per week    Drug use: Never    Sexual activity: Yes        Current Outpatient Medications   Medication Instructions    (Magic mouthwash) 1:1:1 diphenhydrAMINE(Benadryl) 12.5mg/5ml liq, aluminum & magnesium hydroxide-simethicone (Maalox), LIDOcaine viscous 2% 15 mLs, Swish & Spit, Every 4 hours PRN, for mouth sores    aminocaproic acid (AMICAR) 1 g, Oral, Every hour PRN    celecoxib (CELEBREX) 200 mg, Oral, Daily PRN    citalopram (CELEXA) 40 mg, Oral    gabapentin (NEURONTIN) 300 mg, Oral, 2 times daily    lisinopriL-hydrochlorothiazide (PRINZIDE,ZESTORETIC) 20-12.5 mg per tablet Take 1 tablet by mouth once daily    multivitamin (THERAGRAN) per tablet 1 tablet, Oral, Daily    rosuvastatin (CRESTOR) 40 mg, Oral, Daily    tamsulosin (FLOMAX) 0.4 mg, Oral, Daily    tranexamic acid (LYSTEDA) 650 mg, Oral, Once    venetoclax (VENCLEXTA) 100 mg Tab TAKE 2 TABLETS BY MOUTH ONCE DAILY ON DAYS 1-14 OF A 28 DAY CYCLE       Review of patient's allergies indicates:   Allergen Reactions    Acetaminophen Other (See Comments)     Restless leg     Benadryl [diphenhydramine hcl] Other (See Comments)     Restless Legs        Patient Care Team:  Nayely Andres MD as PCP - General (Internal  Medicine)  Nayely Andres MD     Subjective:     Review of Systems   Constitutional:  Negative for appetite change, chills, diaphoresis and fever.   HENT:  Negative for congestion, ear pain, sinus pain and sore throat.    Eyes:  Negative for pain and visual disturbance.   Respiratory:  Negative for cough, chest tightness, shortness of breath and wheezing.    Cardiovascular:  Negative for chest pain, palpitations and leg swelling.   Gastrointestinal:  Negative for abdominal pain, constipation, diarrhea, nausea and vomiting.   Endocrine: Negative for cold intolerance and heat intolerance.   Genitourinary:  Negative for difficulty urinating, dysuria, frequency and hematuria.   Musculoskeletal:  Negative for arthralgias and myalgias.   Skin:  Negative for color change and rash.   Allergic/Immunologic: Negative.    Neurological: Negative.  Negative for dizziness, syncope, light-headedness and numbness.   Hematological: Negative.  Does not bruise/bleed easily.   Psychiatric/Behavioral: Negative.  Negative for dysphoric mood. The patient is not nervous/anxious.    All other systems reviewed and are negative.      Objective:     Visit Vitals  /68 (BP Location: Left arm, Patient Position: Sitting, BP Method: Medium (Manual))   Pulse 83   Ht 6' (1.829 m)   Wt 81.2 kg (179 lb)   SpO2 95%   BMI 24.28 kg/m²       Physical Exam  Vitals and nursing note reviewed.   Constitutional:       General: He is not in acute distress.     Appearance: He is not ill-appearing.   HENT:      Head: Normocephalic and atraumatic.      Nose: No congestion.      Mouth/Throat:      Mouth: Mucous membranes are moist.      Pharynx: Oropharynx is clear. No oropharyngeal exudate or posterior oropharyngeal erythema.   Eyes:      General: No scleral icterus.     Extraocular Movements: Extraocular movements intact.      Conjunctiva/sclera: Conjunctivae normal.      Pupils: Pupils are equal, round, and reactive to light.   Neck:      Vascular: No  carotid bruit.   Cardiovascular:      Rate and Rhythm: Normal rate and regular rhythm.      Heart sounds: Normal heart sounds. No murmur heard.     No friction rub. No gallop.   Pulmonary:      Effort: Pulmonary effort is normal. No respiratory distress.      Breath sounds: Normal breath sounds. No wheezing, rhonchi or rales.   Abdominal:      General: Abdomen is flat. Bowel sounds are normal. There is no distension.      Palpations: Abdomen is soft. There is no mass.      Tenderness: There is no abdominal tenderness. There is no right CVA tenderness, left CVA tenderness, guarding or rebound.          Comments: No crepitus or lumps palpated at area of discomfort   Musculoskeletal:         General: Normal range of motion.      Cervical back: Normal range of motion and neck supple.   Lymphadenopathy:      Cervical: No cervical adenopathy.   Skin:     General: Skin is warm and dry.      Capillary Refill: Capillary refill takes less than 2 seconds.   Neurological:      General: No focal deficit present.      Mental Status: He is alert and oriented to person, place, and time.   Psychiatric:         Mood and Affect: Mood normal.         Behavior: Behavior normal.       Labs Reviewed:     Chemistry:  Lab Results   Component Value Date     09/16/2024    K 3.8 09/16/2024    BUN 21.3 09/16/2024    CREATININE 0.97 09/16/2024    EGFRNORACEVR >60 09/16/2024    GLUCOSE 131 (H) 09/16/2024    CALCIUM 8.3 (L) 09/16/2024    ALKPHOS 75 09/16/2024    LABPROT 6.6 09/16/2024    ALBUMIN 3.0 (L) 09/16/2024    BILIDIR 0.4 02/25/2021    IBILI 0.80 02/25/2021    AST 13 09/16/2024    ALT 18 09/16/2024    [id number] 46.1 04/23/2024    TSH 0.798 04/23/2024    PSA 1.06 04/23/2024        Hematology:  Lab Results   Component Value Date    WBC 3.13 (L) 09/19/2024    HGB 6.8 (L) 09/19/2024    HCT 20.6 (L) 09/19/2024    PLT 14 (LL) 09/19/2024       Lipid Panel:  Lab Results   Component Value Date    CHOL 105 04/23/2024    HDL 34 (L) 04/23/2024     LDL 57.00 04/23/2024    TRIG 72 04/23/2024    TOTALCHOLEST 3 04/23/2024        Urine:  Lab Results   Component Value Date    APPEARANCEUA Clear 07/05/2024    SGUA >1.050 (H) 07/05/2024    PROTEINUA Trace (A) 07/05/2024    KETONESUA Negative 07/05/2024    LEUKOCYTESUR Negative 07/05/2024    RBCUA 0-5 07/05/2024    WBCUA 0-5 07/05/2024    BACTERIA None Seen 07/05/2024    SQEPUA None Seen 07/05/2024    HYALINECASTS 3-5 (A) 07/05/2024        Assessment:       ICD-10-CM ICD-9-CM   1. Chest wall discomfort  R07.89 786.52        Plan:     1. Chest wall discomfort  Assessment & Plan:  Trial Celebrex 200mg daily x 5-7 days.  Can try ice and heat as needed for comfort.    Instructed to notify clinic if symptoms persist or worsen.  Could consider chest x-ray or other imaging at that point.      Other orders  -     celecoxib (CELEBREX) 200 MG capsule; Take 1 capsule (200 mg total) by mouth daily as needed for Pain.  Dispense: 14 capsule; Refill: 0           Follow up for Previously scheduled and PRN if need. In addition to their scheduled follow up, the patient has also been instructed to follow up on as needed basis.       Future Appointments   Date Time Provider Department Center   9/23/2024  7:30 AM LAB, [facility] LAB [facility]   9/23/2024  9:00 AM CHAIR 27, [facility]   9/24/2024  9:30 AM INJECTION CHAIR 02, [facility]   9/25/2024  9:30 AM INJECTION CHAIR 01, [facility]   9/26/2024  8:00 AM LAB, [facility] LAB [facility]   9/26/2024  9:00 AM CHAIR 33, [facility]   9/27/2024  9:30 AM INJECTION CHAIR 01, [facility]   10/10/2024  9:00 AM LAB, [facility] OL[facility] LAB [facility]   10/10/2024  9:30 AM Jenna Bobby FNP OLB HEMONC [facility]   10/10/2024 10:00 AM  27JASON [facility]   10/29/2024  10:20 AM Luis Alberto Patel FNP [facility]   4/29/2025  8:40 AM Nayely Andres MD [facility]CRISTIAN[facility] [place] [id number]          RIVERA Gramajo

## 2024-09-23 ENCOUNTER — INFUSION (OUTPATIENT)
Dept: INFUSION THERAPY | Facility: HOSPITAL | Age: 74
End: 2024-09-23
Attending: INTERNAL MEDICINE
Payer: MEDICARE

## 2024-09-23 ENCOUNTER — LAB VISIT (OUTPATIENT)
Dept: LAB | Facility: HOSPITAL | Age: 74
End: 2024-09-23
Attending: INTERNAL MEDICINE
Payer: MEDICARE

## 2024-09-23 VITALS
WEIGHT: 178 LBS | BODY MASS INDEX: 24.11 KG/M2 | SYSTOLIC BLOOD PRESSURE: 127 MMHG | HEART RATE: 78 BPM | RESPIRATION RATE: 18 BRPM | DIASTOLIC BLOOD PRESSURE: 65 MMHG | TEMPERATURE: 98 F | OXYGEN SATURATION: 96 % | HEIGHT: 72 IN

## 2024-09-23 DIAGNOSIS — C92.00 ACUTE MYELOID LEUKEMIA NOT HAVING ACHIEVED REMISSION: Primary | ICD-10-CM

## 2024-09-23 DIAGNOSIS — C92.00 ACUTE MYELOID LEUKEMIA NOT HAVING ACHIEVED REMISSION: ICD-10-CM

## 2024-09-23 LAB
ABS NEUT CALC (OHS): 0.33 X10(3)/MCL (ref 2.1–9.2)
ALBUMIN SERPL-MCNC: 2.9 G/DL (ref 3.4–4.8)
ALBUMIN/GLOB SERPL: 0.9 RATIO (ref 1.1–2)
ALP SERPL-CCNC: 81 UNIT/L (ref 40–150)
ALT SERPL-CCNC: 22 UNIT/L (ref 0–55)
ANION GAP SERPL CALC-SCNC: 9 MEQ/L
AST SERPL-CCNC: 15 UNIT/L (ref 5–34)
B-HCG SERPL QL: 13 %
BILIRUB SERPL-MCNC: 0.4 MG/DL
BUN SERPL-MCNC: 15.9 MG/DL (ref 8.4–25.7)
CALCIUM SERPL-MCNC: 8.3 MG/DL (ref 8.8–10)
CHLORIDE SERPL-SCNC: 105 MMOL/L (ref 98–107)
CO2 SERPL-SCNC: 27 MMOL/L (ref 23–31)
CREAT SERPL-MCNC: 1.05 MG/DL (ref 0.73–1.18)
CREAT/UREA NIT SERPL: 15
ERYTHROCYTE [DISTWIDTH] IN BLOOD BY AUTOMATED COUNT: 14.3 % (ref 11.5–17)
GFR SERPLBLD CREATININE-BSD FMLA CKD-EPI: >60 ML/MIN/1.73/M2
GLOBULIN SER-MCNC: 3.4 GM/DL (ref 2.4–3.5)
GLUCOSE SERPL-MCNC: 161 MG/DL (ref 82–115)
HCT VFR BLD AUTO: 21.9 % (ref 42–52)
HGB BLD-MCNC: 7.2 G/DL (ref 14–18)
LYMPHOCYTES NFR BLD MANUAL: 2.87 X10(3)/MCL
LYMPHOCYTES NFR BLD MANUAL: 53 % (ref 13–40)
MCH RBC QN AUTO: 29.1 PG (ref 27–31)
MCHC RBC AUTO-ENTMCNC: 32.9 G/DL (ref 33–36)
MCV RBC AUTO: 88.7 FL (ref 80–94)
METAMYELOCYTES NFR BLD MANUAL: 4 %
MONOCYTES NFR BLD MANUAL: 1.25 X10(3)/MCL (ref 0.1–1.3)
MONOCYTES NFR BLD MANUAL: 23 % (ref 2–11)
MYELOCYTES NFR BLD MANUAL: 1 %
NEUTROPHILS NFR BLD MANUAL: 6 % (ref 47–80)
PLATELET # BLD AUTO: 13 X10(3)/MCL (ref 130–400)
PLATELET # BLD EST: ABNORMAL 10*3/UL
PMV BLD AUTO: 10.6 FL (ref 7.4–10.4)
POTASSIUM SERPL-SCNC: 3.7 MMOL/L (ref 3.5–5.1)
PROT SERPL-MCNC: 6.3 GM/DL (ref 5.8–7.6)
RBC # BLD AUTO: 2.47 X10(6)/MCL (ref 4.7–6.1)
RBC MORPH BLD: NORMAL
SODIUM SERPL-SCNC: 141 MMOL/L (ref 136–145)
WBC # BLD AUTO: 5.42 X10(3)/MCL (ref 4.5–11.5)

## 2024-09-23 PROCEDURE — 85027 COMPLETE CBC AUTOMATED: CPT

## 2024-09-23 PROCEDURE — 85007 BL SMEAR W/DIFF WBC COUNT: CPT

## 2024-09-23 PROCEDURE — 36415 COLL VENOUS BLD VENIPUNCTURE: CPT

## 2024-09-23 PROCEDURE — 63600175 PHARM REV CODE 636 W HCPCS: Performed by: NURSE PRACTITIONER

## 2024-09-23 PROCEDURE — 96401 CHEMO ANTI-NEOPL SQ/IM: CPT

## 2024-09-23 PROCEDURE — 80053 COMPREHEN METABOLIC PANEL: CPT

## 2024-09-23 RX ORDER — CYTARABINE 20 MG/ML
20 INJECTION, SOLUTION INTRATHECAL; INTRAVENOUS; SUBCUTANEOUS ONCE
Status: COMPLETED | OUTPATIENT
Start: 2024-09-23 | End: 2024-09-23

## 2024-09-23 RX ADMIN — CYTARABINE 40 MG: 20 INJECTION, SOLUTION INTRATHECAL; INTRAVENOUS; SUBCUTANEOUS at 08:09

## 2024-09-24 ENCOUNTER — INFUSION (OUTPATIENT)
Dept: INFUSION THERAPY | Facility: HOSPITAL | Age: 74
End: 2024-09-24
Attending: INTERNAL MEDICINE
Payer: MEDICARE

## 2024-09-24 VITALS
TEMPERATURE: 98 F | RESPIRATION RATE: 18 BRPM | SYSTOLIC BLOOD PRESSURE: 102 MMHG | DIASTOLIC BLOOD PRESSURE: 56 MMHG | HEART RATE: 86 BPM | OXYGEN SATURATION: 98 %

## 2024-09-24 DIAGNOSIS — C92.00 ACUTE MYELOID LEUKEMIA NOT HAVING ACHIEVED REMISSION: Primary | ICD-10-CM

## 2024-09-24 PROCEDURE — 63600175 PHARM REV CODE 636 W HCPCS: Performed by: NURSE PRACTITIONER

## 2024-09-24 PROCEDURE — 96401 CHEMO ANTI-NEOPL SQ/IM: CPT

## 2024-09-24 RX ORDER — CYTARABINE 20 MG/ML
20 INJECTION, SOLUTION INTRATHECAL; INTRAVENOUS; SUBCUTANEOUS ONCE
Status: COMPLETED | OUTPATIENT
Start: 2024-09-24 | End: 2024-09-24

## 2024-09-24 RX ADMIN — CYTARABINE 40 MG: 20 INJECTION, SOLUTION INTRATHECAL; INTRAVENOUS; SUBCUTANEOUS at 09:09

## 2024-09-25 ENCOUNTER — INFUSION (OUTPATIENT)
Dept: INFUSION THERAPY | Facility: HOSPITAL | Age: 74
End: 2024-09-25
Attending: INTERNAL MEDICINE
Payer: MEDICARE

## 2024-09-25 VITALS
SYSTOLIC BLOOD PRESSURE: 111 MMHG | OXYGEN SATURATION: 97 % | HEART RATE: 78 BPM | DIASTOLIC BLOOD PRESSURE: 58 MMHG | TEMPERATURE: 98 F

## 2024-09-25 DIAGNOSIS — C92.00 ACUTE MYELOID LEUKEMIA NOT HAVING ACHIEVED REMISSION: Primary | ICD-10-CM

## 2024-09-25 PROCEDURE — 96401 CHEMO ANTI-NEOPL SQ/IM: CPT

## 2024-09-25 PROCEDURE — 63600175 PHARM REV CODE 636 W HCPCS: Performed by: NURSE PRACTITIONER

## 2024-09-25 RX ORDER — CYTARABINE 20 MG/ML
20 INJECTION, SOLUTION INTRATHECAL; INTRAVENOUS; SUBCUTANEOUS ONCE
Status: COMPLETED | OUTPATIENT
Start: 2024-09-25 | End: 2024-09-25

## 2024-09-25 RX ADMIN — CYTARABINE 40 MG: 20 INJECTION, SOLUTION INTRATHECAL; INTRAVENOUS; SUBCUTANEOUS at 09:09

## 2024-09-26 ENCOUNTER — INFUSION (OUTPATIENT)
Dept: INFUSION THERAPY | Facility: HOSPITAL | Age: 74
End: 2024-09-26
Attending: INTERNAL MEDICINE
Payer: MEDICARE

## 2024-09-26 ENCOUNTER — LAB VISIT (OUTPATIENT)
Dept: LAB | Facility: HOSPITAL | Age: 74
End: 2024-09-26
Attending: INTERNAL MEDICINE
Payer: MEDICARE

## 2024-09-26 VITALS
SYSTOLIC BLOOD PRESSURE: 132 MMHG | OXYGEN SATURATION: 97 % | DIASTOLIC BLOOD PRESSURE: 69 MMHG | TEMPERATURE: 98 F | HEART RATE: 66 BPM | RESPIRATION RATE: 18 BRPM

## 2024-09-26 DIAGNOSIS — C92.00 ACUTE MYELOID LEUKEMIA NOT HAVING ACHIEVED REMISSION: Primary | ICD-10-CM

## 2024-09-26 DIAGNOSIS — C92.00 ACUTE MYELOID LEUKEMIA NOT HAVING ACHIEVED REMISSION: ICD-10-CM

## 2024-09-26 LAB
ABO + RH BLD: NORMAL
ABO + RH BLD: NORMAL
BASOPHILS # BLD AUTO: 0.01 X10(3)/MCL
BASOPHILS NFR BLD AUTO: 0.1 %
BLD PROD TYP BPU: NORMAL
BLD PROD TYP BPU: NORMAL
BLOOD UNIT EXPIRATION DATE: NORMAL
BLOOD UNIT EXPIRATION DATE: NORMAL
BLOOD UNIT TYPE CODE: 5100
BLOOD UNIT TYPE CODE: 600
CROSSMATCH INTERPRETATION: NORMAL
CROSSMATCH INTERPRETATION: NORMAL
DISPENSE STATUS: NORMAL
DISPENSE STATUS: NORMAL
EOSINOPHIL # BLD AUTO: 0 X10(3)/MCL (ref 0–0.9)
EOSINOPHIL NFR BLD AUTO: 0 %
ERYTHROCYTE [DISTWIDTH] IN BLOOD BY AUTOMATED COUNT: 14.4 % (ref 11.5–17)
GROUP & RH: NORMAL
HCT VFR BLD AUTO: 22.4 % (ref 42–52)
HGB BLD-MCNC: 7.2 G/DL (ref 14–18)
IMM GRANULOCYTES # BLD AUTO: 0.12 X10(3)/MCL (ref 0–0.04)
IMM GRANULOCYTES NFR BLD AUTO: 0.8 %
INDIRECT COOMBS: NORMAL
LYMPHOCYTES # BLD AUTO: 3.09 X10(3)/MCL (ref 0.6–4.6)
LYMPHOCYTES NFR BLD AUTO: 21.2 %
MCH RBC QN AUTO: 28.9 PG (ref 27–31)
MCHC RBC AUTO-ENTMCNC: 32.1 G/DL (ref 33–36)
MCV RBC AUTO: 90 FL (ref 80–94)
MONOCYTES # BLD AUTO: 10.92 X10(3)/MCL (ref 0.1–1.3)
MONOCYTES NFR BLD AUTO: 74.8 %
NEUTROPHILS # BLD AUTO: 0.46 X10(3)/MCL (ref 2.1–9.2)
NEUTROPHILS NFR BLD AUTO: 3.1 %
PLATELET # BLD AUTO: 8 X10(3)/MCL (ref 130–400)
PMV BLD AUTO: 10.7 FL (ref 7.4–10.4)
RBC # BLD AUTO: 2.49 X10(6)/MCL (ref 4.7–6.1)
SPECIMEN OUTDATE: NORMAL
UNIT NUMBER: NORMAL
UNIT NUMBER: NORMAL
WBC # BLD AUTO: 14.6 X10(3)/MCL (ref 4.5–11.5)

## 2024-09-26 PROCEDURE — 85025 COMPLETE CBC W/AUTO DIFF WBC: CPT

## 2024-09-26 PROCEDURE — 96374 THER/PROPH/DIAG INJ IV PUSH: CPT

## 2024-09-26 PROCEDURE — 63600175 PHARM REV CODE 636 W HCPCS: Performed by: NURSE PRACTITIONER

## 2024-09-26 PROCEDURE — P9040 RBC LEUKOREDUCED IRRADIATED: HCPCS | Performed by: INTERNAL MEDICINE

## 2024-09-26 PROCEDURE — 25000003 PHARM REV CODE 250: Performed by: INTERNAL MEDICINE

## 2024-09-26 PROCEDURE — 36430 TRANSFUSION BLD/BLD COMPNT: CPT

## 2024-09-26 PROCEDURE — 86901 BLOOD TYPING SEROLOGIC RH(D): CPT | Performed by: INTERNAL MEDICINE

## 2024-09-26 PROCEDURE — P9037 PLATE PHERES LEUKOREDU IRRAD: HCPCS | Performed by: INTERNAL MEDICINE

## 2024-09-26 PROCEDURE — 86923 COMPATIBILITY TEST ELECTRIC: CPT | Performed by: INTERNAL MEDICINE

## 2024-09-26 PROCEDURE — 36415 COLL VENOUS BLD VENIPUNCTURE: CPT | Mod: 91

## 2024-09-26 PROCEDURE — 96401 CHEMO ANTI-NEOPL SQ/IM: CPT

## 2024-09-26 PROCEDURE — 86900 BLOOD TYPING SEROLOGIC ABO: CPT | Performed by: INTERNAL MEDICINE

## 2024-09-26 RX ORDER — CYTARABINE 20 MG/ML
20 INJECTION, SOLUTION INTRATHECAL; INTRAVENOUS; SUBCUTANEOUS ONCE
Status: COMPLETED | OUTPATIENT
Start: 2024-09-26 | End: 2024-09-26

## 2024-09-26 RX ORDER — FAMOTIDINE 10 MG/ML
20 INJECTION INTRAVENOUS ONCE
Status: COMPLETED | OUTPATIENT
Start: 2024-09-26 | End: 2024-09-26

## 2024-09-26 RX ORDER — HYDROCODONE BITARTRATE AND ACETAMINOPHEN 500; 5 MG/1; MG/1
TABLET ORAL ONCE
Status: CANCELLED | OUTPATIENT
Start: 2024-09-26 | End: 2024-09-26

## 2024-09-26 RX ORDER — FAMOTIDINE 10 MG/ML
20 INJECTION INTRAVENOUS ONCE
Status: CANCELLED | OUTPATIENT
Start: 2024-09-26

## 2024-09-26 RX ORDER — HYDROCODONE BITARTRATE AND ACETAMINOPHEN 500; 5 MG/1; MG/1
TABLET ORAL ONCE
Status: COMPLETED | OUTPATIENT
Start: 2024-09-26 | End: 2024-09-26

## 2024-09-26 RX ADMIN — FAMOTIDINE 20 MG: 10 INJECTION, SOLUTION INTRAVENOUS at 08:09

## 2024-09-26 RX ADMIN — SODIUM CHLORIDE: 9 INJECTION, SOLUTION INTRAVENOUS at 08:09

## 2024-09-26 RX ADMIN — CYTARABINE 40 MG: 20 INJECTION, SOLUTION INTRATHECAL; INTRAVENOUS; SUBCUTANEOUS at 08:09

## 2024-09-27 ENCOUNTER — INFUSION (OUTPATIENT)
Dept: INFUSION THERAPY | Facility: HOSPITAL | Age: 74
End: 2024-09-27
Attending: INTERNAL MEDICINE
Payer: MEDICARE

## 2024-09-27 VITALS
SYSTOLIC BLOOD PRESSURE: 136 MMHG | OXYGEN SATURATION: 98 % | DIASTOLIC BLOOD PRESSURE: 70 MMHG | HEART RATE: 76 BPM | RESPIRATION RATE: 18 BRPM | TEMPERATURE: 98 F

## 2024-09-27 DIAGNOSIS — C92.00 ACUTE MYELOID LEUKEMIA NOT HAVING ACHIEVED REMISSION: Primary | ICD-10-CM

## 2024-09-27 PROCEDURE — 63600175 PHARM REV CODE 636 W HCPCS: Performed by: NURSE PRACTITIONER

## 2024-09-27 PROCEDURE — 96401 CHEMO ANTI-NEOPL SQ/IM: CPT

## 2024-09-27 RX ORDER — CYTARABINE 20 MG/ML
20 INJECTION, SOLUTION INTRATHECAL; INTRAVENOUS; SUBCUTANEOUS ONCE
Status: COMPLETED | OUTPATIENT
Start: 2024-09-27 | End: 2024-09-27

## 2024-09-27 RX ADMIN — CYTARABINE 40 MG: 20 INJECTION, SOLUTION INTRATHECAL; INTRAVENOUS; SUBCUTANEOUS at 09:09

## 2024-09-30 ENCOUNTER — LAB VISIT (OUTPATIENT)
Dept: LAB | Facility: HOSPITAL | Age: 74
End: 2024-09-30
Attending: INTERNAL MEDICINE
Payer: MEDICARE

## 2024-09-30 ENCOUNTER — OFFICE VISIT (OUTPATIENT)
Dept: INTERNAL MEDICINE | Facility: CLINIC | Age: 74
End: 2024-09-30
Payer: MEDICARE

## 2024-09-30 ENCOUNTER — INFUSION (OUTPATIENT)
Dept: INFUSION THERAPY | Facility: HOSPITAL | Age: 74
End: 2024-09-30
Attending: INTERNAL MEDICINE
Payer: MEDICARE

## 2024-09-30 VITALS
DIASTOLIC BLOOD PRESSURE: 70 MMHG | OXYGEN SATURATION: 95 % | BODY MASS INDEX: 24.3 KG/M2 | DIASTOLIC BLOOD PRESSURE: 68 MMHG | WEIGHT: 179.38 LBS | TEMPERATURE: 99 F | OXYGEN SATURATION: 96 % | HEIGHT: 72 IN | SYSTOLIC BLOOD PRESSURE: 120 MMHG | HEART RATE: 77 BPM | SYSTOLIC BLOOD PRESSURE: 125 MMHG | HEART RATE: 72 BPM

## 2024-09-30 DIAGNOSIS — C92.00 ACUTE MYELOID LEUKEMIA NOT HAVING ACHIEVED REMISSION: ICD-10-CM

## 2024-09-30 DIAGNOSIS — R10.9 LEFT FLANK DISCOMFORT: Primary | ICD-10-CM

## 2024-09-30 DIAGNOSIS — C92.00 ACUTE MYELOID LEUKEMIA NOT HAVING ACHIEVED REMISSION: Chronic | ICD-10-CM

## 2024-09-30 DIAGNOSIS — C92.00 ACUTE MYELOID LEUKEMIA NOT HAVING ACHIEVED REMISSION: Primary | ICD-10-CM

## 2024-09-30 DIAGNOSIS — T14.8XXA MUSCLE STRAIN: ICD-10-CM

## 2024-09-30 PROBLEM — I48.91 ATRIAL FIBRILLATION: Status: RESOLVED | Noted: 2024-09-20 | Resolved: 2024-09-30

## 2024-09-30 LAB
ABO + RH BLD: NORMAL
ABO + RH BLD: NORMAL
ALBUMIN SERPL-MCNC: 3 G/DL (ref 3.4–4.8)
ALBUMIN/GLOB SERPL: 0.9 RATIO (ref 1.1–2)
ALP SERPL-CCNC: 83 UNIT/L (ref 40–150)
ALT SERPL-CCNC: 43 UNIT/L (ref 0–55)
ANION GAP SERPL CALC-SCNC: 7 MEQ/L
AST SERPL-CCNC: 25 UNIT/L (ref 5–34)
BASOPHILS # BLD AUTO: 0.05 X10(3)/MCL
BASOPHILS NFR BLD AUTO: 0.1 %
BILIRUB SERPL-MCNC: 0.4 MG/DL
BLD PROD TYP BPU: NORMAL
BLD PROD TYP BPU: NORMAL
BLOOD UNIT EXPIRATION DATE: NORMAL
BLOOD UNIT EXPIRATION DATE: NORMAL
BLOOD UNIT TYPE CODE: 5100
BLOOD UNIT TYPE CODE: 6200
BUN SERPL-MCNC: 21.6 MG/DL (ref 8.4–25.7)
CALCIUM SERPL-MCNC: 8.3 MG/DL (ref 8.8–10)
CHLORIDE SERPL-SCNC: 104 MMOL/L (ref 98–107)
CO2 SERPL-SCNC: 28 MMOL/L (ref 23–31)
CREAT SERPL-MCNC: 1.18 MG/DL (ref 0.73–1.18)
CREAT/UREA NIT SERPL: 18
CROSSMATCH INTERPRETATION: NORMAL
CROSSMATCH INTERPRETATION: NORMAL
DISPENSE STATUS: NORMAL
DISPENSE STATUS: NORMAL
EOSINOPHIL # BLD AUTO: 0 X10(3)/MCL (ref 0–0.9)
EOSINOPHIL NFR BLD AUTO: 0 %
ERYTHROCYTE [DISTWIDTH] IN BLOOD BY AUTOMATED COUNT: 15 % (ref 11.5–17)
GFR SERPLBLD CREATININE-BSD FMLA CKD-EPI: >60 ML/MIN/1.73/M2
GLOBULIN SER-MCNC: 3.4 GM/DL (ref 2.4–3.5)
GLUCOSE SERPL-MCNC: 129 MG/DL (ref 82–115)
GROUP & RH: NORMAL
HCT VFR BLD AUTO: 22.1 % (ref 42–52)
HGB BLD-MCNC: 7.1 G/DL (ref 14–18)
IMM GRANULOCYTES # BLD AUTO: 0.38 X10(3)/MCL (ref 0–0.04)
IMM GRANULOCYTES NFR BLD AUTO: 0.9 %
INDIRECT COOMBS: NORMAL
LYMPHOCYTES # BLD AUTO: 2.18 X10(3)/MCL (ref 0.6–4.6)
LYMPHOCYTES NFR BLD AUTO: 4.9 %
MCH RBC QN AUTO: 29.2 PG (ref 27–31)
MCHC RBC AUTO-ENTMCNC: 32.1 G/DL (ref 33–36)
MCV RBC AUTO: 90.9 FL (ref 80–94)
MONOCYTES # BLD AUTO: 40.99 X10(3)/MCL (ref 0.1–1.3)
MONOCYTES NFR BLD AUTO: 92.3 %
NEUTROPHILS # BLD AUTO: 0.79 X10(3)/MCL (ref 2.1–9.2)
NEUTROPHILS NFR BLD AUTO: 1.8 %
PLATELET # BLD AUTO: 12 X10(3)/MCL (ref 130–400)
PMV BLD AUTO: 9 FL (ref 7.4–10.4)
POTASSIUM SERPL-SCNC: 3.8 MMOL/L (ref 3.5–5.1)
PROT SERPL-MCNC: 6.4 GM/DL (ref 5.8–7.6)
RBC # BLD AUTO: 2.43 X10(6)/MCL (ref 4.7–6.1)
SODIUM SERPL-SCNC: 139 MMOL/L (ref 136–145)
SPECIMEN OUTDATE: NORMAL
UNIT NUMBER: NORMAL
UNIT NUMBER: NORMAL
WBC # BLD AUTO: 44.39 X10(3)/MCL (ref 4.5–11.5)

## 2024-09-30 PROCEDURE — 85025 COMPLETE CBC W/AUTO DIFF WBC: CPT

## 2024-09-30 PROCEDURE — 25000003 PHARM REV CODE 250: Performed by: INTERNAL MEDICINE

## 2024-09-30 PROCEDURE — 96372 THER/PROPH/DIAG INJ SC/IM: CPT | Mod: ,,, | Performed by: NURSE PRACTITIONER

## 2024-09-30 PROCEDURE — 99214 OFFICE O/P EST MOD 30 MIN: CPT | Mod: ,,, | Performed by: INTERNAL MEDICINE

## 2024-09-30 PROCEDURE — P9037 PLATE PHERES LEUKOREDU IRRAD: HCPCS | Performed by: INTERNAL MEDICINE

## 2024-09-30 PROCEDURE — 36430 TRANSFUSION BLD/BLD COMPNT: CPT

## 2024-09-30 PROCEDURE — 36415 COLL VENOUS BLD VENIPUNCTURE: CPT | Mod: 91

## 2024-09-30 PROCEDURE — 86923 COMPATIBILITY TEST ELECTRIC: CPT | Performed by: INTERNAL MEDICINE

## 2024-09-30 PROCEDURE — 86850 RBC ANTIBODY SCREEN: CPT | Performed by: INTERNAL MEDICINE

## 2024-09-30 PROCEDURE — 86901 BLOOD TYPING SEROLOGIC RH(D): CPT | Performed by: INTERNAL MEDICINE

## 2024-09-30 PROCEDURE — P9040 RBC LEUKOREDUCED IRRADIATED: HCPCS | Performed by: INTERNAL MEDICINE

## 2024-09-30 PROCEDURE — 80053 COMPREHEN METABOLIC PANEL: CPT

## 2024-09-30 PROCEDURE — 96374 THER/PROPH/DIAG INJ IV PUSH: CPT

## 2024-09-30 PROCEDURE — 86900 BLOOD TYPING SEROLOGIC ABO: CPT | Performed by: INTERNAL MEDICINE

## 2024-09-30 RX ORDER — KETOROLAC TROMETHAMINE 30 MG/ML
15 INJECTION, SOLUTION INTRAMUSCULAR; INTRAVENOUS
Status: COMPLETED | OUTPATIENT
Start: 2024-09-30 | End: 2024-09-30

## 2024-09-30 RX ORDER — FAMOTIDINE 10 MG/ML
20 INJECTION INTRAVENOUS ONCE
Status: COMPLETED | OUTPATIENT
Start: 2024-09-30 | End: 2024-09-30

## 2024-09-30 RX ORDER — FAMOTIDINE 10 MG/ML
20 INJECTION INTRAVENOUS ONCE
Status: CANCELLED | OUTPATIENT
Start: 2024-09-30

## 2024-09-30 RX ORDER — DEXAMETHASONE SODIUM PHOSPHATE 4 MG/ML
4 INJECTION, SOLUTION INTRA-ARTICULAR; INTRALESIONAL; INTRAMUSCULAR; INTRAVENOUS; SOFT TISSUE
Status: COMPLETED | OUTPATIENT
Start: 2024-09-30 | End: 2024-09-30

## 2024-09-30 RX ORDER — HYDROCODONE BITARTRATE AND ACETAMINOPHEN 500; 5 MG/1; MG/1
TABLET ORAL ONCE
Status: CANCELLED | OUTPATIENT
Start: 2024-09-30 | End: 2024-09-30

## 2024-09-30 RX ORDER — HYDROCODONE BITARTRATE AND ACETAMINOPHEN 500; 5 MG/1; MG/1
TABLET ORAL ONCE
Status: COMPLETED | OUTPATIENT
Start: 2024-09-30 | End: 2024-09-30

## 2024-09-30 RX ORDER — CYCLOBENZAPRINE HCL 10 MG
10 TABLET ORAL 3 TIMES DAILY PRN
Qty: 30 TABLET | Refills: 0 | Status: SHIPPED | OUTPATIENT
Start: 2024-09-30 | End: 2024-10-10

## 2024-09-30 RX ADMIN — FAMOTIDINE 20 MG: 10 INJECTION, SOLUTION INTRAVENOUS at 10:09

## 2024-09-30 RX ADMIN — KETOROLAC TROMETHAMINE 15 MG: 30 INJECTION, SOLUTION INTRAMUSCULAR; INTRAVENOUS at 02:09

## 2024-09-30 RX ADMIN — DEXAMETHASONE SODIUM PHOSPHATE 4 MG: 4 INJECTION, SOLUTION INTRA-ARTICULAR; INTRALESIONAL; INTRAMUSCULAR; INTRAVENOUS; SOFT TISSUE at 02:09

## 2024-09-30 RX ADMIN — SODIUM CHLORIDE: 9 INJECTION, SOLUTION INTRAVENOUS at 10:09

## 2024-09-30 NOTE — PROGRESS NOTES
Internal Medicine    Patient Name:  Hussein Gonzalez Jr.   Patient ID: [id number]     Chief Complaint: Back Pain      HPI:     Hussein Gonzalez Jr. is a 74 y.o. male, known to Dr Andres, is here today for requested visit.  Medical comorbidities include HTN, CVA s/t embolic phenomena, melanoma s/p resection XRT for recurrence (2019) and another resection on his right ear this year. Also with pancytopenia established with Hematology/Oncology and currently being treated for AML.      He was seen in office on 9/20/24 with complaints of left lower rib pain x 4-5 days.  Does not recall specific injury.  Pain was worsened with position change or sneezing.  Trial Celebrex x 5-7 days.  He presents today for re-evaluation.  Reports Celebrex did not improve left lower rib pain.  Over the weekend, he noticed left flank pain.  He does have difficulty with walking and taking deep breaths due to increased discomfort.  Denies fever, chills, dysuria, hematuria.    Last AWV:  04/24/2024      Past Medical History:   Diagnosis Date    Anxiety and depression     BPH (benign prostatic hyperplasia)     History of CVA (cerebrovascular accident) without residual deficits     HTN (hypertension)     Malignant melanoma of skin, unspecified     NSTEMI (non-ST elevated myocardial infarction)     Sepsis, Gram negative     Sleep apnea         Past Surgical History:   Procedure Laterality Date    BONE MARROW ASPIRATION N/A 6/27/2023    Procedure: ASPIRATION, BONE MARROW;  Surgeon: Sabino Núñez MD;  Location: [facility];  Service: General;  Laterality: N/A;  ALL ASSESS FOR MOLECULAR MRD    BONE MARROW ASPIRATION N/A 5/22/2024    Procedure: ASPIRATION, BONE MARROW;  Surgeon: Sabino Núñez MD;  Location: [facility] OR;  Service: General;  Laterality: N/A;    BONE MARROW BIOPSY N/A 1/25/2023    Procedure: Biopsy-bone marrow;  Surgeon: Rd Carlos MD;  Location: [facility] OR;  Service: General;  Laterality: N/A;    BONE MARROW BIOPSY N/A 11/21/2023     Procedure: Biopsy-bone marrow;  Surgeon: Junior Castro MD;  Location: [facility];  Service: General;  Laterality: N/A;    COLONOSCOPY      COLONOSCOPY N/A 1/24/2023    Procedure: COLONOSCOPY (DOUBLE);  Surgeon: Jimy Baker MD;  Location: [facility] ENDOSCOPY;  Service: Gastroenterology;  Laterality: N/A;    EGD, WITH CLOSED BIOPSY N/A 1/24/2023    Procedure: EGD, WITH CLOSED BIOPSY;  Surgeon: Jimy Baker MD;  Location: [facility] ENDOSCOPY;  Service: Gastroenterology;  Laterality: N/A;    EXCISION OF MELANOMA      SINUS SURGERY      TONSILLECTOMY          Social History     Tobacco Use    Smoking status: Never    Smokeless tobacco: Never   Substance and Sexual Activity    Alcohol use: Not Currently     Alcohol/week: 7.0 standard drinks of alcohol     Types: 7 Cans of beer per week    Drug use: Never    Sexual activity: Yes        Current Outpatient Medications   Medication Instructions    (Magic mouthwash) 1:1:1 diphenhydrAMINE(Benadryl) 12.5mg/5ml liq, aluminum & magnesium hydroxide-simethicone (Maalox), LIDOcaine viscous 2% 15 mLs, Swish & Spit, Every 4 hours PRN, for mouth sores    aminocaproic acid (AMICAR) 1 g, Oral, Every hour PRN    celecoxib (CELEBREX) 200 mg, Oral, Daily PRN    citalopram (CELEXA) 40 mg, Oral    cyclobenzaprine (FLEXERIL) 10 mg, Oral, 3 times daily PRN    gabapentin (NEURONTIN) 300 mg, 2 times daily    lisinopriL-hydrochlorothiazide (PRINZIDE,ZESTORETIC) 20-12.5 mg per tablet Take 1 tablet by mouth once daily    multivitamin (THERAGRAN) per tablet 1 tablet, Daily    rosuvastatin (CRESTOR) 40 mg, Daily    tamsulosin (FLOMAX) 0.4 mg, Oral, Daily    tranexamic acid (LYSTEDA) 650 mg, Once    venetoclax (VENCLEXTA) 100 mg Tab TAKE 2 TABLETS BY MOUTH ONCE DAILY ON DAYS 1-14 OF A 28 DAY CYCLE       Review of patient's allergies indicates:   Allergen Reactions    Acetaminophen Other (See Comments)     Restless leg     Benadryl [diphenhydramine hcl] Other (See Comments)     Restless Legs         Patient Care Team:  Nayely Andres MD as PCP - General (Internal Medicine)  Nayely Andres MD     Subjective:     Review of Systems   Constitutional:  Negative for appetite change, chills, diaphoresis and fever.   HENT:  Negative for congestion, ear pain, sinus pain and sore throat.    Eyes:  Negative for pain and visual disturbance.   Respiratory:  Negative for cough, chest tightness, shortness of breath and wheezing.    Cardiovascular:  Negative for chest pain, palpitations and leg swelling.   Gastrointestinal:  Negative for abdominal pain, constipation, diarrhea, nausea and vomiting.   Endocrine: Negative for cold intolerance and heat intolerance.   Genitourinary:  Negative for difficulty urinating, dysuria, frequency and hematuria.   Musculoskeletal:  Positive for back pain (left flank pain). Negative for arthralgias and myalgias.   Skin:  Negative for color change and rash.   Allergic/Immunologic: Negative.    Neurological: Negative.  Negative for dizziness, syncope, light-headedness and numbness.   Hematological: Negative.  Does not bruise/bleed easily.   Psychiatric/Behavioral: Negative.  Negative for dysphoric mood. The patient is not nervous/anxious.    All other systems reviewed and are negative.      Objective:     Visit Vitals  /70 (BP Location: Left arm, Patient Position: Sitting)   Pulse 77   Ht 6' (1.829 m)   Wt 81.4 kg (179 lb 6.4 oz)   SpO2 95%   BMI 24.33 kg/m²       Physical Exam  Vitals and nursing note reviewed.   Constitutional:       General: He is not in acute distress.     Appearance: He is not ill-appearing.   HENT:      Head: Normocephalic and atraumatic.      Nose: No congestion.      Mouth/Throat:      Mouth: Mucous membranes are moist.      Pharynx: Oropharynx is clear. No oropharyngeal exudate or posterior oropharyngeal erythema.   Eyes:      General: No scleral icterus.     Extraocular Movements: Extraocular movements intact.      Conjunctiva/sclera:  Conjunctivae normal.      Pupils: Pupils are equal, round, and reactive to light.   Neck:      Vascular: No carotid bruit.   Cardiovascular:      Rate and Rhythm: Normal rate and regular rhythm.      Heart sounds: Normal heart sounds. No murmur heard.     No friction rub. No gallop.   Pulmonary:      Effort: Pulmonary effort is normal. No respiratory distress.      Breath sounds: Normal breath sounds. No wheezing, rhonchi or rales.   Abdominal:      General: Abdomen is flat. Bowel sounds are normal. There is no distension.      Palpations: Abdomen is soft. There is no mass.      Tenderness: There is no abdominal tenderness. There is no right CVA tenderness, left CVA tenderness, guarding or rebound.   Musculoskeletal:         General: Normal range of motion.      Cervical back: Normal range of motion and neck supple. No tenderness or bony tenderness.      Thoracic back: No tenderness or bony tenderness.      Lumbar back: No tenderness or bony tenderness.        Back:    Lymphadenopathy:      Cervical: No cervical adenopathy.   Skin:     General: Skin is warm and dry.      Capillary Refill: Capillary refill takes less than 2 seconds.   Neurological:      General: No focal deficit present.      Mental Status: He is alert and oriented to person, place, and time.   Psychiatric:         Mood and Affect: Mood normal.         Behavior: Behavior normal.         Labs Reviewed:     Chemistry:  Lab Results   Component Value Date     09/30/2024    K 3.8 09/30/2024    BUN 21.6 09/30/2024    CREATININE 1.18 09/30/2024    EGFRNORACEVR >60 09/30/2024    GLUCOSE 129 (H) 09/30/2024    CALCIUM 8.3 (L) 09/30/2024    ALKPHOS 83 09/30/2024    LABPROT 6.4 09/30/2024    ALBUMIN 3.0 (L) 09/30/2024    BILIDIR 0.4 02/25/2021    IBILI 0.80 02/25/2021    AST 25 09/30/2024    ALT 43 09/30/2024    [id number] 46.1 04/23/2024    TSH 0.798 04/23/2024    PSA 1.06 04/23/2024        Hematology:  Lab Results   Component Value Date    WBC 44.39  (H) 09/30/2024    HGB 7.1 (L) 09/30/2024    HCT 22.1 (L) 09/30/2024    PLT 12 (LL) 09/30/2024       Lipid Panel:  Lab Results   Component Value Date    CHOL 105 04/23/2024    HDL 34 (L) 04/23/2024    LDL 57.00 04/23/2024    TRIG 72 04/23/2024    TOTALCHOLEST 3 04/23/2024        Urine:  Lab Results   Component Value Date    APPEARANCEUA Clear 07/05/2024    SGUA >1.050 (H) 07/05/2024    PROTEINUA Trace (A) 07/05/2024    KETONESUA Negative 07/05/2024    LEUKOCYTESUR Negative 07/05/2024    RBCUA 0-5 07/05/2024    WBCUA 0-5 07/05/2024    BACTERIA None Seen 07/05/2024    SQEPUA None Seen 07/05/2024    HYALINECASTS 3-5 (A) 07/05/2024        Assessment:       ICD-10-CM ICD-9-CM   1. Left flank discomfort  R10.9 789.09   2. Muscle strain  T14.8XXA 848.9   3. Acute myeloid leukemia not having achieved remission  C92.00 205.00        Plan:     1. Left flank discomfort  Assessment & Plan:  Likely related to muscle strain  Will order UA and CT Abdomen Pelvis w/ w/o contrast to rule out pyelonephritis    Orders:  -     Urinalysis, Reflex to Urine Culture; Future; Expected date: 09/30/2024  -     CT Abdomen Pelvis W Wo Contrast; Future; Expected date: 09/30/2024    2. Muscle strain  Assessment & Plan:  Dexamethasone and Toradol injection x1 dose in office today  Trial Flexeril 10mg TID as needed  Encouraged to use heat as needed    Orders:  -     dexAMETHasone injection 4 mg  -     ketorolac injection 15 mg  -     cyclobenzaprine (FLEXERIL) 10 MG tablet; Take 1 tablet (10 mg total) by mouth 3 (three) times daily as needed for Muscle spasms.  Dispense: 30 tablet; Refill: 0    3. Acute myeloid leukemia not having achieved remission  Assessment & Plan:  Followed closely by Oncology (Dr Luna)  Currently on Venclexta , continue             Follow up for Previously scheduled and PRN if need. In addition to their scheduled follow up, the patient has also been instructed to follow up on as needed basis.       Future Appointments   Date  Time Provider Department [place]   10/3/2024  8:10 AM LAB, [facility] OLGH[facility] LAB [facility]   10/3/2024  9:00 AM CHAIR 40, [facility]   10/10/2024  9:00 AM LAB, [facility] OLB LAB [facility]   10/10/2024  9:30 AM [facility]   10/10/2024 10:00 AM CHAIR 27, [facility]   10/29/2024 10:20 AM Luis Alberto Patel FNP OLGCCR[facility] [place] [id number]   4/29/2025  8:40 AM Nayely Andres MD OLB [place] [id number]          RIVERA Gramajo

## 2024-09-30 NOTE — ASSESSMENT & PLAN NOTE
Dexamethasone and Toradol injection x1 dose in office today  Trial Flexeril 10mg TID as needed  Encouraged to use heat as needed

## 2024-09-30 NOTE — ASSESSMENT & PLAN NOTE
Likely related to muscle strain  Will order UA and CT Abdomen Pelvis w/ w/o contrast to rule out pyelonephritis

## 2024-10-01 ENCOUNTER — HOSPITAL ENCOUNTER (EMERGENCY)
Facility: HOSPITAL | Age: 74
Discharge: HOME | End: 2024-10-01
Attending: EMERGENCY MEDICINE
Payer: MEDICARE

## 2024-10-01 VITALS
OXYGEN SATURATION: 96 % | TEMPERATURE: 99 F | RESPIRATION RATE: 17 BRPM | HEIGHT: 72 IN | BODY MASS INDEX: 24.24 KG/M2 | DIASTOLIC BLOOD PRESSURE: 60 MMHG | WEIGHT: 179 LBS | SYSTOLIC BLOOD PRESSURE: 106 MMHG | HEART RATE: 93 BPM

## 2024-10-01 DIAGNOSIS — C92.00 ACUTE MYELOID LEUKEMIA NOT HAVING ACHIEVED REMISSION: Primary | ICD-10-CM

## 2024-10-01 DIAGNOSIS — K59.00 CONSTIPATION, UNSPECIFIED CONSTIPATION TYPE: ICD-10-CM

## 2024-10-01 DIAGNOSIS — R53.1 WEAKNESS: ICD-10-CM

## 2024-10-01 DIAGNOSIS — C92.00 ACUTE MYELOID LEUKEMIA NOT HAVING ACHIEVED REMISSION: ICD-10-CM

## 2024-10-01 DIAGNOSIS — R10.32 LLQ ABDOMINAL PAIN: ICD-10-CM

## 2024-10-01 DIAGNOSIS — D64.9 ANEMIA, UNSPECIFIED TYPE: Primary | ICD-10-CM

## 2024-10-01 LAB
ABS NEUT (OLG): 3.81 X10(3)/MCL (ref 2.1–9.2)
ALBUMIN SERPL-MCNC: 3.1 G/DL (ref 3.4–4.8)
ALBUMIN/GLOB SERPL: 0.8 RATIO (ref 1.1–2)
ALP SERPL-CCNC: 114 UNIT/L (ref 40–150)
ALT SERPL-CCNC: 41 UNIT/L (ref 0–55)
ANION GAP SERPL CALC-SCNC: 14 MEQ/L
AST SERPL-CCNC: 62 UNIT/L (ref 5–34)
B-HCG SERPL QL: 63 %
BILIRUB SERPL-MCNC: 0.4 MG/DL
BUN SERPL-MCNC: 52.4 MG/DL (ref 8.4–25.7)
CALCIUM SERPL-MCNC: 8.9 MG/DL (ref 8.8–10)
CHLORIDE SERPL-SCNC: 107 MMOL/L (ref 98–107)
CO2 SERPL-SCNC: 19 MMOL/L (ref 23–31)
CREAT SERPL-MCNC: 1.45 MG/DL (ref 0.73–1.18)
CREAT/UREA NIT SERPL: 36
ERYTHROCYTE [DISTWIDTH] IN BLOOD BY AUTOMATED COUNT: 15.9 % (ref 11.5–17)
GFR SERPLBLD CREATININE-BSD FMLA CKD-EPI: 50 ML/MIN/1.73/M2
GLOBULIN SER-MCNC: 4.1 GM/DL (ref 2.4–3.5)
GLUCOSE SERPL-MCNC: 132 MG/DL (ref 82–115)
HCT VFR BLD AUTO: 24.4 % (ref 42–52)
HGB BLD-MCNC: 7.9 G/DL (ref 14–18)
INSTRUMENT WBC (OLG): 29.32 X10(3)/MCL
LACTATE SERPL-SCNC: 1.6 MMOL/L (ref 0.5–2.2)
LACTATE SERPL-SCNC: 3.2 MMOL/L (ref 0.5–2.2)
LYMPHOCYTES NFR BLD MANUAL: 1.47 X10(3)/MCL
LYMPHOCYTES NFR BLD MANUAL: 5 %
MCH RBC QN AUTO: 28.4 PG (ref 27–31)
MCHC RBC AUTO-ENTMCNC: 32.4 G/DL (ref 33–36)
MCV RBC AUTO: 87.8 FL (ref 80–94)
MICROCYTES BLD QL SMEAR: ABNORMAL
MONOCYTES NFR BLD MANUAL: 18 %
MONOCYTES NFR BLD MANUAL: 5.28 X10(3)/MCL (ref 0.1–1.3)
MYELOCYTES NFR BLD MANUAL: 1 %
NEUTROPHILS NFR BLD MANUAL: 13 %
NRBC BLD AUTO-RTO: 0.4 %
OHS QRS DURATION: 120 MS
OHS QTC CALCULATION: 502 MS
PLATELET # BLD AUTO: 27 X10(3)/MCL (ref 130–400)
PLATELET # BLD EST: ABNORMAL 10*3/UL
PLATELETS.RETICULATED NFR BLD AUTO: 3.8 % (ref 0.9–11.2)
PMV BLD AUTO: 10.2 FL (ref 7.4–10.4)
POTASSIUM SERPL-SCNC: 4.6 MMOL/L (ref 3.5–5.1)
PROT SERPL-MCNC: 7.2 GM/DL (ref 5.8–7.6)
RBC # BLD AUTO: 2.78 X10(6)/MCL (ref 4.7–6.1)
RBC MORPH BLD: ABNORMAL
SODIUM SERPL-SCNC: 140 MMOL/L (ref 136–145)
WBC # BLD AUTO: 29.32 X10(3)/MCL (ref 4.5–11.5)

## 2024-10-01 PROCEDURE — 99285 EMERGENCY DEPT VISIT HI MDM: CPT | Mod: 25

## 2024-10-01 PROCEDURE — 96374 THER/PROPH/DIAG INJ IV PUSH: CPT

## 2024-10-01 PROCEDURE — 80053 COMPREHEN METABOLIC PANEL: CPT

## 2024-10-01 PROCEDURE — 63600175 PHARM REV CODE 636 W HCPCS

## 2024-10-01 PROCEDURE — 96361 HYDRATE IV INFUSION ADD-ON: CPT

## 2024-10-01 PROCEDURE — 83605 ASSAY OF LACTIC ACID: CPT

## 2024-10-01 PROCEDURE — 87186 SC STD MICRODIL/AGAR DIL: CPT

## 2024-10-01 PROCEDURE — 87040 BLOOD CULTURE FOR BACTERIA: CPT

## 2024-10-01 PROCEDURE — 87154 CUL TYP ID BLD PTHGN 6+ TRGT: CPT

## 2024-10-01 PROCEDURE — 85027 COMPLETE CBC AUTOMATED: CPT

## 2024-10-01 PROCEDURE — 25500020 PHARM REV CODE 255: Performed by: EMERGENCY MEDICINE

## 2024-10-01 PROCEDURE — 93005 ELECTROCARDIOGRAM TRACING: CPT

## 2024-10-01 PROCEDURE — 93010 ELECTROCARDIOGRAM REPORT: CPT | Mod: ,,, | Performed by: INTERNAL MEDICINE

## 2024-10-01 RX ORDER — LACTULOSE 10 G/15ML
20 SOLUTION ORAL 2 TIMES DAILY
Qty: 300 ML | Refills: 0 | Status: ON HOLD | OUTPATIENT
Start: 2024-10-01 | End: 2024-10-02

## 2024-10-01 RX ORDER — ONDANSETRON HYDROCHLORIDE 2 MG/ML
4 INJECTION, SOLUTION INTRAVENOUS
Status: COMPLETED | OUTPATIENT
Start: 2024-10-01 | End: 2024-10-01

## 2024-10-01 RX ADMIN — SODIUM CHLORIDE, POTASSIUM CHLORIDE, SODIUM LACTATE AND CALCIUM CHLORIDE 1000 ML: 600; 310; 30; 20 INJECTION, SOLUTION INTRAVENOUS at 02:10

## 2024-10-01 RX ADMIN — ONDANSETRON 4 MG: 2 INJECTION INTRAMUSCULAR; INTRAVENOUS at 02:10

## 2024-10-01 RX ADMIN — IOHEXOL 95 ML: 350 INJECTION, SOLUTION INTRAVENOUS at 05:10

## 2024-10-01 NOTE — DISCHARGE INSTRUCTIONS
Continue your treatments with your oncologist.  Follow up with the primary care doctor as well.  If you feel worse really weak short of breath or your pain worsens please return to the emergency department.

## 2024-10-01 NOTE — ED PROVIDER NOTES
Encounter Date: 10/1/2024    SCRIBE #1 NOTE: I, Jenna Sanchez, am scribing for, and in the presence of,  Faheem Rodriguez MD. I have scribed the following portions of the note - Other sections scribed: HPI, ROS, PE.       History     Chief Complaint   Patient presents with    Fatigue     Sent from cancer center c/o weakness and feeling shaky. Reports getting blood transfusion yesterday. Also c/o nausea. GCS 15.  .     Patient is a 74 year old male with a history of HTN and acute myeloid leukemia that presents to the ED for left lower abdominal pain onset 3 weeks ago. Patient reports the pain occasionally radiates to his back and worsens with certain positions. Notes his last bowel movement was one week ago. He reports a visit with Dr. Andres yesterday for the abdominal pain. Notes no relief with prescribed Celebrex. He reports going to an Urgent Care today, notes he was cold and continuously shaking there. He reports nausea and vomiting today.  Review of  his records patient has known AML currently on venetoclax and cytarabinefor that.  On prophylaxis levofloxacin and acyclovir and fluconazole    The history is provided by the patient.     Review of patient's allergies indicates:   Allergen Reactions    Acetaminophen Other (See Comments)     Restless leg     Benadryl [diphenhydramine hcl] Other (See Comments)     Restless Legs     Past Medical History:   Diagnosis Date    Anxiety and depression     BPH (benign prostatic hyperplasia)     History of CVA (cerebrovascular accident) without residual deficits     HTN (hypertension)     Malignant melanoma of skin, unspecified     NSTEMI (non-ST elevated myocardial infarction)     Sepsis, Gram negative     Sleep apnea      Past Surgical History:   Procedure Laterality Date    BONE MARROW ASPIRATION N/A 6/27/2023    Procedure: ASPIRATION, BONE MARROW;  Surgeon: Sabino Núñez MD;  [facility]: [facility];  Service: General;  Laterality: N/A;  ALL ASSESS FOR MOLECULAR MRD    BONE  MARROW ASPIRATION N/A 5/22/2024    Procedure: ASPIRATION, BONE MARROW;  Surgeon: Sabino Núñez MD;  Location: [facility] OR;  Service: General;  Laterality: N/A;    BONE MARROW BIOPSY N/A 1/25/2023    Procedure: Biopsy-bone marrow;  Surgeon: Rd Carlos MD;  Location: [facility] OR;  Service: General;  Laterality: N/A;    BONE MARROW BIOPSY N/A 11/21/2023    Procedure: Biopsy-bone marrow;  Surgeon: Junior Castro MD;  Location: [facility] OR;  Service: General;  Laterality: N/A;    COLONOSCOPY      COLONOSCOPY N/A 1/24/2023    Procedure: COLONOSCOPY (DOUBLE);  Surgeon: Jimy Baker MD;  Location: [facility] ENDOSCOPY;  Service: Gastroenterology;  Laterality: N/A;    EGD, WITH CLOSED BIOPSY N/A 1/24/2023    Procedure: EGD, WITH CLOSED BIOPSY;  Surgeon: Jimy Baker MD;  Location: [facility] ENDOSCOPY;  Service: Gastroenterology;  Laterality: N/A;    EXCISION OF MELANOMA      SINUS SURGERY      TONSILLECTOMY       Family History   Problem Relation Name Age of Onset    Cancer Mother      Cancer Father       Social History     Tobacco Use    Smoking status: Never    Smokeless tobacco: Never   Substance Use Topics    Alcohol use: Not Currently     Alcohol/week: 7.0 standard drinks of alcohol     Types: 7 Cans of beer per week    Drug use: Never     Review of Systems   Constitutional:  Negative for chills and fever.   Respiratory:  Negative for cough and shortness of breath.    Cardiovascular:  Negative for chest pain.   Gastrointestinal:  Positive for abdominal pain, constipation, nausea and vomiting.   Musculoskeletal:  Negative for myalgias.   Neurological:  Negative for syncope and headaches.   All other systems reviewed and are negative.      Physical Exam     Initial Vitals [10/01/24 1351]   BP Pulse Resp Temp SpO2   (!) 102/52 (!) 116 16 99.3 °F (37.4 °C) 98 %      MAP       --         Physical Exam    Constitutional: He appears well-developed and well-nourished. No distress.   HENT:   Head: Normocephalic and atraumatic.    Cardiovascular:    Tachycardia present.         Pulmonary/Chest: No respiratory distress. He has no wheezes. He has no rhonchi. He exhibits no tenderness.   Abdominal: Abdomen is soft. He exhibits no distension. There is no abdominal tenderness.   No pain with palpation.  There is no rebound and no guarding.   Musculoskeletal:         General: Normal range of motion.     Neurological: He is alert and oriented to person, place, and time. He has normal strength.   No pain with hip flexion. Normal strength on hip flexio; 5/5 flexion and extension of the bilateral knees, ankle, and great toe; normal light touch sensation in the distal lower extremities bilaterally.    Skin: Skin is warm and dry.   Bandage to right forearm.    Psychiatric: He has a normal mood and affect.         ED Course   Procedures  Labs Reviewed   COMPREHENSIVE METABOLIC PANEL - Abnormal       Result Value    Sodium 140      Potassium 4.6      Chloride 107      CO2 19 (*)     Glucose 132 (*)     Blood Urea Nitrogen 52.4 (*)     Creatinine 1.45 (*)     Calcium 8.9      Protein Total 7.2      Albumin 3.1 (*)     Globulin 4.1 (*)     Albumin/Globulin Ratio 0.8 (*)     Bilirubin Total 0.4            ALT 41      AST 62 (*)     eGFR 50      Anion Gap 14.0      BUN/Creatinine Ratio 36     LACTIC ACID, PLASMA - Abnormal    Lactic Acid Level 3.2 (*)    CBC WITH DIFFERENTIAL - Abnormal    WBC 29.32 (*)     RBC 2.78 (*)     Hgb 7.9 (*)     Hct 24.4 (*)     MCV 87.8      MCH 28.4      MCHC 32.4 (*)     RDW 15.9      Platelet 27 (*)     MPV 10.2      NRBC% 0.4      IPF 3.8     MANUAL DIFFERENTIAL - Abnormal    WBC 29.32      Neutrophils % 13      Lymphs % 5      Monocytes % 18      Myelocytes % 1 (*)     Blasts % 63 (*)     Neutrophils Abs 3.8116      Lymphs Abs 1.466      Monocytes Abs 5.2776 (*)     Platelets Decreased (*)     RBC Morph Abnormal (*)     Microcytosis 1+ (*)    LACTIC ACID, PLASMA - Normal    Lactic Acid Level 1.6     BLOOD CULTURE [facility]    BLOOD CULTURE [facility]   CBC W/ AUTO DIFFERENTIAL    Narrative:     The following orders were created for panel order CBC auto differential.  Procedure                               Abnormality         Status                     ---------                               -----------         ------                     CBC with Differential[[id number]]       Abnormal            Final result               Manual Differential[[id number]]         Abnormal            Final result                 Please view results for these tests on the individual orders.   URINALYSIS, REFLEX TO URINE CULTURE          Imaging Results              CT Abdomen Pelvis With IV Contrast NO Oral Contrast (Final result)  Result time 10/01/24 17:55:03   Procedure changed from CT Abdomen Pelvis W Wo Contrast     Final result by Demetrio Manning MD (10/01/24 17:55:03)                   Impression:      Mildly limited assessment, but no acute process identified in the abdomen or pelvis.    Multiple subcentimeter nodules in the lung bases, increased in number since July.  These may be infectious or inflammatory.  Three month follow-up chest CT recommended to ensure resolution.      Electronically signed by: Demetrio Manning  Date:    10/01/2024  Time:    17:55               Narrative:    EXAMINATION:  CT ABDOMEN PELVIS WITH IV CONTRAST    CLINICAL HISTORY:  LLQ/ L flank pain;    TECHNIQUE:  CT imaging of the abdomen and pelvis after intravenous contrast. Dose length product 665 mGycm. Automatic exposure control, adjustment of mA/kV or iterative reconstruction technique used to limit radiation dose.    COMPARISON:  CT 07/04/2024    FINDINGS:  Evaluation mildly limited by motion.    Liver/biliary: Normal liver. Mild intrahepatic biliary dilatation similar to prior exam.    Pancreas: Normal.    Spleen: Splenomegaly measures 15 cm.    Adrenals: Normal.    Genitourinary: Symmetric renal enhancement. No hydronephrosis. Bladder within normal limits. Enlarged  prostate.    Stomach/bowel: No evidence of bowel obstruction. Normal appendix. No definitive sites of bowel inflammation.    Lymph nodes/peritoneum: No pathologically enlarged lymph node identified. No ascites or free air. No fluid collection.    Vasculature: Aortoiliac atherosclerosis.  Ectasia of the infrarenal aorta measures 28 mm.    Abdominal wall: Small fat containing right inguinal hernia.    Lung bases: Mild reticular densities in the posterior lung bases.  Multiple small nodules as well.  Nodules measure up to 8 mm.    Musculoskeletal: No acute osseous findings.                                       X-Ray Chest AP Portable (Final result)  Result time 10/01/24 15:09:02      Final result by Peng Pisano MD (10/01/24 15:09:02)                   Impression:      No acute chest disease is identified.      Electronically signed by: Peng Pisano  Date:    10/01/2024  Time:    15:09               Narrative:    EXAMINATION:  XR CHEST AP PORTABLE    CLINICAL HISTORY:  Sepsis;, .    FINDINGS:  No alveolar consolidation, effusion, or pneumothorax is seen.   The thoracic aorta is normal  cardiac silhouette, central pulmonary vessels and mediastinum are normal in size and are grossly unremarkable.   visualized osseous structures are grossly unremarkable.                                       Medications   lactated ringers bolus 2,436 mL (0 mLs Intravenous Stopped 10/1/24 1536)   ondansetron injection 4 mg (4 mg Intravenous Given 10/1/24 1436)   iohexoL (OMNIPAQUE 350) injection 95 mL (95 mLs Intravenous Given 10/1/24 1722)     Medical Decision Making  Differential diagnosis includes, but is not limited to, constipation, musculoskeletal pain, lumbar radiculopathy, and UTI    Amount and/or Complexity of Data Reviewed  External Data Reviewed: labs.  Labs: ordered.  Radiology: ordered and independent interpretation performed.  ECG/medicine tests: ordered and independent interpretation performed.            Scribe  Attestation:   Scribe #1: I performed the above scribed service and the documentation accurately describes the services I performed. I attest to the accuracy of the note.    Attending Attestation:           Physician Attestation for Scribe:  Physician Attestation Statement for Scribe #1: I, Faheem Rodriguez MD, reviewed documentation, as scribed by Jenna Sanchez in my presence, and it is both accurate and complete.             ED Course as of 10/01/24 1839   Tue Oct 01, 2024   1526 I have blood cells decreased from yesterday.  H&H 7.9 I have increased over the week.  Platelets better than last week [LF]   1532 As it was just underneath the left rib quite a bit lower in the left lower quadrant just above the pelvis.  States he hurts there occasionally feels it radiate relaxers he was also has been taking hydrocodone at home states hydrocodone helps muscle relaxers have not helped.  He was a P ordered a CT abdomen and pelvis contrast as an outpatient he was not yet received perform that here today patient states he was not had a bowel movement in 7 days and constipation maybe contributing to his symptoms [LF]   1720 Radiology department as requested changing CT from with and without to just with contrast which I feel is sufficient for my current evaluation [LF]      ED Course User Index  [LF] Faheem Rodriguez MD          Clinically I suspect constipation is contributing to his issues.  Discussed this with him.  Discussed treatment for this.  Musculoskeletal pain is also a consideration.  He has been on Celebrex and muscle relaxers.  He takes Norco occasionally for pain he states that does help.  Known chronic anemia.  Continue treatments with his oncologist.      The left lower quadrant/flank pain has been going on for 3 weeks off and on nothing new or different about it today.  I recommend treat for constipation see if it improves his symptoms.  Discussed return precautions patient was comfortable with treatment  plan.  Urinalysis collected will be sent.  He was currently on levofloxacin acyclovir and antifungal I have advised to continue his medications will call if culture comes back positive           Clinical Impression:  Final diagnoses:  [R53.1] Weakness  [D64.9] Anemia, unspecified type (Primary)  [C92.00] Acute myeloid leukemia not having achieved remission  [R10.32] LLQ abdominal pain  [K59.00] Constipation, unspecified constipation type          ED Disposition Condition    Discharge Stable          ED Prescriptions       Medication Sig Dispense Start Date End Date Auth. Provider    lactulose (CHRONULAC) 20 gram/30 mL Soln Take 30 mLs (20 g total) by mouth 2 (two) times daily. for 5 days 300 mL 10/1/2024 10/6/2024 Faheem Rodriguez MD          Follow-up Information       Follow up With Specialties Details Why Contact Info    Nayely Andres MD Internal Medicine Schedule an appointment as soon as possible for a visit   [address] [address]  [phone]      Eduard Luna II, MD Oncology, Hematology and Oncology Schedule an appointment as soon as possible for a visit   [address]  [address]  [facility] [address]  [phone]               Faheem Rodriguez MD  10/01/24 1836       Faheem Rodriguez MD  10/01/24 7611

## 2024-10-02 ENCOUNTER — TELEPHONE (OUTPATIENT)
Dept: INTERNAL MEDICINE | Facility: CLINIC | Age: 74
End: 2024-10-02

## 2024-10-02 ENCOUNTER — HOSPITAL ENCOUNTER (INPATIENT)
Facility: HOSPITAL | Age: 74
LOS: 2 days | Discharge: HOME HEALTH | DRG: 822 | End: 2024-10-04
Attending: EMERGENCY MEDICINE | Admitting: INTERNAL MEDICINE
Payer: MEDICARE

## 2024-10-02 DIAGNOSIS — R78.81 GRAM-NEGATIVE BACTEREMIA: Primary | ICD-10-CM

## 2024-10-02 DIAGNOSIS — B96.1 BACTEREMIA DUE TO KLEBSIELLA PNEUMONIAE: ICD-10-CM

## 2024-10-02 DIAGNOSIS — D69.6 THROMBOCYTOPENIA: ICD-10-CM

## 2024-10-02 DIAGNOSIS — R78.81 BACTEREMIA DUE TO KLEBSIELLA PNEUMONIAE: ICD-10-CM

## 2024-10-02 DIAGNOSIS — T14.8XXA BLEEDING FROM WOUND: ICD-10-CM

## 2024-10-02 DIAGNOSIS — C92.00 ACUTE MYELOID LEUKEMIA NOT HAVING ACHIEVED REMISSION: Chronic | ICD-10-CM

## 2024-10-02 LAB
ABO + RH BLD: NORMAL
ABO + RH BLD: NORMAL
ABS NEUT (OLG): 3.3 X10(3)/MCL (ref 2.1–9.2)
ACINETOBACTER CALCOACETICUS-BAUMANNII COMPLEX (OHS): NOT DETECTED
ALBUMIN SERPL-MCNC: 3 G/DL (ref 3.4–4.8)
ALBUMIN/GLOB SERPL: 0.8 RATIO (ref 1.1–2)
ALP SERPL-CCNC: 81 UNIT/L (ref 40–150)
ALT SERPL-CCNC: 40 UNIT/L (ref 0–55)
AMORPH URATE CRY URNS QL MICRO: ABNORMAL /UL
ANION GAP SERPL CALC-SCNC: 11 MEQ/L
ANISOCYTOSIS BLD QL SMEAR: ABNORMAL
AST SERPL-CCNC: 119 UNIT/L (ref 5–34)
B-HCG SERPL QL: 47 %
BACTERIA #/AREA URNS AUTO: ABNORMAL /HPF
BACTEROIDES FRAGILIS (OHS): NOT DETECTED
BILIRUB SERPL-MCNC: 0.4 MG/DL
BILIRUB UR QL STRIP.AUTO: NEGATIVE
BLD PROD TYP BPU: NORMAL
BLD PROD TYP BPU: NORMAL
BLOOD UNIT EXPIRATION DATE: NORMAL
BLOOD UNIT EXPIRATION DATE: NORMAL
BLOOD UNIT TYPE CODE: 5100
BLOOD UNIT TYPE CODE: 6200
BUN SERPL-MCNC: 51.9 MG/DL (ref 8.4–25.7)
C AURIS DNA BLD POS QL NAA+NON-PROBE: NOT DETECTED
C GATTII+NEOFOR DNA CSF QL NAA+NON-PROBE: NOT DETECTED
CALCIUM SERPL-MCNC: 8.7 MG/DL (ref 8.8–10)
CANDIDA ALBICANS (OHS): NOT DETECTED
CANDIDA GLABRATA (OHS): NOT DETECTED
CANDIDA KRUSEI (OHS): NOT DETECTED
CANDIDA PARAPSILOSIS (OHS): NOT DETECTED
CANDIDA TROPICALIS (OHS): NOT DETECTED
CHLORIDE SERPL-SCNC: 108 MMOL/L (ref 98–107)
CLARITY UR: ABNORMAL
CO2 SERPL-SCNC: 23 MMOL/L (ref 23–31)
COLOR UR AUTO: ABNORMAL
CREAT SERPL-MCNC: 1.54 MG/DL (ref 0.73–1.18)
CREAT/UREA NIT SERPL: 34
CROSSMATCH INTERPRETATION: NORMAL
CROSSMATCH INTERPRETATION: NORMAL
CTX-M (OHS): NOT DETECTED
DISPENSE STATUS: NORMAL
DISPENSE STATUS: NORMAL
ENTEROBACTER CLOACAE COMPLEX (OHS): NOT DETECTED
ENTEROBACTERALES (OHS): DETECTED
ENTEROCOCCUS FAECALIS (OHS): NOT DETECTED
ENTEROCOCCUS FAECIUM (OHS): NOT DETECTED
ERYTHROCYTE [DISTWIDTH] IN BLOOD BY AUTOMATED COUNT: 16.1 % (ref 11.5–17)
ESCHERICHIA COLI (OHS): NOT DETECTED
GFR SERPLBLD CREATININE-BSD FMLA CKD-EPI: 47 ML/MIN/1.73/M2
GLOBULIN SER-MCNC: 3.8 GM/DL (ref 2.4–3.5)
GLUCOSE SERPL-MCNC: 95 MG/DL (ref 82–115)
GLUCOSE UR QL STRIP: NORMAL
GP B STREP DNA CSF QL NAA+NON-PROBE: NOT DETECTED
GRAN CASTS #/AREA URNS LPF: ABNORMAL /LPF
GROUP & RH: NORMAL
HAEM INFLU DNA CSF QL NAA+NON-PROBE: NOT DETECTED
HCT VFR BLD AUTO: 21.8 % (ref 42–52)
HGB BLD-MCNC: 7 G/DL (ref 14–18)
HGB UR QL STRIP: ABNORMAL
IMP (OHS): NOT DETECTED
INDIRECT COOMBS: NORMAL
INSTRUMENT WBC (OLG): 16.52 X10(3)/MCL
KETONES UR QL STRIP: NEGATIVE
KLEBSIELLA AEROGENES (OHS): NOT DETECTED
KLEBSIELLA OXYTOCA (OHS): NOT DETECTED
KLEBSIELLA PNEUMONIAE GROUP (OHS): DETECTED
KPC (OHS): NOT DETECTED
L MONOCYTOG DNA CSF QL NAA+NON-PROBE: NOT DETECTED
LACTATE SERPL-SCNC: 2.3 MMOL/L (ref 0.5–2.2)
LACTATE SERPL-SCNC: 2.5 MMOL/L (ref 0.5–2.2)
LEUKOCYTE ESTERASE UR QL STRIP: NEGATIVE
LYMPHOCYTES NFR BLD MANUAL: 1.32 X10(3)/MCL
LYMPHOCYTES NFR BLD MANUAL: 8 %
MCH RBC QN AUTO: 28.6 PG (ref 27–31)
MCHC RBC AUTO-ENTMCNC: 32.1 G/DL (ref 33–36)
MCR-1 (OHS): NOT DETECTED
MCV RBC AUTO: 89 FL (ref 80–94)
MECA/C (OHS): ABNORMAL
MECA/C AND MREJ (MRSA)(OHS): ABNORMAL
MICROCYTES BLD QL SMEAR: ABNORMAL
MONOCYTES NFR BLD MANUAL: 25 %
MONOCYTES NFR BLD MANUAL: 4.13 X10(3)/MCL (ref 0.1–1.3)
MUCOUS THREADS URNS QL MICRO: ABNORMAL /LPF
MYELOCYTES NFR BLD MANUAL: 1 %
N MEN DNA CSF QL NAA+NON-PROBE: NOT DETECTED
NDM (OHS): NOT DETECTED
NEUTROPHILS NFR BLD MANUAL: 20 %
NITRITE UR QL STRIP: NEGATIVE
NRBC BLD AUTO-RTO: 0.1 %
OVALOCYTES (OLG): ABNORMAL
OXA-48-LIKE (OHS): NOT DETECTED
PH UR STRIP: 5.5 [PH]
PLATELET # BLD AUTO: 10 X10(3)/MCL (ref 130–400)
PLATELET # BLD EST: ABNORMAL 10*3/UL
PLATELETS.RETICULATED NFR BLD AUTO: 4.9 % (ref 0.9–11.2)
PMV BLD AUTO: 9.9 FL (ref 7.4–10.4)
POIKILOCYTOSIS BLD QL SMEAR: ABNORMAL
POTASSIUM SERPL-SCNC: 4.6 MMOL/L (ref 3.5–5.1)
PROT SERPL-MCNC: 6.8 GM/DL (ref 5.8–7.6)
PROT UR QL STRIP: ABNORMAL
PROTEUS SPP. (OHS): NOT DETECTED
PSEUDOMONAS AERUGINOSA (OHS): NOT DETECTED
RBC # BLD AUTO: 2.45 X10(6)/MCL (ref 4.7–6.1)
RBC #/AREA URNS AUTO: ABNORMAL /HPF
RBC MORPH BLD: ABNORMAL
S ENT+BONG DNA STL QL NAA+NON-PROBE: NOT DETECTED
S PNEUM DNA CSF QL NAA+NON-PROBE: NOT DETECTED
SERRATIA MARCESCENS (OHS): NOT DETECTED
SODIUM SERPL-SCNC: 142 MMOL/L (ref 136–145)
SP GR UR STRIP.AUTO: 1.03 (ref 1–1.03)
SPECIMEN OUTDATE: NORMAL
SQUAMOUS #/AREA URNS LPF: ABNORMAL /HPF
STAPHYLOCOCCUS AUREUS (OHS): NOT DETECTED
STAPHYLOCOCCUS EPIDERMIDIS (OHS): NOT DETECTED
STAPHYLOCOCCUS LUGDUNENSIS (OHS): NOT DETECTED
STAPHYLOCOCCUS SPP. (OHS): NOT DETECTED
STENOTROPHOMONAS MALTOPHILIA (OHS): NOT DETECTED
STREPTOCOCCUS PYOGENES (GROUP A)(OHS): NOT DETECTED
STREPTOCOCCUS SPP. (OHS): NOT DETECTED
UNIT NUMBER: NORMAL
UNIT NUMBER: NORMAL
URATE CRY UR QL COMP ASSIST: ABNORMAL
UROBILINOGEN UR STRIP-ACNC: NORMAL
VANA/B (OHS): ABNORMAL
VIM (OHS): NOT DETECTED
WBC # BLD AUTO: 16.52 X10(3)/MCL (ref 4.5–11.5)
WBC #/AREA URNS AUTO: ABNORMAL /HPF

## 2024-10-02 PROCEDURE — 63600175 PHARM REV CODE 636 W HCPCS: Performed by: EMERGENCY MEDICINE

## 2024-10-02 PROCEDURE — 86901 BLOOD TYPING SEROLOGIC RH(D): CPT | Performed by: NURSE PRACTITIONER

## 2024-10-02 PROCEDURE — 21400001 HC TELEMETRY ROOM

## 2024-10-02 PROCEDURE — P9040 RBC LEUKOREDUCED IRRADIATED: HCPCS | Performed by: INTERNAL MEDICINE

## 2024-10-02 PROCEDURE — 99291 CRITICAL CARE FIRST HOUR: CPT | Mod: 95,,, | Performed by: INTERNAL MEDICINE

## 2024-10-02 PROCEDURE — 83605 ASSAY OF LACTIC ACID: CPT | Performed by: EMERGENCY MEDICINE

## 2024-10-02 PROCEDURE — 63600175 PHARM REV CODE 636 W HCPCS: Performed by: INTERNAL MEDICINE

## 2024-10-02 PROCEDURE — 81001 URINALYSIS AUTO W/SCOPE: CPT | Performed by: EMERGENCY MEDICINE

## 2024-10-02 PROCEDURE — 85027 COMPLETE CBC AUTOMATED: CPT | Performed by: EMERGENCY MEDICINE

## 2024-10-02 PROCEDURE — 86923 COMPATIBILITY TEST ELECTRIC: CPT | Performed by: INTERNAL MEDICINE

## 2024-10-02 PROCEDURE — 25000003 PHARM REV CODE 250: Performed by: INTERNAL MEDICINE

## 2024-10-02 PROCEDURE — 25000003 PHARM REV CODE 250: Performed by: EMERGENCY MEDICINE

## 2024-10-02 PROCEDURE — 80053 COMPREHEN METABOLIC PANEL: CPT | Performed by: EMERGENCY MEDICINE

## 2024-10-02 PROCEDURE — 99291 CRITICAL CARE FIRST HOUR: CPT | Mod: 25

## 2024-10-02 PROCEDURE — 30233R1 TRANSFUSION OF NONAUTOLOGOUS PLATELETS INTO PERIPHERAL VEIN, PERCUTANEOUS APPROACH: ICD-10-PCS | Performed by: EMERGENCY MEDICINE

## 2024-10-02 PROCEDURE — 36430 TRANSFUSION BLD/BLD COMPNT: CPT

## 2024-10-02 PROCEDURE — 86900 BLOOD TYPING SEROLOGIC ABO: CPT | Performed by: NURSE PRACTITIONER

## 2024-10-02 PROCEDURE — 85007 BL SMEAR W/DIFF WBC COUNT: CPT | Performed by: EMERGENCY MEDICINE

## 2024-10-02 PROCEDURE — P9037 PLATE PHERES LEUKOREDU IRRAD: HCPCS | Performed by: EMERGENCY MEDICINE

## 2024-10-02 PROCEDURE — 87040 BLOOD CULTURE FOR BACTERIA: CPT | Performed by: EMERGENCY MEDICINE

## 2024-10-02 RX ORDER — TAMSULOSIN HYDROCHLORIDE 0.4 MG/1
0.4 CAPSULE ORAL DAILY
Status: DISCONTINUED | OUTPATIENT
Start: 2024-10-02 | End: 2024-10-04 | Stop reason: HOSPADM

## 2024-10-02 RX ORDER — GABAPENTIN 100 MG/1
100 CAPSULE ORAL 2 TIMES DAILY
Status: ON HOLD | COMMUNITY
End: 2024-10-04 | Stop reason: HOSPADM

## 2024-10-02 RX ORDER — CEPHALEXIN 500 MG/1
500 CAPSULE ORAL EVERY 12 HOURS
Status: ON HOLD | COMMUNITY
Start: 2024-09-18 | End: 2024-10-02

## 2024-10-02 RX ORDER — FAMOTIDINE 10 MG/ML
20 INJECTION INTRAVENOUS ONCE
Status: COMPLETED | OUTPATIENT
Start: 2024-10-02 | End: 2024-10-02

## 2024-10-02 RX ORDER — SODIUM CHLORIDE 0.9 % (FLUSH) 0.9 %
10 SYRINGE (ML) INJECTION
Status: DISCONTINUED | OUTPATIENT
Start: 2024-10-02 | End: 2024-10-04 | Stop reason: HOSPADM

## 2024-10-02 RX ORDER — ONDANSETRON 4 MG/1
8 TABLET, ORALLY DISINTEGRATING ORAL EVERY 8 HOURS PRN
Status: DISCONTINUED | OUTPATIENT
Start: 2024-10-02 | End: 2024-10-04 | Stop reason: HOSPADM

## 2024-10-02 RX ORDER — CITALOPRAM 20 MG/1
40 TABLET, FILM COATED ORAL DAILY
Status: DISCONTINUED | OUTPATIENT
Start: 2024-10-02 | End: 2024-10-04 | Stop reason: HOSPADM

## 2024-10-02 RX ORDER — TRAMADOL HYDROCHLORIDE 50 MG/1
50 TABLET ORAL EVERY 6 HOURS PRN
Status: DISCONTINUED | OUTPATIENT
Start: 2024-10-02 | End: 2024-10-04 | Stop reason: HOSPADM

## 2024-10-02 RX ORDER — HYDROCHLOROTHIAZIDE 12.5 MG/1
12.5 TABLET ORAL DAILY
Status: DISCONTINUED | OUTPATIENT
Start: 2024-10-03 | End: 2024-10-04 | Stop reason: HOSPADM

## 2024-10-02 RX ORDER — HYDROCODONE BITARTRATE AND ACETAMINOPHEN 500; 5 MG/1; MG/1
TABLET ORAL
Status: DISCONTINUED | OUTPATIENT
Start: 2024-10-02 | End: 2024-10-04 | Stop reason: HOSPADM

## 2024-10-02 RX ORDER — CELECOXIB 200 MG/1
200 CAPSULE ORAL DAILY PRN
Status: DISCONTINUED | OUTPATIENT
Start: 2024-10-02 | End: 2024-10-04 | Stop reason: HOSPADM

## 2024-10-02 RX ORDER — LISINOPRIL 20 MG/1
20 TABLET ORAL DAILY
Status: DISCONTINUED | OUTPATIENT
Start: 2024-10-03 | End: 2024-10-04 | Stop reason: HOSPADM

## 2024-10-02 RX ORDER — GABAPENTIN 100 MG/1
100 CAPSULE ORAL 2 TIMES DAILY
Status: DISCONTINUED | OUTPATIENT
Start: 2024-10-02 | End: 2024-10-02

## 2024-10-02 RX ORDER — LISINOPRIL AND HYDROCHLOROTHIAZIDE 12.5; 2 MG/1; MG/1
1 TABLET ORAL DAILY
Status: DISCONTINUED | OUTPATIENT
Start: 2024-10-03 | End: 2024-10-02

## 2024-10-02 RX ORDER — GABAPENTIN 300 MG/1
300 CAPSULE ORAL 2 TIMES DAILY
Status: DISCONTINUED | OUTPATIENT
Start: 2024-10-02 | End: 2024-10-04 | Stop reason: HOSPADM

## 2024-10-02 RX ORDER — SODIUM CHLORIDE 9 MG/ML
INJECTION, SOLUTION INTRAVENOUS CONTINUOUS
Status: DISCONTINUED | OUTPATIENT
Start: 2024-10-02 | End: 2024-10-04 | Stop reason: HOSPADM

## 2024-10-02 RX ORDER — TALC
6 POWDER (GRAM) TOPICAL NIGHTLY PRN
Status: DISCONTINUED | OUTPATIENT
Start: 2024-10-02 | End: 2024-10-04 | Stop reason: HOSPADM

## 2024-10-02 RX ORDER — CYCLOBENZAPRINE HCL 10 MG
10 TABLET ORAL 3 TIMES DAILY PRN
Status: DISCONTINUED | OUTPATIENT
Start: 2024-10-02 | End: 2024-10-02

## 2024-10-02 RX ADMIN — SODIUM CHLORIDE, POTASSIUM CHLORIDE, SODIUM LACTATE AND CALCIUM CHLORIDE 2436 ML: 600; 310; 30; 20 INJECTION, SOLUTION INTRAVENOUS at 05:10

## 2024-10-02 RX ADMIN — CITALOPRAM HYDROBROMIDE 40 MG: 20 TABLET ORAL at 12:10

## 2024-10-02 RX ADMIN — SODIUM CHLORIDE: 9 INJECTION, SOLUTION INTRAVENOUS at 11:10

## 2024-10-02 RX ADMIN — PIPERACILLIN SODIUM AND TAZOBACTAM SODIUM 4.5 G: 4; .5 INJECTION, POWDER, LYOPHILIZED, FOR SOLUTION INTRAVENOUS at 07:10

## 2024-10-02 RX ADMIN — GABAPENTIN 300 MG: 300 CAPSULE ORAL at 12:10

## 2024-10-02 RX ADMIN — GABAPENTIN 300 MG: 300 CAPSULE ORAL at 08:10

## 2024-10-02 RX ADMIN — FAMOTIDINE 20 MG: 10 INJECTION, SOLUTION INTRAVENOUS at 03:10

## 2024-10-02 RX ADMIN — TRAMADOL HYDROCHLORIDE 50 MG: 50 TABLET, COATED ORAL at 07:10

## 2024-10-02 RX ADMIN — PIPERACILLIN SODIUM AND TAZOBACTAM SODIUM 4.5 G: 4; .5 INJECTION, POWDER, LYOPHILIZED, FOR SOLUTION INTRAVENOUS at 11:10

## 2024-10-02 RX ADMIN — TAMSULOSIN HYDROCHLORIDE 0.4 MG: 0.4 CAPSULE ORAL at 12:10

## 2024-10-02 RX ADMIN — PIPERACILLIN SODIUM AND TAZOBACTAM SODIUM 4.5 G: 4; .5 INJECTION, POWDER, LYOPHILIZED, FOR SOLUTION INTRAVENOUS at 04:10

## 2024-10-02 RX ADMIN — TRAMADOL HYDROCHLORIDE 50 MG: 50 TABLET, COATED ORAL at 12:10

## 2024-10-02 NOTE — PLAN OF CARE
10/02/24 0914   Discharge Assessment   Assessment Type Discharge Planning Assessment   Confirmed/corrected address, phone number and insurance Yes   Confirmed Demographics Correct on Facesheet   Source of Information patient   When was your last doctors appointment? 10/01/24   Communicated DINA with patient/caregiver Date not available/Unable to determine   Reason For Admission positive blood cultures, llq abd pain, n/v   People in Home alone   Do you expect to return to your current living situation? Yes   Do you have help at home or someone to help you manage your care at home? No   Prior to hospitilization cognitive status: Alert/Oriented   Current cognitive status: Alert/Oriented   Walking or Climbing Stairs Difficulty yes   Walking or Climbing Stairs ambulation difficulty, requires equipment   Mobility Management straight cane, Rollator   Dressing/Bathing Difficulty yes   Dressing/Bathing bathing difficulty, requires equipment   Dressing/Bathing Management shower chair   Home Accessibility stairs within home   Number of Stairs, Within Home, Primary three   Stair Railings, Within Home, Primary none   Home Layout Able to live on 1st floor   Equipment Currently Used at Home rollator;cane, straight   Readmission within 30 days? Yes   Patient currently being followed by outpatient case management? No   Do you currently have service(s) that help you manage your care at home? No   Do you take prescription medications? Yes   Do you have any problems affording any of your prescribed medications? No   Is the patient taking medications as prescribed? yes   Who is going to help you get home at discharge? son   How do you get to doctors appointments? car, drives self   Are you on dialysis? No   Do you take coumadin? No   Discharge Plan A Home;Home Health   Discharge Plan B Home Health;Home   DME Needed Upon Discharge  other (see comments)  (tbd)   Discharge Plan discussed with: Patient   Financial Resource Strain   How  hard is it for you to pay for the very basics like food, housing, medical care, and heating? Not hard   Housing Stability   In the last 12 months, was there a time when you were not able to pay the mortgage or rent on time? N   At any time in the past 12 months, were you homeless or living in a shelter (including now)? N   Transportation Needs   Has the lack of transportation kept you from medical appointments, meetings, work or from getting things needed for daily living? No   Food Insecurity   Within the past 12 months, the food you bought just didn't last and you didn't have money to get more. Never true   Stress   Do you feel stress - tense, restless, nervous, or anxious, or unable to sleep at night because your mind is troubled all the time - these days? Not at all   Social Isolation   How often do you feel lonely or isolated from those around you?  Never   Alcohol Use   Q1: How often do you have a drink containing alcohol? Never   Q2: How many drinks containing alcohol do you have on a typical day when you are drinking? None   Health Literacy   How often do you need to have someone help you when you read instructions, pamphlets, or other written material from your doctor or pharmacy? Never     Pharmacy [facility]  Pt states he was indep in adl's prior to admit  Pt is a , has 1 son Myles Gonzalez

## 2024-10-02 NOTE — TELEPHONE ENCOUNTER
----- Message from Vivi sent at 10/2/2024  9:32 AM CDT -----  .Who Called: Hussein Gonzalez Jr.          Patient's Preferred Phone Number on File: [phone]   Best Call Back Number, if different:  Additional Information: pt is in room 861

## 2024-10-02 NOTE — H&P
[facility] Medicine History & Physical Examination       Patient Name: Hussein Gonzalez Jr.  MRN: [id number]  Patient Class: IP- Inpatient   Admission Date: 10/2/2024   Admitting Physician: SHELLEY Service   Length of Stay: 0  Attending Physician: Nayely Andres MD  Primary Care Provider: Nayely Andres MD  Face-to-Face encounter date: 10/02/2024  Code Status:Full  Chief Complaint: Abnormal Lab (Called by PCP to report to ED for further evaluation regarding (+) blood culture. Hx leukemia. Denies pain or fever. )      Screening for Social Drivers for health:  Patient screened for food insecurity, housing instability, transportation needs, utility difficulties, and interpersonal safety (select all that apply as identified as concern)  []Housing or Food  []Transportation Needs  []Utility Difficulties  []Interpersonal safety  [x]None      Patient information was obtained from patient, patient's family, past medical records and ER records.  ED records were reviewed in detail and documented below    HISTORY OF PRESENT ILLNESS:   Hussein Gonzalez Jr. is a 74 y.o. male who  has a past medical history of Anxiety and depression, BPH (benign prostatic hyperplasia), History of CVA (cerebrovascular accident) without residual deficits, HTN (hypertension), Malignant melanoma of skin, unspecified, NSTEMI (non-ST elevated myocardial infarction), Sepsis, Gram negative, and Sleep apnea.. The patient presented to [facility] on 10/2/2024 with a primary complaint of ongoing abdominal pain and now positive blood cultures    Ms. Hanley is a 74-year-old gentleman known to me for hypertension and acute myeloid leukemia, currently in treatment and followed by Oncology that was recently seen in the office with complaints of left-sided flank and abdominal pain.  Did not complain of any dysuria or hematuria at the time.  There was no complaints with his bowel habits either.  No complaints of fever or  chills.  At the time considering his history and presentation, he was given Flexeril, Celebrex and a CT scan was ordered.  He presented to the ED however a day after with ongoing complaints of abdominal pain.  He did endorse that his last bowel movement was a week ago in the ED. apparently patient had presented to the urgent care prior and was noted to be cold and having chills.  He reported some nausea and vomiting on the day he presented to the ED which was on 10/01/2024.  A CT scan was completed which was essentially negative for any acute intra-abdominal pathology.  There were some lung nodules noted which had increased in number from a prior comparison.    Blood cultures were drawn as well.  He was diagnosed with musculoskeletal pain and constipation contributing to his issues and was discharged home.  A urinalysis was collected as well as blood cultures drawn.    He is supposed to be on levofloxacin, acyclovir and Diflucan prophylactically.  He presented again on 10/02/2024 since he was called back with positive blood cultures.  Confirmed enterobacteria and Klebsiella.  He received a dose of Zosyn in the ED and was admitted to the hospital.    At the time of my visit he still complaints of some left-sided abdominal and flank pain however improved from any was seen in the office.    He was seen by Dr. Rai yesterday with removal of melanoma on his right forearm.  Zosyn will be continued and cultures will be followed closely.  He did receive 1 unit of platelets in the ED.  H&H is noted to be at 7.0 and 21.8 this morning and a unit of packed red blood cells will be ordered     PAST MEDICAL HISTORY:     Past Medical History:   Diagnosis Date    Anxiety and depression     BPH (benign prostatic hyperplasia)     History of CVA (cerebrovascular accident) without residual deficits     HTN (hypertension)     Malignant melanoma of skin, unspecified     NSTEMI (non-ST elevated myocardial infarction)     Sepsis, Gram  negative     Sleep apnea        PAST SURGICAL HISTORY:     Past Surgical History:   Procedure Laterality Date    BONE MARROW ASPIRATION N/A 6/27/2023    Procedure: ASPIRATION, BONE MARROW;  Surgeon: Sabino Núñez MD;  Location: [facility] OR;  Service: General;  Laterality: N/A;  ALL ASSESS FOR MOLECULAR MRD    BONE MARROW ASPIRATION N/A 5/22/2024    Procedure: ASPIRATION, BONE MARROW;  Surgeon: Sabino Núñez MD;  Location: [facility] OR;  Service: General;  Laterality: N/A;    BONE MARROW BIOPSY N/A 1/25/2023    Procedure: Biopsy-bone marrow;  Surgeon: Rd Carlos MD;  Location: [facility] OR;  Service: General;  Laterality: N/A;    BONE MARROW BIOPSY N/A 11/21/2023    Procedure: Biopsy-bone marrow;  Surgeon: Junior Castro MD;  Location: [facility] OR;  Service: General;  Laterality: N/A;    COLONOSCOPY      COLONOSCOPY N/A 1/24/2023    Procedure: COLONOSCOPY (DOUBLE);  Surgeon: Jimy Baker MD;  Location: [facility] ENDOSCOPY;  Service: Gastroenterology;  Laterality: N/A;    EGD, WITH CLOSED BIOPSY N/A 1/24/2023    Procedure: EGD, WITH CLOSED BIOPSY;  Surgeon: Jimy Baker MD;  Location: [facility] ENDOSCOPY;  Service: Gastroenterology;  Laterality: N/A;    EXCISION OF MELANOMA      SINUS SURGERY      TONSILLECTOMY         ALLERGIES:   Acetaminophen and Benadryl [diphenhydramine hcl]    FAMILY HISTORY:   Reviewed and negative    SOCIAL HISTORY:     Social History     Tobacco Use    Smoking status: Never    Smokeless tobacco: Never   Substance Use Topics    Alcohol use: Not Currently     Alcohol/week: 7.0 standard drinks of alcohol     Types: 7 Cans of beer per week        HOME MEDICATIONS:     Prior to Admission medications    Medication Sig Start Date End Date Taking? Authorizing Provider   citalopram (CELEXA) 40 MG tablet Take 1 tablet by mouth once daily 1/25/24  Yes Luis Alberto Patel FNP   cyclobenzaprine (FLEXERIL) 10 MG tablet Take 1 tablet (10 mg total) by mouth 3 (three) times daily as needed for Muscle spasms.  9/30/24 10/10/24 Yes Margot Bartlett FNP   gabapentin (NEURONTIN) 100 MG capsule Take 100 mg by mouth 2 (two) times daily.   Yes Provider, Historical   lisinopriL-hydrochlorothiazide (PRINZIDE,ZESTORETIC) 20-12.5 mg per tablet Take 1 tablet by mouth once daily  Patient taking differently: 0.5 tab daily 1/9/23  Yes Nayely Andres MD   rosuvastatin (CRESTOR) 40 MG Tab Take 40 mg by mouth once daily. 8/15/23  Yes Provider, Historical   tamsulosin (FLOMAX) 0.4 mg Cap Take 1 capsule (0.4 mg total) by mouth once daily. 10/28/23 10/27/24 Yes Nayely Andres MD   venetoclax (VENCLEXTA) 100 mg Tab TAKE 2 TABLETS BY MOUTH ONCE DAILY ON DAYS 1-14 OF A 28 DAY CYCLE 3/27/24  Yes Eduard Luna II, MD   (Magic mouthwash) 1:1:1 diphenhydrAMINE(Benadryl) 12.5mg/5ml liq, aluminum & magnesium hydroxide-simethicone (Maalox), LIDOcaine viscous 2% Swish and spit 15 mLs every 4 (four) hours as needed (mouth sores). for mouth sores  Patient taking differently: Swish and spit 15 mLs every 4 (four) hours as needed (mouth sores). for mouth sores PRN 10/9/23 10/2/24 Yes Jenna Bobby FNP   cephALEXin (KEFLEX) 500 MG capsule Take 500 mg by mouth every 12 (twelve) hours. 9/18/24 10/2/24 Yes Provider, Historical   celecoxib (CELEBREX) 200 MG capsule Take 1 capsule (200 mg total) by mouth daily as needed for Pain. 9/20/24   Margot Bartlett FNP   gabapentin (NEURONTIN) 300 MG capsule Take 300 mg by mouth 2 (two) times daily. 9/3/24   Provider, Historical   aminocaproic acid (AMICAR) 250 mg/mL (25 %) Soln Take 4 mLs (1 g total) by mouth every hour as needed (bleeding). 9/12/24 10/2/24  Edurad Luna II, MD   lactulose (CHRONULAC) 20 gram/30 mL Soln Take 30 mLs (20 g total) by mouth 2 (two) times daily. for 5 days 10/1/24 10/2/24  Faheem Rodriguez MD   multivitamin (THERAGRAN) per tablet Take 1 tablet by mouth once daily.  10/2/24  Provider, Historical   tranexamic acid (LYSTEDA) 650 mg tablet Take 650 mg by mouth  once. 9/15/24 10/2/24  Provider, Historical       REVIEW OF SYSTEMS:   Except as documented, all other systems reviewed and negative     PHYSICAL EXAM:     VITAL SIGNS: 24 HRS MIN & MAX LAST   Temp  Min: 97.2 °F (36.2 °C)  Max: 99.3 °F (37.4 °C) 98.2 °F (36.8 °C)   BP  Min: 83/54  Max: 146/68 133/71   Pulse  Min: 80  Max: 116  83   Resp  Min: 16  Max: 28 20   SpO2  Min: 91 %  Max: 98 % (!) 94 %     General appearance:  Alert and oriented, no acute distress , appears pale   HENT: Atraumatic head. Moist mucous membranes of oral cavity.  Eyes: Normal extraocular movements.   Neck: Supple.   Lungs:  Decreased in bilateral bases   Heart:  3/6 systolic murmur    Abdomen: Soft, non-distended, non-tender. No rebound tenderness/guarding. Bowel sounds are normal.   Extremities:  Bandage noted on the right forearm from recent melanoma removal   Neuro:  Nonfocal, generalized weakness    LABS AND IMAGING:     Recent Labs   Lab 09/30/24  0829 10/01/24  1429 10/02/24  0351   WBC 44.39* 29.32  29.32* 16.52  16.52*   RBC 2.43* 2.78* 2.45*   HGB 7.1* 7.9* 7.0*   HCT 22.1* 24.4* 21.8*   MCV 90.9 87.8 89.0   MCH 29.2 28.4 28.6   MCHC 32.1* 32.4* 32.1*   RDW 15.0 15.9 16.1   PLT 12* 27* 10*   MPV 9.0 10.2 9.9       Recent Labs   Lab 09/30/24  0829 10/01/24  1429 10/02/24  0351    140 142   K 3.8 4.6 4.6    107 108*   CO2 28 19* 23   BUN 21.6 52.4* 51.9*   CREATININE 1.18 1.45* 1.54*   CALCIUM 8.3* 8.9 8.7*   ALBUMIN 3.0* 3.1* 3.0*   ALKPHOS 83 114 81   ALT 43 41 40   AST 25 62* 119*   BILITOT 0.4 0.4 0.4       Microbiology Results (last 7 days)       Procedure Component Value Units Date/Time    Blood culture x two cultures. Draw prior to antibiotics. [[id number]] Collected: 10/02/24 0351    Order Status: Resulted Specimen: Blood Updated: 10/02/24 0403    Blood culture x two cultures. Draw prior to antibiotics. [[id number]] Collected: 10/02/24 0351    Order Status: Resulted Specimen: Blood Updated: 10/02/24 0403              CT Abdomen Pelvis With IV Contrast NO Oral Contrast  Narrative: EXAMINATION:  CT ABDOMEN PELVIS WITH IV CONTRAST    CLINICAL HISTORY:  LLQ/ L flank pain;    TECHNIQUE:  CT imaging of the abdomen and pelvis after intravenous contrast. Dose length product 665 mGycm. Automatic exposure control, adjustment of mA/kV or iterative reconstruction technique used to limit radiation dose.    COMPARISON:  CT 07/04/2024    FINDINGS:  Evaluation mildly limited by motion.    Liver/biliary: Normal liver. Mild intrahepatic biliary dilatation similar to prior exam.    Pancreas: Normal.    Spleen: Splenomegaly measures 15 cm.    Adrenals: Normal.    Genitourinary: Symmetric renal enhancement. No hydronephrosis. Bladder within normal limits. Enlarged prostate.    Stomach/bowel: No evidence of bowel obstruction. Normal appendix. No definitive sites of bowel inflammation.    Lymph nodes/peritoneum: No pathologically enlarged lymph node identified. No ascites or free air. No fluid collection.    Vasculature: Aortoiliac atherosclerosis.  Ectasia of the infrarenal aorta measures 28 mm.    Abdominal wall: Small fat containing right inguinal hernia.    Lung bases: Mild reticular densities in the posterior lung bases.  Multiple small nodules as well.  Nodules measure up to 8 mm.    Musculoskeletal: No acute osseous findings.  Impression: Mildly limited assessment, but no acute process identified in the abdomen or pelvis.    Multiple subcentimeter nodules in the lung bases, increased in number since July.  These may be infectious or inflammatory.  Three month follow-up chest CT recommended to ensure resolution.    Electronically signed by: Demetrio Manning  Date:    10/01/2024  Time:    17:55  X-Ray Chest AP Portable  Narrative: EXAMINATION:  XR CHEST AP PORTABLE    CLINICAL HISTORY:  Sepsis;, .    FINDINGS:  No alveolar consolidation, effusion, or pneumothorax is seen.   The thoracic aorta is normal  cardiac silhouette, central pulmonary  vessels and mediastinum are normal in size and are grossly unremarkable.   visualized osseous structures are grossly unremarkable.  Impression: No acute chest disease is identified.    Electronically signed by: Peng Pisano  Date:    10/01/2024  Time:    15:09      ASSESSMENT & PLAN:     Sepsis   -blood cultures confirmed Enterobacteriaceae and Klebsiella pneumoniae in gram stain of broth in 1 of 2 bottles  -as such patient without any fever however did have some nausea/vomiting   -will follow final cultures, also repeated blood cultures x2 upon admission   -continue Zosyn, high likelihood of cultures showing a contaminant     2. AML, not achieve remission   -Oncology will be informed  -patient currently on treatment with Venclexta  -also supposed to be on levofloxacin, Diflucan and acyclovir prophylactically  -med rec noted without these medications, nursing staff confirmed with patient and he has been off of it    3. Anemia and thrombocytopenia   -patient is status post 1 unit of platelets in the ED   -will type crossmatch and transfuse 1 unit of packed red blood cells and monitor H&H closely     4. Hypertension   -home medications reviewed and resumed    5. Anxiety/depression   -on citalopram 40 mg p.o. daily, continue      VTE Prophylaxis: SCD's, No anticoagulation secondary to anemia    Patient condition:  Fair    CC time:  31 minutes   Cc diagnosis: Thrombocytopenia, anemia in a patient with AML needing platelets and blood transfusion  __________________________________________________________________________  INPATIENT LIST OF MEDICATIONS     Scheduled Meds:   citalopram  40 mg Oral Daily    gabapentin  300 mg Oral BID    [START ON 10/3/2024] lisinopriL-hydrochlorothiazide  1 tablet Oral Daily    piperacillin-tazobactam (Zosyn) IV (PEDS and ADULTS) (extended infusion is not appropriate)  4.5 g Intravenous Q8H    tamsulosin  0.4 mg Oral Daily     Continuous Infusions:   0.9% NaCl   Intravenous Continuous          PRN Meds:.  Current Facility-Administered Medications:     0.9%  NaCl infusion (for blood administration), , Intravenous, Q24H PRN    0.9%  NaCl infusion (for blood administration), , Intravenous, Q24H PRN    celecoxib, 200 mg, Oral, Daily PRN    melatonin, 6 mg, Oral, Nightly PRN    ondansetron, 8 mg, Oral, Q8H PRN    sodium chloride 0.9%, 10 mL, Intravenous, PRN    traMADoL, 50 mg, Oral, Q6H PRN          All diagnosis and differential diagnosis have been reviewed; assessment and plan has been documented; I have personally reviewed the labs and test results that are presently available; I have reviewed the patients medication list; I have reviewed the consulting providers response and recommendations. I have reviewed or attempted to review medical records based upon their availability.    All of the patient and family questions have been addressed and answered. Patient's is agreeable to the above stated plan. I will continue to monitor closely and make adjustments to medical management as needed.    If patient was admitted under observational status it is with my approval/permission.      Nayely Andres MD   10/02/2024

## 2024-10-02 NOTE — ED PROVIDER NOTES
Encounter Date: 10/2/2024       History     Chief Complaint   Patient presents with    Abnormal Lab     Called by PCP to report to ED for further evaluation regarding (+) blood culture. Hx leukemia. Denies pain or fever.      74-year-old male with a history of AML presents to the emergency department after called back with positive blood cultures.  He was seen yesterday with chills, left lower quadrant abdominal pain radiating to his left flank, nausea and vomiting.  Workup with leukocytosis, anemia, thrombocytopenia attributed to his chronic hematologic malignancy.  CT of the abdomen and pelvis was performed which demonstrated no acute findings.  He was discharged home; however, blood cultures have since returned positive for Gram-negative rods.     Denies noting any fever.  No dysuria or hematuria.    The history is provided by the patient and medical records.     Review of patient's allergies indicates:   Allergen Reactions    Acetaminophen Other (See Comments)     Restless leg     Benadryl [diphenhydramine hcl] Other (See Comments)     Restless Legs     Past Medical History:   Diagnosis Date    Anxiety and depression     BPH (benign prostatic hyperplasia)     History of CVA (cerebrovascular accident) without residual deficits     HTN (hypertension)     Malignant melanoma of skin, unspecified     NSTEMI (non-ST elevated myocardial infarction)     Sepsis, Gram negative     Sleep apnea      Past Surgical History:   Procedure Laterality Date    BONE MARROW ASPIRATION N/A 6/27/2023    Procedure: ASPIRATION, BONE MARROW;  Surgeon: Sabino Núñez MD;  Location: [facility];  Service: General;  Laterality: N/A;  ALL ASSESS FOR MOLECULAR MRD    BONE MARROW ASPIRATION N/A 5/22/2024    Procedure: ASPIRATION, BONE MARROW;  Surgeon: Sabino Núñez MD;  Location: [facility] OR;  Service: General;  Laterality: N/A;    BONE MARROW BIOPSY N/A 1/25/2023    Procedure: Biopsy-bone marrow;  Surgeon: Rd Carlos MD;  Location: [facility] OR;   Service: General;  Laterality: N/A;    BONE MARROW BIOPSY N/A 11/21/2023    Procedure: Biopsy-bone marrow;  Surgeon: Junior Castro MD;  Location: [facility];  Service: General;  Laterality: N/A;    COLONOSCOPY      COLONOSCOPY N/A 1/24/2023    Procedure: COLONOSCOPY (DOUBLE);  Surgeon: Jimy Baker MD;  Location: [facility] ENDOSCOPY;  Service: Gastroenterology;  Laterality: N/A;    EGD, WITH CLOSED BIOPSY N/A 1/24/2023    Procedure: EGD, WITH CLOSED BIOPSY;  Surgeon: Jimy Baker MD;  Location: [facility] ENDOSCOPY;  Service: Gastroenterology;  Laterality: N/A;    EXCISION OF MELANOMA      SINUS SURGERY      TONSILLECTOMY       Family History   Problem Relation Name Age of Onset    Cancer Mother      Cancer Father       Social History     Tobacco Use    Smoking status: Never    Smokeless tobacco: Never   Substance Use Topics    Alcohol use: Not Currently     Alcohol/week: 7.0 standard drinks of alcohol     Types: 7 Cans of beer per week    Drug use: Never     Review of Systems   Constitutional:  Positive for chills. Negative for fever.   Respiratory:  Negative for shortness of breath.    Gastrointestinal:  Positive for abdominal pain.   Genitourinary:  Positive for flank pain.       Physical Exam     Initial Vitals [10/02/24 0331]   BP Pulse Resp Temp SpO2   (!) 100/56 87 16 97.2 °F (36.2 °C) (!) 94 %      MAP       --         Physical Exam    Nursing note and vitals reviewed.  Constitutional: He appears well-developed and well-nourished. No distress.   HENT:   Head: Normocephalic and atraumatic. Mouth/Throat: Oropharynx is clear and moist.   Eyes: No scleral icterus.   Neck: Neck supple.   Normal range of motion.  Cardiovascular:  Normal rate and regular rhythm.           Pulmonary/Chest: Breath sounds normal. No respiratory distress.   Abdominal: Abdomen is soft. He exhibits no distension.   No CVA tenderness to percussion   Musculoskeletal:      Cervical back: Normal range of motion and neck supple.       Comments: No cervical, thoracic, or lumbar tenderness to palpation, no step-off deformity     Neurological: He is alert and oriented to person, place, and time. GCS score is 15.   Skin: Skin is warm and dry.         ED Course   Critical Care    Date/Time: 10/2/2024 3:23 AM    Performed by: Bethany Mcmahon MD  Authorized by: Bethany Mcmahon MD  Direct patient critical care time: 16 minutes  Additional history critical care time: 3 minutes  Ordering / reviewing critical care time: 6 minutes  Documentation critical care time: 4 minutes  Consulting other physicians critical care time: 4 minutes  Total critical care time (exclusive of procedural time) : 33 minutes  Critical care time was exclusive of separately billable procedures and treating other patients.  Critical care was necessary to treat or prevent imminent or life-threatening deterioration of the following conditions: sepsis.  Critical care was time spent personally by me on the following activities: development of treatment plan with patient or surrogate, discussions with primary provider, evaluation of patient's response to treatment, interpretation of cardiac output measurements, examination of patient, obtaining history from patient or surrogate, ordering and performing treatments and interventions, ordering and review of laboratory studies, re-evaluation of patient's condition and review of old charts.        Labs Reviewed   COMPREHENSIVE METABOLIC PANEL - Abnormal       Result Value    Sodium 142      Potassium 4.6      Chloride 108 (*)     CO2 23      Glucose 95      Blood Urea Nitrogen 51.9 (*)     Creatinine 1.54 (*)     Calcium 8.7 (*)     Protein Total 6.8      Albumin 3.0 (*)     Globulin 3.8 (*)     Albumin/Globulin Ratio 0.8 (*)     Bilirubin Total 0.4      ALP 81      ALT 40       (*)     eGFR 47      Anion Gap 11.0      BUN/Creatinine Ratio 34     LACTIC ACID, PLASMA - Abnormal    Lactic Acid Level 2.5 (*)    CBC WITH DIFFERENTIAL -  Abnormal    WBC 16.52 (*)     RBC 2.45 (*)     Hgb 7.0 (*)     Hct 21.8 (*)     MCV 89.0      MCH 28.6      MCHC 32.1 (*)     RDW 16.1      Platelet 10 (*)     MPV 9.9      NRBC% 0.1      IPF 4.9     BLOOD CULTURE [facility]   BLOOD CULTURE [facility]   CBC W/ AUTO DIFFERENTIAL    Narrative:     The following orders were created for panel order CBC auto differential.  Procedure                               Abnormality         Status                     ---------                               -----------         ------                     CBC with Differential[[id number]]       Abnormal            Final result               Manual Differential[[id number]]                             In process                   Please view results for these tests on the individual orders.   URINALYSIS, REFLEX TO URINE CULTURE   MANUAL DIFFERENTIAL   LACTIC ACID, PLASMA   TYPE & SCREEN   PREPARE PLATELETS (DOSE) SOFT          Imaging Results    None          Medications   lactated ringers bolus 2,436 mL (has no administration in time range)   piperacillin-tazobactam (ZOSYN) 4.5 g in D5W 100 mL IVPB (MB+) (has no administration in time range)   sodium chloride 0.9% flush 10 mL (has no administration in time range)   melatonin tablet 6 mg (has no administration in time range)   ondansetron disintegrating tablet 8 mg (has no administration in time range)   0.9%  NaCl infusion (for blood administration) (has no administration in time range)     Medical Decision Making  Problems Addressed:  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    Risk  OTC drugs.  Prescription drug management.  Decision regarding hospitalization.      ED assessment:    Mr. Gonzalez presented to the emergency department for re-evaluation and admission with Gram-negative bacteremia noted on blood cultures collected yesterday.  Afebrile, borderline low BP, nontoxic appearing.    Differential diagnosis (including but not limited to):   Bacteremia, urinary tract infection, immunocompromised due to chemotherapy treatment, sepsis, acute kidney injury, electrolyte derangements    ED management:   I reviewed the workup from the previous ED visit include positive blood cultures.  Started on Zosyn, IV fluids in the emergency department.  Case discussed with the on-call for the patient's primary care physician who accepts for admit.      Amount and/or Complexity of Data Reviewed  Independent historian: none   Summary of history:   External data reviewed: notes from previous ED visits, prior labs, and prior imaging  Summary of data reviewed:   Laboratory studies collected yesterday:  WBC 29.3, hemoglobin 7.9, hematocrit 24.4, platelets 27  Creatinine 1.45 with GFR 50, BUN 52.4  Lactic acid 3.2, cleared to 1.6 after fluid resuscitation  Blood culture returned positive with Gram-negative rods.  [employer] panel with Enterobacterales, Klebsiella pneumonia - sensitivities pending.       Risk and benefits of testing: discussed   Labs: ordered and reviewed      Discussion of management or test interpretation with external provider(s): discussed with primary care physician   Summary of discussion: Discussed with Dr. Irving Lee, on call for Dr. Andres who accepts for admission.     Risk  Prescription drug management   Decision regarding hospitalization  Shared decision making     Critical Care  30-74 minutes     IBethany MD personally performed the history, PE, MDM, and procedures as documented above and agree with the  amy's documentation.                                     Clinical Impression:  Final diagnoses:  [R78.81] Gram-negative bacteremia (Primary)  [R78.81, B96.1] Bacteremia due to Klebsiella pneumoniae  [C92.00] Acute myeloid leukemia not having achieved remission (Chronic)  [D69.6] Thrombocytopenia          ED Disposition Condition    Admit Stable                Bethany Mcmahon MD  10/02/24 8799

## 2024-10-02 NOTE — CONSULTS
[facility] - Oncology Acute  Wound Care    Patient Name:  Hussein Gonzalez Jr.   MRN:  [id number]  Date: 10/2/2024  Diagnosis: Bacteremia due to Klebsiella pneumoniae    History:     Past Medical History:   Diagnosis Date    Anxiety and depression     BPH (benign prostatic hyperplasia)     History of CVA (cerebrovascular accident) without residual deficits     HTN (hypertension)     Malignant melanoma of skin, unspecified     NSTEMI (non-ST elevated myocardial infarction)     Sepsis, Gram negative     Sleep apnea        Social History     Socioeconomic History    Marital status:    Tobacco Use    Smoking status: Never    Smokeless tobacco: Never   Substance and Sexual Activity    Alcohol use: Not Currently     Alcohol/week: 7.0 standard drinks of alcohol     Types: 7 Cans of beer per week    Drug use: Never    Sexual activity: Yes     Social Drivers of Health     Financial Resource Strain: Low Risk  (10/2/2024)    Overall Financial Resource Strain (CARDIA)     Difficulty of Paying Living Expenses: Not very hard   Food Insecurity: No Food Insecurity (10/2/2024)    Hunger Vital Sign     Worried About Running Out of Food in the Last Year: Never true     Ran Out of Food in the Last Year: Never true   Transportation Needs: No Transportation Needs (10/2/2024)    TRANSPORTATION NEEDS     Transportation : No   Physical Activity: Insufficiently Active (4/20/2023)    Exercise Vital Sign     Days of Exercise per Week: 3 days     Minutes of Exercise per Session: 30 min   Stress: No Stress Concern Present (10/2/2024)    [place] [employer] - Occupational Stress Questionnaire     Feeling of Stress : Only a little   Housing Stability: Low Risk  (10/2/2024)    Housing Stability Vital Sign     Unable to Pay for Housing in the Last Year: No     Homeless in the Last Year: No       Precautions:     Allergies as of 10/02/2024 - Reviewed 10/02/2024   Allergen Reaction Noted    Acetaminophen Other  (See Comments) 02/24/2022    Benadryl [diphenhydramine hcl] Other (See Comments) 09/25/2023       WOC Assessment Details/Treatment   WOCN consult for Rt forearm. Spoke with nurse Diana and discussed plan of care. Pt has had bleeding since admit to site . Pt had melanoma removed on 10/1.Labs show low platelet count. Will hold off on wound care treatment. Will follow up on tomorrow.   10/02/2024

## 2024-10-03 PROBLEM — T14.8XXA BLEEDING FROM WOUND: Status: ACTIVE | Noted: 2024-10-03

## 2024-10-03 LAB
ABO + RH BLD: NORMAL
ABO + RH BLD: NORMAL
ABS NEUT (OLG): 2.19 X10(3)/MCL (ref 2.1–9.2)
ALBUMIN SERPL-MCNC: 2.5 G/DL (ref 3.4–4.8)
ALBUMIN/GLOB SERPL: 0.9 RATIO (ref 1.1–2)
ALP SERPL-CCNC: 67 UNIT/L (ref 40–150)
ALT SERPL-CCNC: 28 UNIT/L (ref 0–55)
ANION GAP SERPL CALC-SCNC: 7 MEQ/L
ANISOCYTOSIS BLD QL SMEAR: ABNORMAL
AST SERPL-CCNC: 36 UNIT/L (ref 5–34)
B-HCG SERPL QL: 35 %
BILIRUB SERPL-MCNC: 0.9 MG/DL
BLD PROD TYP BPU: NORMAL
BLD PROD TYP BPU: NORMAL
BLOOD UNIT EXPIRATION DATE: NORMAL
BLOOD UNIT EXPIRATION DATE: NORMAL
BLOOD UNIT TYPE CODE: 5100
BLOOD UNIT TYPE CODE: 5100
BUN SERPL-MCNC: 31.1 MG/DL (ref 8.4–25.7)
CALCIUM SERPL-MCNC: 7.9 MG/DL (ref 8.8–10)
CHLORIDE SERPL-SCNC: 108 MMOL/L (ref 98–107)
CO2 SERPL-SCNC: 26 MMOL/L (ref 23–31)
CREAT SERPL-MCNC: 1.08 MG/DL (ref 0.73–1.18)
CREAT/UREA NIT SERPL: 29
CROSSMATCH INTERPRETATION: NORMAL
CROSSMATCH INTERPRETATION: NORMAL
DISPENSE STATUS: NORMAL
DISPENSE STATUS: NORMAL
ERYTHROCYTE [DISTWIDTH] IN BLOOD BY AUTOMATED COUNT: 16.1 % (ref 11.5–17)
GFR SERPLBLD CREATININE-BSD FMLA CKD-EPI: >60 ML/MIN/1.73/M2
GLOBULIN SER-MCNC: 2.9 GM/DL (ref 2.4–3.5)
GLUCOSE SERPL-MCNC: 99 MG/DL (ref 82–115)
HCT VFR BLD AUTO: 17.8 % (ref 42–52)
HGB BLD-MCNC: 5.9 G/DL (ref 14–18)
INSTRUMENT WBC (OLG): 7.82 X10(3)/MCL
LYMPHOCYTES NFR BLD MANUAL: 0.86 X10(3)/MCL
LYMPHOCYTES NFR BLD MANUAL: 11 %
MCH RBC QN AUTO: 29.1 PG (ref 27–31)
MCHC RBC AUTO-ENTMCNC: 33.1 G/DL (ref 33–36)
MCV RBC AUTO: 87.7 FL (ref 80–94)
METAMYELOCYTES NFR BLD MANUAL: 1 %
MICROCYTES BLD QL SMEAR: ABNORMAL
MONOCYTES NFR BLD MANUAL: 1.88 X10(3)/MCL (ref 0.1–1.3)
MONOCYTES NFR BLD MANUAL: 24 %
NEUTROPHILS NFR BLD MANUAL: 28 %
NRBC BLD AUTO-RTO: 0.3 %
PLATELET # BLD AUTO: 13 X10(3)/MCL (ref 130–400)
PLATELET # BLD EST: ABNORMAL 10*3/UL
PLATELETS.RETICULATED NFR BLD AUTO: 2.5 % (ref 0.9–11.2)
PMV BLD AUTO: 10.5 FL (ref 7.4–10.4)
POTASSIUM SERPL-SCNC: 4.1 MMOL/L (ref 3.5–5.1)
PROMYELOCYTES # BLD MANUAL: 2 %
PROT SERPL-MCNC: 5.4 GM/DL (ref 5.8–7.6)
RBC # BLD AUTO: 2.03 X10(6)/MCL (ref 4.7–6.1)
RBC MORPH BLD: ABNORMAL
SODIUM SERPL-SCNC: 141 MMOL/L (ref 136–145)
UNIT NUMBER: NORMAL
UNIT NUMBER: NORMAL
WBC # BLD AUTO: 7.82 X10(3)/MCL (ref 4.5–11.5)

## 2024-10-03 PROCEDURE — 85007 BL SMEAR W/DIFF WBC COUNT: CPT | Performed by: INTERNAL MEDICINE

## 2024-10-03 PROCEDURE — 85025 COMPLETE CBC W/AUTO DIFF WBC: CPT | Performed by: INTERNAL MEDICINE

## 2024-10-03 PROCEDURE — 17250 CHEM CAUT OF GRANLTJ TISSUE: CPT | Mod: ,,, | Performed by: EMERGENCY MEDICINE

## 2024-10-03 PROCEDURE — 36415 COLL VENOUS BLD VENIPUNCTURE: CPT | Performed by: INTERNAL MEDICINE

## 2024-10-03 PROCEDURE — 63600175 PHARM REV CODE 636 W HCPCS: Performed by: INTERNAL MEDICINE

## 2024-10-03 PROCEDURE — 80053 COMPREHEN METABOLIC PANEL: CPT | Performed by: INTERNAL MEDICINE

## 2024-10-03 PROCEDURE — P9040 RBC LEUKOREDUCED IRRADIATED: HCPCS | Performed by: INTERNAL MEDICINE

## 2024-10-03 PROCEDURE — 0X3D0ZZ CONTROL BLEEDING IN RIGHT LOWER ARM, OPEN APPROACH: ICD-10-PCS | Performed by: EMERGENCY MEDICINE

## 2024-10-03 PROCEDURE — 25000003 PHARM REV CODE 250: Performed by: EMERGENCY MEDICINE

## 2024-10-03 PROCEDURE — 99233 SBSQ HOSP IP/OBS HIGH 50: CPT | Mod: ,,, | Performed by: INTERNAL MEDICINE

## 2024-10-03 PROCEDURE — 99223 1ST HOSP IP/OBS HIGH 75: CPT | Mod: ,,, | Performed by: EMERGENCY MEDICINE

## 2024-10-03 PROCEDURE — 25000003 PHARM REV CODE 250: Performed by: INTERNAL MEDICINE

## 2024-10-03 PROCEDURE — 30233N1 TRANSFUSION OF NONAUTOLOGOUS RED BLOOD CELLS INTO PERIPHERAL VEIN, PERCUTANEOUS APPROACH: ICD-10-PCS | Performed by: INTERNAL MEDICINE

## 2024-10-03 PROCEDURE — 86923 COMPATIBILITY TEST ELECTRIC: CPT | Mod: 91 | Performed by: INTERNAL MEDICINE

## 2024-10-03 PROCEDURE — 36430 TRANSFUSION BLD/BLD COMPNT: CPT

## 2024-10-03 PROCEDURE — 21400001 HC TELEMETRY ROOM

## 2024-10-03 RX ORDER — FAMOTIDINE 10 MG/ML
20 INJECTION INTRAVENOUS
Status: COMPLETED | OUTPATIENT
Start: 2024-10-03 | End: 2024-10-03

## 2024-10-03 RX ORDER — FAMOTIDINE 10 MG/ML
40 INJECTION INTRAVENOUS ONCE
Status: DISCONTINUED | OUTPATIENT
Start: 2024-10-03 | End: 2024-10-03

## 2024-10-03 RX ORDER — DOCUSATE SODIUM 100 MG/1
100 CAPSULE, LIQUID FILLED ORAL 2 TIMES DAILY
Status: DISCONTINUED | OUTPATIENT
Start: 2024-10-03 | End: 2024-10-04 | Stop reason: HOSPADM

## 2024-10-03 RX ORDER — HYDROCODONE BITARTRATE AND ACETAMINOPHEN 500; 5 MG/1; MG/1
TABLET ORAL
Status: DISCONTINUED | OUTPATIENT
Start: 2024-10-03 | End: 2024-10-04 | Stop reason: HOSPADM

## 2024-10-03 RX ORDER — FAMOTIDINE 20 MG/1
40 TABLET, FILM COATED ORAL ONCE AS NEEDED
Status: DISCONTINUED | OUTPATIENT
Start: 2024-10-03 | End: 2024-10-03

## 2024-10-03 RX ORDER — BISACODYL 10 MG/1
10 SUPPOSITORY RECTAL DAILY PRN
Status: DISCONTINUED | OUTPATIENT
Start: 2024-10-03 | End: 2024-10-04 | Stop reason: HOSPADM

## 2024-10-03 RX ORDER — FAMOTIDINE 10 MG/ML
20 INJECTION INTRAVENOUS ONCE AS NEEDED
Status: COMPLETED | OUTPATIENT
Start: 2024-10-03 | End: 2024-10-03

## 2024-10-03 RX ADMIN — CITALOPRAM HYDROBROMIDE 40 MG: 20 TABLET ORAL at 08:10

## 2024-10-03 RX ADMIN — SODIUM CHLORIDE: 9 INJECTION, SOLUTION INTRAVENOUS at 07:10

## 2024-10-03 RX ADMIN — TRAMADOL HYDROCHLORIDE 50 MG: 50 TABLET, COATED ORAL at 07:10

## 2024-10-03 RX ADMIN — GABAPENTIN 300 MG: 300 CAPSULE ORAL at 08:10

## 2024-10-03 RX ADMIN — HYDROCHLOROTHIAZIDE 12.5 MG: 12.5 TABLET ORAL at 08:10

## 2024-10-03 RX ADMIN — PIPERACILLIN SODIUM AND TAZOBACTAM SODIUM 4.5 G: 4; .5 INJECTION, POWDER, LYOPHILIZED, FOR SOLUTION INTRAVENOUS at 02:10

## 2024-10-03 RX ADMIN — TAMSULOSIN HYDROCHLORIDE 0.4 MG: 0.4 CAPSULE ORAL at 08:10

## 2024-10-03 RX ADMIN — LISINOPRIL 20 MG: 20 TABLET ORAL at 08:10

## 2024-10-03 RX ADMIN — PIPERACILLIN SODIUM AND TAZOBACTAM SODIUM 4.5 G: 4; .5 INJECTION, POWDER, LYOPHILIZED, FOR SOLUTION INTRAVENOUS at 12:10

## 2024-10-03 RX ADMIN — FAMOTIDINE 20 MG: 10 INJECTION, SOLUTION INTRAVENOUS at 08:10

## 2024-10-03 RX ADMIN — TRAMADOL HYDROCHLORIDE 50 MG: 50 TABLET, COATED ORAL at 08:10

## 2024-10-03 RX ADMIN — TRAMADOL HYDROCHLORIDE 50 MG: 50 TABLET, COATED ORAL at 02:10

## 2024-10-03 RX ADMIN — BISACODYL 10 MG: 10 SUPPOSITORY RECTAL at 12:10

## 2024-10-03 RX ADMIN — FAMOTIDINE 20 MG: 10 INJECTION, SOLUTION INTRAVENOUS at 03:10

## 2024-10-03 RX ADMIN — DOCUSATE SODIUM 100 MG: 100 CAPSULE, LIQUID FILLED ORAL at 12:10

## 2024-10-03 RX ADMIN — DOCUSATE SODIUM 100 MG: 100 CAPSULE, LIQUID FILLED ORAL at 08:10

## 2024-10-03 RX ADMIN — PIPERACILLIN SODIUM AND TAZOBACTAM SODIUM 4.5 G: 4; .5 INJECTION, POWDER, LYOPHILIZED, FOR SOLUTION INTRAVENOUS at 08:10

## 2024-10-03 NOTE — HPI
Wound Medicine Re-check        75 y/o WM with AML with concomitant pancytopenia including thrombocytopenia who requires frequent blood and platelet transfusions. He is maintained on venetoclax and cytarabine as well as prophylaxis with  levofloxacin and acyclovir and fluconazole.  Pt also battles multiple various skin cancers and on 10/1/24 had a skin cancer removed on dorsal forearm with Dr Rai on 10/1/24. On that same day, pt went to ED with c/o abdominal pain. He was discharged but called back to ED secondary to +Blood Cultures. It is reported his surgical site has continued to bleed since admission. He has received a platelet transfusion for platelets of 10K on 10/2/24  and several units of PRBCs H/H -   Pt demonstrated more bleeding yesterday afternoon when dressing taken down. WOCN staff applied quick clot and pressure dressing but continued to ooze so wound medicine was consulted.   He was seen by Dr. Paz on the afternoon of 10/3/24 and wound bed was cauterized with silver nitrate to stop the bleeding; bulky dressing was applied over mupirocin, adaptic, and Versatel.   Wound re-check this morning, 10/4/24, he is awake and talkative, sitting up in bed eating breakfast. Dressing to right arm is without strike through drainage/bleeding. Agreeable for wound evaluation and treatment. No acute distress. Reports planning to discharge home some time today after he receives more blood products. Reports he has follow up appointment with Dr. Rai next week.   He is in no acute distress.

## 2024-10-03 NOTE — CONSULTS
[facility] - Oncology Acute  Wound Care  Consult Note    Patient Name: Hussein Gonzalez Jr.  MRN: [id number]  Admission Date: 10/2/2024  Hospital Length of Stay: 1 days  Attending Physician: Nayely Andres MD  Primary Care Provider: Nayely Andres MD     Inpatient consult to Wound Care Physician  Consult performed by: Maggi Paz MD  Consult ordered by: Nayely Andres MD        Subjective:     History of Present Illness:  Wound Medicine    CC: bleeding from right forearm recent surgical site    75 y/o WM with AML with concomitant pancytopenia including thrombocytopenia who requires frequent blood and platelet transfusions. He is maintained on venetoclax and cytarabine as well as prophylaxis with  levofloxacin and acyclovir and fluconazole.  Pt also battles multiple various skin cancers and on 10/1/24 had a skin cancer removed on dorsal forearm with Dr Rai on 10/1/24. On that same day, pt went to ED with c/o abdominal pain. He was discharged but called back to ED secondary to +Blood Cultures. It is reported this surgical site has continued to bleed since admission. He has received a platelet transfusion for platelets of 10K on 10/2/24  and several units of PRBC today (H/H dropped this am from 7/21.8 to 5.9/17.8).  Pt demonstrated more bleeding this afternoon when dressing taken down. WOCN staff applied quick clot and pressure dressing but continued to ooze so I was consulted.   I am seeing pt in his room 860. He is very much awake and talkative. Endorses pain in the wound. WOCN at bedside with floor nurse.      Scheduled Meds:   citalopram  40 mg Oral Daily    docusate sodium  100 mg Oral BID    gabapentin  300 mg Oral BID    lisinopriL  20 mg Oral Daily    And    hydroCHLOROthiazide  12.5 mg Oral Daily    piperacillin-tazobactam (Zosyn) IV (PEDS and ADULTS) (extended infusion is not appropriate)  4.5 g Intravenous Q8H    tamsulosin  0.4 mg Oral Daily     Continuous  Infusions:   0.9% NaCl   Intravenous Continuous 50 mL/hr at 10/03/24 0708 New Bag at 10/03/24 0708     PRN Meds:  Current Facility-Administered Medications:     0.9%  NaCl infusion (for blood administration), , Intravenous, Q24H PRN    0.9%  NaCl infusion (for blood administration), , Intravenous, Q24H PRN    0.9%  NaCl infusion (for blood administration), , Intravenous, Q24H PRN    0.9%  NaCl infusion (for blood administration), , Intravenous, Q24H PRN    bisacodyL, 10 mg, Rectal, Daily PRN    celecoxib, 200 mg, Oral, Daily PRN    melatonin, 6 mg, Oral, Nightly PRN    ondansetron, 8 mg, Oral, Q8H PRN    sodium chloride 0.9%, 10 mL, Intravenous, PRN    traMADoL, 50 mg, Oral, Q6H PRN    Review of patient's allergies indicates:   Allergen Reactions    Acetaminophen Other (See Comments)     Restless leg     Benadryl [diphenhydramine hcl] Other (See Comments)     Restless Legs        Past Medical History:   Diagnosis Date    Anxiety and depression     BPH (benign prostatic hyperplasia)     History of CVA (cerebrovascular accident) without residual deficits     HTN (hypertension)     Malignant melanoma of skin, unspecified     NSTEMI (non-ST elevated myocardial infarction)     Sepsis, Gram negative     Sleep apnea      Past Surgical History:   Procedure Laterality Date    BONE MARROW ASPIRATION N/A 6/27/2023    Procedure: ASPIRATION, BONE MARROW;  Surgeon: Sabino Núñez MD;  Location: [facility];  Service: General;  Laterality: N/A;  ALL ASSESS FOR MOLECULAR MRD    BONE MARROW ASPIRATION N/A 5/22/2024    Procedure: ASPIRATION, BONE MARROW;  Surgeon: Sabino Núñez MD;  Location: [facility] OR;  Service: General;  Laterality: N/A;    BONE MARROW BIOPSY N/A 1/25/2023    Procedure: Biopsy-bone marrow;  Surgeon: Rd Carlos MD;  Location: [facility] OR;  Service: General;  Laterality: N/A;    BONE MARROW BIOPSY N/A 11/21/2023    Procedure: Biopsy-bone marrow;  Surgeon: Junior Castro MD;  Location: [facility] OR;  Service: General;   Laterality: N/A;    COLONOSCOPY      COLONOSCOPY N/A 1/24/2023    Procedure: COLONOSCOPY (DOUBLE);  Surgeon: Jimy Baker MD;  Location: [facility] ENDOSCOPY;  Service: Gastroenterology;  Laterality: N/A;    EGD, WITH CLOSED BIOPSY N/A 1/24/2023    Procedure: EGD, WITH CLOSED BIOPSY;  Surgeon: Jimy Baker MD;  Location: [facility] ENDOSCOPY;  Service: Gastroenterology;  Laterality: N/A;    EXCISION OF MELANOMA      SINUS SURGERY      TONSILLECTOMY         Family History       Problem Relation (Age of Onset)    Cancer Mother, Father          Tobacco Use    Smoking status: Never    Smokeless tobacco: Never   Substance and Sexual Activity    Alcohol use: Not Currently     Alcohol/week: 7.0 standard drinks of alcohol     Types: 7 Cans of beer per week    Drug use: Never    Sexual activity: Yes     Review of Systems    Objective:     Vital Signs (Most Recent):  Temp: 99.1 °F (37.3 °C) (10/03/24 1552)  Pulse: 69 (10/03/24 1552)  Resp: 20 (10/03/24 1552)  BP: (!) 145/72 (10/03/24 1552)  SpO2: (!) 93 % (10/03/24 1552) Vital Signs (24h Range):  Temp:  [97.6 °F (36.4 °C)-99.1 °F (37.3 °C)] 99.1 °F (37.3 °C)  Pulse:  [59-86] 69  Resp:  [18-20] 20  SpO2:  [93 %-98 %] 93 %  BP: (121-157)/(62-88) 145/72     Weight: 81.2 kg (179 lb)  Body mass index is 24.28 kg/m².     Physical Exam  Vitals reviewed.   Cardiovascular:      Pulses:           Radial pulses are 2+ on the right side.   Musculoskeletal:        Arms:    Skin:     General: Skin is warm.      Capillary Refill: Capillary refill takes less than 2 seconds.      Findings: Petechiae present.   Neurological:      General: No focal deficit present.      Mental Status: He is alert.      Comments: NVI to hand/fingers          Laboratory:  CBC:   Recent Labs   Lab 10/03/24  0411   WBC 7.82  7.82   RBC 2.03*   HGB 5.9*   HCT 17.8*   PLT 13*   MCV 87.7   MCH 29.1   MCHC 33.1     CMP:   Recent Labs   Lab 10/03/24  0720   CALCIUM 7.9*   ALBUMIN 2.5*      K 4.1   CO2 26   CL  108*   BUN 31.1*   CREATININE 1.08   ALKPHOS 67   ALT 28   AST 36*   BILITOT 0.9        Latest Reference Range & Units 09/19/24 08:06 09/23/24 07:46 09/26/24 08:05 09/30/24 08:29 10/01/24 14:29 10/02/24 03:51 10/03/24 04:11   Platelet Count 130 - 400 x10(3)/mcL 14 (LL) 13 (LL) 8 (LL) 12 (LL) 27 (LL) 10 (LL) 13 (LL)   (LL): Data is critically low   Latest Reference Range & Units 09/19/24 08:06 09/23/24 07:46 09/26/24 08:05 09/30/24 08:29 10/01/24 14:29 10/02/24 03:51 10/03/24 04:11   Hemoglobin 14.0 - 18.0 g/dL 6.8 (L) 7.2 (L) 7.2 (L) 7.1 (L) 7.9 (L) 7.0 (L) 5.9 (LL)   (LL): Data is critically low  (L): Data is abnormally low   Latest Reference Range & Units 09/19/24 08:06 09/23/24 07:46 09/26/24 08:05 09/30/24 08:29 10/01/24 14:29 10/02/24 03:51 10/03/24 04:11   Hematocrit 42.0 - 52.0 % 20.6 (L) 21.9 (L) 22.4 (L) 22.1 (L) 24.4 (L) 21.8 (L) 17.8 (LL)   (LL): Data is critically low  (L): Data is abnormally low      Assessment/Plan:     AML with anemia and thrombocytopenia  Right forearm skin cancer resection by Dr Rai on 10/1/24  Subsequent ongoing wound bleeding secondary to thrombocytopenia    I cauterized the wound bed with silver nitrate to stop the bleeding   Covered with mupirocin, then adaptic , then versitel and then ABD and kerlix.   I will recheck it in the morning  Keep arm elevated  Discussed plan of care with patient ,   JONA Hillman and floor nurse  He is currently received PRBC's and will receive another platelet transfusion as well.    Maggi Paz MD  Wound Care  [facility] - Oncology Acute

## 2024-10-03 NOTE — NURSING
[facility] - Oncology Acute  Wound Care    Patient Name:  Hussein Gonzalez Jr.   MRN:  [id number]  Date: 10/3/2024  Diagnosis: Bacteremia due to Klebsiella pneumoniae    History:     Past Medical History:   Diagnosis Date    Anxiety and depression     BPH (benign prostatic hyperplasia)     History of CVA (cerebrovascular accident) without residual deficits     HTN (hypertension)     Malignant melanoma of skin, unspecified     NSTEMI (non-ST elevated myocardial infarction)     Sepsis, Gram negative     Sleep apnea        Social History     Socioeconomic History    Marital status:    Tobacco Use    Smoking status: Never    Smokeless tobacco: Never   Substance and Sexual Activity    Alcohol use: Not Currently     Alcohol/week: 7.0 standard drinks of alcohol     Types: 7 Cans of beer per week    Drug use: Never    Sexual activity: Yes     Social Drivers of Health     Financial Resource Strain: Low Risk  (10/2/2024)    Overall Financial Resource Strain (CARDIA)     Difficulty of Paying Living Expenses: Not very hard   Food Insecurity: No Food Insecurity (10/2/2024)    Hunger Vital Sign     Worried About Running Out of Food in the Last Year: Never true     Ran Out of Food in the Last Year: Never true   Transportation Needs: No Transportation Needs (10/2/2024)    TRANSPORTATION NEEDS     Transportation : No   Physical Activity: Insufficiently Active (4/20/2023)    Exercise Vital Sign     Days of Exercise per Week: 3 days     Minutes of Exercise per Session: 30 min   Stress: No Stress Concern Present (10/2/2024)    [employer] - Occupational Stress Questionnaire     Feeling of Stress : Only a little   Housing Stability: Low Risk  (10/2/2024)    Housing Stability Vital Sign     Unable to Pay for Housing in the Last Year: No     Homeless in the Last Year: No       Precautions:     Allergies as of 10/02/2024 - Reviewed 10/02/2024   Allergen Reaction Noted    Acetaminophen Other  (See Comments) 02/24/2022    Benadryl [diphenhydramine hcl] Other (See Comments) 09/25/2023       WO Assessment Details/Treatment        10/03/24 1655   WOCN Assessment   Visit Date 10/03/24   Visit Time 1655   Consult Type Follow Up   WOCN Speciality Wound   Wound surgical   Intervention changed   Teaching on-going   Skin Interventions   Device Skin Pressure Protection absorbent pad utilized/changed        Wound 10/02/24 1008 Other (comment) Right dorsal Arm   Date First Assessed/Time First Assessed: 10/02/24 1008   Present on Original Admission: Yes  Primary Wound Type: (c) Other (comment)  Side: Right  Orientation: dorsal  Location: Arm   Wound Image    Dressing Appearance Saturated   Drainage Amount Large   Drainage Characteristics/Odor Sanguineous   Appearance Red   Tissue loss description Full thickness   Red (%), Wound Tissue Color 100 %   Periwound Area Swelling;Redness   Wound Edges Irregular   Dressing Applied     During the follow-up visit, bleeding was noted through kerlix. Ace wrap applied, arm elevated and pressure applied for 10 minutes. Approximately 30 minutes later reevaluated and blood detected through the ace wrap, prompting notification of Dr. Paz. The dressing, moistened with saline for easier removal, was left in place briefly before being taken off. The site was notably painful and tender. A quick clot dressing was taken off, and oozing was observed. Silver nitrate sticks were applied to control the bleeding, Dr. Paz was present at bedside for evaluation. After successfully managing the bleeding, Bactroban was applied, followed by adaptic, Versatel, an ABD pad, and then wrapped with Kerlix. The arm was positioned above heart level with pillows for elevation. It was agreed with the nurse to keep the dressing on overnight and monitor, with plans for Dr Paz to reevaluate in the morning. Head of bed elevated, Bed in lowest position, call bell within reach. Nursing to continue  with preventative measures. Will follow up     10/03/2024

## 2024-10-03 NOTE — PLAN OF CARE
Problem: Adult Inpatient Plan of Care  Goal: Plan of Care Review  10/3/2024 1607 by Luisa Isidro RN  Outcome: Progressing  10/3/2024 0741 by Luisa Isidro RN  Outcome: Progressing  Goal: Patient-Specific Goal (Individualized)  10/3/2024 1607 by Luisa Isidro RN  Outcome: Progressing  10/3/2024 0741 by Luisa Isidro RN  Outcome: Progressing  Goal: Absence of Hospital-Acquired Illness or Injury  10/3/2024 1607 by Luisa Isidro RN  Outcome: Progressing  10/3/2024 0741 by Luisa Isidro RN  Outcome: Progressing  Goal: Optimal Comfort and Wellbeing  10/3/2024 1607 by Luisa Isidro RN  Outcome: Progressing  10/3/2024 0741 by Luisa Isidro RN  Outcome: Progressing  Goal: Readiness for Transition of Care  10/3/2024 1607 by Luisa Isidro RN  Outcome: Progressing  10/3/2024 0741 by Luisa Isidro RN  Outcome: Progressing     Problem: Wound  Goal: Optimal Coping  10/3/2024 1607 by Luisa Isidro RN  Outcome: Progressing  10/3/2024 0741 by Luisa Isidro RN  Outcome: Progressing  Goal: Optimal Functional Ability  10/3/2024 1607 by Luisa Isidro RN  Outcome: Progressing  10/3/2024 0741 by Luisa Isidro RN  Outcome: Progressing  Goal: Absence of Infection Signs and Symptoms  10/3/2024 1607 by Luisa Isidro RN  Outcome: Progressing  10/3/2024 0741 by Luisa Isidro RN  Outcome: Progressing  Goal: Improved Oral Intake  10/3/2024 1607 by Luisa Isidro RN  Outcome: Progressing  10/3/2024 0741 by Luisa Isidro RN  Outcome: Progressing  Goal: Optimal Pain Control and Function  10/3/2024 1607 by Luisa Isidro RN  Outcome: Progressing  10/3/2024 0741 by Luisa Isidro RN  Outcome: Progressing  Goal: Skin Health and Integrity  10/3/2024 1607 by Luisa Isidro RN  Outcome: Progressing  10/3/2024 0741 by Luisa Isidro RN  Outcome: Progressing  Goal: Optimal Wound Healing  10/3/2024 1607 by Luisa Isidro, RN  Outcome: Progressing  10/3/2024 0741 by Luisa Isidro, RN  Outcome:  Progressing     Problem: Fall Injury Risk  Goal: Absence of Fall and Fall-Related Injury  10/3/2024 1607 by Luisa Isidro, RN  Outcome: Progressing  10/3/2024 0741 by Luisa Isidro, RN  Outcome: Progressing     Problem: Skin Injury Risk Increased  Goal: Skin Health and Integrity  10/3/2024 1607 by Luisa Isidro, RN  Outcome: Progressing  10/3/2024 0741 by Luisa Isidro, RN  Outcome: Progressing

## 2024-10-03 NOTE — PROGRESS NOTES
[facility] - Oncology Acute  Wound Care    Patient Name:  Hussein Gonzalez Jr.   MRN:  [id number]  Date: 10/3/2024  Diagnosis: Bacteremia due to Klebsiella pneumoniae    History:     Past Medical History:   Diagnosis Date    Anxiety and depression     BPH (benign prostatic hyperplasia)     History of CVA (cerebrovascular accident) without residual deficits     HTN (hypertension)     Malignant melanoma of skin, unspecified     NSTEMI (non-ST elevated myocardial infarction)     Sepsis, Gram negative     Sleep apnea        Social History     Socioeconomic History    Marital status:    Tobacco Use    Smoking status: Never    Smokeless tobacco: Never   Substance and Sexual Activity    Alcohol use: Not Currently     Alcohol/week: 7.0 standard drinks of alcohol     Types: 7 Cans of beer per week    Drug use: Never    Sexual activity: Yes     Social Drivers of Health     Financial Resource Strain: Low Risk  (10/2/2024)    Overall Financial Resource Strain (CARDIA)     Difficulty of Paying Living Expenses: Not very hard   Food Insecurity: No Food Insecurity (10/2/2024)    Hunger Vital Sign     Worried About Running Out of Food in the Last Year: Never true     Ran Out of Food in the Last Year: Never true   Transportation Needs: No Transportation Needs (10/2/2024)    TRANSPORTATION NEEDS     Transportation : No   Physical Activity: Insufficiently Active (4/20/2023)    Exercise Vital Sign     Days of Exercise per Week: 3 days     Minutes of Exercise per Session: 30 min   Stress: No Stress Concern Present (10/2/2024)    [employer] - Occupational Stress Questionnaire     Feeling of Stress : Only a little   Housing Stability: Low Risk  (10/2/2024)    Housing Stability Vital Sign     Unable to Pay for Housing in the Last Year: No     Homeless in the Last Year: No       Precautions:     Allergies as of 10/02/2024 - Reviewed 10/02/2024   Allergen Reaction Noted    Acetaminophen Other  (See Comments) 02/24/2022    Benadryl [diphenhydramine hcl] Other (See Comments) 09/25/2023       WOC Assessment Details/Treatment   WOCN follow up for right forearm. Attempted to visit patient yesterday but was inappropriate at that time. Patient recently had melanoma removed from right forearm. Patient still not appropriate to see. Platelet count still low and patient is about to undergo a transfusion. Discussing plan of care and treatment options with assigned nurse for the time being. Will continue to follow and closely monitor patient and labs. Patient does mobilize self with assistance. Dressing in place right now is adaptic, thick layer of dry gauze and kerlix which has already been reinforced by tasking nurse Diana. Will follow up.   10/03/2024

## 2024-10-03 NOTE — PLAN OF CARE
Problem: Adult Inpatient Plan of Care  Goal: Plan of Care Review  10/3/2024 0432 by Nita Trujillo RN  Outcome: Progressing  Flowsheets (Taken 10/3/2024 0432)  Plan of Care Reviewed With: patient  10/3/2024 0104 by Nita Trujillo RN  Outcome: Progressing  Flowsheets (Taken 10/3/2024 0104)  Plan of Care Reviewed With: patient  10/2/2024 1949 by Nita Trujillo RN  Outcome: Progressing  Flowsheets (Taken 10/2/2024 1949)  Plan of Care Reviewed With: patient  Goal: Patient-Specific Goal (Individualized)  10/3/2024 0432 by Nita Trujillo RN  Outcome: Progressing  10/3/2024 0104 by Nita Trujillo RN  Outcome: Progressing  10/2/2024 1949 by Nita Trujillo RN  Outcome: Progressing  Goal: Absence of Hospital-Acquired Illness or Injury  10/3/2024 0432 by Nita Trujillo RN  Outcome: Progressing  10/3/2024 0104 by Nita Trujillo RN  Outcome: Progressing  10/2/2024 1949 by Nita Trujillo RN  Outcome: Progressing  Intervention: Identify and Manage Fall Risk  10/3/2024 0432 by Nita Trujillo RN  Flowsheets (Taken 10/3/2024 0432)  Safety Promotion/Fall Prevention:   assistive device/personal item within reach   Fall Risk signage in place   side rails raised x 2  10/3/2024 0104 by Nita Trujillo RN  Flowsheets (Taken 10/3/2024 0104)  Safety Promotion/Fall Prevention:   assistive device/personal item within reach   Fall Risk signage in place   medications reviewed   side rails raised x 2  10/2/2024 1949 by Nita Trujillo RN  Flowsheets (Taken 10/2/2024 1949)  Safety Promotion/Fall Prevention:   assistive device/personal item within reach   Fall Risk signage in place   medications reviewed   side rails raised x 2  Intervention: Prevent Skin Injury  10/3/2024 0432 by Nita Trujillo RN  Flowsheets (Taken 10/3/2024 0432)  Body Position: position changed independently  Skin Protection: transparent dressing maintained  Device Skin Pressure Protection: tubing/devices free from skin  contact  10/3/2024 0104 by Nita Trujillo RN  Flowsheets (Taken 10/3/2024 0104)  Body Position: position changed independently  Skin Protection: transparent dressing maintained  Device Skin Pressure Protection: tubing/devices free from skin contact  10/2/2024 1949 by Nita Trujillo RN  Flowsheets (Taken 10/2/2024 1949)  Body Position: position changed independently  Skin Protection: transparent dressing maintained  Device Skin Pressure Protection: tubing/devices free from skin contact  Intervention: Prevent and Manage VTE (Venous Thromboembolism) Risk  10/3/2024 0432 by Nita Trujillo RN  Flowsheets (Taken 10/3/2024 0432)  VTE Prevention/Management:   bleeding risk assessed   intravenous hydration   bleeding risk factor(s) identified, provider notified  10/3/2024 0104 by Nita Trujillo RN  Flowsheets (Taken 10/3/2024 0104)  VTE Prevention/Management:   bleeding precautions maintained   bleeding risk assessed   intravenous hydration  10/2/2024 1949 by Nita Trujillo RN  Flowsheets (Taken 10/2/2024 1949)  VTE Prevention/Management:   bleeding precautions maintained   ambulation promoted   intravenous hydration  Intervention: Prevent Infection  10/3/2024 0432 by Nita Trujillo RN  Flowsheets (Taken 10/3/2024 0432)  Infection Prevention:   rest/sleep promoted   hand hygiene promoted  10/3/2024 0104 by Nita Trujillo RN  Flowsheets (Taken 10/3/2024 0104)  Infection Prevention:   rest/sleep promoted   hand hygiene promoted  10/2/2024 1949 by Nita Trujillo RN  Flowsheets (Taken 10/2/2024 1949)  Infection Prevention:   rest/sleep promoted   hand hygiene promoted  Goal: Optimal Comfort and Wellbeing  10/3/2024 0432 by Nita Trujillo RN  Outcome: Progressing  10/3/2024 0104 by Nita Trujillo RN  Outcome: Progressing  10/2/2024 1949 by Nita Trujillo RN  Outcome: Progressing  Intervention: Monitor Pain and Promote Comfort  10/3/2024 0432 by Nita Trujillo RN  Flowsheets (Taken  10/3/2024 0432)  Pain Management Interventions:   care clustered   quiet environment facilitated   relaxation techniques promoted  10/3/2024 0104 by Nita Trujillo RN  Flowsheets (Taken 10/3/2024 0104)  Pain Management Interventions:   relaxation techniques promoted   quiet environment facilitated  10/2/2024 1949 by Nita Trujillo RN  Flowsheets (Taken 10/2/2024 1949)  Pain Management Interventions:   relaxation techniques promoted   quiet environment facilitated   care clustered  Intervention: Provide Person-Centered Care  10/3/2024 0432 by Nita Trujillo RN  Flowsheets (Taken 10/3/2024 0432)  Trust Relationship/Rapport:   care explained   thoughts/feelings acknowledged  10/3/2024 0104 by Nita Trujillo RN  Flowsheets (Taken 10/3/2024 0104)  Trust Relationship/Rapport:   care explained   thoughts/feelings acknowledged  10/2/2024 1949 by Nita Trujillo RN  Flowsheets (Taken 10/2/2024 1949)  Trust Relationship/Rapport:   care explained   thoughts/feelings acknowledged  Goal: Readiness for Transition of Care  10/3/2024 0432 by Nita Trujillo RN  Outcome: Progressing  10/3/2024 0104 by Nita Trujillo RN  Outcome: Progressing  10/2/2024 1949 by Nita Trujillo RN  Outcome: Progressing     Problem: Wound  Goal: Optimal Coping  10/3/2024 0432 by Nita Trujillo RN  Outcome: Progressing  10/3/2024 0104 by Nita Trujillo RN  Outcome: Progressing  10/2/2024 1949 by Nita Trujillo RN  Outcome: Progressing  Intervention: Support Patient and Family Response  10/3/2024 0432 by Nita Trujillo RN  Flowsheets (Taken 10/3/2024 0432)  Supportive Measures: relaxation techniques promoted  10/3/2024 0104 by Nita Trujillo RN  Flowsheets (Taken 10/3/2024 0104)  Supportive Measures:   verbalization of feelings encouraged   active listening utilized  10/2/2024 1949 by Nita Trujillo RN  Flowsheets (Taken 10/2/2024 1949)  Supportive Measures:   relaxation techniques promoted   self-care  encouraged  Goal: Optimal Functional Ability  10/3/2024 0432 by Nita Trujillo RN  Outcome: Progressing  10/3/2024 0104 by Nita Trujillo RN  Outcome: Progressing  10/2/2024 1949 by Nita Trujillo RN  Outcome: Progressing  Intervention: Optimize Functional Ability  10/3/2024 0432 by Nita Trujillo RN  Flowsheets (Taken 10/3/2024 0432)  Activity Assistance Provided: independent  10/3/2024 0104 by Nita Trujillo RN  Flowsheets (Taken 10/3/2024 0104)  Activity Management:   Arm raise - L1   Leg kicks - L2  Activity Assistance Provided: assistance, 1 person  10/2/2024 1949 by Nita Trujillo RN  Flowsheets (Taken 10/2/2024 1949)  Activity Management:   Arm raise - L1   Leg kicks - L2  Activity Assistance Provided: independent  Goal: Absence of Infection Signs and Symptoms  10/3/2024 0432 by Nita Trujillo RN  Outcome: Progressing  10/3/2024 0104 by Nita Trujillo RN  Outcome: Progressing  10/2/2024 1949 by Nita Trujillo RN  Outcome: Progressing  Intervention: Prevent or Manage Infection  10/3/2024 0432 by Nita Trujillo RN  Flowsheets (Taken 10/3/2024 0432)  Infection Management: aseptic technique maintained  Isolation Precautions: precautions maintained  10/3/2024 0104 by Nita Trujillo RN  Flowsheets (Taken 10/3/2024 0104)  Infection Management: aseptic technique maintained  Isolation Precautions: precautions maintained  10/2/2024 1949 by Nita Trujillo RN  Flowsheets (Taken 10/2/2024 1949)  Infection Management: aseptic technique maintained  Isolation Precautions: precautions maintained  Goal: Improved Oral Intake  10/3/2024 0432 by Nita Trujillo RN  Outcome: Progressing  10/3/2024 0104 by Nita Trujillo RN  Outcome: Progressing  10/2/2024 1949 by Nita Trujillo RN  Outcome: Progressing  Intervention: Promote and Optimize Oral Intake  10/3/2024 0104 by Nita Trujillo RN  Flowsheets (Taken 10/3/2024 0104)  Oral Nutrition Promotion:   rest periods  promoted   physical activity promoted  10/2/2024 1949 by Nita Trujillo RN  Flowsheets (Taken 10/2/2024 1949)  Oral Nutrition Promotion: rest periods promoted  Goal: Optimal Pain Control and Function  10/3/2024 0432 by Nita Trujillo RN  Outcome: Progressing  10/3/2024 0104 by Nita Trujillo RN  Outcome: Progressing  10/2/2024 1949 by Nita Trujillo RN  Outcome: Progressing  Intervention: Prevent or Manage Pain  10/3/2024 0432 by Nita Trujillo RN  Flowsheets (Taken 10/3/2024 0432)  Sleep/Rest Enhancement:   room darkened   regular sleep/rest pattern promoted  Pain Management Interventions:   care clustered   quiet environment facilitated   relaxation techniques promoted  10/3/2024 0104 by Nita Trujillo RN  Flowsheets (Taken 10/3/2024 0104)  Sleep/Rest Enhancement: relaxation techniques promoted  Pain Management Interventions:   relaxation techniques promoted   quiet environment facilitated  10/2/2024 1949 by Nita Trujillo RN  Flowsheets (Taken 10/2/2024 1949)  Sleep/Rest Enhancement: relaxation techniques promoted  Pain Management Interventions:   relaxation techniques promoted   quiet environment facilitated   care clustered  Goal: Skin Health and Integrity  10/3/2024 0432 by Nita Trujillo RN  Outcome: Progressing  10/3/2024 0104 by Nita Trujillo RN  Outcome: Progressing  10/2/2024 1949 by Nita Trujillo RN  Outcome: Progressing  Intervention: Optimize Skin Protection  10/3/2024 0432 by Nita Trujillo RN  Flowsheets (Taken 10/3/2024 0432)  Skin Protection: transparent dressing maintained  Head of Bed (HOB) Positioning: HOB at 30-45 degrees  10/3/2024 0104 by Nita Trujillo RN  Flowsheets (Taken 10/3/2024 0104)  Skin Protection: transparent dressing maintained  Activity Management:   Arm raise - L1   Leg kicks - L2  Head of Bed (HOB) Positioning: HOB at 30-45 degrees  10/2/2024 1949 by Nita Trujillo RN  Flowsheets (Taken 10/2/2024 1949)  Pressure Reduction  Techniques: rest period provided between sit times  Skin Protection: transparent dressing maintained  Activity Management:   Arm raise - L1   Leg kicks - L2  Head of Bed (HOB) Positioning: HOB at 45 degrees  Goal: Optimal Wound Healing  10/3/2024 0432 by Nita Trujillo RN  Outcome: Progressing  10/3/2024 0104 by Nita Trujillo RN  Outcome: Progressing  10/2/2024 1949 by Nita Trujillo RN  Outcome: Progressing  Intervention: Promote Wound Healing  10/3/2024 0432 by Nita Trujillo RN  Flowsheets (Taken 10/3/2024 0432)  Sleep/Rest Enhancement:   room darkened   regular sleep/rest pattern promoted  10/3/2024 0104 by Nita Trujillo RN  Flowsheets (Taken 10/3/2024 0104)  Sleep/Rest Enhancement: relaxation techniques promoted  10/2/2024 1949 by Nita Trujillo RN  Flowsheets (Taken 10/2/2024 1949)  Sleep/Rest Enhancement: relaxation techniques promoted     Problem: Fall Injury Risk  Goal: Absence of Fall and Fall-Related Injury  10/3/2024 0432 by Nita Trujillo RN  Outcome: Progressing  10/3/2024 0104 by Nita Trujillo RN  Outcome: Progressing  10/2/2024 1949 by Nita Trujillo RN  Outcome: Progressing  Intervention: Identify and Manage Contributors  10/3/2024 0432 by Nita Trujillo RN  Flowsheets (Taken 10/3/2024 0432)  Medication Review/Management: medications reviewed  10/3/2024 0104 by Nita Trujillo RN  Flowsheets (Taken 10/3/2024 0104)  Medication Review/Management: medications reviewed  10/2/2024 1949 by Nita Trujillo RN  Flowsheets (Taken 10/2/2024 1949)  Self-Care Promotion: independence encouraged  Medication Review/Management: medications reviewed  Intervention: Promote Injury-Free Environment  10/3/2024 0432 by Nita Trujillo RN  Flowsheets (Taken 10/3/2024 0432)  Safety Promotion/Fall Prevention:   assistive device/personal item within reach   Fall Risk signage in place   side rails raised x 2  10/3/2024 0104 by Nita Trujillo RN  Flowsheets (Taken  10/3/2024 0104)  Safety Promotion/Fall Prevention:   assistive device/personal item within reach   Fall Risk signage in place   medications reviewed   side rails raised x 2  10/2/2024 1949 by Niat Trujillo RN  Flowsheets (Taken 10/2/2024 1949)  Safety Promotion/Fall Prevention:   assistive device/personal item within reach   Fall Risk signage in place   medications reviewed   side rails raised x 2     Problem: Skin Injury Risk Increased  Goal: Skin Health and Integrity  10/3/2024 0432 by Nita Trujillo RN  Outcome: Progressing  10/3/2024 0104 by Nita Trujillo RN  Outcome: Progressing  Intervention: Optimize Skin Protection  10/3/2024 0432 by Nita Trujillo RN  Flowsheets (Taken 10/3/2024 0432)  Skin Protection: transparent dressing maintained  Head of Bed (HOB) Positioning: HOB at 30-45 degrees  10/3/2024 0104 by Nita Trujillo RN  Flowsheets (Taken 10/3/2024 0104)  Skin Protection: transparent dressing maintained  Activity Management:   Arm raise - L1   Leg kicks - L2  Head of Bed (HOB) Positioning: HOB at 30-45 degrees  Intervention: Promote and Optimize Oral Intake  Flowsheets (Taken 10/3/2024 0104)  Oral Nutrition Promotion:   rest periods promoted   physical activity promoted

## 2024-10-03 NOTE — NURSING
Dr. Duke informed in behalf of Dr. Andres. Hgb- 5.9, hct-17.8. To transfuse 1 unit of PRBC with IV Pepsid 20 mg as pre medications.

## 2024-10-03 NOTE — PROGRESS NOTES
[facility] Medicine Progress Note        Chief Complaint: Inpatient Follow-up for     HPI:   Mr. Hanley is a 74-year-old gentleman known to me for hypertension and acute myeloid leukemia, currently in treatment and followed by Oncology that was recently seen in the office with complaints of left-sided flank and abdominal pain.  Did not complain of any dysuria or hematuria at the time.  There was no complaints with his bowel habits either.  No complaints of fever or chills.  At the time considering his history and presentation, he was given Flexeril, Celebrex and a CT scan was ordered.  He presented to the ED however a day after with ongoing complaints of abdominal pain.  He did endorse that his last bowel movement was a week ago in the ED. apparently patient had presented to the urgent care prior and was noted to be cold and having chills.  He reported some nausea and vomiting on the day he presented to the ED which was on 10/01/2024.  A CT scan was completed which was essentially negative for any acute intra-abdominal pathology.  There were some lung nodules noted which had increased in number from a prior comparison.    Blood cultures were drawn as well.  He was diagnosed with musculoskeletal pain and constipation contributing to his issues and was discharged home.  A urinalysis was collected as well as blood cultures drawn.    He is supposed to be on levofloxacin, acyclovir and Diflucan prophylactically.  He presented again on 10/02/2024 since he was called back with positive blood cultures.  Confirmed enterobacteria and Klebsiella.  He received a dose of Zosyn in the ED and was admitted to the hospital.    At the time of my visit he still complaints of some left-sided abdominal and flank pain however improved from any was seen in the office.    He was seen by Dr. Rai yesterday with removal of melanoma on his right forearm.  Zosyn continued and cultures redrawn   He did  receive 1 unit of platelets in the ED.  another unit on 10/02/2024    Interval Hx:   The patient was seen and examined.  Starting to feel better.  Flank pain seems to be much improved.  Already status post 2 units of packed red blood cells since admission however H&H noted to be 5.9 and 17.8 today.  1 unit was ordered by on- however I will add another unit after completion of the S1.      Case was discussed with patient's nurse and  on the floor.    Objective/physical exam:  General: In no acute distress, afebrile  Chest: Clear to auscultation bilaterally  Heart: RRR, +S1, S2, no appreciable murmur  Abdomen: Soft, nontender, BS +  Neurologic:  Generalized weakness, nonfocal    VITAL SIGNS: 24 HRS MIN & MAX LAST   Temp  Min: 97.6 °F (36.4 °C)  Max: 99.8 °F (37.7 °C) 98.4 °F (36.9 °C)   BP  Min: 104/47  Max: 156/84 (!) 156/84   Pulse  Min: 59  Max: 86  69   Resp  Min: 16  Max: 19 19   SpO2  Min: 93 %  Max: 98 % 95 %     I have reviewed the following labs:  Recent Labs   Lab 10/01/24  1429 10/02/24  0351 10/03/24  0411   WBC 29.32  29.32* 16.52  16.52* 7.82  7.82   RBC 2.78* 2.45* 2.03*   HGB 7.9* 7.0* 5.9*   HCT 24.4* 21.8* 17.8*   MCV 87.8 89.0 87.7   MCH 28.4 28.6 29.1   MCHC 32.4* 32.1* 33.1   RDW 15.9 16.1 16.1   PLT 27* 10* 13*   MPV 10.2 9.9 10.5*     Recent Labs   Lab 10/01/24  1429 10/02/24  0351 10/03/24  0720    142 141   K 4.6 4.6 4.1    108* 108*   CO2 19* 23 26   BUN 52.4* 51.9* 31.1*   CREATININE 1.45* 1.54* 1.08   CALCIUM 8.9 8.7* 7.9*   ALBUMIN 3.1* 3.0* 2.5*   ALKPHOS 114 81 67   ALT 41 40 28   AST 62* 119* 36*   BILITOT 0.4 0.4 0.9     Microbiology Results (last 7 days)       Procedure Component Value Units Date/Time    Blood culture x two cultures. Draw prior to antibiotics. [[id number]]  (Normal) Collected: 10/02/24 0351    Order Status: Completed Specimen: Blood Updated: 10/03/24 0500     Blood Culture No Growth At 24 Hours    Blood culture x two cultures.  Draw prior to antibiotics. [[id number]]  (Normal) Collected: 10/02/24 0351    Order Status: Completed Specimen: Blood Updated: 10/03/24 0500     Blood Culture No Growth At 24 Hours             See below for Radiology    Assessment/Plan:    Sepsis   -blood cultures confirmed Enterobacteriaceae and Klebsiella pneumoniae in gram stain of broth in 1 of 2 bottles  -as such patient without any fever however did have some nausea/vomiting   -will follow final cultures, also repeated blood cultures x2 upon admission which are currently remaining negative  -continue Zosyn, high likelihood of cultures showing a contaminant   -if repeat cultures remained negative and patient's H&H remained stable, possible discharge home     2. AML, not achieve remission   -Oncology aware of admission, no indication for consult just yet   -patient currently on treatment with Venclexta  -also supposed to be on levofloxacin, Diflucan and acyclovir prophylactically  -med rec noted without these medications, nursing staff confirmed with patient and he has been off of it     3. Anemia and thrombocytopenia   -patient is status post 1 unit of platelets in the ED and another unit on 10/02/2024.    -will need 2 more units of packed red blood cells today, will follow-up on H&H closely and try to maintain hemoglobin above 8    4. Hypertension   -home medications reviewed and resumed     5. Anxiety/depression   -on citalopram 40 mg p.o. daily, continue    Patient condition:  Stable    Anticipated discharge and Disposition:   Home when stable      All diagnosis and differential diagnosis have been reviewed; assessment and plan has been documented; I have personally reviewed the labs and test results that are presently available; I have reviewed the patients medication list; I have reviewed the consulting providers response and recommendations. I have reviewed or attempted to review medical records based upon their availability    All of the patient's  questions have been  addressed and answered. Patient's is agreeable to the above stated plan. I will continue to monitor closely and make adjustments to medical management as needed.    Portions of this note dictated using EMR integrated voice recognition software, and may be subject to voice recognition errors not corrected at proofreading. Please contact writer for clarification if needed.   _____________________________________________________________________    Malnutrition Status:    Scheduled Med:   citalopram  40 mg Oral Daily    docusate sodium  100 mg Oral BID    gabapentin  300 mg Oral BID    lisinopriL  20 mg Oral Daily    And    hydroCHLOROthiazide  12.5 mg Oral Daily    piperacillin-tazobactam (Zosyn) IV (PEDS and ADULTS) (extended infusion is not appropriate)  4.5 g Intravenous Q8H    tamsulosin  0.4 mg Oral Daily      Continuous Infusions:   0.9% NaCl   Intravenous Continuous 50 mL/hr at 10/03/24 0708 New Bag at 10/03/24 0708      PRN Meds:    Current Facility-Administered Medications:     0.9%  NaCl infusion (for blood administration), , Intravenous, Q24H PRN    0.9%  NaCl infusion (for blood administration), , Intravenous, Q24H PRN    0.9%  NaCl infusion (for blood administration), , Intravenous, Q24H PRN    bisacodyL, 10 mg, Rectal, Daily PRN    celecoxib, 200 mg, Oral, Daily PRN    melatonin, 6 mg, Oral, Nightly PRN    ondansetron, 8 mg, Oral, Q8H PRN    sodium chloride 0.9%, 10 mL, Intravenous, PRN    traMADoL, 50 mg, Oral, Q6H PRN     Radiology:  I have personally reviewed the following imaging and agree with the radiologist.     CT Abdomen Pelvis With IV Contrast NO Oral Contrast  Narrative: EXAMINATION:  CT ABDOMEN PELVIS WITH IV CONTRAST    CLINICAL HISTORY:  LLQ/ L flank pain;    TECHNIQUE:  CT imaging of the abdomen and pelvis after intravenous contrast. Dose length product 665 mGycm. Automatic exposure control, adjustment of mA/kV or iterative reconstruction technique used to limit  radiation dose.    COMPARISON:  CT 07/04/2024    FINDINGS:  Evaluation mildly limited by motion.    Liver/biliary: Normal liver. Mild intrahepatic biliary dilatation similar to prior exam.    Pancreas: Normal.    Spleen: Splenomegaly measures 15 cm.    Adrenals: Normal.    Genitourinary: Symmetric renal enhancement. No hydronephrosis. Bladder within normal limits. Enlarged prostate.    Stomach/bowel: No evidence of bowel obstruction. Normal appendix. No definitive sites of bowel inflammation.    Lymph nodes/peritoneum: No pathologically enlarged lymph node identified. No ascites or free air. No fluid collection.    Vasculature: Aortoiliac atherosclerosis.  Ectasia of the infrarenal aorta measures 28 mm.    Abdominal wall: Small fat containing right inguinal hernia.    Lung bases: Mild reticular densities in the posterior lung bases.  Multiple small nodules as well.  Nodules measure up to 8 mm.    Musculoskeletal: No acute osseous findings.  Impression: Mildly limited assessment, but no acute process identified in the abdomen or pelvis.    Multiple subcentimeter nodules in the lung bases, increased in number since July.  These may be infectious or inflammatory.  Three month follow-up chest CT recommended to ensure resolution.    Electronically signed by: Demetrio Manning  Date:    10/01/2024  Time:    17:55  X-Ray Chest AP Portable  Narrative: EXAMINATION:  XR CHEST AP PORTABLE    CLINICAL HISTORY:  Sepsis;, .    FINDINGS:  No alveolar consolidation, effusion, or pneumothorax is seen.   The thoracic aorta is normal  cardiac silhouette, central pulmonary vessels and mediastinum are normal in size and are grossly unremarkable.   visualized osseous structures are grossly unremarkable.  Impression: No acute chest disease is identified.    Electronically signed by: Peng Pisano  Date:    10/01/2024  Time:    15:09      Nayely Andres MD  Department of Hospital Medicine   [facility]    10/03/2024

## 2024-10-03 NOTE — NURSING
Maury with wound care nursing came to assess pt's right arm wound. When quick clot previously place on the wound was removed it began to bleed extensively. Another quick clot was placed on the wound with a pressure dressing and pressure held for a few minutes. Wound bled through initial dressing, ACE applied with arm held up and more pressure applied for 10 full minutes. Pt arm then allow to rest elevated on 5 pillows. Dr Andres informed of bleed. Orders to consult wound care physician . Able to communicate with Dr Mark but she has left for the day. No further bleeding at this time .     Wound began bleeding again through the ace wrap. Wound care nurses Leah and Patria at bedside. Dr Paz came out to cauterize wound. Abx ointment applied to wound bed. New dressing applied. No further bleeding at this time. Arm remains elevated on  pillows

## 2024-10-03 NOTE — SUBJECTIVE & OBJECTIVE
Scheduled Meds:   citalopram  40 mg Oral Daily    docusate sodium  100 mg Oral BID    gabapentin  300 mg Oral BID    lisinopriL  20 mg Oral Daily    And    hydroCHLOROthiazide  12.5 mg Oral Daily    piperacillin-tazobactam (Zosyn) IV (PEDS and ADULTS) (extended infusion is not appropriate)  4.5 g Intravenous Q8H    tamsulosin  0.4 mg Oral Daily     Continuous Infusions:   0.9% NaCl   Intravenous Continuous 50 mL/hr at 10/03/24 0708 New Bag at 10/03/24 0708     PRN Meds:  Current Facility-Administered Medications:     0.9%  NaCl infusion (for blood administration), , Intravenous, Q24H PRN    0.9%  NaCl infusion (for blood administration), , Intravenous, Q24H PRN    0.9%  NaCl infusion (for blood administration), , Intravenous, Q24H PRN    0.9%  NaCl infusion (for blood administration), , Intravenous, Q24H PRN    bisacodyL, 10 mg, Rectal, Daily PRN    celecoxib, 200 mg, Oral, Daily PRN    melatonin, 6 mg, Oral, Nightly PRN    ondansetron, 8 mg, Oral, Q8H PRN    sodium chloride 0.9%, 10 mL, Intravenous, PRN    traMADoL, 50 mg, Oral, Q6H PRN    Review of patient's allergies indicates:   Allergen Reactions    Acetaminophen Other (See Comments)     Restless leg     Benadryl [diphenhydramine hcl] Other (See Comments)     Restless Legs        Past Medical History:   Diagnosis Date    Anxiety and depression     BPH (benign prostatic hyperplasia)     History of CVA (cerebrovascular accident) without residual deficits     HTN (hypertension)     Malignant melanoma of skin, unspecified     NSTEMI (non-ST elevated myocardial infarction)     Sepsis, Gram negative     Sleep apnea      Past Surgical History:   Procedure Laterality Date    BONE MARROW ASPIRATION N/A 6/27/2023    Procedure: ASPIRATION, BONE MARROW;  Surgeon: Sabino Núñez MD;  Location: [facility];  Service: General;  Laterality: N/A;  ALL ASSESS FOR MOLECULAR MRD    BONE MARROW ASPIRATION N/A 5/22/2024    Procedure: ASPIRATION, BONE MARROW;  Surgeon: Sabino Núñez,  MD;  Location: [facility] OR;  Service: General;  Laterality: N/A;    BONE MARROW BIOPSY N/A 1/25/2023    Procedure: Biopsy-bone marrow;  Surgeon: Rd Carlos MD;  Location: [facility] OR;  Service: General;  Laterality: N/A;    BONE MARROW BIOPSY N/A 11/21/2023    Procedure: Biopsy-bone marrow;  Surgeon: Junior Castro MD;  Location: [facility] OR;  Service: General;  Laterality: N/A;    COLONOSCOPY      COLONOSCOPY N/A 1/24/2023    Procedure: COLONOSCOPY (DOUBLE);  Surgeon: Jimy Baker MD;  Location: [facility] ENDOSCOPY;  Service: Gastroenterology;  Laterality: N/A;    EGD, WITH CLOSED BIOPSY N/A 1/24/2023    Procedure: EGD, WITH CLOSED BIOPSY;  Surgeon: Jimy Baker MD;  Location: [facility] ENDOSCOPY;  Service: Gastroenterology;  Laterality: N/A;    EXCISION OF MELANOMA      SINUS SURGERY      TONSILLECTOMY         Family History       Problem Relation (Age of Onset)    Cancer Mother, Father          Tobacco Use    Smoking status: Never    Smokeless tobacco: Never   Substance and Sexual Activity    Alcohol use: Not Currently     Alcohol/week: 7.0 standard drinks of alcohol     Types: 7 Cans of beer per week    Drug use: Never    Sexual activity: Yes     Review of Systems    Objective:     Vital Signs (Most Recent):  Temp: 99.1 °F (37.3 °C) (10/03/24 1552)  Pulse: 69 (10/03/24 1552)  Resp: 20 (10/03/24 1552)  BP: (!) 145/72 (10/03/24 1552)  SpO2: (!) 93 % (10/03/24 1552) Vital Signs (24h Range):  Temp:  [97.6 °F (36.4 °C)-99.1 °F (37.3 °C)] 99.1 °F (37.3 °C)  Pulse:  [59-86] 69  Resp:  [18-20] 20  SpO2:  [93 %-98 %] 93 %  BP: (121-157)/(62-88) 145/72     Weight: 81.2 kg (179 lb)  Body mass index is 24.28 kg/m².     Physical Exam  Vitals reviewed.   Cardiovascular:      Pulses:           Radial pulses are 2+ on the right side.   Musculoskeletal:        Arms:    Skin:     General: Skin is warm.      Capillary Refill: Capillary refill takes less than 2 seconds.      Findings: Petechiae present.   Neurological:      General:  No focal deficit present.      Mental Status: He is alert.      Comments: NVI to hand/fingers          Laboratory:  CBC:   Recent Labs   Lab 10/03/24  0411   WBC 7.82  7.82   RBC 2.03*   HGB 5.9*   HCT 17.8*   PLT 13*   MCV 87.7   MCH 29.1   MCHC 33.1     CMP:   Recent Labs   Lab 10/03/24  0720   CALCIUM 7.9*   ALBUMIN 2.5*      K 4.1   CO2 26   *   BUN 31.1*   CREATININE 1.08   ALKPHOS 67   ALT 28   AST 36*   BILITOT 0.9        Latest Reference Range & Units 09/19/24 08:06 09/23/24 07:46 09/26/24 08:05 09/30/24 08:29 10/01/24 14:29 10/02/24 03:51 10/03/24 04:11   Platelet Count 130 - 400 x10(3)/mcL 14 (LL) 13 (LL) 8 (LL) 12 (LL) 27 (LL) 10 (LL) 13 (LL)   (LL): Data is critically low   Latest Reference Range & Units 09/19/24 08:06 09/23/24 07:46 09/26/24 08:05 09/30/24 08:29 10/01/24 14:29 10/02/24 03:51 10/03/24 04:11   Hemoglobin 14.0 - 18.0 g/dL 6.8 (L) 7.2 (L) 7.2 (L) 7.1 (L) 7.9 (L) 7.0 (L) 5.9 (LL)   (LL): Data is critically low  (L): Data is abnormally low   Latest Reference Range & Units 09/19/24 08:06 09/23/24 07:46 09/26/24 08:05 09/30/24 08:29 10/01/24 14:29 10/02/24 03:51 10/03/24 04:11   Hematocrit 42.0 - 52.0 % 20.6 (L) 21.9 (L) 22.4 (L) 22.1 (L) 24.4 (L) 21.8 (L) 17.8 (LL)   (LL): Data is critically low  (L): Data is abnormally low

## 2024-10-03 NOTE — PLAN OF CARE
Problem: Adult Inpatient Plan of Care  Goal: Plan of Care Review  10/3/2024 0104 by Nita Trujillo RN  Outcome: Progressing  Flowsheets (Taken 10/3/2024 0104)  Plan of Care Reviewed With: patient  10/2/2024 1949 by Nita Trujillo RN  Outcome: Progressing  Flowsheets (Taken 10/2/2024 1949)  Plan of Care Reviewed With: patient  Goal: Patient-Specific Goal (Individualized)  10/3/2024 0104 by Nita Trujillo RN  Outcome: Progressing  10/2/2024 1949 by Nita Trujillo RN  Outcome: Progressing  Goal: Absence of Hospital-Acquired Illness or Injury  10/3/2024 0104 by Nita Trujillo RN  Outcome: Progressing  10/2/2024 1949 by Nita Trujillo RN  Outcome: Progressing  Intervention: Identify and Manage Fall Risk  10/3/2024 0104 by Nita Trujillo RN  Flowsheets (Taken 10/3/2024 0104)  Safety Promotion/Fall Prevention:   assistive device/personal item within reach   Fall Risk signage in place   medications reviewed   side rails raised x 2  10/2/2024 1949 by Nita Trujillo RN  Flowsheets (Taken 10/2/2024 1949)  Safety Promotion/Fall Prevention:   assistive device/personal item within reach   Fall Risk signage in place   medications reviewed   side rails raised x 2  Intervention: Prevent Skin Injury  10/3/2024 0104 by Nita Trujillo RN  Flowsheets (Taken 10/3/2024 0104)  Body Position: position changed independently  Skin Protection: transparent dressing maintained  Device Skin Pressure Protection: tubing/devices free from skin contact  10/2/2024 1949 by Nita Trujillo RN  Flowsheets (Taken 10/2/2024 1949)  Body Position: position changed independently  Skin Protection: transparent dressing maintained  Device Skin Pressure Protection: tubing/devices free from skin contact  Intervention: Prevent and Manage VTE (Venous Thromboembolism) Risk  10/3/2024 0104 by Nita Trujillo RN  Flowsheets (Taken 10/3/2024 0104)  VTE Prevention/Management:   bleeding precautions maintained   bleeding risk  assessed   intravenous hydration  10/2/2024 1949 by Nita Trujillo RN  Flowsheets (Taken 10/2/2024 1949)  VTE Prevention/Management:   bleeding precautions maintained   ambulation promoted   intravenous hydration  Intervention: Prevent Infection  10/3/2024 0104 by Nita Trujillo RN  Flowsheets (Taken 10/3/2024 0104)  Infection Prevention:   rest/sleep promoted   hand hygiene promoted  10/2/2024 1949 by Niat Trujillo RN  Flowsheets (Taken 10/2/2024 1949)  Infection Prevention:   rest/sleep promoted   hand hygiene promoted  Goal: Optimal Comfort and Wellbeing  10/3/2024 0104 by Nita Trujillo RN  Outcome: Progressing  10/2/2024 1949 by Nita Trujillo RN  Outcome: Progressing  Intervention: Monitor Pain and Promote Comfort  10/3/2024 0104 by Nita Trujillo RN  Flowsheets (Taken 10/3/2024 0104)  Pain Management Interventions:   relaxation techniques promoted   quiet environment facilitated  10/2/2024 1949 by Nita Trujillo RN  Flowsheets (Taken 10/2/2024 1949)  Pain Management Interventions:   relaxation techniques promoted   quiet environment facilitated   care clustered  Intervention: Provide Person-Centered Care  10/3/2024 0104 by Nita Trujillo RN  Flowsheets (Taken 10/3/2024 0104)  Trust Relationship/Rapport:   care explained   thoughts/feelings acknowledged  10/2/2024 1949 by Nita Trujillo RN  Flowsheets (Taken 10/2/2024 1949)  Trust Relationship/Rapport:   care explained   thoughts/feelings acknowledged  Goal: Readiness for Transition of Care  10/3/2024 0104 by Nita Trujillo RN  Outcome: Progressing  10/2/2024 1949 by Nita Trujillo RN  Outcome: Progressing     Problem: Wound  Goal: Optimal Coping  10/3/2024 0104 by Ntia Trujillo RN  Outcome: Progressing  10/2/2024 1949 by Nita Trujillo RN  Outcome: Progressing  Intervention: Support Patient and Family Response  10/3/2024 0104 by Nita Trujillo RN  Flowsheets (Taken 10/3/2024 0104)  Supportive  Measures:   verbalization of feelings encouraged   active listening utilized  10/2/2024 1949 by Nita Trujillo RN  Flowsheets (Taken 10/2/2024 1949)  Supportive Measures:   relaxation techniques promoted   self-care encouraged  Goal: Optimal Functional Ability  10/3/2024 0104 by Nita Trujillo RN  Outcome: Progressing  10/2/2024 1949 by Nita Trujillo RN  Outcome: Progressing  Intervention: Optimize Functional Ability  10/3/2024 0104 by Nita Trujillo RN  Flowsheets (Taken 10/3/2024 0104)  Activity Management:   Arm raise - L1   Leg kicks - L2  Activity Assistance Provided: assistance, 1 person  10/2/2024 1949 by Nita Trujillo RN  Flowsheets (Taken 10/2/2024 1949)  Activity Management:   Arm raise - L1   Leg kicks - L2  Activity Assistance Provided: independent  Goal: Absence of Infection Signs and Symptoms  10/3/2024 0104 by Nita Trujillo RN  Outcome: Progressing  10/2/2024 1949 by Nita Trujillo RN  Outcome: Progressing  Intervention: Prevent or Manage Infection  10/3/2024 0104 by Nita Trujillo RN  Flowsheets (Taken 10/3/2024 0104)  Infection Management: aseptic technique maintained  Isolation Precautions: precautions maintained  10/2/2024 1949 by Nita Trujillo RN  Flowsheets (Taken 10/2/2024 1949)  Infection Management: aseptic technique maintained  Isolation Precautions: precautions maintained  Goal: Improved Oral Intake  10/3/2024 0104 by Nita Trujillo RN  Outcome: Progressing  10/2/2024 1949 by Nita Trujillo RN  Outcome: Progressing  Intervention: Promote and Optimize Oral Intake  10/3/2024 0104 by Nita Trujillo RN  Flowsheets (Taken 10/3/2024 0104)  Oral Nutrition Promotion:   rest periods promoted   physical activity promoted  10/2/2024 1949 by Nita Trujillo RN  Flowsheets (Taken 10/2/2024 1949)  Oral Nutrition Promotion: rest periods promoted  Goal: Optimal Pain Control and Function  10/3/2024 0104 by Nita Trujillo RN  Outcome:  Progressing  10/2/2024 1949 by Nita Trujillo RN  Outcome: Progressing  Intervention: Prevent or Manage Pain  10/3/2024 0104 by Nita Trujillo RN  Flowsheets (Taken 10/3/2024 0104)  Sleep/Rest Enhancement: relaxation techniques promoted  Pain Management Interventions:   relaxation techniques promoted   quiet environment facilitated  10/2/2024 1949 by Nita Trujillo RN  Flowsheets (Taken 10/2/2024 1949)  Sleep/Rest Enhancement: relaxation techniques promoted  Pain Management Interventions:   relaxation techniques promoted   quiet environment facilitated   care clustered  Goal: Skin Health and Integrity  10/3/2024 0104 by Nita Trujillo RN  Outcome: Progressing  10/2/2024 1949 by Nita Trujillo RN  Outcome: Progressing  Intervention: Optimize Skin Protection  10/3/2024 0104 by Nita Trujillo RN  Flowsheets (Taken 10/3/2024 0104)  Skin Protection: transparent dressing maintained  Activity Management:   Arm raise - L1   Leg kicks - L2  Head of Bed (HOB) Positioning: HOB at 30-45 degrees  10/2/2024 1949 by Nita Trujillo RN  Flowsheets (Taken 10/2/2024 1949)  Pressure Reduction Techniques: rest period provided between sit times  Skin Protection: transparent dressing maintained  Activity Management:   Arm raise - L1   Leg kicks - L2  Head of Bed (HOB) Positioning: HOB at 45 degrees  Goal: Optimal Wound Healing  10/3/2024 0104 by Nita Trujillo RN  Outcome: Progressing  10/2/2024 1949 by Nita Trujillo RN  Outcome: Progressing  Intervention: Promote Wound Healing  10/3/2024 0104 by Nita Trujillo RN  Flowsheets (Taken 10/3/2024 0104)  Sleep/Rest Enhancement: relaxation techniques promoted  10/2/2024 1949 by Nita Trujillo RN  Flowsheets (Taken 10/2/2024 1949)  Sleep/Rest Enhancement: relaxation techniques promoted     Problem: Fall Injury Risk  Goal: Absence of Fall and Fall-Related Injury  10/3/2024 0104 by Nita Trujillo RN  Outcome: Progressing  10/2/2024 1949 by Cassandra  Nita Ryder RN  Outcome: Progressing  Intervention: Identify and Manage Contributors  10/3/2024 0104 by Nita Trujillo RN  Flowsheets (Taken 10/3/2024 0104)  Medication Review/Management: medications reviewed  10/2/2024 1949 by Nita Trujillo RN  Flowsheets (Taken 10/2/2024 1949)  Self-Care Promotion: independence encouraged  Medication Review/Management: medications reviewed  Intervention: Promote Injury-Free Environment  10/3/2024 0104 by Nita Trujillo RN  Flowsheets (Taken 10/3/2024 0104)  Safety Promotion/Fall Prevention:   assistive device/personal item within reach   Fall Risk signage in place   medications reviewed   side rails raised x 2  10/2/2024 1949 by Nita Trujillo RN  Flowsheets (Taken 10/2/2024 1949)  Safety Promotion/Fall Prevention:   assistive device/personal item within reach   Fall Risk signage in place   medications reviewed   side rails raised x 2     Problem: Skin Injury Risk Increased  Goal: Skin Health and Integrity  Outcome: Progressing  Intervention: Optimize Skin Protection  Flowsheets (Taken 10/3/2024 0104)  Skin Protection: transparent dressing maintained  Activity Management:   Arm raise - L1   Leg kicks - L2  Head of Bed (HOB) Positioning: HOB at 30-45 degrees  Intervention: Promote and Optimize Oral Intake  Flowsheets (Taken 10/3/2024 0104)  Oral Nutrition Promotion:   rest periods promoted   physical activity promoted

## 2024-10-04 ENCOUNTER — TELEPHONE (OUTPATIENT)
Dept: INTERNAL MEDICINE | Facility: CLINIC | Age: 74
End: 2024-10-04
Payer: MEDICARE

## 2024-10-04 VITALS
DIASTOLIC BLOOD PRESSURE: 86 MMHG | HEART RATE: 83 BPM | OXYGEN SATURATION: 91 % | BODY MASS INDEX: 24.24 KG/M2 | HEIGHT: 72 IN | WEIGHT: 179 LBS | TEMPERATURE: 99 F | RESPIRATION RATE: 20 BRPM | SYSTOLIC BLOOD PRESSURE: 166 MMHG

## 2024-10-04 PROBLEM — S41.101D: Status: ACTIVE | Noted: 2024-10-04

## 2024-10-04 LAB
ABO + RH BLD: NORMAL
ABS NEUT (OLG): 1.68 X10(3)/MCL (ref 2.1–9.2)
ALBUMIN SERPL-MCNC: 2.5 G/DL (ref 3.4–4.8)
ALBUMIN/GLOB SERPL: 0.9 RATIO (ref 1.1–2)
ALP SERPL-CCNC: 87 UNIT/L (ref 40–150)
ALT SERPL-CCNC: 30 UNIT/L (ref 0–55)
ANION GAP SERPL CALC-SCNC: 7 MEQ/L
ANISOCYTOSIS BLD QL SMEAR: ABNORMAL
AST SERPL-CCNC: 24 UNIT/L (ref 5–34)
B-HCG SERPL QL: 34 %
BACTERIA BLD CULT: ABNORMAL
BILIRUB SERPL-MCNC: 0.6 MG/DL
BLD PROD TYP BPU: NORMAL
BLOOD UNIT EXPIRATION DATE: NORMAL
BLOOD UNIT TYPE CODE: 9500
BUN SERPL-MCNC: 19.5 MG/DL (ref 8.4–25.7)
CALCIUM SERPL-MCNC: 7.9 MG/DL (ref 8.8–10)
CHLORIDE SERPL-SCNC: 105 MMOL/L (ref 98–107)
CO2 SERPL-SCNC: 26 MMOL/L (ref 23–31)
CREAT SERPL-MCNC: 1.17 MG/DL (ref 0.73–1.18)
CREAT/UREA NIT SERPL: 17
CROSSMATCH INTERPRETATION: NORMAL
DISPENSE STATUS: NORMAL
ERYTHROCYTE [DISTWIDTH] IN BLOOD BY AUTOMATED COUNT: 15 % (ref 11.5–17)
GFR SERPLBLD CREATININE-BSD FMLA CKD-EPI: >60 ML/MIN/1.73/M2
GLOBULIN SER-MCNC: 2.9 GM/DL (ref 2.4–3.5)
GLUCOSE SERPL-MCNC: 116 MG/DL (ref 82–115)
GRAM STN SPEC: ABNORMAL
HCT VFR BLD AUTO: 25.4 % (ref 42–52)
HGB BLD-MCNC: 8.8 G/DL (ref 14–18)
INSTRUMENT WBC (OLG): 8.4 X10(3)/MCL
LYMPHOCYTES NFR BLD MANUAL: 1.26 X10(3)/MCL
LYMPHOCYTES NFR BLD MANUAL: 15 %
MCH RBC QN AUTO: 28.8 PG (ref 27–31)
MCHC RBC AUTO-ENTMCNC: 34.6 G/DL (ref 33–36)
MCV RBC AUTO: 83 FL (ref 80–94)
MICROCYTES BLD QL SMEAR: ABNORMAL
MONOCYTES NFR BLD MANUAL: 2.69 X10(3)/MCL (ref 0.1–1.3)
MONOCYTES NFR BLD MANUAL: 32 %
NEUTROPHILS NFR BLD MANUAL: 20 %
NRBC BLD AUTO-RTO: 0.2 %
PLATELET # BLD AUTO: 8 X10(3)/MCL (ref 130–400)
PLATELET # BLD EST: ABNORMAL 10*3/UL
PLATELETS.RETICULATED NFR BLD AUTO: 3.2 % (ref 0.9–11.2)
PMV BLD AUTO: 11.6 FL (ref 7.4–10.4)
POTASSIUM SERPL-SCNC: 3.8 MMOL/L (ref 3.5–5.1)
PROT SERPL-MCNC: 5.4 GM/DL (ref 5.8–7.6)
RBC # BLD AUTO: 3.06 X10(6)/MCL (ref 4.7–6.1)
RBC MORPH BLD: ABNORMAL
SODIUM SERPL-SCNC: 138 MMOL/L (ref 136–145)
UNIT NUMBER: NORMAL
WBC # BLD AUTO: 8.4 X10(3)/MCL (ref 4.5–11.5)

## 2024-10-04 PROCEDURE — 63600175 PHARM REV CODE 636 W HCPCS: Performed by: INTERNAL MEDICINE

## 2024-10-04 PROCEDURE — 99233 SBSQ HOSP IP/OBS HIGH 50: CPT | Mod: 25,,, | Performed by: EMERGENCY MEDICINE

## 2024-10-04 PROCEDURE — 99239 HOSP IP/OBS DSCHRG MGMT >30: CPT | Mod: ,,, | Performed by: INTERNAL MEDICINE

## 2024-10-04 PROCEDURE — 80053 COMPREHEN METABOLIC PANEL: CPT | Performed by: INTERNAL MEDICINE

## 2024-10-04 PROCEDURE — 25000003 PHARM REV CODE 250: Performed by: INTERNAL MEDICINE

## 2024-10-04 PROCEDURE — 36415 COLL VENOUS BLD VENIPUNCTURE: CPT | Performed by: INTERNAL MEDICINE

## 2024-10-04 PROCEDURE — P9037 PLATE PHERES LEUKOREDU IRRAD: HCPCS | Performed by: INTERNAL MEDICINE

## 2024-10-04 PROCEDURE — 85027 COMPLETE CBC AUTOMATED: CPT | Performed by: INTERNAL MEDICINE

## 2024-10-04 RX ORDER — LEVOFLOXACIN 500 MG/1
500 TABLET, FILM COATED ORAL DAILY
Qty: 90 TABLET | Refills: 3 | Status: SHIPPED | OUTPATIENT
Start: 2024-10-04

## 2024-10-04 RX ORDER — ACYCLOVIR 400 MG/1
400 TABLET ORAL DAILY
Qty: 30 TABLET | Refills: 11 | Status: SHIPPED | OUTPATIENT
Start: 2024-10-04 | End: 2025-10-04

## 2024-10-04 RX ORDER — FLUCONAZOLE 200 MG/1
200 TABLET ORAL DAILY
Qty: 90 TABLET | Refills: 3 | Status: SHIPPED | OUTPATIENT
Start: 2024-10-04 | End: 2025-09-29

## 2024-10-04 RX ORDER — HYDROCODONE BITARTRATE AND ACETAMINOPHEN 500; 5 MG/1; MG/1
TABLET ORAL
Status: DISCONTINUED | OUTPATIENT
Start: 2024-10-04 | End: 2024-10-04 | Stop reason: HOSPADM

## 2024-10-04 RX ADMIN — HYDROCHLOROTHIAZIDE 12.5 MG: 12.5 TABLET ORAL at 09:10

## 2024-10-04 RX ADMIN — DOCUSATE SODIUM 100 MG: 100 CAPSULE, LIQUID FILLED ORAL at 09:10

## 2024-10-04 RX ADMIN — TAMSULOSIN HYDROCHLORIDE 0.4 MG: 0.4 CAPSULE ORAL at 09:10

## 2024-10-04 RX ADMIN — GABAPENTIN 300 MG: 300 CAPSULE ORAL at 09:10

## 2024-10-04 RX ADMIN — PIPERACILLIN SODIUM AND TAZOBACTAM SODIUM 4.5 G: 4; .5 INJECTION, POWDER, LYOPHILIZED, FOR SOLUTION INTRAVENOUS at 03:10

## 2024-10-04 RX ADMIN — SODIUM CHLORIDE: 9 INJECTION, SOLUTION INTRAVENOUS at 07:10

## 2024-10-04 RX ADMIN — LISINOPRIL 20 MG: 20 TABLET ORAL at 09:10

## 2024-10-04 RX ADMIN — TRAMADOL HYDROCHLORIDE 50 MG: 50 TABLET, COATED ORAL at 03:10

## 2024-10-04 RX ADMIN — CITALOPRAM HYDROBROMIDE 40 MG: 20 TABLET ORAL at 09:10

## 2024-10-04 NOTE — PLAN OF CARE
Problem: Adult Inpatient Plan of Care  Goal: Plan of Care Review  10/4/2024 0432 by Nita Trujillo RN  Outcome: Progressing  Flowsheets (Taken 10/4/2024 0432)  Plan of Care Reviewed With: patient  10/3/2024 1943 by Nita Trujillo RN  Outcome: Progressing  Flowsheets (Taken 10/3/2024 1943)  Plan of Care Reviewed With: patient  Goal: Patient-Specific Goal (Individualized)  10/4/2024 0432 by Nita Trujillo RN  Outcome: Progressing  10/3/2024 1943 by Nita Trujillo RN  Outcome: Progressing  Goal: Absence of Hospital-Acquired Illness or Injury  10/4/2024 0432 by Nita Trujillo RN  Outcome: Progressing  10/3/2024 1943 by Nita Trujillo RN  Outcome: Progressing  Intervention: Identify and Manage Fall Risk  10/4/2024 0432 by Nita Trujillo RN  Flowsheets (Taken 10/4/2024 0432)  Safety Promotion/Fall Prevention:   assistive device/personal item within reach   Fall Risk signage in place   medications reviewed   side rails raised x 2  10/3/2024 1943 by Nita Trujillo RN  Flowsheets (Taken 10/3/2024 1943)  Safety Promotion/Fall Prevention:   assistive device/personal item within reach   Fall Risk signage in place   medications reviewed   side rails raised x 2  Intervention: Prevent Skin Injury  10/4/2024 0432 by Nita Trujillo RN  Flowsheets (Taken 10/4/2024 0432)  Body Position: position changed independently  Skin Protection: transparent dressing maintained  Device Skin Pressure Protection: tubing/devices free from skin contact  10/3/2024 1943 by Nita Trujillo RN  Flowsheets (Taken 10/3/2024 1943)  Body Position: position changed independently  Skin Protection: transparent dressing maintained  Device Skin Pressure Protection: tubing/devices free from skin contact  Intervention: Prevent and Manage VTE (Venous Thromboembolism) Risk  10/4/2024 0432 by Nita Trujillo RN  Flowsheets (Taken 10/4/2024 0432)  VTE Prevention/Management:   bleeding risk assessed   bleeding precautions  maintained   intravenous hydration  10/3/2024 1943 by Nita Trujillo RN  Flowsheets (Taken 10/3/2024 1943)  VTE Prevention/Management:   bleeding precautions maintained   bleeding risk assessed   intravenous hydration  Intervention: Prevent Infection  10/4/2024 0432 by Nita Trujillo RN  Flowsheets (Taken 10/4/2024 0432)  Infection Prevention:   rest/sleep promoted   hand hygiene promoted  10/3/2024 1943 by Nita Trujillo RN  Flowsheets (Taken 10/3/2024 1943)  Infection Prevention:   rest/sleep promoted   hand hygiene promoted  Goal: Optimal Comfort and Wellbeing  10/4/2024 0432 by Nita Trujillo RN  Outcome: Progressing  10/3/2024 1943 by Nita Trujillo RN  Outcome: Progressing  Intervention: Monitor Pain and Promote Comfort  Flowsheets (Taken 10/4/2024 0432)  Pain Management Interventions:   care clustered   quiet environment facilitated   medication offered  Intervention: Provide Person-Centered Care  10/4/2024 0432 by Nita Trujillo RN  Flowsheets (Taken 10/4/2024 0432)  Trust Relationship/Rapport:   care explained   thoughts/feelings acknowledged  10/3/2024 1943 by Nita Trujillo RN  Flowsheets (Taken 10/3/2024 1943)  Trust Relationship/Rapport:   care explained   thoughts/feelings acknowledged  Goal: Readiness for Transition of Care  10/4/2024 0432 by Nita Trujillo RN  Outcome: Progressing  10/3/2024 1943 by Nita Trujillo RN  Outcome: Progressing     Problem: Wound  Goal: Optimal Coping  10/4/2024 0432 by Nita Trujillo RN  Outcome: Progressing  10/3/2024 1943 by Nita Trujillo RN  Outcome: Progressing  Intervention: Support Patient and Family Response  Flowsheets (Taken 10/4/2024 0432)  Supportive Measures: relaxation techniques promoted  Goal: Optimal Functional Ability  10/4/2024 0432 by Nita Trujillo RN  Outcome: Progressing  10/3/2024 1943 by Nita Trujillo RN  Outcome: Progressing  Intervention: Optimize Functional Ability  Flowsheets (Taken  10/4/2024 0432)  Activity Management: Ambulated to bathroom - L4  Activity Assistance Provided: independent  Goal: Absence of Infection Signs and Symptoms  10/4/2024 0432 by Nita Trujillo RN  Outcome: Progressing  10/3/2024 1943 by Nita Trujillo RN  Outcome: Progressing  Intervention: Prevent or Manage Infection  Flowsheets (Taken 10/4/2024 0432)  Infection Management: aseptic technique maintained  Isolation Precautions: precautions maintained  Goal: Improved Oral Intake  10/4/2024 0432 by Nita Trujillo RN  Outcome: Progressing  10/3/2024 1943 by Nita Trujillo RN  Outcome: Progressing  Intervention: Promote and Optimize Oral Intake  Flowsheets (Taken 10/4/2024 0432)  Oral Nutrition Promotion: rest periods promoted  Goal: Optimal Pain Control and Function  10/4/2024 0432 by Nita Trujillo RN  Outcome: Progressing  10/3/2024 1943 by Nita Trujillo RN  Outcome: Progressing  Intervention: Prevent or Manage Pain  Flowsheets (Taken 10/4/2024 0432)  Sleep/Rest Enhancement:   room darkened   relaxation techniques promoted  Pain Management Interventions:   care clustered   quiet environment facilitated   medication offered  Goal: Skin Health and Integrity  10/4/2024 0432 by Nita Trujillo RN  Outcome: Progressing  10/3/2024 1943 by Nita Trujillo RN  Outcome: Progressing  Intervention: Optimize Skin Protection  Flowsheets (Taken 10/4/2024 0432)  Skin Protection: transparent dressing maintained  Activity Management: Ambulated to bathroom - L4  Head of Bed (HOB) Positioning: HOB at 30 degrees  Goal: Optimal Wound Healing  10/4/2024 0432 by Nita Trjuillo RN  Outcome: Progressing  10/3/2024 1943 by Nita Trujillo RN  Outcome: Progressing  Intervention: Promote Wound Healing  10/4/2024 0432 by Nita Trujillo RN  Flowsheets (Taken 10/4/2024 0432)  Sleep/Rest Enhancement:   room darkened   relaxation techniques promoted  10/3/2024 1943 by Nita Trujillo RN  Flowsheets (Taken  10/3/2024 1943)  Sleep/Rest Enhancement:   awakenings minimized   relaxation techniques promoted     Problem: Fall Injury Risk  Goal: Absence of Fall and Fall-Related Injury  10/4/2024 0432 by Nita Trujillo RN  Outcome: Progressing  10/3/2024 1943 by Nita Trujillo RN  Outcome: Progressing  Intervention: Identify and Manage Contributors  10/4/2024 0432 by Nita Trujillo RN  Flowsheets (Taken 10/4/2024 0432)  Self-Care Promotion: independence encouraged  Medication Review/Management: medications reviewed  10/3/2024 1943 by Nita Trujillo RN  Flowsheets (Taken 10/3/2024 1943)  Medication Review/Management: medications reviewed  Intervention: Promote Injury-Free Environment  10/4/2024 0432 by Nita Trujillo RN  Flowsheets (Taken 10/4/2024 0432)  Safety Promotion/Fall Prevention:   assistive device/personal item within reach   Fall Risk signage in place   medications reviewed   side rails raised x 2  10/3/2024 1943 by Nita Trujillo RN  Flowsheets (Taken 10/3/2024 1943)  Safety Promotion/Fall Prevention:   assistive device/personal item within reach   Fall Risk signage in place   medications reviewed   side rails raised x 2     Problem: Skin Injury Risk Increased  Goal: Skin Health and Integrity  10/3/2024 1943 by Nita Trujillo RN  Outcome: Progressing

## 2024-10-04 NOTE — SUBJECTIVE & OBJECTIVE
Subjective:     HPI:  Wound Medicine Re-check        75 y/o WM with AML with concomitant pancytopenia including thrombocytopenia who requires frequent blood and platelet transfusions. He is maintained on venetoclax and cytarabine as well as prophylaxis with  levofloxacin and acyclovir and fluconazole.  Pt also battles multiple various skin cancers and on 10/1/24 had a skin cancer removed on dorsal forearm with Dr Rai on 10/1/24. On that same day, pt went to ED with c/o abdominal pain. He was discharged but called back to ED secondary to +Blood Cultures. It is reported his surgical site has continued to bleed since admission. He has received a platelet transfusion for platelets of 10K on 10/2/24  and several units of PRBCs H/H -   Pt demonstrated more bleeding yesterday afternoon when dressing taken down. [facility] staff applied quick clot and pressure dressing but continued to ooze so wound medicine was consulted.   He was seen by Dr. Paz on the afternoon of 10/3/24 and wound bed was cauterized with silver nitrate to stop the bleeding; bulky dressing was applied over mupirocin, adaptic, and Versatel.   Wound re-check this morning, 10/4/24, he is awake and talkative, sitting up in bed eating breakfast. Dressing to right arm is without strike through drainage/bleeding. Agreeable for wound evaluation and treatment. No acute distress. Reports planning to discharge home some time today after he receives more blood products. Reports he has follow up appointment with Dr. Rai next week.   He is in no acute distress.       Hospital Course:   No notes on file          Scheduled Meds:   citalopram  40 mg Oral Daily    docusate sodium  100 mg Oral BID    gabapentin  300 mg Oral BID    lisinopriL  20 mg Oral Daily    And    hydroCHLOROthiazide  12.5 mg Oral Daily    piperacillin-tazobactam (Zosyn) IV (PEDS and ADULTS) (extended infusion is not appropriate)  4.5 g Intravenous Q8H    tamsulosin  0.4 mg Oral Daily      Continuous Infusions:   0.9% NaCl   Intravenous Continuous 50 mL/hr at 10/04/24 0704 New Bag at 10/04/24 0704     PRN Meds:  Current Facility-Administered Medications:     0.9%  NaCl infusion (for blood administration), , Intravenous, Q24H PRN    0.9%  NaCl infusion (for blood administration), , Intravenous, Q24H PRN    0.9%  NaCl infusion (for blood administration), , Intravenous, Q24H PRN    0.9%  NaCl infusion (for blood administration), , Intravenous, Q24H PRN    0.9%  NaCl infusion (for blood administration), , Intravenous, Q24H PRN    bisacodyL, 10 mg, Rectal, Daily PRN    celecoxib, 200 mg, Oral, Daily PRN    melatonin, 6 mg, Oral, Nightly PRN    ondansetron, 8 mg, Oral, Q8H PRN    sodium chloride 0.9%, 10 mL, Intravenous, PRN    traMADoL, 50 mg, Oral, Q6H PRN    Review of Systems   Constitutional:  Negative for chills, fatigue and fever.   HENT: Negative.     Respiratory: Negative.     Cardiovascular: Negative.    Gastrointestinal: Negative.    Skin:  Positive for wound.   Neurological: Negative.      Objective:     Vital Signs (Most Recent):  Temp: 97.8 °F (36.6 °C) (10/04/24 0759)  Pulse: 72 (10/04/24 0759)  Resp: 19 (10/04/24 0759)  BP: (!) 158/85 (10/04/24 0911)  SpO2: (!) 93 % (10/04/24 0759) Vital Signs (24h Range):  Temp:  [97.8 °F (36.6 °C)-99.1 °F (37.3 °C)] 97.8 °F (36.6 °C)  Pulse:  [69-80] 72  Resp:  [17-20] 19  SpO2:  [91 %-95 %] 93 %  BP: (145-162)/(72-88) 158/85     Weight: 81.2 kg (179 lb)  Body mass index is 24.28 kg/m².     Physical Exam  Vitals reviewed.   Cardiovascular:      Pulses:           Radial pulses are 2+ on the right side.   Musculoskeletal:        Arms:    Skin:     General: Skin is warm.      Capillary Refill: Capillary refill takes less than 2 seconds.      Findings: Petechiae present.   Neurological:      General: No focal deficit present.      Mental Status: He is alert.      Comments: NVI to hand/fingers          Laboratory:    BMP:   Recent Labs   Lab 10/04/24  0354       K 3.8      CO2 26   BUN 19.5   CREATININE 1.17   CALCIUM 7.9*       CBC:   Recent Labs   Lab 10/04/24  0827   WBC 8.40   RBC 3.06*   HGB 8.8*   HCT 25.4*   PLT 8*   MCV 83.0   MCH 28.8   MCHC 34.6     CMP:   Recent Labs   Lab 10/04/24  0353   CALCIUM 7.9*   ALBUMIN 2.5*      K 3.8   CO2 26      BUN 19.5   CREATININE 1.17   ALKPHOS 87   ALT 30   AST 24   BILITOT 0.6       LFTs:   Recent Labs   Lab 10/04/24  0353   ALT 30   AST 24   ALKPHOS 87   BILITOT 0.6   ALBUMIN 2.5*     Microbiology Results (last 7 days)       Procedure Component Value Units Date/Time    Blood culture x two cultures. Draw prior to antibiotics. [[id number]]  (Normal) Collected: 10/02/24 0351    Order Status: Completed Specimen: Blood Updated: 10/04/24 0500     Blood Culture No Growth At 48 Hours    Blood culture x two cultures. Draw prior to antibiotics. [[id number]]  (Normal) Collected: 10/02/24 0351    Order Status: Completed Specimen: Blood Updated: 10/04/24 0500     Blood Culture No Growth At 48 Hours              Recent Labs   Lab 10/02/24  0615   COLORU Light-Yellow   PHUR 5.5   PROTEINUA 1+*   BACTERIA Trace   NITRITE Negative   LEUKOCYTESUR Negative   UROBILINOGEN Normal       Diagnostic Results:  I have reviewed all pertinent imaging results/findings within the past 24 hours.

## 2024-10-04 NOTE — NURSING
Patient IV D/C. Platelets given prior to discharge. Wound care instructions reviewed with patient. Patient verbalized understanding. Medication and follow-up appointments reviewed with patient as well. Patient left unit via wheelchair without incident.

## 2024-10-04 NOTE — PLAN OF CARE
Order to discharge with home health.  Spoke with the patient and he stated that he does not need home health.

## 2024-10-04 NOTE — PROGRESS NOTES
[facility] - Oncology Acute  Wound Care    Patient Name:  Hussein Gonzalez Jr.   MRN:  [id number]  Date: 10/4/2024  Diagnosis: Bacteremia due to Klebsiella pneumoniae    History:     Past Medical History:   Diagnosis Date    Anxiety and depression     BPH (benign prostatic hyperplasia)     History of CVA (cerebrovascular accident) without residual deficits     HTN (hypertension)     Malignant melanoma of skin, unspecified     NSTEMI (non-ST elevated myocardial infarction)     Sepsis, Gram negative     Sleep apnea        Social History     Socioeconomic History    Marital status:    Tobacco Use    Smoking status: Never    Smokeless tobacco: Never   Substance and Sexual Activity    Alcohol use: Not Currently     Alcohol/week: 7.0 standard drinks of alcohol     Types: 7 Cans of beer per week    Drug use: Never    Sexual activity: Yes     Social Drivers of Health     Financial Resource Strain: Low Risk  (10/2/2024)    Overall Financial Resource Strain (CARDIA)     Difficulty of Paying Living Expenses: Not very hard   Food Insecurity: No Food Insecurity (10/2/2024)    Hunger Vital Sign     Worried About Running Out of Food in the Last Year: Never true     Ran Out of Food in the Last Year: Never true   Transportation Needs: No Transportation Needs (10/2/2024)    TRANSPORTATION NEEDS     Transportation : No   Physical Activity: Insufficiently Active (4/20/2023)    Exercise Vital Sign     Days of Exercise per Week: 3 days     Minutes of Exercise per Session: 30 min   Stress: No Stress Concern Present (10/2/2024)    [employer] - Occupational Stress Questionnaire     Feeling of Stress : Only a little   Housing Stability: Low Risk  (10/2/2024)    Housing Stability Vital Sign     Unable to Pay for Housing in the Last Year: No     Homeless in the Last Year: No       Precautions:     Allergies as of 10/02/2024 - Reviewed 10/02/2024   Allergen Reaction Noted    Acetaminophen Other  "(See Comments) 02/24/2022    Benadryl [diphenhydramine hcl] Other (See Comments) 09/25/2023       [facility] Assessment Details/Treatment      10/03/24 1452   WOCN Assessment   Visit Date 10/03/24   Visit Time 1452   Consult Type New   WO Speciality Wound   Intervention chart review;assessed;applied;orders   Teaching on-going        Wound 10/02/24 1008 Other (comment) Right dorsal Arm   Date First Assessed/Time First Assessed: 10/02/24 1008   Present on Original Admission: Yes  Primary Wound Type: (c) Other (comment)  Side: Right  Orientation: dorsal  Location: Arm   Wound Image    Dressing Appearance Intact;Moist drainage   Drainage Amount Moderate   Drainage Characteristics/Odor Sanguineous   Appearance Red;Moist   Tissue loss description Full thickness   Black (%), Wound Tissue Color 0 %   Red (%), Wound Tissue Color 100 %   Yellow (%), Wound Tissue Color 0 %   Periwound Area Intact;Swelling;Redness   Wound Edges Defined   Wound Length (cm) 4.3 cm   Wound Width (cm) 4 cm   Wound Depth (cm) 0.8 cm   Wound Volume (cm^3) 13.76 cm^3   Wound Surface Area (cm^2) 17.2 cm^2   Care Cleansed with:;Soap and water   Dressing Applied;Other (comment);Non-adherent;Gauze;Rolled gauze  (Quickclot)     WOCN consulted for right arm. Discussed plan of care with nurse Olivas prior to visiting the patient. Assigned nurse notified me that patients dressing had not been removed and shouldn't be per night nurse. Informed assigned nurse I did attempt to to visit yesterdays but the patient was having bleeding from right arm would. Labs drawn for H&H and platelets. Nurse Olivas insisted that I make him my last patient of the day because "Eventually someone is going to have to remove the dressing". Introduced self to patient and explained reason for visit, patient agreeable to visit. Patient stated he has been having pain to the area and missed a follow-up with Dr. Rai due to him being in the hospital. Soaked dressing prior to removal, removed old " dressing and new photographs taken for EMR. Patient began bleeding. Quickclots and adaptic applied with thick layer of dry gauze and wrapped tightly to hold pressure. Nurse called to bedside and notified of bleeding. Also, notified Dr. Andres of bleeding. WOCN recommends further evaluation be done to resolve issue. Nursing to continue with treatment recommendations and other preventative measures. Arm elevated on a pillow at the end of visit. Wound care to follow up promptly tomorrow for re-check.   10/04/2024

## 2024-10-04 NOTE — PROGRESS NOTES
[facility] - Oncology Acute  Wound Care  Progress Note    Patient Name: Hussein Gonzalez Jr.  MRN: [id number]  Admission Date: 10/2/2024  Hospital Length of Stay: 2 days  Attending Physician: Nayely Andres MD  Primary Care Provider: Nayely Andres MD     Subjective:     HPI:  Wound Medicine Re-check        75 y/o WM with AML with concomitant pancytopenia including thrombocytopenia who requires frequent blood and platelet transfusions. He is maintained on venetoclax and cytarabine as well as prophylaxis with  levofloxacin and acyclovir and fluconazole.  Pt also battles multiple various skin cancers and on 10/1/24 had a skin cancer removed on dorsal forearm with Dr Rai on 10/1/24. On that same day, pt went to ED with c/o abdominal pain. He was discharged but called back to ED secondary to +Blood Cultures. It is reported his surgical site has continued to bleed since admission. He has received a platelet transfusion for platelets of 10K on 10/2/24  and several units of PRBCs H/H -   Pt demonstrated more bleeding yesterday afternoon when dressing taken down. WOCN staff applied quick clot and pressure dressing but continued to ooze so wound medicine was consulted.   He was seen by Dr. Paz on the afternoon of 10/3/24 and wound bed was cauterized with silver nitrate to stop the bleeding; bulky dressing was applied over mupirocin, adaptic, and Versatel.   Wound re-check this morning, 10/4/24, he is awake and talkative, sitting up in bed eating breakfast. Dressing to right arm is without strike through drainage/bleeding. Agreeable for wound evaluation and treatment. No acute distress. Reports planning to discharge home some time today after he receives more blood products. Reports he has follow up appointment with Dr. Rai next week.   He is in no acute distress.       Hospital Course:   No notes on file          Scheduled Meds:   citalopram  40 mg Oral Daily    docusate sodium  100  mg Oral BID    gabapentin  300 mg Oral BID    lisinopriL  20 mg Oral Daily    And    hydroCHLOROthiazide  12.5 mg Oral Daily    piperacillin-tazobactam (Zosyn) IV (PEDS and ADULTS) (extended infusion is not appropriate)  4.5 g Intravenous Q8H    tamsulosin  0.4 mg Oral Daily     Continuous Infusions:   0.9% NaCl   Intravenous Continuous 50 mL/hr at 10/04/24 0704 New Bag at 10/04/24 0704     PRN Meds:  Current Facility-Administered Medications:     0.9%  NaCl infusion (for blood administration), , Intravenous, Q24H PRN    0.9%  NaCl infusion (for blood administration), , Intravenous, Q24H PRN    0.9%  NaCl infusion (for blood administration), , Intravenous, Q24H PRN    0.9%  NaCl infusion (for blood administration), , Intravenous, Q24H PRN    0.9%  NaCl infusion (for blood administration), , Intravenous, Q24H PRN    bisacodyL, 10 mg, Rectal, Daily PRN    celecoxib, 200 mg, Oral, Daily PRN    melatonin, 6 mg, Oral, Nightly PRN    ondansetron, 8 mg, Oral, Q8H PRN    sodium chloride 0.9%, 10 mL, Intravenous, PRN    traMADoL, 50 mg, Oral, Q6H PRN    Review of Systems   Constitutional:  Negative for chills, fatigue and fever.   HENT: Negative.     Respiratory: Negative.     Cardiovascular: Negative.    Gastrointestinal: Negative.    Skin:  Positive for wound.   Neurological: Negative.      Objective:     Vital Signs (Most Recent):  Temp: 97.8 °F (36.6 °C) (10/04/24 0759)  Pulse: 72 (10/04/24 0759)  Resp: 19 (10/04/24 0759)  BP: (!) 158/85 (10/04/24 0911)  SpO2: (!) 93 % (10/04/24 0759) Vital Signs (24h Range):  Temp:  [97.8 °F (36.6 °C)-99.1 °F (37.3 °C)] 97.8 °F (36.6 °C)  Pulse:  [69-80] 72  Resp:  [17-20] 19  SpO2:  [91 %-95 %] 93 %  BP: (145-162)/(72-88) 158/85     Weight: 81.2 kg (179 lb)  Body mass index is 24.28 kg/m².     Physical Exam  Vitals reviewed.   Cardiovascular:      Pulses:           Radial pulses are 2+ on the right side.   Musculoskeletal:        Arms:    Skin:     General: Skin is warm.      Capillary  Refill: Capillary refill takes less than 2 seconds.      Findings: Petechiae present.   Neurological:      General: No focal deficit present.      Mental Status: He is alert.      Comments: NVI to hand/fingers          Laboratory:    BMP:   Recent Labs   Lab 10/04/24  0353      K 3.8      CO2 26   BUN 19.5   CREATININE 1.17   CALCIUM 7.9*       CBC:   Recent Labs   Lab 10/04/24  0827   WBC 8.40   RBC 3.06*   HGB 8.8*   HCT 25.4*   PLT 8*   MCV 83.0   MCH 28.8   MCHC 34.6     CMP:   Recent Labs   Lab 10/04/24  0353   CALCIUM 7.9*   ALBUMIN 2.5*      K 3.8   CO2 26      BUN 19.5   CREATININE 1.17   ALKPHOS 87   ALT 30   AST 24   BILITOT 0.6       LFTs:   Recent Labs   Lab 10/04/24  0353   ALT 30   AST 24   ALKPHOS 87   BILITOT 0.6   ALBUMIN 2.5*     Microbiology Results (last 7 days)       Procedure Component Value Units Date/Time    Blood culture x two cultures. Draw prior to antibiotics. [[id number]]  (Normal) Collected: 10/02/24 0351    Order Status: Completed Specimen: Blood Updated: 10/04/24 0500     Blood Culture No Growth At 48 Hours    Blood culture x two cultures. Draw prior to antibiotics. [[id number]]  (Normal) Collected: 10/02/24 0351    Order Status: Completed Specimen: Blood Updated: 10/04/24 0500     Blood Culture No Growth At 48 Hours              Recent Labs   Lab 10/02/24  0615   COLORU Light-Yellow   PHUR 5.5   PROTEINUA 1+*   BACTERIA Trace   NITRITE Negative   LEUKOCYTESUR Negative   UROBILINOGEN Normal       Diagnostic Results:  I have reviewed all pertinent imaging results/findings within the past 24 hours.    Assessment/Plan:     AML with anemia and thrombocytopenia  Right forearm skin cancer resection by Dr Rai on 10/1/24  Subsequent ongoing wound bleeding secondary to thrombocytopenia     Chart reviewed, patient examined and wounds assessed.   Wound re-checked today; no bleeding or oozing, no signs of infection. Dressing as well as contact layer removed; re-applied  Versatel directly to wound bed, this layer can stay in place for up to 7 days while only changing cover dressing (gauze/ABD/Kerlix).   Patient will follow up with Dermatologist, Dr. Rai, next week.   Keep arm elevated  Discussed plan of care with patient  and nurse caring for patient today.   Wound will require close follow up and may have difficulty healing considering comorbid conditions.        The time spent including preparing to see the patient, obtaining patient history and assessment, evaluation of the plan of care, patient/caregiver counseling and education, orders, documentation, coordination of care, and other professional medical management activities for today's encounter was 40 minutes.     JULITO Mittal  Wound Care  [facility] - Oncology [facility]

## 2024-10-04 NOTE — PLAN OF CARE
Problem: Adult Inpatient Plan of Care  Goal: Plan of Care Review  Outcome: Progressing  Flowsheets (Taken 10/3/2024 1943)  Plan of Care Reviewed With: patient  Goal: Patient-Specific Goal (Individualized)  Outcome: Progressing  Goal: Absence of Hospital-Acquired Illness or Injury  Outcome: Progressing  Intervention: Identify and Manage Fall Risk  Flowsheets (Taken 10/3/2024 1943)  Safety Promotion/Fall Prevention:   assistive device/personal item within reach   Fall Risk signage in place   medications reviewed   side rails raised x 2  Intervention: Prevent Skin Injury  Flowsheets (Taken 10/3/2024 1943)  Body Position: position changed independently  Skin Protection: transparent dressing maintained  Device Skin Pressure Protection: tubing/devices free from skin contact  Intervention: Prevent and Manage VTE (Venous Thromboembolism) Risk  Flowsheets (Taken 10/3/2024 1943)  VTE Prevention/Management:   bleeding precautions maintained   bleeding risk assessed   intravenous hydration  Intervention: Prevent Infection  Flowsheets (Taken 10/3/2024 1943)  Infection Prevention:   rest/sleep promoted   hand hygiene promoted  Goal: Optimal Comfort and Wellbeing  Outcome: Progressing  Intervention: Provide Person-Centered Care  Flowsheets (Taken 10/3/2024 1943)  Trust Relationship/Rapport:   care explained   thoughts/feelings acknowledged  Goal: Readiness for Transition of Care  Outcome: Progressing     Problem: Wound  Goal: Optimal Coping  Outcome: Progressing  Goal: Optimal Functional Ability  Outcome: Progressing  Goal: Absence of Infection Signs and Symptoms  Outcome: Progressing  Goal: Improved Oral Intake  Outcome: Progressing  Goal: Optimal Pain Control and Function  Outcome: Progressing  Goal: Skin Health and Integrity  Outcome: Progressing  Goal: Optimal Wound Healing  Outcome: Progressing  Intervention: Promote Wound Healing  Flowsheets (Taken 10/3/2024 1943)  Sleep/Rest Enhancement:   awakenings minimized    relaxation techniques promoted     Problem: Fall Injury Risk  Goal: Absence of Fall and Fall-Related Injury  Outcome: Progressing  Intervention: Identify and Manage Contributors  Flowsheets (Taken 10/3/2024 1943)  Medication Review/Management: medications reviewed  Intervention: Promote Injury-Free Environment  Flowsheets (Taken 10/3/2024 1943)  Safety Promotion/Fall Prevention:   assistive device/personal item within reach   Fall Risk signage in place   medications reviewed   side rails raised x 2     Problem: Skin Injury Risk Increased  Goal: Skin Health and Integrity  Outcome: Progressing  Intervention: Optimize Skin Protection  Flowsheets (Taken 10/3/2024 1943)  Skin Protection: transparent dressing maintained  Activity Management:   Arm raise - L1   Leg kicks - L2  Head of Bed (HOB) Positioning: HOB at 30 degrees

## 2024-10-04 NOTE — TELEPHONE ENCOUNTER
----- Message from Tech Tabby sent at 10/4/2024 12:08 PM CDT -----  .Type:  Sooner Apoointment Request    Caller is requesting a sooner appointment.  Caller declined first available appointment listed below.  Caller will not accept being placed on the waitlist and is requesting a message be sent to doctor.    Name of Caller: LGMD    When is the first available appointment? 10-22-24    Symptoms: HFU     Would the patient rather a call back or a response via [facility]?      Best Call Back Number: [phone]    Additional Information:  pt being d/s 10-4-24 needs a sooner appt thanks

## 2024-10-04 NOTE — DISCHARGE SUMMARY
[facility] Medicine Discharge Summary    Admit Date: 10/2/2024  Discharge Date and Time: 10/4/430187:52 AM  Admitting Physician:  Team  Discharging Physician: Nayely Andres MD.  Primary Care Physician: Nayely Andres MD  Consults: None    Discharge Diagnoses:  AML, positive blood cultures    Hospital Course:       Pt was seen and examined on the day of discharge  Vitals:  VITAL SIGNS: 24 HRS MIN & MAX LAST   Temp  Min: 97.8 °F (36.6 °C)  Max: 99.5 °F (37.5 °C) 99.5 °F (37.5 °C)   BP  Min: 134/70  Max: 162/85 134/70   Pulse  Min: 69  Max: 80  76   Resp  Min: 17  Max: 20 20   SpO2  Min: 91 %  Max: 94 % (!) 92 %       Physical Exam:  Mr. Hanley is a 74-year-old gentleman known to me for hypertension and acute myeloid leukemia, currently in treatment and followed by Oncology that was recently seen in the office with complaints of left-sided flank and abdominal pain.  Did not complain of any dysuria or hematuria at the time.  There was no complaints with his bowel habits either.  No complaints of fever or chills.  At the time considering his history and presentation, he was given Flexeril, Celebrex and a CT scan was ordered.  He presented to the ED however a day after with ongoing complaints of abdominal pain.  He did endorse that his last bowel movement was a week ago in the ED. apparently patient had presented to the urgent care prior and was noted to be cold and having chills.  He reported some nausea and vomiting on the day he presented to the ED which was on 10/01/2024.  A CT scan was completed which was essentially negative for any acute intra-abdominal pathology.  There were some lung nodules noted which had increased in number from a prior comparison.    Blood cultures were drawn as well.  He was diagnosed with musculoskeletal pain and constipation contributing to his issues and was discharged home.  A urinalysis was collected as well as blood cultures  drawn.    He is supposed to be on levofloxacin, acyclovir and Diflucan prophylactically.  He presented again on 10/02/2024 since he was called back with positive blood cultures.  Confirmed enterobacteria and Klebsiella.  He received a dose of Zosyn in the ED and was admitted to the hospital.    At the time of my visit he still complaints of some left-sided abdominal and flank pain however improved from any was seen in the office.    He was seen by Dr. Rai yesterday with removal of melanoma on his right forearm.  Zosyn continued and cultures redrawn   He did receive 1 unit of platelets in the ED.  another unit on 10/02/2024 and will be receiving 1 more unit today before discharge.    He has received a total of 4 units of packed red blood cells as well during this hospital stay.  Only issue was the surgical site from the skin cancer excision on the right forearm was bleeding because of low platelets however this has now stabilized.  He was already evaluated by wound care clinic and we will make a follow-up appointment with them outpatient.  Was treated with Zosyn in the hospital however repeat blood cultures have remained negative and the prior cultures were positive only in the g stain of the broth of 1 of 2 bottles  He does not need any further antibiotics on discharge however will need to be prophylactically on fluconazole, levofloxacin and acyclovir and prescriptions for this were sent to the pharmacy.    Procedures Performed: No admission procedures for hospital encounter.     Significant Diagnostic Studies: See Full reports for all details    Recent Labs   Lab 10/02/24  0351 10/03/24  0411 10/04/24  0827   WBC 16.52  16.52* 7.82  7.82 8.40   RBC 2.45* 2.03* 3.06*   HGB 7.0* 5.9* 8.8*   HCT 21.8* 17.8* 25.4*   MCV 89.0 87.7 83.0   MCH 28.6 29.1 28.8   MCHC 32.1* 33.1 34.6   RDW 16.1 16.1 15.0   PLT 10* 13* 8*   MPV 9.9 10.5* 11.6*       Recent Labs   Lab 10/02/24  0351 10/03/24  0720 10/04/24  0353     141 138   K 4.6 4.1 3.8   * 108* 105   CO2 23 26 26   BUN 51.9* 31.1* 19.5   CREATININE 1.54* 1.08 1.17   CALCIUM 8.7* 7.9* 7.9*   ALBUMIN 3.0* 2.5* 2.5*   ALKPHOS 81 67 87   ALT 40 28 30   * 36* 24   BILITOT 0.4 0.9 0.6        Microbiology Results (last 7 days)       Procedure Component Value Units Date/Time    Blood culture x two cultures. Draw prior to antibiotics. [[id number]]  (Normal) Collected: 10/02/24 0351    Order Status: Completed Specimen: Blood Updated: 10/04/24 0500     Blood Culture No Growth At 48 Hours    Blood culture x two cultures. Draw prior to antibiotics. [[id number]]  (Normal) Collected: 10/02/24 0351    Order Status: Completed Specimen: Blood Updated: 10/04/24 0500     Blood Culture No Growth At 48 Hours             CT Abdomen Pelvis With IV Contrast NO Oral Contrast  Narrative: EXAMINATION:  CT ABDOMEN PELVIS WITH IV CONTRAST    CLINICAL HISTORY:  LLQ/ L flank pain;    TECHNIQUE:  CT imaging of the abdomen and pelvis after intravenous contrast. Dose length product 665 mGycm. Automatic exposure control, adjustment of mA/kV or iterative reconstruction technique used to limit radiation dose.    COMPARISON:  CT 07/04/2024    FINDINGS:  Evaluation mildly limited by motion.    Liver/biliary: Normal liver. Mild intrahepatic biliary dilatation similar to prior exam.    Pancreas: Normal.    Spleen: Splenomegaly measures 15 cm.    Adrenals: Normal.    Genitourinary: Symmetric renal enhancement. No hydronephrosis. Bladder within normal limits. Enlarged prostate.    Stomach/bowel: No evidence of bowel obstruction. Normal appendix. No definitive sites of bowel inflammation.    Lymph nodes/peritoneum: No pathologically enlarged lymph node identified. No ascites or free air. No fluid collection.    Vasculature: Aortoiliac atherosclerosis.  Ectasia of the infrarenal aorta measures 28 mm.    Abdominal wall: Small fat containing right inguinal hernia.    Lung bases: Mild reticular densities in  the posterior lung bases.  Multiple small nodules as well.  Nodules measure up to 8 mm.    Musculoskeletal: No acute osseous findings.  Impression: Mildly limited assessment, but no acute process identified in the abdomen or pelvis.    Multiple subcentimeter nodules in the lung bases, increased in number since July.  These may be infectious or inflammatory.  Three month follow-up chest CT recommended to ensure resolution.    Electronically signed by: Demetrio Manning  Date:    10/01/2024  Time:    17:55  X-Ray Chest AP Portable  Narrative: EXAMINATION:  XR CHEST AP PORTABLE    CLINICAL HISTORY:  Sepsis;, .    FINDINGS:  No alveolar consolidation, effusion, or pneumothorax is seen.   The thoracic aorta is normal  cardiac silhouette, central pulmonary vessels and mediastinum are normal in size and are grossly unremarkable.   visualized osseous structures are grossly unremarkable.  Impression: No acute chest disease is identified.    Electronically signed by: Peng Pisano  Date:    10/01/2024  Time:    15:09         Medication List        START taking these medications      acyclovir 400 MG tablet  Commonly known as: ZOVIRAX  Take 1 tablet (400 mg total) by mouth once daily.     fluconazole 200 MG Tab  Commonly known as: DIFLUCAN  Take 1 tablet (200 mg total) by mouth once daily.     levoFLOXacin 500 MG tablet  Commonly known as: LEVAQUIN  Take 1 tablet (500 mg total) by mouth once daily.            CHANGE how you take these medications      gabapentin 300 MG capsule  Commonly known as: NEURONTIN  What changed: Another medication with the same name was removed. Continue taking this medication, and follow the directions you see here.     lisinopriL-hydrochlorothiazide 20-12.5 mg per tablet  Commonly known as: PRINZIDE,ZESTORETIC  Take 1 tablet by mouth once daily  What changed:   how much to take  how to take this  when to take this  additional instructions            CONTINUE taking these medications      celecoxib  200 MG capsule  Commonly known as: CeleBREX  Take 1 capsule (200 mg total) by mouth daily as needed for Pain.     citalopram 40 MG tablet  Commonly known as: CeleXA  Take 1 tablet by mouth once daily     cyclobenzaprine 10 MG tablet  Commonly known as: FLEXERIL  Take 1 tablet (10 mg total) by mouth 3 (three) times daily as needed for Muscle spasms.     rosuvastatin 40 MG Tab  Commonly known as: CRESTOR     tamsulosin 0.4 mg Cap  Commonly known as: FLOMAX  Take 1 capsule (0.4 mg total) by mouth once daily.     VENCLEXTA 100 mg Tab  Generic drug: venetoclax  TAKE 2 TABLETS BY MOUTH ONCE DAILY ON DAYS 1-14 OF A 28 DAY CYCLE               Where to Get Your Medications        These medications were sent to [facility] [address]  [address] [address]      Phone: [phone]   acyclovir 400 MG tablet  fluconazole 200 MG Tab  levoFLOXacin 500 MG tablet          Explained in detail to the patient about the discharge plan, medications, and follow-up visits. Pt understands and agrees with the treatment plan  Discharge Disposition: Home or Self Care   Discharged Condition: stable  Diet- Regular, as tolerated  Dietary Orders (From admission, onward)       Start     Ordered    10/02/24 0414  Diet Adult Regular  (Diet/Nutrition - [facility])  Diet effective now         10/02/24 0413                   Medications Per [facility] med rec  Activities as tolerated    For further questions contact Dr Andres's office    Discharge time 33 minutes    For worsening symptoms, chest pain, shortness of breath, increased abdominal pain, high grade fever, stroke or stroke like symptoms, immediately go to the nearest Emergency Room or call 911 as soon as possible.      Nayely Bruno M.D on 10/4/2024. at 11:52 AM.

## 2024-10-06 LAB — BACTERIA BLD CULT: NORMAL

## 2024-10-07 ENCOUNTER — DOCUMENTATION ONLY (OUTPATIENT)
Dept: HEMATOLOGY/ONCOLOGY | Facility: CLINIC | Age: 74
End: 2024-10-07
Payer: MEDICARE

## 2024-10-07 ENCOUNTER — TELEPHONE (OUTPATIENT)
Dept: INTERNAL MEDICINE | Facility: CLINIC | Age: 74
End: 2024-10-07
Payer: MEDICARE

## 2024-10-07 ENCOUNTER — INFUSION (OUTPATIENT)
Dept: INFUSION THERAPY | Facility: HOSPITAL | Age: 74
End: 2024-10-07
Attending: INTERNAL MEDICINE
Payer: MEDICARE

## 2024-10-07 ENCOUNTER — PATIENT OUTREACH (OUTPATIENT)
Dept: ADMINISTRATIVE | Facility: CLINIC | Age: 74
End: 2024-10-07
Payer: MEDICARE

## 2024-10-07 ENCOUNTER — LAB VISIT (OUTPATIENT)
Dept: LAB | Facility: HOSPITAL | Age: 74
End: 2024-10-07
Attending: NURSE PRACTITIONER
Payer: MEDICARE

## 2024-10-07 VITALS
TEMPERATURE: 98 F | SYSTOLIC BLOOD PRESSURE: 105 MMHG | HEART RATE: 70 BPM | DIASTOLIC BLOOD PRESSURE: 62 MMHG | OXYGEN SATURATION: 94 %

## 2024-10-07 DIAGNOSIS — C92.00 ACUTE MYELOID LEUKEMIA NOT HAVING ACHIEVED REMISSION: ICD-10-CM

## 2024-10-07 DIAGNOSIS — C92.00 ACUTE MYELOID LEUKEMIA NOT HAVING ACHIEVED REMISSION: Primary | ICD-10-CM

## 2024-10-07 LAB
ABO + RH BLD: NORMAL
ABO + RH BLD: NORMAL
ABS NEUT CALC (OHS): 1.27 X10(3)/MCL (ref 2.1–9.2)
ALBUMIN SERPL-MCNC: 2.9 G/DL (ref 3.4–4.8)
ALBUMIN/GLOB SERPL: 0.8 RATIO (ref 1.1–2)
ALP SERPL-CCNC: 85 UNIT/L (ref 40–150)
ALT SERPL-CCNC: 26 UNIT/L (ref 0–55)
ANION GAP SERPL CALC-SCNC: 11 MEQ/L
AST SERPL-CCNC: 21 UNIT/L (ref 5–34)
B-HCG SERPL QL: 37 %
BACTERIA BLD CULT: NORMAL
BACTERIA BLD CULT: NORMAL
BILIRUB SERPL-MCNC: 1 MG/DL
BLD PROD TYP BPU: NORMAL
BLD PROD TYP BPU: NORMAL
BLOOD UNIT EXPIRATION DATE: NORMAL
BLOOD UNIT EXPIRATION DATE: NORMAL
BLOOD UNIT TYPE CODE: 6200
BLOOD UNIT TYPE CODE: 6200
BUN SERPL-MCNC: 24.7 MG/DL (ref 8.4–25.7)
CALCIUM SERPL-MCNC: 8 MG/DL (ref 8.8–10)
CHLORIDE SERPL-SCNC: 100 MMOL/L (ref 98–107)
CO2 SERPL-SCNC: 26 MMOL/L (ref 23–31)
CREAT SERPL-MCNC: 1.49 MG/DL (ref 0.73–1.18)
CREAT/UREA NIT SERPL: 17
CROSSMATCH INTERPRETATION: NORMAL
CROSSMATCH INTERPRETATION: NORMAL
DISPENSE STATUS: NORMAL
DISPENSE STATUS: NORMAL
ERYTHROCYTE [DISTWIDTH] IN BLOOD BY AUTOMATED COUNT: 14.8 % (ref 11.5–17)
GFR SERPLBLD CREATININE-BSD FMLA CKD-EPI: 49 ML/MIN/1.73/M2
GLOBULIN SER-MCNC: 3.6 GM/DL (ref 2.4–3.5)
GLUCOSE SERPL-MCNC: 133 MG/DL (ref 82–115)
HCT VFR BLD AUTO: 26.5 % (ref 42–52)
HGB BLD-MCNC: 8.7 G/DL (ref 14–18)
LYMPHOCYTES NFR BLD MANUAL: 1.69 X10(3)/MCL
LYMPHOCYTES NFR BLD MANUAL: 20 % (ref 13–40)
MCH RBC QN AUTO: 29.1 PG (ref 27–31)
MCHC RBC AUTO-ENTMCNC: 32.8 G/DL (ref 33–36)
MCV RBC AUTO: 88.6 FL (ref 80–94)
METAMYELOCYTES NFR BLD MANUAL: 2 %
MONOCYTES NFR BLD MANUAL: 2.2 X10(3)/MCL (ref 0.1–1.3)
MONOCYTES NFR BLD MANUAL: 26 % (ref 2–11)
NEUTROPHILS NFR BLD MANUAL: 15 % (ref 47–80)
PLATELET # BLD AUTO: 7 X10(3)/MCL (ref 130–400)
PLATELET # BLD EST: ABNORMAL 10*3/UL
PMV BLD AUTO: 9.3 FL (ref 7.4–10.4)
POTASSIUM SERPL-SCNC: 3.5 MMOL/L (ref 3.5–5.1)
PROT SERPL-MCNC: 6.5 GM/DL (ref 5.8–7.6)
RBC # BLD AUTO: 2.99 X10(6)/MCL (ref 4.7–6.1)
RBC MORPH BLD: NORMAL
SODIUM SERPL-SCNC: 137 MMOL/L (ref 136–145)
UNIT NUMBER: NORMAL
UNIT NUMBER: NORMAL
WBC # BLD AUTO: 8.45 X10(3)/MCL (ref 4.5–11.5)

## 2024-10-07 PROCEDURE — 85027 COMPLETE CBC AUTOMATED: CPT

## 2024-10-07 PROCEDURE — 25000003 PHARM REV CODE 250: Performed by: INTERNAL MEDICINE

## 2024-10-07 PROCEDURE — 36415 COLL VENOUS BLD VENIPUNCTURE: CPT

## 2024-10-07 PROCEDURE — 36430 TRANSFUSION BLD/BLD COMPNT: CPT

## 2024-10-07 PROCEDURE — 80053 COMPREHEN METABOLIC PANEL: CPT

## 2024-10-07 PROCEDURE — P9037 PLATE PHERES LEUKOREDU IRRAD: HCPCS | Performed by: INTERNAL MEDICINE

## 2024-10-07 RX ORDER — HYDROCODONE BITARTRATE AND ACETAMINOPHEN 500; 5 MG/1; MG/1
TABLET ORAL ONCE
Status: CANCELLED | OUTPATIENT
Start: 2024-10-07 | End: 2024-10-07

## 2024-10-07 RX ORDER — FAMOTIDINE 10 MG/ML
20 INJECTION INTRAVENOUS
Status: COMPLETED | OUTPATIENT
Start: 2024-10-07 | End: 2024-10-07

## 2024-10-07 RX ORDER — HYDROCODONE BITARTRATE AND ACETAMINOPHEN 500; 5 MG/1; MG/1
TABLET ORAL ONCE
Status: ACTIVE | OUTPATIENT
Start: 2024-10-07

## 2024-10-07 RX ORDER — FAMOTIDINE 10 MG/ML
20 INJECTION INTRAVENOUS
Start: 2024-10-07

## 2024-10-07 RX ORDER — SODIUM CHLORIDE 0.9 % (FLUSH) 0.9 %
10 SYRINGE (ML) INJECTION
Status: CANCELLED | OUTPATIENT
Start: 2024-10-07

## 2024-10-07 RX ORDER — HEPARIN 100 UNIT/ML
500 SYRINGE INTRAVENOUS
Status: CANCELLED | OUTPATIENT
Start: 2024-10-07

## 2024-10-07 RX ORDER — SODIUM CHLORIDE 0.9 % (FLUSH) 0.9 %
10 SYRINGE (ML) INJECTION
Status: DISCONTINUED | OUTPATIENT
Start: 2024-10-07 | End: 2024-10-07 | Stop reason: HOSPADM

## 2024-10-07 RX ORDER — FAMOTIDINE 10 MG/ML
20 INJECTION INTRAVENOUS ONCE
OUTPATIENT
Start: 2024-10-07

## 2024-10-07 RX ORDER — HEPARIN 100 UNIT/ML
500 SYRINGE INTRAVENOUS
Status: DISCONTINUED | OUTPATIENT
Start: 2024-10-07 | End: 2024-10-07 | Stop reason: HOSPADM

## 2024-10-07 RX ADMIN — FAMOTIDINE 20 MG: 10 INJECTION, SOLUTION INTRAVENOUS at 10:10

## 2024-10-07 RX ADMIN — SODIUM CHLORIDE 500 ML: 9 INJECTION, SOLUTION INTRAVENOUS at 10:10

## 2024-10-07 NOTE — PROGRESS NOTES
Patient came into clinic today, he did not have an appointment but stated that he just was feeling really bad.  He was recently discharged from the hospital on 10/04/2024.  Overall, he was just weak.  He also feels a little bit dizzy.    I had a discussion with him today, I explained that my concern is that his treatment is no longer working that his AML is likely progressing.  I explained that there were not really any good options moving forward.  I also explained that I felt that his performance status would not lend towards him tolerating any further treatment.    We will check a CBC today, see if he needs blood or platelets.  He has an appointment on 10/10/2024.  He will keep that appointment.  He will bring family with them, he understands that we will likely be transitioning to hospice at that point.    Bless this patient.

## 2024-10-08 ENCOUNTER — OFFICE VISIT (OUTPATIENT)
Dept: INTERNAL MEDICINE | Facility: CLINIC | Age: 74
End: 2024-10-08
Payer: MEDICARE

## 2024-10-08 VITALS
HEART RATE: 85 BPM | BODY MASS INDEX: 24.24 KG/M2 | WEIGHT: 179 LBS | DIASTOLIC BLOOD PRESSURE: 42 MMHG | SYSTOLIC BLOOD PRESSURE: 84 MMHG | HEIGHT: 72 IN | OXYGEN SATURATION: 94 %

## 2024-10-08 DIAGNOSIS — Z09 HOSPITAL DISCHARGE FOLLOW-UP: Primary | ICD-10-CM

## 2024-10-08 RX ORDER — LACTULOSE 10 G/15ML
SOLUTION ORAL 2 TIMES DAILY
COMMUNITY
Start: 2024-10-02

## 2024-10-08 NOTE — ASSESSMENT & PLAN NOTE
-plan of care has been reviewed and noted and discussed with the patient and his wife in detail   -we did discuss goals of care and touched on comfort care and hospice conversation for which patient was very receptive   -will keep follow-up on Thursday with Oncology and discuss further  -does understand that is functional status has been declining this disease currently not responding to treatment.    -still continues with ongoing pain that remains quite uncontrolled and low blood pressures

## 2024-10-08 NOTE — PROGRESS NOTES
Transitional Care Note  Subjective:   Patient ID: Hussein Gonzalez Jr. is a 74 y.o. male.    Chief Complaint: Follow-up (Hospital f/u/DC 10-4-24)      Date of Hospital Discharge: 10/4/24   Family and/or Caretaker present at visit?  Yes  Diagnostic tests reviewed/disposition: No diagnosic tests pending after this hospitalization.  Disease/illness education: performed   Home health/community services discussion/referrals: Patient does not have home health established from hospital visit.  They do not need home health.  If needed, we will set up home health for the patient.   Establishment or re-establishment of referral orders for community resources: No other necessary community resources.   Discussion with other health care providers:  Appointment with Oncology on Thursday with goals of care discussion .     HPI: Mr. Hanley is a 74-year-old gentleman known to me for hypertension and acute myeloid leukemia, currently in treatment and followed by Oncology here today for his hospital discharge follow-up.   He presented to the ED however a day after with ongoing complaints of abdominal pain.  He did endorse that his last bowel movement was a week ago in the ED. apparently patient had presented to the urgent care prior and was noted to be cold and having chills.  He reported some nausea and vomiting on the day he presented to the ED which was on 10/01/2024.  A CT scan was completed which was essentially negative for any acute intra-abdominal pathology.  There were some lung nodules noted which had increased in number from a prior comparison.    Blood cultures were drawn as well.  He was diagnosed with musculoskeletal pain and constipation contributing to his issues and was discharged home.  A urinalysis was collected as well as blood cultures drawn.    He is supposed to be on levofloxacin, acyclovir and Diflucan prophylactically.  He presented again on 10/02/2024 since he was called back with positive blood cultures.   Confirmed enterobacteria and Klebsiella.  He received a dose of Zosyn in the ED and was admitted to the hospital.    He had also had a skin cancer lesion excised from his right forearm by Dr. Rai.     Zosyn continued and cultures redrawn   He did receive 1 unit of platelets in the ED.  another unit on 10/02/2024 and will be receiving 1 more unit before discharge.    He  received a total of 4 units of packed red blood cells as well during this hospital stay.  Only issue was the surgical site from the skin cancer excision on the right forearm was bleeding because of low platelets however this stabilized.  He was already evaluated by wound care clinic and we will make a follow-up appointment with them outpatient.  Was treated with Zosyn in the hospital however repeat blood cultures remained negative and the prior cultures were positive only in the gram stain of the broth of 1 of 2 bottles  He did  not need any further antibiotics on discharge however was initiated back on prophylactic  fluconazole, levofloxacin and acyclovir.  Here today, accompanied by his wife for his hospital discharge follow-up.  Has profound weakness and overall does not feel well.  We did get to the conversation of goals of care.  Patient understands that at this time his disease progression is not in his favor and functional status has been declining.  He probably will not be a candidate for any further treatment in the near future.  We did touch on the discussion of comfort care and hospice and he was very receptive.  Does have an upcoming appointment with Oncology on Thursday along with his son to discuss this further.                 Health maintenance reviewed with the patient.  Health maintenance completed:  Health Maintenance Topics with due status: Not Due       Topic Last Completion Date    Lipid Panel 04/23/2024    High Dose Statin 10/02/2024      Health maintenance due:  Health Maintenance Due   Topic Date Due    Hepatitis C  Screening  Never done    COVID-19 Vaccine (1) Never done    Pneumococcal Vaccines (Age 65+) (1 of 2 - PCV) Never done    TETANUS VACCINE  Never done    Shingles Vaccine (1 of 2) Never done    Colorectal Cancer Screening  07/24/2023    RSV Vaccine (Age 60+ and Pregnant patients) (1 - 1-dose 75+ series) Never done    Influenza Vaccine (1) Never done      Review of Systems  A comprehensive review of systems is obtained and is essentially negative except for that stated in the HPI   History:     Past Medical History:   Diagnosis Date    Anxiety and depression     BPH (benign prostatic hyperplasia)     History of CVA (cerebrovascular accident) without residual deficits     HTN (hypertension)     Malignant melanoma of skin, unspecified     NSTEMI (non-ST elevated myocardial infarction)     Sepsis, Gram negative     Sleep apnea       Past Surgical History:   Procedure Laterality Date    BONE MARROW ASPIRATION N/A 6/27/2023    Procedure: ASPIRATION, BONE MARROW;  Surgeon: Sabino Núñez MD;  Location: [facility];  Service: General;  Laterality: N/A;  ALL ASSESS FOR MOLECULAR MRD    BONE MARROW ASPIRATION N/A 5/22/2024    Procedure: ASPIRATION, BONE MARROW;  Surgeon: Sabino Núñez MD;  Location: [facility];  Service: General;  Laterality: N/A;    BONE MARROW BIOPSY N/A 1/25/2023    Procedure: Biopsy-bone marrow;  Surgeon: Rd Carlos MD;  Location: [facility];  Service: General;  Laterality: N/A;    BONE MARROW BIOPSY N/A 11/21/2023    Procedure: Biopsy-bone marrow;  Surgeon: Junior Castro MD;  Location: [facility];  Service: General;  Laterality: N/A;    COLONOSCOPY      COLONOSCOPY N/A 1/24/2023    Procedure: COLONOSCOPY (DOUBLE);  Surgeon: Jimy Baker MD;  Location: [facility] ENDOSCOPY;  Service: Gastroenterology;  Laterality: N/A;    EGD, WITH CLOSED BIOPSY N/A 1/24/2023    Procedure: EGD, WITH CLOSED BIOPSY;  Surgeon: Jimy Baker MD;  Location: [facility] ENDOSCOPY;  Service: Gastroenterology;  Laterality: N/A;     EXCISION OF MELANOMA      SINUS SURGERY      TONSILLECTOMY       Family History   Problem Relation Name Age of Onset    Cancer Mother      Cancer Father        Social History     Tobacco Use    Smoking status: Never    Smokeless tobacco: Never   Substance and Sexual Activity    Alcohol use: Not Currently     Alcohol/week: 7.0 standard drinks of alcohol     Types: 7 Cans of beer per week    Drug use: Never    Sexual activity: Yes      Smoking Cessation:  Counseling given: No     Non-smoker    Allergies:   Review of patient's allergies indicates:   Allergen Reactions    Acetaminophen Other (See Comments)     Restless leg     Benadryl [diphenhydramine hcl] Other (See Comments)     Restless Legs     Objective:     Vitals:    10/08/24 1345   BP: (!) 84/42   Pulse: 85   SpO2: (!) 94%   Weight: 81.2 kg (179 lb)   Height: 6' (1.829 m)   PainSc: 10-Worst pain ever     Body mass index is 24.28 kg/m².   Wt Readings from Last 4 Encounters:   10/08/24 81.2 kg (179 lb)   10/02/24 81.2 kg (179 lb)   10/01/24 81.2 kg (179 lb)   09/30/24 81.4 kg (179 lb 6.4 oz)     Weight change: has decreased (see table above).    Physical Examination:   Physical Exam  Constitutional:       Appearance: Normal appearance.   HENT:      Head: Normocephalic and atraumatic.      Nose: Nose normal.      Mouth/Throat:      Mouth: Mucous membranes are moist.      Pharynx: Oropharynx is clear.   Eyes:      Extraocular Movements: Extraocular movements intact.      Pupils: Pupils are equal, round, and reactive to light.   Cardiovascular:      Rate and Rhythm: Normal rate and regular rhythm.      Pulses: Normal pulses.   Pulmonary:      Effort: Pulmonary effort is normal.      Breath sounds: Normal breath sounds.   Abdominal:      General: Bowel sounds are normal.      Palpations: Abdomen is soft.   Musculoskeletal:         General: Normal range of motion.      Cervical back: Normal range of motion and neck supple.   Skin:     General: Skin is warm.    Neurological:      General: No focal deficit present.      Mental Status: He is alert and oriented to person, place, and time. Mental status is at baseline.   Psychiatric:         Mood and Affect: Mood normal.         Diagnostic Tests:  Significant Imaging: I have reviewed and interpreted all pertinent imaging results/findings.  CT Abdomen Pelvis With IV Contrast NO Oral Contrast  Narrative: EXAMINATION:  CT ABDOMEN PELVIS WITH IV CONTRAST    CLINICAL HISTORY:  LLQ/ L flank pain;    TECHNIQUE:  CT imaging of the abdomen and pelvis after intravenous contrast. Dose length product 665 mGycm. Automatic exposure control, adjustment of mA/kV or iterative reconstruction technique used to limit radiation dose.    COMPARISON:  CT 07/04/2024    FINDINGS:  Evaluation mildly limited by motion.    Liver/biliary: Normal liver. Mild intrahepatic biliary dilatation similar to prior exam.    Pancreas: Normal.    Spleen: Splenomegaly measures 15 cm.    Adrenals: Normal.    Genitourinary: Symmetric renal enhancement. No hydronephrosis. Bladder within normal limits. Enlarged prostate.    Stomach/bowel: No evidence of bowel obstruction. Normal appendix. No definitive sites of bowel inflammation.    Lymph nodes/peritoneum: No pathologically enlarged lymph node identified. No ascites or free air. No fluid collection.    Vasculature: Aortoiliac atherosclerosis.  Ectasia of the infrarenal aorta measures 28 mm.    Abdominal wall: Small fat containing right inguinal hernia.    Lung bases: Mild reticular densities in the posterior lung bases.  Multiple small nodules as well.  Nodules measure up to 8 mm.    Musculoskeletal: No acute osseous findings.  Impression: Mildly limited assessment, but no acute process identified in the abdomen or pelvis.    Multiple subcentimeter nodules in the lung bases, increased in number since July.  These may be infectious or inflammatory.  Three month follow-up chest CT recommended to ensure  resolution.    Electronically signed by: Demetrio Manning  Date:    10/01/2024  Time:    17:55  X-Ray Chest AP Portable  Narrative: EXAMINATION:  XR CHEST AP PORTABLE    CLINICAL HISTORY:  Sepsis;, .    FINDINGS:  No alveolar consolidation, effusion, or pneumothorax is seen.   The thoracic aorta is normal  cardiac silhouette, central pulmonary vessels and mediastinum are normal in size and are grossly unremarkable.   visualized osseous structures are grossly unremarkable.  Impression: No acute chest disease is identified.    Electronically signed by: Peng Pisano  Date:    10/01/2024  Time:    15:09      Labs:   Lab Results   Component Value Date    WBC 8.45 10/07/2024    RBC 2.99 (L) 10/07/2024    HGB 8.7 (L) 10/07/2024    HCT 26.5 (L) 10/07/2024    MCV 88.6 10/07/2024    MCH 29.1 10/07/2024    PLT 7 (LL) 10/07/2024     10/07/2024    K 3.5 10/07/2024     10/07/2024    BUN 24.7 10/07/2024    CREATININE 1.49 (H) 10/07/2024    AST 21 10/07/2024    ALT 26 10/07/2024    BILITOT 1.0 10/07/2024    TSH 0.798 04/23/2024    PSA 1.06 04/23/2024        Laboratory Data:  All pertinent labs have been reviewed.  I have reviewed the following records today:     Details:   [x] Labs [] Internal  [] External    [] Micro [] Internal  [] External    [] Pathology [] Internal  [] External    [x] Imaging [] Internal  [] External    [x] Cardiology Procedures [] Internal  [] External    [x] Provider Records [] Internal  [] External    [] Other [] Internal  [] External      Medications:     Medication List with Changes/Refills   Current Medications    ACYCLOVIR (ZOVIRAX) 400 MG TABLET    Take 1 tablet (400 mg total) by mouth once daily.    CELECOXIB (CELEBREX) 200 MG CAPSULE    Take 1 capsule (200 mg total) by mouth daily as needed for Pain.    CITALOPRAM (CELEXA) 40 MG TABLET    Take 1 tablet by mouth once daily    CONSTULOSE 10 GRAM/15 ML SOLUTION    Take by mouth 2 (two) times daily. Has not started yet    CYCLOBENZAPRINE  (FLEXERIL) 10 MG TABLET    Take 1 tablet (10 mg total) by mouth 3 (three) times daily as needed for Muscle spasms.    FLUCONAZOLE (DIFLUCAN) 200 MG TAB    Take 1 tablet (200 mg total) by mouth once daily.    GABAPENTIN (NEURONTIN) 300 MG CAPSULE    Take 300 mg by mouth 2 (two) times daily.    LEVOFLOXACIN (LEVAQUIN) 500 MG TABLET    Take 1 tablet (500 mg total) by mouth once daily.    LISINOPRIL-HYDROCHLOROTHIAZIDE (PRINZIDE,ZESTORETIC) 20-12.5 MG PER TABLET    Take 1 tablet by mouth once daily    ROSUVASTATIN (CRESTOR) 40 MG TAB    Take 40 mg by mouth once daily.    TAMSULOSIN (FLOMAX) 0.4 MG CAP    Take 1 capsule (0.4 mg total) by mouth once daily.    VENETOCLAX (VENCLEXTA) 100 MG TAB    TAKE 2 TABLETS BY MOUTH ONCE DAILY ON DAYS 1-14 OF A 28 DAY CYCLE     Assessment:     1. Hospital discharge follow-up      Plan:     Problem List Items Addressed This Visit          Other    Hospital discharge follow-up - Primary    Overview     -plan of care has been reviewed with the patient in detail   CT results have been reviewed with opacity noted however patient without any complaints of fever or chills  -overall doing well, no indication to escalate antibiotics   -levofloxacin 500 mg p.o. daily will be continued as scheduled as well as patient is currently on acyclovir 400 mg p.o. daily which will be continued also         Current Assessment & Plan     -plan of care has been reviewed and noted and discussed with the patient and his wife in detail   -we did discuss goals of care and touched on comfort care and hospice conversation for which patient was very receptive   -will keep follow-up on Thursday with Oncology and discuss further  -does understand that is functional status has been declining this disease currently not responding to treatment.    -still continues with ongoing pain that remains quite uncontrolled and low blood pressures             1.  Continue current medications  2.  Side effects and expected results  discussed  3.  Diet and exercise as tolerated  4.  Nutritional support  5.  Health maintenance updated accordingly  6.  Call with increased complaints or concerns  7.  Plan of care discussed in detail, all questions addressed and answered    Follow Up:   No follow-ups on file.    I spent greater than 45 minutes today both in chart review and greater than 50% of that time in discussion with the patient regarding health maintenance, diagnoses, diagnostic tests, medications, treatments, symptom management, expected results and adverse effects. Patient verbalized understanding and all questions were answered.    This note includes dictation done on [employer] word recognition program.  There are word recognition mistakes that are occasionally missed on review.

## 2024-10-09 ENCOUNTER — TELEPHONE (OUTPATIENT)
Dept: INTERNAL MEDICINE | Facility: CLINIC | Age: 74
End: 2024-10-09
Payer: MEDICARE

## 2024-10-09 NOTE — TELEPHONE ENCOUNTER
Pt son is on the involvement of care form. Spoke with Mr Bueno, he stated he wasn't aware of Pt appt yesterday. He asked Pt about what was talked about, but didn't get a whole lot of answers. Mr Bueno requesting a call back on Monday.

## 2024-10-09 NOTE — TELEPHONE ENCOUNTER
----- Message from Stan sent at 10/9/2024 10:10 AM CDT -----  .Who Called: Myles Gonzalez (son)    Caller is requesting assistance/information from provider's office.    Symptoms (please be specific): n/a   How long has patient had these symptoms:  n/a  List of preferred pharmacies on file (remove unneeded): [unfilled]  If different, enter pharmacy into here including location and phone number: n/a      Preferred Method of Contact: Phone Call    Patient's Preferred Phone Number on File: [phone]     Best Call Back Number, if different: [phone] (son)    Additional Information:  Called wanting an update on his dad's visit yesterday. Please advise, thank you.

## 2024-10-10 ENCOUNTER — LAB VISIT (OUTPATIENT)
Dept: LAB | Facility: HOSPITAL | Age: 74
End: 2024-10-10
Attending: INTERNAL MEDICINE
Payer: MEDICARE

## 2024-10-10 ENCOUNTER — INFUSION (OUTPATIENT)
Dept: INFUSION THERAPY | Facility: HOSPITAL | Age: 74
End: 2024-10-10
Attending: INTERNAL MEDICINE
Payer: MEDICARE

## 2024-10-10 ENCOUNTER — OFFICE VISIT (OUTPATIENT)
Dept: HEMATOLOGY/ONCOLOGY | Facility: CLINIC | Age: 74
End: 2024-10-10
Payer: MEDICARE

## 2024-10-10 VITALS
OXYGEN SATURATION: 98 % | DIASTOLIC BLOOD PRESSURE: 55 MMHG | HEART RATE: 67 BPM | RESPIRATION RATE: 18 BRPM | SYSTOLIC BLOOD PRESSURE: 94 MMHG | TEMPERATURE: 98 F

## 2024-10-10 VITALS
HEART RATE: 80 BPM | SYSTOLIC BLOOD PRESSURE: 91 MMHG | WEIGHT: 179 LBS | RESPIRATION RATE: 16 BRPM | HEIGHT: 72 IN | DIASTOLIC BLOOD PRESSURE: 50 MMHG | OXYGEN SATURATION: 96 % | BODY MASS INDEX: 24.24 KG/M2 | TEMPERATURE: 98 F

## 2024-10-10 DIAGNOSIS — C92.00 ACUTE MYELOID LEUKEMIA NOT HAVING ACHIEVED REMISSION: ICD-10-CM

## 2024-10-10 DIAGNOSIS — C92.00 ACUTE MYELOID LEUKEMIA NOT HAVING ACHIEVED REMISSION: Primary | ICD-10-CM

## 2024-10-10 LAB
ABO + RH BLD: NORMAL
BASOPHILS # BLD AUTO: 0.02 X10(3)/MCL
BASOPHILS NFR BLD AUTO: 0.5 %
BLD PROD TYP BPU: NORMAL
BLOOD UNIT EXPIRATION DATE: NORMAL
BLOOD UNIT TYPE CODE: 7300
CROSSMATCH INTERPRETATION: NORMAL
DISPENSE STATUS: NORMAL
EOSINOPHIL # BLD AUTO: 0 X10(3)/MCL (ref 0–0.9)
EOSINOPHIL NFR BLD AUTO: 0 %
ERYTHROCYTE [DISTWIDTH] IN BLOOD BY AUTOMATED COUNT: 14.8 % (ref 11.5–17)
HCT VFR BLD AUTO: 24.1 % (ref 42–52)
HGB BLD-MCNC: 7.9 G/DL (ref 14–18)
IMM GRANULOCYTES # BLD AUTO: 0.4 X10(3)/MCL (ref 0–0.04)
IMM GRANULOCYTES NFR BLD AUTO: 10.3 %
LYMPHOCYTES # BLD AUTO: 0.46 X10(3)/MCL (ref 0.6–4.6)
LYMPHOCYTES NFR BLD AUTO: 11.8 %
MCH RBC QN AUTO: 29.6 PG (ref 27–31)
MCHC RBC AUTO-ENTMCNC: 32.8 G/DL (ref 33–36)
MCV RBC AUTO: 90.3 FL (ref 80–94)
MONOCYTES # BLD AUTO: 2.56 X10(3)/MCL (ref 0.1–1.3)
MONOCYTES NFR BLD AUTO: 65.8 %
NEUTROPHILS # BLD AUTO: 0.45 X10(3)/MCL (ref 2.1–9.2)
NEUTROPHILS NFR BLD AUTO: 11.6 %
PLATELET # BLD AUTO: 9 X10(3)/MCL (ref 130–400)
PMV BLD AUTO: 9.5 FL (ref 7.4–10.4)
RBC # BLD AUTO: 2.67 X10(6)/MCL (ref 4.7–6.1)
UNIT NUMBER: NORMAL
WBC # BLD AUTO: 3.89 X10(3)/MCL (ref 4.5–11.5)

## 2024-10-10 PROCEDURE — 99999 PR PBB SHADOW E&M-EST. PATIENT-LVL IV: CPT | Mod: PBBFAC,,, | Performed by: NURSE PRACTITIONER

## 2024-10-10 PROCEDURE — 25000003 PHARM REV CODE 250: Performed by: NURSE PRACTITIONER

## 2024-10-10 PROCEDURE — 96374 THER/PROPH/DIAG INJ IV PUSH: CPT

## 2024-10-10 PROCEDURE — 36430 TRANSFUSION BLD/BLD COMPNT: CPT

## 2024-10-10 PROCEDURE — P9037 PLATE PHERES LEUKOREDU IRRAD: HCPCS | Performed by: NURSE PRACTITIONER

## 2024-10-10 PROCEDURE — 36415 COLL VENOUS BLD VENIPUNCTURE: CPT

## 2024-10-10 PROCEDURE — 99214 OFFICE O/P EST MOD 30 MIN: CPT | Mod: PBBFAC,25 | Performed by: NURSE PRACTITIONER

## 2024-10-10 PROCEDURE — 85025 COMPLETE CBC W/AUTO DIFF WBC: CPT

## 2024-10-10 RX ORDER — FAMOTIDINE 10 MG/ML
20 INJECTION INTRAVENOUS ONCE
Status: CANCELLED | OUTPATIENT
Start: 2024-10-10

## 2024-10-10 RX ORDER — FAMOTIDINE 10 MG/ML
20 INJECTION INTRAVENOUS ONCE
Status: COMPLETED | OUTPATIENT
Start: 2024-10-10 | End: 2024-10-10

## 2024-10-10 RX ORDER — HYDROCODONE BITARTRATE AND ACETAMINOPHEN 500; 5 MG/1; MG/1
TABLET ORAL ONCE
Status: CANCELLED | OUTPATIENT
Start: 2024-10-10 | End: 2024-10-10

## 2024-10-10 RX ORDER — HYDROCODONE BITARTRATE AND ACETAMINOPHEN 500; 5 MG/1; MG/1
TABLET ORAL ONCE
Status: ACTIVE | OUTPATIENT
Start: 2024-10-10

## 2024-10-10 RX ADMIN — FAMOTIDINE 20 MG: 10 INJECTION, SOLUTION INTRAVENOUS at 10:10

## 2024-10-10 NOTE — PROGRESS NOTES
Subjective:       Patient ID: Hussein Gonzalez Jr. is a 74 y.o. male.    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    Current Treatment:   Low-dose cytarabine and venetoclax started on 06/24/2024.    Treatment History:  Azacitidine and venetoclax started on 02/27/2023.  Started 7 out of 28 days, did this through cycle 5.  With cycle 6, moved to 5 out of 28 days.  Ultimately ended up on azacitidine 3/28 days and venetoclax 1 through 14/28 days.  Referral for possible stem cell transplant evaluation.    HPI:  74-year-old male with a history of CVA, hypertension, BPH, and melanoma who presented to the emergency department following a syncopal episode.  In February of 2022, the patient did have a slightly low white blood cell count, hemoglobin, and platelet count.  Per the patient, 3 months prior to admission, he was having dizziness, fatigue, weakness, shortness of breath, back pain, and leg pain.  The dizziness was worse with standing and walking.  On 01/20/2023, the patient had a syncopal episode.  He was brought to the emergency department and was found to be pancytopenic.  He also had stool that was positive for occult blood in the emergency department.  He received blood transfusion.  His CBC on 01/20/2023 revealed a white blood cell count of 2.6, hemoglobin of 7.7, and platelet count of 47,000. Reticulocytes were appropriately elevated, B12 and folate were within normal limits, iron level was slightly decreased.  Repeat blood work on 01/21/2023 revealed a white blood cell count of 2.8, post transfusion hemoglobin of 9.6, and a platelet count of 02093.  Hematology was consulted for pancytopenia.  EGD and colonoscopy done on 01/24/2023, EGD revealed nonbleeding ulcers, colonoscopy unremarkable, however had to be aborted due to tortuous colon.   Bone Marrow Biopsy done on 01/25/2023.  Preliminary reveals AML with 27% blasts in a background of dysplasia.  Cytogenetics are within normal limits, FISH is within normal limits.  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  Started azacitidine and venetoclax on 02/27/2023, completed cycle 4 on 05/30/2023.  Held after this cycle and repeated a bone marrow biopsy, this was done on 06/27/2023, this showed a normocellular marrow (40%) with relative erythroid hyperplasia, sequential granulopoiesis and megakaryopoiesis.  The bone marrow was slightly suboptimal given it was predominantly subcortical bone with focal hematopoietic elements.  There was no morphologic or immunophenotypic evidence of previous acute myeloid leukemia, cytogenetics were normal, FISH was normal, NGS showed persistent ASXL1, SRSF2, TET2.    Repeat bone marrow biopsy done on 11/21/2023 showed no morphologic or immunophenotypic evidence of persistent acute myeloid leukemia. There were ASXL1, CEBPA, RUNX1, SRSF2, STAG2, and TET2 mutations.    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     Started low-dose cytarabine and venetoclax on 06/24/2024.    Interval History:   Patient well known to me currently on low-dose cytarabine and venetoclax for acute myeloid leukemia.  He presents today for clinic follow up visit.  He had come into clinic on 10/07/2024.  He was very weak.  He required transfusions.  I explained that my concern was that his AML with likely progressing.  I asked that he keep his appointment on today and bring his family.  They presented today for discussion of possible transitioned to hospice.  The patient had much more energy and was feeling much better today.          Past Medical History:   Diagnosis Date    Anxiety  and depression     BPH (benign prostatic hyperplasia)     History of CVA (cerebrovascular accident) without residual deficits     HTN (hypertension)     Malignant melanoma of skin, unspecified     NSTEMI (non-ST elevated myocardial infarction)     Sepsis, Gram negative     Sleep apnea       Past Surgical History:   Procedure Laterality Date    BONE MARROW ASPIRATION N/A 6/27/2023    Procedure: ASPIRATION, BONE MARROW;  Surgeon: Sabino Núñez MD;  Location: [facility];  Service: General;  Laterality: N/A;  ALL ASSESS FOR MOLECULAR MRD    BONE MARROW ASPIRATION N/A 5/22/2024    Procedure: ASPIRATION, BONE MARROW;  Surgeon: Sabino Núñez MD;  Location: [facility] OR;  Service: General;  Laterality: N/A;    BONE MARROW BIOPSY N/A 1/25/2023    Procedure: Biopsy-bone marrow;  Surgeon: Rd Carlos MD;  Location: [facility];  Service: General;  Laterality: N/A;    BONE MARROW BIOPSY N/A 11/21/2023    Procedure: Biopsy-bone marrow;  Surgeon: Junior Castro MD;  Location: [facility];  Service: General;  Laterality: N/A;    COLONOSCOPY      COLONOSCOPY N/A 1/24/2023    Procedure: COLONOSCOPY (DOUBLE);  Surgeon: Jimy Baker MD;  Location: [facility] ENDOSCOPY;  Service: Gastroenterology;  Laterality: N/A;    EGD, WITH CLOSED BIOPSY N/A 1/24/2023    Procedure: EGD, WITH CLOSED BIOPSY;  Surgeon: Jimy Baker MD;  Location: [facility] ENDOSCOPY;  Service: Gastroenterology;  Laterality: N/A;    EXCISION OF MELANOMA      SINUS SURGERY      TONSILLECTOMY       Social History     Socioeconomic History    Marital status:    Tobacco Use    Smoking status: Never    Smokeless tobacco: Never   Substance and Sexual Activity    Alcohol use: Not Currently     Alcohol/week: 7.0 standard drinks of alcohol     Types: 7 Cans of beer per week    Drug use: Never    Sexual activity: Yes     Social Drivers of Health     Financial Resource Strain: Low Risk  (10/2/2024)    Overall Financial Resource Strain (CARDIA)      Difficulty of Paying Living Expenses: Not very hard   Food Insecurity: No Food Insecurity (10/2/2024)    Hunger Vital Sign     Worried About Running Out of Food in the Last Year: Never true     Ran Out of Food in the Last Year: Never true   Transportation Needs: No Transportation Needs (10/2/2024)    TRANSPORTATION NEEDS     Transportation : No   Physical Activity: Insufficiently Active (4/20/2023)    Exercise Vital Sign     Days of Exercise per Week: 3 days     Minutes of Exercise per Session: 30 min   Stress: No Stress Concern Present (10/2/2024)    [employer] [place] [employer] - Occupational Stress Questionnaire     Feeling of Stress : Only a little   Housing Stability: Low Risk  (10/2/2024)    Housing Stability Vital Sign     Unable to Pay for Housing in the Last Year: No     Homeless in the Last Year: No      Family History   Problem Relation Name Age of Onset    Cancer Mother      Cancer Father        Review of patient's allergies indicates:   Allergen Reactions    Acetaminophen Other (See Comments)     Restless leg     Benadryl [diphenhydramine hcl] Other (See Comments)     Restless Legs      Review of Systems   Constitutional:  Negative for chills, diaphoresis, fatigue, fever and unexpected weight change.   HENT:  Negative for nasal congestion, mouth sores, sinus pressure/congestion and sore throat.    Eyes:  Negative for pain and visual disturbance.   Respiratory:  Negative for cough, chest tightness and shortness of breath.    Cardiovascular:  Negative for chest pain, palpitations and leg swelling.   Gastrointestinal:  Negative for abdominal distention, abdominal pain, blood in stool, constipation and diarrhea.   Genitourinary:  Negative for dysuria, frequency and hematuria.   Musculoskeletal:  Positive for arthralgias and myalgias. Negative for back pain.   Integumentary:  Negative for rash.   Neurological:  Negative for dizziness, syncope, weakness, numbness and headaches.    Hematological:  Negative for adenopathy.   Psychiatric/Behavioral:  Negative for confusion.          Objective:      Physical Exam  Vitals reviewed.   Constitutional:       General: He is awake.      Appearance: Normal appearance.   HENT:      Head: Normocephalic and atraumatic.      Right Ear: Hearing normal.      Left Ear: Hearing normal.      Nose: Nose normal.   Eyes:      General: Lids are normal. Vision grossly intact.      Extraocular Movements: Extraocular movements intact.      Conjunctiva/sclera: Conjunctivae normal.   Cardiovascular:      Rate and Rhythm: Normal rate and regular rhythm.      Pulses: Normal pulses.   Pulmonary:      Effort: Pulmonary effort is normal.      Breath sounds: Normal breath sounds. No wheezing, rhonchi or rales.   Musculoskeletal:      Cervical back: Full passive range of motion without pain.      Right lower leg: No edema.      Left lower leg: No edema.   Skin:     General: Skin is warm.      Comments:      Neurological:      General: No focal deficit present.      Mental Status: He is alert and oriented to person, place, and time.   Psychiatric:         Attention and Perception: Attention normal.         Mood and Affect: Mood and affect normal.         Behavior: Behavior is cooperative.       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    Evaluated by Dr. Valdez in [place].     Patient was on azacitidine on days 1 through 3 out of every 28 days and venetoclax on days 1 through 14 out of every 28 days.  Repeat bone marrow biopsy on 05/22/2024 showed recurrent AML.    Switched to  low-dose cytarabine and venetoclax on 06/24/2024.    Cycle 5 due on 10/14/2024.  Due to continued weakness, transfusion requirement increase, I feel that the patient needs a repeat bone marrow biopsy.    We will have a bone marrow biopsy next week.  If there is morphologic evidence of worsening AML, we will consider transitioning to hospice.  If his AML is stable or improved, we will continue with treatment.    Continue prophylaxis with 500 mg of levofloxacin daily, 200 mg of acyclovir b.i.d., 200 mg of fluconazole daily.    Tranexamic acid oral and prescribed Amicar mouthwash.    He will need 1 unit of platelets today.      Bone marrow biopsy ordered    He will continue twice weekly labs/PRN transfusion. Family did inquire about getting his transfusions in [place]    I will see him back in 2 weeks to discuss bone marrow biopsy results and plan moving forward      Eduard Luna II, MD I, Terra Chisholm LPN, acted solely as a scribe for and in the presence of, Dr. Eduard Luna who performed the service.

## 2024-10-10 NOTE — PROGRESS NOTES
Subjective:       Patient ID: Hussein Gonzalez Jr. is a 74 y.o. male.    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    Current Treatment:   Low-dose cytarabine and venetoclax started on 06/24/2024.    Treatment History:  Azacitidine and venetoclax started on 02/27/2023.  Started 7 out of 28 days, did this through cycle 5.  With cycle 6, moved to 5 out of 28 days.  Ultimately ended up on azacitidine 3/28 days and venetoclax 1 through 14/28 days.  Referral for possible stem cell transplant evaluation.    HPI:  74-year-old male with a history of CVA, hypertension, BPH, and melanoma who presented to the emergency department following a syncopal episode.  In February of 2022, the patient did have a slightly low white blood cell count, hemoglobin, and platelet count.  Per the patient, 3 months prior to admission, he was having dizziness, fatigue, weakness, shortness of breath, back pain, and leg pain.  The dizziness was worse with standing and walking.  On 01/20/2023, the patient had a syncopal episode.  He was brought to the emergency department and was found to be pancytopenic.  He also had stool that was positive for occult blood in the emergency department.  He received blood transfusion.  His CBC on 01/20/2023 revealed a white blood cell count of 2.6, hemoglobin of 7.7, and platelet count of 47,000. Reticulocytes were appropriately elevated, B12 and folate were within normal limits, iron level was slightly decreased.  Repeat blood work on 01/21/2023 revealed a white blood cell count of 2.8, post transfusion hemoglobin of 9.6, and a platelet count of 83370.  Hematology was consulted for pancytopenia.  EGD and colonoscopy done on 01/24/2023, EGD revealed nonbleeding ulcers, colonoscopy unremarkable, however had to be aborted due to tortuous colon.   Bone Marrow Biopsy done on 01/25/2023.  Preliminary reveals AML with 27% blasts in a background of dysplasia.  Cytogenetics are within normal limits, FISH is within normal limits.  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  Started azacitidine and venetoclax on 02/27/2023, completed cycle 4 on 05/30/2023.  Held after this cycle and repeated a bone marrow biopsy, this was done on 06/27/2023, this showed a normocellular marrow (40%) with relative erythroid hyperplasia, sequential granulopoiesis and megakaryopoiesis.  The bone marrow was slightly suboptimal given it was predominantly subcortical bone with focal hematopoietic elements.  There was no morphologic or immunophenotypic evidence of previous acute myeloid leukemia, cytogenetics were normal, FISH was normal, NGS showed persistent ASXL1, SRSF2, TET2.    Repeat bone marrow biopsy done on 11/21/2023 showed no morphologic or immunophenotypic evidence of persistent acute myeloid leukemia. There were ASXL1, CEBPA, RUNX1, SRSF2, STAG2, and TET2 mutations.    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     Started low-dose cytarabine and venetoclax on 06/24/2024.    Interval History:   Patient well known to me currently on low-dose cytarabine and venetoclax for acute myeloid leukemia.  He presents today for clinic follow up visit.  He had come into clinic on 10/07/2024.  He was very weak.  He required transfusions.  I explained that my concern was that his AML with likely progressing.  I asked that he keep his appointment on today and bring his family.  They presented today for discussion of possible transitioned to hospice.  The patient had much more energy and was feeling much better today.          Past Medical History:   Diagnosis Date    Anxiety  and depression     BPH (benign prostatic hyperplasia)     History of CVA (cerebrovascular accident) without residual deficits     HTN (hypertension)     Malignant melanoma of skin, unspecified     NSTEMI (non-ST elevated myocardial infarction)     Sepsis, Gram negative     Sleep apnea       Past Surgical History:   Procedure Laterality Date    BONE MARROW ASPIRATION N/A 6/27/2023    Procedure: ASPIRATION, BONE MARROW;  Surgeon: Sabino Núñez MD;  Location: [facility];  Service: General;  Laterality: N/A;  ALL ASSESS FOR MOLECULAR MRD    BONE MARROW ASPIRATION N/A 5/22/2024    Procedure: ASPIRATION, BONE MARROW;  Surgeon: Sabino Núñez MD;  Location: [facility] OR;  Service: General;  Laterality: N/A;    BONE MARROW BIOPSY N/A 1/25/2023    Procedure: Biopsy-bone marrow;  Surgeon: Rd Carlos MD;  Location: [facility];  Service: General;  Laterality: N/A;    BONE MARROW BIOPSY N/A 11/21/2023    Procedure: Biopsy-bone marrow;  Surgeon: Junior Castro MD;  Location: [facility];  Service: General;  Laterality: N/A;    COLONOSCOPY      COLONOSCOPY N/A 1/24/2023    Procedure: COLONOSCOPY (DOUBLE);  Surgeon: Jimy Baker MD;  Location: [facility] ENDOSCOPY;  Service: Gastroenterology;  Laterality: N/A;    EGD, WITH CLOSED BIOPSY N/A 1/24/2023    Procedure: EGD, WITH CLOSED BIOPSY;  Surgeon: Jimy Baker MD;  Location: [facility] ENDOSCOPY;  Service: Gastroenterology;  Laterality: N/A;    EXCISION OF MELANOMA      SINUS SURGERY      TONSILLECTOMY       Social History     Socioeconomic History    Marital status:    Tobacco Use    Smoking status: Never    Smokeless tobacco: Never   Substance and Sexual Activity    Alcohol use: Not Currently     Alcohol/week: 7.0 standard drinks of alcohol     Types: 7 Cans of beer per week    Drug use: Never    Sexual activity: Yes     Social Drivers of Health     Financial Resource Strain: Low Risk  (10/2/2024)    Overall Financial Resource Strain (CARDIA)     Difficulty of Paying Living  Expenses: Not very hard   Food Insecurity: No Food Insecurity (10/2/2024)    Hunger Vital Sign     Worried About Running Out of Food in the Last Year: Never true     Ran Out of Food in the Last Year: Never true   Transportation Needs: No Transportation Needs (10/2/2024)    TRANSPORTATION NEEDS     Transportation : No   Physical Activity: Insufficiently Active (4/20/2023)    Exercise Vital Sign     Days of Exercise per Week: 3 days     Minutes of Exercise per Session: 30 min   Stress: No Stress Concern Present (10/2/2024)    [employer] [place] [employer] - Occupational Stress Questionnaire     Feeling of Stress : Only a little   Housing Stability: Low Risk  (10/2/2024)    Housing Stability Vital Sign     Unable to Pay for Housing in the Last Year: No     Homeless in the Last Year: No      Family History   Problem Relation Name Age of Onset    Cancer Mother      Cancer Father        Review of patient's allergies indicates:   Allergen Reactions    Acetaminophen Other (See Comments)     Restless leg     Benadryl [diphenhydramine hcl] Other (See Comments)     Restless Legs      Review of Systems   Constitutional:  Negative for chills, diaphoresis, fatigue, fever and unexpected weight change.   HENT:  Negative for nasal congestion, mouth sores, sinus pressure/congestion and sore throat.    Eyes:  Negative for pain and visual disturbance.   Respiratory:  Negative for cough, chest tightness and shortness of breath.    Cardiovascular:  Negative for chest pain, palpitations and leg swelling.   Gastrointestinal:  Negative for abdominal distention, abdominal pain, blood in stool, constipation and diarrhea.   Genitourinary:  Negative for dysuria, frequency and hematuria.   Musculoskeletal:  Positive for arthralgias and myalgias. Negative for back pain.   Integumentary:  Negative for rash.   Neurological:  Negative for dizziness, syncope, weakness, numbness and headaches.   Hematological:  Negative for adenopathy.    Psychiatric/Behavioral:  Negative for confusion.          Objective:      Physical Exam  Vitals reviewed.   Constitutional:       General: He is awake.      Appearance: Normal appearance.   HENT:      Head: Normocephalic and atraumatic.      Right Ear: Hearing normal.      Left Ear: Hearing normal.      Nose: Nose normal.   Eyes:      General: Lids are normal. Vision grossly intact.      Extraocular Movements: Extraocular movements intact.      Conjunctiva/sclera: Conjunctivae normal.   Cardiovascular:      Rate and Rhythm: Normal rate and regular rhythm.      Pulses: Normal pulses.   Pulmonary:      Effort: Pulmonary effort is normal.      Breath sounds: Normal breath sounds. No wheezing, rhonchi or rales.   Musculoskeletal:      Cervical back: Full passive range of motion without pain.      Right lower leg: No edema.      Left lower leg: No edema.   Skin:     General: Skin is warm.      Comments:      Neurological:      General: No focal deficit present.      Mental Status: He is alert and oriented to person, place, and time.   Psychiatric:         Attention and Perception: Attention normal.         Mood and Affect: Mood and affect normal.         Behavior: Behavior is cooperative.         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    Evaluated by Dr. Valdez in [place].     Patient was on azacitidine on days 1 through 3 out of every 28 days and venetoclax on days 1 through 14 out of every 28 days.  Repeat bone marrow biopsy on 05/22/2024 showed recurrent AML.    Switched to low-dose cytarabine and venetoclax on  06/24/2024.    Cycle 5 due on 10/14/2024.  Due to continued weakness, transfusion requirement increase, I feel that the patient needs a repeat bone marrow biopsy.    We will have a bone marrow biopsy next week.  If there is morphologic evidence of worsening AML, we will consider transitioning to hospice.  If his AML is stable or improved, we will continue with treatment.    Continue prophylaxis with 500 mg of levofloxacin daily, 200 mg of acyclovir b.i.d., 200 mg of fluconazole daily.    Tranexamic acid oral and prescribed Amicar mouthwash.    He will need 1 unit of platelets today.      Bone marrow biopsy ordered    He will continue twice weekly labs/PRN transfusion. Family did inquire about getting his transfusions in [place]    I will see him back in 2 weeks to discuss bone marrow biopsy results and plan moving forward      Eduadr Luna II, MD    I, Terra Chisholm LPN, acted solely as a scribe for and in the presence of, Dr. Eduard Luna who performed the service.

## 2024-10-14 ENCOUNTER — INFUSION (OUTPATIENT)
Dept: INFUSION THERAPY | Facility: HOSPITAL | Age: 74
End: 2024-10-14
Attending: INTERNAL MEDICINE
Payer: MEDICARE

## 2024-10-14 VITALS
SYSTOLIC BLOOD PRESSURE: 119 MMHG | DIASTOLIC BLOOD PRESSURE: 71 MMHG | HEART RATE: 69 BPM | OXYGEN SATURATION: 94 % | RESPIRATION RATE: 18 BRPM | TEMPERATURE: 98 F

## 2024-10-14 DIAGNOSIS — C92.00 ACUTE MYELOID LEUKEMIA NOT HAVING ACHIEVED REMISSION: ICD-10-CM

## 2024-10-14 DIAGNOSIS — C92.00 ACUTE MYELOID LEUKEMIA NOT HAVING ACHIEVED REMISSION: Primary | ICD-10-CM

## 2024-10-14 PROCEDURE — 25000003 PHARM REV CODE 250: Performed by: INTERNAL MEDICINE

## 2024-10-14 PROCEDURE — P9037 PLATE PHERES LEUKOREDU IRRAD: HCPCS | Performed by: INTERNAL MEDICINE

## 2024-10-14 RX ORDER — HYDROCODONE BITARTRATE AND ACETAMINOPHEN 500; 5 MG/1; MG/1
TABLET ORAL ONCE
Status: COMPLETED | OUTPATIENT
Start: 2024-10-14 | End: 2024-10-14

## 2024-10-14 RX ORDER — FAMOTIDINE 10 MG/ML
20 INJECTION INTRAVENOUS ONCE
Status: CANCELLED | OUTPATIENT
Start: 2024-10-14

## 2024-10-14 RX ORDER — FAMOTIDINE 10 MG/ML
20 INJECTION INTRAVENOUS ONCE
Status: COMPLETED | OUTPATIENT
Start: 2024-10-14 | End: 2024-10-14

## 2024-10-14 RX ORDER — HYDROCODONE BITARTRATE AND ACETAMINOPHEN 500; 5 MG/1; MG/1
TABLET ORAL ONCE
Status: CANCELLED | OUTPATIENT
Start: 2024-10-14 | End: 2024-10-14

## 2024-10-14 RX ADMIN — FAMOTIDINE 20 MG: 10 INJECTION, SOLUTION INTRAVENOUS at 11:10

## 2024-10-14 RX ADMIN — SODIUM CHLORIDE: 9 INJECTION, SOLUTION INTRAVENOUS at 11:10

## 2024-10-14 NOTE — PHYSICIAN QUERY
Please specify diagnosis or diagnoses associated with the clinical findings identified in the query.   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

## 2024-10-14 NOTE — PLAN OF CARE
Patient received 1 PRBC/ 1 PLT, tolerated well. Left facility in stable condition. Next appointment given verbally. Voiced understanding.

## 2024-10-15 ENCOUNTER — HOSPITAL ENCOUNTER (OUTPATIENT)
Dept: RADIOLOGY | Facility: HOSPITAL | Age: 74
Discharge: HOME | End: 2024-10-15
Attending: NURSE PRACTITIONER
Payer: MEDICARE

## 2024-10-15 ENCOUNTER — PATIENT MESSAGE (OUTPATIENT)
Dept: SURGERY | Facility: HOSPITAL | Age: 74
End: 2024-10-15
Payer: MEDICARE

## 2024-10-15 DIAGNOSIS — R10.9 LEFT FLANK DISCOMFORT: ICD-10-CM

## 2024-10-15 PROCEDURE — 74176 CT ABD & PELVIS W/O CONTRAST: CPT | Mod: TC

## 2024-10-15 NOTE — PRE-PROCEDURE INSTRUCTIONS
"[facility]: Outpatient Surgery  Preprocedure Check-In Instructions     Your arrival time for your surgery or procedure is 8:30 am  We ask patients to arrive about 2 hours before surgery to allow for enough time to review your health history & medications, start your IV, complete any outstanding labwork or tests, and meet your Anesthesiologist.    Expectations: "Because of inconsistent procedure completion times, an unexpected wait may occur. The Physicians would like you to be here to prepare in the event they run ahead of time. We will make you as comfortable as possible and keep you informed. We apologize in advance if this happens."    You will arrive at [facility], [address], [place].  Enter through the [place] entrance next to the Emergency Room, and come to the 6th floor to the Outpatient Surgery Department.     Visitory Policy:  You are allowed 2 adult visitors to be with you in the hospital. All hospital visitors should be in good current health.  No small children.     What to Bring:  Please have your ID, insurance cards, and all home medication bottles with you at check in.  Bring your CPAP machine if one is used at home.     Fasting:  Nothing to eat after midnight the night before your procedure. This includes no gum and/or tobacco products. You may have clear liquids limited to: water, gatorade/powerade, sprite, apple juice and/or black coffee up until 2 hours before your arrival time.   Follow your doctor's instructions for taking any medications on the morning of your procedure.  If no instructions for taking medications were given, do not take any medications but bring your medications in their bottles to your procedure check in.     Follow your doctor's preoperative instructions regarding skin prep, bowel prep, bathing, or showering prior to your procedure.  If any special soaps were provided to you, please use according to your doctor's " instructions. If no instructions were given from your doctor, take a good bath or shower with antibacterial soap the night before and the morning of your procedure.  On the morning of procedure, wear loose, comfortable clothing.  No lotions, makeup, perfumes, colognes, deodorant, or jewelry to your procedure.  Removable items (glasses, contact lenses, dentures, retainers, hearing aids) need to be removed for your procedure.  Bring your storage containers for these items if you wear them.     Artificial nails, body jewelry, eyelash extensions, and/or hair extensions with metal clips are not allowed during your surgery.  If you currently wear any of these items, please arrange for them to be removed prior to your arrival to the hospital.     Outpatient or Same Day Surgeries:  Any patients receiving sedation/anesthesia are advised not to drive for 24 hours after their procedure.  We do not allow patients to drive themselves home after discharge.  If you are going home after your procedure, please have someone available to drive you home from the hospital.        You may call the Outpatient Surgery Department at [phone] with any questions or concerns.  We are looking forward to meeting you and taking great care of you for your procedure.  Thank you for choosing [facility] for your surgical needs.

## 2024-10-16 ENCOUNTER — TELEPHONE (OUTPATIENT)
Dept: INTERNAL MEDICINE | Facility: CLINIC | Age: 74
End: 2024-10-16
Payer: MEDICARE

## 2024-10-16 ENCOUNTER — HOSPITAL ENCOUNTER (OUTPATIENT)
Facility: HOSPITAL | Age: 74
Discharge: HOME | End: 2024-10-16
Attending: PATHOLOGY | Admitting: PATHOLOGY
Payer: MEDICARE

## 2024-10-16 VITALS
OXYGEN SATURATION: 93 % | DIASTOLIC BLOOD PRESSURE: 62 MMHG | TEMPERATURE: 98 F | WEIGHT: 175.69 LBS | HEART RATE: 74 BPM | SYSTOLIC BLOOD PRESSURE: 111 MMHG | BODY MASS INDEX: 23.83 KG/M2 | RESPIRATION RATE: 19 BRPM

## 2024-10-16 DIAGNOSIS — T14.8XXA MUSCLE STRAIN: ICD-10-CM

## 2024-10-16 DIAGNOSIS — R10.9 LEFT FLANK DISCOMFORT: ICD-10-CM

## 2024-10-16 DIAGNOSIS — C92.00 ACUTE MYELOID LEUKEMIA NOT HAVING ACHIEVED REMISSION: ICD-10-CM

## 2024-10-16 DIAGNOSIS — C92.00 ACUTE MYELOID LEUKEMIA NOT HAVING ACHIEVED REMISSION: Primary | ICD-10-CM

## 2024-10-16 PROCEDURE — 38222 DX BONE MARROW BX & ASPIR: CPT | Performed by: PATHOLOGY

## 2024-10-16 PROCEDURE — 63600175 PHARM REV CODE 636 W HCPCS: Performed by: PATHOLOGY

## 2024-10-16 RX ORDER — SODIUM CHLORIDE 9 MG/ML
INJECTION, SOLUTION INTRAVENOUS CONTINUOUS
Status: DISCONTINUED | OUTPATIENT
Start: 2024-10-16 | End: 2024-10-16 | Stop reason: HOSPADM

## 2024-10-16 RX ORDER — CYCLOBENZAPRINE HCL 10 MG
10 TABLET ORAL 2 TIMES DAILY
COMMUNITY

## 2024-10-16 RX ORDER — FLUMAZENIL 0.1 MG/ML
0.2 INJECTION INTRAVENOUS
Status: DISCONTINUED | OUTPATIENT
Start: 2024-10-16 | End: 2024-10-16 | Stop reason: HOSPADM

## 2024-10-16 RX ORDER — VENETOCLAX 100 MG/1
TABLET, FILM COATED ORAL
Qty: 28 TABLET | Refills: 5 | Status: SHIPPED | OUTPATIENT
Start: 2024-10-16

## 2024-10-16 RX ORDER — MIDAZOLAM HYDROCHLORIDE 2 MG/2ML
1 INJECTION, SOLUTION INTRAMUSCULAR; INTRAVENOUS
Status: DISCONTINUED | OUTPATIENT
Start: 2024-10-16 | End: 2024-10-16 | Stop reason: HOSPADM

## 2024-10-16 RX ADMIN — MIDAZOLAM HYDROCHLORIDE 1 MG: 1 INJECTION, SOLUTION INTRAMUSCULAR; INTRAVENOUS at 10:10

## 2024-10-16 RX ADMIN — MIDAZOLAM HYDROCHLORIDE 2 MG: 1 INJECTION, SOLUTION INTRAMUSCULAR; INTRAVENOUS at 10:10

## 2024-10-16 NOTE — TELEPHONE ENCOUNTER
Spoke with Mr Myles, ordered wheelchair. Faxed manually to [facility] on [address]. Will send to [facility] with rehab medical if need be. Requested callback and ask for Connie if they have any issues with the order.

## 2024-10-16 NOTE — PROGRESS NOTES
[facility] Peri Services  Discharge Note  Short Stay    Procedure(s) (LRB):  Biopsy-bone marrow (N/A)      OUTCOME: Patient tolerated treatment/procedure well without complication and is now ready for discharge.    DISPOSITION: Home or Self Care    FINAL DIAGNOSIS:  <principal problem not specified>    FOLLOWUP: In clinic    DISCHARGE INSTRUCTIONS:  No discharge procedures on file.      Clinical Reference Documents Added to Patient Instructions         Document    BONE MARROW ASPIRATION OR BIOPSY (ENGLISH)    MODERATE SEDATION IN ADULTS DISCHARGE INSTRUCTIONS (ENGLISH)            TIME SPENT ON DISCHARGE: 20 minutes

## 2024-10-16 NOTE — PROCEDURES
Hussein Gonzalez Jr. is a 74 y.o. male patient.    Temp: 98.1 °F (36.7 °C) (10/16/24 0837)  Pulse: 69 (10/16/24 1057)  Resp: 16 (10/16/24 1057)  BP: 117/63 (10/16/24 1057)  SpO2: (!) 93 % (10/16/24 1057)  Weight: 79.7 kg (175 lb 11.3 oz) (10/16/24 0926)  Mallampati Scale: Class II  ASA Classification: Class 2    Bone marrow    Date/Time: 10/16/2024 11:08 AM    Performed by: Hansel Betancourt MD  Authorized by: Hansel Betancourt MD    Consent Done?: Yes (Written)   Immediately prior to procedure a time out was called to verify the correct patient, procedure, equipment, support staff and site/side marked as required.   Patient was prepped and draped in the usual sterile fashion.      Assistants?: Yes    List of assistants: Jeannie Watkins I was present for the entire procedure.    Position: left lateral  Anesthesia: local infiltration  Local anesthetic: lidocaine 1% without epinephrine  Aspiration?: Yes   Biopsy?: Yes    Specimen source: right posterior iliac crest  Number of Specimens: 2  Estimated blood loss (cc):9  Patient tolerated the procedure well with no immediate complications.  Post-operative instructions were provided for the patient.  Patient was discharged and will follow up if any complications occur.     The patient was sedated with 4 mg Versed IV, administered in titrated doses.      10/16/2024

## 2024-10-16 NOTE — PROGRESS NOTES
Attempted to contact patient regarding CT results (left VM).  I did discuss CT results with Dr Andres.  No change in current plan.  Continue follow up with Oncology as previously discussed at recent hospital follow up.

## 2024-10-17 ENCOUNTER — LAB VISIT (OUTPATIENT)
Dept: LAB | Facility: HOSPITAL | Age: 74
End: 2024-10-17
Attending: NURSE PRACTITIONER
Payer: MEDICARE

## 2024-10-17 ENCOUNTER — TELEPHONE (OUTPATIENT)
Dept: INTERNAL MEDICINE | Facility: CLINIC | Age: 74
End: 2024-10-17
Payer: MEDICARE

## 2024-10-17 ENCOUNTER — INFUSION (OUTPATIENT)
Dept: INFUSION THERAPY | Facility: HOSPITAL | Age: 74
End: 2024-10-17
Attending: INTERNAL MEDICINE
Payer: MEDICARE

## 2024-10-17 VITALS
DIASTOLIC BLOOD PRESSURE: 60 MMHG | RESPIRATION RATE: 18 BRPM | HEART RATE: 72 BPM | SYSTOLIC BLOOD PRESSURE: 138 MMHG | TEMPERATURE: 98 F

## 2024-10-17 DIAGNOSIS — C92.00 ACUTE MYELOID LEUKEMIA NOT HAVING ACHIEVED REMISSION: ICD-10-CM

## 2024-10-17 DIAGNOSIS — C92.00 ACUTE MYELOID LEUKEMIA NOT HAVING ACHIEVED REMISSION: Primary | Chronic | ICD-10-CM

## 2024-10-17 LAB
ABO + RH BLD: NORMAL
ABO + RH BLD: NORMAL
ALBUMIN SERPL-MCNC: 2.7 G/DL (ref 3.4–4.8)
ALBUMIN/GLOB SERPL: 0.8 RATIO (ref 1.1–2)
ALP SERPL-CCNC: 84 UNIT/L (ref 40–150)
ALT SERPL-CCNC: 15 UNIT/L (ref 0–55)
ANION GAP SERPL CALC-SCNC: 15 MEQ/L
AST SERPL-CCNC: 23 UNIT/L (ref 5–34)
BASOPHILS # BLD AUTO: 0.03 X10(3)/MCL
BASOPHILS NFR BLD AUTO: 0.2 %
BILIRUB SERPL-MCNC: 0.7 MG/DL
BLD PROD TYP BPU: NORMAL
BLD PROD TYP BPU: NORMAL
BLOOD UNIT EXPIRATION DATE: NORMAL
BLOOD UNIT EXPIRATION DATE: NORMAL
BLOOD UNIT TYPE CODE: 6200
BLOOD UNIT TYPE CODE: 6200
BUN SERPL-MCNC: 29.6 MG/DL (ref 8.4–25.7)
CALCIUM SERPL-MCNC: 7.9 MG/DL (ref 8.8–10)
CHLORIDE SERPL-SCNC: 101 MMOL/L (ref 98–107)
CO2 SERPL-SCNC: 27 MMOL/L (ref 23–31)
CREAT SERPL-MCNC: 2.54 MG/DL (ref 0.73–1.18)
CREAT/UREA NIT SERPL: 12
CROSSMATCH INTERPRETATION: NORMAL
CROSSMATCH INTERPRETATION: NORMAL
DISPENSE STATUS: NORMAL
DISPENSE STATUS: NORMAL
EOSINOPHIL # BLD AUTO: 0 X10(3)/MCL (ref 0–0.9)
EOSINOPHIL NFR BLD AUTO: 0 %
ERYTHROCYTE [DISTWIDTH] IN BLOOD BY AUTOMATED COUNT: 14.9 % (ref 11.5–17)
GFR SERPLBLD CREATININE-BSD FMLA CKD-EPI: 26 ML/MIN/1.73/M2
GLOBULIN SER-MCNC: 3.5 GM/DL (ref 2.4–3.5)
GLUCOSE SERPL-MCNC: 145 MG/DL (ref 82–115)
HCT VFR BLD AUTO: 24.8 % (ref 42–52)
HGB BLD-MCNC: 8.4 G/DL (ref 14–18)
IMM GRANULOCYTES # BLD AUTO: 1.81 X10(3)/MCL (ref 0–0.04)
IMM GRANULOCYTES NFR BLD AUTO: 13.6 %
LYMPHOCYTES # BLD AUTO: 1.01 X10(3)/MCL (ref 0.6–4.6)
LYMPHOCYTES NFR BLD AUTO: 7.6 %
MCH RBC QN AUTO: 29.8 PG (ref 27–31)
MCHC RBC AUTO-ENTMCNC: 33.9 G/DL (ref 33–36)
MCV RBC AUTO: 87.9 FL (ref 80–94)
MONOCYTES # BLD AUTO: 9.57 X10(3)/MCL (ref 0.1–1.3)
MONOCYTES NFR BLD AUTO: 71.7 %
NEUTROPHILS # BLD AUTO: 0.93 X10(3)/MCL (ref 2.1–9.2)
NEUTROPHILS NFR BLD AUTO: 6.9 %
PLATELET # BLD AUTO: 9 X10(3)/MCL (ref 130–400)
PMV BLD AUTO: 9.9 FL (ref 7.4–10.4)
POTASSIUM SERPL-SCNC: 2.8 MMOL/L (ref 3.5–5.1)
PROT SERPL-MCNC: 6.2 GM/DL (ref 5.8–7.6)
RBC # BLD AUTO: 2.82 X10(6)/MCL (ref 4.7–6.1)
SODIUM SERPL-SCNC: 143 MMOL/L (ref 136–145)
UNIT NUMBER: NORMAL
UNIT NUMBER: NORMAL
WBC # BLD AUTO: 13.35 X10(3)/MCL (ref 4.5–11.5)

## 2024-10-17 PROCEDURE — 25000003 PHARM REV CODE 250: Performed by: INTERNAL MEDICINE

## 2024-10-17 PROCEDURE — 80053 COMPREHEN METABOLIC PANEL: CPT

## 2024-10-17 PROCEDURE — P9037 PLATE PHERES LEUKOREDU IRRAD: HCPCS | Performed by: NURSE PRACTITIONER

## 2024-10-17 PROCEDURE — 96375 TX/PRO/DX INJ NEW DRUG ADDON: CPT

## 2024-10-17 PROCEDURE — 36415 COLL VENOUS BLD VENIPUNCTURE: CPT

## 2024-10-17 PROCEDURE — 85025 COMPLETE CBC W/AUTO DIFF WBC: CPT

## 2024-10-17 PROCEDURE — 36430 TRANSFUSION BLD/BLD COMPNT: CPT

## 2024-10-17 RX ORDER — HYDROCODONE BITARTRATE AND ACETAMINOPHEN 500; 5 MG/1; MG/1
TABLET ORAL ONCE
Status: CANCELLED | OUTPATIENT
Start: 2024-10-17 | End: 2024-10-17

## 2024-10-17 RX ORDER — FAMOTIDINE 10 MG/ML
20 INJECTION INTRAVENOUS
Start: 2024-10-17

## 2024-10-17 RX ORDER — HYDROCODONE BITARTRATE AND ACETAMINOPHEN 500; 5 MG/1; MG/1
TABLET ORAL ONCE
Status: ACTIVE | OUTPATIENT
Start: 2024-10-17

## 2024-10-17 RX ORDER — FAMOTIDINE 10 MG/ML
20 INJECTION INTRAVENOUS
Status: COMPLETED | OUTPATIENT
Start: 2024-10-17 | End: 2024-10-17

## 2024-10-17 RX ADMIN — FAMOTIDINE 20 MG: 10 INJECTION, SOLUTION INTRAVENOUS at 09:10

## 2024-10-17 NOTE — TELEPHONE ENCOUNTER
----- Message from JULITO Briscoe sent at 10/16/2024  1:15 PM CDT -----  Attempted to contact patient regarding CT results (left VM).  I did discuss CT results with Dr Andres.  No change in current plan.  Continue follow up with Oncology as previously discussed at recent hospital follow up.

## 2024-10-17 NOTE — TELEPHONE ENCOUNTER
----- Message from Edvin sent at 10/17/2024 12:00 PM CDT -----  .Who Called: Myles Gonzalez    Patient is returning phone call    Who Left Message for Patient:Judy Tay MA  Does the patient know what this is regarding?:      Preferred Method of Contact: Phone Call  Patient's Preferred Phone Number on File: [phone]  Best Call Back Number, if different:  Additional Information:

## 2024-10-18 LAB — RBCS: NORMAL

## 2024-10-21 ENCOUNTER — INFUSION (OUTPATIENT)
Dept: INFUSION THERAPY | Facility: HOSPITAL | Age: 74
End: 2024-10-21
Attending: INTERNAL MEDICINE
Payer: MEDICARE

## 2024-10-21 ENCOUNTER — TELEPHONE (OUTPATIENT)
Dept: INTERNAL MEDICINE | Facility: CLINIC | Age: 74
End: 2024-10-21
Payer: MEDICARE

## 2024-10-21 ENCOUNTER — LAB VISIT (OUTPATIENT)
Dept: LAB | Facility: HOSPITAL | Age: 74
End: 2024-10-21
Attending: NURSE PRACTITIONER
Payer: MEDICARE

## 2024-10-21 ENCOUNTER — DOCUMENTATION ONLY (OUTPATIENT)
Dept: HEMATOLOGY/ONCOLOGY | Facility: CLINIC | Age: 74
End: 2024-10-21
Payer: MEDICARE

## 2024-10-21 ENCOUNTER — TELEPHONE (OUTPATIENT)
Dept: HEMATOLOGY/ONCOLOGY | Facility: CLINIC | Age: 74
End: 2024-10-21
Payer: MEDICARE

## 2024-10-21 VITALS — TEMPERATURE: 98 F | HEART RATE: 85 BPM | SYSTOLIC BLOOD PRESSURE: 113 MMHG | DIASTOLIC BLOOD PRESSURE: 66 MMHG

## 2024-10-21 DIAGNOSIS — C92.00 ACUTE MYELOID LEUKEMIA NOT HAVING ACHIEVED REMISSION: ICD-10-CM

## 2024-10-21 PROBLEM — Z09 HOSPITAL DISCHARGE FOLLOW-UP: Status: RESOLVED | Noted: 2024-07-17 | Resolved: 2024-10-21

## 2024-10-21 LAB
ALBUMIN SERPL-MCNC: 2.8 G/DL (ref 3.4–4.8)
ALBUMIN/GLOB SERPL: 0.8 RATIO (ref 1.1–2)
ALP SERPL-CCNC: 87 UNIT/L (ref 40–150)
ALT SERPL-CCNC: 17 UNIT/L (ref 0–55)
ANION GAP SERPL CALC-SCNC: 15 MEQ/L
AST SERPL-CCNC: 30 UNIT/L (ref 5–34)
BASOPHILS # BLD AUTO: 0.02 X10(3)/MCL
BASOPHILS NFR BLD AUTO: 0.1 %
BILIRUB SERPL-MCNC: 0.6 MG/DL
BUN SERPL-MCNC: 22.5 MG/DL (ref 8.4–25.7)
CALCIUM SERPL-MCNC: 8 MG/DL (ref 8.8–10)
CHLORIDE SERPL-SCNC: 99 MMOL/L (ref 98–107)
CO2 SERPL-SCNC: 29 MMOL/L (ref 23–31)
CREAT SERPL-MCNC: 2.14 MG/DL (ref 0.72–1.25)
CREAT/UREA NIT SERPL: 11
EOSINOPHIL # BLD AUTO: 0 X10(3)/MCL (ref 0–0.9)
EOSINOPHIL NFR BLD AUTO: 0 %
ERYTHROCYTE [DISTWIDTH] IN BLOOD BY AUTOMATED COUNT: 15.3 % (ref 11.5–17)
GFR SERPLBLD CREATININE-BSD FMLA CKD-EPI: 31 ML/MIN/1.73/M2
GLOBULIN SER-MCNC: 3.3 GM/DL (ref 2.4–3.5)
GLUCOSE SERPL-MCNC: 130 MG/DL (ref 82–115)
HCT VFR BLD AUTO: 22.3 % (ref 42–52)
HGB BLD-MCNC: 7.4 G/DL (ref 14–18)
IMM GRANULOCYTES # BLD AUTO: 1.91 X10(3)/MCL (ref 0–0.04)
IMM GRANULOCYTES NFR BLD AUTO: 7.7 %
LYMPHOCYTES # BLD AUTO: 1.85 X10(3)/MCL (ref 0.6–4.6)
LYMPHOCYTES NFR BLD AUTO: 7.5 %
MCH RBC QN AUTO: 29.1 PG (ref 27–31)
MCHC RBC AUTO-ENTMCNC: 33.2 G/DL (ref 33–36)
MCV RBC AUTO: 87.8 FL (ref 80–94)
MONOCYTES # BLD AUTO: 19.92 X10(3)/MCL (ref 0.1–1.3)
MONOCYTES NFR BLD AUTO: 80.2 %
NEUTROPHILS # BLD AUTO: 1.13 X10(3)/MCL (ref 2.1–9.2)
NEUTROPHILS NFR BLD AUTO: 4.5 %
PLATELET # BLD AUTO: 17 X10(3)/MCL (ref 130–400)
PMV BLD AUTO: 8.7 FL (ref 7.4–10.4)
POTASSIUM SERPL-SCNC: 2.3 MMOL/L (ref 3.5–5.1)
PROT SERPL-MCNC: 6.1 GM/DL (ref 5.8–7.6)
RBC # BLD AUTO: 2.54 X10(6)/MCL (ref 4.7–6.1)
SODIUM SERPL-SCNC: 143 MMOL/L (ref 136–145)
WBC # BLD AUTO: 24.83 X10(3)/MCL (ref 4.5–11.5)

## 2024-10-21 PROCEDURE — 36415 COLL VENOUS BLD VENIPUNCTURE: CPT

## 2024-10-21 PROCEDURE — 80053 COMPREHEN METABOLIC PANEL: CPT

## 2024-10-21 PROCEDURE — 85025 COMPLETE CBC W/AUTO DIFF WBC: CPT

## 2024-10-21 NOTE — TELEPHONE ENCOUNTER
Spoke with Myles, he stated he didn't know if Pt would need the wheelchair for much longer. He stated it seems like Mr Savage will be on hospice soon. I left the option open in case something changes.

## 2024-10-21 NOTE — TELEPHONE ENCOUNTER
----- Message from Lisa sent at 10/21/2024 11:54 AM CDT -----  .Type:  Patient Returning Call    Who Called:Myles   Who Left Message for Patient:Son  Does the patient know what this is regarding?:missed call   Would the patient rather a call back or a response via [facility]?   Best Call Back Number:[phone]  Additional Information: Please call back about a wheel chair

## 2024-10-21 NOTE — TELEPHONE ENCOUNTER
LVM, wheelchair order has been faxed to [facility]. Requested call back to see if there is something more needed.

## 2024-10-21 NOTE — TELEPHONE ENCOUNTER
----- Message from Lesly sent at 10/18/2024  2:26 PM CDT -----  Who Called: Marcella pt son     Caller is requesting assistance/information from provider's office.    Symptoms (please be specific):    How long has patient had these symptoms:    List of preferred pharmacies on file (remove unneeded): [unfilled]  If different, enter pharmacy into here including location and phone number:         Patient's Preferred Phone Number on File:   Best Call Back Number, if different:[phone]  Additional Information: pt son called to speak to Connie to follow up on req for wheelchair, req order is sent to [facility] wheelchair with big wheels. Asked for a follow up

## 2024-10-21 NOTE — PROGRESS NOTES
Patient came to clinic for lab.  He was follow up with me next week.  Unfortunately, he has had chest pain and shortness of breath.  Had a long discussion with him, to the ER to see what this is.  Unfortunately, I think that his AML has persisted and that hospice is going to be the best option for him.  We will discuss once we figure out what is going on with his pain.

## 2024-10-22 ENCOUNTER — TELEPHONE (OUTPATIENT)
Dept: INTERNAL MEDICINE | Facility: CLINIC | Age: 74
End: 2024-10-22
Payer: MEDICARE

## 2024-10-22 ENCOUNTER — HOSPITAL ENCOUNTER (OUTPATIENT)
Dept: WOUND CARE | Facility: HOSPITAL | Age: 74
Discharge: HOME | End: 2024-10-22

## 2024-10-24 ENCOUNTER — LAB VISIT (OUTPATIENT)
Dept: LAB | Facility: HOSPITAL | Age: 74
End: 2024-10-24
Attending: NURSE PRACTITIONER
Payer: MEDICARE

## 2024-10-24 ENCOUNTER — OFFICE VISIT (OUTPATIENT)
Dept: HEMATOLOGY/ONCOLOGY | Facility: CLINIC | Age: 74
End: 2024-10-24
Payer: MEDICARE

## 2024-10-24 VITALS
HEIGHT: 72 IN | WEIGHT: 178 LBS | SYSTOLIC BLOOD PRESSURE: 107 MMHG | OXYGEN SATURATION: 93 % | DIASTOLIC BLOOD PRESSURE: 65 MMHG | BODY MASS INDEX: 24.11 KG/M2 | TEMPERATURE: 98 F | HEART RATE: 81 BPM

## 2024-10-24 DIAGNOSIS — C92.00 ACUTE MYELOID LEUKEMIA NOT HAVING ACHIEVED REMISSION: ICD-10-CM

## 2024-10-24 DIAGNOSIS — C92.00 ACUTE MYELOID LEUKEMIA NOT HAVING ACHIEVED REMISSION: Primary | Chronic | ICD-10-CM

## 2024-10-24 DIAGNOSIS — G47.09 OTHER INSOMNIA: ICD-10-CM

## 2024-10-24 LAB
ALBUMIN SERPL-MCNC: 2.9 G/DL (ref 3.4–4.8)
ALBUMIN/GLOB SERPL: 0.8 RATIO (ref 1.1–2)
ALP SERPL-CCNC: 92 UNIT/L (ref 40–150)
ALT SERPL-CCNC: 19 UNIT/L (ref 0–55)
ANION GAP SERPL CALC-SCNC: 11 MEQ/L
AST SERPL-CCNC: 41 UNIT/L (ref 5–34)
BASOPHILS # BLD AUTO: 0.06 X10(3)/MCL
BASOPHILS NFR BLD AUTO: 0.1 %
BILIRUB SERPL-MCNC: 1 MG/DL
BUN SERPL-MCNC: 14.8 MG/DL (ref 8.4–25.7)
CALCIUM SERPL-MCNC: 8 MG/DL (ref 8.8–10)
CHLORIDE SERPL-SCNC: 97 MMOL/L (ref 98–107)
CO2 SERPL-SCNC: 32 MMOL/L (ref 23–31)
CREAT SERPL-MCNC: 1.91 MG/DL (ref 0.72–1.25)
CREAT/UREA NIT SERPL: 8
EOSINOPHIL # BLD AUTO: 0 X10(3)/MCL (ref 0–0.9)
EOSINOPHIL NFR BLD AUTO: 0 %
ERYTHROCYTE [DISTWIDTH] IN BLOOD BY AUTOMATED COUNT: 15.4 % (ref 11.5–17)
GFR SERPLBLD CREATININE-BSD FMLA CKD-EPI: 36 ML/MIN/1.73/M2
GLOBULIN SER-MCNC: 3.7 GM/DL (ref 2.4–3.5)
GLUCOSE SERPL-MCNC: 111 MG/DL (ref 82–115)
HCT VFR BLD AUTO: 21.1 % (ref 42–52)
HGB BLD-MCNC: 7.1 G/DL (ref 14–18)
IMM GRANULOCYTES # BLD AUTO: 2.89 X10(3)/MCL (ref 0–0.04)
IMM GRANULOCYTES NFR BLD AUTO: 6 %
LYMPHOCYTES # BLD AUTO: 4.94 X10(3)/MCL (ref 0.6–4.6)
LYMPHOCYTES NFR BLD AUTO: 10.3 %
MCH RBC QN AUTO: 29.1 PG (ref 27–31)
MCHC RBC AUTO-ENTMCNC: 33.6 G/DL (ref 33–36)
MCV RBC AUTO: 86.5 FL (ref 80–94)
MONOCYTES # BLD AUTO: 38.03 X10(3)/MCL (ref 0.1–1.3)
MONOCYTES NFR BLD AUTO: 79 %
NEUTROPHILS # BLD AUTO: 2.2 X10(3)/MCL (ref 2.1–9.2)
NEUTROPHILS NFR BLD AUTO: 4.6 %
PLATELET # BLD AUTO: 11 X10(3)/MCL (ref 130–400)
PMV BLD AUTO: 11.7 FL (ref 7.4–10.4)
POTASSIUM SERPL-SCNC: 2 MMOL/L (ref 3.5–5.1)
PROT SERPL-MCNC: 6.6 GM/DL (ref 5.8–7.6)
RBC # BLD AUTO: 2.44 X10(6)/MCL (ref 4.7–6.1)
SODIUM SERPL-SCNC: 140 MMOL/L (ref 136–145)
WBC # BLD AUTO: 48.12 X10(3)/MCL (ref 4.5–11.5)

## 2024-10-24 PROCEDURE — 99999 PR PBB SHADOW E&M-EST. PATIENT-LVL IV: CPT | Mod: PBBFAC,,, | Performed by: INTERNAL MEDICINE

## 2024-10-24 PROCEDURE — 85025 COMPLETE CBC W/AUTO DIFF WBC: CPT

## 2024-10-24 PROCEDURE — 36415 COLL VENOUS BLD VENIPUNCTURE: CPT

## 2024-10-24 PROCEDURE — 99212 OFFICE O/P EST SF 10 MIN: CPT | Mod: S$PBB,,, | Performed by: INTERNAL MEDICINE

## 2024-10-24 PROCEDURE — 99214 OFFICE O/P EST MOD 30 MIN: CPT | Mod: PBBFAC | Performed by: INTERNAL MEDICINE

## 2024-10-24 PROCEDURE — 80053 COMPREHEN METABOLIC PANEL: CPT

## 2024-10-24 RX ORDER — TEMAZEPAM 7.5 MG/1
15 CAPSULE ORAL NIGHTLY PRN
Qty: 30 CAPSULE | Refills: 1 | Status: SHIPPED | OUTPATIENT
Start: 2024-10-24 | End: 2024-11-23

## 2024-10-24 NOTE — PROGRESS NOTES
Subjective:       Patient ID: Hussein Gonzalez Jr. is a 74 y.o. male.    Chief Complaint: Follow-up (Pt reports fatigue )      Diagnosis:  AML with 27% blasts, background dysplasia, normal cytogenetics and normal FISH with NGS showing ASXL1, SRSF2, TET2, and STAG2 mutations.  Bone marrow biopsy on 10/16/2024 showed persistent disease with 80% involvement.    Current Treatment:   Low-dose cytarabine and venetoclax started on 06/24/2024.    Treatment History:  Azacitidine and venetoclax started on 02/27/2023.  Started 7 out of 28 days, did this through cycle 5.  With cycle 6, moved to 5 out of 28 days.  Ultimately ended up on azacitidine 3/28 days and venetoclax 1 through 14/28 days.  Referral for possible stem cell transplant evaluation.    HPI:  74-year-old male with a history of CVA, hypertension, BPH, and melanoma who presented to the emergency department following a syncopal episode.  In February of 2022, the patient did have a slightly low white blood cell count, hemoglobin, and platelet count.  Per the patient, 3 months prior to admission, he was having dizziness, fatigue, weakness, shortness of breath, back pain, and leg pain.  The dizziness was worse with standing and walking.  On 01/20/2023, the patient had a syncopal episode.  He was brought to the emergency department and was found to be pancytopenic.  He also had stool that was positive for occult blood in the emergency department.  He received blood transfusion.  His CBC on 01/20/2023 revealed a white blood cell count of 2.6, hemoglobin of 7.7, and platelet count of 47,000. Reticulocytes were appropriately elevated, B12 and folate were within normal limits, iron level was slightly decreased.  Repeat blood work on 01/21/2023 revealed a white blood cell count of 2.8, post transfusion hemoglobin of 9.6, and a platelet count of 81185.  Hematology was consulted for pancytopenia.  EGD and colonoscopy done on 01/24/2023, EGD revealed nonbleeding ulcers,  colonoscopy unremarkable, however had to be aborted due to tortuous colon.  Bone Marrow Biopsy done on 01/25/2023.  Preliminary reveals AML with 27% blasts in a background of dysplasia.  Cytogenetics are within normal limits, FISH is within normal limits.  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  Started azacitidine and venetoclax on 02/27/2023, completed cycle 4 on 05/30/2023.  Held after this cycle and repeated a bone marrow biopsy, this was done on 06/27/2023, this showed a normocellular marrow (40%) with relative erythroid hyperplasia, sequential granulopoiesis and megakaryopoiesis.  The bone marrow was slightly suboptimal given it was predominantly subcortical bone with focal hematopoietic elements.  There was no morphologic or immunophenotypic evidence of previous acute myeloid leukemia, cytogenetics were normal, FISH was normal, NGS showed persistent ASXL1, SRSF2, TET2.    Repeat bone marrow biopsy done on 11/21/2023 showed no morphologic or immunophenotypic evidence of persistent acute myeloid leukemia. There were ASXL1, CEBPA, RUNX1, SRSF2, STAG2, and TET2 mutations.    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     Started low-dose cytarabine and venetoclax on 06/24/2024.    Bone marrow biopsy done on 10/16/2024 revealed persistent disease with 80% involvement of the bone marrow by leukemia.    Interval History:   Patient well known to me with recurrent AML.  Most recently he was on low-dose cytarabine and venetoclax.  Unfortunately, his disease persists.  He has had significant clinical decline.  We had a long conversation on 10/24/2024.  We had had multiple conversations leading up to this.  Ultimately the decision was made to move forward with hospice.        Past Medical History:   Diagnosis  Date    Anxiety and depression     BPH (benign prostatic hyperplasia)     History of CVA (cerebrovascular accident) without residual deficits     HTN (hypertension)     Malignant melanoma of skin, unspecified     NSTEMI (non-ST elevated myocardial infarction)     Sepsis, Gram negative     Sleep apnea       Past Surgical History:   Procedure Laterality Date    BONE MARROW ASPIRATION N/A 6/27/2023    Procedure: ASPIRATION, BONE MARROW;  Surgeon: Sabino Núñez MD;  Location: [facility];  Service: General;  Laterality: N/A;  ALL ASSESS FOR MOLECULAR MRD    BONE MARROW ASPIRATION N/A 5/22/2024    Procedure: ASPIRATION, BONE MARROW;  Surgeon: Sabino Núñez MD;  Location: [facility];  Service: General;  Laterality: N/A;    BONE MARROW BIOPSY N/A 1/25/2023    Procedure: Biopsy-bone marrow;  Surgeon: Rd Carlos MD;  Location: [facility];  Service: General;  Laterality: N/A;    BONE MARROW BIOPSY N/A 11/21/2023    Procedure: Biopsy-bone marrow;  Surgeon: Junior Castro MD;  Location: [facility];  Service: General;  Laterality: N/A;    BONE MARROW BIOPSY N/A 10/16/2024    Procedure: Biopsy-bone marrow;  Surgeon: Hansel Betancourt MD;  Location: [facility];  Service: General;  Laterality: N/A;    COLONOSCOPY      COLONOSCOPY N/A 1/24/2023    Procedure: COLONOSCOPY (DOUBLE);  Surgeon: Jimy Baker MD;  Location: [facility] ENDOSCOPY;  Service: Gastroenterology;  Laterality: N/A;    EGD, WITH CLOSED BIOPSY N/A 1/24/2023    Procedure: EGD, WITH CLOSED BIOPSY;  Surgeon: Jimy Bakre MD;  Location: [facility] ENDOSCOPY;  Service: Gastroenterology;  Laterality: N/A;    EXCISION OF MELANOMA      SINUS SURGERY      TONSILLECTOMY       Social History     Socioeconomic History    Marital status:    Tobacco Use    Smoking status: Never    Smokeless tobacco: Never   Substance and Sexual Activity    Alcohol use: Not Currently     Alcohol/week: 7.0 standard drinks of alcohol     Types: 7 Cans of beer per week    Drug use: Never    Sexual  activity: Yes     Social Drivers of Health     Financial Resource Strain: Low Risk  (10/2/2024)    Overall Financial Resource Strain (CARDIA)     Difficulty of Paying Living Expenses: Not very hard   Food Insecurity: No Food Insecurity (10/2/2024)    Hunger Vital Sign     Worried About Running Out of Food in the Last Year: Never true     Ran Out of Food in the Last Year: Never true   Transportation Needs: No Transportation Needs (10/2/2024)    TRANSPORTATION NEEDS     Transportation : No   Physical Activity: Insufficiently Active (4/20/2023)    Exercise Vital Sign     Days of Exercise per Week: 3 days     Minutes of Exercise per Session: 30 min   Stress: No Stress Concern Present (10/2/2024)    [employer] - Occupational Stress Questionnaire     Feeling of Stress : Only a little   Housing Stability: Low Risk  (10/2/2024)    Housing Stability Vital Sign     Unable to Pay for Housing in the Last Year: No     Homeless in the Last Year: No      Family History   Problem Relation Name Age of Onset    Cancer Mother      Cancer Father        Review of patient's allergies indicates:   Allergen Reactions    Acetaminophen Other (See Comments)     Restless leg     Benadryl [diphenhydramine hcl] Other (See Comments)     Restless Legs      Review of Systems   Constitutional:  Negative for chills, diaphoresis, fatigue, fever and unexpected weight change.   HENT:  Negative for nasal congestion, mouth sores, sinus pressure/congestion and sore throat.    Eyes:  Negative for pain and visual disturbance.   Respiratory:  Negative for cough, chest tightness and shortness of breath.    Cardiovascular:  Negative for chest pain, palpitations and leg swelling.   Gastrointestinal:  Negative for abdominal distention, abdominal pain, blood in stool, constipation and diarrhea.   Genitourinary:  Negative for dysuria, frequency and hematuria.   Musculoskeletal:  Positive for arthralgias and myalgias. Negative for back  pain.   Integumentary:  Negative for rash.   Neurological:  Negative for dizziness, syncope, weakness, numbness and headaches.   Hematological:  Negative for adenopathy.   Psychiatric/Behavioral:  Negative for confusion.          Objective:      Physical Exam  Vitals reviewed.   Constitutional:       General: He is awake.      Appearance: Normal appearance.   HENT:      Head: Normocephalic and atraumatic.      Right Ear: Hearing normal.      Left Ear: Hearing normal.      Nose: Nose normal.   Eyes:      General: Lids are normal. Vision grossly intact.      Extraocular Movements: Extraocular movements intact.      Conjunctiva/sclera: Conjunctivae normal.   Cardiovascular:      Rate and Rhythm: Normal rate and regular rhythm.      Pulses: Normal pulses.   Pulmonary:      Effort: Pulmonary effort is normal.      Breath sounds: Normal breath sounds. No wheezing, rhonchi or rales.   Musculoskeletal:      Cervical back: Full passive range of motion without pain.      Right lower leg: No edema.      Left lower leg: No edema.   Skin:     General: Skin is warm.      Comments:      Neurological:      General: No focal deficit present.      Mental Status: He is alert and oriented to person, place, and time.   Psychiatric:         Attention and Perception: Attention normal.         Mood and Affect: Mood and affect normal.         Behavior: Behavior is cooperative.         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    Evaluated by Dr. Valdez in [place].     Patient was on azacitidine on days 1 through 3 out of every 28 days and  venetoclax on days 1 through 14 out of every 28 days.  Repeat bone marrow biopsy on 05/22/2024 showed recurrent AML.    Switched to low-dose cytarabine and venetoclax on 06/24/2024.    Cycle 5 due on 10/14/2024.  Due to continued weakness, transfusion requirement increase, I feel that the patient needs a repeat bone marrow biopsy.    We will have a bone marrow biopsy next week.  If there is morphologic evidence of worsening AML, we will consider transitioning to hospice.  If his AML is stable or improved, we will continue with treatment.    Continue prophylaxis with 500 mg of levofloxacin daily, 200 mg of acyclovir b.i.d., 200 mg of fluconazole daily.    Tranexamic acid oral and prescribed Amicar mouthwash.    Bone marrow biopsy done on 10/16/2024 showed persistent disease, 80% blasts in the bone marrow.    Restoril sent to pharmacy    Hospice referral sent to [facility].    Potassium critically low today at 2.0, informing hospice team.        Eduard Luna II, MD    I, Terra Chisholm LPN, acted solely as a scribe for and in the presence of, Dr. Eduard Luna who performed the service.

## 2024-10-26 ENCOUNTER — PATIENT MESSAGE (OUTPATIENT)
Dept: INTERNAL MEDICINE | Facility: CLINIC | Age: 74
End: 2024-10-26
Payer: MEDICARE

## 2025-04-01 ENCOUNTER — SSC ENCOUNTER (OUTPATIENT)
Dept: ADMINISTRATIVE | Facility: OTHER | Age: 75
End: 2025-04-01
Payer: MEDICARE

## 2025-04-03 ENCOUNTER — OUTPATIENT CASE MANAGEMENT (OUTPATIENT)
Dept: ADMINISTRATIVE | Facility: OTHER | Age: 75
End: 2025-04-03
Payer: MEDICARE

## 2025-04-10 DIAGNOSIS — Z13.83 SCREENING FOR CARDIOVASCULAR, RESPIRATORY, AND GENITOURINARY DISEASES: ICD-10-CM

## 2025-04-10 DIAGNOSIS — Z13.228 SCREENING FOR ENDOCRINE, NUTRITIONAL, METABOLIC AND IMMUNITY DISORDER: ICD-10-CM

## 2025-04-10 DIAGNOSIS — Z13.6 SCREENING FOR CARDIOVASCULAR, RESPIRATORY, AND GENITOURINARY DISEASES: ICD-10-CM

## 2025-04-10 DIAGNOSIS — Z13.21 SCREENING FOR ENDOCRINE, NUTRITIONAL, METABOLIC AND IMMUNITY DISORDER: ICD-10-CM

## 2025-04-10 DIAGNOSIS — Z12.5 SCREENING FOR PROSTATE CANCER: ICD-10-CM

## 2025-04-10 DIAGNOSIS — Z13.89 SCREENING FOR CARDIOVASCULAR, RESPIRATORY, AND GENITOURINARY DISEASES: ICD-10-CM

## 2025-04-10 DIAGNOSIS — N40.0 BENIGN PROSTATIC HYPERPLASIA WITHOUT LOWER URINARY TRACT SYMPTOMS: ICD-10-CM

## 2025-04-10 DIAGNOSIS — Z86.73 HISTORY OF CVA (CEREBROVASCULAR ACCIDENT) WITHOUT RESIDUAL DEFICITS: ICD-10-CM

## 2025-04-10 DIAGNOSIS — I10 PRIMARY HYPERTENSION: Primary | ICD-10-CM

## 2025-04-10 DIAGNOSIS — D69.6 THROMBOCYTOPENIA: ICD-10-CM

## 2025-04-10 DIAGNOSIS — D61.818 PANCYTOPENIA: ICD-10-CM

## 2025-04-10 DIAGNOSIS — E55.9 VITAMIN D DEFICIENCY: ICD-10-CM

## 2025-04-10 DIAGNOSIS — I70.213 ATHEROSCLEROSIS OF NATIVE ARTERIES OF EXTREMITIES WITH INTERMITTENT CLAUDICATION, BILATERAL LEGS: ICD-10-CM

## 2025-04-10 DIAGNOSIS — Z00.00 MEDICARE ANNUAL WELLNESS VISIT, SUBSEQUENT: ICD-10-CM

## 2025-04-10 DIAGNOSIS — Z13.29 SCREENING FOR ENDOCRINE, NUTRITIONAL, METABOLIC AND IMMUNITY DISORDER: ICD-10-CM

## 2025-04-10 DIAGNOSIS — Z13.0 SCREENING FOR ENDOCRINE, NUTRITIONAL, METABOLIC AND IMMUNITY DISORDER: ICD-10-CM

## 2025-04-21 ENCOUNTER — OUTPATIENT CASE MANAGEMENT (OUTPATIENT)
Dept: ADMINISTRATIVE | Facility: OTHER | Age: 75
End: 2025-04-21
Payer: MEDICARE

## 2025-04-22 ENCOUNTER — TELEPHONE (OUTPATIENT)
Dept: INTERNAL MEDICINE | Facility: CLINIC | Age: 75
End: 2025-04-22
Payer: MEDICARE

## 2025-04-22 NOTE — TELEPHONE ENCOUNTER
----- Message from Med Assistant Rosales sent at 4/10/2025  9:56 AM CDT -----  Regarding:  Tuesday 4-29-25  Wellness AppointmentFasting wellness labs ordered and ready to do. Last Wellness 4-24-24

## (undated) DEVICE — FORCEP BX LG CAP 2.8MMX240CM

## (undated) DEVICE — KIT SURGICAL COLON .25 1.1OZ

## (undated) DEVICE — TIP SUCTION YANKAUER

## (undated) DEVICE — CONTAINER SPECIMEN SCREW 4OZ

## (undated) DEVICE — KIT CANIST SUCTION 1200CC

## (undated) DEVICE — ADAPTER DUAL NSL LUER M-M 7FT

## (undated) DEVICE — COLLECTION SPECIMEN NEPTUNE

## (undated) DEVICE — SOL IRRI STRL WATER 1000ML

## (undated) DEVICE — BLOCK BLOX BITE DENT RIM 54FR

## (undated) DEVICE — BAG LABGUARD BIOHAZARD 6X9IN

## (undated) DEVICE — TUBING O2 FEMALE CONN 13FT

## (undated) DEVICE — UNDERPAD DISPOSABLE 30X30IN